# Patient Record
Sex: MALE | Race: WHITE | NOT HISPANIC OR LATINO | Employment: OTHER | URBAN - METROPOLITAN AREA
[De-identification: names, ages, dates, MRNs, and addresses within clinical notes are randomized per-mention and may not be internally consistent; named-entity substitution may affect disease eponyms.]

---

## 2017-01-12 ENCOUNTER — CLINICAL SUPPORT (OUTPATIENT)
Dept: FAMILY MEDICINE CLINIC | Facility: CLINIC | Age: 77
End: 2017-01-12

## 2017-01-12 DIAGNOSIS — D51.3 OTHER DIETARY VITAMIN B12 DEFICIENCY ANEMIA: Primary | ICD-10-CM

## 2017-01-12 PROCEDURE — 96372 THER/PROPH/DIAG INJ SC/IM: CPT | Performed by: FAMILY MEDICINE

## 2017-01-12 RX ADMIN — CYANOCOBALAMIN 1000 MCG: 1000 INJECTION, SOLUTION INTRAMUSCULAR; SUBCUTANEOUS at 08:55

## 2017-02-02 ENCOUNTER — OFFICE VISIT (OUTPATIENT)
Dept: FAMILY MEDICINE CLINIC | Facility: CLINIC | Age: 77
End: 2017-02-02

## 2017-02-02 VITALS
DIASTOLIC BLOOD PRESSURE: 64 MMHG | TEMPERATURE: 98.1 F | HEART RATE: 77 BPM | HEIGHT: 72 IN | WEIGHT: 249 LBS | SYSTOLIC BLOOD PRESSURE: 134 MMHG | RESPIRATION RATE: 16 BRPM | BODY MASS INDEX: 33.72 KG/M2

## 2017-02-02 DIAGNOSIS — J30.89 NON-SEASONAL ALLERGIC RHINITIS DUE TO OTHER ALLERGIC TRIGGER: ICD-10-CM

## 2017-02-02 DIAGNOSIS — M54.32 SCIATICA OF LEFT SIDE: Primary | ICD-10-CM

## 2017-02-02 DIAGNOSIS — G62.9 NEUROPATHY: ICD-10-CM

## 2017-02-02 PROCEDURE — 99214 OFFICE O/P EST MOD 30 MIN: CPT | Performed by: FAMILY MEDICINE

## 2017-02-02 RX ORDER — LEVOCETIRIZINE DIHYDROCHLORIDE 5 MG/1
5 TABLET, FILM COATED ORAL DAILY
Qty: 90 TABLET | Refills: 1 | Status: SHIPPED | OUTPATIENT
Start: 2017-02-02 | End: 2017-02-02 | Stop reason: SDUPTHER

## 2017-02-02 RX ORDER — GABAPENTIN 400 MG/1
400 CAPSULE ORAL 4 TIMES DAILY
Qty: 120 CAPSULE | Refills: 1 | Status: SHIPPED | OUTPATIENT
Start: 2017-02-02 | End: 2017-03-16 | Stop reason: SDUPTHER

## 2017-02-02 RX ORDER — LEVOCETIRIZINE DIHYDROCHLORIDE 5 MG/1
5 TABLET, FILM COATED ORAL DAILY
Qty: 90 TABLET | Refills: 1 | Status: SHIPPED | OUTPATIENT
Start: 2017-02-02 | End: 2017-06-23 | Stop reason: SDUPTHER

## 2017-02-02 RX ORDER — HYDROCODONE BITARTRATE AND ACETAMINOPHEN 5; 300 MG/1; MG/1
1 TABLET ORAL DAILY
Qty: 30 TABLET | Refills: 0 | Status: SHIPPED | OUTPATIENT
Start: 2017-02-02 | End: 2017-02-13 | Stop reason: SDUPTHER

## 2017-02-02 NOTE — PROGRESS NOTES
"Violet Gerber Sr. is a 76 y.o. male.     CC: Pain    History of Present Illness     Pt of Dr. Wild comes in today c/o widespread pain of the hips and legs/knees. Has metastatic renal cell carcinoma and sees oncology. He went on a cruise and \"walked too much\" and overdid it somewhat.  The right leg has improved somewhat, although he admits to \"really pushing myself\" with is hiking with his cruise and has had pain since.    The following portions of the patient's history were reviewed and updated as appropriate: allergies, current medications, past family history, past medical history, past social history, past surgical history and problem list.    Review of Systems   Constitutional: Negative for activity change, chills, fatigue and fever.   Respiratory: Negative for cough and chest tightness.    Cardiovascular: Negative for chest pain and palpitations.   Gastrointestinal: Negative for abdominal pain and nausea.   Endocrine: Negative for cold intolerance and polydipsia.   Musculoskeletal: Positive for back pain.        Knee pain   Psychiatric/Behavioral: Negative for behavioral problems and dysphoric mood.   All other systems reviewed and are negative.    Visit Vitals   • /64   • Pulse 77   • Temp 98.1 °F (36.7 °C) (Oral)   • Resp 16   • Ht 72\" (182.9 cm)   • Wt 249 lb (113 kg)   • BMI 33.77 kg/m2       Objective   Physical Exam   Constitutional: He appears well-developed and well-nourished.   Neck: Neck supple. No thyromegaly present.   Cardiovascular: Normal rate and regular rhythm.    No murmur heard.  Pulmonary/Chest: Effort normal and breath sounds normal.   Abdominal: Bowel sounds are normal.   Musculoskeletal: He exhibits tenderness (left SI joint).   Psychiatric: He has a normal mood and affect. His behavior is normal.   Nursing note and vitals reviewed.    The patient has read and signed the [facility] Controlled Substance Contract.  I will continue to see patient for regular " follow up appointments.  They are well controlled on their medication.  ISRAEL has been reviewed by me and is updated every 3 months. The patient is aware of the potential for addiction and dependence.    Assessment/Plan   Mg was seen today for bilateral hip pain, bilateral knee pain, bilateral pain from ankle to hips and pain left groin area.    Diagnoses and all orders for this visit:    Sciatica of left side  -     Hydrocodone-Acetaminophen (VICODIN) 5-300 MG per tablet; Take 1 tablet by mouth Daily. For 30 days    Non-seasonal allergic rhinitis due to other allergic trigger  -     levocetirizine (XYZAL) 5 MG tablet; Take 1 tablet by mouth Daily.    Neuropathy  -     gabapentin (NEURONTIN) 400 MG capsule; Take 1 capsule by mouth 4 (Four) Times a Day.    Other orders  -     Discontinue: levocetirizine (XYZAL) 5 MG tablet; Take 1 tablet by mouth Daily.

## 2017-02-08 ENCOUNTER — OFFICE VISIT (OUTPATIENT)
Dept: NEUROLOGY | Facility: CLINIC | Age: 77
End: 2017-02-08

## 2017-02-08 VITALS
DIASTOLIC BLOOD PRESSURE: 64 MMHG | OXYGEN SATURATION: 98 % | WEIGHT: 249 LBS | HEIGHT: 73 IN | HEART RATE: 75 BPM | BODY MASS INDEX: 33 KG/M2 | SYSTOLIC BLOOD PRESSURE: 130 MMHG

## 2017-02-08 DIAGNOSIS — M54.40 CHRONIC LEFT-SIDED LOW BACK PAIN WITH SCIATICA, SCIATICA LATERALITY UNSPECIFIED: ICD-10-CM

## 2017-02-08 DIAGNOSIS — R51.9 HEADACHE AROUND THE EYES: ICD-10-CM

## 2017-02-08 DIAGNOSIS — G89.29 CHRONIC LEFT-SIDED LOW BACK PAIN WITH SCIATICA, SCIATICA LATERALITY UNSPECIFIED: ICD-10-CM

## 2017-02-08 DIAGNOSIS — G31.84 MILD COGNITIVE IMPAIRMENT: ICD-10-CM

## 2017-02-08 PROCEDURE — 99214 OFFICE O/P EST MOD 30 MIN: CPT | Performed by: PSYCHIATRY & NEUROLOGY

## 2017-02-08 NOTE — PROGRESS NOTES
Subjective   Mg Gerber Sr. is a 76 y.o. male with history of memory impairment with headaches came for follow-up appointment.    History of Present Illness   He was accompanied by his wife and according to them, he has fluctuations in his memory but denies forgetting familiar names and complaint with the medication donepezil and denies any side effects and sepsis it's helping his symptoms.  Denies any hallucinations, resting tremor, cogwheel rigidity or shuffling type of gait.  Denies any worsening of the headaches and happening 2-3 times a month, pain is behind his eyes, pressure-like pain and associated with nausea but no photophobia or phonophobia and complaint with the medication gabapentin and denies any side effects.  Complaining of back pain issues and sometimes radiation of pain from the back to the left side but denies any nerve vision, double vision, weakness, tingling numbness, dizziness or balance problems when walking.  Denies any falls, passing out spells or recent hospitalizations since last visit.    The following portions of the patient's history were reviewed and updated as appropriate: allergies, current medications, past family history, past medical history, past social history, past surgical history and problem list.    Review of Systems   Constitutional: Negative for chills, fatigue and fever.   HENT: Positive for hearing loss. Negative for tinnitus and trouble swallowing.    Eyes: Negative for pain, itching and visual disturbance.   Respiratory: Positive for shortness of breath. Negative for cough and wheezing.    Gastrointestinal: Negative for diarrhea, nausea and vomiting.   Endocrine: Negative for cold intolerance and heat intolerance.   Genitourinary: Negative for decreased urine volume, difficulty urinating and urgency.   Musculoskeletal: Positive for back pain and gait problem (hurt leg, a week ago). Negative for neck pain.   Skin: Negative for rash and wound.    Allergic/Immunologic: Negative for environmental allergies and food allergies.   Neurological: Negative for dizziness, weakness, light-headedness, numbness and headaches.   Hematological: Negative for adenopathy.   Psychiatric/Behavioral: Positive for sleep disturbance. Negative for hallucinations.       Objective   Physical Exam  GENERAL EXAMINATION : Patient is well-developed, well-nourished, well-groomed, alert and approriate in no apparent distress.  HEENT: Normocephalic, normal funduscopic exam, no visible oral lesions.  NECK : Normal range of motion, no tenderness, no malalignment.  CARDIAC : Regular rate and rhythm, no murmurs.  CHEST : Clear to auscultation, bilateral.   No wheezing .  ABDOMEN : Soft, non tender and non distended. EXTREMITIES: No edema. SKIN : No rashes or lesions visible. MUSCULOSKELETAL : No muscle weakness or muscle pain. No joint pains. PSYCHIATRIC : Awake and follwoing verbal commands well.     NEUROLOGICAL EXAMINATION  :  Higher integrative functions : No aphasia or dysarthria.  MMSE 29/30.  CRANIAL NERVES : Cranial nerve II: Normal visual acuity and visual fields.  On funduscopic exam, discs are flat with sharp margins cranial nerves III, IV, VI: Extraocular movements are full without nystagmus; pupils are equal, round and reactive to light.  Cranial nerve V: Normal facial sensation and strength of muscles of mastication.  Cranial nerve: VII: Facial movements are symmetric.  No weakness.  Cranial nerve VIII: Auditory acuity is normal.  Cranial nerve IX, X: Symmetric palate movement.  Cranial nerve XI sternocleidomastoid and trapezius are normal.  Cranial nerve XII: Tongue in the midline with no atrophy or fasciculations.      MOTOR : Normal muscle strength and tone.  SENSATION : Normal to light touch, pinprick, vibration sensations intact and Romberg is negative.  MUSCLE STRETCH REFLEXES : Normal and symmetric in the upper extremities and lower extremities and the plantar responses  are flexor bilaterally. COORDINATION : Finger to nose test showed no dysmetria.  Rapid alternating movements are normal.   GAIT : Walks on heels, toes, except for difficulty with tandem walk.    Assessment/Plan   Mg was seen today for dizziness.    Diagnoses and all orders for this visit:    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, sciatica laterality unspecified  -     Ambulatory Referral to Physical Therapy Evaluate and treat    Mild cognitive impairment    Headache around the eyes     76-year-old right-handed white male with history of renal carcinoma status post nephrectomy with recent lung mass and getting immunotherapy once in 2 weeks with memory impairment and headaches came for follow-up appointment and complaining of back pain issues.  On exam, no new focal deficits were seen except for difficulty with tandem walk and MMSE 29/30.  Reviewed neuropsychological evaluation dated February 2016 and showed evidence of mild cognitive impairment and will change donepezil to Namzaric dose pack for 3 months period and continue Namzaric 28-10 milligrams 1 capsule by mouth daily and discussed about side effects.  Discussed about brain exercises to help with the memory issues.  Told him to continue gabapentin at the same dosage and discussed about side effects.  Discussed about fall precautions and ordered for physical therapy to help with the balance issues and back pain problems.  Will follow-up in 4 months or earlier if problems arise.    Thank you for allowing me to participate in the care of the patient.  Please feel free to call for any questions at your convenience.    Yours sincerely,    Diann Daley M.D

## 2017-02-13 ENCOUNTER — OFFICE VISIT (OUTPATIENT)
Dept: FAMILY MEDICINE CLINIC | Facility: CLINIC | Age: 77
End: 2017-02-13

## 2017-02-13 VITALS
DIASTOLIC BLOOD PRESSURE: 61 MMHG | WEIGHT: 249 LBS | HEIGHT: 72 IN | HEART RATE: 66 BPM | SYSTOLIC BLOOD PRESSURE: 124 MMHG | TEMPERATURE: 97 F | BODY MASS INDEX: 33.72 KG/M2 | OXYGEN SATURATION: 93 %

## 2017-02-13 DIAGNOSIS — M54.40 CHRONIC LEFT-SIDED LOW BACK PAIN WITH SCIATICA, SCIATICA LATERALITY UNSPECIFIED: Primary | ICD-10-CM

## 2017-02-13 DIAGNOSIS — M54.32 SCIATICA OF LEFT SIDE: ICD-10-CM

## 2017-02-13 DIAGNOSIS — M19.90 ARTHRITIS: ICD-10-CM

## 2017-02-13 DIAGNOSIS — D51.3 OTHER DIETARY VITAMIN B12 DEFICIENCY ANEMIA: ICD-10-CM

## 2017-02-13 DIAGNOSIS — G89.29 CHRONIC LEFT-SIDED LOW BACK PAIN WITH SCIATICA, SCIATICA LATERALITY UNSPECIFIED: Primary | ICD-10-CM

## 2017-02-13 DIAGNOSIS — M25.552 PAIN OF LEFT HIP JOINT: ICD-10-CM

## 2017-02-13 PROCEDURE — 99214 OFFICE O/P EST MOD 30 MIN: CPT | Performed by: FAMILY MEDICINE

## 2017-02-13 PROCEDURE — 96372 THER/PROPH/DIAG INJ SC/IM: CPT | Performed by: FAMILY MEDICINE

## 2017-02-13 RX ORDER — HYDROCODONE BITARTRATE AND ACETAMINOPHEN 5; 300 MG/1; MG/1
TABLET ORAL
Qty: 60 TABLET | Refills: 0
Start: 2017-02-13 | End: 2017-03-16 | Stop reason: SDUPTHER

## 2017-02-13 RX ADMIN — CYANOCOBALAMIN 1000 MCG: 1000 INJECTION, SOLUTION INTRAMUSCULAR; SUBCUTANEOUS at 08:24

## 2017-02-13 NOTE — PROGRESS NOTES
Subjective   Mg Gerber Sr. is a 76 y.o. male.     History of Present Illness   Chief Complaint:   Chief Complaint   Patient presents with   • Back Pain   • B12 Injection       Mg Gerber Sr. 76 y.o. male who presents today for Medical Management of the below listed issues and medication refills. He is needing a refill on his pain medication. He is having a lot of back, hip and leg pain. He is currently in PT for this. He saw tania Walton for this and used the vicodin already. He was warned to only take vicodin  Only 1/2 tablet q 6hrs prn pain. He can try flexeril 5mg prn. He starts pt this week.  he has a history of   Patient Active Problem List   Diagnosis   • Anemia, vitamin B12 deficiency   • Arthritis   • Hemochromatosis   • Herpes zoster   • Hip pain   • Hypertension   • Cancer   • COPD (chronic obstructive pulmonary disease)   • Left low back pain   • Dizziness   • Headache around the eyes   • Mild cognitive impairment   • Renal cell carcinoma   • Anxiety   • Malignant neoplasm of lung   • Neuropathy   • Perennial allergic rhinitis   .  Since the last visit, he has overall felt well.  he has been compliant with   Current Outpatient Prescriptions:   •  ALPRAZolam (XANAX) 0.25 MG tablet, Take 1 tablet by mouth daily as needed for anxiety., Disp: 30 tablet, Rfl: 0  •  amLODIPine (NORVASC) 5 MG tablet, Take 5 mg by mouth Daily., Disp: , Rfl:   •  cyclobenzaprine (FLEXERIL) 5 MG tablet, Take 1 tablet by mouth 3 (Three) Times a Day As Needed for muscle spasms., Disp: 90 tablet, Rfl: 1  •  donepezil (ARICEPT) 10 MG tablet, Take 1 tablet by mouth Daily., Disp: 30 tablet, Rfl: 1  •  folic acid (FOLVITE) 1 MG tablet, Take 1 mg by mouth daily., Disp: , Rfl:   •  gabapentin (NEURONTIN) 400 MG capsule, Take 1 capsule by mouth 4 (Four) Times a Day., Disp: 120 capsule, Rfl: 1  •  Hydrocodone-Acetaminophen (VICODIN) 5-300 MG per tablet, Take 1 tablet by mouth Daily. For 30 days (Patient taking differently: Take 1  "tablet by mouth Every 6 (Six) Hours As Needed for moderate pain (4-6). For 30 days), Disp: 30 tablet, Rfl: 0  •  levocetirizine (XYZAL) 5 MG tablet, Take 1 tablet by mouth Daily., Disp: 90 tablet, Rfl: 1  •  levothyroxine (SYNTHROID, LEVOTHROID) 50 MCG tablet, Take 50 mcg by mouth Daily., Disp: , Rfl:   •  promethazine (PHENERGAN) 25 MG tablet, Take 1 tablet (25 mg total) by mouth daily., Disp: 30 tablet, Rfl: 1  •  raNITIdine (ZANTAC) 150 MG tablet, Take 150 mg by mouth Daily., Disp: , Rfl:   •  SPIRIVA HANDIHALER 18 MCG per inhalation capsule, Place 1 capsule into inhaler and inhale Daily., Disp: , Rfl:   •  VENTOLIN  (90 BASE) MCG/ACT inhaler, Inhale 2 puffs Every 4 (Four) Hours As Needed., Disp: , Rfl:   •  VOTRIENT 200 MG chemo tablet, Take 800 mg by mouth daily., Disp: , Rfl:   •  XARELTO 20 MG tablet, Take 20 mg by mouth daily with dinner., Disp: , Rfl:     Current Facility-Administered Medications:   •  cyanocobalamin injection 1,000 mcg, 1,000 mcg, Intramuscular, Q30 Days, Dillon Marsh MD, 1,000 mcg at 01/12/17 0855.  he denies medication side effects.    All of the chronic condition(s) listed above are stable w/o issues.    Visit Vitals   • /61   • Pulse 66   • Temp 97 °F (36.1 °C)   • Ht 72\" (182.9 cm)   • Wt 249 lb (113 kg)   • SpO2 93%   • BMI 33.77 kg/m2             The following portions of the patient's history were reviewed and updated as appropriate: allergies, current medications, past family history, past medical history, past social history, past surgical history and problem list.    Review of Systems   Constitutional: Negative for activity change, appetite change and unexpected weight change.   HENT: Negative for nosebleeds and trouble swallowing.    Eyes: Negative for pain and visual disturbance.   Respiratory: Negative for chest tightness, shortness of breath and wheezing.    Cardiovascular: Negative for chest pain and palpitations.   Gastrointestinal: Negative for abdominal " pain and blood in stool.   Endocrine: Negative.    Genitourinary: Negative for difficulty urinating and hematuria.   Musculoskeletal: Positive for arthralgias and back pain. Negative for joint swelling.   Skin: Negative for color change and rash.   Allergic/Immunologic: Negative.    Neurological: Negative for syncope and speech difficulty.   Hematological: Negative for adenopathy.   Psychiatric/Behavioral: Negative for agitation and confusion.   All other systems reviewed and are negative.      Objective   Physical Exam   Constitutional: He appears well-developed and well-nourished.   HENT:   Head: Normocephalic and atraumatic.   Eyes: EOM are normal. Pupils are equal, round, and reactive to light.   Neck: Normal range of motion.   Cardiovascular: Normal rate.    Pulmonary/Chest: Effort normal and breath sounds normal.   Abdominal: Soft.   Musculoskeletal:   Left pevis pain and pain left posterior  Buttock and left posterior muscles tight. Can walk but with pain. No neuro deficit   Psychiatric: He has a normal mood and affect. His behavior is normal. Judgment and thought content normal.   Nursing note reviewed.      Assessment/Plan   Mg was seen today for back pain and b12 injection.    Diagnoses and all orders for this visit:    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, sciatica laterality unspecified    Arthritis    Pain of left hip joint    Sciatica of left side  -     Hydrocodone-Acetaminophen (VICODIN) 5-300 MG per tablet; 1/2 tablet q 6 hours prn pain    Other dietary vitamin B12 deficiency anemia

## 2017-02-13 NOTE — PATIENT INSTRUCTIONS
Patients must be seen every 3 months for their presription medication review and refill required by [place] law.  Patient has been advised on the potential for addiction  and dependence to their prescribed scheduled medication.  ISRAEL was obtained today and reviewed. This was scanned into the patient's chart. Told patient only use norco  5mg  1/2 q 6 hours prn pain  See me 1 month   Start pt this week

## 2017-02-15 ENCOUNTER — TELEPHONE (OUTPATIENT)
Dept: NEUROLOGY | Facility: CLINIC | Age: 77
End: 2017-02-15

## 2017-02-15 NOTE — TELEPHONE ENCOUNTER
----- Message from Radha Calabrese sent at 2/15/2017 10:16 AM EST -----  PT SAID THAT DR CALLEJAS GAVE HIM 3 SAMPLES. PT SAID THAT HE HAS 2 TYPES OF CANCER AND HE HAND DELIVERED THE SAMPLES TO HIS DR DOWN AT THE [facility]. PT SAID THE DOCTOR TOLD HIM THAT IT WAS OKAY FOR HIM TO TAKE THE MEDICATION, BUT THE DOCTOR TOLD HIM THAT DR CALLEJAS HAS TO WATCH HIS LIVER LEVELS.  PT WANTS TO BE CALLED [phone]

## 2017-02-16 ENCOUNTER — TREATMENT (OUTPATIENT)
Dept: PHYSICAL THERAPY | Facility: CLINIC | Age: 77
End: 2017-02-16

## 2017-02-16 DIAGNOSIS — G89.29 CHRONIC LEFT-SIDED LOW BACK PAIN WITH LEFT-SIDED SCIATICA: Primary | ICD-10-CM

## 2017-02-16 DIAGNOSIS — M54.42 CHRONIC LEFT-SIDED LOW BACK PAIN WITH LEFT-SIDED SCIATICA: Primary | ICD-10-CM

## 2017-02-16 DIAGNOSIS — R26.9 GAIT DIFFICULTY: ICD-10-CM

## 2017-02-16 PROCEDURE — 97110 THERAPEUTIC EXERCISES: CPT | Performed by: PHYSICAL THERAPIST

## 2017-02-16 PROCEDURE — G8979 MOBILITY GOAL STATUS: HCPCS | Performed by: PHYSICAL THERAPIST

## 2017-02-16 PROCEDURE — G8978 MOBILITY CURRENT STATUS: HCPCS | Performed by: PHYSICAL THERAPIST

## 2017-02-16 PROCEDURE — 97140 MANUAL THERAPY 1/> REGIONS: CPT | Performed by: PHYSICAL THERAPIST

## 2017-02-16 PROCEDURE — 97161 PT EVAL LOW COMPLEX 20 MIN: CPT | Performed by: PHYSICAL THERAPIST

## 2017-02-16 NOTE — PROGRESS NOTES
"Physical Therapy Initial Evaluation and Plan of Care    Patient: Mg Gerber .   : 1940  Diagnosis/ICD-10 Code:  Chronic left-sided low back pain with left-sided sciatica [M54.42, G89.29]  Referring practitioner: Diann Daley MD  Past medical Hx reviewed: 2017  Subjective Evaluation    History of Present Illness  Date of onset: 2017  Mechanism of injury: I have L lower back pain with pain in both legs. The L side is worse.  I get some pain in the groin and in the thigh.  I do have symptoms of neuropathy but not disease.  I take medication for that.      The last week of January went on an excursion while on vacation and walked about 3 miles.  The terrain was uneven and was rough.  By the time I got back it was severe back pain.  When I got back home the pain continued to get worse and decided to go to the doctor.      I started doing some home exercises from previous PT episodes and seemed to help for a little, then it got a little worse.  I eventually just laid in bed for several days and that seemed to help some.  Rest seems to be the most helpful activity.  Movement seems to help my symptoms.    I still have to use the cane to help get around.      The back just feels like it needs knots to be worked out.  Able to drive and sit okay.      PLOF: pain was about 2/10 with normal activity and with cane.  I could also walk about 15-20 min.       Quality of life: fair (Due to injury)    Pain  At worst pain rating: 10 (Brief periods of time.  )  Location: L leg and thigh into the back.  Some mm knots in the back.    Quality: sharp (L leg initially felt like to leg was going to \"snap in half\" )  Relieving factors: rest, relaxation and medications  Aggravating factors: movement (Walking.  Transfers/transitions.  )  Progression: improved (Mostly due to rest.  and medication. )    Social Support  Lives in: multiple-level home (Steps are difficult but has to use them. )  Lives with: " spouse    Diagnostic Tests  X-ray: abnormal  Abnormal MRI: None since recent injury.      Treatments  Previous treatment: physical therapy, medication and home therapy  Current treatment: medication  Patient Goals  Patient goals for therapy: decreased pain, increased strength, independence with ADLs/IADLs, improved balance and increased motion  Patient goal: improved ability to ambulate and transfer.       Objective     Palpation   Left   Muscle spasm in the erector spinae and quadratus lumborum.   Tenderness of the erector spinae and quadratus lumborum.   Trigger point to erector spinae.     Additional Palpation Details  L gluteals, piriformis mm TTP and mm spasm.      Active Range of Motion     Lumbar   Flexion: 40 (in sitting) degrees   Extension: 15 (in sitting) degrees   Left lateral flexion: 30 (Pain with pain into leg.  ) degrees   Right lateral flexion: 33 (Pulling pain on L side.  ) degrees   Left rotation: 50 degrees   Right rotation: 55 degrees     Strength/Myotome Testing     Left Hip   Planes of Motion   Flexion: 4-  Abduction: 4- (hip pain)  Adduction: 5  External rotation: 4- (pain )  Internal rotation: 4+ (pain)    Right Hip   Planes of Motion   Flexion: 4  Abduction: 4-  Adduction: 5  External rotation: 4+  Internal rotation: 5    Left Knee   Flexion: 4+  Extension: 4    Right Knee   Flexion: 4+  Extension: 5    Left Ankle/Foot   Dorsiflexion: 5  Plantar flexion: 4+    Right Ankle/Foot   Dorsiflexion: 5  Plantar flexion: 4+    Tests     Lumbar     Left   Positive passive SLR.     Ambulation   Weight-Bearing Status   Weight-Bearing Status (Left): weight-bearing as tolerated   Pain with standing on L.    Weight-Bearing Status (Right): weight-bearing as tolerated    Assistive device used: single point cane    Ambulation: Level Surfaces   Ambulation with assistive device: independent  Ambulation without assistive device: minimum assist    Ambulation: Stairs   Ascend stairs: independent  Pattern:  non-reciprocal  Railings: one rail  Descend stairs: independent  Pattern: non-reciprocal  Railings: one rail  Curbs: hand held    Observational Gait   Gait: antalgic and asymmetric   Decreased walking speed, stride length, left stance time and left step length.   Left foot contact pattern: foot flat  Left arm swing: decreased  Base of support: increased    Quality of Movement During Gait     Pelvis    Pelvis (Left): Positive Trendelenburg.     Functional Assessment     Single Leg Stance   Left: 0 (Unable) seconds       Assessment & Plan     Assessment  Impairments: abnormal gait, abnormal or restricted ROM, activity intolerance, impaired physical strength, lacks appropriate home exercise program and pain with function  Assessment details: Pt presents to PT with signs and symptoms consistent with referral for back pain associated with sciatica.  Pt would benefit from skilled PT intervention to address noted deficits.    Barriers to therapy: Chronicity of symptoms.  Significant Co-morbidities.    Prognosis: fair  Prognosis details:           SHORT TERM GOALS: 4-5 visits        1. Patient to be compliant and progression of HEP                             2.  Pain level < 5/10 at worst with mentioned activities to improve function.  3. Increased thoracic, lumbar and SIJ mobility to allow for increased lumbar AROM with less pain<6/10.    4. Increased lumbar AROM to by 25% in all planes to allow for increased ease with sit-stand transfers and bed transfers without pain > 5/10.      LONG TERM GOALS:  8-10 visits   1.Pt. to score < 20 % on Back Index  2. Pain level < 2/10 with all listed activities to return to normal  3. Lumbar AROM to WFL to allow for return to household & recreational activities w/o increased symptoms.   4. (B) LE and lower abdominal strength to 5-/5 to allow for pushing, pulling and activities to occur without pain (driving, sitting, household).        Goals  improved ability to ambulate and transfer.       Plan  Therapy options: will be seen for skilled physical therapy services  Planned modality interventions: thermotherapy (hydrocollator packs), TENS and cryotherapy  Planned therapy interventions: manual therapy, neuromuscular re-education, postural training, soft tissue mobilization, joint mobilization, home exercise program, gait training, functional ROM exercises, flexibility, balance/weight-bearing training, strengthening, stretching, therapeutic activities and transfer training  Frequency: 2x week  Duration in weeks: 6  Treatment plan discussed with: patient    Manual Therapy:    15     mins  30213;  Therapeutic Exercise:    10     mins  29598;     Neuromuscular Devin:    -    mins  20113;    Therapeutic Activity:     -     mins  85429;     Gait Training:      -     mins  39491;     Ultrasound:     -     mins  25363;    Electrical Stimulation:    -     mins  69923 ( );  Dry Needling     -     mins self-pay    Timed Treatment:   25   mins   Total Treatment:     65   mins    PT SIGNATURE: PHILIPPE Willson License #: [id number]    DATE TREATMENT INITIATED: 2/16/2017    Medicare Initial Certification  Certification Period: 5/17/2017  I certify that the therapy services are furnished while this patient is under my care.  The services outlined above are required by this patient, and will be reviewed every 90 days.     PHYSICIAN: Diann Daley MD      DATE:     Please sign and return via fax to [phone].. Thank you, [facility].

## 2017-02-16 NOTE — PATIENT INSTRUCTIONS
HEP provided to patient with picture handouts.  Exercise to be performed 2-3x/day daily. Pt demonstrated good understanding and agreed with established plan.

## 2017-02-20 ENCOUNTER — TELEPHONE (OUTPATIENT)
Dept: NEUROLOGY | Facility: CLINIC | Age: 77
End: 2017-02-20

## 2017-02-20 ENCOUNTER — TREATMENT (OUTPATIENT)
Dept: PHYSICAL THERAPY | Facility: CLINIC | Age: 77
End: 2017-02-20

## 2017-02-20 DIAGNOSIS — M54.42 CHRONIC LEFT-SIDED LOW BACK PAIN WITH LEFT-SIDED SCIATICA: Primary | ICD-10-CM

## 2017-02-20 DIAGNOSIS — G89.29 CHRONIC LEFT-SIDED LOW BACK PAIN WITH LEFT-SIDED SCIATICA: Primary | ICD-10-CM

## 2017-02-20 DIAGNOSIS — R26.9 GAIT DIFFICULTY: ICD-10-CM

## 2017-02-20 PROCEDURE — 97110 THERAPEUTIC EXERCISES: CPT | Performed by: PHYSICAL THERAPIST

## 2017-02-20 PROCEDURE — 97140 MANUAL THERAPY 1/> REGIONS: CPT | Performed by: PHYSICAL THERAPIST

## 2017-02-20 NOTE — PROGRESS NOTES
Physical Therapy Daily Progress Note  Visits:2    Subjective : Mg Gerber reports: Feeling better overall. I'm on my cane only and getting weight on the L leg more, but still hurts.    Objective:  L lumbar paraspinals more restricted than R.    See Exercise, Manual, and Modality Logs for complete treatment.   Assessment/Plan:  Pt continues to report that hip mobilization with belt is very helpful in relieving groin discomfort, further indicating L hip dysfunction Vs. Lumbar spine.  Will continue to track progress.  If hip/groin does not resolve, may consult MD for hip examination.      Progress per Plan of Care       Manual Therapy:    15     mins  19420;  Therapeutic Exercise:    20     mins  68066;     Neuromuscular Devin:    -    mins  57422;    Therapeutic Activity:     -     mins  99583;     Gait Trainin     mins  81320;     Ultrasound:     -     mins  29629;    Electrical Stimulation:    -     mins  45310 ( );  Dry Needling     -     mins self-pay    Timed Treatment:   40   mins   Total Treatment:     65   mins    PHILIPPE Willson License #[id number]    Physical Therapist

## 2017-02-20 NOTE — TELEPHONE ENCOUNTER
----- Message from Madelyn Radford sent at 2/17/2017  4:12 PM EST -----  Pt called again and is upset because he hasn't heard back from us.  He needs to get his medication refilled Monday if he is to continue taking it but it's very expensive to refill.    He says he is going to call back Monday if he doesn't hear anything.

## 2017-02-21 NOTE — TELEPHONE ENCOUNTER
Called the patient and talked to him and told him to start Namzaric dose pack for 3 month period and lefta message at DR. Montes De Oca's office.    Call Dr. Nadia Montes De Oca's office at [facility] and inform them that they can monitor LFT's every month.

## 2017-02-23 ENCOUNTER — TREATMENT (OUTPATIENT)
Dept: PHYSICAL THERAPY | Facility: CLINIC | Age: 77
End: 2017-02-23

## 2017-02-23 DIAGNOSIS — M54.42 CHRONIC LEFT-SIDED LOW BACK PAIN WITH LEFT-SIDED SCIATICA: Primary | ICD-10-CM

## 2017-02-23 DIAGNOSIS — R26.9 GAIT DIFFICULTY: ICD-10-CM

## 2017-02-23 DIAGNOSIS — G89.29 CHRONIC LEFT-SIDED LOW BACK PAIN WITH LEFT-SIDED SCIATICA: Primary | ICD-10-CM

## 2017-02-23 PROCEDURE — 97110 THERAPEUTIC EXERCISES: CPT | Performed by: PHYSICAL THERAPIST

## 2017-02-23 PROCEDURE — 97140 MANUAL THERAPY 1/> REGIONS: CPT | Performed by: PHYSICAL THERAPIST

## 2017-02-23 NOTE — PROGRESS NOTES
Physical Therapy Daily Progress Note  Visits:3    Subjective : Mg Gerber reports: I'm a little stiff this morning and it seems that the more I walk and stand, the more pain I get in the leg and groin.  When I rest, it seems to be better.  I use a sit down cart at the grocery.    Objective: Ambulating with SPC, decreased WB'ing on L LE.   See Exercise, Manual, and Modality Logs for complete treatment.   Assessment/Plan:  Pt tolerated treatment fair.  We are still avoiding much WB'ing activity due to the irritation it causes.  Hip flexor stretching did prove helpful for him.  Emphasis on light stretching.  We are considering some hip dysunction in addition to back pain vs. Radicular pain from back into the hip.      Progress per Plan of Care     Manual Therapy:    12     mins  64011;  Therapeutic Exercise:    40     mins  00164;     Neuromuscular Devin:    -    mins  20402;    Therapeutic Activity:     -     mins  55950;     Gait Training:      -     mins  25720;     Ultrasound:     -     mins  70811;    Electrical Stimulation:    -     mins  84548 ( );  Dry Needling     -     mins self-pay    Timed Treatment:   52   mins   Total Treatment:     62   mins    PHILIPPE Willson License #[id number]    Physical Therapist

## 2017-02-27 ENCOUNTER — TREATMENT (OUTPATIENT)
Dept: PHYSICAL THERAPY | Facility: CLINIC | Age: 77
End: 2017-02-27

## 2017-02-27 DIAGNOSIS — G89.29 CHRONIC LEFT-SIDED LOW BACK PAIN WITH LEFT-SIDED SCIATICA: Primary | ICD-10-CM

## 2017-02-27 DIAGNOSIS — M54.42 CHRONIC LEFT-SIDED LOW BACK PAIN WITH LEFT-SIDED SCIATICA: Primary | ICD-10-CM

## 2017-02-27 DIAGNOSIS — R26.9 GAIT DIFFICULTY: ICD-10-CM

## 2017-02-27 PROCEDURE — 97140 MANUAL THERAPY 1/> REGIONS: CPT | Performed by: PHYSICAL THERAPIST

## 2017-02-27 PROCEDURE — 97110 THERAPEUTIC EXERCISES: CPT | Performed by: PHYSICAL THERAPIST

## 2017-02-27 NOTE — PROGRESS NOTES
Physical Therapy Daily Progress Note  Visits:4    Subjective : Mg Gerber reports: Having more groin pain today.  I laid around a lot this weekend because it seems to be the only thing that helps.  I'm having more pain in the thigh too.   Objective :  Noticably more antalgia noted today with gait.  Heavy use of cane.    See Exercise, Manual, and Modality Logs for complete treatment.   Assessment/Plan: Today's visit was significantly modified due to his symptoms being worse.  What was previously very helpful (hip mobilzations and lumbar passive stretching) seemed to bother him today. He was instructed to call referring physician's office to set up appointment/ consult for further examination and possible imaging due to worsening symptoms.     Other:  Continue to treat for symptom alleviation until seen by MD.         Manual Therapy:    15     mins  79126;  Therapeutic Exercise:    20     mins  53463;     Neuromuscular Devin:    -    mins  74942;    Therapeutic Activity:     -     mins  35505;     Gait Training:      -     mins  22156;     Ultrasound:     -     mins  83212;    Electrical Stimulation:    -     mins  15151 ( );  Dry Needling     -     mins self-pay    Timed Treatment:   35   mins   Total Treatment:     50   mins    PHILIPPE Willson License #[id number]    Physical Therapist

## 2017-03-01 ENCOUNTER — TELEPHONE (OUTPATIENT)
Dept: NEUROLOGY | Facility: CLINIC | Age: 77
End: 2017-03-01

## 2017-03-06 ENCOUNTER — TELEPHONE (OUTPATIENT)
Dept: NEUROLOGY | Facility: CLINIC | Age: 77
End: 2017-03-06

## 2017-03-06 DIAGNOSIS — G89.29 CHRONIC LEFT-SIDED LOW BACK PAIN WITH SCIATICA, SCIATICA LATERALITY UNSPECIFIED: Primary | ICD-10-CM

## 2017-03-06 DIAGNOSIS — M54.40 CHRONIC LEFT-SIDED LOW BACK PAIN WITH SCIATICA, SCIATICA LATERALITY UNSPECIFIED: Primary | ICD-10-CM

## 2017-03-06 NOTE — TELEPHONE ENCOUNTER
Reviewed physical therapist notes and inform the patient to call me for worsening of his symptoms and to take an appointment earlier.

## 2017-03-06 NOTE — TELEPHONE ENCOUNTER
Called the patient and discussed about MRI of the lumbar spine but his oncologist ordered for MRI of the hip and lower lumbar spine and scheduled to get it done tomorrow and will review the report and will consider further evaluation depending on MRI reports.

## 2017-03-06 NOTE — TELEPHONE ENCOUNTER
Reviewed his previous records and ordered for MRI of the lumbar spine without contrast to evaluate for low back pain issues with sciatica and informed the patient that I ordered for MRI of the lumbar spine and told him to call me if he has any questions.

## 2017-03-06 NOTE — TELEPHONE ENCOUNTER
----- Message from Radha Calabrese sent at 3/3/2017  4:21 PM EST -----  Pt says he's in a lot of pain. [phone] cell phone or [phone] home phone preferably call the home phone. Pt said if  doesn't want to treat him anymore let him know so that he could find another doctor. Pt said he is very upset with our office. Pt said that he's had to go to his oncology doctor and they ordered him a MRI for his hip. Pt said that he been taking a lot of medications trying to relieve some of the pain.

## 2017-03-10 ENCOUNTER — TELEPHONE (OUTPATIENT)
Dept: NEUROLOGY | Facility: CLINIC | Age: 77
End: 2017-03-10

## 2017-03-10 NOTE — TELEPHONE ENCOUNTER
----- Message from Radha Calabrese sent at 3/10/2017  2:48 PM EST -----  Contact: [phone]  PT HAD HIS MRI @ [facility] 2/28. PT SAID THAT DR. MENDOZA CALLED AND GAVE PT HIS RESULTS. PT SAID THAT DR. MENDOZA IS REFERRING HIM TO A ORTHOPEDIC SURGEON BECAUSE OF WHAT WAS SEEN ON THE TEST. PT WOULD LIKE TO TALK TO . CALL AND LEAVE MESSAGE ON HIS ANSWERING MACHINE.

## 2017-03-13 ENCOUNTER — TELEPHONE (OUTPATIENT)
Dept: NEUROLOGY | Facility: CLINIC | Age: 77
End: 2017-03-13

## 2017-03-13 NOTE — TELEPHONE ENCOUNTER
Called the patient and discussed about MRI of the lumbar spine and MRI hip report and patient is scheduled to see orthopedic surgeon soon to evaluate for lower back issues and hip issues and told him to call me for any questions.

## 2017-03-13 NOTE — TELEPHONE ENCOUNTER
Called the patient to discuss about his MRI and left a message to call back. I need the results of MRI Hip and Lumbar spine form [facility].

## 2017-03-13 NOTE — TELEPHONE ENCOUNTER
----- Message from Radha Calabrese sent at 3/13/2017 10:55 AM EDT -----  Contact: [phone]  PT WILL BE GONE ALL DAY TOMORROW FOR ABOUT 5HRS AT THE [facility]. PT WAS JUST RETURNING DR. CALLEJAS'S PHONE CALL, THAT HE MISSED.

## 2017-03-16 ENCOUNTER — OFFICE VISIT (OUTPATIENT)
Dept: FAMILY MEDICINE CLINIC | Facility: CLINIC | Age: 77
End: 2017-03-16

## 2017-03-16 VITALS
RESPIRATION RATE: 16 BRPM | BODY MASS INDEX: 33.86 KG/M2 | OXYGEN SATURATION: 95 % | SYSTOLIC BLOOD PRESSURE: 130 MMHG | WEIGHT: 250 LBS | HEART RATE: 71 BPM | DIASTOLIC BLOOD PRESSURE: 66 MMHG | TEMPERATURE: 97.5 F | HEIGHT: 72 IN

## 2017-03-16 DIAGNOSIS — M54.40 CHRONIC LEFT-SIDED LOW BACK PAIN WITH SCIATICA, SCIATICA LATERALITY UNSPECIFIED: ICD-10-CM

## 2017-03-16 DIAGNOSIS — E53.8 VITAMIN B12 DEFICIENCY: Primary | ICD-10-CM

## 2017-03-16 DIAGNOSIS — M54.32 SCIATICA OF LEFT SIDE: ICD-10-CM

## 2017-03-16 DIAGNOSIS — G89.29 CHRONIC LEFT-SIDED LOW BACK PAIN WITH SCIATICA, SCIATICA LATERALITY UNSPECIFIED: ICD-10-CM

## 2017-03-16 DIAGNOSIS — G62.9 NEUROPATHY: ICD-10-CM

## 2017-03-16 PROCEDURE — 96372 THER/PROPH/DIAG INJ SC/IM: CPT | Performed by: FAMILY MEDICINE

## 2017-03-16 PROCEDURE — 99214 OFFICE O/P EST MOD 30 MIN: CPT | Performed by: FAMILY MEDICINE

## 2017-03-16 RX ORDER — HYDROCODONE BITARTRATE AND ACETAMINOPHEN 5; 300 MG/1; MG/1
TABLET ORAL
Qty: 90 TABLET | Refills: 0 | Status: SHIPPED | OUTPATIENT
Start: 2017-03-16 | End: 2017-06-23 | Stop reason: SDUPTHER

## 2017-03-16 RX ORDER — GABAPENTIN 400 MG/1
400 CAPSULE ORAL 4 TIMES DAILY
Qty: 120 CAPSULE | Refills: 5 | Status: SHIPPED | OUTPATIENT
Start: 2017-03-16 | End: 2017-09-22 | Stop reason: SDUPTHER

## 2017-03-16 RX ADMIN — CYANOCOBALAMIN 1000 MCG: 1000 INJECTION, SOLUTION INTRAMUSCULAR; SUBCUTANEOUS at 08:24

## 2017-03-16 NOTE — PROGRESS NOTES
Subjective   Mg Gerber Sr. is a 76 y.o. male.     History of Present Illness   Chief Complaint:   Chief Complaint   Patient presents with   • Back Pain   • Sciatica       Mg Gerber Sr. 76 y.o. male who presents today for a 4 week follow up for back pain with sciatica. HIs pain is a 6 out of 10 today. He had a B 12 injection in the office today. I reviewed his lab results. The patient has read and signed the [facility] Controlled Substance Contract.  I will continue to see patient for regular follow up appointments.  They are well controlled on their medication.  ISRAEL has been reviewed by me and is updated every 3 months. He also brought in x-ray reports and i said to follow up with that  Specialist. i will increase hydrocodone to 1 q 8hours. The patient is aware of the potential for addiction and dependence.    he has a history of   Patient Active Problem List   Diagnosis   • Anemia, vitamin B12 deficiency   • Arthritis   • Hemochromatosis   • Herpes zoster   • Hip pain   • Hypertension   • Cancer   • COPD (chronic obstructive pulmonary disease)   • Left low back pain   • Dizziness   • Headache around the eyes   • Mild cognitive impairment   • Renal cell carcinoma   • Anxiety   • Malignant neoplasm of lung   • Neuropathy   • Perennial allergic rhinitis   .  Since the last visit, he has overall felt well.  he has been compliant with   Current Outpatient Prescriptions:   •  ALPRAZolam (XANAX) 0.25 MG tablet, Take 1 tablet by mouth daily as needed for anxiety., Disp: 30 tablet, Rfl: 0  •  amLODIPine (NORVASC) 5 MG tablet, Take 5 mg by mouth Daily., Disp: , Rfl:   •  cyclobenzaprine (FLEXERIL) 5 MG tablet, Take 1 tablet by mouth 3 (Three) Times a Day As Needed for muscle spasms., Disp: 90 tablet, Rfl: 1  •  donepezil (ARICEPT) 10 MG tablet, Take 1 tablet by mouth Daily., Disp: 30 tablet, Rfl: 1  •  folic acid (FOLVITE) 1 MG tablet, Take 1 mg by mouth daily., Disp: , Rfl:   •  gabapentin (NEURONTIN)  "400 MG capsule, Take 1 capsule by mouth 4 (Four) Times a Day., Disp: 120 capsule, Rfl: 1  •  Hydrocodone-Acetaminophen (VICODIN) 5-300 MG per tablet, 1/2 tablet q 6 hours prn pain, Disp: 60 tablet, Rfl: 0  •  levocetirizine (XYZAL) 5 MG tablet, Take 1 tablet by mouth Daily., Disp: 90 tablet, Rfl: 1  •  levothyroxine (SYNTHROID, LEVOTHROID) 50 MCG tablet, Take 50 mcg by mouth Daily., Disp: , Rfl:   •  Memantine HCl-Donepezil HCl 7 & 14 & 21 &28 -10 MG capsule extended-release 24 hour therapy, Take  by mouth., Disp: , Rfl:   •  promethazine (PHENERGAN) 25 MG tablet, Take 1 tablet (25 mg total) by mouth daily., Disp: 30 tablet, Rfl: 1  •  raNITIdine (ZANTAC) 150 MG tablet, Take 150 mg by mouth Daily., Disp: , Rfl:   •  SPIRIVA HANDIHALER 18 MCG per inhalation capsule, Place 1 capsule into inhaler and inhale Daily., Disp: , Rfl:   •  VENTOLIN  (90 BASE) MCG/ACT inhaler, Inhale 2 puffs Every 4 (Four) Hours As Needed., Disp: , Rfl:   •  VOTRIENT 200 MG chemo tablet, Take 800 mg by mouth daily., Disp: , Rfl:   •  XARELTO 20 MG tablet, Take 20 mg by mouth daily with dinner., Disp: , Rfl:     Current Facility-Administered Medications:   •  cyanocobalamin injection 1,000 mcg, 1,000 mcg, Intramuscular, Q30 Days, Dillon Marsh MD, 1,000 mcg at 03/16/17 0824.  he denies medication side effects.    All of the chronic condition(s) listed above are stable w/o issues.    Visit Vitals   • /66   • Pulse 71   • Temp 97.5 °F (36.4 °C) (Oral)   • Resp 16   • Ht 72\" (182.9 cm)   • Wt 250 lb (113 kg)   • SpO2 95%   • BMI 33.91 kg/m2       Results for orders placed or performed in visit on 12/18/15   PTH, intact   Result Value Ref Range    PTH, Intact 16 15 - 65 pg/mL         The following portions of the patient's history were reviewed and updated as appropriate: allergies, current medications, past family history, past medical history, past social history, past surgical history and problem list.    Review of Systems "   Constitutional: Negative for activity change, appetite change and unexpected weight change.   Eyes: Negative for visual disturbance.   Respiratory: Negative for chest tightness and shortness of breath.    Cardiovascular: Negative for chest pain and palpitations.   Musculoskeletal: Positive for back pain.   Skin: Negative for color change.   Neurological: Negative for syncope and speech difficulty.   Psychiatric/Behavioral: Negative for confusion and decreased concentration.       Objective   Physical Exam   Constitutional: He is oriented to person, place, and time. He appears well-developed and well-nourished.   HENT:   Head: Normocephalic.   Eyes: EOM are normal.   Neck: Normal range of motion. Neck supple.   Cardiovascular: Normal rate and regular rhythm.    Pulmonary/Chest: Effort normal and breath sounds normal.   Abdominal: Soft.   Musculoskeletal:   Pain left back and hip   Pain is severe see reports   Neurological: He is alert and oriented to person, place, and time.   Psychiatric: He has a normal mood and affect. His behavior is normal.   Nursing note and vitals reviewed.      Assessment/Plan   Mg was seen today for back pain, sciatica and b12 injection.    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

## 2017-03-16 NOTE — PATIENT INSTRUCTIONS
Patients must be seen every 3 months for their presription medication review and refill required by [place] law.  Patient has been advised on the potential for addiction  and dependence to their prescribed scheduled medication.  ISRAEL was obtained today and reviewed. This was scanned into the patient's chart.

## 2017-03-20 ENCOUNTER — TREATMENT (OUTPATIENT)
Dept: PHYSICAL THERAPY | Facility: CLINIC | Age: 77
End: 2017-03-20

## 2017-03-20 ENCOUNTER — TELEPHONE (OUTPATIENT)
Dept: NEUROLOGY | Facility: CLINIC | Age: 77
End: 2017-03-20

## 2017-03-20 DIAGNOSIS — M54.42 CHRONIC LEFT-SIDED LOW BACK PAIN WITH LEFT-SIDED SCIATICA: Primary | ICD-10-CM

## 2017-03-20 DIAGNOSIS — G89.29 CHRONIC LEFT-SIDED LOW BACK PAIN WITH LEFT-SIDED SCIATICA: Primary | ICD-10-CM

## 2017-03-20 DIAGNOSIS — R26.9 GAIT DIFFICULTY: ICD-10-CM

## 2017-03-20 PROCEDURE — G0283 ELEC STIM OTHER THAN WOUND: HCPCS | Performed by: PHYSICAL THERAPIST

## 2017-03-20 PROCEDURE — 97110 THERAPEUTIC EXERCISES: CPT | Performed by: PHYSICAL THERAPIST

## 2017-03-20 NOTE — TELEPHONE ENCOUNTER
----- Message from Madelyn Radford sent at 3/20/2017  2:38 PM EDT -----  Contact: [phone]  Pt says Dr. Daley wanted to speak with him after he met with the doctor at the [facility].    He would like to talk to her about what they've told him.

## 2017-03-20 NOTE — PROGRESS NOTES
Physical Therapy Progress Note  Visits:5    Subjective : Mg Gerber reports: The L hip/ groin pain isn't nearly as painful as it used to be over the last week.  The pain seems to have moved to the thigh, buttock area, and down the inside of the leg and into the ankle.  I have a tough time standing too long and the leg feels like it's going to snap.  1/10 pain in rest, transition to standing: 10/10 and then 6-7/10 after walking a little.  Pain increases after about 200 ft of walking.  Still using cane and taking pain medication to address the pain.      Objective :  No objective measurements taken today.  Significant pain demonstrated.     Gait: Noted increased antalgia and decreased stance time on the L.    -See Exercise, Manual, and Modality Logs for complete treatment.     Assessment/Plan: Extensive patient education performed today to attempt to explain the findings of his diagnostic imaging of the hips and lumbar spine.  Pt had several questions and concerns about his current status.  He was informed that due to the extent of dysfunction and degeneration of hip and spinal structures, PT would only be a means of pain control.  We did apply TENS unit and Ice to the hip and lumbar spine today, in which he reported significant pain relief.  No TE progression or manual TE performed today.      Other: Follow up PRN as he completes medical intervention.       Manual Therapy:    -     mins  38880;  Therapeutic Exercise:    12     mins  43792;     Neuromuscular Devin:    -    mins  33940;    Therapeutic Activity:     -     mins  33743;     Gait Training:      -     mins  56955;     Ultrasound:     -     mins  23563;    Electrical Stimulation:    15     mins  86281 ( );  Dry Needling     -     mins self-pay    Timed Treatment:   12   mins   Total Treatment:     45   mins    PHILIPPE Willson License #[id number]    Physical Therapist

## 2017-03-22 ENCOUNTER — OFFICE VISIT (OUTPATIENT)
Dept: FAMILY MEDICINE CLINIC | Facility: CLINIC | Age: 77
End: 2017-03-22

## 2017-03-22 ENCOUNTER — TELEPHONE (OUTPATIENT)
Dept: PHYSICAL THERAPY | Facility: CLINIC | Age: 77
End: 2017-03-22

## 2017-03-22 VITALS
WEIGHT: 246 LBS | BODY MASS INDEX: 33.32 KG/M2 | HEART RATE: 96 BPM | RESPIRATION RATE: 16 BRPM | DIASTOLIC BLOOD PRESSURE: 74 MMHG | SYSTOLIC BLOOD PRESSURE: 138 MMHG | TEMPERATURE: 98.7 F | HEIGHT: 72 IN | OXYGEN SATURATION: 97 %

## 2017-03-22 DIAGNOSIS — M25.551 RIGHT HIP PAIN: Primary | ICD-10-CM

## 2017-03-22 DIAGNOSIS — Z01.818 PREOPERATIVE CLEARANCE: ICD-10-CM

## 2017-03-22 PROCEDURE — 99214 OFFICE O/P EST MOD 30 MIN: CPT | Performed by: FAMILY MEDICINE

## 2017-03-22 NOTE — PROGRESS NOTES
Subjective   Mg Gerber Sr. is a 76 y.o. male.     History of Present Illness   Chief Complaint:   Chief Complaint   Patient presents with   • Hip Pain     referral to Cardiology for surgery clearance       Mg Gerber Sr. 76 y.o. male who presents today for a referral to Cardiology for surgery clearance. He was referred by Dr. Montes De Oca to be seen by Dr. Estevez for his left hip pain. He was informed that there are 3 possibilities: cancer, Fx, or a tumor. They stated that the top of the femur is dying. They are going to run a biopsy first. It is waiting to be scheduled once he gets the clearance from the pulmonologist and cardiologist.  His pain is a 5-6 out of 10 today. He stated if he was not on his pain medication it would be a 10+.    he has a history of   Patient Active Problem List   Diagnosis   • Anemia, vitamin B12 deficiency   • Arthritis   • Hemochromatosis   • Herpes zoster   • Hip pain   • Hypertension   • Cancer   • COPD (chronic obstructive pulmonary disease)   • Left low back pain   • Dizziness   • Headache around the eyes   • Mild cognitive impairment   • Renal cell carcinoma   • Anxiety   • Malignant neoplasm of lung   • Neuropathy   • Perennial allergic rhinitis   • Vitamin B12 deficiency   .  Since the last visit, he has overall felt well.  he has been compliant with   Current Outpatient Prescriptions:   •  ALPRAZolam (XANAX) 0.25 MG tablet, Take 1 tablet by mouth daily as needed for anxiety., Disp: 30 tablet, Rfl: 0  •  amLODIPine (NORVASC) 5 MG tablet, Take 5 mg by mouth Daily., Disp: , Rfl:   •  cyclobenzaprine (FLEXERIL) 5 MG tablet, Take 1 tablet by mouth 3 (Three) Times a Day As Needed for muscle spasms., Disp: 90 tablet, Rfl: 1  •  donepezil (ARICEPT) 10 MG tablet, Take 1 tablet by mouth Daily., Disp: 30 tablet, Rfl: 1  •  folic acid (FOLVITE) 1 MG tablet, Take 1 mg by mouth daily., Disp: , Rfl:   •  gabapentin (NEURONTIN) 400 MG capsule, Take 1 capsule by mouth 4 (Four) Times a  "Day., Disp: 120 capsule, Rfl: 5  •  Hydrocodone-Acetaminophen (VICODIN) 5-300 MG per tablet, 1/2 tablet q 4 hours prn pain, Disp: 90 tablet, Rfl: 0  •  levocetirizine (XYZAL) 5 MG tablet, Take 1 tablet by mouth Daily., Disp: 90 tablet, Rfl: 1  •  levothyroxine (SYNTHROID, LEVOTHROID) 50 MCG tablet, Take 50 mcg by mouth Daily., Disp: , Rfl:   •  Memantine HCl-Donepezil HCl 7 & 14 & 21 &28 -10 MG capsule extended-release 24 hour therapy, Take  by mouth., Disp: , Rfl:   •  promethazine (PHENERGAN) 25 MG tablet, Take 1 tablet (25 mg total) by mouth daily., Disp: 30 tablet, Rfl: 1  •  raNITIdine (ZANTAC) 150 MG tablet, Take 150 mg by mouth Daily., Disp: , Rfl:   •  SPIRIVA HANDIHALER 18 MCG per inhalation capsule, Place 1 capsule into inhaler and inhale Daily., Disp: , Rfl:   •  VENTOLIN  (90 BASE) MCG/ACT inhaler, Inhale 2 puffs Every 4 (Four) Hours As Needed., Disp: , Rfl:   •  VOTRIENT 200 MG chemo tablet, Take 800 mg by mouth daily., Disp: , Rfl:   •  XARELTO 20 MG tablet, Take 20 mg by mouth daily with dinner., Disp: , Rfl:     Current Facility-Administered Medications:   •  cyanocobalamin injection 1,000 mcg, 1,000 mcg, Intramuscular, Q30 Days, Dillon Marsh MD, 1,000 mcg at 03/16/17 0824.  he denies medication side effects.    All of the chronic condition(s) listed above are stable w/o issues.    /74  Pulse 96  Temp 98.7 °F (37.1 °C) (Oral)   Resp 16  Ht 72\" (182.9 cm)  Wt 246 lb (112 kg)  SpO2 97%  BMI 33.36 kg/m2    The following portions of the patient's history were reviewed and updated as appropriate: allergies, current medications, past family history, past social history, past surgical history and problem list.    Review of Systems   Constitutional: Negative for activity change, appetite change and unexpected weight change.   Eyes: Negative for visual disturbance.   Respiratory: Negative for chest tightness and shortness of breath.    Cardiovascular: Negative for chest pain and " palpitations.   Musculoskeletal:        Right hip pain   Skin: Negative for color change.   Neurological: Negative for syncope and speech difficulty.   Psychiatric/Behavioral: Negative for confusion and decreased concentration.       Objective   Physical Exam   Constitutional: He is oriented to person, place, and time. He appears well-developed and well-nourished.   HENT:   Head: Atraumatic.   Mouth/Throat: Oropharynx is clear and moist.   Eyes: EOM are normal. Pupils are equal, round, and reactive to light.   Neck: Normal range of motion. Neck supple. No thyromegaly present.   Cardiovascular: Normal rate and regular rhythm.    Pulmonary/Chest: Effort normal and breath sounds normal.   Abdominal: Soft.   Musculoskeletal: Normal range of motion.   Walking with walker  Pain left hip   Neurological: He is alert and oriented to person, place, and time.   Skin: Skin is warm and dry.   Psychiatric: He has a normal mood and affect. His behavior is normal.   Nursing note and vitals reviewed.      Assessment/Plan   Mg was seen today for hip pain.    Diagnoses and all orders for this visit:    Right hip pain  -     Ambulatory Referral to Cardiology    Preoperative clearance  -     Ambulatory Referral to Cardiology    Other orders  -     Cancel: Ambulatory Referral to Cardiology

## 2017-03-22 NOTE — PATIENT INSTRUCTIONS
Exercise 30 minutes most days of the week  Sleep 6-8 hours each night if possible  Low fat, low cholesterol diet   we discussed prescribed medications and how to take them   make sure you get results of any labs/studies ordered today  Low glycemic index diet

## 2017-03-24 ENCOUNTER — TRANSCRIBE ORDERS (OUTPATIENT)
Dept: CARDIOLOGY | Facility: CLINIC | Age: 77
End: 2017-03-24

## 2017-03-24 ENCOUNTER — OFFICE VISIT (OUTPATIENT)
Dept: CARDIOLOGY | Facility: CLINIC | Age: 77
End: 2017-03-24

## 2017-03-24 VITALS
SYSTOLIC BLOOD PRESSURE: 128 MMHG | HEART RATE: 78 BPM | DIASTOLIC BLOOD PRESSURE: 70 MMHG | WEIGHT: 251 LBS | HEIGHT: 73 IN | BODY MASS INDEX: 33.27 KG/M2

## 2017-03-24 DIAGNOSIS — R94.31 ABNORMAL ECG: Primary | ICD-10-CM

## 2017-03-24 DIAGNOSIS — R06.02 SHORTNESS OF BREATH: ICD-10-CM

## 2017-03-24 DIAGNOSIS — R94.31 ABNORMAL ELECTROCARDIOGRAM: Primary | ICD-10-CM

## 2017-03-24 PROCEDURE — 93000 ELECTROCARDIOGRAM COMPLETE: CPT | Performed by: INTERNAL MEDICINE

## 2017-03-24 PROCEDURE — 99203 OFFICE O/P NEW LOW 30 MIN: CPT | Performed by: INTERNAL MEDICINE

## 2017-03-24 NOTE — PROGRESS NOTES
Subjective:     Encounter Date:03/24/2017      Patient ID: Mg Gerber Sr. is a 76 y.o. male.    Chief Complaint:  Shortness of Breath   This is a recurrent problem. The current episode started more than 1 year ago. The problem has been unchanged. Associated symptoms include wheezing. Pertinent negatives include no chest pain, leg pain, leg swelling, orthopnea, PND or syncope.   [id number] who presents today for perioperative risk assessment for his hip.  Patient has multiple cardiac issues including hypertension COPD and pulmonary embolisms and sleep apnea.  His EKG showed some nonspecific ST-T segment abnormalities.  Due to his hip pain he is very immobile.  He walked in with a walker today.  He also carries a history of neuropathy hemochromatosis and vitamin B12 deficiency.  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  He was seen today for perioperative risk assessment.      Review of Systems   Cardiovascular: Negative for chest pain, leg swelling, orthopnea, paroxysmal nocturnal dyspnea and syncope.   Respiratory: Positive for shortness of breath and wheezing.          ECG 12 Lead  Date/Time: 3/24/2017 1:22 PM  Performed by: SPENCER FLORES  Authorized by: SPENCER FLORES   Comparison: compared with previous ECG from 4/24/2015  Similar to previous ECG  Rhythm: sinus rhythm  ST Elevation: V4, V5 and V6  Other findings: LVH with strain  Clinical impression: non-specific ECG               Objective:     Physical Exam   Constitutional: He is oriented to person, place, and time. He appears well-developed and well-nourished. No distress.   HENT:   Head: Normocephalic.   Eyes: Conjunctivae are normal. Pupils are equal, round, and reactive to light. No scleral icterus.   Neck: Normal carotid pulses, no hepatojugular reflux and no JVD present. Carotid bruit is not present. No tracheal deviation, no edema and no erythema present. No thyromegaly present.   Cardiovascular: Normal  rate, regular rhythm, S1 normal, S2 normal, normal heart sounds and intact distal pulses.   No extrasystoles are present. PMI is not displaced.  Exam reveals no gallop, no distant heart sounds and no friction rub.    No murmur heard.  Pulses:       Carotid pulses are 2+ on the right side, and 2+ on the left side.       Radial pulses are 2+ on the right side, and 2+ on the left side.        Femoral pulses are 2+ on the right side, and 2+ on the left side.       Dorsalis pedis pulses are 2+ on the right side, and 2+ on the left side.        Posterior tibial pulses are 2+ on the right side, and 2+ on the left side.   Pulmonary/Chest: Effort normal and breath sounds normal. No respiratory distress. He has no decreased breath sounds. He has no wheezes. He has no rhonchi. He has no rales. He exhibits no tenderness.   Abdominal: Soft. Bowel sounds are normal. He exhibits no distension and no mass. There is no hepatosplenomegaly. There is no tenderness. There is no rebound and no guarding.       Musculoskeletal: He exhibits no edema, tenderness or deformity.   Neurological: He is alert and oriented to person, place, and time.   Skin: Skin is warm and dry. No rash noted. He is not diaphoretic. No cyanosis or erythema. No pallor. Nails show no clubbing.   Psychiatric: He has a normal mood and affect. His speech is normal and behavior is normal. Judgment and thought content normal.       Lab Review:       Assessment:          Diagnosis Plan   1. Abnormal ECG  Stress Test With Pet Myocardial Perfusion (Multi-Study)    Adult Transthoracic Echo Complete   2. Shortness of breath  Stress Test With Pet Myocardial Perfusion (Multi-Study)    Adult Transthoracic Echo Complete          Plan:       1.  Patient presents with shortness of breath as well as perioperative risk assessment for a hip surgery.  Patient is unable to walk significantly because of his hip.  I therefore try to set him up for a PET study but he did not meet the  current insurance qualifications.  His body habitus is such he would optimize his study.  Due to orthopedic issues it'll be a Lexiscan perfusion study.  If the stress test is unremarkable then clear for surgery.  2.  Shortness of breath known pulmonary embolisms and COPD.  I'm when a set up for an echocardiogram to reassess his valve status as well as look for pulmonary hypertension.  3.  If echo and stress test is unremarkable I would proceed with surgery as clinically indicated.  We will do this on Monday to try to get answer as soon as possible

## 2017-03-27 ENCOUNTER — HOSPITAL ENCOUNTER (OUTPATIENT)
Dept: CARDIOLOGY | Facility: HOSPITAL | Age: 77
Discharge: HOME | End: 2017-03-27
Attending: INTERNAL MEDICINE

## 2017-03-27 ENCOUNTER — APPOINTMENT (OUTPATIENT)
Dept: CARDIOLOGY | Facility: HOSPITAL | Age: 77
End: 2017-03-27
Attending: INTERNAL MEDICINE

## 2017-03-27 ENCOUNTER — TELEPHONE (OUTPATIENT)
Dept: CARDIOLOGY | Facility: CLINIC | Age: 77
End: 2017-03-27

## 2017-03-27 ENCOUNTER — HOSPITAL ENCOUNTER (OUTPATIENT)
Dept: CARDIOLOGY | Facility: HOSPITAL | Age: 77
Discharge: HOME | End: 2017-03-27
Attending: INTERNAL MEDICINE | Admitting: INTERNAL MEDICINE

## 2017-03-27 VITALS
HEART RATE: 64 BPM | SYSTOLIC BLOOD PRESSURE: 140 MMHG | DIASTOLIC BLOOD PRESSURE: 64 MMHG | WEIGHT: 250 LBS | BODY MASS INDEX: 33.13 KG/M2 | HEIGHT: 73 IN

## 2017-03-27 DIAGNOSIS — R06.02 SHORTNESS OF BREATH: ICD-10-CM

## 2017-03-27 DIAGNOSIS — R94.31 ABNORMAL ELECTROCARDIOGRAM: ICD-10-CM

## 2017-03-27 DIAGNOSIS — R94.31 ABNORMAL ECG: ICD-10-CM

## 2017-03-27 LAB
BH CV ECHO MEAS - ACS: 2.1 CM
BH CV ECHO MEAS - AO MAX PG (FULL): 11.2 MMHG
BH CV ECHO MEAS - AO MAX PG: 16.8 MMHG
BH CV ECHO MEAS - AO MEAN PG (FULL): 5.6 MMHG
BH CV ECHO MEAS - AO MEAN PG: 8.6 MMHG
BH CV ECHO MEAS - AO ROOT AREA (BSA CORRECTED): 1.2
BH CV ECHO MEAS - AO ROOT AREA: 6.6 CM^2
BH CV ECHO MEAS - AO ROOT DIAM: 2.9 CM
BH CV ECHO MEAS - AO V2 MAX: 205 CM/SEC
BH CV ECHO MEAS - AO V2 MEAN: 133.6 CM/SEC
BH CV ECHO MEAS - AO V2 VTI: 42.8 CM
BH CV ECHO MEAS - AVA(I,A): 2.4 CM^2
BH CV ECHO MEAS - AVA(I,D): 2.4 CM^2
BH CV ECHO MEAS - AVA(V,A): 2.3 CM^2
BH CV ECHO MEAS - AVA(V,D): 2.3 CM^2
BH CV ECHO MEAS - BSA(HAYCOCK): 2.4 M^2
BH CV ECHO MEAS - BSA: 2.4 M^2
BH CV ECHO MEAS - BZI_BMI: 33 KILOGRAMS/M^2
BH CV ECHO MEAS - BZI_METRIC_HEIGHT: 185.4 CM
BH CV ECHO MEAS - BZI_METRIC_WEIGHT: 113.4 KG
BH CV ECHO MEAS - CONTRAST EF (2CH): 62.9 ML/M^2
BH CV ECHO MEAS - CONTRAST EF 4CH: 63.2 ML/M^2
BH CV ECHO MEAS - EDV(CUBED): 188.8 ML
BH CV ECHO MEAS - EDV(MOD-SP2): 70 ML
BH CV ECHO MEAS - EDV(MOD-SP4): 68 ML
BH CV ECHO MEAS - EDV(TEICH): 162.4 ML
BH CV ECHO MEAS - EF(CUBED): 81 %
BH CV ECHO MEAS - EF(MOD-SP2): 62.9 %
BH CV ECHO MEAS - EF(MOD-SP4): 63.2 %
BH CV ECHO MEAS - EF(TEICH): 72.9 %
BH CV ECHO MEAS - ESV(CUBED): 35.8 ML
BH CV ECHO MEAS - ESV(MOD-SP2): 26 ML
BH CV ECHO MEAS - ESV(MOD-SP4): 25 ML
BH CV ECHO MEAS - ESV(TEICH): 44 ML
BH CV ECHO MEAS - FS: 42.6 %
BH CV ECHO MEAS - IVS/LVPW: 1
BH CV ECHO MEAS - IVSD: 1.2 CM
BH CV ECHO MEAS - LAT PEAK E' VEL: 10 CM/SEC
BH CV ECHO MEAS - LV DIASTOLIC VOL/BSA (35-75): 28.7 ML/M^2
BH CV ECHO MEAS - LV MASS(C)D: 285.1 GRAMS
BH CV ECHO MEAS - LV MASS(C)DI: 120.5 GRAMS/M^2
BH CV ECHO MEAS - LV MAX PG: 5.6 MMHG
BH CV ECHO MEAS - LV MEAN PG: 2.9 MMHG
BH CV ECHO MEAS - LV SYSTOLIC VOL/BSA (12-30): 10.6 ML/M^2
BH CV ECHO MEAS - LV V1 MAX: 117.9 CM/SEC
BH CV ECHO MEAS - LV V1 MEAN: 78.3 CM/SEC
BH CV ECHO MEAS - LV V1 VTI: 25.9 CM
BH CV ECHO MEAS - LVIDD: 5.7 CM
BH CV ECHO MEAS - LVIDS: 3.3 CM
BH CV ECHO MEAS - LVLD AP2: 7.2 CM
BH CV ECHO MEAS - LVLD AP4: 6.9 CM
BH CV ECHO MEAS - LVLS AP2: 5.9 CM
BH CV ECHO MEAS - LVLS AP4: 5.4 CM
BH CV ECHO MEAS - LVOT AREA (M): 3.8 CM^2
BH CV ECHO MEAS - LVOT AREA: 4 CM^2
BH CV ECHO MEAS - LVOT DIAM: 2.2 CM
BH CV ECHO MEAS - LVPWD: 1.2 CM
BH CV ECHO MEAS - MED PEAK E' VEL: 8 CM/SEC
BH CV ECHO MEAS - MV A DUR: 0.12 SEC
BH CV ECHO MEAS - MV A MAX VEL: 83.9 CM/SEC
BH CV ECHO MEAS - MV DEC SLOPE: 516 CM/SEC^2
BH CV ECHO MEAS - MV DEC TIME: 0.19 SEC
BH CV ECHO MEAS - MV E MAX VEL: 98 CM/SEC
BH CV ECHO MEAS - MV E/A: 1.2
BH CV ECHO MEAS - MV MAX PG: 4 MMHG
BH CV ECHO MEAS - MV MEAN PG: 2.6 MMHG
BH CV ECHO MEAS - MV P1/2T MAX VEL: 99.9 CM/SEC
BH CV ECHO MEAS - MV P1/2T: 56.7 MSEC
BH CV ECHO MEAS - MV V2 MAX: 100.4 CM/SEC
BH CV ECHO MEAS - MV V2 MEAN: 77.6 CM/SEC
BH CV ECHO MEAS - MV V2 VTI: 33.6 CM
BH CV ECHO MEAS - MVA P1/2T LCG: 2.2 CM^2
BH CV ECHO MEAS - MVA(P1/2T): 3.9 CM^2
BH CV ECHO MEAS - MVA(VTI): 3.1 CM^2
BH CV ECHO MEAS - PA MAX PG (FULL): 2.8 MMHG
BH CV ECHO MEAS - PA MAX PG: 5.1 MMHG
BH CV ECHO MEAS - PA V2 MAX: 112.7 CM/SEC
BH CV ECHO MEAS - PULM A REVS DUR: 0.12 SEC
BH CV ECHO MEAS - PULM A REVS VEL: 37.8 CM/SEC
BH CV ECHO MEAS - PULM DIAS VEL: 63.5 CM/SEC
BH CV ECHO MEAS - PULM S/D: 0.87
BH CV ECHO MEAS - PULM SYS VEL: 55.3 CM/SEC
BH CV ECHO MEAS - PVA(V,A): 2.8 CM^2
BH CV ECHO MEAS - PVA(V,D): 2.8 CM^2
BH CV ECHO MEAS - QP/QS: 0.79
BH CV ECHO MEAS - RAP SYSTOLE: 3 MMHG
BH CV ECHO MEAS - RV MAX PG: 2.3 MMHG
BH CV ECHO MEAS - RV MEAN PG: 1.4 MMHG
BH CV ECHO MEAS - RV V1 MAX: 75.7 CM/SEC
BH CV ECHO MEAS - RV V1 MEAN: 56.4 CM/SEC
BH CV ECHO MEAS - RV V1 VTI: 19.3 CM
BH CV ECHO MEAS - RVOT AREA: 4.2 CM^2
BH CV ECHO MEAS - RVOT DIAM: 2.3 CM
BH CV ECHO MEAS - RVSP: 22 MMHG
BH CV ECHO MEAS - SI(AO): 120.3 ML/M^2
BH CV ECHO MEAS - SI(CUBED): 64.7 ML/M^2
BH CV ECHO MEAS - SI(LVOT): 43.5 ML/M^2
BH CV ECHO MEAS - SI(MOD-SP2): 18.6 ML/M^2
BH CV ECHO MEAS - SI(MOD-SP4): 18.2 ML/M^2
BH CV ECHO MEAS - SI(TEICH): 50.1 ML/M^2
BH CV ECHO MEAS - SUP REN AO DIAM: 2.1 CM
BH CV ECHO MEAS - SV(AO): 284.7 ML
BH CV ECHO MEAS - SV(CUBED): 153 ML
BH CV ECHO MEAS - SV(LVOT): 102.8 ML
BH CV ECHO MEAS - SV(MOD-SP2): 44 ML
BH CV ECHO MEAS - SV(MOD-SP4): 43 ML
BH CV ECHO MEAS - SV(RVOT): 80.8 ML
BH CV ECHO MEAS - SV(TEICH): 118.4 ML
BH CV ECHO MEAS - TAPSE (>1.6): 2.1 CM2
BH CV ECHO MEAS - TR MAX VEL: 218.5 CM/SEC
BH CV NUCLEAR PRIOR STUDY: 3
BH CV STRESS BP STAGE 1: NORMAL
BH CV STRESS COMMENTS STAGE 1: NORMAL
BH CV STRESS DOSE REGADENOSON STAGE 1: 0.4
BH CV STRESS DURATION MIN STAGE 1: 0
BH CV STRESS DURATION SEC STAGE 1: 15
BH CV STRESS HR STAGE 1: 84
BH CV STRESS PROTOCOL 1: NORMAL
BH CV STRESS RECOVERY BP: NORMAL MMHG
BH CV STRESS RECOVERY HR: 76 BPM
BH CV STRESS STAGE 1: 1
BH CV XLRA - RV BASE: 3.3 CM
BH CV XLRA - TDI S': 15 CM/SEC
E/E' RATIO: 12
LEFT ATRIUM VOLUME INDEX: 32 ML/M2
LEFT ATRIUM VOLUME: 77 CM3
LV EF 2D ECHO EST: 63 %
LV EF NUC BP: 66 %
MAXIMAL PREDICTED HEART RATE: 143 BPM
PERCENT MAX PREDICTED HR: 58.74 %
STRESS BASELINE BP: NORMAL MMHG
STRESS BASELINE HR: 64 BPM
STRESS PERCENT HR: 69 %
STRESS POST EXERCISE DUR SEC: 15 SEC
STRESS POST PEAK BP: NORMAL MMHG
STRESS POST PEAK HR: 84 BPM
STRESS TARGET HR: 122 BPM

## 2017-03-27 PROCEDURE — 25010000002 REGADENOSON 0.4 MG/5ML SOLUTION: Performed by: INTERNAL MEDICINE

## 2017-03-27 PROCEDURE — 93016 CV STRESS TEST SUPVJ ONLY: CPT | Performed by: INTERNAL MEDICINE

## 2017-03-27 PROCEDURE — 93018 CV STRESS TEST I&R ONLY: CPT | Performed by: INTERNAL MEDICINE

## 2017-03-27 PROCEDURE — A9502 TC99M TETROFOSMIN: HCPCS | Performed by: INTERNAL MEDICINE

## 2017-03-27 PROCEDURE — 93306 TTE W/DOPPLER COMPLETE: CPT

## 2017-03-27 PROCEDURE — 0 TECHNETIUM TETROFOSMIN KIT: Performed by: INTERNAL MEDICINE

## 2017-03-27 PROCEDURE — 78452 HT MUSCLE IMAGE SPECT MULT: CPT | Performed by: INTERNAL MEDICINE

## 2017-03-27 PROCEDURE — 93306 TTE W/DOPPLER COMPLETE: CPT | Performed by: INTERNAL MEDICINE

## 2017-03-27 PROCEDURE — 78452 HT MUSCLE IMAGE SPECT MULT: CPT

## 2017-03-27 PROCEDURE — 93017 CV STRESS TEST TRACING ONLY: CPT

## 2017-03-27 RX ADMIN — REGADENOSON 0.4 MG: 0.08 INJECTION, SOLUTION INTRAVENOUS at 09:02

## 2017-03-27 RX ADMIN — TETROFOSMIN 1 DOSE: 1.38 INJECTION, POWDER, LYOPHILIZED, FOR SOLUTION INTRAVENOUS at 07:40

## 2017-03-27 RX ADMIN — TETROFOSMIN 1 DOSE: 1.38 INJECTION, POWDER, LYOPHILIZED, FOR SOLUTION INTRAVENOUS at 09:02

## 2017-03-27 NOTE — TELEPHONE ENCOUNTER
Pt left a msg at the  this morning and said that you wanted to know at his visit this past Friday if the walker was his or his wife's.  He said the walker is his, but he was only using it since March 22nd.  The pain in his hip is due mainly from a cracked hip, tumor or cancer.  Before the pain happened he was a very active person and did not use a walker or cane.  He needs this surgery so he can be active again.         Thanks,    Tammy

## 2017-03-28 ENCOUNTER — TELEPHONE (OUTPATIENT)
Dept: CARDIOLOGY | Facility: CLINIC | Age: 77
End: 2017-03-28

## 2017-03-28 NOTE — TELEPHONE ENCOUNTER
Pt wants to know if you called him to give him the results of his stress tests and the other test?  He said someone called & his answering machine isn't working properly-please call back.  He's waiting to be cleared for surgery.      Thanks,  Tammy

## 2017-04-24 ENCOUNTER — CLINICAL SUPPORT (OUTPATIENT)
Dept: FAMILY MEDICINE CLINIC | Facility: CLINIC | Age: 77
End: 2017-04-24

## 2017-04-24 DIAGNOSIS — E53.8 VITAMIN B12 DEFICIENCY: Primary | ICD-10-CM

## 2017-04-24 PROCEDURE — 96372 THER/PROPH/DIAG INJ SC/IM: CPT | Performed by: FAMILY MEDICINE

## 2017-04-24 RX ADMIN — CYANOCOBALAMIN 1000 MCG: 1000 INJECTION, SOLUTION INTRAMUSCULAR; SUBCUTANEOUS at 09:59

## 2017-05-01 ENCOUNTER — TELEPHONE (OUTPATIENT)
Dept: NEUROLOGY | Facility: CLINIC | Age: 77
End: 2017-05-01

## 2017-05-24 ENCOUNTER — CLINICAL SUPPORT (OUTPATIENT)
Dept: FAMILY MEDICINE CLINIC | Facility: CLINIC | Age: 77
End: 2017-05-24

## 2017-05-24 DIAGNOSIS — E53.8 VITAMIN B12 DEFICIENCY: Primary | ICD-10-CM

## 2017-05-24 PROCEDURE — 96372 THER/PROPH/DIAG INJ SC/IM: CPT | Performed by: FAMILY MEDICINE

## 2017-05-24 RX ADMIN — CYANOCOBALAMIN 1000 MCG: 1000 INJECTION, SOLUTION INTRAMUSCULAR; SUBCUTANEOUS at 09:14

## 2017-06-08 ENCOUNTER — OFFICE VISIT (OUTPATIENT)
Dept: FAMILY MEDICINE CLINIC | Facility: CLINIC | Age: 77
End: 2017-06-08

## 2017-06-08 VITALS
WEIGHT: 248 LBS | TEMPERATURE: 97.8 F | HEIGHT: 73 IN | BODY MASS INDEX: 32.87 KG/M2 | SYSTOLIC BLOOD PRESSURE: 120 MMHG | HEART RATE: 65 BPM | OXYGEN SATURATION: 95 % | DIASTOLIC BLOOD PRESSURE: 60 MMHG | RESPIRATION RATE: 16 BRPM

## 2017-06-08 DIAGNOSIS — R07.81 RIB PAIN ON RIGHT SIDE: Primary | ICD-10-CM

## 2017-06-08 DIAGNOSIS — Z85.118 HISTORY OF LUNG CANCER: ICD-10-CM

## 2017-06-08 PROCEDURE — 99213 OFFICE O/P EST LOW 20 MIN: CPT | Performed by: PHYSICIAN ASSISTANT

## 2017-06-08 PROCEDURE — 71101 X-RAY EXAM UNILAT RIBS/CHEST: CPT | Performed by: PHYSICIAN ASSISTANT

## 2017-06-08 NOTE — PROGRESS NOTES
Subjective   Mg Gerber Sr. is a 76 y.o. male.     History of Present Illness   Mg Gerber Sr. 76 y.o. male who presents today for sore area in ribs for 3 weeks and trauma 3 weeks ago with putting up w/c.  He has lung cancer and seeing pulm and oncologist.  Going to PT and want him to have rib xray;  Still having pain in distal costal margin.  Right knee pain--he has ortho  he has a history of   Patient Active Problem List   Diagnosis   • Anemia, vitamin B12 deficiency   • Arthritis   • Hemochromatosis   • Herpes zoster   • Hip pain   • Hypertension   • Cancer   • COPD (chronic obstructive pulmonary disease)   • Left low back pain   • Dizziness   • Headache around the eyes   • Mild cognitive impairment   • Renal cell carcinoma   • Anxiety   • Malignant neoplasm of lung   • Neuropathy   • Perennial allergic rhinitis   • Vitamin B12 deficiency   .  Today is first day no pain with breathing;  Still ROM pain; cannot lay on it and sore to touch    X-Ray  Interpretation report in house X-rays that I personally viewed    Relevant Clinical Issues/Diagnoses/Indications:  Rib trauma right ant costal margin        Clinical Findings:  No definite fx or displacement of ribs;  Lung right is expanded          Comparative Data:  none          Date of Previous X-ray:  Change on current X-ray    The following portions of the patient's history were reviewed and updated as appropriate: allergies, current medications, past family history, past medical history, past social history, past surgical history and problem list.    Review of Systems   Constitutional: Negative for activity change, appetite change and unexpected weight change.   Eyes: Negative for visual disturbance.   Respiratory: Negative for chest tightness and shortness of breath.    Cardiovascular: Negative for chest pain and palpitations.   Musculoskeletal: Positive for back pain.   Skin: Negative for color change.   Neurological: Negative for syncope and speech  difficulty.   Psychiatric/Behavioral: Negative for confusion and decreased concentration.       Objective   Physical Exam   Constitutional: He is oriented to person, place, and time. He appears well-developed and well-nourished.   HENT:   Head: Normocephalic.   Eyes: EOM are normal.   Neck: Normal range of motion. Neck supple.   Cardiovascular: Normal rate and regular rhythm.    Pulmonary/Chest: Effort normal and breath sounds normal.   Abdominal: Soft.   Musculoskeletal: He exhibits tenderness.   Right costal margin anterior 6-7 sore to touch and posterior also;  No rash; ROM pain; not red   Neurological: He is alert and oriented to person, place, and time.   Psychiatric: His behavior is normal. Judgment and thought content normal.   Nursing note and vitals reviewed.      Assessment/Plan   Problems Addressed this Visit     None      Visit Diagnoses     Rib pain on right side    -  Primary    Relevant Orders    XR Ribs Bilateral 3 View (In Office)

## 2017-06-23 ENCOUNTER — OFFICE VISIT (OUTPATIENT)
Dept: FAMILY MEDICINE CLINIC | Facility: CLINIC | Age: 77
End: 2017-06-23

## 2017-06-23 VITALS
TEMPERATURE: 97.7 F | OXYGEN SATURATION: 96 % | SYSTOLIC BLOOD PRESSURE: 116 MMHG | BODY MASS INDEX: 32.74 KG/M2 | DIASTOLIC BLOOD PRESSURE: 57 MMHG | HEART RATE: 75 BPM | WEIGHT: 247 LBS | RESPIRATION RATE: 16 BRPM | HEIGHT: 73 IN

## 2017-06-23 DIAGNOSIS — M25.561 CHRONIC PAIN OF RIGHT KNEE: ICD-10-CM

## 2017-06-23 DIAGNOSIS — G89.29 CHRONIC PAIN OF RIGHT KNEE: ICD-10-CM

## 2017-06-23 DIAGNOSIS — J30.89 NON-SEASONAL ALLERGIC RHINITIS DUE TO OTHER ALLERGIC TRIGGER: ICD-10-CM

## 2017-06-23 DIAGNOSIS — F41.9 ANXIETY: ICD-10-CM

## 2017-06-23 DIAGNOSIS — M62.830 BACK MUSCLE SPASM: ICD-10-CM

## 2017-06-23 DIAGNOSIS — M25.552 LEFT HIP PAIN: ICD-10-CM

## 2017-06-23 DIAGNOSIS — D51.3 OTHER DIETARY VITAMIN B12 DEFICIENCY ANEMIA: Primary | ICD-10-CM

## 2017-06-23 DIAGNOSIS — M54.32 SCIATICA OF LEFT SIDE: ICD-10-CM

## 2017-06-23 PROCEDURE — 96372 THER/PROPH/DIAG INJ SC/IM: CPT | Performed by: FAMILY MEDICINE

## 2017-06-23 PROCEDURE — 99214 OFFICE O/P EST MOD 30 MIN: CPT | Performed by: FAMILY MEDICINE

## 2017-06-23 RX ORDER — CYCLOBENZAPRINE HCL 5 MG
5 TABLET ORAL 3 TIMES DAILY PRN
Qty: 90 TABLET | Refills: 1 | Status: SHIPPED | OUTPATIENT
Start: 2017-06-23 | End: 2018-04-04 | Stop reason: SDUPTHER

## 2017-06-23 RX ORDER — LEVOCETIRIZINE DIHYDROCHLORIDE 5 MG/1
5 TABLET, FILM COATED ORAL DAILY
Qty: 90 TABLET | Refills: 0 | Status: SHIPPED | OUTPATIENT
Start: 2017-06-23 | End: 2017-12-11 | Stop reason: SDUPTHER

## 2017-06-23 RX ORDER — MEMANTINE HYDROCHLORIDE AND DONEPEZIL HYDROCHLORIDE 28; 10 MG/1; MG/1
1 CAPSULE ORAL EVERY EVENING
COMMUNITY
Start: 2017-06-10

## 2017-06-23 RX ORDER — HYDROCODONE BITARTRATE AND ACETAMINOPHEN 5; 300 MG/1; MG/1
TABLET ORAL
Qty: 90 TABLET | Refills: 0 | Status: SHIPPED | OUTPATIENT
Start: 2017-06-23 | End: 2019-11-22 | Stop reason: ALTCHOICE

## 2017-06-23 RX ADMIN — CYANOCOBALAMIN 1000 MCG: 1000 INJECTION, SOLUTION INTRAMUSCULAR; SUBCUTANEOUS at 09:03

## 2017-06-23 NOTE — PATIENT INSTRUCTIONS
Exercise 30 minutes most days of the week  Sleep 6-8 hours each night if possible  Low fat, low cholesterol diet   we discussed prescribed medications and how to take them   make sure you get results of any labs/studies ordered today  Low glycemic index diet    Patients must be seen every 3 months for their presription medication review and refill required by [place] law.  Patient has been advised on the potential for addiction  and dependence to their prescribed scheduled medication.  ISRAEL was obtained today and reviewed. This was scanned into the patient's chart.    See ortho about knee and cxr

## 2017-06-23 NOTE — PROGRESS NOTES
Subjective   Mg Gerber Sr. is a 76 y.o. male.     History of Present Illness   Chief Complaint:   Chief Complaint   Patient presents with   • Allergic Rhinitis   • Arthritis   • Anxiety       Mg Gerber Sr. 76 y.o. male who presents today for Medical Management of the below listed issues and medication refills. The patient has read and signed the [facility] Controlled Substance Contract.  I will continue to see patient for regular follow up appointments.  They are well controlled on their medication.  ISRAEL has been reviewed by me and is updated every 3 months. Describes pain right knee   Exam shows some pain on walking but neg exam  Takes vicodin 5mg  1  Daily prn pain.. Referred back to his ortho md  Which he has already seen him. The patient is aware of the potential for addiction and dependence.    he has a history of   Patient Active Problem List   Diagnosis   • Anemia, vitamin B12 deficiency   • Arthritis   • Hemochromatosis   • Herpes zoster   • Hip pain   • Hypertension   • Cancer   • COPD (chronic obstructive pulmonary disease)   • Left low back pain   • Dizziness   • Headache around the eyes   • Mild cognitive impairment   • Renal cell carcinoma   • Anxiety   • Malignant neoplasm of lung   • Neuropathy   • Perennial allergic rhinitis   • Vitamin B12 deficiency   .  Since the last visit, he has overall felt well.  he has been compliant with   Current Outpatient Prescriptions:   •  ALPRAZolam (XANAX) 0.25 MG tablet, Take 1 tablet by mouth daily as needed for anxiety., Disp: 30 tablet, Rfl: 0  •  amLODIPine (NORVASC) 5 MG tablet, Take 5 mg by mouth Daily., Disp: , Rfl:   •  cyclobenzaprine (FLEXERIL) 5 MG tablet, Take 1 tablet by mouth 3 (Three) Times a Day As Needed for muscle spasms., Disp: 90 tablet, Rfl: 1  •  folic acid (FOLVITE) 1 MG tablet, Take 1 mg by mouth daily., Disp: , Rfl:   •  gabapentin (NEURONTIN) 400 MG capsule, Take 1 capsule by mouth 4 (Four) Times a Day., Disp: 120  "capsule, Rfl: 5  •  Hydrocodone-Acetaminophen (VICODIN) 5-300 MG per tablet, 1/2 tablet q 4 hours prn pain, Disp: 90 tablet, Rfl: 0  •  levocetirizine (XYZAL) 5 MG tablet, Take 1 tablet by mouth Daily., Disp: 90 tablet, Rfl: 1  •  levothyroxine (SYNTHROID, LEVOTHROID) 50 MCG tablet, Take 50 mcg by mouth Daily., Disp: , Rfl:   •  NAMZARIC 28-10 MG capsule sustained-release 24 hr, , Disp: , Rfl:   •  SPIRIVA HANDIHALER 18 MCG per inhalation capsule, Place 1 capsule into inhaler and inhale Daily., Disp: , Rfl:   •  VENTOLIN  (90 BASE) MCG/ACT inhaler, Inhale 2 puffs Every 4 (Four) Hours As Needed., Disp: , Rfl:   •  XARELTO 20 MG tablet, Take 20 mg by mouth daily with dinner., Disp: , Rfl:     Current Facility-Administered Medications:   •  cyanocobalamin injection 1,000 mcg, 1,000 mcg, Intramuscular, Q30 Days, Dillon Marsh MD, 1,000 mcg at 05/24/17 0914.  he denies medication side effects.    All of the chronic condition(s) listed above are stable w/o issues.    /57  Pulse 75  Temp 97.7 °F (36.5 °C) (Oral)   Resp 16  Ht 73\" (185.4 cm)  Wt 247 lb (112 kg)  SpO2 96%  BMI 32.59 kg/m2    Results for orders placed or performed during the hospital encounter of 03/27/17   Stress Test With Myocardial Perfusion One Day   Result Value Ref Range    BH CV STRESS PROTOCOL 1 Pharmacologic     Stage 1 1     HR Stage 1 84     BP Stage 1 140/60     Duration Min Stage 1 0     Duration Sec Stage 1 15     Stress Dose Regadenoson Stage 1 0.4     Stress Comments Stage 1 15 sec bolus injection     Baseline HR 64 bpm    Baseline /70 mmHg    Peak HR 84 bpm    Percent Max Pred HR 58.74 %    Percent Target HR 69 %    Peak /60 mmHg    Recovery HR 76 bpm    Recovery /70 mmHg    Target HR (85%) 122 bpm    Max. Pred. HR (100%) 143 bpm    Exercise duration (sec) 15 sec    Nuclear Prior Study 3     Nuc Stress EF 66 %         The following portions of the patient's history were reviewed and updated as " appropriate: allergies, current medications, past family history, past medical history, past social history, past surgical history and problem list.    Review of Systems   Constitutional: Negative for activity change, appetite change and unexpected weight change.   Eyes: Negative for visual disturbance.   Respiratory: Negative for chest tightness and shortness of breath.    Cardiovascular: Negative for chest pain and palpitations.   Musculoskeletal:        Left hip pain   Skin: Negative for color change.   Neurological: Negative for syncope and speech difficulty.   Psychiatric/Behavioral: Negative for confusion and decreased concentration.       Objective   Physical Exam   Constitutional: He is oriented to person, place, and time. He appears well-developed and well-nourished.   HENT:   Head: Normocephalic.   Eyes: EOM are normal. Pupils are equal, round, and reactive to light.   Cardiovascular: Normal rate and regular rhythm.    Pulmonary/Chest: Effort normal and breath sounds normal.   Abdominal: Soft.   Musculoskeletal:   Pain on walking  Right knee  Refer to ortho   Lymphadenopathy:     He has no cervical adenopathy.   Neurological: He is alert and oriented to person, place, and time.   Psychiatric: He has a normal mood and affect. His behavior is normal.   Nursing note and vitals reviewed.      Assessment/Plan   Mg was seen today for allergic rhinitis, arthritis, anxiety, hip pain, knee pain and b12 injection.    Diagnoses and all orders for this visit:    Other dietary vitamin B12 deficiency anemia    Anxiety    Back muscle spasm  -     cyclobenzaprine (FLEXERIL) 5 MG tablet; Take 1 tablet by mouth 3 (Three) Times a Day As Needed for Muscle Spasms.    Sciatica of left side  -     Hydrocodone-Acetaminophen (VICODIN) 5-300 MG per tablet; 1/2 tablet q 4 hours prn pain    Non-seasonal allergic rhinitis due to other allergic trigger  -     levocetirizine (XYZAL) 5 MG tablet; Take 1 tablet by mouth  Daily.    Chronic pain of right knee    Left hip pain

## 2017-07-26 ENCOUNTER — CLINICAL SUPPORT (OUTPATIENT)
Dept: FAMILY MEDICINE CLINIC | Facility: CLINIC | Age: 77
End: 2017-07-26

## 2017-07-26 DIAGNOSIS — G62.9 NEUROPATHY: Primary | ICD-10-CM

## 2017-07-26 DIAGNOSIS — D51.3 OTHER DIETARY VITAMIN B12 DEFICIENCY ANEMIA: Primary | ICD-10-CM

## 2017-07-26 DIAGNOSIS — G31.84 MILD COGNITIVE IMPAIRMENT: ICD-10-CM

## 2017-07-26 PROCEDURE — 96372 THER/PROPH/DIAG INJ SC/IM: CPT | Performed by: FAMILY MEDICINE

## 2017-07-26 RX ADMIN — CYANOCOBALAMIN 1000 MCG: 1000 INJECTION, SOLUTION INTRAMUSCULAR; SUBCUTANEOUS at 08:56

## 2017-08-03 ENCOUNTER — TELEPHONE (OUTPATIENT)
Dept: CARDIOLOGY | Facility: CLINIC | Age: 77
End: 2017-08-03

## 2017-08-03 NOTE — TELEPHONE ENCOUNTER
I have put a letter in your inbox to sign. Tammy can you please call pt when this is ready. He wants it mailed to him.........karlo

## 2017-08-03 NOTE — TELEPHONE ENCOUNTER
----- Message from Rocky Forrester MD sent at 8/3/2017  1:53 PM EDT -----  Ok to clear  ----- Message -----     From: Stella Houston MA     Sent: 8/3/2017  11:36 AM       To: Rocky Forrester MD    Please see below and advise on if he needs an appt or not.......Stella    ----- Message -----     From: Razia Rodriguez     Sent: 8/2/2017  11:41 AM       To: Tammy Lal MA    Pt is having surgery on hip replacement an needs to be seen. Where do you want me to schedule him for the next follow up available is October. Thanks

## 2017-08-22 ENCOUNTER — DOCUMENTATION (OUTPATIENT)
Dept: PHYSICAL THERAPY | Facility: CLINIC | Age: 77
End: 2017-08-22

## 2017-08-22 NOTE — PROGRESS NOTES
NAME: Mg Gerber .     PATIENT MRN: [id number]  DATE: 8/22/2017    Discharge Note   Visits: 5, Treatment dates 2-17-17 to 3-20-17    Subjective     Per cancellation note: Patient needs to cancel his appointment. He said that he is in so much pain that he can't function and he spoke with his MD and was told to call and cancel all of his appointments. Because he may have to go to the hospital       Objective     Unable to formally assess objective measures or outcome measure due to unscheduled discharge. Please see last visit or progress note on 3-20-17 to see function at last visit.    Goals: Not Met    Assessment/Plan /Discharge status.    Self discharge and referral to MD.       Arden Hinojosa PT  Physical Therapist  [place] #[id number]

## 2017-09-22 ENCOUNTER — OFFICE VISIT (OUTPATIENT)
Dept: FAMILY MEDICINE CLINIC | Facility: CLINIC | Age: 77
End: 2017-09-22

## 2017-09-22 VITALS
OXYGEN SATURATION: 95 % | WEIGHT: 237 LBS | TEMPERATURE: 97.7 F | SYSTOLIC BLOOD PRESSURE: 115 MMHG | RESPIRATION RATE: 16 BRPM | HEART RATE: 73 BPM | DIASTOLIC BLOOD PRESSURE: 58 MMHG | HEIGHT: 73 IN | BODY MASS INDEX: 31.41 KG/M2

## 2017-09-22 DIAGNOSIS — J30.89 NON-SEASONAL ALLERGIC RHINITIS DUE TO OTHER ALLERGIC TRIGGER: ICD-10-CM

## 2017-09-22 DIAGNOSIS — K59.00 CONSTIPATION, UNSPECIFIED CONSTIPATION TYPE: ICD-10-CM

## 2017-09-22 DIAGNOSIS — G62.9 NEUROPATHY: Primary | ICD-10-CM

## 2017-09-22 DIAGNOSIS — M62.830 BACK MUSCLE SPASM: ICD-10-CM

## 2017-09-22 DIAGNOSIS — M54.32 SCIATICA OF LEFT SIDE: ICD-10-CM

## 2017-09-22 PROCEDURE — 99214 OFFICE O/P EST MOD 30 MIN: CPT | Performed by: FAMILY MEDICINE

## 2017-09-22 RX ORDER — SENNA AND DOCUSATE SODIUM 50; 8.6 MG/1; MG/1
1 TABLET, FILM COATED ORAL DAILY
Qty: 180 TABLET | Refills: 1 | Status: CANCELLED | OUTPATIENT
Start: 2017-09-22

## 2017-09-22 RX ORDER — HYDROCODONE BITARTRATE AND ACETAMINOPHEN 5; 300 MG/1; MG/1
TABLET ORAL
Qty: 90 TABLET | Refills: 0 | Status: CANCELLED | OUTPATIENT
Start: 2017-09-22

## 2017-09-22 RX ORDER — GABAPENTIN 400 MG/1
400 CAPSULE ORAL 4 TIMES DAILY
Qty: 120 CAPSULE | Refills: 5 | Status: SHIPPED | OUTPATIENT
Start: 2017-09-22 | End: 2018-04-04 | Stop reason: SDUPTHER

## 2017-09-22 RX ORDER — LEVOTHYROXINE SODIUM 112 UG/1
CAPSULE ORAL DAILY
COMMUNITY
Start: 2017-09-12 | End: 2022-01-17

## 2017-09-22 RX ORDER — ONDANSETRON 4 MG/1
4 TABLET, FILM COATED ORAL ONCE
Qty: 30 TABLET | Refills: 0 | Status: SHIPPED | OUTPATIENT
Start: 2017-09-22 | End: 2017-09-22

## 2017-09-22 RX ORDER — ONDANSETRON 4 MG/1
TABLET, FILM COATED ORAL
COMMUNITY
Start: 2017-09-14 | End: 2017-09-22 | Stop reason: SDUPTHER

## 2017-09-22 RX ORDER — DOCUSATE SODIUM 100 MG/1
100 CAPSULE, LIQUID FILLED ORAL NIGHTLY
Qty: 90 CAPSULE | Refills: 1 | Status: SHIPPED | OUTPATIENT
Start: 2017-09-22 | End: 2020-11-27 | Stop reason: HOSPADM

## 2017-09-22 RX ORDER — METOLAZONE 5 MG/1
TABLET ORAL
COMMUNITY
Start: 2017-09-14 | End: 2020-11-27 | Stop reason: HOSPADM

## 2017-09-22 RX ORDER — CYCLOBENZAPRINE HCL 5 MG
5 TABLET ORAL 3 TIMES DAILY PRN
Qty: 90 TABLET | Refills: 1 | Status: CANCELLED | OUTPATIENT
Start: 2017-09-22

## 2017-09-22 RX ORDER — SERTRALINE HYDROCHLORIDE 25 MG/1
50 TABLET, FILM COATED ORAL DAILY
COMMUNITY
Start: 2017-09-19

## 2017-09-22 RX ORDER — LEVOCETIRIZINE DIHYDROCHLORIDE 5 MG/1
5 TABLET, FILM COATED ORAL DAILY
Qty: 90 TABLET | Refills: 0 | Status: CANCELLED | OUTPATIENT
Start: 2017-09-22

## 2017-09-22 NOTE — PROGRESS NOTES
Subjective   Mg Gerber Sr. is a 76 y.o. male.     History of Present Illness   Chief Complaint:   Chief Complaint   Patient presents with   • Allergies   • Spasms   • Peripheral Neuropathy   • Constipation       Mg Gerber Sr. 76 y.o. male who presents today for Medical Management of the below listed issues and medication refills. The patient has read and signed the [facility] Controlled Substance Contract.  I will continue to see patient for regular follow up appointments.  They are well controlled on their medication.  ISRAEL has been reviewed by me and is updated every 3 months. The patient is aware of the potential for addiction and dependence. Surgery 6 weeks ago for left total hip and doing well.  Reviewed meds.    he has a problem list of   Patient Active Problem List   Diagnosis   • Anemia, vitamin B12 deficiency   • Arthritis   • Hemochromatosis   • Herpes zoster   • Hip pain   • Hypertension   • Cancer   • COPD (chronic obstructive pulmonary disease)   • Left low back pain   • Dizziness   • Headache around the eyes   • Mild cognitive impairment   • Renal cell carcinoma   • Anxiety   • Malignant neoplasm of lung   • Neuropathy   • Perennial allergic rhinitis   • Vitamin B12 deficiency   • Osteoarthritis of lumbar spine   .  Since the last visit, he has overall felt well.  he has been compliant with   Current Outpatient Prescriptions:   •  ALPRAZolam (XANAX) 0.25 MG tablet, Take 1 tablet by mouth daily as needed for anxiety., Disp: 30 tablet, Rfl: 0  •  amLODIPine (NORVASC) 5 MG tablet, Take 5 mg by mouth Daily., Disp: , Rfl:   •  cyclobenzaprine (FLEXERIL) 5 MG tablet, Take 1 tablet by mouth 3 (Three) Times a Day As Needed for Muscle Spasms., Disp: 90 tablet, Rfl: 1  •  folic acid (FOLVITE) 1 MG tablet, Take 1 mg by mouth daily., Disp: , Rfl:   •  gabapentin (NEURONTIN) 400 MG capsule, Take 1 capsule by mouth 4 (Four) Times a Day., Disp: 120 capsule, Rfl: 5  •  Hydrocodone-Acetaminophen  "(VICODIN) 5-300 MG per tablet, 1/2 tablet q 4 hours prn pain, Disp: 90 tablet, Rfl: 0  •  levocetirizine (XYZAL) 5 MG tablet, Take 1 tablet by mouth Daily., Disp: 90 tablet, Rfl: 0  •  levothyroxine (SYNTHROID, LEVOTHROID) 100 MCG tablet, , Disp: , Rfl:   •  metOLazone (ZAROXOLYN) 5 MG tablet, , Disp: , Rfl:   •  NAMZARIC 28-10 MG capsule sustained-release 24 hr, , Disp: , Rfl:   •  ondansetron (ZOFRAN) 4 MG tablet, , Disp: , Rfl:   •  sertraline (ZOLOFT) 25 MG tablet, , Disp: , Rfl:   •  SPIRIVA HANDIHALER 18 MCG per inhalation capsule, Place 1 capsule into inhaler and inhale Daily., Disp: , Rfl:   •  VENTOLIN  (90 BASE) MCG/ACT inhaler, Inhale 2 puffs Every 4 (Four) Hours As Needed., Disp: , Rfl:   •  XARELTO 20 MG tablet, Take 20 mg by mouth daily with dinner., Disp: , Rfl:     Current Facility-Administered Medications:   •  cyanocobalamin injection 1,000 mcg, 1,000 mcg, Intramuscular, Q30 Days, Dillon Marsh MD, 1,000 mcg at 07/26/17 0856.  he denies medication side effects.    All of the chronic condition(s) listed above are stable w/o issues.    /58  Pulse 73  Temp 97.7 °F (36.5 °C) (Oral)   Resp 16  Ht 73\" (185.4 cm)  Wt 237 lb (108 kg)  SpO2 95%  BMI 31.27 kg/m2    Results for orders placed or performed in visit on 09/04/17   BNP   Result Value Ref Range    proBNP 437.9 0.0 - 1800.0 pg/mL         The following portions of the patient's history were reviewed and updated as appropriate: allergies, current medications, past family history, past medical history, past social history, past surgical history and problem list.    Review of Systems   Constitutional: Negative for activity change, appetite change and unexpected weight change.   Eyes: Negative for visual disturbance.   Respiratory: Negative for chest tightness and shortness of breath.    Cardiovascular: Negative for chest pain and palpitations.   Skin: Negative for color change.   Neurological: Negative for syncope and speech " difficulty.   Psychiatric/Behavioral: Negative for confusion and decreased concentration.       Objective   Physical Exam   Constitutional: He is oriented to person, place, and time. He appears well-developed and well-nourished.   HENT:   Head: Normocephalic.   Eyes: EOM are normal. Pupils are equal, round, and reactive to light.   Neck: Neck supple.   Cardiovascular: Normal rate and regular rhythm.    Pulmonary/Chest: Effort normal and breath sounds normal.   Abdominal: Soft. Bowel sounds are normal.   Musculoskeletal: Normal range of motion.   Neurological: He is alert and oriented to person, place, and time.   Skin: Skin is warm and dry.   Psychiatric: He has a normal mood and affect. His behavior is normal.   Nursing note and vitals reviewed.      Assessment/Plan   Mg was seen today for allergies, spasms, peripheral neuropathy and constipation.    Diagnoses and all orders for this visit:    Neuropathy  -     gabapentin (NEURONTIN) 400 MG capsule; Take 1 capsule by mouth 4 (Four) Times a Day.    Non-seasonal allergic rhinitis due to other allergic trigger    Sciatica of left side  Comments:  recent left total hip    Back muscle spasm    Constipation, unspecified constipation type  -     docusate sodium (COLACE) 100 MG capsule; Take 1 capsule by mouth Every Night.    Other orders  -     Cancel: levocetirizine (XYZAL) 5 MG tablet; Take 1 tablet by mouth Daily.  -     Cancel: Hydrocodone-Acetaminophen (VICODIN) 5-300 MG per tablet; 1/2 tablet q 4 hours prn pain  -     Cancel: cyclobenzaprine (FLEXERIL) 5 MG tablet; Take 1 tablet by mouth 3 (Three) Times a Day As Needed for Muscle Spasms.  -     ondansetron (ZOFRAN) 4 MG tablet; Take 1 tablet by mouth 1 (One) Time for 1 dose. Prn nausea  -     Cancel: sennosides-docusate sodium (SENOKOT-S) 8.6-50 MG tablet; Take 1 tablet by mouth Daily.

## 2017-09-22 NOTE — PATIENT INSTRUCTIONS
Exercise 30 minutes most days of the week  Sleep 6-8 hours each night if possible  Low fat, low cholesterol diet   we discussed prescribed medications and how to take them   make sure you get results of any labs/studies ordered today  Low glycemic index diet      Patients must be seen every 3 months for their presription medication review and refill required by [place] law.  Patient has been advised on the potential for addiction  and dependence to their prescribed scheduled medication.  ISRAEL was obtained today and reviewed. This was scanned into the patient's chart.

## 2017-09-28 ENCOUNTER — TELEPHONE (OUTPATIENT)
Dept: FAMILY MEDICINE CLINIC | Facility: CLINIC | Age: 77
End: 2017-09-28

## 2017-09-28 DIAGNOSIS — Z96.642 HISTORY OF LEFT HIP REPLACEMENT: Primary | ICD-10-CM

## 2017-10-02 ENCOUNTER — CLINICAL SUPPORT (OUTPATIENT)
Dept: FAMILY MEDICINE CLINIC | Facility: CLINIC | Age: 77
End: 2017-10-02

## 2017-10-02 PROCEDURE — G0008 ADMIN INFLUENZA VIRUS VAC: HCPCS | Performed by: FAMILY MEDICINE

## 2017-10-02 PROCEDURE — 96372 THER/PROPH/DIAG INJ SC/IM: CPT | Performed by: FAMILY MEDICINE

## 2017-10-02 PROCEDURE — 90686 IIV4 VACC NO PRSV 0.5 ML IM: CPT | Performed by: FAMILY MEDICINE

## 2017-10-02 RX ADMIN — CYANOCOBALAMIN 1000 MCG: 1000 INJECTION, SOLUTION INTRAMUSCULAR; SUBCUTANEOUS at 09:44

## 2017-11-01 ENCOUNTER — CLINICAL SUPPORT (OUTPATIENT)
Dept: FAMILY MEDICINE CLINIC | Facility: CLINIC | Age: 77
End: 2017-11-01

## 2017-11-01 DIAGNOSIS — D51.3 OTHER DIETARY VITAMIN B12 DEFICIENCY ANEMIA: Primary | ICD-10-CM

## 2017-11-01 PROCEDURE — 96372 THER/PROPH/DIAG INJ SC/IM: CPT | Performed by: FAMILY MEDICINE

## 2017-11-01 RX ADMIN — CYANOCOBALAMIN 1000 MCG: 1000 INJECTION, SOLUTION INTRAMUSCULAR; SUBCUTANEOUS at 09:16

## 2017-12-01 ENCOUNTER — CLINICAL SUPPORT (OUTPATIENT)
Dept: FAMILY MEDICINE CLINIC | Facility: CLINIC | Age: 77
End: 2017-12-01

## 2017-12-01 DIAGNOSIS — E53.8 VITAMIN B12 DEFICIENCY: ICD-10-CM

## 2017-12-01 PROCEDURE — 96372 THER/PROPH/DIAG INJ SC/IM: CPT | Performed by: FAMILY MEDICINE

## 2017-12-01 RX ADMIN — CYANOCOBALAMIN 1000 MCG: 1000 INJECTION, SOLUTION INTRAMUSCULAR; SUBCUTANEOUS at 10:49

## 2017-12-11 RX ORDER — LEVOCETIRIZINE DIHYDROCHLORIDE 5 MG/1
TABLET, FILM COATED ORAL
Qty: 30 TABLET | Refills: 0 | Status: SHIPPED | OUTPATIENT
Start: 2017-12-11 | End: 2018-01-08 | Stop reason: SDUPTHER

## 2017-12-16 ENCOUNTER — OFFICE VISIT (OUTPATIENT)
Dept: RETAIL CLINIC | Facility: CLINIC | Age: 77
End: 2017-12-16

## 2017-12-16 VITALS — HEART RATE: 72 BPM | TEMPERATURE: 98.2 F | OXYGEN SATURATION: 96 %

## 2017-12-16 DIAGNOSIS — J02.9 PHARYNGITIS, UNSPECIFIED ETIOLOGY: Primary | ICD-10-CM

## 2017-12-16 PROCEDURE — 99213 OFFICE O/P EST LOW 20 MIN: CPT | Performed by: NURSE PRACTITIONER

## 2017-12-16 RX ORDER — DEXTROMETHORPHAN HYDROBROMIDE AND PROMETHAZINE HYDROCHLORIDE 15; 6.25 MG/5ML; MG/5ML
5 SYRUP ORAL 4 TIMES DAILY PRN
Qty: 180 ML | Refills: 0 | Status: SHIPPED | OUTPATIENT
Start: 2017-12-16 | End: 2018-04-04

## 2017-12-16 RX ORDER — AMOXICILLIN 500 MG/1
1000 CAPSULE ORAL 3 TIMES DAILY
Qty: 30 CAPSULE | Refills: 0 | Status: SHIPPED | OUTPATIENT
Start: 2017-12-16 | End: 2017-12-26

## 2017-12-16 NOTE — PROGRESS NOTES
Subjective   Patient ID: Mg Gerber Sr. is a 76 y.o. male presents with   Chief Complaint   Patient presents with   • URI       HPI Comments: 75 yo wm  Cc: sore throat x 48 hours. He has been using chloraseptic and promethazine Dm with some relief.    PMH: prostate ca, renal cell ca s/p L nephrectomy with lung mets he is currently taking chemotherapy. He is also taking vicodin prn for pain.      URI    Associated symptoms include congestion, rhinorrhea and a sore throat. Pertinent negatives include no abdominal pain, chest pain, coughing, diarrhea, ear pain, rash, vomiting or wheezing.       No Known Allergies    The following portions of the patient's history were reviewed and updated as appropriate: allergies, current medications, past family history, past medical history, past social history, past surgical history and problem list.      Review of Systems   Constitutional: Positive for fatigue. Negative for activity change, fever and unexpected weight change.   HENT: Positive for congestion, postnasal drip, rhinorrhea, sinus pressure and sore throat. Negative for ear pain.         Hoarseness   Eyes: Negative for pain and discharge.   Respiratory: Negative for cough, chest tightness, shortness of breath and wheezing.    Cardiovascular: Negative for chest pain and palpitations.   Gastrointestinal: Negative for abdominal pain, diarrhea and vomiting.   Endocrine: Negative.    Genitourinary: Negative.    Musculoskeletal: Negative for joint swelling.   Skin: Negative for color change, rash and wound.   Allergic/Immunologic: Negative.    Neurological: Negative for seizures and syncope.   Psychiatric/Behavioral: Negative.        Objective     Vitals:    12/16/17 1059   Pulse: 72   Temp: 98.2 °F (36.8 °C)   SpO2: 96%         Physical Exam   Constitutional: He is oriented to person, place, and time. He appears well-developed and well-nourished.  Non-toxic appearance. No distress.   HENT:   Head: Normocephalic and  atraumatic. Hair is normal.   Right Ear: Hearing, tympanic membrane, external ear and ear canal normal. No drainage, swelling or tenderness.   Left Ear: Hearing, tympanic membrane, external ear and ear canal normal. No drainage, swelling or tenderness.   Nose: Mucosal edema and rhinorrhea present. No epistaxis.   Mouth/Throat: Uvula is midline and mucous membranes are normal. No oral lesions. No uvula swelling. Posterior oropharyngeal erythema present. No oropharyngeal exudate. Tonsils are 0 on the right. Tonsils are 0 on the left. No tonsillar exudate.   Eyes: Conjunctivae, EOM and lids are normal. Pupils are equal, round, and reactive to light. Right eye exhibits no discharge. Left eye exhibits no discharge. No scleral icterus.   Neck: Normal range of motion. Neck supple.   Cardiovascular: Normal rate, regular rhythm and normal heart sounds.  Exam reveals no gallop.    No murmur heard.  Pulmonary/Chest: Effort normal and breath sounds normal. No stridor. No respiratory distress. He has no wheezes. He has no rales. He exhibits no tenderness.   Abdominal: Soft. There is no tenderness.   Lymphadenopathy:        Head (right side): No tonsillar adenopathy present.        Head (left side): No tonsillar adenopathy present.     He has no cervical adenopathy.   Neurological: He is alert and oriented to person, place, and time. He exhibits normal muscle tone.   Skin: Skin is warm and dry. No rash noted. He is not diaphoretic.   Psychiatric: He has a normal mood and affect. His behavior is normal. Judgment and thought content normal.   Nursing note and vitals reviewed.        Mg was seen today for uri.    Diagnoses and all orders for this visit:    Pharyngitis, unspecified etiology  -     promethazine-dextromethorphan (PROMETHAZINE-DM) 6.25-15 MG/5ML syrup; Take 5 mL by mouth 4 (Four) Times a Day As Needed for Cough.  -     amoxicillin (AMOXIL) 500 MG capsule; Take 2 capsules by mouth 3 (Three) Times a Day for 10  days.      Follow-up with Primary Care Physician in 24-48 hours if these symptoms worsen or fail to improve as anticipated.

## 2018-01-08 ENCOUNTER — OFFICE VISIT (OUTPATIENT)
Dept: FAMILY MEDICINE CLINIC | Facility: CLINIC | Age: 78
End: 2018-01-08

## 2018-01-08 VITALS
TEMPERATURE: 97.9 F | RESPIRATION RATE: 16 BRPM | SYSTOLIC BLOOD PRESSURE: 118 MMHG | HEIGHT: 73 IN | WEIGHT: 240 LBS | OXYGEN SATURATION: 96 % | BODY MASS INDEX: 31.81 KG/M2 | DIASTOLIC BLOOD PRESSURE: 58 MMHG | HEART RATE: 69 BPM

## 2018-01-08 DIAGNOSIS — M25.552 PAIN OF LEFT HIP JOINT: ICD-10-CM

## 2018-01-08 DIAGNOSIS — J30.89 PERENNIAL ALLERGIC RHINITIS: ICD-10-CM

## 2018-01-08 DIAGNOSIS — E53.8 VITAMIN B12 DEFICIENCY: Primary | ICD-10-CM

## 2018-01-08 DIAGNOSIS — G62.9 NEUROPATHY: ICD-10-CM

## 2018-01-08 PROCEDURE — 96372 THER/PROPH/DIAG INJ SC/IM: CPT | Performed by: FAMILY MEDICINE

## 2018-01-08 PROCEDURE — 99214 OFFICE O/P EST MOD 30 MIN: CPT | Performed by: FAMILY MEDICINE

## 2018-01-08 RX ORDER — ONDANSETRON 4 MG/1
TABLET, FILM COATED ORAL
COMMUNITY
Start: 2017-10-05 | End: 2019-11-22 | Stop reason: ALTCHOICE

## 2018-01-08 RX ORDER — CYCLOBENZAPRINE HCL 5 MG
5 TABLET ORAL 3 TIMES DAILY PRN
Qty: 90 TABLET | Refills: 1 | Status: CANCELLED | OUTPATIENT
Start: 2018-01-08

## 2018-01-08 RX ORDER — HYDROCODONE BITARTRATE AND ACETAMINOPHEN 5; 300 MG/1; MG/1
TABLET ORAL
Qty: 90 TABLET | Refills: 0 | Status: CANCELLED | OUTPATIENT
Start: 2018-01-08

## 2018-01-08 RX ORDER — LEVOCETIRIZINE DIHYDROCHLORIDE 5 MG/1
5 TABLET, FILM COATED ORAL EVERY EVENING
Qty: 90 TABLET | Refills: 1 | Status: SHIPPED | OUTPATIENT
Start: 2018-01-08 | End: 2018-07-02 | Stop reason: SDUPTHER

## 2018-01-08 RX ADMIN — CYANOCOBALAMIN 1000 MCG: 1000 INJECTION, SOLUTION INTRAMUSCULAR; SUBCUTANEOUS at 08:33

## 2018-01-08 NOTE — PATIENT INSTRUCTIONS
Exercise 30 minutes most days of the week  Sleep 6-8 hours each night if possible  Low fat, low cholesterol diet   we discussed prescribed medications and how to take them   make sure you get results of any labs/studies ordered today  Low glycemic index diet   Get me tsh labs

## 2018-01-08 NOTE — PROGRESS NOTES
Subjective   Mg Gerber Sr. is a 77 y.o. male.     History of Present Illness   Chief Complaint:   Chief Complaint   Patient presents with   • Vitamin B12 deficiency   • Allergic Rhinitis       Mg Gerber Sr. 77 y.o. male who presents today for Medical Management of the below listed issues and medication refills. He had a B12 injection in the office today. Need refill of xyzal for allergies. I do not see recent thyroid tests   Tsh.  Gets labs at [facility]  nueropathy better with watching diet/  He is going to bring me  His last tsh.  he has a problem list of   Patient Active Problem List   Diagnosis   • Anemia, vitamin B12 deficiency   • Arthritis   • Hemochromatosis   • Herpes zoster   • Hip pain   • Hypertension   • Cancer   • COPD (chronic obstructive pulmonary disease)   • Left low back pain   • Dizziness   • Headache around the eyes   • Mild cognitive impairment   • Renal cell carcinoma   • Anxiety   • Malignant neoplasm of lung   • Neuropathy   • Perennial allergic rhinitis   • Vitamin B12 deficiency   • Osteoarthritis of lumbar spine   • Pharyngitis   .  Since the last visit, he has overall felt well.  he has been compliant with   Current Outpatient Prescriptions:   •  ALPRAZolam (XANAX) 0.25 MG tablet, Take 1 tablet by mouth daily as needed for anxiety., Disp: 30 tablet, Rfl: 0  •  amLODIPine (NORVASC) 5 MG tablet, Take 5 mg by mouth Daily., Disp: , Rfl:   •  cyclobenzaprine (FLEXERIL) 5 MG tablet, Take 1 tablet by mouth 3 (Three) Times a Day As Needed for Muscle Spasms., Disp: 90 tablet, Rfl: 1  •  docusate sodium (COLACE) 100 MG capsule, Take 1 capsule by mouth Every Night., Disp: 90 capsule, Rfl: 1  •  folic acid (FOLVITE) 1 MG tablet, Take 1 mg by mouth daily., Disp: , Rfl:   •  gabapentin (NEURONTIN) 400 MG capsule, Take 1 capsule by mouth 4 (Four) Times a Day., Disp: 120 capsule, Rfl: 5  •  Hydrocodone-Acetaminophen (VICODIN) 5-300 MG per tablet, 1/2 tablet q 4 hours prn pain, Disp: 90 tablet,  "Rfl: 0  •  levocetirizine (XYZAL) 5 MG tablet, TAKE ONE TABLET BY MOUTH DAILY, Disp: 30 tablet, Rfl: 0  •  levothyroxine (SYNTHROID, LEVOTHROID) 100 MCG tablet, , Disp: , Rfl:   •  metOLazone (ZAROXOLYN) 5 MG tablet, , Disp: , Rfl:   •  NAMZARIC 28-10 MG capsule sustained-release 24 hr, , Disp: , Rfl:   •  ondansetron (ZOFRAN) 4 MG tablet, , Disp: , Rfl:   •  promethazine-dextromethorphan (PROMETHAZINE-DM) 6.25-15 MG/5ML syrup, Take 5 mL by mouth 4 (Four) Times a Day As Needed for Cough., Disp: 180 mL, Rfl: 0  •  sertraline (ZOLOFT) 25 MG tablet, , Disp: , Rfl:   •  SPIRIVA HANDIHALER 18 MCG per inhalation capsule, Place 1 capsule into inhaler and inhale Daily., Disp: , Rfl:   •  VENTOLIN  (90 BASE) MCG/ACT inhaler, Inhale 2 puffs Every 4 (Four) Hours As Needed., Disp: , Rfl:   •  XARELTO 20 MG tablet, Take 20 mg by mouth daily with dinner., Disp: , Rfl:     Current Facility-Administered Medications:   •  cyanocobalamin injection 1,000 mcg, 1,000 mcg, Intramuscular, Q30 Days, Dillon Marsh MD, 1,000 mcg at 12/01/17 1049.  he denies medication side effects.    All of the chronic condition(s) listed above are stable w/o issues.    /58  Pulse 69  Temp 97.9 °F (36.6 °C) (Oral)   Resp 16  Ht 185.4 cm (73\")  Wt 109 kg (240 lb)  SpO2 96%  BMI 31.66 kg/m2    The following portions of the patient's history were reviewed and updated as appropriate: allergies, current medications, past family history, past medical history, past social history, past surgical history and problem list.    Review of Systems   Constitutional: Negative for activity change, appetite change and unexpected weight change.   Eyes: Negative for visual disturbance.   Respiratory: Negative for chest tightness and shortness of breath.    Cardiovascular: Negative for chest pain and palpitations.   Skin: Negative for color change.   Neurological: Negative for syncope and speech difficulty.   Psychiatric/Behavioral: Negative for confusion " and decreased concentration.       Objective   Physical Exam   Constitutional: He is oriented to person, place, and time. He appears well-developed and well-nourished.   HENT:   Nose: Nose normal.   Mouth/Throat: Oropharynx is clear and moist.   Eyes: Conjunctivae and EOM are normal.   Neck: Normal range of motion. Neck supple. No thyromegaly present.   Cardiovascular: Normal rate and regular rhythm.    Pulmonary/Chest: Effort normal and breath sounds normal.   Abdominal: Soft. Bowel sounds are normal.   Musculoskeletal:   Neuropathy legs and feet     Neurological: He is alert and oriented to person, place, and time.   Gait  Walks carefully   Skin: Skin is warm and dry.   Psychiatric: He has a normal mood and affect. His behavior is normal.   Nursing note and vitals reviewed.      Assessment/Plan   Mg was seen today for vitamin b12 deficiency and allergic rhinitis.    Diagnoses and all orders for this visit:    Vitamin B12 deficiency    Perennial allergic rhinitis  -     levocetirizine (XYZAL) 5 MG tablet; Take 1 tablet by mouth Every Evening.    Pain of left hip joint    Neuropathy    Other orders  -     Cancel: cyclobenzaprine (FLEXERIL) 5 MG tablet; Take 1 tablet by mouth 3 (Three) Times a Day As Needed for Muscle Spasms.  -     Cancel: Hydrocodone-Acetaminophen (VICODIN) 5-300 MG per tablet; 1/2 tablet q 4 hours prn pain

## 2018-02-05 ENCOUNTER — CLINICAL SUPPORT (OUTPATIENT)
Dept: FAMILY MEDICINE CLINIC | Facility: CLINIC | Age: 78
End: 2018-02-05

## 2018-02-05 DIAGNOSIS — D51.3 OTHER DIETARY VITAMIN B12 DEFICIENCY ANEMIA: Primary | ICD-10-CM

## 2018-02-05 PROCEDURE — 96372 THER/PROPH/DIAG INJ SC/IM: CPT | Performed by: FAMILY MEDICINE

## 2018-02-05 RX ADMIN — CYANOCOBALAMIN 1000 MCG: 1000 INJECTION, SOLUTION INTRAMUSCULAR; SUBCUTANEOUS at 09:05

## 2018-03-05 ENCOUNTER — CLINICAL SUPPORT (OUTPATIENT)
Dept: FAMILY MEDICINE CLINIC | Facility: CLINIC | Age: 78
End: 2018-03-05

## 2018-03-05 DIAGNOSIS — D51.3 OTHER DIETARY VITAMIN B12 DEFICIENCY ANEMIA: Primary | ICD-10-CM

## 2018-03-05 PROCEDURE — 96372 THER/PROPH/DIAG INJ SC/IM: CPT | Performed by: FAMILY MEDICINE

## 2018-03-29 DIAGNOSIS — G62.9 NEUROPATHY: ICD-10-CM

## 2018-03-29 RX ORDER — GABAPENTIN 400 MG/1
CAPSULE ORAL
Qty: 120 CAPSULE | Refills: 4 | OUTPATIENT
Start: 2018-03-29

## 2018-04-04 ENCOUNTER — OFFICE VISIT (OUTPATIENT)
Dept: FAMILY MEDICINE CLINIC | Facility: CLINIC | Age: 78
End: 2018-04-04

## 2018-04-04 VITALS
DIASTOLIC BLOOD PRESSURE: 58 MMHG | SYSTOLIC BLOOD PRESSURE: 126 MMHG | RESPIRATION RATE: 16 BRPM | OXYGEN SATURATION: 90 % | HEIGHT: 73 IN | HEART RATE: 68 BPM | TEMPERATURE: 97.9 F | WEIGHT: 247 LBS | BODY MASS INDEX: 32.74 KG/M2

## 2018-04-04 DIAGNOSIS — G89.29 CHRONIC LEFT-SIDED LOW BACK PAIN WITH SCIATICA, SCIATICA LATERALITY UNSPECIFIED: ICD-10-CM

## 2018-04-04 DIAGNOSIS — M19.90 ARTHRITIS: ICD-10-CM

## 2018-04-04 DIAGNOSIS — F41.9 ANXIETY: Primary | ICD-10-CM

## 2018-04-04 DIAGNOSIS — G62.9 NEUROPATHY: ICD-10-CM

## 2018-04-04 DIAGNOSIS — M62.830 BACK MUSCLE SPASM: ICD-10-CM

## 2018-04-04 DIAGNOSIS — M54.40 CHRONIC LEFT-SIDED LOW BACK PAIN WITH SCIATICA, SCIATICA LATERALITY UNSPECIFIED: ICD-10-CM

## 2018-04-04 PROCEDURE — 99214 OFFICE O/P EST MOD 30 MIN: CPT | Performed by: FAMILY MEDICINE

## 2018-04-04 PROCEDURE — 96372 THER/PROPH/DIAG INJ SC/IM: CPT | Performed by: FAMILY MEDICINE

## 2018-04-04 RX ORDER — CYCLOBENZAPRINE HCL 5 MG
5 TABLET ORAL 3 TIMES DAILY PRN
Qty: 90 TABLET | Refills: 1 | Status: SHIPPED | OUTPATIENT
Start: 2018-04-04 | End: 2019-04-08 | Stop reason: SDUPTHER

## 2018-04-04 RX ORDER — TAMSULOSIN HYDROCHLORIDE 0.4 MG/1
1 CAPSULE ORAL DAILY
COMMUNITY
Start: 2018-03-03 | End: 2021-10-19 | Stop reason: ALTCHOICE

## 2018-04-04 RX ORDER — DOCUSATE SODIUM 100 MG/1
100 CAPSULE, LIQUID FILLED ORAL NIGHTLY
Qty: 90 CAPSULE | Refills: 1 | Status: CANCELLED | OUTPATIENT
Start: 2018-04-04

## 2018-04-04 RX ORDER — GABAPENTIN 400 MG/1
400 CAPSULE ORAL 4 TIMES DAILY
Qty: 120 CAPSULE | Refills: 5 | Status: SHIPPED | OUTPATIENT
Start: 2018-04-04 | End: 2018-10-01 | Stop reason: SDUPTHER

## 2018-04-04 RX ADMIN — CYANOCOBALAMIN 1000 MCG: 1000 INJECTION, SOLUTION INTRAMUSCULAR; SUBCUTANEOUS at 08:42

## 2018-04-04 NOTE — PROGRESS NOTES
Subjective   Mg Gerber Sr. is a 77 y.o. male.     History of Present Illness   Chief Complaint:   Chief Complaint   Patient presents with   • Peripheral Neuropathy   • Spasms       Mg Gerber Sr. 77 y.o. male who presents today for Medical Management of the below listed issues and medication refills. He received a B 12 injection in the office today. I reviewed his labs from [facility].  The patient has read and signed the [facility] Controlled Substance Contract.  I will continue to see patient for regular follow up appointments.  They are well controlled on their medication.  ISRAEL has been reviewed by me and is updated every 3 months. The patient is aware of the potential for addiction and dependence.    he has a problem list of   Patient Active Problem List   Diagnosis   • Anemia, vitamin B12 deficiency   • Arthritis   • Hemochromatosis   • Herpes zoster   • Hip pain   • Hypertension   • Cancer   • COPD (chronic obstructive pulmonary disease)   • Left low back pain   • Dizziness   • Headache around the eyes   • Mild cognitive impairment   • Renal cell carcinoma   • Anxiety   • Malignant neoplasm of lung   • Neuropathy   • Perennial allergic rhinitis   • Vitamin B12 deficiency   • Osteoarthritis of lumbar spine   • Pharyngitis   .  Since the last visit, he has overall felt well.  he has been compliant with   Current Outpatient Prescriptions:   •  ALPRAZolam (XANAX) 0.25 MG tablet, Take 1 tablet by mouth daily as needed for anxiety., Disp: 30 tablet, Rfl: 0  •  amLODIPine (NORVASC) 5 MG tablet, Take 5 mg by mouth Daily., Disp: , Rfl:   •  cyclobenzaprine (FLEXERIL) 5 MG tablet, Take 1 tablet by mouth 3 (Three) Times a Day As Needed for Muscle Spasms., Disp: 90 tablet, Rfl: 1  •  docusate sodium (COLACE) 100 MG capsule, Take 1 capsule by mouth Every Night., Disp: 90 capsule, Rfl: 1  •  folic acid (FOLVITE) 1 MG tablet, Take 1 mg by mouth daily., Disp: , Rfl:   •  gabapentin (NEURONTIN) 400  "MG capsule, Take 1 capsule by mouth 4 (Four) Times a Day., Disp: 120 capsule, Rfl: 5  •  Hydrocodone-Acetaminophen (VICODIN) 5-300 MG per tablet, 1/2 tablet q 4 hours prn pain, Disp: 90 tablet, Rfl: 0  •  levocetirizine (XYZAL) 5 MG tablet, Take 1 tablet by mouth Every Evening., Disp: 90 tablet, Rfl: 1  •  levothyroxine (SYNTHROID, LEVOTHROID) 100 MCG tablet, , Disp: , Rfl:   •  metOLazone (ZAROXOLYN) 5 MG tablet, , Disp: , Rfl:   •  NAMZARIC 28-10 MG capsule sustained-release 24 hr, , Disp: , Rfl:   •  ondansetron (ZOFRAN) 4 MG tablet, , Disp: , Rfl:   •  sertraline (ZOLOFT) 25 MG tablet, , Disp: , Rfl:   •  SPIRIVA HANDIHALER 18 MCG per inhalation capsule, Place 1 capsule into inhaler and inhale Daily., Disp: , Rfl:   •  tamsulosin (FLOMAX) 0.4 MG capsule 24 hr capsule, , Disp: , Rfl:   •  VENTOLIN  (90 BASE) MCG/ACT inhaler, Inhale 2 puffs Every 4 (Four) Hours As Needed., Disp: , Rfl:   •  XARELTO 20 MG tablet, Take 20 mg by mouth daily with dinner., Disp: , Rfl:     Current Facility-Administered Medications:   •  cyanocobalamin injection 1,000 mcg, 1,000 mcg, Intramuscular, Q30 Days, Dillon Montesinos MD, 1,000 mcg at 02/05/18 0905.  he denies medication side effects.    All of the chronic condition(s) listed above are stable w/o issues.    /58   Pulse 68   Temp 97.9 °F (36.6 °C) (Oral)   Resp 16   Ht 185.4 cm (73\")   Wt 112 kg (247 lb)   SpO2 90%   BMI 32.59 kg/m²     The following portions of the patient's history were reviewed and updated as appropriate: allergies, current medications, past family history, past medical history, past social history, past surgical history and problem list.    Review of Systems   Constitutional: Negative for activity change, appetite change and unexpected weight change.   Eyes: Negative for visual disturbance.   Respiratory: Negative for chest tightness and shortness of breath.    Cardiovascular: Negative for chest pain and palpitations.   Endocrine: Negative for " cold intolerance and heat intolerance.   Skin: Negative for color change.   Neurological: Negative for tremors, syncope and speech difficulty.   Psychiatric/Behavioral: Negative for behavioral problems, confusion and decreased concentration.       Objective   Physical Exam   Constitutional: He is oriented to person, place, and time. He appears well-developed and well-nourished.   HENT:   Head: Normocephalic.   Eyes: Pupils are equal, round, and reactive to light.   Neck: Normal range of motion. Neck supple.   Cardiovascular: Normal rate and regular rhythm.    Pulmonary/Chest: Effort normal and breath sounds normal.   Abdominal: Soft. Bowel sounds are normal.   Musculoskeletal: Normal range of motion. He exhibits no edema.   Lymphadenopathy:     He has no cervical adenopathy.   Neurological: He is alert and oriented to person, place, and time.   Neuropathy hands and feet   Skin: Skin is warm and dry.   Psychiatric: He has a normal mood and affect.   Nursing note and vitals reviewed.      Assessment/Plan   Mg was seen today for peripheral neuropathy and spasms.    Diagnoses and all orders for this visit:    Anxiety    Neuropathy  -     gabapentin (NEURONTIN) 400 MG capsule; Take 1 capsule by mouth 4 (Four) Times a Day.    Back muscle spasm  -     cyclobenzaprine (FLEXERIL) 5 MG tablet; Take 1 tablet by mouth 3 (Three) Times a Day As Needed for Muscle Spasms.    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

## 2018-04-05 ENCOUNTER — PRIOR AUTHORIZATION (OUTPATIENT)
Dept: FAMILY MEDICINE CLINIC | Facility: CLINIC | Age: 78
End: 2018-04-05

## 2018-05-07 ENCOUNTER — CLINICAL SUPPORT (OUTPATIENT)
Dept: FAMILY MEDICINE CLINIC | Facility: CLINIC | Age: 78
End: 2018-05-07

## 2018-05-07 DIAGNOSIS — E53.8 VITAMIN B12 DEFICIENCY: Primary | ICD-10-CM

## 2018-05-07 PROCEDURE — 96372 THER/PROPH/DIAG INJ SC/IM: CPT | Performed by: FAMILY MEDICINE

## 2018-05-07 RX ADMIN — CYANOCOBALAMIN 1000 MCG: 1000 INJECTION, SOLUTION INTRAMUSCULAR; SUBCUTANEOUS at 08:52

## 2018-06-07 ENCOUNTER — CLINICAL SUPPORT (OUTPATIENT)
Dept: FAMILY MEDICINE CLINIC | Facility: CLINIC | Age: 78
End: 2018-06-07

## 2018-06-07 DIAGNOSIS — D51.3 OTHER DIETARY VITAMIN B12 DEFICIENCY ANEMIA: Primary | ICD-10-CM

## 2018-06-07 PROCEDURE — 96372 THER/PROPH/DIAG INJ SC/IM: CPT | Performed by: FAMILY MEDICINE

## 2018-06-07 RX ADMIN — CYANOCOBALAMIN 1000 MCG: 1000 INJECTION, SOLUTION INTRAMUSCULAR; SUBCUTANEOUS at 09:19

## 2018-07-02 ENCOUNTER — OFFICE VISIT (OUTPATIENT)
Dept: FAMILY MEDICINE CLINIC | Facility: CLINIC | Age: 78
End: 2018-07-02

## 2018-07-02 VITALS
RESPIRATION RATE: 16 BRPM | DIASTOLIC BLOOD PRESSURE: 62 MMHG | BODY MASS INDEX: 33.27 KG/M2 | TEMPERATURE: 97.4 F | WEIGHT: 251 LBS | SYSTOLIC BLOOD PRESSURE: 130 MMHG | HEIGHT: 73 IN | HEART RATE: 65 BPM

## 2018-07-02 DIAGNOSIS — J44.9 CHRONIC OBSTRUCTIVE PULMONARY DISEASE, UNSPECIFIED COPD TYPE (HCC): ICD-10-CM

## 2018-07-02 DIAGNOSIS — R42 DIZZINESS: Primary | ICD-10-CM

## 2018-07-02 DIAGNOSIS — J30.89 PERENNIAL ALLERGIC RHINITIS: ICD-10-CM

## 2018-07-02 DIAGNOSIS — C34.90 MALIGNANT NEOPLASM OF LUNG, UNSPECIFIED LATERALITY, UNSPECIFIED PART OF LUNG (HCC): ICD-10-CM

## 2018-07-02 PROCEDURE — 99214 OFFICE O/P EST MOD 30 MIN: CPT | Performed by: FAMILY MEDICINE

## 2018-07-02 PROCEDURE — 96372 THER/PROPH/DIAG INJ SC/IM: CPT | Performed by: FAMILY MEDICINE

## 2018-07-02 RX ORDER — LEVOCETIRIZINE DIHYDROCHLORIDE 5 MG/1
5 TABLET, FILM COATED ORAL EVERY EVENING
Qty: 90 TABLET | Refills: 1 | Status: SHIPPED | OUTPATIENT
Start: 2018-07-02 | End: 2018-10-13 | Stop reason: SDUPTHER

## 2018-07-02 RX ORDER — MECLIZINE HYDROCHLORIDE 25 MG/1
12.5 TABLET ORAL 2 TIMES DAILY PRN
Status: DISCONTINUED | OUTPATIENT
Start: 2018-07-02 | End: 2018-07-03

## 2018-07-02 RX ADMIN — CYANOCOBALAMIN 1000 MCG: 1000 INJECTION, SOLUTION INTRAMUSCULAR; SUBCUTANEOUS at 08:39

## 2018-07-02 NOTE — PROGRESS NOTES
Subjective   Mg Gerber Sr. is a 77 y.o. male.     History of Present Illness   Chief Complaint:   Chief Complaint   Patient presents with   • Allergies   • Dizziness     when laying on back       Mg Gerber Sr. 77 y.o. male who presents today for Medical Management of the below listed issues and medication refills.  he has a problem list of  disscussed [facility]   Discussed ndizziness and antivert 12.5 mg   Discussed dizziness and allergies   Wants to us xyzal 5 mg   Discussed meds      Discussed dizziness and meds.  Patient Active Problem List   Diagnosis   • Anemia, vitamin B12 deficiency   • Arthritis   • Hemochromatosis   • Herpes zoster   • Hip pain   • Hypertension   • Cancer   • COPD (chronic obstructive pulmonary disease)   • Left low back pain   • Dizziness   • Headache around the eyes   • Mild cognitive impairment   • Renal cell carcinoma   • Anxiety   • Malignant neoplasm of lung   • Neuropathy   • Perennial allergic rhinitis   • Vitamin B12 deficiency   • Osteoarthritis of lumbar spine   • Pharyngitis   .  Since the last visit, he has overall felt well.  he has been compliant with   Current Outpatient Prescriptions:   •  ALPRAZolam (XANAX) 0.25 MG tablet, Take 1 tablet by mouth daily as needed for anxiety., Disp: 30 tablet, Rfl: 0  •  amLODIPine (NORVASC) 5 MG tablet, Take 5 mg by mouth Daily., Disp: , Rfl:   •  cyclobenzaprine (FLEXERIL) 5 MG tablet, Take 1 tablet by mouth 3 (Three) Times a Day As Needed for Muscle Spasms., Disp: 90 tablet, Rfl: 1  •  docusate sodium (COLACE) 100 MG capsule, Take 1 capsule by mouth Every Night., Disp: 90 capsule, Rfl: 1  •  folic acid (FOLVITE) 1 MG tablet, Take 1 mg by mouth daily., Disp: , Rfl:   •  gabapentin (NEURONTIN) 400 MG capsule, Take 1 capsule by mouth 4 (Four) Times a Day., Disp: 120 capsule, Rfl: 5  •  Hydrocodone-Acetaminophen (VICODIN) 5-300 MG per tablet, 1/2 tablet q 4 hours prn pain, Disp: 90 tablet, Rfl: 0  •  levocetirizine  "(XYZAL) 5 MG tablet, Take 1 tablet by mouth Every Evening., Disp: 90 tablet, Rfl: 1  •  levothyroxine (SYNTHROID, LEVOTHROID) 100 MCG tablet, , Disp: , Rfl:   •  metOLazone (ZAROXOLYN) 5 MG tablet, , Disp: , Rfl:   •  NAMZARIC 28-10 MG capsule sustained-release 24 hr, , Disp: , Rfl:   •  ondansetron (ZOFRAN) 4 MG tablet, , Disp: , Rfl:   •  sertraline (ZOLOFT) 25 MG tablet, , Disp: , Rfl:   •  SPIRIVA HANDIHALER 18 MCG per inhalation capsule, Place 1 capsule into inhaler and inhale Daily., Disp: , Rfl:   •  tamsulosin (FLOMAX) 0.4 MG capsule 24 hr capsule, , Disp: , Rfl:   •  VENTOLIN  (90 BASE) MCG/ACT inhaler, Inhale 2 puffs Every 4 (Four) Hours As Needed., Disp: , Rfl:   •  XARELTO 20 MG tablet, Take 20 mg by mouth daily with dinner., Disp: , Rfl:     Current Facility-Administered Medications:   •  cyanocobalamin injection 1,000 mcg, 1,000 mcg, Intramuscular, Q30 Days, Salma Carranza MD, 1,000 mcg at 07/02/18 0839.  he denies medication side effects.    All of the chronic condition(s) listed above are stable w/o issues.    /62   Pulse 65   Temp 97.4 °F (36.3 °C) (Oral)   Resp 16   Ht 185.4 cm (73\")   Wt 114 kg (251 lb)   BMI 33.12 kg/m²       The following portions of the patient's history were reviewed and updated as appropriate: allergies, current medications, past family history, past medical history, past social history, past surgical history and problem list.    Review of Systems   Constitutional: Positive for fatigue.   HENT: Positive for postnasal drip. Negative for sore throat.    Eyes: Negative for discharge.   Respiratory: Negative for cough.    Cardiovascular: Negative for chest pain.   Gastrointestinal: Negative for abdominal pain.   Musculoskeletal: Positive for arthralgias.   Neurological: Negative for headaches.   Psychiatric/Behavioral: Negative for confusion.       Objective   Physical Exam   Constitutional: He is oriented to person, place, and time. No distress.   HENT: "   Right Ear: External ear normal.   Left Ear: External ear normal.   Mouth/Throat: No oropharyngeal exudate.   Eyes: Pupils are equal, round, and reactive to light.   Neck: Normal range of motion.   Cardiovascular: Normal rate and regular rhythm.    Pulmonary/Chest: Effort normal and breath sounds normal.   Abdominal: Soft.   Musculoskeletal: Normal range of motion.   Lymphadenopathy:     He has no cervical adenopathy.   Neurological: He is oriented to person, place, and time.   Skin: No rash noted.   Psychiatric: He has a normal mood and affect.   Nursing note and vitals reviewed.      Assessment/Plan   Mg was seen today for allergies and dizziness.    Diagnoses and all orders for this visit:    Dizziness  -     meclizine (ANTIVERT) tablet 12.5 mg; Take 0.5 tablets by mouth 2 (Two) Times a Day As Needed for dizziness.    Perennial allergic rhinitis  -     levocetirizine (XYZAL) 5 MG tablet; Take 1 tablet by mouth Every Evening.    Malignant neoplasm of lung, unspecified laterality, unspecified part of lung    Chronic obstructive pulmonary disease, unspecified COPD type

## 2018-07-02 NOTE — PATIENT INSTRUCTIONS
Exercise 30 minutes most days of the week  Sleep 6-8 hours each night if possible  Low fat, low cholesterol diet   we discussed prescribed medications and how to take them   make sure you get results of any labs/studies ordered today  Low glycemic index diet   See me 3 months

## 2018-07-03 RX ORDER — MECLIZINE HCL 12.5 MG/1
12.5 TABLET ORAL 2 TIMES DAILY PRN
Qty: 20 TABLET | Refills: 0 | Status: SHIPPED | OUTPATIENT
Start: 2018-07-03 | End: 2021-08-04 | Stop reason: SDUPTHER

## 2018-08-01 ENCOUNTER — CLINICAL SUPPORT (OUTPATIENT)
Dept: FAMILY MEDICINE CLINIC | Facility: CLINIC | Age: 78
End: 2018-08-01

## 2018-08-01 DIAGNOSIS — D51.3 OTHER DIETARY VITAMIN B12 DEFICIENCY ANEMIA: Primary | ICD-10-CM

## 2018-08-01 PROCEDURE — 96372 THER/PROPH/DIAG INJ SC/IM: CPT | Performed by: FAMILY MEDICINE

## 2018-08-01 RX ADMIN — CYANOCOBALAMIN 1000 MCG: 1000 INJECTION, SOLUTION INTRAMUSCULAR; SUBCUTANEOUS at 09:06

## 2018-09-05 ENCOUNTER — CLINICAL SUPPORT (OUTPATIENT)
Dept: FAMILY MEDICINE CLINIC | Facility: CLINIC | Age: 78
End: 2018-09-05

## 2018-09-05 DIAGNOSIS — E53.8 VITAMIN B12 DEFICIENCY: Primary | ICD-10-CM

## 2018-09-05 PROCEDURE — 96372 THER/PROPH/DIAG INJ SC/IM: CPT | Performed by: FAMILY MEDICINE

## 2018-09-05 RX ADMIN — CYANOCOBALAMIN 1000 MCG: 1000 INJECTION, SOLUTION INTRAMUSCULAR; SUBCUTANEOUS at 09:06

## 2018-10-01 ENCOUNTER — OFFICE VISIT (OUTPATIENT)
Dept: FAMILY MEDICINE CLINIC | Facility: CLINIC | Age: 78
End: 2018-10-01

## 2018-10-01 VITALS
BODY MASS INDEX: 33.27 KG/M2 | RESPIRATION RATE: 16 BRPM | TEMPERATURE: 97.5 F | HEIGHT: 73 IN | SYSTOLIC BLOOD PRESSURE: 132 MMHG | OXYGEN SATURATION: 95 % | HEART RATE: 63 BPM | WEIGHT: 251 LBS | DIASTOLIC BLOOD PRESSURE: 58 MMHG

## 2018-10-01 DIAGNOSIS — F41.9 ANXIETY: ICD-10-CM

## 2018-10-01 DIAGNOSIS — E53.8 VITAMIN B12 DEFICIENCY: Primary | ICD-10-CM

## 2018-10-01 DIAGNOSIS — G62.9 NEUROPATHY: ICD-10-CM

## 2018-10-01 PROCEDURE — 96372 THER/PROPH/DIAG INJ SC/IM: CPT | Performed by: FAMILY MEDICINE

## 2018-10-01 PROCEDURE — 99214 OFFICE O/P EST MOD 30 MIN: CPT | Performed by: FAMILY MEDICINE

## 2018-10-01 RX ORDER — ALPRAZOLAM 0.25 MG/1
0.25 TABLET ORAL DAILY PRN
Qty: 30 TABLET | Refills: 0 | Status: SHIPPED | OUTPATIENT
Start: 2018-10-01 | End: 2019-12-11 | Stop reason: SDUPTHER

## 2018-10-01 RX ORDER — GABAPENTIN 400 MG/1
400 CAPSULE ORAL 4 TIMES DAILY
Qty: 120 CAPSULE | Refills: 5 | Status: SHIPPED | OUTPATIENT
Start: 2018-10-01 | End: 2019-04-08 | Stop reason: SDUPTHER

## 2018-10-01 RX ADMIN — CYANOCOBALAMIN 1000 MCG: 1000 INJECTION, SOLUTION INTRAMUSCULAR; SUBCUTANEOUS at 17:07

## 2018-10-01 NOTE — PROGRESS NOTES
Subjective   Chief Complaint:   Chief Complaint   Patient presents with   • Anxiety   • Peripheral Neuropathy   • Hypertension         History of Present Illness  Pain left ankle, back and it's a refill of Vicodin.  I have not given him Vicodin over a year.  I want him to get Vicodin from his cancer doctors.  The prescription he has is over a year old in my records in my chart I have not given him Vicodin for over a year.  He does get gabapentin 400 mg 1 4 times a day for neuropathy in his legs.  He also gets Xanax 0.25 mg 1 daily when necessary anxiety and I will give him 30 of those with no refills so many giving Xanax 0.25 mg #30 with no refills.  May given gabapentin 400 mg 1 4 times a day for neuropathy legs and  give him 30 days with 5 refills.  He needs to go to his pain doctor or his cancer doctor and get the Vicodin.forms filled out. Again he needs to get vicodin from pain doctor.The patient has read and signed the [facility] Controlled Substance Contract.  I will continue to see patient for regular follow up appointments.  They are well controlled on their medication.  ISRAEL has been reviewed by me and is updated every 3 months. The patient is aware of the potential for addiction and dependence.            Mg Gerber Sr. 77 y.o. male who presents today for Medical Management of the below listed issues and medication refills.    ICD-10-CM ICD-9-CM   1. Neuropathy G62.9 355.9   2. Anxiety F41.9 300.00        he has a problem list of   Patient Active Problem List   Diagnosis   • Anemia, vitamin B12 deficiency   • Arthritis   • Hemochromatosis   • Herpes zoster   • Hip pain   • Hypertension   • Cancer (CMS/HCC)   • COPD (chronic obstructive pulmonary disease) (CMS/HCC)   • Left low back pain   • Dizziness   • Headache around the eyes   • Mild cognitive impairment   • Renal cell carcinoma (CMS/HCC)   • Anxiety   • Malignant neoplasm of lung (CMS/HCC)   • Neuropathy   • Perennial allergic rhinitis   •  Vitamin B12 deficiency   • Osteoarthritis of lumbar spine   • Pharyngitis   .  Since the last visit, he has overall felt well.  he has been compliant with   Current Outpatient Prescriptions:   •  ALPRAZolam (XANAX) 0.25 MG tablet, Take 1 tablet by mouth Daily As Needed for Anxiety., Disp: 30 tablet, Rfl: 0  •  amLODIPine (NORVASC) 5 MG tablet, Take 5 mg by mouth Daily., Disp: , Rfl:   •  cyclobenzaprine (FLEXERIL) 5 MG tablet, Take 1 tablet by mouth 3 (Three) Times a Day As Needed for Muscle Spasms., Disp: 90 tablet, Rfl: 1  •  docusate sodium (COLACE) 100 MG capsule, Take 1 capsule by mouth Every Night., Disp: 90 capsule, Rfl: 1  •  folic acid (FOLVITE) 1 MG tablet, Take 1 mg by mouth daily., Disp: , Rfl:   •  gabapentin (NEURONTIN) 400 MG capsule, Take 1 capsule by mouth 4 (Four) Times a Day., Disp: 120 capsule, Rfl: 5  •  Hydrocodone-Acetaminophen (VICODIN) 5-300 MG per tablet, 1/2 tablet q 4 hours prn pain, Disp: 90 tablet, Rfl: 0  •  levocetirizine (XYZAL) 5 MG tablet, Take 1 tablet by mouth Every Evening., Disp: 90 tablet, Rfl: 1  •  levothyroxine (SYNTHROID, LEVOTHROID) 100 MCG tablet, , Disp: , Rfl:   •  meclizine (ANTIVERT) 12.5 MG tablet, Take 1 tablet by mouth 2 (Two) Times a Day As Needed for dizziness., Disp: 20 tablet, Rfl: 0  •  metOLazone (ZAROXOLYN) 5 MG tablet, , Disp: , Rfl:   •  NAMZARIC 28-10 MG capsule sustained-release 24 hr, , Disp: , Rfl:   •  ondansetron (ZOFRAN) 4 MG tablet, , Disp: , Rfl:   •  sertraline (ZOLOFT) 25 MG tablet, , Disp: , Rfl:   •  SPIRIVA HANDIHALER 18 MCG per inhalation capsule, Place 1 capsule into inhaler and inhale Daily., Disp: , Rfl:   •  tamsulosin (FLOMAX) 0.4 MG capsule 24 hr capsule, , Disp: , Rfl:   •  VENTOLIN  (90 BASE) MCG/ACT inhaler, Inhale 2 puffs Every 4 (Four) Hours As Needed., Disp: , Rfl:   •  XARELTO 20 MG tablet, Take 20 mg by mouth daily with dinner., Disp: , Rfl:     Current Facility-Administered Medications:   •  cyanocobalamin injection  "1,000 mcg, 1,000 mcg, Intramuscular, Q30 Days, Dillon Marsh MD, 1,000 mcg at 09/05/18 0906.  he denies medication side effects.    All of the chronic condition(s) listed above are stable w/o issues.    /58   Pulse 63   Temp 97.5 °F (36.4 °C)   Resp 16   Ht 185.4 cm (73\")   Wt 114 kg (251 lb)   SpO2 95%   BMI 33.12 kg/m²     Results for orders placed or performed in visit on 09/04/17   BNP   Result Value Ref Range    proBNP 437.9 0.0 - 1,800.0 pg/mL             The following portions of the patient's history were reviewed and updated as appropriate: allergies, current medications, past family history, past medical history, past social history, past surgical history and problem list.    Review of Systems   Constitutional: Negative for appetite change and fatigue.   HENT: Negative for ear pain and sinus pain.    Eyes: Negative for visual disturbance.   Respiratory: Negative for apnea and chest tightness.    Cardiovascular: Negative for chest pain.   Gastrointestinal: Negative for abdominal pain.   Musculoskeletal: Positive for back pain and gait problem.   Skin: Negative for rash.   Neurological: Positive for weakness. Negative for dizziness.       Objective   Physical Exam   Constitutional: He is oriented to person, place, and time.   Eyes: Pupils are equal, round, and reactive to light.   Cardiovascular: Normal rate.    Pulmonary/Chest: Effort normal and breath sounds normal.   Abdominal: Soft.   Musculoskeletal: He exhibits no edema or deformity.   Neurological: He is alert and oriented to person, place, and time.   Skin: Skin is warm and dry.   Psychiatric: He has a normal mood and affect.   Vitals reviewed.      Assessment/Plan   Mg was seen today for anxiety, peripheral neuropathy and hypertension.    Diagnoses and all orders for this visit:    Neuropathy  -     gabapentin (NEURONTIN) 400 MG capsule; Take 1 capsule by mouth 4 (Four) Times a Day.    Anxiety  -     ALPRAZolam (XANAX) 0.25 MG " tablet; Take 1 tablet by mouth Daily As Needed for Anxiety.

## 2018-10-13 DIAGNOSIS — J30.89 PERENNIAL ALLERGIC RHINITIS: ICD-10-CM

## 2018-10-15 RX ORDER — LEVOCETIRIZINE DIHYDROCHLORIDE 5 MG/1
TABLET, FILM COATED ORAL
Qty: 90 TABLET | Refills: 0 | Status: SHIPPED | OUTPATIENT
Start: 2018-10-15 | End: 2019-01-04 | Stop reason: SDUPTHER

## 2018-11-02 ENCOUNTER — CLINICAL SUPPORT (OUTPATIENT)
Dept: FAMILY MEDICINE CLINIC | Facility: CLINIC | Age: 78
End: 2018-11-02

## 2018-11-02 DIAGNOSIS — E53.8 VITAMIN B12 DEFICIENCY: Primary | ICD-10-CM

## 2018-11-02 PROCEDURE — 96372 THER/PROPH/DIAG INJ SC/IM: CPT | Performed by: FAMILY MEDICINE

## 2018-11-02 RX ADMIN — CYANOCOBALAMIN 1000 MCG: 1000 INJECTION, SOLUTION INTRAMUSCULAR; SUBCUTANEOUS at 08:58

## 2018-12-03 ENCOUNTER — CLINICAL SUPPORT (OUTPATIENT)
Dept: FAMILY MEDICINE CLINIC | Facility: CLINIC | Age: 78
End: 2018-12-03

## 2018-12-03 DIAGNOSIS — D51.3 OTHER DIETARY VITAMIN B12 DEFICIENCY ANEMIA: Primary | ICD-10-CM

## 2018-12-03 PROCEDURE — 96372 THER/PROPH/DIAG INJ SC/IM: CPT | Performed by: FAMILY MEDICINE

## 2018-12-03 RX ADMIN — CYANOCOBALAMIN 1000 MCG: 1000 INJECTION, SOLUTION INTRAMUSCULAR; SUBCUTANEOUS at 09:23

## 2019-01-04 ENCOUNTER — OFFICE VISIT (OUTPATIENT)
Dept: FAMILY MEDICINE CLINIC | Facility: CLINIC | Age: 79
End: 2019-01-04

## 2019-01-04 ENCOUNTER — RESULTS ENCOUNTER (OUTPATIENT)
Dept: FAMILY MEDICINE CLINIC | Facility: CLINIC | Age: 79
End: 2019-01-04

## 2019-01-04 VITALS
HEIGHT: 73 IN | OXYGEN SATURATION: 98 % | RESPIRATION RATE: 16 BRPM | BODY MASS INDEX: 33.13 KG/M2 | HEART RATE: 66 BPM | DIASTOLIC BLOOD PRESSURE: 59 MMHG | SYSTOLIC BLOOD PRESSURE: 130 MMHG | WEIGHT: 250 LBS | TEMPERATURE: 97.8 F

## 2019-01-04 DIAGNOSIS — J30.89 PERENNIAL ALLERGIC RHINITIS: ICD-10-CM

## 2019-01-04 DIAGNOSIS — E53.8 VITAMIN B12 DEFICIENCY: ICD-10-CM

## 2019-01-04 DIAGNOSIS — E53.8 LOW SERUM VITAMIN B12: Primary | ICD-10-CM

## 2019-01-04 DIAGNOSIS — E53.8 LOW SERUM VITAMIN B12: ICD-10-CM

## 2019-01-04 DIAGNOSIS — J44.9 CHRONIC OBSTRUCTIVE PULMONARY DISEASE, UNSPECIFIED COPD TYPE (HCC): ICD-10-CM

## 2019-01-04 DIAGNOSIS — C34.90 MALIGNANT NEOPLASM OF LUNG, UNSPECIFIED LATERALITY, UNSPECIFIED PART OF LUNG (HCC): ICD-10-CM

## 2019-01-04 PROCEDURE — 99214 OFFICE O/P EST MOD 30 MIN: CPT | Performed by: FAMILY MEDICINE

## 2019-01-04 PROCEDURE — 96372 THER/PROPH/DIAG INJ SC/IM: CPT | Performed by: FAMILY MEDICINE

## 2019-01-04 RX ORDER — LEVOCETIRIZINE DIHYDROCHLORIDE 5 MG/1
5 TABLET, FILM COATED ORAL EVERY EVENING
Qty: 90 TABLET | Refills: 1 | Status: SHIPPED | OUTPATIENT
Start: 2019-01-04 | End: 2019-07-09 | Stop reason: SDUPTHER

## 2019-01-04 RX ADMIN — CYANOCOBALAMIN 1000 MCG: 1000 INJECTION, SOLUTION INTRAMUSCULAR; SUBCUTANEOUS at 14:47

## 2019-01-04 NOTE — PROGRESS NOTES
Subjective   Chief Complaint:   Chief Complaint   Patient presents with   • Allergies         History of Present Illness allergies and needs his Xyzal refilled.  We discussed Xyzal 5 mg and that is the maximum dose of Xyzal I'm going to refill Xyzal 5 mg 1 daily 90 with 3 refills.  He sometimes takes 10 mg but I told him to only use 5 mg as that's a max dose he could use a Claritin over-the-counter or another mild antihistamine if he needs a little bit more than one Xyzal.  Patient takes the Xyzal 5 mg every day.  For allergies.  Brought a sheet of I and I reviewed those labs.  I reviewed his medications and list.  Allergies are really bad in any need more than one Xyzal I would take a little Claritin extra as needed when necessary reviewed his medication list            Mg Gerber Sr. 78 y.o. male who presents today for Medical Management of the below listed issues and medication refills.    ICD-10-CM ICD-9-CM   1. Perennial allergic rhinitis J30.89 477.8        he has a problem list of   Patient Active Problem List   Diagnosis   • Anemia, vitamin B12 deficiency   • Arthritis   • Hemochromatosis   • Herpes zoster   • Hip pain   • Hypertension   • Cancer (CMS/HCC)   • COPD (chronic obstructive pulmonary disease) (CMS/HCC)   • Left low back pain   • Dizziness   • Headache around the eyes   • Mild cognitive impairment   • Renal cell carcinoma (CMS/HCC)   • Anxiety   • Malignant neoplasm of lung (CMS/HCC)   • Neuropathy   • Perennial allergic rhinitis   • Vitamin B12 deficiency   • Osteoarthritis of lumbar spine   • Pharyngitis   .  Since the last visit, he has overall felt well.  he has been compliant with   Current Outpatient Medications:   •  ALPRAZolam (XANAX) 0.25 MG tablet, Take 1 tablet by mouth Daily As Needed for Anxiety., Disp: 30 tablet, Rfl: 0  •  amLODIPine (NORVASC) 5 MG tablet, Take 5 mg by mouth Daily., Disp: , Rfl:   •  cyclobenzaprine (FLEXERIL) 5 MG tablet, Take 1 tablet by mouth 3 (Three)  "Times a Day As Needed for Muscle Spasms., Disp: 90 tablet, Rfl: 1  •  docusate sodium (COLACE) 100 MG capsule, Take 1 capsule by mouth Every Night., Disp: 90 capsule, Rfl: 1  •  folic acid (FOLVITE) 1 MG tablet, Take 1 mg by mouth daily., Disp: , Rfl:   •  gabapentin (NEURONTIN) 400 MG capsule, Take 1 capsule by mouth 4 (Four) Times a Day., Disp: 120 capsule, Rfl: 5  •  Hydrocodone-Acetaminophen (VICODIN) 5-300 MG per tablet, 1/2 tablet q 4 hours prn pain, Disp: 90 tablet, Rfl: 0  •  levocetirizine (XYZAL) 5 MG tablet, TAKE ONE TABLET BY MOUTH EVERY EVENING, Disp: 90 tablet, Rfl: 0  •  levothyroxine (SYNTHROID, LEVOTHROID) 100 MCG tablet, , Disp: , Rfl:   •  meclizine (ANTIVERT) 12.5 MG tablet, Take 1 tablet by mouth 2 (Two) Times a Day As Needed for dizziness., Disp: 20 tablet, Rfl: 0  •  metOLazone (ZAROXOLYN) 5 MG tablet, , Disp: , Rfl:   •  NAMZARIC 28-10 MG capsule sustained-release 24 hr, , Disp: , Rfl:   •  ondansetron (ZOFRAN) 4 MG tablet, , Disp: , Rfl:   •  sertraline (ZOLOFT) 25 MG tablet, , Disp: , Rfl:   •  SPIRIVA HANDIHALER 18 MCG per inhalation capsule, Place 1 capsule into inhaler and inhale Daily., Disp: , Rfl:   •  tamsulosin (FLOMAX) 0.4 MG capsule 24 hr capsule, , Disp: , Rfl:   •  VENTOLIN  (90 BASE) MCG/ACT inhaler, Inhale 2 puffs Every 4 (Four) Hours As Needed., Disp: , Rfl:   •  XARELTO 20 MG tablet, Take 20 mg by mouth daily with dinner., Disp: , Rfl:     Current Facility-Administered Medications:   •  cyanocobalamin injection 1,000 mcg, 1,000 mcg, Intramuscular, Q30 Days, Dillon Marsh MD, 1,000 mcg at 12/03/18 0923.  he denies medication side effects.    All of the chronic condition(s) listed above are stable w/o issues.    /59   Pulse 66   Temp 97.8 °F (36.6 °C)   Resp 16   Ht 185.4 cm (73\")   Wt 113 kg (250 lb)   SpO2 98%   BMI 32.98 kg/m²     Results for orders placed or performed in visit on 09/04/17   BNP   Result Value Ref Range    proBNP 437.9 0.0 - 1,800.0 " pg/mL             The following portions of the patient's history were reviewed and updated as appropriate: allergies, current medications, past family history, past medical history, past social history, past surgical history and problem list.    Review of Systems   Constitutional: Negative for activity change, appetite change and unexpected weight change.   HENT: Negative for sore throat.    Eyes: Negative for pain and visual disturbance.   Respiratory: Negative for chest tightness and shortness of breath.    Cardiovascular: Negative for chest pain and palpitations.   Gastrointestinal: Negative for abdominal pain.   Genitourinary: Negative for difficulty urinating.   Musculoskeletal: Positive for arthralgias and back pain.   Skin: Negative for color change.   Psychiatric/Behavioral: Negative for confusion, decreased concentration, hallucinations and sleep disturbance.       Objective   Physical Exam    Assessment/Plan   Mg was seen today for allergies.    Diagnoses and all orders for this visit:    Perennial allergic rhinitis  -     levocetirizine (XYZAL) 5 MG tablet; Take 1 tablet by mouth Every Evening.

## 2019-02-01 ENCOUNTER — CLINICAL SUPPORT (OUTPATIENT)
Dept: FAMILY MEDICINE CLINIC | Facility: CLINIC | Age: 79
End: 2019-02-01

## 2019-02-01 ENCOUNTER — OFFICE VISIT (OUTPATIENT)
Dept: RETAIL CLINIC | Facility: CLINIC | Age: 79
End: 2019-02-01

## 2019-02-01 VITALS
HEART RATE: 63 BPM | SYSTOLIC BLOOD PRESSURE: 137 MMHG | DIASTOLIC BLOOD PRESSURE: 60 MMHG | TEMPERATURE: 97.8 F | OXYGEN SATURATION: 95 %

## 2019-02-01 DIAGNOSIS — E53.8 VITAMIN B12 DEFICIENCY: Primary | ICD-10-CM

## 2019-02-01 DIAGNOSIS — J01.00 ACUTE MAXILLARY SINUSITIS, RECURRENCE NOT SPECIFIED: Primary | ICD-10-CM

## 2019-02-01 PROCEDURE — 96372 THER/PROPH/DIAG INJ SC/IM: CPT | Performed by: FAMILY MEDICINE

## 2019-02-01 PROCEDURE — 99213 OFFICE O/P EST LOW 20 MIN: CPT | Performed by: NURSE PRACTITIONER

## 2019-02-01 RX ORDER — AMOXICILLIN AND CLAVULANATE POTASSIUM 875; 125 MG/1; MG/1
1 TABLET, FILM COATED ORAL 2 TIMES DAILY
Qty: 20 TABLET | Refills: 0 | Status: SHIPPED | OUTPATIENT
Start: 2019-02-01 | End: 2019-02-11

## 2019-02-01 RX ADMIN — CYANOCOBALAMIN 1000 MCG: 1000 INJECTION, SOLUTION INTRAMUSCULAR; SUBCUTANEOUS at 13:29

## 2019-02-01 NOTE — PROGRESS NOTES
"Violet Gerber Sr. is a 78 y.o. male.     Facial Swelling   This is a new problem. The current episode started today. The problem occurs constantly. The problem has been unchanged. Pertinent negatives include no congestion, coughing, fever, headaches, nausea or sore throat. Associated symptoms comments: \"ears stopped up\"  No appetite  . Nothing aggravates the symptoms. He has tried nothing for the symptoms.        The following portions of the patient's history were reviewed and updated as appropriate: allergies, current medications, past family history, past medical history, past social history, past surgical history and problem list.    Review of Systems   Constitutional: Negative for fever.   HENT: Positive for facial swelling. Negative for congestion and sore throat.    Respiratory: Negative for cough.    Gastrointestinal: Negative.  Negative for nausea.   Neurological: Negative.        Objective   Physical Exam   Constitutional: He is cooperative. No distress.   HENT:   Head: Normocephalic.   Right Ear: Hearing, external ear and ear canal normal. A middle ear effusion is present.   Left Ear: Hearing, external ear and ear canal normal. A middle ear effusion is present.   Nose: Mucosal edema (mild) present. Left sinus exhibits maxillary sinus tenderness and frontal sinus tenderness.   Mouth/Throat: Oropharynx is clear and moist.   Eyes: Conjunctivae, EOM and lids are normal. Pupils are equal, round, and reactive to light.   Neck: Trachea normal and full passive range of motion without pain.   Cardiovascular: Normal rate, regular rhythm and normal pulses.   Pulmonary/Chest: Effort normal and breath sounds normal.   Lymphadenopathy:        Head (left side): No submandibular adenopathy present.     He has cervical adenopathy.        Right cervical: Superficial cervical adenopathy present.        Left cervical: Superficial cervical adenopathy present.   Neurological: He is alert.   Skin: Skin is warm. " Capillary refill takes less than 2 seconds.   Mild swelling submandibular on right side, patietn states he feels like left side is more sore    Psychiatric: He has a normal mood and affect. His speech is normal and behavior is normal.   Vitals reviewed.        Assessment/Plan   Mg was seen today for facial swelling.    Diagnoses and all orders for this visit:    Acute maxillary sinusitis, recurrence not specified    Other orders  -     amoxicillin-clavulanate (AUGMENTIN) 875-125 MG per tablet; Take 1 tablet by mouth 2 (Two) Times a Day for 10 days.

## 2019-02-01 NOTE — PATIENT INSTRUCTIONS

## 2019-03-04 ENCOUNTER — CLINICAL SUPPORT (OUTPATIENT)
Dept: FAMILY MEDICINE CLINIC | Facility: CLINIC | Age: 79
End: 2019-03-04

## 2019-03-04 DIAGNOSIS — D51.3 OTHER DIETARY VITAMIN B12 DEFICIENCY ANEMIA: Primary | ICD-10-CM

## 2019-03-04 LAB — VIT B12 SERPL-MCNC: >2000 PG/ML (ref 211–946)

## 2019-03-04 PROCEDURE — 96372 THER/PROPH/DIAG INJ SC/IM: CPT | Performed by: FAMILY MEDICINE

## 2019-03-04 RX ADMIN — CYANOCOBALAMIN 1000 MCG: 1000 INJECTION, SOLUTION INTRAMUSCULAR; SUBCUTANEOUS at 08:46

## 2019-04-08 ENCOUNTER — OFFICE VISIT (OUTPATIENT)
Dept: FAMILY MEDICINE CLINIC | Facility: CLINIC | Age: 79
End: 2019-04-08

## 2019-04-08 VITALS
WEIGHT: 244 LBS | SYSTOLIC BLOOD PRESSURE: 138 MMHG | RESPIRATION RATE: 16 BRPM | HEIGHT: 73 IN | BODY MASS INDEX: 32.34 KG/M2 | HEART RATE: 64 BPM | DIASTOLIC BLOOD PRESSURE: 60 MMHG | OXYGEN SATURATION: 97 % | TEMPERATURE: 97.7 F

## 2019-04-08 DIAGNOSIS — I10 ESSENTIAL HYPERTENSION: ICD-10-CM

## 2019-04-08 DIAGNOSIS — G62.9 NEUROPATHY: ICD-10-CM

## 2019-04-08 DIAGNOSIS — J30.89 PERENNIAL ALLERGIC RHINITIS: Primary | ICD-10-CM

## 2019-04-08 DIAGNOSIS — M62.830 BACK MUSCLE SPASM: ICD-10-CM

## 2019-04-08 DIAGNOSIS — G31.84 MILD COGNITIVE IMPAIRMENT: ICD-10-CM

## 2019-04-08 PROCEDURE — 99214 OFFICE O/P EST MOD 30 MIN: CPT | Performed by: FAMILY MEDICINE

## 2019-04-08 RX ORDER — AMLODIPINE BESYLATE 5 MG/1
5 TABLET ORAL DAILY
Qty: 90 TABLET | Refills: 1 | Status: SHIPPED | OUTPATIENT
Start: 2019-04-08 | End: 2019-07-09 | Stop reason: SDUPTHER

## 2019-04-08 RX ORDER — CYCLOBENZAPRINE HCL 5 MG
5 TABLET ORAL 3 TIMES DAILY PRN
Qty: 90 TABLET | Refills: 1 | Status: SHIPPED | OUTPATIENT
Start: 2019-04-08 | End: 2019-10-23

## 2019-04-08 RX ORDER — GABAPENTIN 400 MG/1
400 CAPSULE ORAL 4 TIMES DAILY
Qty: 120 CAPSULE | Refills: 5 | Status: SHIPPED | OUTPATIENT
Start: 2019-04-08 | End: 2019-10-23 | Stop reason: SDUPTHER

## 2019-04-08 NOTE — PROGRESS NOTES
Subjective   Chief Complaint:   Chief Complaint   Patient presents with   • Hypertension   • Peripheral Neuropathy   • Spasms         History of Present Illness    comes in the office today for hypertension and peripheral neuropathy and spasms and refill of gabapentin.  [facility]  He needs a refill of Norvasc 5 mg 1 a day for his hypertension.  He also needs a refill of Flexeril 5 mg and he takes 1 tablet 3  times a day as needed muscle spasm.  Is doing good on the above mentioned medications.  The Flexeril were going to keep at 5 mg 1 tablet 3 times a day as needed for muscle spasm dispense 90 with 1 refill.  The gabapentin will stay at 400 mg 1 capsule by mouth 4 times a day.  Pressure today is good at 120/60.  On exam I do not see any evidence of sinusitis.            Mg OSEGUERA Buzz Sr. 78 y.o. male who presents today for Medical Management of the below listed issues and medication refills.    ICD-10-CM ICD-9-CM   1. Neuropathy G62.9 355.9   2. Back muscle spasm M62.830 724.8        he has a problem list of   Patient Active Problem List   Diagnosis   • Anemia, vitamin B12 deficiency   • Arthritis   • Hemochromatosis   • Herpes zoster   • Hip pain   • Hypertension   • Cancer (CMS/HCC)   • COPD (chronic obstructive pulmonary disease) (CMS/HCC)   • Left low back pain   • Dizziness   • Headache around the eyes   • Mild cognitive impairment   • Renal cell carcinoma (CMS/HCC)   • Anxiety   • Malignant neoplasm of lung (CMS/HCC)   • Neuropathy   • Perennial allergic rhinitis   • Vitamin B12 deficiency   • Osteoarthritis of lumbar spine   • Pharyngitis   .  Since the last visit, he has overall felt well.  he has been compliant with   Current Outpatient Medications:   •  ALPRAZolam (XANAX) 0.25 MG tablet, Take 1 tablet by mouth Daily As Needed for Anxiety., Disp: 30 tablet, Rfl: 0  •  amLODIPine (NORVASC) 5 MG tablet, Take 5 mg by mouth Daily., Disp: , Rfl:   •  cyclobenzaprine (FLEXERIL) 5 MG  "tablet, Take 1 tablet by mouth 3 (Three) Times a Day As Needed for Muscle Spasms., Disp: 90 tablet, Rfl: 1  •  docusate sodium (COLACE) 100 MG capsule, Take 1 capsule by mouth Every Night., Disp: 90 capsule, Rfl: 1  •  folic acid (FOLVITE) 1 MG tablet, Take 1 mg by mouth daily., Disp: , Rfl:   •  gabapentin (NEURONTIN) 400 MG capsule, Take 1 capsule by mouth 4 (Four) Times a Day., Disp: 120 capsule, Rfl: 5  •  Hydrocodone-Acetaminophen (VICODIN) 5-300 MG per tablet, 1/2 tablet q 4 hours prn pain, Disp: 90 tablet, Rfl: 0  •  levocetirizine (XYZAL) 5 MG tablet, Take 1 tablet by mouth Every Evening., Disp: 90 tablet, Rfl: 1  •  levothyroxine (SYNTHROID, LEVOTHROID) 100 MCG tablet, , Disp: , Rfl:   •  meclizine (ANTIVERT) 12.5 MG tablet, Take 1 tablet by mouth 2 (Two) Times a Day As Needed for dizziness., Disp: 20 tablet, Rfl: 0  •  metOLazone (ZAROXOLYN) 5 MG tablet, , Disp: , Rfl:   •  NAMZARIC 28-10 MG capsule sustained-release 24 hr, , Disp: , Rfl:   •  ondansetron (ZOFRAN) 4 MG tablet, , Disp: , Rfl:   •  sertraline (ZOLOFT) 25 MG tablet, , Disp: , Rfl:   •  SPIRIVA HANDIHALER 18 MCG per inhalation capsule, Place 1 capsule into inhaler and inhale Daily., Disp: , Rfl:   •  tamsulosin (FLOMAX) 0.4 MG capsule 24 hr capsule, , Disp: , Rfl:   •  VENTOLIN  (90 BASE) MCG/ACT inhaler, Inhale 2 puffs Every 4 (Four) Hours As Needed., Disp: , Rfl:   •  XARELTO 20 MG tablet, Take 20 mg by mouth daily with dinner., Disp: , Rfl:     Current Facility-Administered Medications:   •  cyanocobalamin injection 1,000 mcg, 1,000 mcg, Intramuscular, Q30 Days, Dillon Marsh MD, 1,000 mcg at 03/04/19 0846.  he denies medication side effects.    All of the chronic condition(s) listed above are stable w/o issues.    /60   Pulse 64   Temp 97.7 °F (36.5 °C)   Resp 16   Ht 185.4 cm (73\")   Wt 111 kg (244 lb)   SpO2 97%   BMI 32.19 kg/m²     Results for orders placed or performed in visit on 01/04/19   Vitamin B12   Result " Value Ref Range    Vitamin B-12 >2000 (H) 211 - 946 pg/mL             The following portions of the patient's history were reviewed and updated as appropriate: allergies, current medications, past family history, past medical history, past social history, past surgical history and problem list.    Review of Systems   Constitutional: Negative for activity change, appetite change and unexpected weight change.   Eyes: Negative for visual disturbance.   Respiratory: Negative for chest tightness and shortness of breath.    Cardiovascular: Negative for chest pain and palpitations.   Gastrointestinal: Negative for abdominal pain.   Musculoskeletal: Positive for arthralgias.   Skin: Negative for color change.   Neurological: Negative for syncope and speech difficulty.        Neuropathy both legs   Psychiatric/Behavioral: Negative for confusion and decreased concentration.       Objective   Physical Exam   Constitutional: He is oriented to person, place, and time. He appears well-developed and well-nourished.   HENT:   Head: Atraumatic.   Right Ear: External ear normal.   Left Ear: External ear normal.   Mouth/Throat: Oropharynx is clear and moist. No oropharyngeal exudate.   Eyes: EOM are normal. Pupils are equal, round, and reactive to light.   Neck: Normal range of motion. Neck supple. No thyromegaly present.   Cardiovascular: Normal rate and regular rhythm.   Pulmonary/Chest: Effort normal and breath sounds normal.   Abdominal: Soft.   Musculoskeletal: Normal range of motion.   Neurological: He is alert and oriented to person, place, and time. A sensory deficit is present.   Neuropathy legs   Skin: Skin is warm and dry.   Psychiatric: He has a normal mood and affect. His behavior is normal.   Nursing note and vitals reviewed.      Assessment/Plan   Mg was seen today for hypertension, peripheral neuropathy and spasms.    Diagnoses and all orders for this visit:    Neuropathy  -     gabapentin (NEURONTIN) 400 MG  capsule; Take 1 capsule by mouth 4 (Four) Times a Day.    Back muscle spasm  -     cyclobenzaprine (FLEXERIL) 5 MG tablet; Take 1 tablet by mouth 3 (Three) Times a Day As Needed for Muscle Spasms.    Other orders  -     amLODIPine (NORVASC) 5 MG tablet; Take 1 tablet by mouth Daily.

## 2019-05-09 ENCOUNTER — OFFICE VISIT (OUTPATIENT)
Dept: RETAIL CLINIC | Facility: CLINIC | Age: 79
End: 2019-05-09

## 2019-05-09 VITALS
OXYGEN SATURATION: 92 % | DIASTOLIC BLOOD PRESSURE: 50 MMHG | SYSTOLIC BLOOD PRESSURE: 132 MMHG | HEART RATE: 83 BPM | TEMPERATURE: 98.5 F

## 2019-05-09 DIAGNOSIS — J32.9 SINUSITIS, UNSPECIFIED CHRONICITY, UNSPECIFIED LOCATION: Primary | ICD-10-CM

## 2019-05-09 PROCEDURE — 99213 OFFICE O/P EST LOW 20 MIN: CPT | Performed by: NURSE PRACTITIONER

## 2019-05-09 RX ORDER — DEXTROMETHORPHAN HYDROBROMIDE AND PROMETHAZINE HYDROCHLORIDE 15; 6.25 MG/5ML; MG/5ML
5 SYRUP ORAL 4 TIMES DAILY PRN
Qty: 180 ML | Refills: 0 | Status: SHIPPED | OUTPATIENT
Start: 2019-05-09 | End: 2020-03-18 | Stop reason: SDUPTHER

## 2019-05-09 RX ORDER — AMOXICILLIN 500 MG/1
500 CAPSULE ORAL 2 TIMES DAILY
Qty: 20 CAPSULE | Refills: 0 | Status: SHIPPED | OUTPATIENT
Start: 2019-05-09 | End: 2019-05-15 | Stop reason: SDUPTHER

## 2019-05-09 RX ORDER — GUAIFENESIN 600 MG/1
600 TABLET, EXTENDED RELEASE ORAL 2 TIMES DAILY
Qty: 28 TABLET | Refills: 0 | Status: SHIPPED | OUTPATIENT
Start: 2019-05-09 | End: 2019-05-23

## 2019-05-09 NOTE — PROGRESS NOTES
Subjective:     Mg Gerber Sr. is a 78 y.o.     Sinusitis   Episode onset: started week ago. There has been no fever. Associated symptoms include congestion, coughing, headaches, shortness of breath, sinus pressure and a sore throat. Ear pain: stopped up. (Has lung ca) Treatments tried: allergy pill daily.         The following portions of the patient's history were reviewed and updated as appropriate: allergies, current medications, past family history, past medical history, past social history, past surgical history and problem list.      Review of Systems   Constitutional: Negative for fever.   HENT: Positive for congestion, rhinorrhea, sinus pressure, sinus pain and sore throat. Ear pain: stopped up.    Respiratory: Positive for cough and shortness of breath. Wheezing: minimal.    Cardiovascular:        Hx: hypertension   Neurological: Positive for headaches.         Objective:      Physical Exam   HENT:   Head: Normocephalic and atraumatic.   Right Ear: Ear canal normal. Right ear middle ear effusion: mild serous.   Left Ear: Ear canal normal. Left ear middle ear effusion: mild serous.   Nose: Right sinus exhibits maxillary sinus tenderness. Left sinus exhibits maxillary sinus tenderness.   Cardiovascular: Normal rate, regular rhythm, S1 normal, S2 normal and normal heart sounds.   Pulmonary/Chest: He has decreased breath sounds.   Coughing frequently at visit   Lymphadenopathy:     He has no cervical adenopathy.   Vitals reviewed.          Diagnoses and all orders for this visit:    Sinusitis, unspecified chronicity, unspecified location    Other orders  -     amoxicillin (AMOXIL) 500 MG capsule; Take 1 capsule by mouth 2 (Two) Times a Day for 10 days.  -     promethazine-dextromethorphan (PROMETHAZINE-DM) 6.25-15 MG/5ML syrup; Take 5 mL by mouth 4 (Four) Times a Day As Needed for Cough.  -     guaiFENesin (MUCINEX) 600 MG 12 hr tablet; Take 1 tablet by mouth 2 (Two) Times a Day for 14 days.

## 2019-05-09 NOTE — PATIENT INSTRUCTIONS

## 2019-05-15 RX ORDER — AMOXICILLIN 500 MG/1
500 CAPSULE ORAL 2 TIMES DAILY
Qty: 20 CAPSULE | Refills: 0 | Status: SHIPPED | OUTPATIENT
Start: 2019-05-15 | End: 2019-05-20 | Stop reason: ALTCHOICE

## 2019-05-20 ENCOUNTER — OFFICE VISIT (OUTPATIENT)
Dept: FAMILY MEDICINE CLINIC | Facility: CLINIC | Age: 79
End: 2019-05-20

## 2019-05-20 VITALS
SYSTOLIC BLOOD PRESSURE: 121 MMHG | HEART RATE: 64 BPM | OXYGEN SATURATION: 97 % | BODY MASS INDEX: 32.87 KG/M2 | TEMPERATURE: 98 F | DIASTOLIC BLOOD PRESSURE: 50 MMHG | HEIGHT: 73 IN | WEIGHT: 248 LBS | RESPIRATION RATE: 16 BRPM

## 2019-05-20 DIAGNOSIS — J44.9 CHRONIC OBSTRUCTIVE PULMONARY DISEASE, UNSPECIFIED COPD TYPE (HCC): ICD-10-CM

## 2019-05-20 DIAGNOSIS — J40 BRONCHITIS: Primary | ICD-10-CM

## 2019-05-20 DIAGNOSIS — C34.92 MALIGNANT NEOPLASM OF LEFT LUNG, UNSPECIFIED PART OF LUNG (HCC): ICD-10-CM

## 2019-05-20 PROCEDURE — 71046 X-RAY EXAM CHEST 2 VIEWS: CPT | Performed by: FAMILY MEDICINE

## 2019-05-20 PROCEDURE — 99214 OFFICE O/P EST MOD 30 MIN: CPT | Performed by: FAMILY MEDICINE

## 2019-05-20 RX ORDER — PREDNISONE 1 MG/1
TABLET ORAL
COMMUNITY
Start: 2019-05-16 | End: 2019-11-22 | Stop reason: ALTCHOICE

## 2019-05-20 NOTE — PATIENT INSTRUCTIONS
Exercise 30 minutes most days of the week  Sleep 6-8 hours each night if possible  Low fat, low cholesterol diet   we discussed prescribed medications and how to take them   make sure you get results of any labs/studies ordered today  Low glycemic index diet     Continue doxycycline that you are now taking along with your cough medicine see the pulmonary doctors that you have L as planned in a.m. with x-rays

## 2019-05-20 NOTE — PROGRESS NOTES
Subjective   Chief Complaint:   Chief Complaint   Patient presents with   • Cough         History of Present Illness over a chest x-ray that he had at [facility] last week.  I have an old chest x-ray from 2017 and it looks the same as the chest x-ray did today on him here.  His chest is essentially clear although he has had a cough for some time.  He has a chest x-ray that I read the report from the [facility] at [facility] I see that report.  That report is noted.  Him a copy of the chest x-ray I did today here and he is got a copy of the report and the CD that he did at [facility] he is going to see his cancer doctors tomorrow and is going to bring both of these x-rays down there to have them look at these films.  See the x-ray report from [facility].  His chest basically is clear on doxycycline at present he was on I think amoxicillin and they changed him to doxycycline he is on the doxycycline still.  He is again is going to see his cancer doctors tomorrow with the copies of both chest x-rays.            Mg ANA ROSA Buzz Sr. 78 y.o. male who presents today for Medical Management of the below listed issues and medication refills.    ICD-10-CM ICD-9-CM   1. Malignant neoplasm of left lung, unspecified part of lung (CMS/HCC) C34.92 162.9   2. Chronic obstructive pulmonary disease, unspecified COPD type (CMS/HCC) J44.9 496   3. Bronchitis J40 490        he has a problem list of   Patient Active Problem List   Diagnosis   • Anemia, vitamin B12 deficiency   • Arthritis   • Hemochromatosis   • Herpes zoster   • Hip pain   • Hypertension   • Cancer (CMS/HCC)   • COPD (chronic obstructive pulmonary disease) (CMS/HCC)   • Left low back pain   • Dizziness   • Headache around the eyes   • Mild cognitive impairment   • Renal cell carcinoma (CMS/HCC)   • Anxiety   • Malignant neoplasm of lung (CMS/HCC)   • Neuropathy   • Perennial allergic rhinitis   • Vitamin B12 deficiency   • Osteoarthritis of  lumbar spine   • Pharyngitis   • Bronchitis   .  Since the last visit, he has overall felt well.  he has been compliant with   Current Outpatient Medications:   •  ALPRAZolam (XANAX) 0.25 MG tablet, Take 1 tablet by mouth Daily As Needed for Anxiety., Disp: 30 tablet, Rfl: 0  •  amLODIPine (NORVASC) 5 MG tablet, Take 1 tablet by mouth Daily., Disp: 90 tablet, Rfl: 1  •  cyclobenzaprine (FLEXERIL) 5 MG tablet, Take 1 tablet by mouth 3 (Three) Times a Day As Needed for Muscle Spasms., Disp: 90 tablet, Rfl: 1  •  docusate sodium (COLACE) 100 MG capsule, Take 1 capsule by mouth Every Night., Disp: 90 capsule, Rfl: 1  •  folic acid (FOLVITE) 1 MG tablet, Take 1 mg by mouth daily., Disp: , Rfl:   •  gabapentin (NEURONTIN) 400 MG capsule, Take 1 capsule by mouth 4 (Four) Times a Day., Disp: 120 capsule, Rfl: 5  •  guaiFENesin (MUCINEX) 600 MG 12 hr tablet, Take 1 tablet by mouth 2 (Two) Times a Day for 14 days., Disp: 28 tablet, Rfl: 0  •  Hydrocodone-Acetaminophen (VICODIN) 5-300 MG per tablet, 1/2 tablet q 4 hours prn pain, Disp: 90 tablet, Rfl: 0  •  levocetirizine (XYZAL) 5 MG tablet, Take 1 tablet by mouth Every Evening., Disp: 90 tablet, Rfl: 1  •  levothyroxine (SYNTHROID, LEVOTHROID) 100 MCG tablet, , Disp: , Rfl:   •  meclizine (ANTIVERT) 12.5 MG tablet, Take 1 tablet by mouth 2 (Two) Times a Day As Needed for dizziness., Disp: 20 tablet, Rfl: 0  •  metOLazone (ZAROXOLYN) 5 MG tablet, , Disp: , Rfl:   •  NAMZARIC 28-10 MG capsule sustained-release 24 hr, , Disp: , Rfl:   •  ondansetron (ZOFRAN) 4 MG tablet, , Disp: , Rfl:   •  predniSONE (DELTASONE) 5 MG tablet, , Disp: , Rfl:   •  promethazine-dextromethorphan (PROMETHAZINE-DM) 6.25-15 MG/5ML syrup, Take 5 mL by mouth 4 (Four) Times a Day As Needed for Cough., Disp: 180 mL, Rfl: 0  •  sertraline (ZOLOFT) 25 MG tablet, , Disp: , Rfl:   •  SPIRIVA HANDIHALER 18 MCG per inhalation capsule, Place 1 capsule into inhaler and inhale Daily., Disp: , Rfl:   •  tamsulosin  "(FLOMAX) 0.4 MG capsule 24 hr capsule, , Disp: , Rfl:   •  VENTOLIN  (90 BASE) MCG/ACT inhaler, Inhale 2 puffs Every 4 (Four) Hours As Needed., Disp: , Rfl:   •  XARELTO 20 MG tablet, Take 20 mg by mouth daily with dinner., Disp: , Rfl:     Current Facility-Administered Medications:   •  cyanocobalamin injection 1,000 mcg, 1,000 mcg, Intramuscular, Q30 Days, Dillon Marsh MD, 1,000 mcg at 03/04/19 0846.  he denies medication side effects.    All of the chronic condition(s) listed above are stable w/o issues.    /50   Pulse 64   Temp 98 °F (36.7 °C)   Resp 16   Ht 185.4 cm (73\")   Wt 112 kg (248 lb)   SpO2 97%   BMI 32.72 kg/m²     Results for orders placed or performed in visit on 01/04/19   Vitamin B12   Result Value Ref Range    Vitamin B-12 >2000 (H) 211 - 946 pg/mL             The following portions of the patient's history were reviewed and updated as appropriate: allergies, current medications, past family history, past medical history, past social history, past surgical history and problem list.    Review of Systems   Constitutional: Negative for activity change, appetite change and unexpected weight change.   Eyes: Negative for visual disturbance.   Respiratory: Negative for chest tightness and shortness of breath.         Do not hear him coughing very much.  And his chest is basically clear   Cardiovascular: Negative for chest pain and palpitations.   Gastrointestinal: Negative for abdominal pain.   Skin: Negative for color change.   Neurological: Negative for syncope and speech difficulty.   Psychiatric/Behavioral: Negative for confusion and decreased concentration.       Objective   Physical Exam   Constitutional: He is oriented to person, place, and time. He appears well-developed and well-nourished.   HENT:   Head: Atraumatic.   Mouth/Throat: Oropharynx is clear and moist.   Eyes: EOM are normal. Pupils are equal, round, and reactive to light.   Neck: Normal range of motion. Neck " supple. No thyromegaly present.   Cardiovascular: Normal rate and regular rhythm.   Pulmonary/Chest: Effort normal and breath sounds normal.   Is basically clear no rales   Abdominal: Soft.   Musculoskeletal: Normal range of motion.   Neurological: He is alert and oriented to person, place, and time.   Skin: Skin is warm and dry.   Psychiatric: He has a normal mood and affect. His behavior is normal.   Nursing note and vitals reviewed.      Assessment/Plan   Mg was seen today for cough.    Diagnoses and all orders for this visit:    Malignant neoplasm of left lung, unspecified part of lung (CMS/HCC)    Chronic obstructive pulmonary disease, unspecified COPD type (CMS/HCC)    Bronchitis

## 2019-07-09 ENCOUNTER — OFFICE VISIT (OUTPATIENT)
Dept: FAMILY MEDICINE CLINIC | Facility: CLINIC | Age: 79
End: 2019-07-09

## 2019-07-09 VITALS
RESPIRATION RATE: 16 BRPM | OXYGEN SATURATION: 97 % | BODY MASS INDEX: 32.47 KG/M2 | SYSTOLIC BLOOD PRESSURE: 134 MMHG | HEART RATE: 59 BPM | HEIGHT: 73 IN | WEIGHT: 245 LBS | DIASTOLIC BLOOD PRESSURE: 63 MMHG | TEMPERATURE: 97.7 F

## 2019-07-09 DIAGNOSIS — E53.8 VITAMIN B12 DEFICIENCY: Primary | ICD-10-CM

## 2019-07-09 DIAGNOSIS — J30.89 PERENNIAL ALLERGIC RHINITIS: ICD-10-CM

## 2019-07-09 PROCEDURE — 99212 OFFICE O/P EST SF 10 MIN: CPT | Performed by: FAMILY MEDICINE

## 2019-07-09 RX ORDER — AMLODIPINE BESYLATE 5 MG/1
5 TABLET ORAL DAILY
Qty: 90 TABLET | Refills: 1 | Status: CANCELLED | OUTPATIENT
Start: 2019-07-09

## 2019-07-09 RX ORDER — LEVOCETIRIZINE DIHYDROCHLORIDE 5 MG/1
5 TABLET, FILM COATED ORAL EVERY EVENING
Qty: 90 TABLET | Refills: 1 | Status: SHIPPED | OUTPATIENT
Start: 2019-07-09 | End: 2019-12-11 | Stop reason: SDUPTHER

## 2019-07-09 RX ORDER — LEVOTHYROXINE SODIUM 0.1 MG/1
100 TABLET ORAL DAILY
Qty: 90 TABLET | Refills: 1 | Status: CANCELLED | OUTPATIENT
Start: 2019-07-09

## 2019-07-09 RX ORDER — AMLODIPINE BESYLATE 5 MG/1
5 TABLET ORAL DAILY
Qty: 90 TABLET | Refills: 1 | Status: SHIPPED | OUTPATIENT
Start: 2019-07-09 | End: 2019-12-11 | Stop reason: SDUPTHER

## 2019-07-09 NOTE — PROGRESS NOTES
Subjective   Chief Complaint:   Chief Complaint   Patient presents with   • Hypertension   • Hypothyroidism         History of Present Illness   refilled Xyzal and amlodipine for patient today.  Then he wants to get a B12 level because he has been off his B12 for several months somata order a B12 level  It was ordered today    Energy same and decreased  But this is normal fot patient.        Mg Gerber Sr. 78 y.o. male who presents today for Medical Management of the below listed issues and medication refills.    ICD-10-CM ICD-9-CM   1. Vitamin B12 deficiency E53.8 266.2   2. Perennial allergic rhinitis J30.89 477.8        he has a problem list of   Patient Active Problem List   Diagnosis   • Anemia, vitamin B12 deficiency   • Arthritis   • Hemochromatosis   • Herpes zoster   • Hip pain   • Hypertension   • Cancer (CMS/HCC)   • COPD (chronic obstructive pulmonary disease) (CMS/HCC)   • Left low back pain   • Dizziness   • Headache around the eyes   • Mild cognitive impairment   • Renal cell carcinoma (CMS/HCC)   • Anxiety   • Malignant neoplasm of lung (CMS/HCC)   • Neuropathy   • Perennial allergic rhinitis   • Vitamin B12 deficiency   • Osteoarthritis of lumbar spine   • Pharyngitis   • Bronchitis   .  Since the last visit, he has overall felt well.  he has been compliant with   Current Outpatient Medications:   •  ALPRAZolam (XANAX) 0.25 MG tablet, Take 1 tablet by mouth Daily As Needed for Anxiety., Disp: 30 tablet, Rfl: 0  •  amLODIPine (NORVASC) 5 MG tablet, Take 1 tablet by mouth Daily., Disp: 90 tablet, Rfl: 1  •  cyclobenzaprine (FLEXERIL) 5 MG tablet, Take 1 tablet by mouth 3 (Three) Times a Day As Needed for Muscle Spasms., Disp: 90 tablet, Rfl: 1  •  docusate sodium (COLACE) 100 MG capsule, Take 1 capsule by mouth Every Night., Disp: 90 capsule, Rfl: 1  •  folic acid (FOLVITE) 1 MG tablet, Take 1 mg by mouth daily., Disp: , Rfl:   •  gabapentin (NEURONTIN) 400 MG capsule, Take 1 capsule by mouth 4  "(Four) Times a Day., Disp: 120 capsule, Rfl: 5  •  Hydrocodone-Acetaminophen (VICODIN) 5-300 MG per tablet, 1/2 tablet q 4 hours prn pain, Disp: 90 tablet, Rfl: 0  •  levocetirizine (XYZAL) 5 MG tablet, Take 1 tablet by mouth Every Evening., Disp: 90 tablet, Rfl: 1  •  levothyroxine (SYNTHROID, LEVOTHROID) 100 MCG tablet, , Disp: , Rfl:   •  meclizine (ANTIVERT) 12.5 MG tablet, Take 1 tablet by mouth 2 (Two) Times a Day As Needed for dizziness., Disp: 20 tablet, Rfl: 0  •  metOLazone (ZAROXOLYN) 5 MG tablet, , Disp: , Rfl:   •  NAMZARIC 28-10 MG capsule sustained-release 24 hr, , Disp: , Rfl:   •  ondansetron (ZOFRAN) 4 MG tablet, , Disp: , Rfl:   •  predniSONE (DELTASONE) 5 MG tablet, , Disp: , Rfl:   •  promethazine-dextromethorphan (PROMETHAZINE-DM) 6.25-15 MG/5ML syrup, Take 5 mL by mouth 4 (Four) Times a Day As Needed for Cough., Disp: 180 mL, Rfl: 0  •  sertraline (ZOLOFT) 25 MG tablet, , Disp: , Rfl:   •  SPIRIVA HANDIHALER 18 MCG per inhalation capsule, Place 1 capsule into inhaler and inhale Daily., Disp: , Rfl:   •  tamsulosin (FLOMAX) 0.4 MG capsule 24 hr capsule, , Disp: , Rfl:   •  VENTOLIN  (90 BASE) MCG/ACT inhaler, Inhale 2 puffs Every 4 (Four) Hours As Needed., Disp: , Rfl:   •  XARELTO 20 MG tablet, Take 20 mg by mouth daily with dinner., Disp: , Rfl:     Current Facility-Administered Medications:   •  cyanocobalamin injection 1,000 mcg, 1,000 mcg, Intramuscular, Q30 Days, Dillon Marsh MD, 1,000 mcg at 03/04/19 0846.  he denies medication side effects.    All of the chronic condition(s) listed above are stable w/o issues.    /63   Pulse 59   Temp 97.7 °F (36.5 °C)   Resp 16   Ht 185.4 cm (73\")   Wt 111 kg (245 lb)   SpO2 97%   BMI 32.32 kg/m²     Results for orders placed or performed in visit on 01/04/19   Vitamin B12   Result Value Ref Range    Vitamin B-12 >2000 (H) 211 - 946 pg/mL             The following portions of the patient's history were reviewed and updated as " appropriate: allergies, current medications, past family history, past medical history, past social history, past surgical history and problem list.    Review of Systems   Constitutional: Negative for activity change, appetite change and unexpected weight change.   Eyes: Negative for visual disturbance.   Respiratory: Negative for chest tightness and shortness of breath.    Cardiovascular: Negative for chest pain and palpitations.   Skin: Negative for color change.   Neurological: Negative for syncope and speech difficulty.   Psychiatric/Behavioral: Negative for confusion and decreased concentration.       Objective   Physical Exam   Constitutional: He is oriented to person, place, and time. He appears well-developed and well-nourished.   HENT:   Head: Atraumatic.   Eyes: EOM are normal. Pupils are equal, round, and reactive to light.   Neck: Normal range of motion. Neck supple. No thyromegaly present.   Cardiovascular: Normal rate and regular rhythm.   Pulmonary/Chest: Effort normal and breath sounds normal.   Abdominal: Soft.   Musculoskeletal: Normal range of motion.   Neurological: He is alert and oriented to person, place, and time.   Skin: Skin is warm and dry.   Psychiatric: He has a normal mood and affect. His behavior is normal.   Nursing note and vitals reviewed.      Assessment/Plan   Mg was seen today for hypertension and hypothyroidism.    Diagnoses and all orders for this visit:    Vitamin B12 deficiency  -     Vitamin B12    Perennial allergic rhinitis  -     levocetirizine (XYZAL) 5 MG tablet; Take 1 tablet by mouth Every Evening.    Other orders  -     Cancel: levothyroxine (SYNTHROID, LEVOTHROID) 100 MCG tablet; Take 1 tablet by mouth Daily.  -     Cancel: amLODIPine (NORVASC) 5 MG tablet; Take 1 tablet by mouth Daily.  -     amLODIPine (NORVASC) 5 MG tablet; Take 1 tablet by mouth Daily.

## 2019-07-10 LAB — VIT B12 SERPL-MCNC: 439 PG/ML (ref 211–946)

## 2019-09-20 NOTE — PATIENT INSTRUCTIONS
"----- Message from Rain Llanos sent at 9/20/2019 12:58 PM CDT -----  Contact: [facility]   Pharmacy Assistant     Rx:  -  REVLIMID 25 mg Cap   Name of caller: Omayra   Provider/Physician?: Jere Tineo MD   Pharmacy name and phone number?:  [facility] - [address] [phone] (Phone) [phone] (Fax)  What do they need to clarify?:  - received Revlimid prescription but hasn't received the authorization number/     Contact Preference?: [phone], speak to anyone that answers.    Additional Information:  "Thank you for all that you do for our patients'"    " Exercise 30 minutes most days of the week  Sleep 6-8 hours each night if possible  Low fat, low cholesterol diet   we discussed prescribed medications and how to take them   make sure you get results of any labs/studies ordered today  Low glycemic index diet

## 2019-10-18 ENCOUNTER — TELEPHONE (OUTPATIENT)
Dept: FAMILY MEDICINE CLINIC | Facility: CLINIC | Age: 79
End: 2019-10-18

## 2019-10-21 NOTE — TELEPHONE ENCOUNTER
He needs refill of  Gabapentin  400mg  Qid   # 120   5 refills      He would be due appointment   October 8 2019   For refills      He was in hospital 2 days   In October      He has appointment wed ??? To see me   See if radha has idea where he could get 8 or 9 pills to last until wed   Make sure I can give him some  Or can I just give him 8 pills until I see him wed    See if  Wed appointment is correct ????????  Because he was in hospital 2 days.  He is due 6 month appointment  For gabapentin.  Send to bandar perez

## 2019-10-23 ENCOUNTER — OFFICE VISIT (OUTPATIENT)
Dept: FAMILY MEDICINE CLINIC | Facility: CLINIC | Age: 79
End: 2019-10-23

## 2019-10-23 VITALS
SYSTOLIC BLOOD PRESSURE: 124 MMHG | RESPIRATION RATE: 16 BRPM | BODY MASS INDEX: 32.6 KG/M2 | WEIGHT: 246 LBS | DIASTOLIC BLOOD PRESSURE: 58 MMHG | TEMPERATURE: 98.1 F | HEIGHT: 73 IN | OXYGEN SATURATION: 98 % | HEART RATE: 65 BPM

## 2019-10-23 DIAGNOSIS — G62.9 NEUROPATHY: ICD-10-CM

## 2019-10-23 PROCEDURE — 99213 OFFICE O/P EST LOW 20 MIN: CPT | Performed by: FAMILY MEDICINE

## 2019-10-23 RX ORDER — CYCLOBENZAPRINE HCL 5 MG
5 TABLET ORAL 2 TIMES DAILY PRN
Qty: 30 TABLET | Refills: 3 | Status: SHIPPED | OUTPATIENT
Start: 2019-10-23 | End: 2019-11-22 | Stop reason: ALTCHOICE

## 2019-10-23 RX ORDER — GABAPENTIN 400 MG/1
400 CAPSULE ORAL 4 TIMES DAILY
Qty: 120 CAPSULE | Refills: 5 | Status: SHIPPED | OUTPATIENT
Start: 2019-10-23 | End: 2020-02-26 | Stop reason: SDUPTHER

## 2019-10-23 NOTE — PATIENT INSTRUCTIONS
Exercise 30 minutes most days of the week  Sleep 6-8 hours each night if possible  Low fat, low cholesterol diet   we discussed prescribed medications and how to take them   make sure you get results of any labs/studies ordered today  Low glycemic index diet      Patients must be seen every 3 months for their presription medication review and refill required by [place] law.  Patient has been advised on the potential for addiction  and dependence to their prescribed scheduled medication.  ISRAEL was obtained today and reviewed. This was scanned into the patient's chart.  Labs results discussed in detail with the patient, if no recent labs were done, order placed today.  Plan to update vaccines if needed today.  I  reviewed health maintenance with the patient as part of my preventative care plan. I discussed preventative counseling with the patient in detail.

## 2019-10-23 NOTE — PROGRESS NOTES
Subjective   Chief Complaint:   Chief Complaint   Patient presents with   • Peripheral Neuropathy         History of Present Illness     The patient has read and signed the [facility] Controlled Substance Contract.  I will continue to see patient for regular follow up appointments.  They are well controlled on their medication.  ISRAEL has been reviewed by me and is updated every 3 months. The patient is aware of the potential for addiction and dependence.  So he is got the gabapentin now filled for the next 6 months.  And the gabapentin is 400 mg #121 4 times daily.  He is got a month with 5 refills.  I also him some Flexeril 5 mg to use 1 p.o. daily or 1 p.o. twice daily as needed muscle spasm in his back and legs.  He will need to recheck for me for the gabapentin in 6 months.            Mg OSEGUERA Buzz Sr. 78 y.o. male who presents today for Medical Management of the below listed issues and medication refills.    ICD-10-CM ICD-9-CM   1. Neuropathy G62.9 355.9        he has a problem list of   Patient Active Problem List   Diagnosis   • Anemia, vitamin B12 deficiency   • Arthritis   • Hemochromatosis   • Herpes zoster   • Hip pain   • Hypertension   • Cancer (CMS/HCC)   • COPD (chronic obstructive pulmonary disease) (CMS/HCC)   • Left low back pain   • Dizziness   • Headache around the eyes   • Mild cognitive impairment   • Renal cell carcinoma (CMS/HCC)   • Anxiety   • Malignant neoplasm of lung (CMS/HCC)   • Neuropathy   • Perennial allergic rhinitis   • Vitamin B12 deficiency   • Osteoarthritis of lumbar spine   • Pharyngitis   • Bronchitis   .    he has been compliant with   Current Outpatient Medications:   •  ALPRAZolam (XANAX) 0.25 MG tablet, Take 1 tablet by mouth Daily As Needed for Anxiety., Disp: 30 tablet, Rfl: 0  •  amLODIPine (NORVASC) 5 MG tablet, Take 1 tablet by mouth Daily., Disp: 90 tablet, Rfl: 1  •  cyclobenzaprine (FLEXERIL) 5 MG tablet, Take 1 tablet by mouth 3 (Three) Times a Day As  "Needed for Muscle Spasms., Disp: 90 tablet, Rfl: 1  •  docusate sodium (COLACE) 100 MG capsule, Take 1 capsule by mouth Every Night., Disp: 90 capsule, Rfl: 1  •  folic acid (FOLVITE) 1 MG tablet, Take 1 mg by mouth daily., Disp: , Rfl:   •  gabapentin (NEURONTIN) 400 MG capsule, Take 1 capsule by mouth 4 (Four) Times a Day., Disp: 120 capsule, Rfl: 5  •  Hydrocodone-Acetaminophen (VICODIN) 5-300 MG per tablet, 1/2 tablet q 4 hours prn pain, Disp: 90 tablet, Rfl: 0  •  levocetirizine (XYZAL) 5 MG tablet, Take 1 tablet by mouth Every Evening., Disp: 90 tablet, Rfl: 1  •  levothyroxine (SYNTHROID, LEVOTHROID) 100 MCG tablet, , Disp: , Rfl:   •  meclizine (ANTIVERT) 12.5 MG tablet, Take 1 tablet by mouth 2 (Two) Times a Day As Needed for dizziness., Disp: 20 tablet, Rfl: 0  •  metOLazone (ZAROXOLYN) 5 MG tablet, , Disp: , Rfl:   •  NAMZARIC 28-10 MG capsule sustained-release 24 hr, , Disp: , Rfl:   •  ondansetron (ZOFRAN) 4 MG tablet, , Disp: , Rfl:   •  predniSONE (DELTASONE) 5 MG tablet, , Disp: , Rfl:   •  promethazine-dextromethorphan (PROMETHAZINE-DM) 6.25-15 MG/5ML syrup, Take 5 mL by mouth 4 (Four) Times a Day As Needed for Cough., Disp: 180 mL, Rfl: 0  •  sertraline (ZOLOFT) 25 MG tablet, , Disp: , Rfl:   •  SPIRIVA HANDIHALER 18 MCG per inhalation capsule, Place 1 capsule into inhaler and inhale Daily., Disp: , Rfl:   •  tamsulosin (FLOMAX) 0.4 MG capsule 24 hr capsule, , Disp: , Rfl:   •  VENTOLIN  (90 BASE) MCG/ACT inhaler, Inhale 2 puffs Every 4 (Four) Hours As Needed., Disp: , Rfl:   •  XARELTO 20 MG tablet, Take 20 mg by mouth daily with dinner., Disp: , Rfl:     Current Facility-Administered Medications:   •  cyanocobalamin injection 1,000 mcg, 1,000 mcg, Intramuscular, Q30 Days, Dillon Marsh MD, 1,000 mcg at 03/04/19 0846.  he denies medication side effects.        /58   Pulse 65   Temp 98.1 °F (36.7 °C)   Resp 16   Ht 185.4 cm (73\")   Wt 112 kg (246 lb)   SpO2 98%   BMI 32.46 " kg/m²     Results for orders placed or performed in visit on 07/09/19   Vitamin B12   Result Value Ref Range    Vitamin B-12 439 211 - 946 pg/mL             The following portions of the patient's history were reviewed and updated as appropriate: allergies, current medications, past family history, past medical history, past social history, past surgical history and problem list.    Review of Systems   Constitutional: Negative for activity change, appetite change and unexpected weight change.   Eyes: Negative for visual disturbance.   Respiratory: Negative for chest tightness and shortness of breath.    Cardiovascular: Negative for chest pain and palpitations.   Skin: Negative for color change.   Neurological: Negative for syncope and speech difficulty.   Psychiatric/Behavioral: Negative for confusion and decreased concentration.       Objective   Physical Exam   Constitutional: He is oriented to person, place, and time. He appears well-developed and well-nourished.   HENT:   Head: Atraumatic.   Mouth/Throat: Oropharynx is clear and moist.   Eyes: EOM are normal. Pupils are equal, round, and reactive to light.   Neck: Normal range of motion. Neck supple. No thyromegaly present.   Cardiovascular: Normal rate and regular rhythm.   Pulmonary/Chest: Effort normal and breath sounds normal.   Abdominal: Soft.   Musculoskeletal: Normal range of motion.   He has some neuropathy going down both legs from his back and his lumbar radicular radiculopathy   Neurological: He is alert and oriented to person, place, and time. A sensory deficit is present.   Again he is going to use the gabapentin 400 mg and the Flexeril for his lumbar radiculopathy and the leg pain.   Skin: Skin is warm and dry.   Psychiatric: He has a normal mood and affect. His behavior is normal.   Nursing note and vitals reviewed.      Assessment/Plan   Mg was seen today for peripheral neuropathy.    Diagnoses and all orders for this  visit:    Neuropathy  -     gabapentin (NEURONTIN) 400 MG capsule; Take 1 capsule by mouth 4 (Four) Times a Day.                 -Labs results discussed in detail with the patient, if no recent labs were done, order placed today.  Plan to update vaccines if needed today.  I  reviewed health maintenance with the patient as part of my preventative care plan. I discussed preventative counseling with the patient in detail.

## 2019-11-22 ENCOUNTER — OFFICE VISIT (OUTPATIENT)
Dept: CARDIOLOGY | Facility: CLINIC | Age: 79
End: 2019-11-22

## 2019-11-22 VITALS
HEART RATE: 68 BPM | BODY MASS INDEX: 32.71 KG/M2 | DIASTOLIC BLOOD PRESSURE: 60 MMHG | WEIGHT: 246.8 LBS | SYSTOLIC BLOOD PRESSURE: 140 MMHG | OXYGEN SATURATION: 93 % | HEIGHT: 73 IN

## 2019-11-22 DIAGNOSIS — I49.3 PREMATURE VENTRICULAR CONTRACTIONS (PVCS) (VPCS): Primary | ICD-10-CM

## 2019-11-22 DIAGNOSIS — C64.2 RENAL CELL CARCINOMA OF LEFT KIDNEY (HCC): ICD-10-CM

## 2019-11-22 DIAGNOSIS — I10 ESSENTIAL HYPERTENSION: ICD-10-CM

## 2019-11-22 DIAGNOSIS — E83.119 HEMOCHROMATOSIS, UNSPECIFIED HEMOCHROMATOSIS TYPE: ICD-10-CM

## 2019-11-22 PROCEDURE — 99214 OFFICE O/P EST MOD 30 MIN: CPT | Performed by: INTERNAL MEDICINE

## 2019-11-22 RX ORDER — HYDROCODONE BITARTRATE AND ACETAMINOPHEN 5; 325 MG/1; MG/1
1 TABLET ORAL EVERY 6 HOURS PRN
Status: ON HOLD | COMMUNITY
Start: 2019-10-23 | End: 2020-11-18 | Stop reason: SDUPTHER

## 2019-11-22 NOTE — PROGRESS NOTES
Subjective:     Encounter Date:11/22/2019      Patient ID: Mg Gerber Sr. is a 78 y.o. male.    Chief Complaint: PVCs.  History of Present Illness    78-year-old gentleman who presents today for evaluation.  Patient was undergoing immunotherapy at [facility].  He was told 3 years ago that if he did not choose immunotherapy that he would be dead in 6 months.  Patient has metastatic renal cancer to his lungs.  During the infusion of his chemo he was noted to have an irregular pulse.  Patient also got very weak said he felt a little bit different.  They ultimately admitted him to the hospital given some fluid.  He had an echocardiogram done that reportedly was normal but we have been unable to get that report and continue to try to him.  He is doing relatively well says he occasionally feels palpitations but they really have not worsened at all.    Review of Systems   Constitution: Positive for malaise/fatigue.   HENT: Positive for hearing loss.    Eyes: Positive for blurred vision.   Musculoskeletal: Positive for joint pain.   Neurological: Positive for dizziness.   Psychiatric/Behavioral: The patient is nervous/anxious.        Procedures       Objective:     Physical Exam   Constitutional: He is oriented to person, place, and time. He appears well-developed.   HENT:   Head: Normocephalic.   Eyes: Conjunctivae are normal.   Neck: Normal range of motion.   Cardiovascular: Normal rate, regular rhythm and normal heart sounds.   Pulmonary/Chest: Breath sounds normal.   Abdominal: Soft. Bowel sounds are normal.   Musculoskeletal: Normal range of motion. He exhibits no edema.   Neurological: He is alert and oriented to person, place, and time.   Skin: Skin is warm and dry.   Psychiatric: He has a normal mood and affect. His behavior is normal.   Vitals reviewed.      Lab Review:       Assessment:          Diagnosis Plan   1. Premature ventricular contractions (PVCs) (VPCs)  Holter Monitor -  24 Hour          Plan:       1.  PVCs.  I am going to set patient up for a monitor and assess how much PVCs he is having.  2.  Metastatic renal cell cancer on immunotherapy.  Reportedly his scans are stable although his tumors are not decreasing in size.  This led to extensive discussion about CODE STATUS.  He said that no one is never really brought that up to him before and myself the patient and his wife initiated the conversation.  They were going to think about it more get more informed about it.  I think unfortunately is something that needs to be discussed and he admitted that he is going to be 79 years old soon and he is not sure how much she wants done.  3.  If his monitor is unremarkable I really would not do any further work-up at this time.

## 2019-12-03 ENCOUNTER — TELEPHONE (OUTPATIENT)
Dept: CARDIOLOGY | Facility: CLINIC | Age: 79
End: 2019-12-03

## 2019-12-11 ENCOUNTER — OFFICE VISIT (OUTPATIENT)
Dept: FAMILY MEDICINE CLINIC | Facility: CLINIC | Age: 79
End: 2019-12-11

## 2019-12-11 VITALS
OXYGEN SATURATION: 98 % | SYSTOLIC BLOOD PRESSURE: 135 MMHG | WEIGHT: 244 LBS | BODY MASS INDEX: 32.34 KG/M2 | TEMPERATURE: 97.6 F | DIASTOLIC BLOOD PRESSURE: 58 MMHG | RESPIRATION RATE: 16 BRPM | HEART RATE: 65 BPM | HEIGHT: 73 IN

## 2019-12-11 DIAGNOSIS — M25.511 PAIN IN JOINT OF RIGHT SHOULDER: Primary | ICD-10-CM

## 2019-12-11 DIAGNOSIS — D51.3 OTHER DIETARY VITAMIN B12 DEFICIENCY ANEMIA: ICD-10-CM

## 2019-12-11 DIAGNOSIS — F41.9 ANXIETY: ICD-10-CM

## 2019-12-11 DIAGNOSIS — E53.8 VITAMIN B12 DEFICIENCY: ICD-10-CM

## 2019-12-11 DIAGNOSIS — J30.89 PERENNIAL ALLERGIC RHINITIS: ICD-10-CM

## 2019-12-11 PROCEDURE — 73020 X-RAY EXAM OF SHOULDER: CPT | Performed by: FAMILY MEDICINE

## 2019-12-11 PROCEDURE — 99213 OFFICE O/P EST LOW 20 MIN: CPT | Performed by: FAMILY MEDICINE

## 2019-12-11 PROCEDURE — 96372 THER/PROPH/DIAG INJ SC/IM: CPT | Performed by: FAMILY MEDICINE

## 2019-12-11 RX ORDER — AMLODIPINE BESYLATE 5 MG/1
5 TABLET ORAL DAILY
Qty: 90 TABLET | Refills: 1 | Status: SHIPPED | OUTPATIENT
Start: 2019-12-11 | End: 2020-04-06 | Stop reason: SDUPTHER

## 2019-12-11 RX ORDER — LEVOTHYROXINE SODIUM 112 UG/1
112 CAPSULE ORAL DAILY
Qty: 90 CAPSULE | Refills: 1 | Status: CANCELLED | OUTPATIENT
Start: 2019-12-11

## 2019-12-11 RX ORDER — LEVOCETIRIZINE DIHYDROCHLORIDE 5 MG/1
5 TABLET, FILM COATED ORAL EVERY EVENING
Qty: 90 TABLET | Refills: 1 | Status: SHIPPED | OUTPATIENT
Start: 2019-12-11 | End: 2020-06-26

## 2019-12-11 RX ORDER — ALPRAZOLAM 0.25 MG/1
0.25 TABLET ORAL DAILY PRN
Qty: 30 TABLET | Refills: 0 | Status: SHIPPED | OUTPATIENT
Start: 2019-12-11 | End: 2020-04-08 | Stop reason: SDUPTHER

## 2019-12-11 RX ADMIN — CYANOCOBALAMIN 1000 MCG: 1000 INJECTION, SOLUTION INTRAMUSCULAR; SUBCUTANEOUS at 09:46

## 2019-12-11 NOTE — PROGRESS NOTES
Subjective   Chief Complaint:   Chief Complaint   Patient presents with   • Hypertension   • Allergic Rhinitis   • Anxiety   • Vitamin B12 deficiency         History of Present Illness a with some labs from his cancer doctor at [facility] and I reviewed his CMP and his CBC.  His hemoglobin is 10.9 I am going to refill his Xanax 0.25 mg 1 a day as needed anxiety #30 with no refills and on Monday refill his amlodipine Norvasc 5 mg 1 a day and refill his Xyzal 5 mg 1 a day for allergies.  I am going to x-ray his right shoulder.  He is having pain with limited range of motion in his right shoulder he says it has been like that for 2 months and then I am also going to order an MRI of his right shoulder per his cancer doctor at [facility].  He is got limited range of motion and pain in his right shoulder.  He also gets a B12 shot here monthly which he is due one today.  He is got tenderness in his right shoulder with limited range of motion.  Reviewed his CMP and a CBC from the [facility] cancer doctor.  His hemoglobin was 10.9 then today also he is getting a B12 shot I will look at his x-ray of his right shoulder then I will order an MRI scan of his right shoulder as per the wishes of his cancer doctors  At [facility].          Mg Gerber Sr. 78 y.o. male who presents today for Medical Management of the below listed issues and medication refills.    ICD-10-CM ICD-9-CM   1. Pain in joint of right shoulder M25.511 719.41   2. Perennial allergic rhinitis J30.89 477.8   3. Anxiety F41.9 300.00        he has a problem list of   Patient Active Problem List   Diagnosis   • Anemia, vitamin B12 deficiency   • Arthritis   • Hemochromatosis   • Herpes zoster   • Hip pain   • Hypertension   • Cancer (CMS/HCC)   • COPD (chronic obstructive pulmonary disease) (CMS/HCC)   • Left low back pain   • Dizziness   • Headache around the eyes   • Mild cognitive impairment   • Renal cell carcinoma (CMS/HCC)   • Anxiety   • Malignant neoplasm of lung  (CMS/[facility])   • Neuropathy   • Perennial allergic rhinitis   • Vitamin B12 deficiency   • Osteoarthritis of lumbar spine   • Pharyngitis   • Bronchitis   .    he has been compliant with   Current Outpatient Medications:   •  ALPRAZolam (XANAX) 0.25 MG tablet, Take 1 tablet by mouth Daily As Needed for Anxiety., Disp: 30 tablet, Rfl: 0  •  amLODIPine (NORVASC) 5 MG tablet, Take 1 tablet by mouth Daily., Disp: 90 tablet, Rfl: 1  •  docusate sodium (COLACE) 100 MG capsule, Take 1 capsule by mouth Every Night., Disp: 90 capsule, Rfl: 1  •  folic acid (FOLVITE) 1 MG tablet, Take 1 mg by mouth daily., Disp: , Rfl:   •  gabapentin (NEURONTIN) 400 MG capsule, Take 1 capsule by mouth 4 (Four) Times a Day., Disp: 120 capsule, Rfl: 5  •  HYDROcodone-acetaminophen (NORCO) 5-325 MG per tablet, , Disp: , Rfl:   •  levocetirizine (XYZAL) 5 MG tablet, Take 1 tablet by mouth Every Evening., Disp: 90 tablet, Rfl: 1  •  levothyroxine sodium (TIROSINT) 112 MCG capsule, Daily., Disp: , Rfl:   •  meclizine (ANTIVERT) 12.5 MG tablet, Take 1 tablet by mouth 2 (Two) Times a Day As Needed for dizziness., Disp: 20 tablet, Rfl: 0  •  metOLazone (ZAROXOLYN) 5 MG tablet, , Disp: , Rfl:   •  NAMZARIC 28-10 MG capsule sustained-release 24 hr, , Disp: , Rfl:   •  promethazine-dextromethorphan (PROMETHAZINE-DM) 6.25-15 MG/5ML syrup, Take 5 mL by mouth 4 (Four) Times a Day As Needed for Cough., Disp: 180 mL, Rfl: 0  •  sertraline (ZOLOFT) 25 MG tablet, , Disp: , Rfl:   •  SPIRIVA HANDIHALER 18 MCG per inhalation capsule, Place 1 capsule into inhaler and inhale Daily., Disp: , Rfl:   •  tamsulosin (FLOMAX) 0.4 MG capsule 24 hr capsule, , Disp: , Rfl:   •  VENTOLIN  (90 BASE) MCG/ACT inhaler, Inhale 2 puffs Every 4 (Four) Hours As Needed., Disp: , Rfl:   •  XARELTO 20 MG tablet, Take 20 mg by mouth daily with dinner., Disp: , Rfl:     Current Facility-Administered Medications:   •  cyanocobalamin injection 1,000 mcg, 1,000 mcg, Intramuscular, Q30  "Salma Mc James, MD, 1,000 mcg at 03/04/19 0846.  he denies medication side effects.        /58   Pulse 65   Temp 97.6 °F (36.4 °C)   Resp 16   Ht 185.4 cm (73\")   Wt 111 kg (244 lb)   SpO2 98%   BMI 32.19 kg/m²     Results for orders placed or performed in visit on 07/09/19   Vitamin B12   Result Value Ref Range    Vitamin B-12 439 211 - 946 pg/mL             The following portions of the patient's history were reviewed and updated as appropriate: allergies, current medications, past family history, past medical history, past social history, past surgical history and problem list.    Review of Systems   Constitutional: Negative for activity change, appetite change and unexpected weight change.   HENT: Negative for congestion.    Eyes: Negative for visual disturbance.   Respiratory: Negative for chest tightness and shortness of breath.    Cardiovascular: Negative for chest pain and palpitations.   Gastrointestinal: Negative for abdominal pain.   Musculoskeletal: Positive for arthralgias.        And with limited range of motion right shoulder pain right shoulder   Skin: Negative for color change.   Neurological: Negative for syncope and speech difficulty.   Psychiatric/Behavioral: Negative for confusion and decreased concentration.       Objective   Physical Exam   Constitutional: He is oriented to person, place, and time. He appears well-developed.   HENT:   Head: Normocephalic.   Eyes: Pupils are equal, round, and reactive to light. EOM are normal.   Neck: Normal range of motion.   Cardiovascular: Normal rate.   Pulmonary/Chest: No respiratory distress.   Abdominal: Soft.   Musculoskeletal: He exhibits tenderness. He exhibits no edema.   Pain and tenderness with limited range of motion right shoulder   Neurological: He is alert and oriented to person, place, and time.   Skin: No rash noted.   Psychiatric: He has a normal mood and affect.       Assessment/Plan   Mg was seen today for " hypertension, allergic rhinitis, anxiety and vitamin b12 deficiency.    Diagnoses and all orders for this visit:    Pain in joint of right shoulder  -     XR Shoulder 1 View Right    Perennial allergic rhinitis  -     levocetirizine (XYZAL) 5 MG tablet; Take 1 tablet by mouth Every Evening.  -     XR Shoulder 1 View Right    Anxiety  -     ALPRAZolam (XANAX) 0.25 MG tablet; Take 1 tablet by mouth Daily As Needed for Anxiety.  -     XR Shoulder 1 View Right    Other orders  -     amLODIPine (NORVASC) 5 MG tablet; Take 1 tablet by mouth Daily.                 .  To get a x-ray of his right shoulder today and then I am going to order an MRI of his right shoulder and he is going to go back and see his cancer doctors that you have L after the MRI of his right shoulder.  And I gave him 30 Xanax today to take as needed for anxiety due to his blood count from the cancer doctor.  See me in 2 to 3 weeks for the pain of her right shoulder to see if that is better

## 2019-12-12 LAB — VIT B12 SERPL-MCNC: >2000 PG/ML (ref 211–946)

## 2019-12-20 ENCOUNTER — TELEPHONE (OUTPATIENT)
Dept: FAMILY MEDICINE CLINIC | Facility: CLINIC | Age: 79
End: 2019-12-20

## 2019-12-20 DIAGNOSIS — G89.29 CHRONIC RIGHT SHOULDER PAIN: Primary | ICD-10-CM

## 2019-12-20 DIAGNOSIS — M25.619 LIMITED RANGE OF MOTION (ROM) OF SHOULDER: ICD-10-CM

## 2019-12-20 DIAGNOSIS — M25.511 CHRONIC RIGHT SHOULDER PAIN: Primary | ICD-10-CM

## 2019-12-20 NOTE — TELEPHONE ENCOUNTER
Pt called asking about MRI.    Per last OV 12/11/19:    I am also going to order an MRI of his right shoulder per his cancer doctor at [facility].  He is got limited range of motion and pain in his right shoulder.     Pt has chemo on Tuesdays - so do not schedule on Tuesday.

## 2020-01-08 NOTE — PATIENT INSTRUCTIONS
Exercise 30 minutes most days of the week  Sleep 6-8 hours each night if possible  Low fat, low cholesterol diet   we discussed prescribed medications and how to take them   make sure you get results of any labs/studies ordered today  Low glycemic index diet        The patient feels that the cataract is significantly impacting daily activities and has elected cataract surgery. The risks, benefits, and alternatives to surgery were discussed. The patient elects to proceed with surgery.

## 2020-01-10 ENCOUNTER — HOSPITAL ENCOUNTER (OUTPATIENT)
Dept: MRI IMAGING | Facility: HOSPITAL | Age: 80
Discharge: HOME | End: 2020-01-10
Admitting: FAMILY MEDICINE

## 2020-01-10 ENCOUNTER — APPOINTMENT (OUTPATIENT)
Dept: MRI IMAGING | Facility: HOSPITAL | Age: 80
End: 2020-01-10

## 2020-01-10 ENCOUNTER — HOSPITAL ENCOUNTER (OUTPATIENT)
Dept: GENERAL RADIOLOGY | Facility: HOSPITAL | Age: 80
Discharge: HOME | End: 2020-01-10

## 2020-01-10 DIAGNOSIS — M25.619 LIMITED RANGE OF MOTION (ROM) OF SHOULDER: ICD-10-CM

## 2020-01-10 DIAGNOSIS — G89.29 CHRONIC RIGHT SHOULDER PAIN: ICD-10-CM

## 2020-01-10 DIAGNOSIS — M25.511 CHRONIC RIGHT SHOULDER PAIN: ICD-10-CM

## 2020-01-10 PROCEDURE — 73221 MRI JOINT UPR EXTREM W/O DYE: CPT

## 2020-01-15 ENCOUNTER — TELEPHONE (OUTPATIENT)
Dept: FAMILY MEDICINE CLINIC | Facility: CLINIC | Age: 80
End: 2020-01-15

## 2020-01-15 ENCOUNTER — CLINICAL SUPPORT (OUTPATIENT)
Dept: FAMILY MEDICINE CLINIC | Facility: CLINIC | Age: 80
End: 2020-01-15

## 2020-01-15 DIAGNOSIS — E53.8 VITAMIN B12 DEFICIENCY: ICD-10-CM

## 2020-01-15 PROCEDURE — 96372 THER/PROPH/DIAG INJ SC/IM: CPT | Performed by: FAMILY MEDICINE

## 2020-01-15 RX ADMIN — CYANOCOBALAMIN 1000 MCG: 1000 INJECTION, SOLUTION INTRAMUSCULAR; SUBCUTANEOUS at 08:48

## 2020-01-20 NOTE — TELEPHONE ENCOUNTER
I gave him reports and told him to pick them up and give and discuss with his cancer doctors.he has a tear in shoulder and he needs to discuss with his cancer doctor and refer him to see DR KEARNS. ABOUT ABNORMAL MRI SHOULDER.    REFER HIM TO DR KEARNS  ABOUT ABNORMAL MRI RIGHT SHOULDER AND GIVE HIM COPIES  OF MRI ETC.

## 2020-01-23 DIAGNOSIS — T14.8XXA TENDON TEAR: Primary | ICD-10-CM

## 2020-02-05 ENCOUNTER — APPOINTMENT (OUTPATIENT)
Dept: GENERAL RADIOLOGY | Facility: HOSPITAL | Age: 80
End: 2020-02-05

## 2020-02-05 ENCOUNTER — HOSPITAL ENCOUNTER (EMERGENCY)
Facility: HOSPITAL | Age: 80
Discharge: HOME | End: 2020-02-05
Attending: EMERGENCY MEDICINE | Admitting: EMERGENCY MEDICINE

## 2020-02-05 VITALS
SYSTOLIC BLOOD PRESSURE: 137 MMHG | TEMPERATURE: 100.3 F | WEIGHT: 247 LBS | DIASTOLIC BLOOD PRESSURE: 57 MMHG | RESPIRATION RATE: 20 BRPM | BODY MASS INDEX: 32.74 KG/M2 | OXYGEN SATURATION: 99 % | HEART RATE: 71 BPM | HEIGHT: 73 IN

## 2020-02-05 DIAGNOSIS — R53.1 GENERALIZED WEAKNESS: ICD-10-CM

## 2020-02-05 DIAGNOSIS — J10.1 INFLUENZA A: Primary | ICD-10-CM

## 2020-02-05 LAB
ALBUMIN SERPL-MCNC: 3.8 G/DL (ref 3.5–5.2)
ALBUMIN/GLOB SERPL: 1.3 G/DL
ALP SERPL-CCNC: 61 U/L (ref 39–117)
ALT SERPL W P-5'-P-CCNC: 28 U/L (ref 1–41)
ANION GAP SERPL CALCULATED.3IONS-SCNC: 13.3 MMOL/L (ref 5–15)
AST SERPL-CCNC: 84 U/L (ref 1–40)
B PARAPERT DNA SPEC QL NAA+PROBE: NOT DETECTED
B PERT DNA SPEC QL NAA+PROBE: NOT DETECTED
BILIRUB SERPL-MCNC: 1.8 MG/DL (ref 0.2–1.2)
BUN BLD-MCNC: 17 MG/DL (ref 8–23)
BUN/CREAT SERPL: 13.8 (ref 7–25)
C PNEUM DNA NPH QL NAA+NON-PROBE: NOT DETECTED
CALCIUM SPEC-SCNC: 8.9 MG/DL (ref 8.6–10.5)
CHLORIDE SERPL-SCNC: 99 MMOL/L (ref 98–107)
CO2 SERPL-SCNC: 23.7 MMOL/L (ref 22–29)
CREAT BLD-MCNC: 1.23 MG/DL (ref 0.76–1.27)
DEPRECATED RDW RBC AUTO: 39 FL (ref 37–54)
ERYTHROCYTE [DISTWIDTH] IN BLOOD BY AUTOMATED COUNT: 10 % (ref 12.3–15.4)
FLUAV H1 2009 PAND RNA NPH QL NAA+PROBE: DETECTED
FLUAV H1 HA GENE NPH QL NAA+PROBE: NOT DETECTED
FLUAV H3 RNA NPH QL NAA+PROBE: NOT DETECTED
FLUBV RNA ISLT QL NAA+PROBE: NOT DETECTED
GFR SERPL CREATININE-BSD FRML MDRD: 57 ML/MIN/1.73
GIANT PLATELETS: ABNORMAL
GLOBULIN UR ELPH-MCNC: 3 GM/DL
GLUCOSE BLD-MCNC: 109 MG/DL (ref 65–99)
HADV DNA SPEC NAA+PROBE: NOT DETECTED
HCOV 229E RNA SPEC QL NAA+PROBE: NOT DETECTED
HCOV HKU1 RNA SPEC QL NAA+PROBE: NOT DETECTED
HCOV NL63 RNA SPEC QL NAA+PROBE: NOT DETECTED
HCOV OC43 RNA SPEC QL NAA+PROBE: NOT DETECTED
HCT VFR BLD AUTO: 28.8 % (ref 37.5–51)
HGB BLD-MCNC: 9.6 G/DL (ref 13–17.7)
HMPV RNA NPH QL NAA+NON-PROBE: NOT DETECTED
HOLD SPECIMEN: NORMAL
HOLD SPECIMEN: NORMAL
HPIV1 RNA SPEC QL NAA+PROBE: NOT DETECTED
HPIV2 RNA SPEC QL NAA+PROBE: NOT DETECTED
HPIV3 RNA NPH QL NAA+PROBE: NOT DETECTED
HPIV4 P GENE NPH QL NAA+PROBE: NOT DETECTED
LYMPHOCYTES # BLD MANUAL: 1.89 10*3/MM3 (ref 0.7–3.1)
LYMPHOCYTES NFR BLD MANUAL: 13.3 % (ref 5–12)
LYMPHOCYTES NFR BLD MANUAL: 16.3 % (ref 19.6–45.3)
M PNEUMO IGG SER IA-ACNC: NOT DETECTED
MCH RBC QN AUTO: 36.2 PG (ref 26.6–33)
MCHC RBC AUTO-ENTMCNC: 33.3 G/DL (ref 31.5–35.7)
MCV RBC AUTO: 108.7 FL (ref 79–97)
MONOCYTES # BLD AUTO: 1.55 10*3/MM3 (ref 0.1–0.9)
NEUTROPHILS # BLD AUTO: 8.18 10*3/MM3 (ref 1.7–7)
NEUTROPHILS NFR BLD MANUAL: 70.4 % (ref 42.7–76)
NRBC SPEC MANUAL: 5.1 /100 WBC (ref 0–0.2)
NT-PROBNP SERPL-MCNC: 882.3 PG/ML (ref 5–1800)
PLATELET # BLD AUTO: 332 10*3/MM3 (ref 140–450)
PMV BLD AUTO: 10.2 FL (ref 6–12)
POTASSIUM BLD-SCNC: 3.8 MMOL/L (ref 3.5–5.2)
PROT SERPL-MCNC: 6.8 G/DL (ref 6–8.5)
RBC # BLD AUTO: 2.65 10*6/MM3 (ref 4.14–5.8)
RBC MORPH BLD: NORMAL
RHINOVIRUS RNA SPEC NAA+PROBE: NOT DETECTED
RSV RNA NPH QL NAA+NON-PROBE: NOT DETECTED
SMUDGE CELLS BLD QL SMEAR: ABNORMAL
SODIUM BLD-SCNC: 136 MMOL/L (ref 136–145)
TROPONIN T SERPL-MCNC: <0.01 NG/ML (ref 0–0.03)
WBC NRBC COR # BLD: 11.62 10*3/MM3 (ref 3.4–10.8)
WHOLE BLOOD HOLD SPECIMEN: NORMAL
WHOLE BLOOD HOLD SPECIMEN: NORMAL

## 2020-02-05 PROCEDURE — 80053 COMPREHEN METABOLIC PANEL: CPT | Performed by: EMERGENCY MEDICINE

## 2020-02-05 PROCEDURE — 85007 BL SMEAR W/DIFF WBC COUNT: CPT | Performed by: EMERGENCY MEDICINE

## 2020-02-05 PROCEDURE — 85025 COMPLETE CBC W/AUTO DIFF WBC: CPT | Performed by: EMERGENCY MEDICINE

## 2020-02-05 PROCEDURE — 71046 X-RAY EXAM CHEST 2 VIEWS: CPT

## 2020-02-05 PROCEDURE — 96360 HYDRATION IV INFUSION INIT: CPT

## 2020-02-05 PROCEDURE — 84484 ASSAY OF TROPONIN QUANT: CPT | Performed by: EMERGENCY MEDICINE

## 2020-02-05 PROCEDURE — 93010 ELECTROCARDIOGRAM REPORT: CPT | Performed by: INTERNAL MEDICINE

## 2020-02-05 PROCEDURE — 83880 ASSAY OF NATRIURETIC PEPTIDE: CPT | Performed by: EMERGENCY MEDICINE

## 2020-02-05 PROCEDURE — 99284 EMERGENCY DEPT VISIT MOD MDM: CPT

## 2020-02-05 PROCEDURE — 0100U HC BIOFIRE FILMARRAY RESP PANEL 2: CPT | Performed by: EMERGENCY MEDICINE

## 2020-02-05 PROCEDURE — 93005 ELECTROCARDIOGRAM TRACING: CPT | Performed by: EMERGENCY MEDICINE

## 2020-02-05 PROCEDURE — 94640 AIRWAY INHALATION TREATMENT: CPT

## 2020-02-05 PROCEDURE — 96361 HYDRATE IV INFUSION ADD-ON: CPT

## 2020-02-05 RX ORDER — IPRATROPIUM BROMIDE AND ALBUTEROL SULFATE 2.5; .5 MG/3ML; MG/3ML
3 SOLUTION RESPIRATORY (INHALATION) ONCE
Status: COMPLETED | OUTPATIENT
Start: 2020-02-05 | End: 2020-02-05

## 2020-02-05 RX ORDER — SODIUM CHLORIDE 9 MG/ML
125 INJECTION, SOLUTION INTRAVENOUS CONTINUOUS
Status: DISCONTINUED | OUTPATIENT
Start: 2020-02-05 | End: 2020-02-05 | Stop reason: HOSPADM

## 2020-02-05 RX ORDER — SODIUM CHLORIDE 0.9 % (FLUSH) 0.9 %
10 SYRINGE (ML) INJECTION AS NEEDED
Status: DISCONTINUED | OUTPATIENT
Start: 2020-02-05 | End: 2020-02-05 | Stop reason: HOSPADM

## 2020-02-05 RX ORDER — OSELTAMIVIR PHOSPHATE 75 MG/1
75 CAPSULE ORAL 2 TIMES DAILY
Qty: 10 CAPSULE | Refills: 0 | Status: SHIPPED | OUTPATIENT
Start: 2020-02-05 | End: 2020-04-08

## 2020-02-05 RX ORDER — ACETAMINOPHEN 500 MG
1000 TABLET ORAL ONCE
Status: COMPLETED | OUTPATIENT
Start: 2020-02-05 | End: 2020-02-05

## 2020-02-05 RX ADMIN — ACETAMINOPHEN 1000 MG: 500 TABLET, FILM COATED ORAL at 12:01

## 2020-02-05 RX ADMIN — SODIUM CHLORIDE 125 ML/HR: 9 INJECTION, SOLUTION INTRAVENOUS at 12:01

## 2020-02-05 RX ADMIN — IPRATROPIUM BROMIDE AND ALBUTEROL SULFATE 3 ML: 2.5; .5 SOLUTION RESPIRATORY (INHALATION) at 11:53

## 2020-02-05 NOTE — ED PROVIDER NOTES
EMERGENCY DEPARTMENT ENCOUNTER    CHIEF COMPLAINT  Chief Complaint: Cough   History given by: Patient and family   History limited by: Pt has baseline confusion  Room Number: 10/10  PMD: Dillon Marsh MD      HPI:  Pt is a 79 y.o. male who presents via EMS complaining of productive cough with clear sputum which began 4 days ago and worsened today.   Pt is also c/o generalized weakness, light-headedness, SOA, abdominal pain and nausea. Pt denies chest pain, difficulty urinating and hematochezia. Pt reports a hx of COPD for which he is followed by  (pulmonology). He reports using CPAP at night and affirms compliance. Per family, pt is currently undergoing chemo for renal, prostate and lung cancer. Pt states he was supposed to go to chemo yesterday but felt too sick to go. He affirms getting flu shot this season. Family affirms pt is mildly confused at baseline.     Duration:  4 days   Onset: Gradual  Timing: Constant   Location: Respiratory   Radiation: n/a  Quality: Cough   Intensity/Severity: Moderate  Progression: Worsening   Associated Symptoms: Generalized weakness, light-headedness, SOA, abdominal pain and nausea  Aggravating Factors: None  Alleviating Factors: None  Previous Episodes: None  Treatment before arrival: None    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/24/2014   • Herpes zoster 12/06/2011   • Hip pain 03/24/2014   • Hypertension    • Cancer (CMS/HCC) 04/29/2014   • COPD (chronic obstructive pulmonary disease) (CMS/[facility]) 03/24/2014   • Left low back pain 10/28/2015   • Dizziness 02/02/2016   • Headache around the eyes 02/02/2016   • Mild cognitive impairment 02/02/2016   • Renal cell carcinoma (CMS/HCC) 02/15/2016   • Anxiety 05/16/2016   • Malignant neoplasm of lung (CMS/HCC) 05/16/2016   • Neuropathy 02/02/2017   • Perennial allergic rhinitis 02/02/2017   • Vitamin B12  deficiency 03/16/2017   • 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   • Pharyngitis 12/16/2017   • Bronchitis 05/20/2019     Resolved Ambulatory Problems     Diagnosis Date Noted   • Pulmonary embolism (CMS/HCC) 03/24/2014     Past Medical History:   Diagnosis Date   • Allergic    • Atrial fibrillation (CMS/HCC)    • Cataract    • Chemotherapeutic agent or infusion extravasation    • Emphysema of lung (CMS/HCC)    • History of Doppler ultrasound 12/28/2011   • History of EKG 02/10/2012   • Injury of back    • Renal cell cancer (CMS/HCC)    • Shortness of breath    • Sleep apnea        PAST SURGICAL HISTORY  Past Surgical History:   Procedure Laterality Date   • APPENDECTOMY     • CATARACT EXTRACTION     • CHOLECYSTECTOMY     • SPLENECTOMY     • TONSILLECTOMY     • VEIN SURGERY         FAMILY HISTORY  Family History   Problem Relation Age of Onset   • Aneurysm Mother    • Stroke Mother    • Heart disease Father    • Diabetes Brother         type 2       SOCIAL HISTORY  Social History     Socioeconomic History   • Marital status:      Spouse name: Not on file   • Number of children: Not on file   • Years of education: Not on file   • Highest education level: Not on file   Tobacco Use   • Smoking status: Former Smoker   • Smokeless tobacco: Never Used   Substance and Sexual Activity   • Alcohol use: Yes     Comment: 2 drinks month   • Drug use: No   • Sexual activity: Defer       ALLERGIES  Latex    REVIEW OF SYSTEMS  Review of Systems   Constitutional: Negative for chills and fever.   HENT: Negative for sore throat and trouble swallowing.    Eyes: Negative for visual disturbance.   Respiratory: Positive for cough and shortness of breath.    Cardiovascular: Negative for chest pain and leg swelling.   Gastrointestinal: Positive for abdominal pain and nausea. Negative for blood in stool, diarrhea and vomiting.   Endocrine: Negative.    Genitourinary: Negative for decreased urine volume, difficulty urinating and  frequency.   Musculoskeletal: Negative for neck pain.   Skin: Negative for rash.   Allergic/Immunologic: Negative.    Neurological: Positive for weakness and light-headedness. Negative for numbness.   Hematological: Negative.    Psychiatric/Behavioral: Negative.    All other systems reviewed and are negative.      PHYSICAL EXAM  ED Triage Vitals   Temp Heart Rate Resp BP SpO2   02/05/20 1002 02/05/20 1002 02/05/20 1002 02/05/20 1022 02/05/20 1002   100.3 °F (37.9 °C) 79 16 140/68 92 %      Temp src Heart Rate Source Patient Position BP Location FiO2 (%)   02/05/20 1002 -- -- -- --   Tympanic           Physical Exam   Constitutional: He is oriented to person, place, and time.  Non-toxic appearance. He appears distressed.   HENT:   Head: Normocephalic and atraumatic.   Eyes: EOM are normal.   Neck: Normal range of motion.   Cardiovascular: Normal rate, regular rhythm and normal heart sounds.   No murmur heard.  Pulses:       Posterior tibial pulses are 2+ on the right side, and 2+ on the left side.   Pulmonary/Chest: Effort normal. No respiratory distress. He has decreased breath sounds (L base). He has wheezes (expiratory wheezes throughout).   Port in R upper chest wall which appears clean, dry and intact   Abdominal: Soft. Bowel sounds are normal. There is no tenderness. There is no rebound and no guarding.   Musculoskeletal: Normal range of motion. He exhibits no edema.   Neurological: He is alert and oriented to person, place, and time.   Skin: Skin is warm and dry.   Psychiatric: Affect normal.   Nursing note and vitals reviewed.      LAB RESULTS  Lab Results (last 24 hours)     Procedure Component Value Units Date/Time    CBC & Differential [[id number]] Collected:  02/05/20 1030    Specimen:  Blood Updated:  02/05/20 1143    Narrative:       The following orders were created for panel order CBC & Differential.  Procedure                               Abnormality         Status                     ---------                                -----------         ------                     CBC Auto Differential[[id number]]        Abnormal            Final result                 Please view results for these tests on the individual orders.    Comprehensive Metabolic Panel [[id number]]  (Abnormal) Collected:  02/05/20 1030    Specimen:  Blood Updated:  02/05/20 1119     Glucose 109 mg/dL      BUN 17 mg/dL      Creatinine 1.23 mg/dL      Sodium 136 mmol/L      Potassium 3.8 mmol/L      Chloride 99 mmol/L      CO2 23.7 mmol/L      Calcium 8.9 mg/dL      Total Protein 6.8 g/dL      Albumin 3.80 g/dL      ALT (SGPT) 28 U/L      AST (SGOT) 84 U/L      Alkaline Phosphatase 61 U/L      Total Bilirubin 1.8 mg/dL      eGFR Non African Amer 57 mL/min/1.73      Globulin 3.0 gm/dL      A/G Ratio 1.3 g/dL      BUN/Creatinine Ratio 13.8     Anion Gap 13.3 mmol/L     Narrative:       GFR Normal >60  Chronic Kidney Disease <60  Kidney Failure <15      Troponin [[id number]]  (Normal) Collected:  02/05/20 1030    Specimen:  Blood Updated:  02/05/20 1119     Troponin T <0.010 ng/mL     Narrative:       Troponin T Reference Range:  <= 0.03 ng/mL-   Negative for AMI  >0.03 ng/mL-     Abnormal for myocardial necrosis.  Clinicians would have to utilize clinical acumen, EKG, Troponin and serial changes to determine if it is an Acute Myocardial Infarction or myocardial injury due to an underlying chronic condition.       Results may be falsely decreased if patient taking Biotin.      BNP [[id number]]  (Normal) Collected:  02/05/20 1030    Specimen:  Blood Updated:  02/05/20 1118     proBNP 882.3 pg/mL     Narrative:       Among patients with dyspnea, NT-proBNP is highly sensitive for the detection of acute congestive heart failure. In addition NT-proBNP of <300 pg/ml effectively rules out acute congestive heart failure with 99% negative predictive value.    Results may be falsely decreased if patient taking Biotin.      CBC Auto Differential [[id number]]   (Abnormal) Collected:  02/05/20 1030    Specimen:  Blood Updated:  02/05/20 1143     WBC 11.62 10*3/mm3      RBC 2.65 10*6/mm3      Hemoglobin 9.6 g/dL      Hematocrit 28.8 %      .7 fL      MCH 36.2 pg      MCHC 33.3 g/dL      RDW 10.0 %      RDW-SD 39.0 fl      MPV 10.2 fL      Platelets 332 10*3/mm3     Manual Differential [[id number]]  (Abnormal) Collected:  02/05/20 1030    Specimen:  Blood Updated:  02/05/20 1143     Neutrophil % 70.4 %      Lymphocyte % 16.3 %      Monocyte % 13.3 %      Neutrophils Absolute 8.18 10*3/mm3      Lymphocytes Absolute 1.89 10*3/mm3      Monocytes Absolute 1.55 10*3/mm3      nRBC 5.1 /100 WBC      RBC Morphology Normal     Smudge Cells Mod/2+     Giant Platelets Slight/1+    Respiratory Panel, PCR - Swab, Nasopharynx [[id number]]  (Abnormal) Collected:  02/05/20 1102    Specimen:  Swab from Nasopharynx Updated:  02/05/20 1207     ADENOVIRUS, PCR Not Detected     Coronavirus 229E Not Detected     Coronavirus HKU1 Not Detected     Coronavirus NL63 Not Detected     Coronavirus OC43 Not Detected     Human Metapneumovirus Not Detected     Human Rhinovirus/Enterovirus Not Detected     Influenza B PCR Not Detected     Parainfluenza Virus 1 Not Detected     Parainfluenza Virus 2 Not Detected     Parainfluenza Virus 3 Not Detected     Parainfluenza Virus 4 Not Detected     Bordetella pertussis pcr Not Detected     Influenza A H1 2009 PCR Detected     Chlamydophila pneumoniae PCR Not Detected     Mycoplasma pneumo by PCR Not Detected     Influenza A H3 Not Detected     Influenza A H1 Not Detected     RSV, PCR Not Detected     Bordetella parapertussis PCR Not Detected          I ordered the above labs and reviewed the results    RADIOLOGY  XR Chest 2 View   Final Result   IMPRESSIONS: No evidence of acute disease within the chest.          I ordered the above noted radiological studies. Interpreted by radiologist. Reviewed by me in PACS.       PROCEDURES  Procedures     EKG          EKG  time: 1111  Rhythm/Rate: Sinus rhythm, rate 70's  P waves and NV: Nml  QRS, axis: Nml   ST and T waves: Nml     Interpreted Contemporaneously by me, independently viewed  Unchanged compared to prior 4/24/15      PROGRESS AND CONSULTS       12:35 PM: Pt able to ambulate with walker without assistance. Informed pt of respiratory panel which was positive for flu A. Performed shared decision making regarding admission versus discharge. Pt is worried about  going home because he lives in a tri level home and is very weak. Discussed consulting [facility] to determine if resources can be obtained to support pt at home. Advised pt that tamiflu will not help since onset sx greater than 48 hours but pt would still like prescription for tamiflu. Son is at bedside and is going to discuss with pt before making a decision.    1:18 PM: Spoke with Wilfredo from [facility] who has spoke with family. He reports they are comfortable being discharged with a bedside commode. He gave them additional resources for home health just in case.     1:22 PM: Spoke with son. He reports pt is ready to go home and is comfortable with arrangements made with [facility]. Discussed plan to discharge. RTER pre-cautions given. Pt understands and agrees with the plan, all questions answered.    Pt able to ambulate independently with a walker with sats 90% or greater with ambulation        MEDICAL DECISION MAKING  Results were reviewed/discussed with the patient and they were also made aware of online access. Pt also made aware that some labs, such as cultures, will not be resulted during ER visit and follow up with PMD is necessary.     MDM  Number of Diagnoses or Management Options  Generalized weakness:   Influenza A:      Amount and/or Complexity of Data Reviewed  Clinical lab tests: ordered and reviewed (Respiratory viral panel positive for influenza A)  Tests in the radiology section of CPT®: ordered and reviewed (XR chest negative acute)  Tests in the medicine section of  CPT®: reviewed and ordered (EKG- see procedure note)  Decide to obtain previous medical records or to obtain history from someone other than the patient: yes ([facility] everywhere)  Review and summarize past medical records: yes (Pt was admitted to [facility] from 10/15/19 to 10/16/19 for workup after near syncope)  Independent visualization of images, tracings, or specimens: yes    Patient Progress  Patient progress: stable         DIAGNOSIS  Final diagnoses:   Influenza A   Generalized weakness       DISPOSITION  DISCHARGE    Patient discharged in stable condition.    Reviewed implications of results, diagnosis, meds, responsibility to follow up, warning signs and symptoms of possible worsening, potential complications and reasons to return to ER.    Patient/Family voiced understanding of above instructions.    Discussed plan for discharge, as there is no emergent indication for admission. Patient referred to primary care provider for BP management due to today's BP. Pt/family is agreeable and understands need for follow up and repeat testing.  Pt is aware that discharge does not mean that nothing is wrong but it indicates no emergency is present that requires admission and they must continue care with follow-up as given below or physician of their choice.     FOLLOW-UP  Dillon Marsh MD  [address] [address]  [phone]    Schedule an appointment as soon as possible for a visit in 2 days  EVEN IF WELL         Medication List      New Prescriptions    oseltamivir 75 MG capsule  Commonly known as:  TAMIFLU  Take 1 capsule by mouth 2 (Two) Times a Day.              Latest Documented Vital Signs:  As of 1:35 PM  BP- 137/57 HR- 71 Temp- 100.3 °F (37.9 °C) (Tympanic) O2 sat- 99%    --  Documentation assistance provided by chito Franco for . Information recorded by the chito was done at my direction and has been verified and validated by me.                     Salvador  Luna  02/05/20 [id number]       Fabiana Brooks MD  02/08/20 [id number]

## 2020-02-05 NOTE — PROGRESS NOTES
Received request from Cecilia MARIE for assistance with home DME, support for discharging patient.  Entered room, identified self, explained role, and verified facesheet information.  Patient alert, pleasant, slightly [place]gr[place], cooperative with assessment; patient's son, Mg, at bedside.  Patient lives in tri-level house with wife, Lucina, and daughter, Romina.  Family indicates patient and spouse are very active, and patient has been independent with ADLs.  Patient ambulates without assistance within the home, but does use a walker for longer distances while outside of the home.  There are approximately eight stairs between levels of the house, and patient able to negotiate these without issues.  Daughter, Romina, lives in the house and is able to provide assistance as needed; son, Mg, resides in his own residence but is also available for support if needed.  Patient indicates that he owns a wheelchair in addition to a walker, but it is not needed for use at this time.  Patient and family verbalized readiness to go home with current family support as needed; patient did request [facility] as BR alternative in the setting of his potential weakness.  [facility] rep contacted for availability; Detailed Written Order completed and faxed to [facility] with receipt of confirmation page.  [facility] also contacted for verbal confirmation of receipt of information/request.  Patient/Family also provided with In-Home Personal Care Agency list for assistance with ADLs as needed; family expressed appreciation for information, but indicated that external assistance likely not needed at this time.  No other questions or concerns verbalized.  Dillon Olsen RN

## 2020-02-05 NOTE — DISCHARGE INSTRUCTIONS
You are advised to follow closely with Dr. Marsh in 2-3 days for recheck, final results of lab work and imaging testing, and further testing/treatment as needed.    Drink plenty of fluids-small amounts frequently.  Please drink fluids without caffeine.  Take Tylenol every 4-6 hours as needed for body aches and fever.  Please make sure you have a thermometer at home and take Tylenol for a temperature 100.4 or above    Please return to the emergency department immediately with chest pain different than usual for you, persistent or worsening shortness of air, abdominal pain, persistent vomiting/fever, blood in emesis or stool, lightheadedness/fainting, problems with speech, one sided weakness/numbness, new incontinence, problems with vision, altered mental status or for worsening of symptoms or other concerns.

## 2020-02-05 NOTE — ED NOTES
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  Currently has temp of 100.3.     Lasha Pierre RN  02/05/20 2162

## 2020-02-06 ENCOUNTER — PATIENT OUTREACH (OUTPATIENT)
Dept: CASE MANAGEMENT | Facility: OTHER | Age: 80
End: 2020-02-06

## 2020-02-06 NOTE — OUTREACH NOTE
Care Plan Note      Responses   Lifestyle Goals  Routine follow-up with doctor(s), Fewer ER/urgent care visits, Less pain, Medication management   Barriers  Pain   Self Management  Get flu/pneumonia shot, Medication Adherence, Other (See Comment) [Check temperature regularly until feeling better]   Annual Wellness Visit:   Patient Will Schedule   AWV Materials  Send Materials   Specific Disease Process Teaching  -- [Flu: drink plenty of fluids, rest, treat fever and pain with Tylenol. ]   Does patient have depression diagnosis?  No   Advanced Directives:  Not Interested At This Time   Ed Visits past 12 months:  1   Hospitalizations past 12 months  None   Discharge destination:  Home   Medication Adherence  Medications understood [Patient knows to take Tylenol for fever and pain every 4 to 6 hours. ]   Goal Progress  Making Progress Toward Goal(s)   Health Literacy  Good        The main concerns and/or symptoms the patient would like to address are: Spoke with patient regarding recent ED visit due to flu. Reviewed that flu symptoms usually last 7-10 days. Patient states he still feels very bad. Patient stopped Tamiflu due to diarrhea. Patient has been drinking fluids. Patient denies temperature, but has not been checking. Patient states he will have his daughter check his temperature when she gets home. Patient's wife being discharged from hospital to rehab today.    Education/instruction provided by Care Coordinator: Introduced self, explained CA role and provided contact information. Reviewed flu care: rest, fluids, Tylenol for pain/fever. Reviewed typical symptom duration. Reviewed medications. Tamiflu stopped and Tylenol understood. Mychart active. AD not filed. AWV to be scheduled when patient feeling better. AWV information sent to patient as a reminder.     Follow Up Outreach Due: as needed    Christine Montes De Oca RN  Ambulatory     2/6/2020, 3:08 PM

## 2020-02-26 ENCOUNTER — OFFICE VISIT (OUTPATIENT)
Dept: FAMILY MEDICINE CLINIC | Facility: CLINIC | Age: 80
End: 2020-02-26

## 2020-02-26 VITALS
SYSTOLIC BLOOD PRESSURE: 135 MMHG | BODY MASS INDEX: 32.2 KG/M2 | RESPIRATION RATE: 16 BRPM | HEIGHT: 73 IN | HEART RATE: 72 BPM | TEMPERATURE: 98 F | OXYGEN SATURATION: 94 % | DIASTOLIC BLOOD PRESSURE: 59 MMHG | WEIGHT: 243 LBS

## 2020-02-26 DIAGNOSIS — M19.90 ARTHRITIS: ICD-10-CM

## 2020-02-26 DIAGNOSIS — F41.9 ANXIETY: Primary | ICD-10-CM

## 2020-02-26 DIAGNOSIS — C80.1 CANCER (HCC): ICD-10-CM

## 2020-02-26 DIAGNOSIS — C34.92 MALIGNANT NEOPLASM OF LEFT LUNG, UNSPECIFIED PART OF LUNG (HCC): ICD-10-CM

## 2020-02-26 DIAGNOSIS — J44.9 CHRONIC OBSTRUCTIVE PULMONARY DISEASE, UNSPECIFIED COPD TYPE (HCC): ICD-10-CM

## 2020-02-26 DIAGNOSIS — I10 ESSENTIAL HYPERTENSION: ICD-10-CM

## 2020-02-26 DIAGNOSIS — G62.9 NEUROPATHY: ICD-10-CM

## 2020-02-26 DIAGNOSIS — M54.40 CHRONIC LEFT-SIDED LOW BACK PAIN WITH SCIATICA, SCIATICA LATERALITY UNSPECIFIED: ICD-10-CM

## 2020-02-26 DIAGNOSIS — G89.29 CHRONIC LEFT-SIDED LOW BACK PAIN WITH SCIATICA, SCIATICA LATERALITY UNSPECIFIED: ICD-10-CM

## 2020-02-26 PROCEDURE — 99213 OFFICE O/P EST LOW 20 MIN: CPT | Performed by: FAMILY MEDICINE

## 2020-02-26 RX ORDER — GABAPENTIN 400 MG/1
400 CAPSULE ORAL 4 TIMES DAILY
Qty: 120 CAPSULE | Refills: 5 | Status: SHIPPED | OUTPATIENT
Start: 2020-02-26 | End: 2020-11-27 | Stop reason: HOSPADM

## 2020-02-26 RX ORDER — CEFDINIR 300 MG/1
300 CAPSULE ORAL 2 TIMES DAILY
Qty: 20 CAPSULE | Refills: 0 | Status: SHIPPED | OUTPATIENT
Start: 2020-02-26 | End: 2020-03-18 | Stop reason: SDUPTHER

## 2020-02-26 RX ORDER — CYCLOBENZAPRINE HCL 5 MG
5 TABLET ORAL 2 TIMES DAILY PRN
COMMUNITY
End: 2020-02-26 | Stop reason: SDUPTHER

## 2020-02-26 RX ORDER — CYCLOBENZAPRINE HCL 5 MG
5 TABLET ORAL 2 TIMES DAILY PRN
Qty: 30 TABLET | Refills: 3 | Status: SHIPPED | OUTPATIENT
Start: 2020-02-26 | End: 2020-04-08 | Stop reason: SDUPTHER

## 2020-02-26 RX ORDER — CEFDINIR 300 MG/1
300 CAPSULE ORAL 2 TIMES DAILY
COMMUNITY
End: 2020-02-26 | Stop reason: SDUPTHER

## 2020-02-26 NOTE — PROGRESS NOTES
Subjective   Chief Complaint:   Chief Complaint   Patient presents with   • Cough         History of Present Illness patient was checked at the hospital and had a chest x-ray and he thought he had pneumonia but they told him he did not have pneumonia but he does have a congested cough.  And here in the office I heard him a couple times with a congested cough.  Had a cough for about 3 weeks now it is getting better he said.  The time his phlegm is clear but sometimes he does have some yellow-green phlegm.  Also asked for a gabapentin refill he only has 30 days left and the MRI of his right shoulder was scheduled back are pushed back to March 6.  Wants a refill of his Flexeril 5 mg.  Also he asked for some Phenergan with codeine cough syrup.  Do hear some crackles in his bases coughing some going start him on Omnicef 300  mg 1 twice daily for 10 days and will give him some Phenergan with codeine cough syrup and he wants his gabapentin refilled and I am going to give him Flexeril 5 mg take 1 p.o. 3 times a day as needed cough gets worse or starts running high fevers to return.  Current outpatient and discharge medications have been reconciled for the patient.  Refill his gabapentin 400 mg 4 times daily for 120 with 5 refills.  And I am giving him Omnicef 3 mg 1 twice daily for 10 days and I am giving Flexeril 5 mg 1 daily #90 with a refill.  Phenergan with codeine cough syrup 1 teaspoon every 6 hours as needed cough 4 ounces.  Reviewed by: Dillon Marsh MD          Past Medical History:   Diagnosis Date   • Allergic    • Anemia, vitamin B12 deficiency 04/12/2012   • Anxiety    • Arthritis 03/24/2014   • Atrial fibrillation (CMS/HCC)    • Cancer (CMS/HCC) 04/29/2014    kidney; prostate   • Cataract    • Chemotherapeutic agent or infusion extravasation     q 1-2 wks    • COPD (chronic obstructive pulmonary disease) (CMS/HCC) 03/24/2014   • Emphysema of lung (CMS/HCC)    • Hemochromatosis 03/24/2014   • Herpes zoster  12/06/2011    left medial thigh   • Hip pain 03/24/2014   • History of Doppler ultrasound 12/28/2011    superficial and deep venous insuffiency   • History of EKG 02/10/2012    Dr. Kathi Lloyd; unchanged from prior EKG   • Hypertension     benign essential   • Injury of back    • Pulmonary embolism (CMS/HCC) 03/24/2014   • Renal cell cancer (CMS/HCC)    • Shortness of breath    • Sleep apnea         Mg Gerber Sr. 79 y.o. male who presents today for Medical Management of Cough.  Pt was seen in ER on 2/5/2020 with the Flu.    ICD-10-CM ICD-9-CM   1. Anxiety F41.9 300.00   2. Malignant neoplasm of left lung, unspecified part of lung (CMS/HCC) C34.92 162.9   3. Chronic obstructive pulmonary disease, unspecified COPD type (CMS/HCC) J44.9 496   4. Neuropathy G62.9 355.9   5. Arthritis M19.90 716.90   6. Cancer (CMS/HCC) C80.1 199.1   7. Essential hypertension I10 401.9   8. 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, sciatica laterality unspecified M54.40 724.2    G89.29 724.3     338.29        he has a problem list of   Patient Active Problem List   Diagnosis   • Anemia, vitamin B12 deficiency   • Arthritis   • Hemochromatosis   • Herpes zoster   • Hip pain   • Hypertension   • Cancer (CMS/HCC)   • COPD (chronic obstructive pulmonary disease) (CMS/HCC)   • Left low back pain   • Dizziness   • Headache around the eyes   • Mild cognitive impairment   • Renal cell carcinoma (CMS/HCC)   • Anxiety   • Malignant neoplasm of lung (CMS/HCC)   • Neuropathy   • Perennial allergic rhinitis   • Vitamin B12 deficiency   • Osteoarthritis of lumbar spine   • Pharyngitis   • Bronchitis   .    he has been compliant with   Current Outpatient Medications:   •  cefdinir (OMNICEF) 300 MG capsule, Take 300 mg by mouth 2 (Two) Times a Day., Disp: , Rfl:   •  cyclobenzaprine (FLEXERIL) 5 MG tablet, Take 5 mg by mouth 2 (Two) Times a Day As Needed for Muscle Spasms., Disp: , Rfl:   •  ALPRAZolam (XANAX) 0.25 MG tablet, Take 1 tablet  by mouth Daily As Needed for Anxiety., Disp: 30 tablet, Rfl: 0  •  amLODIPine (NORVASC) 5 MG tablet, Take 1 tablet by mouth Daily., Disp: 90 tablet, Rfl: 1  •  docusate sodium (COLACE) 100 MG capsule, Take 1 capsule by mouth Every Night., Disp: 90 capsule, Rfl: 1  •  folic acid (FOLVITE) 1 MG tablet, Take 1 mg by mouth daily., Disp: , Rfl:   •  gabapentin (NEURONTIN) 400 MG capsule, Take 1 capsule by mouth 4 (Four) Times a Day., Disp: 120 capsule, Rfl: 5  •  HYDROcodone-acetaminophen (NORCO) 5-325 MG per tablet, , Disp: , Rfl:   •  levocetirizine (XYZAL) 5 MG tablet, Take 1 tablet by mouth Every Evening., Disp: 90 tablet, Rfl: 1  •  levothyroxine sodium (TIROSINT) 112 MCG capsule, Daily., Disp: , Rfl:   •  meclizine (ANTIVERT) 12.5 MG tablet, Take 1 tablet by mouth 2 (Two) Times a Day As Needed for dizziness., Disp: 20 tablet, Rfl: 0  •  metOLazone (ZAROXOLYN) 5 MG tablet, , Disp: , Rfl:   •  NAMZARIC 28-10 MG capsule sustained-release 24 hr, , Disp: , Rfl:   •  oseltamivir (TAMIFLU) 75 MG capsule, Take 1 capsule by mouth 2 (Two) Times a Day., Disp: 10 capsule, Rfl: 0  •  promethazine-dextromethorphan (PROMETHAZINE-DM) 6.25-15 MG/5ML syrup, Take 5 mL by mouth 4 (Four) Times a Day As Needed for Cough., Disp: 180 mL, Rfl: 0  •  sertraline (ZOLOFT) 25 MG tablet, , Disp: , Rfl:   •  SPIRIVA HANDIHALER 18 MCG per inhalation capsule, Place 1 capsule into inhaler and inhale Daily., Disp: , Rfl:   •  tamsulosin (FLOMAX) 0.4 MG capsule 24 hr capsule, , Disp: , Rfl:   •  VENTOLIN  (90 BASE) MCG/ACT inhaler, Inhale 2 puffs Every 4 (Four) Hours As Needed., Disp: , Rfl:   •  XARELTO 20 MG tablet, Take 20 mg by mouth daily with dinner., Disp: , Rfl:     Current Facility-Administered Medications:   •  cyanocobalamin injection 1,000 mcg, 1,000 mcg, Intramuscular, Q30 Days, Dillon Marsh MD, 1,000 mcg at 01/15/20 0848.  he denies medication side effects.        /59   Pulse 72   Temp 98 °F (36.7 °C)   Resp 16   Ht  "185.4 cm (73\")   Wt 110 kg (243 lb)   SpO2 94%   BMI 32.06 kg/m²     Results for orders placed or performed during the hospital encounter of 02/05/20   Respiratory Panel, PCR - Swab, Nasopharynx   Result Value Ref Range    ADENOVIRUS, PCR Not Detected Not Detected    Coronavirus 229E Not Detected Not Detected    Coronavirus HKU1 Not Detected Not Detected    Coronavirus NL63 Not Detected Not Detected    Coronavirus OC43 Not Detected Not Detected    Human Metapneumovirus Not Detected Not Detected    Human Rhinovirus/Enterovirus Not Detected Not Detected    Influenza B PCR Not Detected Not Detected    Parainfluenza Virus 1 Not Detected Not Detected    Parainfluenza Virus 2 Not Detected Not Detected    Parainfluenza Virus 3 Not Detected Not Detected    Parainfluenza Virus 4 Not Detected Not Detected    Bordetella pertussis pcr Not Detected Not Detected    Influenza A H1 2009 PCR Detected (A) Not Detected    Chlamydophila pneumoniae PCR Not Detected Not Detected    Mycoplasma pneumo by PCR Not Detected Not Detected    Influenza A H3 Not Detected Not Detected    Influenza A H1 Not Detected Not Detected    RSV, PCR Not Detected Not Detected    Bordetella parapertussis PCR Not Detected Not Detected   Comprehensive Metabolic Panel   Result Value Ref Range    Glucose 109 (H) 65 - 99 mg/dL    BUN 17 8 - 23 mg/dL    Creatinine 1.23 0.76 - 1.27 mg/dL    Sodium 136 136 - 145 mmol/L    Potassium 3.8 3.5 - 5.2 mmol/L    Chloride 99 98 - 107 mmol/L    CO2 23.7 22.0 - 29.0 mmol/L    Calcium 8.9 8.6 - 10.5 mg/dL    Total Protein 6.8 6.0 - 8.5 g/dL    Albumin 3.80 3.50 - 5.20 g/dL    ALT (SGPT) 28 1 - 41 U/L    AST (SGOT) 84 (H) 1 - 40 U/L    Alkaline Phosphatase 61 39 - 117 U/L    Total Bilirubin 1.8 (H) 0.2 - 1.2 mg/dL    eGFR Non African Amer 57 (L) >60 mL/min/1.73    Globulin 3.0 gm/dL    A/G Ratio 1.3 g/dL    BUN/Creatinine Ratio 13.8 7.0 - 25.0    Anion Gap 13.3 5.0 - 15.0 mmol/L   Troponin   Result Value Ref Range    Troponin " T <0.010 0.000 - 0.030 ng/mL   BNP   Result Value Ref Range    proBNP 882.3 5.0-1,800.0 pg/mL   CBC Auto Differential   Result Value Ref Range    WBC 11.62 (H) 3.40 - 10.80 10*3/mm3    RBC 2.65 (L) 4.14 - 5.80 10*6/mm3    Hemoglobin 9.6 (L) 13.0 - 17.7 g/dL    Hematocrit 28.8 (L) 37.5 - 51.0 %    .7 (H) 79.0 - 97.0 fL    MCH 36.2 (H) 26.6 - 33.0 pg    MCHC 33.3 31.5 - 35.7 g/dL    RDW 10.0 (L) 12.3 - 15.4 %    RDW-SD 39.0 37.0 - 54.0 fl    MPV 10.2 6.0 - 12.0 fL    Platelets 332 140 - 450 10*3/mm3   Light Blue Top   Result Value Ref Range    Extra Tube hold for add-on    Green Top (Gel)   Result Value Ref Range    Extra Tube Hold for add-ons.    Lavender Top   Result Value Ref Range    Extra Tube hold for add-on    Gold Top - SST   Result Value Ref Range    Extra Tube Hold for add-ons.    Manual Differential   Result Value Ref Range    Neutrophil % 70.4 42.7 - 76.0 %    Lymphocyte % 16.3 (L) 19.6 - 45.3 %    Monocyte % 13.3 (H) 5.0 - 12.0 %    Neutrophils Absolute 8.18 (H) 1.70 - 7.00 10*3/mm3    Lymphocytes Absolute 1.89 0.70 - 3.10 10*3/mm3    Monocytes Absolute 1.55 (H) 0.10 - 0.90 10*3/mm3    nRBC 5.1 (H) 0.0 - 0.2 /100 WBC    RBC Morphology Normal Normal    Smudge Cells Mod/2+ None Seen    Giant Platelets Slight/1+ None Seen       The following portions of the patient's history were reviewed and updated as appropriate: allergies, current medications, past family history, past medical history, past social history, past surgical history and problem list.      he has a history of   Patient Active Problem List   Diagnosis   • Anemia, vitamin B12 deficiency   • Arthritis   • Hemochromatosis   • Herpes zoster   • Hip pain   • Hypertension   • Cancer (CMS/HCC)   • COPD (chronic obstructive pulmonary disease) (CMS/HCC)   • Left low back pain   • Dizziness   • Headache around the eyes   • Mild cognitive impairment   • Renal cell carcinoma (CMS/HCC)   • Anxiety   • Malignant neoplasm of lung (CMS/HCC)   •  Neuropathy   • Perennial allergic rhinitis   • Vitamin B12 deficiency   • Osteoarthritis of lumbar spine   • Pharyngitis   • Bronchitis       Review of Systems   Constitutional: Negative for activity change, appetite change and unexpected weight change.   Eyes: Negative for visual disturbance.   Respiratory: Negative for chest tightness and shortness of breath.    Cardiovascular: Negative for chest pain and palpitations.   Skin: Negative for color change.   Neurological: Negative for syncope and speech difficulty.   Psychiatric/Behavioral: Negative for confusion and decreased concentration.       Objective   Physical Exam   Constitutional: He is oriented to person, place, and time. He appears well-developed and well-nourished.   HENT:   Head: Atraumatic.   Mouth/Throat: Oropharynx is clear and moist.   Eyes: Pupils are equal, round, and reactive to light. EOM are normal.   Neck: Normal range of motion. Neck supple. No thyromegaly present.   Cardiovascular: Normal rate and regular rhythm.   Pulmonary/Chest: Effort normal and breath sounds normal.   He had bilateral crackles in both bases   Abdominal: Soft.   Musculoskeletal: Normal range of motion.   Neurological: He is alert and oriented to person, place, and time.   Skin: Skin is warm and dry.   Psychiatric: He has a normal mood and affect. His behavior is normal.   Nursing note and vitals reviewed.      Assessment/Plan   Mg was seen today for cough.    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

## 2020-02-26 NOTE — PATIENT INSTRUCTIONS
Cough, Adult    A cough helps to clear your throat and lungs. A cough may last only 2-3 weeks (acute), or it may last longer than 8 weeks (chronic). Many different things can cause a cough. A cough may be a sign of an illness or another medical condition.  Follow these instructions at home:  · Pay attention to any changes in your cough.  · Take medicines only as told by your doctor.  ? If you were prescribed an antibiotic medicine, take it as told by your doctor. Do not stop taking it even if you start to feel better.  ? Talk with your doctor before you try using a cough medicine.  · Drink enough fluid to keep your pee (urine) clear or pale yellow.  · If the air is dry, use a cold steam vaporizer or humidifier in your home.  · Stay away from things that make you cough at work or at home.  · If your cough is worse at night, try using extra pillows to raise your head up higher while you sleep.  · Do not smoke, and try not to be around smoke. If you need help quitting, ask your doctor.  · Do not have caffeine.  · Do not drink alcohol.  · Rest as needed.  Contact a doctor if:  · You have new problems (symptoms).  · You cough up yellow fluid (pus).  · Your cough does not get better after 2-3 weeks, or your cough gets worse.  · Medicine does not help your cough and you are not sleeping well.  · You have pain that gets worse or pain that is not helped with medicine.  · You have a fever.  · You are losing weight and you do not know why.  · You have night sweats.  Get help right away if:  · You cough up blood.  · You have trouble breathing.  · Your heartbeat is very fast.  This information is not intended to replace advice given to you by your health care provider. Make sure you discuss any questions you have with your health care provider.  Document Released: 08/30/2012 Document Revised: 05/25/2017 Document Reviewed: 02/24/2016  [employer] Interactive Patient Education © 2019 [employer]

## 2020-03-06 ENCOUNTER — OFFICE VISIT (OUTPATIENT)
Dept: ORTHOPEDIC SURGERY | Facility: CLINIC | Age: 80
End: 2020-03-06

## 2020-03-06 VITALS — TEMPERATURE: 97.4 F | BODY MASS INDEX: 33.05 KG/M2 | WEIGHT: 244 LBS | HEIGHT: 72 IN

## 2020-03-06 DIAGNOSIS — M25.511 RIGHT SHOULDER PAIN, UNSPECIFIED CHRONICITY: Primary | ICD-10-CM

## 2020-03-06 PROCEDURE — 20610 DRAIN/INJ JOINT/BURSA W/O US: CPT | Performed by: ORTHOPAEDIC SURGERY

## 2020-03-06 PROCEDURE — 73030 X-RAY EXAM OF SHOULDER: CPT | Performed by: ORTHOPAEDIC SURGERY

## 2020-03-06 PROCEDURE — 99203 OFFICE O/P NEW LOW 30 MIN: CPT | Performed by: ORTHOPAEDIC SURGERY

## 2020-03-06 RX ORDER — OXCARBAZEPINE 300 MG/1
300 TABLET, FILM COATED ORAL
Status: ON HOLD | COMMUNITY
End: 2020-11-18 | Stop reason: SDUPTHER

## 2020-03-06 RX ORDER — PROMETHAZINE HYDROCHLORIDE AND CODEINE PHOSPHATE 6.25; 1 MG/5ML; MG/5ML
SOLUTION ORAL
COMMUNITY
Start: 2020-02-26 | End: 2020-04-08

## 2020-03-06 RX ORDER — METHYLPREDNISOLONE ACETATE 80 MG/ML
80 INJECTION, SUSPENSION INTRA-ARTICULAR; INTRALESIONAL; INTRAMUSCULAR; SOFT TISSUE
Status: COMPLETED | OUTPATIENT
Start: 2020-03-06 | End: 2020-03-06

## 2020-03-06 RX ADMIN — METHYLPREDNISOLONE ACETATE 80 MG: 80 INJECTION, SUSPENSION INTRA-ARTICULAR; INTRALESIONAL; INTRAMUSCULAR; SOFT TISSUE at 09:48

## 2020-03-06 NOTE — PROGRESS NOTES
Patient: Mg Gerber Sr.    YOB: 1940    Medical Record Number: [id number]    Chief Complaints:   Right shoulder pain    History of Present Illness:     79 y.o. male patient who presents with right shoulder pain and weakness.  He reports a 6-month history of gradual onset symptoms.  He does not recall a specific injury.  He especially notices the pain when sleeping on his right side.  His pain is severe, intermittent and aching.  The symptoms are worse when trying to reach or lift above his head.  He has not noticed any alleviating factors.    Allergies:   Allergies   Allergen Reactions   • Latex Rash       Home Medications:    Current Outpatient Medications:   •  amLODIPine (NORVASC) 5 MG tablet, Take 1 tablet by mouth Daily., Disp: 90 tablet, Rfl: 1  •  cefdinir (OMNICEF) 300 MG capsule, Take 1 capsule by mouth 2 (Two) Times a Day., Disp: 20 capsule, Rfl: 0  •  folic acid (FOLVITE) 1 MG tablet, Take 1 mg by mouth daily., Disp: , Rfl:   •  gabapentin (NEURONTIN) 400 MG capsule, Take 1 capsule by mouth 4 (Four) Times a Day., Disp: 120 capsule, Rfl: 5  •  HYDROcodone-acetaminophen (NORCO) 5-325 MG per tablet, , Disp: , Rfl:   •  levocetirizine (XYZAL) 5 MG tablet, Take 1 tablet by mouth Every Evening., Disp: 90 tablet, Rfl: 1  •  levothyroxine sodium (TIROSINT) 112 MCG capsule, Daily., Disp: , Rfl:   •  metOLazone (ZAROXOLYN) 5 MG tablet, , Disp: , Rfl:   •  NAMZARIC 28-10 MG capsule sustained-release 24 hr, , Disp: , Rfl:   •  NIVOLUMAB IV, Infuse  into a venous catheter., Disp: , Rfl:   •  OXcarbazepine (TRILEPTAL) 300 MG tablet, Take 300 mg by mouth 2 (Two) Times a Day., Disp: , Rfl:   •  promethazine-codeine (PHENERGAN with CODEINE) 6.25-10 MG/5ML solution, , Disp: , Rfl:   •  sertraline (ZOLOFT) 25 MG tablet, , Disp: , Rfl:   •  SPIRIVA HANDIHALER 18 MCG per inhalation capsule, Place 1 capsule into inhaler and inhale Daily., Disp: , Rfl:   •  tamsulosin (FLOMAX) 0.4 MG capsule 24 hr  capsule, , Disp: , Rfl:   •  tiotropium bromide-olodaterol (STIOLTO RESPIMAT) 2.5-2.5 MCG/ACT aerosol solution inhaler, Inhale 2 (Two) Times a Day., Disp: , Rfl:   •  VENTOLIN  (90 BASE) MCG/ACT inhaler, Inhale 2 puffs Every 4 (Four) Hours As Needed., Disp: , Rfl:   •  XARELTO 20 MG tablet, Take 20 mg by mouth daily with dinner., Disp: , Rfl:   •  ALPRAZolam (XANAX) 0.25 MG tablet, Take 1 tablet by mouth Daily As Needed for Anxiety., Disp: 30 tablet, Rfl: 0  •  cyclobenzaprine (FLEXERIL) 5 MG tablet, Take 1 tablet by mouth 2 (Two) Times a Day As Needed for Muscle Spasms., Disp: 30 tablet, Rfl: 3  •  docusate sodium (COLACE) 100 MG capsule, Take 1 capsule by mouth Every Night., Disp: 90 capsule, Rfl: 1  •  meclizine (ANTIVERT) 12.5 MG tablet, Take 1 tablet by mouth 2 (Two) Times a Day As Needed for dizziness., Disp: 20 tablet, Rfl: 0  •  oseltamivir (TAMIFLU) 75 MG capsule, Take 1 capsule by mouth 2 (Two) Times a Day., Disp: 10 capsule, Rfl: 0  •  promethazine-dextromethorphan (PROMETHAZINE-DM) 6.25-15 MG/5ML syrup, Take 5 mL by mouth 4 (Four) Times a Day As Needed for Cough., Disp: 180 mL, Rfl: 0    Current Facility-Administered Medications:   •  cyanocobalamin injection 1,000 mcg, 1,000 mcg, Intramuscular, Q30 Days, Dillon Marsh MD, 1,000 mcg at 01/15/20 0848    Past Medical History:   Diagnosis Date   • Allergic    • Anemia, vitamin B12 deficiency 04/12/2012   • Anxiety    • Arthritis 03/24/2014   • Atrial fibrillation (CMS/HCC)    • Cancer (CMS/HCC) 04/29/2014    kidney; prostate   • Cataract    • Chemotherapeutic agent or infusion extravasation     q 1-2 wks    • COPD (chronic obstructive pulmonary disease) (CMS/HCC) 03/24/2014   • Emphysema of lung (CMS/HCC)    • Hemochromatosis 03/24/2014   • Herpes zoster 12/06/2011    left medial thigh   • Hip pain 03/24/2014   • History of Doppler ultrasound 12/28/2011    superficial and deep venous insuffiency   • History of EKG 02/10/2012    Dr. Kathi Lloyd;  "unchanged from prior EKG   • Hypertension     benign essential   • Injury of back    • Pulmonary embolism (CMS/HCC) 03/24/2014   • Renal cell cancer (CMS/HCC)    • Shortness of breath    • Sleep apnea        Past Surgical History:   Procedure Laterality Date   • APPENDECTOMY     • CATARACT EXTRACTION     • CHOLECYSTECTOMY     • SPLENECTOMY     • TONSILLECTOMY     • VEIN SURGERY         Social History     Occupational History   • Not on file   Tobacco Use   • Smoking status: Former Smoker   • Smokeless tobacco: Never Used   Substance and Sexual Activity   • Alcohol use: Yes     Comment: 2 drinks month   • Drug use: No   • Sexual activity: Defer      Social History     Social History Narrative   • Not on file       Family History   Problem Relation Age of Onset   • Aneurysm Mother    • Stroke Mother    • Heart disease Father    • Diabetes Brother         type 2       Review of Systems:      Constitutional: Denies fever, shaking or chills   Eyes: Denies change in visual acuity   HEENT: Denies nasal congestion or sore throat   Respiratory: Denies cough or shortness of breath   Cardiovascular: Denies chest pain or edema  Endocrine: Denies tremors, palpitations, intolerance of heat or cold, polyuria, polydipsia.  GI: Denies abdominal pain, nausea, vomiting, bloody stools or diarrhea  : Denies frequency, urgency, incontinence, retention, or nocturia.  Musculoskeletal: Denies numbness, tingling or loss of motor function except as above  Integument: Denies rash, lesion or ulceration   Neurologic: Denies headache or focal weakness, deficits  Heme: Denies spontaneous or excessive bleeding, epistaxis, hematuria, melena, fatigue, enlarged or tender lymph nodes.      All other pertinent positives and negatives as noted above in HPI.    Physical Exam: 79 y.o. male    Vitals:    03/06/20 0927   Temp: 97.4 °F (36.3 °C)   TempSrc: Temporal   Weight: 111 kg (244 lb)   Height: 182.9 cm (72\")     General:  Patient is awake and alert.  " Appears in no acute distress or discomfort.    Psych:  Affect and demeanor are appropriate.    Eyes:  Conjunctiva and sclera appear grossly normal.  Eyes track well and EOM seem to be intact.    Ears:  No gross abnormalities.  Hearing adequate for the exam.    Cardiovascular:  Regular rate and rhythm.    Lungs:  Good chest expansion.  Breathing unlabored.    Lymph:  No palpable adenopathy about neck or axilla.    Neck:  Supple.  Normal ROM.  Negative Spurling's for shoulder or arm pain.    Right upper extremity:  Skin is benign.  No gross abnormalities on inspection including any atrophy, swellings, or masses.  No palpable masses or adenopathy.  Mild anterior tenderness.  Full shoulder motion. No evident instability or apprehension.  Mildly positive Neer, Oliva.  Negative active compression maneuver.  Negative Speeds and Yergasons maneuver.  Negative belly press maneuver.  Weakness with forward elevation in the scapular plane.  Good strength with internal and external rotation.  Good strength in the deltoid, biceps, triceps, and .  Intact sensation throughout the arm.  Brisk cap refill.  Palpable radial pulse.  Good skin turgor.         Radiology:   AP, scapular Y, and axillary views of the right shoulder are ordered by myself and reviewed to evaluate the patient's complaint.  No comparison films are immediately available.  Incidental note of a port in his right chest.  He has moderate acromioclavicular arthritis.  The x-rays show no obvious acute abnormalities, lesions, masses, significant glenohumeral degenerative changes, or other concerning findings.  The acromiohumeral interval is normal.  Glenoid version appears normal as well.    MRI of the right shoulder is reviewed along with the associated report.  Findings are listed below.    IMPRESSION:  1. Thin linear full-thickness insertional tear of the supraspinatus  tendon with full-thickness component of the tear measuring approximately  1 cm in AP  dimension. Tear exhibits interstitial extension into the  infraspinous tendon though the infraspinous tendon is otherwise intact.  2. Acromial spur with type II acromial arch, moderate AC joint arthritis  with AC joint effusion. Fluid distention subacromial/subdeltoid bursa.  3. Mild glenohumeral joint arthritis with degenerative volume loss  posterior glenoid labrum.    Assessment/Plan:   Right rotator cuff tear    We discussed the natural history of full-thickness rotator cuff tears.  I explained the risk of tear progression.  I did explain that nonsurgical treatment is reasonable, particularly given his multiple medical comorbidities.  This would not cure the tear but hopefully we can manage his symptoms.  We discussed surgical and nonsurgical treatment.  He tells me that he has had problems with his hips and knees in the past.  He had trouble convincing a surgeon to operate on his hip.  This was very frustrating thing for him and he insists that he is healthy enough to have surgery.  We talked about it.  I told him I am not necessarily ruling out surgery at this point but I just think he is better off with a trial of attempted conservative treatment before we jump into a big surgery that could potentially put him at risk.  He is in agreement with a trial of conservative treatment.  The risk, benefits and alternatives to an injection were discussed.  He consented and the injection was performed as described below.  I offered to refer him to physical therapy but he declined that.  He will follow-up with me as needed.    Antony Ji MD    03/06/2020    CC to Dillon Marsh MD     Large Joint Arthrocentesis: R subacromial bursa  Date/Time: 3/6/2020 9:48 AM  Consent given by: patient  Site marked: site marked  Timeout: Immediately prior to procedure a time out was called to verify the correct patient, procedure, equipment, support staff and site/side marked as required   Supporting Documentation  Indications:  pain   Procedure Details  Location: shoulder - R subacromial bursa  Preparation: Patient was prepped and draped in the usual sterile fashion  Needle gauge: 21 G.  Approach: posterior  Medications administered: 2 mL lidocaine (cardiac); 80 mg methylPREDNISolone acetate 80 MG/ML  Patient tolerance: patient tolerated the procedure well with no immediate complications

## 2020-03-17 ENCOUNTER — TELEPHONE (OUTPATIENT)
Dept: FAMILY MEDICINE CLINIC | Facility: CLINIC | Age: 80
End: 2020-03-17

## 2020-03-17 NOTE — TELEPHONE ENCOUNTER
Pt called asking for refill cefdinir and promethazine cough syrup.  Pt does not want to come in office due to age and compromised immune system.  Pt still coughing, but denies fever and SOA.  Pt states that he usually has to have 2 rounds of antibiotics.

## 2020-03-18 RX ORDER — DEXTROMETHORPHAN HYDROBROMIDE AND PROMETHAZINE HYDROCHLORIDE 15; 6.25 MG/5ML; MG/5ML
5 SYRUP ORAL 4 TIMES DAILY PRN
Qty: 180 ML | Refills: 0 | Status: SHIPPED | OUTPATIENT
Start: 2020-03-18 | End: 2020-04-08

## 2020-03-18 RX ORDER — CEFDINIR 300 MG/1
300 CAPSULE ORAL 2 TIMES DAILY
Qty: 20 CAPSULE | Refills: 0 | Status: SHIPPED | OUTPATIENT
Start: 2020-03-18 | End: 2020-04-08

## 2020-03-24 ENCOUNTER — TELEPHONE (OUTPATIENT)
Dept: FAMILY MEDICINE CLINIC | Facility: CLINIC | Age: 80
End: 2020-03-24

## 2020-03-24 NOTE — TELEPHONE ENCOUNTER
Pt called stating the cough meds was not available , wants to know if there is something else you could send him.

## 2020-04-03 DIAGNOSIS — F41.9 ANXIETY: ICD-10-CM

## 2020-04-03 RX ORDER — AMLODIPINE BESYLATE 5 MG/1
5 TABLET ORAL DAILY
Qty: 90 TABLET | Refills: 1 | OUTPATIENT
Start: 2020-04-03

## 2020-04-03 RX ORDER — ALPRAZOLAM 0.25 MG/1
TABLET ORAL
Qty: 30 TABLET | Refills: 0 | OUTPATIENT
Start: 2020-04-03

## 2020-04-06 RX ORDER — AMLODIPINE BESYLATE 5 MG/1
5 TABLET ORAL DAILY
Qty: 90 TABLET | Refills: 0 | Status: SHIPPED | OUTPATIENT
Start: 2020-04-06 | End: 2020-04-08 | Stop reason: SDUPTHER

## 2020-04-06 NOTE — TELEPHONE ENCOUNTER
Spoke to pt - pt states that he does not have a psychiatrist and that you filled it for him in December 2019.  Please advise.

## 2020-04-08 ENCOUNTER — OFFICE VISIT (OUTPATIENT)
Dept: FAMILY MEDICINE CLINIC | Facility: CLINIC | Age: 80
End: 2020-04-08

## 2020-04-08 DIAGNOSIS — M19.90 ARTHRITIS: ICD-10-CM

## 2020-04-08 DIAGNOSIS — F41.9 ANXIETY: ICD-10-CM

## 2020-04-08 DIAGNOSIS — G62.9 NEUROPATHY: Primary | ICD-10-CM

## 2020-04-08 PROCEDURE — 99442 PR PHYS/QHP TELEPHONE EVALUATION 11-20 MIN: CPT | Performed by: FAMILY MEDICINE

## 2020-04-08 RX ORDER — CYCLOBENZAPRINE HCL 5 MG
5 TABLET ORAL 2 TIMES DAILY PRN
Qty: 60 TABLET | Refills: 3 | Status: ON HOLD | OUTPATIENT
Start: 2020-04-08 | End: 2020-11-24

## 2020-04-08 RX ORDER — AMLODIPINE BESYLATE 5 MG/1
5 TABLET ORAL DAILY
Qty: 90 TABLET | Refills: 1 | Status: SHIPPED | OUTPATIENT
Start: 2020-04-08 | End: 2020-12-28 | Stop reason: SDUPTHER

## 2020-04-08 RX ORDER — ALPRAZOLAM 0.25 MG/1
0.25 TABLET ORAL DAILY PRN
Qty: 30 TABLET | Refills: 2 | Status: ON HOLD | OUTPATIENT
Start: 2020-04-08 | End: 2020-11-18 | Stop reason: SDUPTHER

## 2020-04-08 NOTE — TELEPHONE ENCOUNTER
Make appointment for him today to refill meds   He is going to call today to make appointment  Can you  Make the  Note that discussed him getting xanax from psychatrist error because he always gets xanax from me.

## 2020-04-08 NOTE — PROGRESS NOTES
You have chosen to receive care through a telephone visit today. Do you consent to use a telephone visit for your medical care today? Yes    I refilled Xanax 0.25mg q-day 30 with 2 refill, Flexeril 5mg q-day PRN, Amlodipine 5mg q-day. Patient has anxiety which has worsened with what's going on in the world right now. He also has multiple illnesses which we dicussed today.    Diagnosis:  Final diagnoses:   Anxiety   Neuropathy   Arthritis       12 minutes, [id number]

## 2020-06-01 ENCOUNTER — OFFICE VISIT (OUTPATIENT)
Dept: FAMILY MEDICINE CLINIC | Facility: CLINIC | Age: 80
End: 2020-06-01

## 2020-06-01 VITALS
TEMPERATURE: 97.3 F | SYSTOLIC BLOOD PRESSURE: 122 MMHG | BODY MASS INDEX: 33.05 KG/M2 | DIASTOLIC BLOOD PRESSURE: 60 MMHG | HEART RATE: 96 BPM | WEIGHT: 244 LBS | RESPIRATION RATE: 16 BRPM | HEIGHT: 72 IN

## 2020-06-01 DIAGNOSIS — F41.9 ANXIETY: ICD-10-CM

## 2020-06-01 DIAGNOSIS — K59.09 OTHER CONSTIPATION: Primary | ICD-10-CM

## 2020-06-01 DIAGNOSIS — I10 ESSENTIAL HYPERTENSION: ICD-10-CM

## 2020-06-01 DIAGNOSIS — M54.40 CHRONIC LEFT-SIDED LOW BACK PAIN WITH SCIATICA, SCIATICA LATERALITY UNSPECIFIED: ICD-10-CM

## 2020-06-01 DIAGNOSIS — G89.29 CHRONIC LEFT-SIDED LOW BACK PAIN WITH SCIATICA, SCIATICA LATERALITY UNSPECIFIED: ICD-10-CM

## 2020-06-01 DIAGNOSIS — C64.9 RENAL CELL CARCINOMA, UNSPECIFIED LATERALITY (HCC): ICD-10-CM

## 2020-06-01 PROCEDURE — 99213 OFFICE O/P EST LOW 20 MIN: CPT | Performed by: FAMILY MEDICINE

## 2020-06-01 NOTE — PROGRESS NOTES
Subjective   Chief Complaint:   Chief Complaint   Patient presents with   • Hypertension   • COPD   • Anemia         History of Present Illness patient here today to  Discuss constipation.  Not new.  Has had in past. Taking ducolox otc.  Taking as needed. Drinking fluids. Up to date on colonoscopy.  He thinks 6 years ago.  Going to find out name of doctor and call me back today. Tender epigastric area   No vomiting  Make sure drinking  Fluids.        Past Medical History:   Diagnosis Date   • Allergic    • Anemia, vitamin B12 deficiency 04/12/2012   • Anxiety    • Arthritis 03/24/2014   • Atrial fibrillation (CMS/HCC)    • Cancer (CMS/HCC) 04/29/2014    kidney; prostate   • Cataract    • Chemotherapeutic agent or infusion extravasation     q 1-2 wks    • COPD (chronic obstructive pulmonary disease) (CMS/HCC) 03/24/2014   • Emphysema of lung (CMS/[facility])    • Hemochromatosis 03/24/2014   • Herpes zoster 12/06/2011    left medial thigh   • Hip pain 03/24/2014   • History of Doppler ultrasound 12/28/2011    superficial and deep venous insuffiency   • History of EKG 02/10/2012    Dr. Kathi Lloyd; unchanged from prior EKG   • Hypertension     benign essential   • Injury of back    • Pulmonary embolism (CMS/HCC) 03/24/2014   • Renal cell cancer (CMS/HCC)    • Shortness of breath    • Sleep apnea         Mg OSEGUERA Roosevelt Sr. 79 y.o. male who presents today for Medical Management of the below listed issues and medication refills.    ICD-10-CM ICD-9-CM   1. Other constipation K59.09 564.09   2. Renal cell carcinoma, unspecified laterality (CMS/HCC) C64.9 189.0   3. 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 Essential hypertension I10 401.9   5. Anxiety F41.9 300.00        he has a problem list of   Patient Active Problem List   Diagnosis   • Anemia, vitamin B12 deficiency   • Arthritis   • Hemochromatosis   • Herpes zoster   • Hip pain   •  Hypertension   • Cancer (CMS/HCC)   • COPD (chronic obstructive pulmonary disease) (CMS/HCC)   • Left low back pain   • Dizziness   • Headache around the eyes   • Mild cognitive impairment   • Renal cell carcinoma (CMS/HCC)   • Anxiety   • Malignant neoplasm of lung (CMS/HCC)   • Neuropathy   • Perennial allergic rhinitis   • Vitamin B12 deficiency   • Osteoarthritis of lumbar spine   • Pharyngitis   • Bronchitis   • Other constipation   .    he has been compliant with   Current Outpatient Medications:   •  ALPRAZolam (XANAX) 0.25 MG tablet, Take 1 tablet by mouth Daily As Needed for Anxiety., Disp: 30 tablet, Rfl: 2  •  amLODIPine (NORVASC) 5 MG tablet, Take 1 tablet by mouth Daily., Disp: 90 tablet, Rfl: 1  •  cyclobenzaprine (FLEXERIL) 5 MG tablet, Take 1 tablet by mouth 2 (Two) Times a Day As Needed for Muscle Spasms., Disp: 60 tablet, Rfl: 3  •  docusate sodium (COLACE) 100 MG capsule, Take 1 capsule by mouth Every Night., Disp: 90 capsule, Rfl: 1  •  folic acid (FOLVITE) 1 MG tablet, Take 1 mg by mouth daily., Disp: , Rfl:   •  gabapentin (NEURONTIN) 400 MG capsule, Take 1 capsule by mouth 4 (Four) Times a Day., Disp: 120 capsule, Rfl: 5  •  HYDROcodone-acetaminophen (NORCO) 5-325 MG per tablet, , Disp: , Rfl:   •  levocetirizine (XYZAL) 5 MG tablet, Take 1 tablet by mouth Every Evening., Disp: 90 tablet, Rfl: 1  •  levothyroxine sodium (TIROSINT) 112 MCG capsule, Daily., Disp: , Rfl:   •  meclizine (ANTIVERT) 12.5 MG tablet, Take 1 tablet by mouth 2 (Two) Times a Day As Needed for dizziness., Disp: 20 tablet, Rfl: 0  •  metOLazone (ZAROXOLYN) 5 MG tablet, , Disp: , Rfl:   •  NAMZARIC 28-10 MG capsule sustained-release 24 hr, , Disp: , Rfl:   •  NIVOLUMAB IV, Infuse  into a venous catheter., Disp: , Rfl:   •  OXcarbazepine (TRILEPTAL) 300 MG tablet, Take 300 mg by mouth 2 (Two) Times a Day., Disp: , Rfl:   •  sertraline (ZOLOFT) 25 MG tablet, , Disp: , Rfl:   •  SPIRIVA HANDIHALER 18 MCG per inhalation  "capsule, Place 1 capsule into inhaler and inhale Daily., Disp: , Rfl:   •  tamsulosin (FLOMAX) 0.4 MG capsule 24 hr capsule, , Disp: , Rfl:   •  tiotropium bromide-olodaterol (STIOLTO RESPIMAT) 2.5-2.5 MCG/ACT aerosol solution inhaler, Inhale 2 (Two) Times a Day., Disp: , Rfl:   •  VENTOLIN  (90 BASE) MCG/ACT inhaler, Inhale 2 puffs Every 4 (Four) Hours As Needed., Disp: , Rfl:   •  XARELTO 20 MG tablet, Take 20 mg by mouth daily with dinner., Disp: , Rfl:     Current Facility-Administered Medications:   •  cyanocobalamin injection 1,000 mcg, 1,000 mcg, Intramuscular, Q30 Days, Dillon Marsh MD, 1,000 mcg at 01/15/20 0848.  he denies medication side effects.        /60   Pulse 96   Temp 97.3 °F (36.3 °C)   Resp 16   Ht 182.9 cm (72\")   Wt 111 kg (244 lb)   BMI 33.09 kg/m²     Results for orders placed or performed during the hospital encounter of 02/05/20   Respiratory Panel, PCR - Swab, Nasopharynx   Result Value Ref Range    ADENOVIRUS, PCR Not Detected Not Detected    Coronavirus 229E Not Detected Not Detected    Coronavirus HKU1 Not Detected Not Detected    Coronavirus NL63 Not Detected Not Detected    Coronavirus OC43 Not Detected Not Detected    Human Metapneumovirus Not Detected Not Detected    Human Rhinovirus/Enterovirus Not Detected Not Detected    Influenza B PCR Not Detected Not Detected    Parainfluenza Virus 1 Not Detected Not Detected    Parainfluenza Virus 2 Not Detected Not Detected    Parainfluenza Virus 3 Not Detected Not Detected    Parainfluenza Virus 4 Not Detected Not Detected    Bordetella pertussis pcr Not Detected Not Detected    Influenza A H1 2009 PCR Detected (A) Not Detected    Chlamydophila pneumoniae PCR Not Detected Not Detected    Mycoplasma pneumo by PCR Not Detected Not Detected    Influenza A H3 Not Detected Not Detected    Influenza A H1 Not Detected Not Detected    RSV, PCR Not Detected Not Detected    Bordetella parapertussis PCR Not Detected Not Detected "   Comprehensive Metabolic Panel   Result Value Ref Range    Glucose 109 (H) 65 - 99 mg/dL    BUN 17 8 - 23 mg/dL    Creatinine 1.23 0.76 - 1.27 mg/dL    Sodium 136 136 - 145 mmol/L    Potassium 3.8 3.5 - 5.2 mmol/L    Chloride 99 98 - 107 mmol/L    CO2 23.7 22.0 - 29.0 mmol/L    Calcium 8.9 8.6 - 10.5 mg/dL    Total Protein 6.8 6.0 - 8.5 g/dL    Albumin 3.80 3.50 - 5.20 g/dL    ALT (SGPT) 28 1 - 41 U/L    AST (SGOT) 84 (H) 1 - 40 U/L    Alkaline Phosphatase 61 39 - 117 U/L    Total Bilirubin 1.8 (H) 0.2 - 1.2 mg/dL    eGFR Non African Amer 57 (L) >60 mL/min/1.73    Globulin 3.0 gm/dL    A/G Ratio 1.3 g/dL    BUN/Creatinine Ratio 13.8 7.0 - 25.0    Anion Gap 13.3 5.0 - 15.0 mmol/L   Troponin   Result Value Ref Range    Troponin T <0.010 0.000 - 0.030 ng/mL   BNP   Result Value Ref Range    proBNP 882.3 5.0-1,800.0 pg/mL   CBC Auto Differential   Result Value Ref Range    WBC 11.62 (H) 3.40 - 10.80 10*3/mm3    RBC 2.65 (L) 4.14 - 5.80 10*6/mm3    Hemoglobin 9.6 (L) 13.0 - 17.7 g/dL    Hematocrit 28.8 (L) 37.5 - 51.0 %    .7 (H) 79.0 - 97.0 fL    MCH 36.2 (H) 26.6 - 33.0 pg    MCHC 33.3 31.5 - 35.7 g/dL    RDW 10.0 (L) 12.3 - 15.4 %    RDW-SD 39.0 37.0 - 54.0 fl    MPV 10.2 6.0 - 12.0 fL    Platelets 332 140 - 450 10*3/mm3   Light Blue Top   Result Value Ref Range    Extra Tube hold for add-on    Green Top (Gel)   Result Value Ref Range    Extra Tube Hold for add-ons.    Lavender Top   Result Value Ref Range    Extra Tube hold for add-on    Gold Top - SST   Result Value Ref Range    Extra Tube Hold for add-ons.    Manual Differential   Result Value Ref Range    Neutrophil % 70.4 42.7 - 76.0 %    Lymphocyte % 16.3 (L) 19.6 - 45.3 %    Monocyte % 13.3 (H) 5.0 - 12.0 %    Neutrophils Absolute 8.18 (H) 1.70 - 7.00 10*3/mm3    Lymphocytes Absolute 1.89 0.70 - 3.10 10*3/mm3    Monocytes Absolute 1.55 (H) 0.10 - 0.90 10*3/mm3    nRBC 5.1 (H) 0.0 - 0.2 /100 WBC    RBC Morphology Normal Normal    Smudge Cells Mod/2+ None  Seen    Giant Platelets Slight/1+ None Seen       The following portions of the patient's history were reviewed and updated as appropriate: allergies, current medications, past family history, past medical history, past social history, past surgical history and problem list.      he has a history of   Patient Active Problem List   Diagnosis   • Anemia, vitamin B12 deficiency   • Arthritis   • Hemochromatosis   • Herpes zoster   • Hip pain   • Hypertension   • Cancer (CMS/HCC)   • COPD (chronic obstructive pulmonary disease) (CMS/HCC)   • Left low back pain   • Dizziness   • Headache around the eyes   • Mild cognitive impairment   • Renal cell carcinoma (CMS/HCC)   • Anxiety   • Malignant neoplasm of lung (CMS/HCC)   • Neuropathy   • Perennial allergic rhinitis   • Vitamin B12 deficiency   • Osteoarthritis of lumbar spine   • Pharyngitis   • Bronchitis   • Other constipation       Review of Systems   Constitutional: Negative for fever.   Eyes: Negative for visual disturbance.   Respiratory: Negative for shortness of breath.    Cardiovascular: Negative for chest pain.   Gastrointestinal: Positive for constipation. Negative for abdominal distention, abdominal pain, blood in stool, nausea and vomiting.   Genitourinary: Negative for difficulty urinating.   Musculoskeletal: Negative for back pain.   Skin: Negative for rash.   Neurological: Negative for headaches.   Psychiatric/Behavioral: Negative for confusion.       Objective   Physical Exam   Constitutional: He is oriented to person, place, and time. He appears well-developed. No distress.   Eyes: Pupils are equal, round, and reactive to light.   Cardiovascular: Normal rate and regular rhythm.   Pulmonary/Chest: He has no rales.   Abdominal: Soft. He exhibits no distension. There is no tenderness.   Genitourinary: Rectum normal.   Genitourinary Comments: Rectal   No stool   Neurological: He is alert and oriented to person, place, and time.   Psychiatric: He has a  normal mood and affect. His behavior is normal. Judgment and thought content normal.       Assessment/Plan   Mg was seen today for hypertension, copd and anemia.    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

## 2020-06-26 DIAGNOSIS — J30.89 PERENNIAL ALLERGIC RHINITIS: ICD-10-CM

## 2020-06-26 RX ORDER — LEVOCETIRIZINE DIHYDROCHLORIDE 5 MG/1
TABLET, FILM COATED ORAL
Qty: 90 TABLET | Refills: 0 | Status: SHIPPED | OUTPATIENT
Start: 2020-06-26 | End: 2020-09-28

## 2020-07-01 ENCOUNTER — OFFICE VISIT (OUTPATIENT)
Dept: FAMILY MEDICINE CLINIC | Facility: CLINIC | Age: 80
End: 2020-07-01

## 2020-07-01 VITALS
TEMPERATURE: 97.5 F | BODY MASS INDEX: 32.91 KG/M2 | WEIGHT: 243 LBS | OXYGEN SATURATION: 97 % | HEIGHT: 72 IN | DIASTOLIC BLOOD PRESSURE: 67 MMHG | RESPIRATION RATE: 16 BRPM | HEART RATE: 67 BPM | SYSTOLIC BLOOD PRESSURE: 147 MMHG

## 2020-07-01 DIAGNOSIS — K59.00 CONSTIPATION, UNSPECIFIED CONSTIPATION TYPE: ICD-10-CM

## 2020-07-01 DIAGNOSIS — F41.9 ANXIETY: ICD-10-CM

## 2020-07-01 DIAGNOSIS — G62.9 NEUROPATHY: ICD-10-CM

## 2020-07-01 DIAGNOSIS — J30.89 PERENNIAL ALLERGIC RHINITIS: ICD-10-CM

## 2020-07-01 PROCEDURE — 99213 OFFICE O/P EST LOW 20 MIN: CPT | Performed by: FAMILY MEDICINE

## 2020-07-01 RX ORDER — FINASTERIDE 5 MG/1
TABLET, FILM COATED ORAL
COMMUNITY
Start: 2020-06-22 | End: 2022-03-11

## 2020-07-01 NOTE — PATIENT INSTRUCTIONS
Diabetic Neuropathy  Diabetic neuropathy refers to nerve damage that is caused by diabetes (diabetes mellitus). Over time, people with diabetes can develop nerve damage throughout the body. There are several types of diabetic neuropathy:  · Peripheral neuropathy. This is the most common type of diabetic neuropathy. It causes damage to nerves that carry signals between the spinal cord and other parts of the body (peripheral nerves). This usually affects nerves in the feet and legs first, and may eventually affect the hands and arms. The damage affects the ability to sense touch or temperature.  · Autonomic neuropathy. This type causes damage to nerves that control involuntary functions (autonomic nerves). These nerves carry signals that control:  ? Heartbeat.  ? Body temperature.  ? Blood pressure.  ? Urination.  ? Digestion.  ? Sweating.  ? Sexual function.  ? Response to changing blood sugar (glucose) levels.  · Focal neuropathy. This type of nerve damage affects one area of the body, such as an arm, a leg, or the face. The injury may involve one nerve or a small group of nerves. Focal neuropathy can be painful and unpredictable, and occurs most often in older adults with diabetes. This often develops suddenly, but usually improves over time and does not cause long-term problems.  · Proximal neuropathy. This type of nerve damage affects the nerves of the thighs, hips, buttocks, or legs. It causes severe pain, weakness, and muscle death (atrophy), usually in the thigh muscles. It is more common among older men and people who have type 2 diabetes. The length of recovery time may vary.  What are the causes?  Peripheral, autonomic, and focal neuropathies are caused by diabetes that is not well controlled with treatment. The cause of proximal neuropathy is not known, but it may be caused by inflammation related to uncontrolled blood glucose levels.  What are the signs or symptoms?  Peripheral neuropathy  Peripheral  neuropathy develops slowly over time. When the nerves of the feet and legs no longer work, you may experience:  · Burning, stabbing, or aching pain in the legs or feet.  · Pain or cramping in the legs or feet.  · Loss of feeling (numbness) and inability to feel pressure or pain in the feet. This can lead to:  ? Thick calluses or sores on areas of constant pressure.  ? Ulcers.  ? Reduced ability to feel temperature changes.  · Foot deformities.  · Muscle weakness.  · Loss of balance or coordination.  Autonomic neuropathy  The symptoms of autonomic neuropathy vary depending on which nerves are affected. Symptoms may include:  · Problems with digestion, such as:  ? Nausea or vomiting.  ? Poor appetite.  ? Bloating.  ? Diarrhea or constipation.  ? Trouble swallowing.  ? Losing weight without trying to.  · Problems with the heart, blood and lungs, such as:  ? Dizziness, especially when standing up.  ? Fainting.  ? Shortness of breath.  ? Irregular heartbeat.  · Bladder problems, such as:  ? Trouble starting or stopping urination.  ? Leaking urine.  ? Trouble emptying the bladder.  ? Urinary tract infections (UTIs).  · Problems with other body functions, such as:  ? Sweat. You may sweat too much or too little.  ? Temperature. You might get hot easily. Or, you might feel cold more than usual.  ? Sexual function. Men may not be able to get or maintain an erection. Women may have vaginal dryness and difficulty with arousal.  Focal neuropathy  Symptoms affect only one area of the body. Common symptoms include:  · Numbness.  · Tingling.  · Burning pain.  · Prickling feeling.  · Very sensitive skin.  · Weakness.  · Inability to move (paralysis).  · Muscle twitching.  · Muscles getting smaller (wasting).  · Poor coordination.  · Double or blurred vision.  Proximal neuropathy  · Sudden, severe pain in the hip, thigh, or buttocks. Pain may spread from the back into the legs (sciatica).  · Pain and numbness in the arms and  legs.  · Tingling.  · Loss of bladder control or bowel control.  · Weakness and wasting of thigh muscles.  · Difficulty getting up from a seated position.  · Abdominal swelling.  · Unexplained weight loss.  How is this diagnosed?  Diagnosis usually involves reviewing your medical history and any symptoms you have. Diagnosis varies depending on the type of neuropathy your health care provider suspects.  Peripheral neuropathy  Your health care provider will check areas that are affected by your nervous system (neurologic exam), such as your reflexes, how you move, and what you can feel. You may have other tests, such as:  · Blood tests.  · Removal and examination of fluid that surrounds the spinal cord (lumbar puncture).  · CT scan.  · MRI.  · A test to check the nerves that control muscles (electromyogram, EMG).  · Tests of how quickly messages pass through your nerves (nerve conduction velocity tests).  · Removal of a small piece of nerve to be examined under a microscope (biopsy).  Autonomic neuropathy  You may have tests, such as:  · Tests to measure your blood pressure and heart rate. This may include monitoring you while you are safely secured to an exam table that moves you from a lying position to an upright position (table tilt test).  · Breathing tests to check your lungs.  · Tests to check how food moves through the digestive system (gastric emptying tests).  · Blood, sweat, or urine tests.  · Ultrasound of your bladder.  · Spinal fluid tests.  Focal neuropathy  This condition may be diagnosed with:  · A neurologic exam.  · CT scan.  · MRI.  · EMG.  · Nerve conduction velocity tests.  Proximal neuropathy  There is no test to diagnose this type of neuropathy. You may have tests to rule out other possible causes of this type of neuropathy. Tests may include:  · X-rays of your spine and lumbar region.  · Lumbar puncture.  · MRI.  How is this treated?  The goal of treatment is to keep nerve damage from getting  worse. The most important part of treatment is keeping your blood glucose level and your A1C level within your target range by following your diabetes management plan. Over time, maintaining lower blood glucose levels helps lessen symptoms. In some cases, you may need prescription pain medicine.  Follow these instructions at home:    Lifestyle    · Do not use any products that contain nicotine or tobacco, such as cigarettes and e-cigarettes. If you need help quitting, ask your health care provider.  · Be physically active every day. Include strength training and balance exercises.  · Follow a healthy meal plan.  · Work with your health care provider to manage your blood pressure.  General instructions  · Follow your diabetes management plan as directed.  ? Check your blood glucose levels as directed by your health care provider.  ? Keep your blood glucose in your target range as directed by your health care provider.  ? Have your A1C level checked at least two times a year, or as often as told by your health care provider.  · Take over the counter and prescription medicines only as told by your health care provider. This includes insulin and diabetes medicine.  · Do not drive or use heavy machinery while taking prescription pain medicines.  · Check your skin and feet every day for cuts, bruises, redness, blisters, or sores.  · Keep all follow up visits as told by your health care provider. This is important.  Contact a health care provider if:  · You have burning, stabbing, or aching pain in your legs or feet.  · You are unable to feel pressure or pain in your feet.  · You develop problems with digestion, such as:  ? Nausea.  ? Vomiting.  ? Bloating.  ? Constipation.  ? Diarrhea.  ? Abdominal pain.  · You have difficulty with urination, such as inability:  ? To control when you urinate (incontinence).  ? To completely empty the bladder (retention).  · You have palpitations.  · You feel dizzy, weak, or faint when you  stand up.  Get help right away if:  · You cannot urinate.  · You have sudden weakness or loss of coordination.  · You have trouble speaking.  · You have pain or pressure in your chest.  · You have an irregular heart beat.  · You have sudden inability to move a part of your body.  Summary  · Diabetic neuropathy refers to nerve damage that is caused by diabetes. It can affect nerves throughout the entire body, causing numbness and pain in the arms, legs, digestive tract, heart, and other body systems.  · Keep your blood glucose level and your blood pressure in your target range, as directed by your health care provider. This can help prevent neuropathy from getting worse.  · Check your skin and feet every day for cuts, bruises, redness, blisters, or sores.  · Do not use any products that contain nicotine or tobacco, such as cigarettes and e-cigarettes. If you need help quitting, ask your health care provider.  This information is not intended to replace advice given to you by your health care provider. Make sure you discuss any questions you have with your health care provider.  Document Released: 02/26/2003 Document Revised: 01/30/2019 Document Reviewed: 01/22/2018  Elsevier Patient Education © 2020 [employer]

## 2020-07-01 NOTE — PROGRESS NOTES
Subjective   Chief Complaint:   Chief Complaint   Patient presents with   • Hypertension   • Peripheral Neuropathy   • COPD       History of Present Illness Patient came into the office today to discuss medications and upcoming colonoscopy. Patient sees Dr Maciel at [facility] for his kidney cancer. He is asking about his colonoscopy and if Dr Kevin Land is able to get it done prior to [facility]. I told him to see Dr Land and discuss this, possibly will refer him to one of his partners. Patient complains of allergies and I advised him to try Flonase. We reviewed the recent labs in his chart in detail. I will see him in 6 months with repeat labs prior.         Past Medical History:   Diagnosis Date   • Allergic    • Anemia, vitamin B12 deficiency 04/12/2012   • Anxiety    • Arthritis 03/24/2014   • Atrial fibrillation (CMS/[facility])    • Cancer (CMS/[facility]) 04/29/2014    kidney; prostate   • Cataract    • Chemotherapeutic agent or infusion extravasation     q 1-2 wks    • COPD (chronic obstructive pulmonary disease) (CMS/[facility]) 03/24/2014   • Emphysema of lung (CMS/[facility])    • Hemochromatosis 03/24/2014   • Herpes zoster 12/06/2011    left medial thigh   • Hip pain 03/24/2014   • History of Doppler ultrasound 12/28/2011    superficial and deep venous insuffiency   • History of EKG 02/10/2012    Dr. Kathi Lloyd; unchanged from prior EKG   • Hypertension     benign essential   • Injury of back    • Pulmonary embolism (CMS/[facility]) 03/24/2014   • Renal cell cancer (CMS/[facility])    • Shortness of breath    • Sleep apnea         Mg Gerber Sr. 79 y.o. male who presents today for Medical Management of the below listed issues and medication refills.    ICD-10-CM ICD-9-CM   1. Perennial allergic rhinitis J30.89 477.8   2. Neuropathy G62.9 355.9   3. Constipation, unspecified constipation type K59.00 564.00   4. Anxiety F41.9 300.00        he has a problem list of   Patient Active Problem List   Diagnosis   • Anemia, vitamin B12  deficiency   • Arthritis   • Hemochromatosis   • Herpes zoster   • Hip pain   • Hypertension   • Cancer (CMS/[facility])   • COPD (chronic obstructive pulmonary disease) (CMS/[facility]CC)   • Left low back pain   • Dizziness   • Headache around the eyes   • Mild cognitive impairment   • Renal cell carcinoma (CMS/HCC)   • Anxiety   • Malignant neoplasm of lung (CMS/HCC)   • Neuropathy   • Perennial allergic rhinitis   • Vitamin B12 deficiency   • Osteoarthritis of lumbar spine   • Pharyngitis   • Bronchitis   • Other constipation   .    he has been compliant with   Current Outpatient Medications:   •  ALPRAZolam (XANAX) 0.25 MG tablet, Take 1 tablet by mouth Daily As Needed for Anxiety., Disp: 30 tablet, Rfl: 2  •  amLODIPine (NORVASC) 5 MG tablet, Take 1 tablet by mouth Daily., Disp: 90 tablet, Rfl: 1  •  cyclobenzaprine (FLEXERIL) 5 MG tablet, Take 1 tablet by mouth 2 (Two) Times a Day As Needed for Muscle Spasms., Disp: 60 tablet, Rfl: 3  •  docusate sodium (COLACE) 100 MG capsule, Take 1 capsule by mouth Every Night., Disp: 90 capsule, Rfl: 1  •  Docusate Sodium (STOOL SOFTENER LAXATIVE PO), Take  by mouth., Disp: , Rfl:   •  FIBER SELECT GUMMIES PO, Take  by mouth., Disp: , Rfl:   •  folic acid (FOLVITE) 1 MG tablet, Take 1 mg by mouth daily., Disp: , Rfl:   •  gabapentin (NEURONTIN) 400 MG capsule, Take 1 capsule by mouth 4 (Four) Times a Day., Disp: 120 capsule, Rfl: 5  •  HYDROcodone-acetaminophen (NORCO) 5-325 MG per tablet, , Disp: , Rfl:   •  levocetirizine (XYZAL) 5 MG tablet, TAKE ONE TABLET BY MOUTH EVERY EVENING, Disp: 90 tablet, Rfl: 0  •  levothyroxine sodium (TIROSINT) 112 MCG capsule, Daily., Disp: , Rfl:   •  meclizine (ANTIVERT) 12.5 MG tablet, Take 1 tablet by mouth 2 (Two) Times a Day As Needed for dizziness., Disp: 20 tablet, Rfl: 0  •  metOLazone (ZAROXOLYN) 5 MG tablet, , Disp: , Rfl:   •  NAMZARIC 28-10 MG capsule sustained-release 24 hr, , Disp: , Rfl:   •  NIVOLUMAB IV, Infuse  into a venous catheter.,  "Disp: , Rfl:   •  OXcarbazepine (TRILEPTAL) 300 MG tablet, Take 300 mg by mouth 2 (Two) Times a Day., Disp: , Rfl:   •  sertraline (ZOLOFT) 25 MG tablet, , Disp: , Rfl:   •  SPIRIVA HANDIHALER 18 MCG per inhalation capsule, Place 1 capsule into inhaler and inhale Daily., Disp: , Rfl:   •  tamsulosin (FLOMAX) 0.4 MG capsule 24 hr capsule, , Disp: , Rfl:   •  tiotropium bromide-olodaterol (STIOLTO RESPIMAT) 2.5-2.5 MCG/ACT aerosol solution inhaler, Inhale 2 (Two) Times a Day., Disp: , Rfl:   •  VENTOLIN  (90 BASE) MCG/ACT inhaler, Inhale 2 puffs Every 4 (Four) Hours As Needed., Disp: , Rfl:   •  XARELTO 20 MG tablet, Take 20 mg by mouth daily with dinner., Disp: , Rfl:   •  finasteride (PROSCAR) 5 MG tablet, , Disp: , Rfl:     Current Facility-Administered Medications:   •  cyanocobalamin injection 1,000 mcg, 1,000 mcg, Intramuscular, Q30 Days, Dillon Marsh MD, 1,000 mcg at 01/15/20 0848.  he denies medication side effects.        /67   Pulse 67   Temp 97.5 °F (36.4 °C)   Resp 16   Ht 182.9 cm (72\")   Wt 110 kg (243 lb)   SpO2 97%   BMI 32.96 kg/m²     Results for orders placed or performed during the hospital encounter of 02/05/20   Respiratory Panel, PCR - Swab, Nasopharynx   Result Value Ref Range    ADENOVIRUS, PCR Not Detected Not Detected    Coronavirus 229E Not Detected Not Detected    Coronavirus HKU1 Not Detected Not Detected    Coronavirus NL63 Not Detected Not Detected    Coronavirus OC43 Not Detected Not Detected    Human Metapneumovirus Not Detected Not Detected    Human Rhinovirus/Enterovirus Not Detected Not Detected    Influenza B PCR Not Detected Not Detected    Parainfluenza Virus 1 Not Detected Not Detected    Parainfluenza Virus 2 Not Detected Not Detected    Parainfluenza Virus 3 Not Detected Not Detected    Parainfluenza Virus 4 Not Detected Not Detected    Bordetella pertussis pcr Not Detected Not Detected    Influenza A H1 2009 PCR Detected (A) Not Detected    " Chlamydophila pneumoniae PCR Not Detected Not Detected    Mycoplasma pneumo by PCR Not Detected Not Detected    Influenza A H3 Not Detected Not Detected    Influenza A H1 Not Detected Not Detected    RSV, PCR Not Detected Not Detected    Bordetella parapertussis PCR Not Detected Not Detected   Comprehensive Metabolic Panel   Result Value Ref Range    Glucose 109 (H) 65 - 99 mg/dL    BUN 17 8 - 23 mg/dL    Creatinine 1.23 0.76 - 1.27 mg/dL    Sodium 136 136 - 145 mmol/L    Potassium 3.8 3.5 - 5.2 mmol/L    Chloride 99 98 - 107 mmol/L    CO2 23.7 22.0 - 29.0 mmol/L    Calcium 8.9 8.6 - 10.5 mg/dL    Total Protein 6.8 6.0 - 8.5 g/dL    Albumin 3.80 3.50 - 5.20 g/dL    ALT (SGPT) 28 1 - 41 U/L    AST (SGOT) 84 (H) 1 - 40 U/L    Alkaline Phosphatase 61 39 - 117 U/L    Total Bilirubin 1.8 (H) 0.2 - 1.2 mg/dL    eGFR Non African Amer 57 (L) >60 mL/min/1.73    Globulin 3.0 gm/dL    A/G Ratio 1.3 g/dL    BUN/Creatinine Ratio 13.8 7.0 - 25.0    Anion Gap 13.3 5.0 - 15.0 mmol/L   Troponin   Result Value Ref Range    Troponin T <0.010 0.000 - 0.030 ng/mL   BNP   Result Value Ref Range    proBNP 882.3 5.0-1,800.0 pg/mL   CBC Auto Differential   Result Value Ref Range    WBC 11.62 (H) 3.40 - 10.80 10*3/mm3    RBC 2.65 (L) 4.14 - 5.80 10*6/mm3    Hemoglobin 9.6 (L) 13.0 - 17.7 g/dL    Hematocrit 28.8 (L) 37.5 - 51.0 %    .7 (H) 79.0 - 97.0 fL    MCH 36.2 (H) 26.6 - 33.0 pg    MCHC 33.3 31.5 - 35.7 g/dL    RDW 10.0 (L) 12.3 - 15.4 %    RDW-SD 39.0 37.0 - 54.0 fl    MPV 10.2 6.0 - 12.0 fL    Platelets 332 140 - 450 10*3/mm3   Light Blue Top   Result Value Ref Range    Extra Tube hold for add-on    Green Top (Gel)   Result Value Ref Range    Extra Tube Hold for add-ons.    Lavender Top   Result Value Ref Range    Extra Tube hold for add-on    Gold Top - SST   Result Value Ref Range    Extra Tube Hold for add-ons.    Manual Differential   Result Value Ref Range    Neutrophil % 70.4 42.7 - 76.0 %    Lymphocyte % 16.3 (L) 19.6 -  45.3 %    Monocyte % 13.3 (H) 5.0 - 12.0 %    Neutrophils Absolute 8.18 (H) 1.70 - 7.00 10*3/mm3    Lymphocytes Absolute 1.89 0.70 - 3.10 10*3/mm3    Monocytes Absolute 1.55 (H) 0.10 - 0.90 10*3/mm3    nRBC 5.1 (H) 0.0 - 0.2 /100 WBC    RBC Morphology Normal Normal    Smudge Cells Mod/2+ None Seen    Giant Platelets Slight/1+ None Seen       The following portions of the patient's history were reviewed and updated as appropriate: allergies, current medications, past family history, past medical history, past social history, past surgical history and problem list.      he has a history of   Patient Active Problem List   Diagnosis   • Anemia, vitamin B12 deficiency   • Arthritis   • Hemochromatosis   • Herpes zoster   • Hip pain   • Hypertension   • Cancer (CMS/HCC)   • COPD (chronic obstructive pulmonary disease) (CMS/HCC)   • Left low back pain   • Dizziness   • Headache around the eyes   • Mild cognitive impairment   • Renal cell carcinoma (CMS/HCC)   • Anxiety   • Malignant neoplasm of lung (CMS/HCC)   • Neuropathy   • Perennial allergic rhinitis   • Vitamin B12 deficiency   • Osteoarthritis of lumbar spine   • Pharyngitis   • Bronchitis   • Other constipation       Review of Systems   Constitutional: Negative for activity change, appetite change and unexpected weight change.   HENT: Positive for rhinorrhea, sinus pressure and sneezing.    Eyes: Negative for visual disturbance.   Respiratory: Negative for chest tightness and shortness of breath.    Cardiovascular: Negative for chest pain and palpitations.   Skin: Negative for color change.   Neurological: Negative for syncope and speech difficulty.   Psychiatric/Behavioral: Negative for confusion and decreased concentration.   All other systems reviewed and are negative.      Objective   Physical Exam   Constitutional: He is oriented to person, place, and time. He appears well-developed and well-nourished. No distress.   HENT:   Head: Atraumatic.   Mouth/Throat:  Oropharynx is clear and moist.   Eyes: Pupils are equal, round, and reactive to light. EOM are normal.   Neck: Normal range of motion. Neck supple. No thyromegaly present.   Cardiovascular: Normal rate and regular rhythm.   Pulmonary/Chest: Effort normal and breath sounds normal. No respiratory distress.   Abdominal: Soft.   Musculoskeletal: Normal range of motion.   Neurological: He is alert and oriented to person, place, and time.   Skin: Skin is warm and dry.   Psychiatric: He has a normal mood and affect. His behavior is normal.   Nursing note and vitals reviewed.      Assessment/Plan   Mg was seen today for hypertension, peripheral neuropathy and copd.    Diagnoses and all orders for this visit:    Perennial allergic rhinitis    Neuropathy    Constipation, unspecified constipation type    Anxiety      Labs results discussed in detail with the patient, if no recent labs were done, order placed today.  Plan to update vaccines if needed today.  I  reviewed health maintenance with the patient as part of my preventative care plan. I discussed preventative counseling with the patient in detail.

## 2020-07-27 ENCOUNTER — OFFICE (OUTPATIENT)
Dept: URBAN - METROPOLITAN AREA CLINIC 75 | Facility: CLINIC | Age: 80
End: 2020-07-27
Payer: MEDICARE

## 2020-07-27 VITALS — WEIGHT: 247 LBS | TEMPERATURE: 96.4 F | HEIGHT: 73 IN

## 2020-07-27 DIAGNOSIS — K59.00 CONSTIPATION, UNSPECIFIED: ICD-10-CM

## 2020-07-27 PROCEDURE — 99203 OFFICE O/P NEW LOW 30 MIN: CPT | Performed by: INTERNAL MEDICINE

## 2020-09-27 DIAGNOSIS — J30.89 PERENNIAL ALLERGIC RHINITIS: ICD-10-CM

## 2020-09-28 RX ORDER — LEVOCETIRIZINE DIHYDROCHLORIDE 5 MG/1
TABLET, FILM COATED ORAL
Qty: 90 TABLET | Refills: 0 | Status: SHIPPED | OUTPATIENT
Start: 2020-09-28 | End: 2020-12-28 | Stop reason: SDUPTHER

## 2020-11-10 ENCOUNTER — APPOINTMENT (OUTPATIENT)
Dept: GENERAL RADIOLOGY | Facility: HOSPITAL | Age: 80
End: 2020-11-10

## 2020-11-10 ENCOUNTER — HOSPITAL ENCOUNTER (INPATIENT)
Facility: HOSPITAL | Age: 80
LOS: 8 days | Discharge: NURSING, REHAB OR LONG-TERM CARE | End: 2020-11-18
Attending: EMERGENCY MEDICINE | Admitting: HOSPITALIST

## 2020-11-10 ENCOUNTER — OFFICE VISIT (OUTPATIENT)
Dept: FAMILY MEDICINE CLINIC | Facility: CLINIC | Age: 80
End: 2020-11-10

## 2020-11-10 ENCOUNTER — APPOINTMENT (OUTPATIENT)
Dept: CT IMAGING | Facility: HOSPITAL | Age: 80
End: 2020-11-10

## 2020-11-10 DIAGNOSIS — R06.02 SHORTNESS OF BREATH: ICD-10-CM

## 2020-11-10 DIAGNOSIS — Z79.899 ON ANTINEOPLASTIC CHEMOTHERAPY: ICD-10-CM

## 2020-11-10 DIAGNOSIS — U07.1 COVID-19: ICD-10-CM

## 2020-11-10 DIAGNOSIS — R09.02 HYPOXEMIA: ICD-10-CM

## 2020-11-10 DIAGNOSIS — Z85.118 HISTORY OF LUNG CANCER: Primary | ICD-10-CM

## 2020-11-10 DIAGNOSIS — F41.9 ANXIETY: ICD-10-CM

## 2020-11-10 DIAGNOSIS — R50.9 FEVER AND CHILLS: ICD-10-CM

## 2020-11-10 DIAGNOSIS — Z20.822 ENCOUNTER FOR SCREENING LABORATORY TESTING FOR COVID-19 VIRUS: Primary | ICD-10-CM

## 2020-11-10 PROBLEM — Z87.19 HX OF HIATAL HERNIA: Status: ACTIVE | Noted: 2020-11-10

## 2020-11-10 LAB
ALBUMIN SERPL-MCNC: 4.1 G/DL (ref 3.5–5.2)
ALBUMIN/GLOB SERPL: 1.7 G/DL
ALP SERPL-CCNC: 61 U/L (ref 39–117)
ALT SERPL W P-5'-P-CCNC: 14 U/L (ref 1–41)
ANION GAP SERPL CALCULATED.3IONS-SCNC: 9.5 MMOL/L (ref 5–15)
ANISOCYTOSIS BLD QL: ABNORMAL
AST SERPL-CCNC: 20 U/L (ref 1–40)
B PARAPERT DNA SPEC QL NAA+PROBE: NOT DETECTED
B PERT DNA SPEC QL NAA+PROBE: NOT DETECTED
BACTERIA UR QL AUTO: ABNORMAL /HPF
BILIRUB SERPL-MCNC: 1.7 MG/DL (ref 0–1.2)
BILIRUB UR QL STRIP: NEGATIVE
BUN SERPL-MCNC: 12 MG/DL (ref 8–23)
BUN/CREAT SERPL: 11.2 (ref 7–25)
C PNEUM DNA NPH QL NAA+NON-PROBE: NOT DETECTED
CALCIUM SPEC-SCNC: 9.3 MG/DL (ref 8.6–10.5)
CHLORIDE SERPL-SCNC: 102 MMOL/L (ref 98–107)
CLARITY UR: CLEAR
CO2 SERPL-SCNC: 24.5 MMOL/L (ref 22–29)
COLOR UR: YELLOW
CREAT SERPL-MCNC: 1.07 MG/DL (ref 0.76–1.27)
CRP SERPL-MCNC: 1.51 MG/DL (ref 0–0.5)
D DIMER PPP FEU-MCNC: 0.36 MCGFEU/ML (ref 0–0.49)
D-LACTATE SERPL-SCNC: 1 MMOL/L (ref 0.5–2)
DEPRECATED RDW RBC AUTO: 40 FL (ref 37–54)
EOSINOPHIL # BLD MANUAL: 0.09 10*3/MM3 (ref 0–0.4)
EOSINOPHIL NFR BLD MANUAL: 1 % (ref 0.3–6.2)
ERYTHROCYTE [DISTWIDTH] IN BLOOD BY AUTOMATED COUNT: 10.1 % (ref 12.3–15.4)
FLUAV SUBTYP SPEC NAA+PROBE: NOT DETECTED
FLUBV RNA ISLT QL NAA+PROBE: NOT DETECTED
GFR SERPL CREATININE-BSD FRML MDRD: 66 ML/MIN/1.73
GLOBULIN UR ELPH-MCNC: 2.4 GM/DL
GLUCOSE SERPL-MCNC: 97 MG/DL (ref 65–99)
GLUCOSE UR STRIP-MCNC: NEGATIVE MG/DL
HADV DNA SPEC NAA+PROBE: NOT DETECTED
HCOV 229E RNA SPEC QL NAA+PROBE: NOT DETECTED
HCOV HKU1 RNA SPEC QL NAA+PROBE: NOT DETECTED
HCOV NL63 RNA SPEC QL NAA+PROBE: NOT DETECTED
HCOV OC43 RNA SPEC QL NAA+PROBE: NOT DETECTED
HCT VFR BLD AUTO: 31 % (ref 37.5–51)
HGB BLD-MCNC: 10.2 G/DL (ref 13–17.7)
HGB UR QL STRIP.AUTO: ABNORMAL
HMPV RNA NPH QL NAA+NON-PROBE: NOT DETECTED
HPIV1 RNA SPEC QL NAA+PROBE: NOT DETECTED
HPIV2 RNA SPEC QL NAA+PROBE: NOT DETECTED
HPIV3 RNA NPH QL NAA+PROBE: NOT DETECTED
HPIV4 P GENE NPH QL NAA+PROBE: NOT DETECTED
HYALINE CASTS UR QL AUTO: ABNORMAL /LPF
KETONES UR QL STRIP: NEGATIVE
LEUKOCYTE ESTERASE UR QL STRIP.AUTO: NEGATIVE
LYMPHOCYTES # BLD MANUAL: 0.97 10*3/MM3 (ref 0.7–3.1)
LYMPHOCYTES NFR BLD MANUAL: 10.4 % (ref 19.6–45.3)
LYMPHOCYTES NFR BLD MANUAL: 5.2 % (ref 5–12)
M PNEUMO IGG SER IA-ACNC: NOT DETECTED
MACROCYTES BLD QL SMEAR: ABNORMAL
MCH RBC QN AUTO: 36.2 PG (ref 26.6–33)
MCHC RBC AUTO-ENTMCNC: 32.9 G/DL (ref 31.5–35.7)
MCV RBC AUTO: 109.9 FL (ref 79–97)
MONOCYTES # BLD AUTO: 0.49 10*3/MM3 (ref 0.1–0.9)
NEUTROPHILS # BLD AUTO: 7.8 10*3/MM3 (ref 1.7–7)
NEUTROPHILS NFR BLD MANUAL: 83.3 % (ref 42.7–76)
NITRITE UR QL STRIP: NEGATIVE
NRBC SPEC MANUAL: 2.1 /100 WBC (ref 0–0.2)
NT-PROBNP SERPL-MCNC: 877.7 PG/ML (ref 0–1800)
PH UR STRIP.AUTO: 6 [PH] (ref 5–8)
PLAT MORPH BLD: NORMAL
PLATELET # BLD AUTO: 347 10*3/MM3 (ref 140–450)
PMV BLD AUTO: 9.8 FL (ref 6–12)
POTASSIUM SERPL-SCNC: 3.7 MMOL/L (ref 3.5–5.2)
PROCALCITONIN SERPL-MCNC: 0.19 NG/ML (ref 0–0.25)
PROT SERPL-MCNC: 6.5 G/DL (ref 6–8.5)
PROT UR QL STRIP: NEGATIVE
RBC # BLD AUTO: 2.82 10*6/MM3 (ref 4.14–5.8)
RBC # UR: ABNORMAL /HPF
REF LAB TEST METHOD: ABNORMAL
RHINOVIRUS RNA SPEC NAA+PROBE: NOT DETECTED
RSV RNA NPH QL NAA+NON-PROBE: NOT DETECTED
SARS-COV-2 RNA NPH QL NAA+NON-PROBE: DETECTED
SODIUM SERPL-SCNC: 136 MMOL/L (ref 136–145)
SP GR UR STRIP: >=1.03 (ref 1–1.03)
SQUAMOUS #/AREA URNS HPF: ABNORMAL /HPF
TROPONIN T SERPL-MCNC: <0.01 NG/ML (ref 0–0.03)
UROBILINOGEN UR QL STRIP: ABNORMAL
WBC # BLD AUTO: 9.36 10*3/MM3 (ref 3.4–10.8)
WBC MORPH BLD: NORMAL
WBC UR QL AUTO: ABNORMAL /HPF

## 2020-11-10 PROCEDURE — 83880 ASSAY OF NATRIURETIC PEPTIDE: CPT | Performed by: EMERGENCY MEDICINE

## 2020-11-10 PROCEDURE — 0 IOPAMIDOL PER 1 ML: Performed by: EMERGENCY MEDICINE

## 2020-11-10 PROCEDURE — 99284 EMERGENCY DEPT VISIT MOD MDM: CPT

## 2020-11-10 PROCEDURE — 93010 ELECTROCARDIOGRAM REPORT: CPT | Performed by: INTERNAL MEDICINE

## 2020-11-10 PROCEDURE — 86140 C-REACTIVE PROTEIN: CPT | Performed by: EMERGENCY MEDICINE

## 2020-11-10 PROCEDURE — G0378 HOSPITAL OBSERVATION PER HR: HCPCS

## 2020-11-10 PROCEDURE — 71275 CT ANGIOGRAPHY CHEST: CPT

## 2020-11-10 PROCEDURE — 84145 PROCALCITONIN (PCT): CPT | Performed by: PHYSICIAN ASSISTANT

## 2020-11-10 PROCEDURE — 93005 ELECTROCARDIOGRAM TRACING: CPT | Performed by: EMERGENCY MEDICINE

## 2020-11-10 PROCEDURE — 85379 FIBRIN DEGRADATION QUANT: CPT | Performed by: EMERGENCY MEDICINE

## 2020-11-10 PROCEDURE — 85007 BL SMEAR W/DIFF WBC COUNT: CPT | Performed by: EMERGENCY MEDICINE

## 2020-11-10 PROCEDURE — 71045 X-RAY EXAM CHEST 1 VIEW: CPT

## 2020-11-10 PROCEDURE — 87040 BLOOD CULTURE FOR BACTERIA: CPT | Performed by: PHYSICIAN ASSISTANT

## 2020-11-10 PROCEDURE — 0202U NFCT DS 22 TRGT SARS-COV-2: CPT | Performed by: EMERGENCY MEDICINE

## 2020-11-10 PROCEDURE — 80053 COMPREHEN METABOLIC PANEL: CPT | Performed by: EMERGENCY MEDICINE

## 2020-11-10 PROCEDURE — 81001 URINALYSIS AUTO W/SCOPE: CPT | Performed by: PHYSICIAN ASSISTANT

## 2020-11-10 PROCEDURE — 84484 ASSAY OF TROPONIN QUANT: CPT | Performed by: EMERGENCY MEDICINE

## 2020-11-10 PROCEDURE — 99442 PR PHYS/QHP TELEPHONE EVALUATION 11-20 MIN: CPT | Performed by: FAMILY MEDICINE

## 2020-11-10 PROCEDURE — 85025 COMPLETE CBC W/AUTO DIFF WBC: CPT | Performed by: EMERGENCY MEDICINE

## 2020-11-10 PROCEDURE — 36415 COLL VENOUS BLD VENIPUNCTURE: CPT

## 2020-11-10 PROCEDURE — 83605 ASSAY OF LACTIC ACID: CPT | Performed by: EMERGENCY MEDICINE

## 2020-11-10 RX ORDER — SODIUM CHLORIDE 0.9 % (FLUSH) 0.9 %
10 SYRINGE (ML) INJECTION AS NEEDED
Status: DISCONTINUED | OUTPATIENT
Start: 2020-11-10 | End: 2020-11-18 | Stop reason: HOSPADM

## 2020-11-10 RX ORDER — DEXAMETHASONE SODIUM PHOSPHATE 4 MG/ML
6 INJECTION, SOLUTION INTRA-ARTICULAR; INTRALESIONAL; INTRAMUSCULAR; INTRAVENOUS; SOFT TISSUE ONCE
Status: COMPLETED | OUTPATIENT
Start: 2020-11-10 | End: 2020-11-11

## 2020-11-10 RX ORDER — SODIUM CHLORIDE 9 MG/ML
100 INJECTION, SOLUTION INTRAVENOUS CONTINUOUS
Status: DISCONTINUED | OUTPATIENT
Start: 2020-11-10 | End: 2020-11-18 | Stop reason: HOSPADM

## 2020-11-10 RX ADMIN — IOPAMIDOL 95 ML: 755 INJECTION, SOLUTION INTRAVENOUS at 20:58

## 2020-11-10 NOTE — ED NOTES
Pt presents to ED with family. Pt spoke with Dr. Montesinos office and was sent to the ER today. Pt complains of fever, increased SOA, and generalized weakness starting last night. Pt has lung cancer. Pt wife states he is getting chemo every two weeks, with last treatment on 11-3-2020. Pt is A&OX3, states he can stand and sit but not walk because of the weakness, and in a mask at this time. Pt states he took 5-325 mg hydrocodone around 1600/1630 today.             Sherin Silav, RN  11/10/20 [id number]

## 2020-11-10 NOTE — ED NOTES
Patient was wearing facemask when RN entered the room and throughout our encounter. RN wearing PPE throughout all patient encounter including a face mask, goggles, gown and gloves.      Joann Bartlett RN  11/10/20 3603

## 2020-11-10 NOTE — PROGRESS NOTES
You have chosen to receive care through a telephone visit. Do you consent to use a telephone visit for your medical care today? Yes    Spoke to patient. He is complaining of COVID symptoms: fatigue, fever, exertional shortness of air and cough. I am going to send him to  for a COVID screening test. He has multiple serious medical problems including metastatic cancer currently being treated at [facility]. He is on a chemotherapy regimen.      Final diagnoses:   Encounter for screening laboratory testing for COVID-19 virus       [id number]   15 minutes

## 2020-11-11 ENCOUNTER — TELEPHONE (OUTPATIENT)
Dept: ONCOLOGY | Facility: CLINIC | Age: 80
End: 2020-11-11

## 2020-11-11 PROBLEM — R06.00 DYSPNEA: Status: ACTIVE | Noted: 2020-11-11

## 2020-11-11 PROBLEM — U07.1 COVID-19 VIRUS DETECTED: Status: ACTIVE | Noted: 2020-11-11

## 2020-11-11 PROBLEM — R53.1 GENERALIZED WEAKNESS: Status: ACTIVE | Noted: 2020-11-11

## 2020-11-11 LAB
ALBUMIN SERPL-MCNC: 3.6 G/DL (ref 3.5–5.2)
ALP SERPL-CCNC: 56 U/L (ref 39–117)
ALT SERPL W P-5'-P-CCNC: 14 U/L (ref 1–41)
AST SERPL-CCNC: 22 U/L (ref 1–40)
BILIRUB CONJ SERPL-MCNC: 0.3 MG/DL (ref 0–0.3)
BILIRUB INDIRECT SERPL-MCNC: 0.9 MG/DL
BILIRUB SERPL-MCNC: 1.2 MG/DL (ref 0–1.2)
CREAT SERPL-MCNC: 1.04 MG/DL (ref 0.76–1.27)
DEPRECATED RDW RBC AUTO: 39.6 FL (ref 37–54)
ERYTHROCYTE [DISTWIDTH] IN BLOOD BY AUTOMATED COUNT: 9.9 % (ref 12.3–15.4)
GFR SERPL CREATININE-BSD FRML MDRD: 69 ML/MIN/1.73
HCT VFR BLD AUTO: 29.6 % (ref 37.5–51)
HGB BLD-MCNC: 9.7 G/DL (ref 13–17.7)
MCH RBC QN AUTO: 36.2 PG (ref 26.6–33)
MCHC RBC AUTO-ENTMCNC: 32.8 G/DL (ref 31.5–35.7)
MCV RBC AUTO: 110.4 FL (ref 79–97)
PLATELET # BLD AUTO: 316 10*3/MM3 (ref 140–450)
PMV BLD AUTO: 10.1 FL (ref 6–12)
PROT SERPL-MCNC: 6.1 G/DL (ref 6–8.5)
QT INTERVAL: 355 MS
RBC # BLD AUTO: 2.68 10*6/MM3 (ref 4.14–5.8)
WBC # BLD AUTO: 7.2 10*3/MM3 (ref 3.4–10.8)

## 2020-11-11 PROCEDURE — 82565 ASSAY OF CREATININE: CPT | Performed by: INTERNAL MEDICINE

## 2020-11-11 PROCEDURE — 99221 1ST HOSP IP/OBS SF/LOW 40: CPT | Performed by: INTERNAL MEDICINE

## 2020-11-11 PROCEDURE — G0378 HOSPITAL OBSERVATION PER HR: HCPCS

## 2020-11-11 PROCEDURE — 63710000001 DEXAMETHASONE PER 0.25 MG: Performed by: HOSPITALIST

## 2020-11-11 PROCEDURE — 85027 COMPLETE CBC AUTOMATED: CPT | Performed by: NURSE PRACTITIONER

## 2020-11-11 PROCEDURE — 94640 AIRWAY INHALATION TREATMENT: CPT

## 2020-11-11 PROCEDURE — 25010000002 ONDANSETRON PER 1 MG: Performed by: HOSPITALIST

## 2020-11-11 PROCEDURE — 97530 THERAPEUTIC ACTIVITIES: CPT

## 2020-11-11 PROCEDURE — 80076 HEPATIC FUNCTION PANEL: CPT | Performed by: INTERNAL MEDICINE

## 2020-11-11 PROCEDURE — 97162 PT EVAL MOD COMPLEX 30 MIN: CPT

## 2020-11-11 PROCEDURE — XW033E5 INTRODUCTION OF REMDESIVIR ANTI-INFECTIVE INTO PERIPHERAL VEIN, PERCUTANEOUS APPROACH, NEW TECHNOLOGY GROUP 5: ICD-10-PCS | Performed by: INTERNAL MEDICINE

## 2020-11-11 PROCEDURE — 25010000002 DEXAMETHASONE SODIUM PHOSPHATE 20 MG/5ML SOLUTION: Performed by: PHYSICIAN ASSISTANT

## 2020-11-11 RX ORDER — ACETAMINOPHEN 325 MG/1
650 TABLET ORAL EVERY 4 HOURS PRN
Status: DISCONTINUED | OUTPATIENT
Start: 2020-11-11 | End: 2020-11-18 | Stop reason: HOSPADM

## 2020-11-11 RX ORDER — AMLODIPINE BESYLATE 5 MG/1
5 TABLET ORAL DAILY
Status: DISCONTINUED | OUTPATIENT
Start: 2020-11-11 | End: 2020-11-18 | Stop reason: HOSPADM

## 2020-11-11 RX ORDER — HYDROCODONE BITARTRATE AND ACETAMINOPHEN 5; 325 MG/1; MG/1
1 TABLET ORAL EVERY 6 HOURS PRN
Status: DISCONTINUED | OUTPATIENT
Start: 2020-11-11 | End: 2020-11-18 | Stop reason: HOSPADM

## 2020-11-11 RX ORDER — ACETAMINOPHEN 160 MG/5ML
650 SOLUTION ORAL EVERY 4 HOURS PRN
Status: DISCONTINUED | OUTPATIENT
Start: 2020-11-11 | End: 2020-11-18 | Stop reason: HOSPADM

## 2020-11-11 RX ORDER — FAMOTIDINE 20 MG/1
20 TABLET, FILM COATED ORAL
Status: DISCONTINUED | OUTPATIENT
Start: 2020-11-11 | End: 2020-11-18 | Stop reason: HOSPADM

## 2020-11-11 RX ORDER — GABAPENTIN 400 MG/1
800 CAPSULE ORAL 2 TIMES DAILY
Status: DISCONTINUED | OUTPATIENT
Start: 2020-11-11 | End: 2020-11-11

## 2020-11-11 RX ORDER — LEVOTHYROXINE SODIUM 112 UG/1
112 TABLET ORAL
Status: DISCONTINUED | OUTPATIENT
Start: 2020-11-11 | End: 2020-11-18 | Stop reason: HOSPADM

## 2020-11-11 RX ORDER — GABAPENTIN 400 MG/1
800 CAPSULE ORAL NIGHTLY
Status: DISCONTINUED | OUTPATIENT
Start: 2020-11-11 | End: 2020-11-18 | Stop reason: HOSPADM

## 2020-11-11 RX ORDER — FOLIC ACID 1 MG/1
1 TABLET ORAL
Status: DISCONTINUED | OUTPATIENT
Start: 2020-11-11 | End: 2020-11-18 | Stop reason: HOSPADM

## 2020-11-11 RX ORDER — ALBUTEROL SULFATE 90 UG/1
2 AEROSOL, METERED RESPIRATORY (INHALATION) EVERY 6 HOURS PRN
Status: DISCONTINUED | OUTPATIENT
Start: 2020-11-11 | End: 2020-11-18 | Stop reason: HOSPADM

## 2020-11-11 RX ORDER — ACETAMINOPHEN 650 MG/1
650 SUPPOSITORY RECTAL EVERY 4 HOURS PRN
Status: DISCONTINUED | OUTPATIENT
Start: 2020-11-11 | End: 2020-11-18 | Stop reason: HOSPADM

## 2020-11-11 RX ORDER — CYCLOBENZAPRINE HCL 10 MG
5 TABLET ORAL 2 TIMES DAILY PRN
Status: DISCONTINUED | OUTPATIENT
Start: 2020-11-11 | End: 2020-11-18 | Stop reason: HOSPADM

## 2020-11-11 RX ORDER — GABAPENTIN 400 MG/1
400 CAPSULE ORAL
Status: DISCONTINUED | OUTPATIENT
Start: 2020-11-12 | End: 2020-11-18 | Stop reason: HOSPADM

## 2020-11-11 RX ORDER — MEMANTINE HYDROCHLORIDE 10 MG/1
10 TABLET ORAL EVERY 12 HOURS SCHEDULED
Status: DISCONTINUED | OUTPATIENT
Start: 2020-11-11 | End: 2020-11-18 | Stop reason: HOSPADM

## 2020-11-11 RX ORDER — DONEPEZIL HYDROCHLORIDE 10 MG/1
10 TABLET, FILM COATED ORAL NIGHTLY
Status: DISCONTINUED | OUTPATIENT
Start: 2020-11-11 | End: 2020-11-18 | Stop reason: HOSPADM

## 2020-11-11 RX ORDER — ALPRAZOLAM 0.25 MG/1
0.25 TABLET ORAL DAILY PRN
Status: DISCONTINUED | OUTPATIENT
Start: 2020-11-11 | End: 2020-11-18 | Stop reason: HOSPADM

## 2020-11-11 RX ORDER — ONDANSETRON 2 MG/ML
4 INJECTION INTRAMUSCULAR; INTRAVENOUS EVERY 6 HOURS PRN
Status: DISCONTINUED | OUTPATIENT
Start: 2020-11-11 | End: 2020-11-18 | Stop reason: HOSPADM

## 2020-11-11 RX ORDER — CETIRIZINE HYDROCHLORIDE 10 MG/1
5 TABLET ORAL DAILY
Status: DISCONTINUED | OUTPATIENT
Start: 2020-11-11 | End: 2020-11-18 | Stop reason: HOSPADM

## 2020-11-11 RX ORDER — NITROGLYCERIN 0.4 MG/1
0.4 TABLET SUBLINGUAL
Status: DISCONTINUED | OUTPATIENT
Start: 2020-11-11 | End: 2020-11-18 | Stop reason: HOSPADM

## 2020-11-11 RX ORDER — SODIUM CHLORIDE 0.9 % (FLUSH) 0.9 %
10 SYRINGE (ML) INJECTION EVERY 12 HOURS SCHEDULED
Status: DISCONTINUED | OUTPATIENT
Start: 2020-11-11 | End: 2020-11-18 | Stop reason: HOSPADM

## 2020-11-11 RX ORDER — OXCARBAZEPINE 300 MG/1
300 TABLET, FILM COATED ORAL NIGHTLY
Status: DISCONTINUED | OUTPATIENT
Start: 2020-11-11 | End: 2020-11-18 | Stop reason: HOSPADM

## 2020-11-11 RX ORDER — DOCUSATE SODIUM 100 MG/1
100 CAPSULE, LIQUID FILLED ORAL DAILY PRN
Status: DISCONTINUED | OUTPATIENT
Start: 2020-11-11 | End: 2020-11-18 | Stop reason: HOSPADM

## 2020-11-11 RX ORDER — SERTRALINE HYDROCHLORIDE 25 MG/1
25 TABLET, FILM COATED ORAL DAILY
Status: DISCONTINUED | OUTPATIENT
Start: 2020-11-11 | End: 2020-11-18 | Stop reason: HOSPADM

## 2020-11-11 RX ORDER — TAMSULOSIN HYDROCHLORIDE 0.4 MG/1
0.4 CAPSULE ORAL DAILY
Status: DISCONTINUED | OUTPATIENT
Start: 2020-11-11 | End: 2020-11-18 | Stop reason: HOSPADM

## 2020-11-11 RX ORDER — FOLIC ACID 1 MG/1
1 TABLET ORAL 2 TIMES DAILY
Status: DISCONTINUED | OUTPATIENT
Start: 2020-11-11 | End: 2020-11-11

## 2020-11-11 RX ORDER — SODIUM CHLORIDE 0.9 % (FLUSH) 0.9 %
10 SYRINGE (ML) INJECTION AS NEEDED
Status: DISCONTINUED | OUTPATIENT
Start: 2020-11-11 | End: 2020-11-18 | Stop reason: HOSPADM

## 2020-11-11 RX ADMIN — TAMSULOSIN HYDROCHLORIDE 0.4 MG: 0.4 CAPSULE ORAL at 08:46

## 2020-11-11 RX ADMIN — LEVOTHYROXINE SODIUM 112 MCG: 112 TABLET ORAL at 08:46

## 2020-11-11 RX ADMIN — DONEPEZIL HYDROCHLORIDE 10 MG: 10 TABLET, FILM COATED ORAL at 20:49

## 2020-11-11 RX ADMIN — FOLIC ACID 1 MG: 1 TABLET ORAL at 20:49

## 2020-11-11 RX ADMIN — FOLIC ACID 1 MG: 1 TABLET ORAL at 08:46

## 2020-11-11 RX ADMIN — ALPRAZOLAM 0.25 MG: 0.25 TABLET ORAL at 20:50

## 2020-11-11 RX ADMIN — MEMANTINE HYDROCHLORIDE 10 MG: 10 TABLET, FILM COATED ORAL at 08:46

## 2020-11-11 RX ADMIN — GABAPENTIN 800 MG: 400 CAPSULE ORAL at 08:50

## 2020-11-11 RX ADMIN — SODIUM CHLORIDE, PRESERVATIVE FREE 10 ML: 5 INJECTION INTRAVENOUS at 20:50

## 2020-11-11 RX ADMIN — MEMANTINE HYDROCHLORIDE 10 MG: 10 TABLET, FILM COATED ORAL at 20:49

## 2020-11-11 RX ADMIN — TIOTROPIUM BROMIDE INHALATION SPRAY 2 PUFF: 3.12 SPRAY, METERED RESPIRATORY (INHALATION) at 12:30

## 2020-11-11 RX ADMIN — REMDESIVIR 200 MG: 100 INJECTION, POWDER, LYOPHILIZED, FOR SOLUTION INTRAVENOUS at 20:51

## 2020-11-11 RX ADMIN — ONDANSETRON 4 MG: 2 INJECTION INTRAMUSCULAR; INTRAVENOUS at 20:55

## 2020-11-11 RX ADMIN — DEXAMETHASONE 6 MG: 4 TABLET ORAL at 08:46

## 2020-11-11 RX ADMIN — GABAPENTIN 800 MG: 400 CAPSULE ORAL at 20:49

## 2020-11-11 RX ADMIN — OXCARBAZEPINE 300 MG: 300 TABLET, FILM COATED ORAL at 20:50

## 2020-11-11 RX ADMIN — CETIRIZINE HYDROCHLORIDE 5 MG: 10 TABLET ORAL at 18:32

## 2020-11-11 RX ADMIN — SERTRALINE 25 MG: 25 TABLET, FILM COATED ORAL at 08:46

## 2020-11-11 RX ADMIN — HYDROCODONE BITARTRATE AND ACETAMINOPHEN 1 TABLET: 5; 325 TABLET ORAL at 18:33

## 2020-11-11 RX ADMIN — HYDROCODONE BITARTRATE AND ACETAMINOPHEN 1 TABLET: 5; 325 TABLET ORAL at 09:07

## 2020-11-11 RX ADMIN — RIVAROXABAN 20 MG: 20 TABLET, FILM COATED ORAL at 18:33

## 2020-11-11 RX ADMIN — SODIUM CHLORIDE 100 ML/HR: 9 INJECTION, SOLUTION INTRAVENOUS at 00:21

## 2020-11-11 RX ADMIN — SODIUM CHLORIDE 100 ML/HR: 9 INJECTION, SOLUTION INTRAVENOUS at 11:24

## 2020-11-11 RX ADMIN — AMLODIPINE BESYLATE 5 MG: 5 TABLET ORAL at 08:49

## 2020-11-11 RX ADMIN — DEXAMETHASONE SODIUM PHOSPHATE 6 MG: 4 INJECTION, SOLUTION INTRAMUSCULAR; INTRAVENOUS at 00:23

## 2020-11-11 NOTE — CONSULTS
.     REASON FOR CONSULTATION:   lung ca  Provide an opinion on any further workup or treatment                             REQUESTING PHYSICIAN: Rigo Carney MD  RECORDS OBTAINED:  Records of the patients history including those from the electronic medical record were reviewed and summarized in detail.    HISTORY OF PRESENT ILLNESS:  The patient is a 79 y.o. year old male  who is here for follow-up with the above-mentioned history.    Reviewed note from Dr. Montes De Oca, [facility] oncology, 5/14/2020.    Metastatic renal cell carcinoma found on biopsy of enlarging mediastinal node 2016.  Nivolumab every 2 weeks since then.  Followed with PET scans based on contrast allergy.  Last seen by Dr. Elmer Thomas, pulmonary, 11/5/2020 in the office.    His PCP, Dr. Marsh, sent him to the ER due to fever, increased SOA, generalized weakness since the night prior.  Family reports last Opdivo was 11/3/2020.    CT angiogram chest 11/10/2020, from 2015: Left hilar mass 4.4 x 4.1 cm, compared to 3 cm in 2015.  Previously seen paraspinal masses have decreased in size.    COVID-19 positive, upon work-up in the ER.    Past Medical History:   Diagnosis Date   • Allergic    • Anemia, vitamin B12 deficiency 04/12/2012   • Anxiety    • Arthritis 03/24/2014   • Atrial fibrillation (CMS/HCC)    • Cancer (CMS/HCC) 04/29/2014    kidney; prostate   • Cataract    • Chemotherapeutic agent or infusion extravasation     q 1-2 wks    • COPD (chronic obstructive pulmonary disease) (CMS/HCC) 03/24/2014   • Emphysema of lung (CMS/HCC)    • Hemochromatosis 03/24/2014   • Herpes zoster 12/06/2011    left medial thigh   • Hip pain 03/24/2014   • History of Doppler ultrasound 12/28/2011    superficial and deep venous insuffiency   • History of EKG 02/10/2012    Dr. Kathi Lloyd; unchanged from prior EKG   • Hypertension     benign essential   • Injury of back    • Pulmonary embolism (CMS/HCC) 03/24/2014   • Renal cell cancer  (CMS/[facility])    • Shortness of breath    • Sleep apnea      Past Surgical History:   Procedure Laterality Date   • APPENDECTOMY     • CATARACT EXTRACTION     • CHOLECYSTECTOMY     • SPLENECTOMY     • TONSILLECTOMY     • VEIN SURGERY         MEDICATIONS    Current Facility-Administered Medications:   •  acetaminophen (TYLENOL) tablet 650 mg, 650 mg, Oral, Q4H PRN **OR** acetaminophen (TYLENOL) 160 MG/5ML solution 650 mg, 650 mg, Oral, Q4H PRN **OR** acetaminophen (TYLENOL) suppository 650 mg, 650 mg, Rectal, Q4H PRN, Rigo Carney MD  •  albuterol sulfate HFA (PROVENTIL HFA;VENTOLIN HFA;PROAIR HFA) inhaler 2 puff, 2 puff, Inhalation, Q6H PRN, Rigo Carney MD  •  ALPRAZolam (XANAX) tablet 0.25 mg, 0.25 mg, Oral, Daily PRN, Rigo Carney MD  •  amLODIPine (NORVASC) tablet 5 mg, 5 mg, Oral, Daily, Rigo Carney MD, 5 mg at 11/11/20 0849  •  cyclobenzaprine (FLEXERIL) tablet 5 mg, 5 mg, Oral, BID PRN, Rigo Carney MD  •  dexamethasone (DECADRON) tablet 6 mg, 6 mg, Oral, Daily With Breakfast, Rigo Carney MD, 6 mg at 11/11/20 0846  •  docusate sodium (COLACE) capsule 100 mg, 100 mg, Oral, Daily PRN, Rigo Carney MD  •  donepezil (ARICEPT) tablet 10 mg, 10 mg, Oral, Nightly, Rigo Carney MD  •  folic acid (FOLVITE) tablet 1 mg, 1 mg, Oral, BID, Rigo Carney MD, 1 mg at 11/11/20 0846  •  gabapentin (NEURONTIN) capsule 800 mg, 800 mg, Oral, BID, Rigo Carney MD, 800 mg at 11/11/20 0850  •  HYDROcodone-acetaminophen (NORCO) 5-325 MG per tablet 1 tablet, 1 tablet, Oral, Q6H PRN, Rigo Carney MD  •  levothyroxine (SYNTHROID, LEVOTHROID) tablet 112 mcg, 112 mcg, Oral, Q AM, Rigo Carney MD, 112 mcg at 11/11/20 0846  •  memantine (NAMENDA) tablet 10 mg, 10 mg, Oral, Q12H, Rigo Carney MD, 10 mg at 11/11/20 0846  •  nitroglycerin (NITROSTAT) SL tablet 0.4 mg, 0.4 mg, Sublingual, Q5 Min PRN, Rigo Carney MD  •  ondansetron  (ZOFRAN) injection 4 mg, 4 mg, Intravenous, Q6H PRN, Rigo Carney MD  •  OXcarbazepine (TRILEPTAL) tablet 300 mg, 300 mg, Oral, Nightly, Rigo Carney MD  •  rivaroxaban (XARELTO) tablet 20 mg, 20 mg, Oral, Daily With Dinner, Rigo Carney MD  •  sertraline (ZOLOFT) tablet 25 mg, 25 mg, Oral, Daily, Rigo Carney MD, 25 mg at 11/11/20 0846  •  [COMPLETED] Insert peripheral IV, , , Once **AND** sodium chloride 0.9 % flush 10 mL, 10 mL, Intravenous, PRN, Rigo Carney MD  •  sodium chloride 0.9 % flush 10 mL, 10 mL, Intravenous, Q12H, Rigo Carney MD  •  sodium chloride 0.9 % flush 10 mL, 10 mL, Intravenous, PRN, Rigo Carney MD  •  sodium chloride 0.9 % infusion, 100 mL/hr, Intravenous, Continuous, Rigo Carney MD, Last Rate: 100 mL/hr at 11/11/20 0850, 100 mL/hr at 11/11/20 0850  •  tamsulosin (FLOMAX) 24 hr capsule 0.4 mg, 0.4 mg, Oral, Daily, Rigo Carney MD, 0.4 mg at 11/11/20 0846  •  tiotropium (SPIRIVA RESPIMAT) 2.5 mcg/act aerosol solution inhaler, 2 puff, Inhalation, Daily - RT, Rigo Carney MD    ALLERGIES:     Allergies   Allergen Reactions   • Latex Rash       SOCIAL HISTORY:       Social History     Socioeconomic History   • Marital status:      Spouse name: Not on file   • Number of children: Not on file   • Years of education: Not on file   • Highest education level: Not on file   Tobacco Use   • Smoking status: Former Smoker   • Smokeless tobacco: Never Used   Substance and Sexual Activity   • Alcohol use: Yes     Comment: 2 drinks month   • Drug use: No   • Sexual activity: Defer         FAMILY HISTORY:  Family History   Problem Relation Age of Onset   • Aneurysm Mother    • Stroke Mother    • Heart disease Father    • Diabetes Brother         type 2       REVIEW OF SYSTEMS:  Review of Systems   Constitutional: Positive for fatigue. Negative for activity change.   HENT: Negative for nosebleeds and trouble swallowing.   "  Respiratory: Positive for shortness of breath. Negative for wheezing.    Cardiovascular: Negative for chest pain and palpitations.   Gastrointestinal: Negative for constipation, diarrhea and nausea.   Genitourinary: Negative for dysuria and hematuria.   Musculoskeletal: Negative for arthralgias and myalgias.   Neurological: Negative for seizures and syncope.   Hematological: Negative for adenopathy. Does not bruise/bleed easily.   Psychiatric/Behavioral: Negative for confusion.              Vitals:    11/10/20 2319 11/11/20 0011 11/11/20 0119 11/11/20 0849   BP:  142/77 109/48 104/67   BP Location:   Left arm Left arm   Patient Position:   Lying Sitting   Pulse: 80 76 70 62   Resp:   18 16   Temp:   99.2 °F (37.3 °C)    TempSrc:   Oral Oral   SpO2: 92% 92% 92% 91%   Weight:   105 kg (231 lb 7.7 oz)    Height:   185.4 cm (73\")      No flowsheet data found.   PHYSICAL EXAM:    Looks comfortable.  On room air.  Hard of hearing.  Does not appear to be in respiratory distress.  Appropriate mood and affect.  Appropriately verbal.     RECENT LABS:        WBC   Date Value Ref Range Status   11/11/2020 7.20 3.40 - 10.80 10*3/mm3 Final   11/10/2020 9.36 3.40 - 10.80 10*3/mm3 Final     Hemoglobin   Date Value Ref Range Status   11/11/2020 9.7 (L) 13.0 - 17.7 g/dL Final   11/10/2020 10.2 (L) 13.0 - 17.7 g/dL Final     Platelets   Date Value Ref Range Status   11/11/2020 316 140 - 450 10*3/mm3 Final   11/10/2020 347 140 - 450 10*3/mm3 Final       Assessment/Plan   Mg OSEGUERA [facility]. S513/1     *Metastatic renal cell carcinoma found on biopsy of enlarging mediastinal node 2016.  Nivolumab every 2 weeks since then.  Followed with PET scans based on contrast allergy.  Last seen by Dr. Elmer Thomas, pulmonary, 11/5/2020 in the office.  CT angiogram chest 11/10/2020, from 2015: Left hilar mass 4.4 x 4.1 cm, compared to 3 cm in 2015.  Previously seen paraspinal masses have decreased in size.  I told him it is difficult to know " the status of his cancer since we did not know the [facility] scans to compare to.  I told him to make sure Dr. Montes De Oca knows he had a CT at [facility] during this admission when he recovers from COVID-19 and is ready to resume Opdivo.    *COVID-19 positive.    *Anemia    *Macrocytosis    Plan  Hold Opdivo while recovering from COVID-19  Needs an appointment with Dr. Montes De Oca or NP with possible opdivo in about 3 -4weeks.  I have asked our office to make sure he has this appointment.    I will follow peripherally.

## 2020-11-11 NOTE — CONSULTS
CONSULT NOTE    Patient Identification:  Mg Gerber Sr.  79 y.o.  male  1940  [id number]            Requesting physician: Dr Rigo Carney    Reason for Consultation:  covid pna ahrf, abnormal ct chest    CC: couldn't breath    History of Present Illness:  Patient is an 79-year-old who follows me in the office for some COPD as well as a history of a mediastinal mass compressing his pulmonary artery secondary to metastatic renal cell cancer.  He has known about this for quite some time.  He has been on immunotherapy through the [facility] and has this monitored with serial CT scans and PET scans.  Last week some the office felt as though he was getting some nasal congestion.  At that point he had no shortness of breath however I did advise him if his symptoms progressed or if he developed any fever to be checked for Covid.  Fairly he did develop worsening symptoms shortness of breath and his primary care physician told to go to the ER to get checked.  He is Covid positive.  He had been weaned off oxygen however he had some desaturation was placed back on.  He is 98% on 2 L at present time he says he feels much better.  He has no chest pain no pleuritic chest pain no worsening chest tightness.  He is rather disgruntled at present.  Review of Systems:  CONSTITUTIONAL:  Denies fevers or chills  EYE:  No new vision changes  EAR:  No change in hearing  CARDIAC:  No chest pain  PULMONARY: Positive cough positive shortness of breath  GI:  No diarrhea, hematemesis or hematochezia,  RENAL:  No dysuria or urinary frequency  MUSCULOSKELETAL:  No musculoskeletal complaints other than some generalized weakness  ENDOCRINE:  No heat or cold intolerance  INTEGUMENTARY: No skin rashes  NEUROLOGICAL:  No dizziness or confusion.  No seizure activity  PSYCHIATRIC:  No new anxiety or depression  12 system review of systems performed and all else negative    Past Medical History:   Diagnosis Date   • Allergic     • Anemia, vitamin B12 deficiency 04/12/2012   • Anxiety    • Arthritis 03/24/2014   • Atrial fibrillation (CMS/HCC)    • Cancer (CMS/HCC) 04/29/2014    kidney; prostate   • Cataract    • Chemotherapeutic agent or infusion extravasation     q 1-2 wks    • COPD (chronic obstructive pulmonary disease) (CMS/HCC) 03/24/2014   • Emphysema of lung (CMS/HCC)    • Hemochromatosis 03/24/2014   • Herpes zoster 12/06/2011    left medial thigh   • Hip pain 03/24/2014   • History of Doppler ultrasound 12/28/2011    superficial and deep venous insuffiency   • History of EKG 02/10/2012    Dr. Kathi Lloyd; unchanged from prior EKG   • Hypertension     benign essential   • Injury of back    • Pulmonary embolism (CMS/HCC) 03/24/2014   • Renal cell cancer (CMS/HCC)    • Shortness of breath    • Sleep apnea        Past Surgical History:   Procedure Laterality Date   • APPENDECTOMY     • CATARACT EXTRACTION     • CHOLECYSTECTOMY     • SPLENECTOMY     • TONSILLECTOMY     • VEIN SURGERY          Facility-Administered Medications Prior to Admission   Medication Dose Route Frequency Provider Last Rate Last Dose   • cyanocobalamin injection 1,000 mcg  1,000 mcg Intramuscular Q30 Days Dillon Marsh MD   1,000 mcg at 01/15/20 0848     Medications Prior to Admission   Medication Sig Dispense Refill Last Dose   • amLODIPine (NORVASC) 5 MG tablet Take 1 tablet by mouth Daily. 90 tablet 1 11/10/2020 at Unknown time   • Calcium Carb-Cholecalciferol (CALTRATE 600+D3 PO) Take 1 tablet by mouth 2 (two) times a day.   11/10/2020 at Unknown time   • cyclobenzaprine (FLEXERIL) 5 MG tablet Take 1 tablet by mouth 2 (Two) Times a Day As Needed for Muscle Spasms. 60 tablet 3 11/10/2020 at Unknown time   • folic acid (FOLVITE) 1 MG tablet Take 1 mg by mouth 3 (Three) Times a Day.   11/10/2020 at Unknown time   • gabapentin (NEURONTIN) 400 MG capsule Take 1 capsule by mouth 4 (Four) Times a Day. (Patient taking differently: Take 800 mg by mouth 2 (two)  times a day. 1-2 capsules by mouth twice daily) 120 capsule 5 11/10/2020 at Unknown time   • HYDROcodone-acetaminophen (NORCO) 5-325 MG per tablet Take 1 tablet by mouth Every 6 (Six) Hours As Needed for Mild Pain , Moderate Pain  or Severe Pain .   11/10/2020 at Unknown time   • levocetirizine (XYZAL) 5 MG tablet TAKE ONE TABLET BY MOUTH EVERY EVENING 90 tablet 0 11/10/2020 at Unknown time   • levothyroxine sodium (TIROSINT) 112 MCG capsule Daily.   11/10/2020 at Unknown time   • NAMZARIC 28-10 MG capsule sustained-release 24 hr Take 1 capsule by mouth Every Evening.   11/10/2020 at Unknown time   • OXcarbazepine (TRILEPTAL) 300 MG tablet Take 300 mg by mouth every night at bedtime.   11/10/2020 at Unknown time   • sertraline (ZOLOFT) 25 MG tablet Take 25 mg by mouth Daily. Take 1-2 tabs by mouth daily   11/10/2020 at Unknown time   • SPIRIVA HANDIHALER 18 MCG per inhalation capsule Place 1 capsule into inhaler and inhale Daily.   11/10/2020 at Unknown time   • tamsulosin (FLOMAX) 0.4 MG capsule 24 hr capsule Take 1 capsule by mouth Daily.   Patient Taking Differently at Unknown time   • tiotropium bromide-olodaterol (STIOLTO RESPIMAT) 2.5-2.5 MCG/ACT aerosol solution inhaler Inhale 2 (Two) Times a Day.   11/10/2020 at Unknown time   • XARELTO 20 MG tablet Take 20 mg by mouth daily with dinner.   11/10/2020 at Unknown time   • ALPRAZolam (XANAX) 0.25 MG tablet Take 1 tablet by mouth Daily As Needed for Anxiety. 30 tablet 2 Unknown at Unknown time   • docusate sodium (COLACE) 100 MG capsule Take 1 capsule by mouth Every Night. (Patient taking differently: Take 100 mg by mouth Daily As Needed for Constipation.) 90 capsule 1 Unknown at Unknown time   • FIBER SELECT GUMMIES PO Take  by mouth.   Unknown at Unknown time   • finasteride (PROSCAR) 5 MG tablet       • meclizine (ANTIVERT) 12.5 MG tablet Take 1 tablet by mouth 2 (Two) Times a Day As Needed for dizziness. 20 tablet 0 Unknown at Unknown time   • metOLazone  "(ZAROXOLYN) 5 MG tablet       • NIVOLUMAB IV Infuse  into a venous catheter.      • VENTOLIN  (90 BASE) MCG/ACT inhaler Inhale 2 puffs Every 4 (Four) Hours As Needed.   Unknown at Unknown time       Allergies   Allergen Reactions   • Latex Rash       Social History     Socioeconomic History   • Marital status:      Spouse name: Not on file   • Number of children: Not on file   • Years of education: Not on file   • Highest education level: Not on file   Tobacco Use   • Smoking status: Former Smoker   • Smokeless tobacco: Never Used   Substance and Sexual Activity   • Alcohol use: Yes     Comment: 2 drinks month   • Drug use: No   • Sexual activity: Defer       Family History   Problem Relation Age of Onset   • Aneurysm Mother    • Stroke Mother    • Heart disease Father    • Diabetes Brother         type 2       Physical Exam:  /41 (BP Location: Right arm, Patient Position: Lying)   Pulse 55   Temp 97.3 °F (36.3 °C) (Oral)   Resp 20   Ht 185.4 cm (73\")   Wt 105 kg (231 lb 7.7 oz)   SpO2 91%   BMI 30.54 kg/m²   Body mass index is 30.54 kg/m².   General appearance: NAD, conversant   Eyes: anicteric sclerae, moist conjunctivae; no lid-lag; PERRLA  HENT: Atraumatic; oropharynx clear with moist mucous membranes and no mucosal ulcerations; normal hard and soft palate  Neck: Trachea midline; FROM, supple, no thyromegaly or lymphadenopathy  Lungs: No wheeze good air entry bilateral no rhonchi, with normal respiratory effort and no intercostal retractions  CV: RRR, no MRGs   Abdomen: Soft, non-tender; no masses or HSM  Extremities: No peripheral edema or extremity lymphadenopathy  Skin: Normal temperature, turgor and texture; no rash, ulcers or subcutaneous nodules  Psych: Agitated affect, alert and oriented to person, place and time    LABS:  Results from last 7 days   Lab Units 11/11/20  0618 11/10/20  1836   WBC 10*3/mm3 7.20 9.36   HEMOGLOBIN g/dL 9.7* 10.2*   PLATELETS 10*3/mm3 316 347 "     Results from last 7 days   Lab Units 11/10/20  1836   SODIUM mmol/L 136   POTASSIUM mmol/L 3.7   CHLORIDE mmol/L 102   CO2 mmol/L 24.5   BUN mg/dL 12   CREATININE mg/dL 1.07   GLUCOSE mg/dL 97   CALCIUM mg/dL 9.3   Estimated Creatinine Clearance: 70 mL/min (by C-G formula based on SCr of 1.07 mg/dL).    Imaging: I personally visualized the images of scans/x-rays performed within last 3 days.  Imaging Results (Most Recent)     Procedure Component Value Units Date/Time    CT Angiogram Chest [[id number]] Collected: 11/10/20 2122     Updated: 11/10/20 2138    Narrative:      CT PULMONARY ANGIOGRAM:     HISTORY:  Chest pain. Shortness of breath.     TECHNIQUE:  Non contrast localizing images were performed through the  mediastinum for localization and bolus timing. Axial images were  obtained from the apex to the lung bases and during IV contrast  infusion. No adverse reaction. Multiplanar reformatted images were  reconstructed from the helical source data. Radiation dose reduction  techniques were utilized, including automated exposure control and  exposure modulation based on body size.         COMPARISON:  CTA chest 04/25/2015. CT abdomen and pelvis 09/29/2015     FINDINGS:        Suboptimal opacification of the pulmonary arterial system. There is  respiratory motion artifact. Normal caliber main pulmonary artery. No  evidence of pulmonary embolus within the main, right and left pulmonary  arteries. The segmental branches cannot be clearly evaluated..     The thoracic aorta is normal in caliber. Scattered atherosclerotic  calcifications.      The heart is normal in size. No pericardial effusion.Coronary artery  opacifications.     Right-sided Mediport terminates in the SVC.     There is a 4.4 x 4.1 cm left hilar mass that has enlarged when compared  to the prior examination from 2015 where this measured approximately 3  cm in size. No other mediastinal or right hilar lymphadenopathy. No  axillary lymphadenopathy.      Previously seen paraspinal masses have decreased in size.     The central airways are patent. No focal consolidation or suspicious  pulmonary nodules. Trace left pleural effusion with mild compressive  atelectasis. No pneumothorax.     Mild nodular contour of the liver indicating possible cirrhosis. Patient  is status post left nephrectomy and splenectomy.     No acute osseous abnormality. No aggressive osseous lesions.  Degenerative changes of the spine.     The chest wall soft tissues are normal.     The visualized thyroid gland is unremarkable.          Impression:      1.  Suboptimal examination evaluate for pulmonary embolism. There is no  pulmonary embolism within the main, right and left pulmonary arteries.  The segmental and distal branches cannot be clearly evaluated.  2.  Trace left pleural effusion with mild compressive atelectasis. No  other evidence for acute cardiopulmonary disease.  3.  Left hilar mass mass that has enlarged when compared to the prior  exam from 04/25/2015.  4.  Previously seen paraspinal masses have decreased in size.  5.  Possible cirrhotic liver.     This report was finalized on 11/10/2020 9:32 PM by Dr. Marshal Blackwood M.D.       XR Chest AP [[id number]] Collected: 11/10/20 1946     Updated: 11/10/20 2039    Narrative:      ONE VIEW PORTABLE CHEST     HISTORY: Cough and fever. Possible Covid 19 pneumonia. Previous lung  cancer.     FINDINGS: There appears to be a large left perihilar mass and concerning  for neoplasm in this patient with previous history of left-sided lung  cancer and paraspinal mass. Further evaluation with CT scan is therefore  recommended. The area of concern measures up to 5.6 x 7 cm. The heart is  top normal in size. Right-sided MediPort catheter ends in the SVC.     This report was finalized on 11/10/2020 8:36 PM by Dr. Leo Franklin M.D.             Assessment / Recommendations:  COPD without acute exacerbation  COVID  Acute hypoxic respiratory  failure  Medistinal Metastasis from [facility] follows with [facility] on Immunotherapy monitored closely with frequent pet and ct scans.  Relatively stable over more recent years.  Obesity  Anemia  Hemochromatosis  Hypertension  Low back pain  Mild cognitive impairment  Hiatal hernia  Osteoarthritis    At this point it looks as though we have acute Covid infection requiring oxygen.  He is currently at a low flow rate.  Symptoms really within 10 days low oxygen requirements.  I will go ahead and pursue pretty much standard of care at this point in our pandemic with the best data evidence available.  REM does move air for 5 days as well as dexamethasone 6 mg.  Wean oxygen as able.  No signs of acute exacerbation.  Thank you very much for allowing me to see Mr. Gerber.  Cont outpt care for his met rcc      Elmer Cevallos MD  [facility]  11/11/20  16:17 EST

## 2020-11-11 NOTE — ED NOTES
Nursing report ED to floor  [facility]  79 y.o.  male    HPI (triage note):   Chief Complaint   Patient presents with   • Shortness of Breath   • Fever   • Weakness - Generalized       Admitting doctor:   Rigo Carney MD    Admitting diagnosis:   The primary encounter diagnosis was History of lung cancer. Diagnoses of On antineoplastic chemotherapy, Fever and chills, Shortness of breath, and COVID-19 were also pertinent to this visit.    Code status:   Current Code Status     Date Active Code Status Order ID Comments User Context       Not on file    Advance Care Planning Activity          Allergies:   Latex    Weight:       11/10/20  1804   Weight: 112 kg (247 lb)       Most recent vitals:   Vitals:    11/10/20 2100 11/10/20 2103 11/10/20 2200 11/10/20 2319   BP: (!) 173/29  176/76    BP Location:       Patient Position:       Pulse:  80 77 80   Resp:       Temp:       TempSrc:       SpO2:  92% 93% 92%   Weight:       Height:           Active LDAs/IV Access:   Lines, Drains & Airways    Active LDAs     Name:   Placement date:   Placement time:   Site:   Days:    Peripheral IV 11/10/20 1810 Right Forearm   11/10/20    1810    Forearm   less than 1                Labs (abnormal labs have a star):   Labs Reviewed   RESPIRATORY PANEL PCR W/ COVID-19 (SARS-COV-2) BEBETO/SUSIE/NOHELIA/PAD/COR/MAD/ZEFERINO IN-HOUSE, NP SWAB IN UTM/VTP, 3-4 HR TAT - Abnormal; Notable for the following components:       Result Value    COVID19 Detected (*)     All other components within normal limits    Narrative:     Fact sheet for providers: https://docs.[employer]/wp-content/uploads/QTV5442-0812-QT7.1-EUA-Provider-Fact-Sheet-3.pdf    Fact sheet for patients: https://docs.[employer]/wp-content/uploads/XKZ2766-3972-FV2.1-EUA-Patient-Fact-Sheet-1.pdf   COMPREHENSIVE METABOLIC PANEL - Abnormal; Notable for the following components:    Total Bilirubin 1.7 (*)     All other components within normal limits    Narrative:     GFR Normal  >60  Chronic Kidney Disease <60  Kidney Failure <15     C-REACTIVE PROTEIN - Abnormal; Notable for the following components:    C-Reactive Protein 1.51 (*)     All other components within normal limits   CBC WITH AUTO DIFFERENTIAL - Abnormal; Notable for the following components:    RBC 2.82 (*)     Hemoglobin 10.2 (*)     Hematocrit 31.0 (*)     .9 (*)     MCH 36.2 (*)     RDW 10.1 (*)     All other components within normal limits   MANUAL DIFFERENTIAL - Abnormal; Notable for the following components:    Neutrophil % 83.3 (*)     Lymphocyte % 10.4 (*)     Neutrophils Absolute 7.80 (*)     nRBC 2.1 (*)     All other components within normal limits   URINALYSIS W/ MICROSCOPIC IF INDICATED (NO CULTURE) - Abnormal; Notable for the following components:    Blood, UA Small (1+) (*)     All other components within normal limits   URINALYSIS, MICROSCOPIC ONLY - Abnormal; Notable for the following components:    RBC, UA 3-5 (*)     All other components within normal limits   LACTIC ACID, PLASMA - Normal   D-DIMER, QUANTITATIVE - Normal    Narrative:     The [place]ago D-Dimer test used in conjunction with a clinical pretest probability (PTP) assessment model, has been approved by the FDA to rule out the presence of venous thromboembolism (VTE) in outpatients suspected of deep venous thrombosis (DVT) or pulmonary embolism (PE). The cut-off for negative predictive value is <0.50 MCGFEU/mL.   TROPONIN (IN-HOUSE) - Normal    Narrative:     Troponin T Reference Range:  <= 0.03 ng/mL-   Negative for AMI  >0.03 ng/mL-     Abnormal for myocardial necrosis.  Clinicians would have to utilize clinical acumen, EKG, Troponin and serial changes to determine if it is an Acute Myocardial Infarction or myocardial injury due to an underlying chronic condition.       Results may be falsely decreased if patient taking Biotin.     BNP (IN-HOUSE) - Normal    Narrative:     Among patients with dyspnea, NT-proBNP is highly sensitive for the  "detection of acute congestive heart failure. In addition NT-proBNP of <300 pg/ml effectively rules out acute congestive heart failure with 99% negative predictive value.    Results may be falsely decreased if patient taking Biotin.     PROCALCITONIN - Normal    Narrative:     As a Marker for Sepsis (Non-Neonates):   1. <0.5 ng/mL represents a low risk of severe sepsis and/or septic shock.  1. >2 ng/mL represents a high risk of severe sepsis and/or septic shock.    As a Marker for Lower Respiratory Tract Infections that require antibiotic therapy:  PCT on Admission     Antibiotic Therapy             6-12 Hrs later  > 0.5                Strongly Recommended            >0.25 - <0.5         Recommended  0.1 - 0.25           Discouraged                   Remeasure/reassess PCT  <0.1                 Strongly Discouraged          Remeasure/reassess PCT      As 28 day mortality risk marker: \"Change in Procalcitonin Result\" (> 80 % or <=80 %) if Day 0 (or Day 1) and Day 4 values are available. Refer to http://www.[facility] Health[facility]ct-calculator.com/   Change in PCT <=80 %   A decrease of PCT levels below or equal to 80 % defines a positive change in PCT test result representing a higher risk for 28-day all-cause mortality of patients diagnosed with severe sepsis or septic shock.  Change in PCT > 80 %   A decrease of PCT levels of more than 80 % defines a negative change in PCT result representing a lower risk for 28-day all-cause mortality of patients diagnosed with severe sepsis or septic shock.                Results may be falsely decreased if patient taking Biotin.    BLOOD CULTURE   BLOOD CULTURE   CBC AND DIFFERENTIAL    Narrative:     The following orders were created for panel order CBC & Differential.  Procedure                               Abnormality         Status                     ---------                               -----------         ------                     CBC Auto Differential[[id number]]        Abnormal      "       Final result                 Please view results for these tests on the individual orders.       EKG:   ECG 12 Lead   Preliminary Result   HEART RATE= 79  bpm   RR Interval= 756  ms   CT Interval= 177  ms   P Horizontal Axis= 20  deg   P Front Axis= 63  deg   QRSD Interval= 102  ms   QT Interval= 355  ms   QRS Axis= -7  deg   T Wave Axis= 58  deg   - NORMAL ECG -   Sinus rhythm   Electronically Signed By:    Date and Time of Study: 2020-11-10 18:41:27          Meds given in ED:   Medications   sodium chloride 0.9 % flush 10 mL (has no administration in time range)   sodium chloride 0.9 % infusion (has no administration in time range)   dexamethasone sodium phosphate injection 6 mg (has no administration in time range)   iopamidol (ISOVUE-370) 76 % injection 100 mL (95 mL Intravenous Given by Other 11/10/20 2058)       Imaging results:  Ct Angiogram Chest    Result Date: 11/10/2020  1.  Suboptimal examination evaluate for pulmonary embolism. There is no pulmonary embolism within the main, right and left pulmonary arteries. The segmental and distal branches cannot be clearly evaluated. 2.  Trace left pleural effusion with mild compressive atelectasis. No other evidence for acute cardiopulmonary disease. 3.  Left hilar mass mass that has enlarged when compared to the prior exam from 04/25/2015. 4.  Previously seen paraspinal masses have decreased in size. 5.  Possible cirrhotic liver.  This report was finalized on 11/10/2020 9:32 PM by Dr. Marshal Blackwood M.D.        Ambulatory status:   -     Social issues:   Social History     Socioeconomic History   • Marital status:      Spouse name: Not on file   • Number of children: Not on file   • Years of education: Not on file   • Highest education level: Not on file   Tobacco Use   • Smoking status: Former Smoker   • Smokeless tobacco: Never Used   Substance and Sexual Activity   • Alcohol use: Yes     Comment: 2 drinks month   • Drug use: No   • Sexual activity:  Defer    Nursing report ED to floor       Ray, KALEB Coley  11/11/20 0011

## 2020-11-11 NOTE — TELEPHONE ENCOUNTER
PER DR. PARRA STAFF MSG- CALLED DR. MENDOZA'S OFFICE ([phone]) LEFT MSG STATING PT IS ADMITTED TO [facility] WITH COVID-19 ALSO PT HAD CT SCAN 11/10 AT [facility].  IN ADDITION DR. PARRA WANTED TO MAKE SURE PT HAS APPT WITH EITHER DR. ESTRADA OR NP WITH [facility] IN ABOUT 3-4 WEEKS.  I LEFT [facility]'S CLINIC MANAGER- PHONE NUMBER ([phone]) TO CALL IF THEY HAD ANY QUESTIONS.

## 2020-11-11 NOTE — THERAPY EVALUATION
Patient Name: Mg Gerber Sr.  : 1940    MRN: [id number]                              Today's Date: 2020       Admit Date: 11/10/2020    Visit Dx:     ICD-10-CM ICD-9-CM   1. History of lung cancer  Z85.118 V10.11   2. On antineoplastic chemotherapy  Z79.899 V58.69   3. Fever and chills  R50.9 780.60   4. Shortness of breath  R06.02 786.05   5. COVID-19  U07.1 079.89     Patient Active Problem List   Diagnosis   • Anemia, vitamin B12 deficiency   • Arthritis   • Hemochromatosis   • Herpes zoster   • Hip pain   • Hypertension   • Cancer (CMS/HCC)   • COPD (chronic obstructive pulmonary disease) (CMS/HCC)   • Left low back pain   • Dizziness   • Headache around the eyes   • Mild cognitive impairment   • Renal cell carcinoma (CMS/HCC)   • Anxiety   • Malignant neoplasm of lung (CMS/HCC)   • Neuropathy   • Perennial allergic rhinitis   • Vitamin B12 deficiency   • Osteoarthritis of lumbar spine   • Pharyngitis   • Bronchitis   • Other constipation   • Hx of hiatal hernia   • History of lung cancer   • COVID-19 virus detected   • Dyspnea   • Generalized weakness     Past Medical History:   Diagnosis Date   • Allergic    • Anemia, vitamin B12 deficiency 2012   • Anxiety    • Arthritis 2014   • Atrial fibrillation (CMS/HCC)    • Cancer (CMS/HCC) 2014    kidney; prostate   • Cataract    • Chemotherapeutic agent or infusion extravasation     q 1-2 wks    • COPD (chronic obstructive pulmonary disease) (CMS/HCC) 2014   • Emphysema of lung (CMS/HCC)    • Hemochromatosis 2014   • Herpes zoster 2011    left medial thigh   • Hip pain 2014   • History of Doppler ultrasound 2011    superficial and deep venous insuffiency   • History of EKG 02/10/2012    Dr. Kathi Lloyd; unchanged from prior EKG   • Hypertension     benign essential   • Injury of back    • Pulmonary embolism (CMS/HCC) 2014   • Renal cell cancer (CMS/HCC)    • Shortness of breath    • Sleep  apnea      Past Surgical History:   Procedure Laterality Date   • APPENDECTOMY     • CATARACT EXTRACTION     • CHOLECYSTECTOMY     • SPLENECTOMY     • TONSILLECTOMY     • VEIN SURGERY       General Information     Row Name 11/11/20 1500          Physical Therapy Time and Intention    Document Type  evaluation  -SV     Mode of Treatment  individual therapy;physical therapy  -     Row Name 11/11/20 1500          General Information    Patient Profile Reviewed  yes  -SV     Prior Level of Function  -- indep amb inside home, uses stc in community , occaisional usage of  rwx if not feeling well  -SV     Existing Precautions/Restrictions  fall;oxygen therapy device and L/min [place],kidney Cancer, lung CA, currently on Chemo  -SV     Barriers to Rehab  medically complex;previous functional deficit;hearing deficit  -     Row Name 11/11/20 1500          Living Environment    Lives With  spouse  -     Row Name 11/11/20 1500          Stairs Within Home, Primary    Stairs, Within Home, Primary  trilevel, multiple steps, plans to stay on one level once home  -SV     Row Name 11/11/20 1500          Cognition    Orientation Status (Cognition)  oriented x 4  -     Row Name 11/11/20 1500          Safety Issues, Functional Mobility    Impairments Affecting Function (Mobility)  balance;endurance/activity tolerance;shortness of breath;strength  -SV       User Key  (r) = Recorded By, (t) = Taken By, (c) = Cosigned By    Initials Name Provider Type    SV Debra Dickinson, PT Physical Therapist        Mobility     Row Name 11/11/20 153          Bed Mobility    Bed Mobility  supine-sit;sit-supine  -SV     Supine-Sit [place] (Bed Mobility)  contact guard;minimum assist (75% patient effort)  -     Sit-Supine [place] (Bed Mobility)  contact guard  -SV     Assistive Device (Bed Mobility)  bed rails;head of bed elevated  -     Row Name 11/11/20 1536          Sit-Stand Transfer    Sit-Stand [place] (Transfers)  contact  guard  -SV     Assistive Device (Sit-Stand Transfers)  walker, front-wheeled  -SV     Row Name 11/11/20 1538          Gait/Stairs (Locomotion)    [place] Level (Gait)  contact guard  -SV     Distance in Feet (Gait)  pushed IV pole 40' inside room , mild unsteadiness noted,  -SV       User Key  (r) = Recorded By, (t) = Taken By, (c) = Cosigned By    Initials Name Provider Type    SV Debra Dickinson, PT Physical Therapist        Obj/Interventions     Row Name 11/11/20 1539          Range of Motion Comprehensive    General Range of Motion  no range of motion deficits identified  -[facility] Name 11/11/20 1539          Strength Comprehensive (MMT)    Comment, General Manual Muscle Testing (MMT) Assessment  gross general strength observed at >3/5 with rom and mobility  -[facility] Name 11/11/20 1539          Motor Skills    Therapeutic Exercise  -- educated on PLB, seated posterior leg stretch: laq with DF 5 reps 5 h  -[facility] Name 11/11/20 1539          Balance    Comment, Balance  sit balance sup, standing bal cga with IV pole  -[facility] Name 11/11/20 1539          Sensory Assessment (Somatosensory)    Sensory Assessment (Somatosensory)  not tested  -SV       User Key  (r) = Recorded By, (t) = Taken By, (c) = Cosigned By    Initials Name Provider Type    Debra Duffy, PT Physical Therapist        Goals/Plan     Row Name 11/11/20 1543          Bed Mobility Goal 1 (PT)    Activity/Assistive Device (Bed Mobility Goal 1, PT)  sit to supine/supine to sit  -SV     [place] Level/Cues Needed (Bed Mobility Goal 1, PT)  independent  -SV     Time Frame (Bed Mobility Goal 1, PT)  1 week  -SV     Row Name 11/11/20 1543          Transfer Goal 1 (PT)    Activity/Assistive Device (Transfer Goal 1, PT)  sit-to-stand/stand-to-sit;walker, rolling  -SV     [place] Level/Cues Needed (Transfer Goal 1, PT)  independent  -SV     Time Frame (Transfer Goal 1, PT)  1 week  -SV     Row Name 11/11/20 1543          Gait  Training Goal 1 (PT)    Activity/Assistive Device (Gait Training Goal 1, PT)  gait (walking locomotion);walker, rolling  -SV     [place] Level (Gait Training Goal 1, PT)  independent  -SV     Distance (Gait Training Goal 1, PT)  '  -SV     Time Frame (Gait Training Goal 1, PT)  1 week  -SV       User Key  (r) = Recorded By, (t) = Taken By, (c) = Cosigned By    Initials Name Provider Type    SV Debra Dickinson, PT Physical Therapist        Clinical Impression     Row Name 11/11/20 1541          Pain    Additional Documentation  Pain Scale: Numbers Pre/Post-Treatment (Group)  -SV     Row Name 11/11/20 1541          Pain Scale: Numbers Pre/Post-Treatment    Pretreatment Pain Rating  0/10 - no pain  -     Row Name 11/11/20 1541          Plan of Care Review    Plan of Care Reviewed With  patient  -SV     [facility] Name 11/11/20 1541          Therapy Assessment/Plan (PT)    Patient/Family Therapy Goals Statement (PT)  indep amb with least vs no AD  -SV     Rehab Potential (PT)  good, to achieve stated therapy goals  -SV     Criteria for Skilled Interventions Met (PT)  yes  -SV     Predicted Duration of Therapy Intervention (PT)  1-2 weeks  -SV     Row Name 11/11/20 1541          Vital Signs    Pre SpO2 (%)  93  -SV     O2 Delivery Pre Treatment  room air  -SV     Intra SpO2 (%)  91 after amb 40'  -SV     O2 Delivery Intra Treatment  room air  -SV     Post SpO2 (%)  96  -SV     O2 Delivery Post Treatment  supplemental O2  -SV     Pre Patient Position  Supine  -SV     Intra Patient Position  Standing  -SV     Post Patient Position  Supine  -SV     Row Name 11/11/20 1541          Positioning and Restraints    Pre-Treatment Position  in bed  -SV     Post Treatment Position  bed  -SV     In Bed  call light within reach;encouraged to call for assist;exit alarm on;fowlers;notified nsg  -SV       User Key  (r) = Recorded By, (t) = Taken By, (c) = Cosigned By    Initials Name Provider Type    Debra Duffy, PT  Physical Therapist        Outcome Measures     Row Name 11/11/20 1544          How much help from another person do you currently need...    Turning from your back to your side while in flat bed without using bedrails?  4  -SV     Moving from lying on back to sitting on the side of a flat bed without bedrails?  3  -SV     Moving to and from a bed to a chair (including a wheelchair)?  3  -SV     Standing up from a chair using your arms (e.g., wheelchair, bedside chair)?  3  -SV     Climbing 3-5 steps with a railing?  2  -SV     To walk in hospital room?  3  -SV     AM-PAC 6 Clicks Score (PT)  18  -SV     Row Name 11/11/20 1544          Functional Assessment    Outcome Measure Options  AM-PAC 6 Clicks Basic Mobility (PT)  -       User Key  (r) = Recorded By, (t) = Taken By, (c) = Cosigned By    Initials Name Provider Type     Debra Dickinson, PT Physical Therapist        Physical Therapy Education                 Title: PT OT SLP Therapies (Done)     Topic: Physical Therapy (Done)     Point: Mobility training (Done)     Learning Progress Summary           Patient Acceptance, E,TB,D, VU,NR by  at 11/11/2020 1544                   Point: Home exercise program (Done)     Learning Progress Summary           Patient Acceptance, E,TB,D, VU,NR by  at 11/11/2020 1544                               User Key     Initials Effective Dates Name Provider Type Discipline     04/03/18 -  Debra Dickinson, PT Physical Therapist PT              PT Recommendation and Plan  Planned Therapy Interventions (PT): balance training, bed mobility training, gait training, home exercise program, strengthening, stretching, stair training, transfer training, patient/family education  Plan of Care Reviewed With: patient     Time Calculation:   PT Charges     Row Name 11/11/20 1554             Time Calculation    Start Time  1500  -SV      Stop Time  1530  -SV      Time Calculation (min)  30 min  -      PT Received On  11/11/20  -       PT - Next Appointment  11/12/20  -      PT Goal Re-Cert Due Date  11/18/20  -SV        User Key  (r) = Recorded By, (t) = Taken By, (c) = Cosigned By    Initials Name Provider Type    Debra Duffy, PT Physical Therapist        Therapy Charges for Today     Code Description Service Date Service Provider Modifiers Qty    12474483527 HC PT EVAL MOD COMPLEXITY 2 11/11/2020 Debra Dickinson, PT GP 1    58630158008 HC PT THERAPEUTIC ACT EA 15 MIN 11/11/2020 Debra Dickinson, PT GP 1          PT G-Codes  Outcome Measure Options: AM-PAC 6 Clicks Basic Mobility (PT)  AM-PAC 6 Clicks Score (PT): 18    Debra Dickinson PT  11/11/2020

## 2020-11-11 NOTE — H&P
HISTORY AND PHYSICAL   [facility]        Patient Identification:  Name: Mg Gerber Sr.  Age: 79 y.o.  Sex: male  :  1940  MRN: [id number]                     Primary Care Physician: Dillon Marsh MD    Chief Complaint: Shortness of breath    History of Present Illness:   Mr Gerber is a wonderfully pleasant though very hard of hearing 79-year-old male.  He is here secondary to complaints of shortness of breath.  He has known metastatic lung disease with initial diagnosis of renal cell carcinoma.  He states that he is followed by one of the lung doctors here at [facility] but was sent over by his PCP secondary to complaints of fever shortness of breath and malaise.  CT angiogram performed demonstrates increasing size of left hilar mass but improvement in paraspinal masses.  I believe his oncologist may be with the [facility] and he is either on chemo versus immunotherapy at this time.  He states he has an upcoming treatment here in the next couple weeks.  He was found to be Covid positive though no aspects of pneumonia were identified.  He was given dexamethasone IV at 6 mg.  Upon walking into the room he was not wearing the oxygen and his O2 was quite reassuring at around 91 to 93% on room air.  I instructed him to leave it off at this time and will watch his further O2 trends.  He denies any productive cough hemoptysis or chest pain.  He denies any focal loss of function but does admit to generalized weakness.      Past Medical History:  Past Medical History:   Diagnosis Date   • Allergic    • Anemia, vitamin B12 deficiency 2012   • Anxiety    • Arthritis 2014   • Atrial fibrillation (CMS/HCC)    • Cancer (CMS/HCC) 2014    kidney; prostate   • Cataract    • Chemotherapeutic agent or infusion extravasation     q 1-2 wks    • COPD (chronic obstructive pulmonary disease) (CMS/HCC) 2014   • Emphysema of lung (CMS/HCC)    • Hemochromatosis 2014   •  Herpes zoster 12/06/2011    left medial thigh   • Hip pain 03/24/2014   • History of Doppler ultrasound 12/28/2011    superficial and deep venous insuffiency   • History of EKG 02/10/2012    Dr. Kathi Lloyd; unchanged from prior EKG   • Hypertension     benign essential   • Injury of back    • Pulmonary embolism (CMS/HCC) 03/24/2014   • Renal cell cancer (CMS/HCC)    • Shortness of breath    • Sleep apnea      Past Surgical History:  Past Surgical History:   Procedure Laterality Date   • APPENDECTOMY     • CATARACT EXTRACTION     • CHOLECYSTECTOMY     • SPLENECTOMY     • TONSILLECTOMY     • VEIN SURGERY        Home Meds:  Facility-Administered Medications Prior to Admission   Medication Dose Route Frequency Provider Last Rate Last Dose   • cyanocobalamin injection 1,000 mcg  1,000 mcg Intramuscular Q30 Days Dillon Marsh MD   1,000 mcg at 01/15/20 0848     Medications Prior to Admission   Medication Sig Dispense Refill Last Dose   • amLODIPine (NORVASC) 5 MG tablet Take 1 tablet by mouth Daily. 90 tablet 1 11/10/2020 at Unknown time   • Calcium Carb-Cholecalciferol (CALTRATE 600+D3 PO) Take 1 tablet by mouth 2 (two) times a day.   11/10/2020 at Unknown time   • cyclobenzaprine (FLEXERIL) 5 MG tablet Take 1 tablet by mouth 2 (Two) Times a Day As Needed for Muscle Spasms. 60 tablet 3 11/10/2020 at Unknown time   • folic acid (FOLVITE) 1 MG tablet Take 1 mg by mouth 3 (Three) Times a Day.   11/10/2020 at Unknown time   • gabapentin (NEURONTIN) 400 MG capsule Take 1 capsule by mouth 4 (Four) Times a Day. (Patient taking differently: Take 800 mg by mouth 2 (two) times a day. 1-2 capsules by mouth twice daily) 120 capsule 5 11/10/2020 at Unknown time   • HYDROcodone-acetaminophen (NORCO) 5-325 MG per tablet Take 1 tablet by mouth Every 6 (Six) Hours As Needed for Mild Pain , Moderate Pain  or Severe Pain .   11/10/2020 at Unknown time   • levocetirizine (XYZAL) 5 MG tablet TAKE ONE TABLET BY MOUTH EVERY EVENING 90  tablet 0 11/10/2020 at Unknown time   • levothyroxine sodium (TIROSINT) 112 MCG capsule Daily.   11/10/2020 at Unknown time   • NAMZARIC 28-10 MG capsule sustained-release 24 hr Take 1 capsule by mouth Every Evening.   11/10/2020 at Unknown time   • OXcarbazepine (TRILEPTAL) 300 MG tablet Take 300 mg by mouth every night at bedtime.   11/10/2020 at Unknown time   • sertraline (ZOLOFT) 25 MG tablet Take 25 mg by mouth Daily. Take 1-2 tabs by mouth daily   11/10/2020 at Unknown time   • SPIRIVA HANDIHALER 18 MCG per inhalation capsule Place 1 capsule into inhaler and inhale Daily.   11/10/2020 at Unknown time   • tamsulosin (FLOMAX) 0.4 MG capsule 24 hr capsule Take 1 capsule by mouth Daily.   Patient Taking Differently at Unknown time   • tiotropium bromide-olodaterol (STIOLTO RESPIMAT) 2.5-2.5 MCG/ACT aerosol solution inhaler Inhale 2 (Two) Times a Day.   11/10/2020 at Unknown time   • XARELTO 20 MG tablet Take 20 mg by mouth daily with dinner.   11/10/2020 at Unknown time   • ALPRAZolam (XANAX) 0.25 MG tablet Take 1 tablet by mouth Daily As Needed for Anxiety. 30 tablet 2 Unknown at Unknown time   • docusate sodium (COLACE) 100 MG capsule Take 1 capsule by mouth Every Night. (Patient taking differently: Take 100 mg by mouth Daily As Needed for Constipation.) 90 capsule 1 Unknown at Unknown time   • FIBER SELECT GUMMIES PO Take  by mouth.   Unknown at Unknown time   • finasteride (PROSCAR) 5 MG tablet       • meclizine (ANTIVERT) 12.5 MG tablet Take 1 tablet by mouth 2 (Two) Times a Day As Needed for dizziness. 20 tablet 0 Unknown at Unknown time   • metOLazone (ZAROXOLYN) 5 MG tablet       • NIVOLUMAB IV Infuse  into a venous catheter.      • VENTOLIN  (90 BASE) MCG/ACT inhaler Inhale 2 puffs Every 4 (Four) Hours As Needed.   Unknown at Unknown time       Allergies:  Allergies   Allergen Reactions   • Latex Rash     Immunizations:  Immunization History   Administered Date(s) Administered   • Flu Vaccine Quad  "PF >36MO 10/02/2017   • Fluzone High Dose =>65 Years ([employer] ONLY) 2018   • Hepatitis B 2009   • Influenza TIV (IM) 2014, 10/01/2015, 10/20/2015   • Meningococcal Conjugate 2009   • Pneumococcal Polysaccharide (PPSV23) 2007   • Td 2009     Social History:   Social History     Social History Narrative   • Not on file     Social History     Socioeconomic History   • Marital status:      Spouse name: Not on file   • Number of children: Not on file   • Years of education: Not on file   • Highest education level: Not on file   Tobacco Use   • Smoking status: Former Smoker   • Smokeless tobacco: Never Used   Substance and Sexual Activity   • Alcohol use: Yes     Comment: 2 drinks month   • Drug use: No   • Sexual activity: Defer       Family History:  Family History   Problem Relation Age of Onset   • Aneurysm Mother    • Stroke Mother    • Heart disease Father    • Diabetes Brother         type 2        Review of Systems  See history of present illness and past medical history.  Patient admits to subjective fevers but denies chills or night sweats.  Denies any nausea vomiting abdominal pain and/or diarrhea.  Denies any chest pain palpitations admits to shortness of breath both at rest and with exertion at times.  Denies any productive cough or bloody sputum.  Denies any abdominal pains.  Admits to hardness of hearing but denies any focal loss of function and/or focal confusion and/or syncope.  Remainder of ROS is negative.    Objective:  T Max 24 hrs: Temp (24hrs), Av.7 °F (37.6 °C), Min:99.2 °F (37.3 °C), Max:100.2 °F (37.9 °C)    Vitals Ranges:   Temp:  [99.2 °F (37.3 °C)-100.2 °F (37.9 °C)] 99.2 °F (37.3 °C)  Heart Rate:  [62-90] 62  Resp:  [16-18] 16  BP: ()/(29-77) 104/67      Exam:  /67 (BP Location: Left arm, Patient Position: Sitting)   Pulse 62   Temp 99.2 °F (37.3 °C) (Oral)   Resp 16   Ht 185.4 cm (73\")   Wt 105 kg (231 lb 7.7 oz)   SpO2 91%   " BMI 30.54 kg/m²     General Appearance:    Alert, cooperative, no distress, alert and oriented for me.  Very hard of hearing and makes getting a history difficult at times, no family at bedside   Head:    Normocephalic, without obvious abnormality, atraumatic   Eyes:    PERRL, conjunctivae/corneas clear, EOM's intact, both eyes   Ears:    Normal external ear canals, both ears   Nose:   Nares normal, septum midline, mucosa normal, no drainage    or sinus tenderness   Throat:   Lips, mucosa, and tongue normal   Neck:   Supple, no meningismus or JVD   Back:     Symmetric, no curvature   Lungs:     Clear to auscultation bilaterally, respirations unlabored   Chest Wall:    No tenderness or deformity    Heart:    Regular rate and rhythm, S1 and S2 normal   Abdomen:     Soft, nontender, bowel sounds active all four quadrants,     no masses   Extremities:  Globally weak though nothing focal and moving all, no cyanosis or edema   Pulses:   2+ and symmetric all extremities   Skin:   Skin color, texture, turgor normal       Neurologic:   CNII-XII intact except for hearing loss      .    Data Review:  Labs in chart were reviewed.             Imaging Results (All)     Procedure Component Value Units Date/Time    CT Angiogram Chest [[id number]] Collected: 11/10/20 2122     Updated: 11/10/20 2138    Narrative:      CT PULMONARY ANGIOGRAM:     HISTORY:  Chest pain. Shortness of breath.     TECHNIQUE:  Non contrast localizing images were performed through the  mediastinum for localization and bolus timing. Axial images were  obtained from the apex to the lung bases and during IV contrast  infusion. No adverse reaction. Multiplanar reformatted images were  reconstructed from the helical source data. Radiation dose reduction  techniques were utilized, including automated exposure control and  exposure modulation based on body size.         COMPARISON:  CTA chest 04/25/2015. CT abdomen and pelvis 09/29/2015     FINDINGS:         Suboptimal opacification of the pulmonary arterial system. There is  respiratory motion artifact. Normal caliber main pulmonary artery. No  evidence of pulmonary embolus within the main, right and left pulmonary  arteries. The segmental branches cannot be clearly evaluated..     The thoracic aorta is normal in caliber. Scattered atherosclerotic  calcifications.      The heart is normal in size. No pericardial effusion.Coronary artery  opacifications.     Right-sided Mediport terminates in the SVC.     There is a 4.4 x 4.1 cm left hilar mass that has enlarged when compared  to the prior examination from 2015 where this measured approximately 3  cm in size. No other mediastinal or right hilar lymphadenopathy. No  axillary lymphadenopathy.     Previously seen paraspinal masses have decreased in size.     The central airways are patent. No focal consolidation or suspicious  pulmonary nodules. Trace left pleural effusion with mild compressive  atelectasis. No pneumothorax.     Mild nodular contour of the liver indicating possible cirrhosis. Patient  is status post left nephrectomy and splenectomy.     No acute osseous abnormality. No aggressive osseous lesions.  Degenerative changes of the spine.     The chest wall soft tissues are normal.     The visualized thyroid gland is unremarkable.          Impression:      1.  Suboptimal examination evaluate for pulmonary embolism. There is no  pulmonary embolism within the main, right and left pulmonary arteries.  The segmental and distal branches cannot be clearly evaluated.  2.  Trace left pleural effusion with mild compressive atelectasis. No  other evidence for acute cardiopulmonary disease.  3.  Left hilar mass mass that has enlarged when compared to the prior  exam from 04/25/2015.  4.  Previously seen paraspinal masses have decreased in size.  5.  Possible cirrhotic liver.     This report was finalized on 11/10/2020 9:32 PM by Dr. Marshal Blackwood M.D.       XR Chest AP  [[id number]] Collected: 11/10/20 1946     Updated: 11/10/20 2039    Narrative:      ONE VIEW PORTABLE CHEST     HISTORY: Cough and fever. Possible Covid 19 pneumonia. Previous lung  cancer.     FINDINGS: There appears to be a large left perihilar mass and concerning  for neoplasm in this patient with previous history of left-sided lung  cancer and paraspinal mass. Further evaluation with CT scan is therefore  recommended. The area of concern measures up to 5.6 x 7 cm. The heart is  top normal in size. Right-sided MediPort catheter ends in the SVC.     This report was finalized on 11/10/2020 8:36 PM by Dr. Leo Franklin M.D.               Assessment:    Dyspnea    Hypertension    Renal cell carcinoma (CMS/HCC)    History of lung cancer    COVID-19 virus detected    Generalized weakness      Plan:    Source of dyspnea likely secondary to cancer burden is left hilar masses increased in size but CTA does not demonstrate a PE but was not a great quality of exam.  He does have some left-sided pleural effusion with atelectasis which is likely compounding his issue   -He has no known past history of cirrhosis so this was also noted on CTA.  Paraspinal masses have actually shown improvement.  Will hold further work-up with GI at this time and await oncological input in case any of his treatments could be possible etiology.  LFTs otherwise normal except for mild elevation of bilirubin and will trend CMP tomorrow with a.m. labs   -Currently undergoing chemotherapy and I believe immunotherapy per his oncology group and I have placed a consult to oncology while here we will also appreciate any additional recommendations.  Unfortunately patient has metastatic renal cell carcinoma   -Covid was detected though no pneumonia is noted and will empirically treat with some dexamethasone as his O2 saturations are currently low normal at 91 to 93% on room air and will have to watch this closely   -Consult pulmonology as followed by   [facility] for any additional recommendations    PT to see for generalized weakness    CCP for dispo needs    On Xarelto so no additional DVT prophylaxis warranted    Add Pepcid for GI prophylaxis with use of steroids    Dementia without behavior disorder compounded by severe hardness of hearing    Further recommendations to follow    Rigo Carney MD  11/11/2020  11:20 EST

## 2020-11-11 NOTE — PROGRESS NOTES
Discharge Planning Assessment  [facility]     Patient Name: Mg Gerber Sr.  MRN: [id number]  Today's Date: 11/11/2020    Admit Date: 11/10/2020    Discharge Needs Assessment     Row Name 11/11/20 1410       Living Environment    Lives With  spouse;child(haroldo), adult    Current Living Arrangements  home/apartment/condo    Primary Care Provided by  self    Family Caregiver if Needed  spouse    Quality of Family Relationships  supportive    Able to Return to Prior Arrangements  yes       Transition Planning    Patient/Family Anticipates Transition to  home with family    Patient/Family Anticipated Services at Transition  none    Transportation Anticipated  car, drives self       Discharge Needs Assessment    Readmission Within the Last 30 Days  no previous admission in last 30 days    Equipment Currently Used at Home  cane, straight;walker, standard;wheelchair;wheelchair, motorized    Concerns to be Addressed  denies needs/concerns at this time        Discharge Plan     Row Name 11/11/20 1418       Plan    Plan  Pt plans to return home with his wife and dtr upon DC.  He denies DC needs at this time.  CCP will continue to follow.    Plan Comments  S/W pt by phone. He is currently A&Ox4. Facesheet and pharmacy info confirmed.  He did enroll in Meds to Bed.  Pt lives in a house with his wife Lucina and dtr Romina.  Pt states Romina is on Disability, but can help out a little at home.  Home DME includes a cane, walker, w/c, and scooter, but mostly uses the cane.  Pt states he is IADLs and can drive.  He has used HH in the past, but does not remember which agency.  He has been to [facility] for rehab. He does not have an advance directive. He manages his own meds and denies issues affording them.  Pt plans to return home upon DC.  He denies DC needs at this time.  H&P states dementia without behavioral disorder.  Pt states his main contact is his wife.  HIPAA compliant VM message left for Lucina with CCP contact  info.  BELCHER notice explained to pt.  Verbal understanding.  RN will take copy into the room when she she goes in next.        Continued Care and Services - Admitted Since 11/10/2020    Coordination has not been started for this encounter.         Demographic Summary     Row Name 11/11/20 1402       General Information    Admission Type  observation    Arrived From  home    Required Notices Provided  Observation Status Notice    Referral Source  admission list    Reason for Consult  discharge planning    Preferred Language  English        Functional Status     Row Name 11/11/20 1409       Functional Status    Usual Activity Tolerance  moderate    Current Activity Tolerance  moderate       Functional Status, IADL    Medications  independent    Meal Preparation  assistive person    Housekeeping  assistive person    Laundry  assistive person    Shopping  assistive person       Employment/    Employment Status  retired               Patient Forms     Row Name 11/11/20 1415       Patient Forms    Patient Observation Letter  Delivered    Delivered to  Patient    Method of delivery  Telephone Copy given to RN to take into the room            Tiffany Swann RN

## 2020-11-11 NOTE — PLAN OF CARE
Goal Outcome Evaluation:  Plan of Care Reviewed With: patient  Progress: improving  Outcome Summary: Patient very [place], needing to scream each instruction to him has caused some communication delays. Batteries changed in his Hearing aides, didn't help too much. He is very concerned about his home regimen if medications, got most of the scheduling figured out with help of Dr. Carney, patient worked well with PT, good appetite, oxygen really only needed when he is sleeping as he drops. RA all shift with exception of early am when he dropped a little but I suspect he was sleeping at this time. otherwise pt has no c/o. wallet, keys and meds locked in safe/pharmacy.

## 2020-11-11 NOTE — PLAN OF CARE
Pt admit with soa, weakness, COVID +, left pleural effusion with atelecatsis. Pt PMH kidney CA, lung CA, currently in chemotherapy, left MATTY, COPD. Pt PLOF is indep amb inside home no AD, and in [facility] with [facility]. He lives in a trilevel with his wife. Pt presents with general weakness, impaired mobility, impaired balance in standing and impaired endurance. Pt 93 % on RA upon PT entering the room. He moved to eob with min/cga and maintained >92% on eob for PLB and BLE exercises. Pt amb 40' inside room holding IV pole with cga. Mild unsteadiness noted. Pt reported dizziness following amb though O2 at 90% on RA. Pt returned to supine with O2 replaced on 2 L with increase to 95%. Pt encourage to perform 5-10 reps BLE MATTY exercises: qs, hs, abd/add, a/p, ceiling punches. Pt is hopeful to return home at d/c in 2-3 days.

## 2020-11-12 LAB
ALBUMIN SERPL-MCNC: 3.5 G/DL (ref 3.5–5.2)
ALBUMIN/GLOB SERPL: 1.5 G/DL
ALP SERPL-CCNC: 49 U/L (ref 39–117)
ALT SERPL W P-5'-P-CCNC: 14 U/L (ref 1–41)
ANION GAP SERPL CALCULATED.3IONS-SCNC: 9.4 MMOL/L (ref 5–15)
AST SERPL-CCNC: 21 U/L (ref 1–40)
BILIRUB CONJ SERPL-MCNC: 0.3 MG/DL (ref 0–0.3)
BILIRUB SERPL-MCNC: 1.1 MG/DL (ref 0–1.2)
BUN SERPL-MCNC: 21 MG/DL (ref 8–23)
BUN/CREAT SERPL: 19.3 (ref 7–25)
CALCIUM SPEC-SCNC: 8.3 MG/DL (ref 8.6–10.5)
CHLORIDE SERPL-SCNC: 105 MMOL/L (ref 98–107)
CO2 SERPL-SCNC: 24.6 MMOL/L (ref 22–29)
CREAT SERPL-MCNC: 1.09 MG/DL (ref 0.76–1.27)
DEPRECATED RDW RBC AUTO: 41.1 FL (ref 37–54)
ERYTHROCYTE [DISTWIDTH] IN BLOOD BY AUTOMATED COUNT: 10.1 % (ref 12.3–15.4)
GFR SERPL CREATININE-BSD FRML MDRD: 65 ML/MIN/1.73
GLOBULIN UR ELPH-MCNC: 2.3 GM/DL
GLUCOSE SERPL-MCNC: 115 MG/DL (ref 65–99)
HCT VFR BLD AUTO: 27.5 % (ref 37.5–51)
HGB BLD-MCNC: 8.8 G/DL (ref 13–17.7)
MCH RBC QN AUTO: 35.5 PG (ref 26.6–33)
MCHC RBC AUTO-ENTMCNC: 32 G/DL (ref 31.5–35.7)
MCV RBC AUTO: 110.9 FL (ref 79–97)
PLATELET # BLD AUTO: 301 10*3/MM3 (ref 140–450)
PMV BLD AUTO: 10 FL (ref 6–12)
POTASSIUM SERPL-SCNC: 4.4 MMOL/L (ref 3.5–5.2)
PROT SERPL-MCNC: 5.8 G/DL (ref 6–8.5)
RBC # BLD AUTO: 2.48 10*6/MM3 (ref 4.14–5.8)
SODIUM SERPL-SCNC: 139 MMOL/L (ref 136–145)
WBC # BLD AUTO: 10.69 10*3/MM3 (ref 3.4–10.8)

## 2020-11-12 PROCEDURE — 85027 COMPLETE CBC AUTOMATED: CPT | Performed by: HOSPITALIST

## 2020-11-12 PROCEDURE — 94799 UNLISTED PULMONARY SVC/PX: CPT

## 2020-11-12 PROCEDURE — 63710000001 DEXAMETHASONE PER 0.25 MG: Performed by: HOSPITALIST

## 2020-11-12 PROCEDURE — 80053 COMPREHEN METABOLIC PANEL: CPT | Performed by: HOSPITALIST

## 2020-11-12 PROCEDURE — 82248 BILIRUBIN DIRECT: CPT | Performed by: INTERNAL MEDICINE

## 2020-11-12 RX ORDER — CALCIUM CARBONATE 500(1250)
500 TABLET ORAL DAILY
Status: DISCONTINUED | OUTPATIENT
Start: 2020-11-12 | End: 2020-11-18 | Stop reason: HOSPADM

## 2020-11-12 RX ADMIN — HYDROCODONE BITARTRATE AND ACETAMINOPHEN 1 TABLET: 5; 325 TABLET ORAL at 23:46

## 2020-11-12 RX ADMIN — FOLIC ACID 1 MG: 1 TABLET ORAL at 12:10

## 2020-11-12 RX ADMIN — OXCARBAZEPINE 300 MG: 300 TABLET, FILM COATED ORAL at 20:22

## 2020-11-12 RX ADMIN — TIOTROPIUM BROMIDE INHALATION SPRAY 2 PUFF: 3.12 SPRAY, METERED RESPIRATORY (INHALATION) at 08:30

## 2020-11-12 RX ADMIN — LEVOTHYROXINE SODIUM 112 MCG: 112 TABLET ORAL at 06:44

## 2020-11-12 RX ADMIN — RIVAROXABAN 20 MG: 20 TABLET, FILM COATED ORAL at 17:23

## 2020-11-12 RX ADMIN — GABAPENTIN 400 MG: 400 CAPSULE ORAL at 08:48

## 2020-11-12 RX ADMIN — HYDROCODONE BITARTRATE AND ACETAMINOPHEN 1 TABLET: 5; 325 TABLET ORAL at 04:27

## 2020-11-12 RX ADMIN — FAMOTIDINE 20 MG: 20 TABLET, FILM COATED ORAL at 04:29

## 2020-11-12 RX ADMIN — MEMANTINE HYDROCHLORIDE 10 MG: 10 TABLET, FILM COATED ORAL at 08:49

## 2020-11-12 RX ADMIN — GABAPENTIN 400 MG: 400 CAPSULE ORAL at 12:10

## 2020-11-12 RX ADMIN — HYDROCODONE BITARTRATE AND ACETAMINOPHEN 1 TABLET: 5; 325 TABLET ORAL at 17:23

## 2020-11-12 RX ADMIN — SODIUM CHLORIDE 100 ML/HR: 9 INJECTION, SOLUTION INTRAVENOUS at 20:26

## 2020-11-12 RX ADMIN — FAMOTIDINE 20 MG: 20 TABLET, FILM COATED ORAL at 17:23

## 2020-11-12 RX ADMIN — FOLIC ACID 1 MG: 1 TABLET ORAL at 20:22

## 2020-11-12 RX ADMIN — CETIRIZINE HYDROCHLORIDE 5 MG: 10 TABLET ORAL at 08:48

## 2020-11-12 RX ADMIN — SODIUM CHLORIDE, PRESERVATIVE FREE 10 ML: 5 INJECTION INTRAVENOUS at 20:22

## 2020-11-12 RX ADMIN — AMLODIPINE BESYLATE 5 MG: 5 TABLET ORAL at 08:49

## 2020-11-12 RX ADMIN — DONEPEZIL HYDROCHLORIDE 10 MG: 10 TABLET, FILM COATED ORAL at 20:22

## 2020-11-12 RX ADMIN — SODIUM CHLORIDE, PRESERVATIVE FREE 10 ML: 5 INJECTION INTRAVENOUS at 08:49

## 2020-11-12 RX ADMIN — GABAPENTIN 800 MG: 400 CAPSULE ORAL at 20:22

## 2020-11-12 RX ADMIN — MEMANTINE HYDROCHLORIDE 10 MG: 10 TABLET, FILM COATED ORAL at 20:22

## 2020-11-12 RX ADMIN — TAMSULOSIN HYDROCHLORIDE 0.4 MG: 0.4 CAPSULE ORAL at 08:48

## 2020-11-12 RX ADMIN — SERTRALINE 25 MG: 25 TABLET, FILM COATED ORAL at 08:49

## 2020-11-12 RX ADMIN — DEXAMETHASONE 6 MG: 4 TABLET ORAL at 08:49

## 2020-11-12 RX ADMIN — REMDESIVIR 100 MG: 100 INJECTION, POWDER, LYOPHILIZED, FOR SOLUTION INTRAVENOUS at 17:23

## 2020-11-12 RX ADMIN — FOLIC ACID 1 MG: 1 TABLET ORAL at 08:49

## 2020-11-12 RX ADMIN — CALCIUM 500 MG: 500 TABLET ORAL at 12:10

## 2020-11-12 NOTE — PROGRESS NOTES
"  Daily Progress Note.   [address]  11/12/2020    Patient:  Name:  Mg Gerber Sr.  MRN:  [id number]  1940  79 y.o.  male         CC: soa    Interval History:  Follow pu covid pna, ahrf copd  Doing well  No increased cough soa, hemoptysis  A tad displeased with things in general but no specific complaint  Feels like he is getting better  ROS: No fever, no diarrhea, no chest pain  PMFSSH: no change    Physical Exam:  /56 (BP Location: Left arm, Patient Position: Lying)   Pulse 53   Temp 96.7 °F (35.9 °C) (Oral)   Resp 18   Ht 185.4 cm (73\")   Wt 110 kg (242 lb 8.1 oz)   SpO2 95%   BMI 31.99 kg/m²   Body mass index is 31.99 kg/m².    Intake/Output Summary (Last 24 hours) at 11/12/2020 1713  Last data filed at 11/12/2020 0643  Gross per 24 hour   Intake 1201 ml   Output 600 ml   Net 601 ml     General appearance: NAD, conversant   Eyes: anicteric sclerae, moist conjunctivae; no lid-lag; PERRLA  HENT: Atraumatic; oropharynx clear with moist mucous membranes and no mucosal ulcerations; normal hard and soft palate  Neck: Trachea midline; FROM, supple, no thyromegaly or lymphadenopathy  Lungs: No wheeze good air entry bilateral no rhonchi, with normal respiratory effort and no intercostal retractions  CV: RRR, no MRGs   Abdomen: Soft, non-tender; no masses or HSM  Extremities: No peripheral edema or extremity lymphadenopathy  Skin: Normal temperature, turgor and texture; no rash, ulcers or subcutaneous nodules  Psych: Agitated affect, alert and oriented to person, place and time     Data Review:  Notable Labs:  Results from last 7 days   Lab Units 11/12/20  0708 11/11/20  0618 11/10/20  1836   WBC 10*3/mm3 10.69 7.20 9.36   HEMOGLOBIN g/dL 8.8* 9.7* 10.2*   PLATELETS 10*3/mm3 301 316 347     Results from last 7 days   Lab Units 11/12/20  0708 11/11/20  2016 11/10/20  1836   SODIUM mmol/L 139  --  136   POTASSIUM mmol/L 4.4  --  3.7   CHLORIDE mmol/L 105  --  102   CO2 mmol/L " 24.6  --  24.5   BUN mg/dL 21  --  12   CREATININE mg/dL 1.09 1.04 1.07   GLUCOSE mg/dL 115*  --  97   CALCIUM mg/dL 8.3*  --  9.3   Estimated Creatinine Clearance: 70.3 mL/min (by C-G formula based on SCr of 1.09 mg/dL).    Results from last 7 days   Lab Units 11/12/20  0708 11/11/20  2016 11/11/20  0618 11/10/20  1836   AST (SGOT) U/L 21 22  --  20   ALT (SGPT) U/L 14 14  --  14   PROCALCITONIN ng/mL  --   --   --  0.19   LACTATE mmol/L  --   --   --  1.0   D DIMER QUANT MCGFEU/mL  --   --   --  0.36   CRP mg/dL  --   --   --  1.51*   PLATELETS 10*3/mm3 301  --  316 347             Imaging:  Reviewed chest images personally from past 3 days    Scheduled meds:    amLODIPine, 5 mg, Oral, Daily  calcium carbonate (oyster shell), 500 mg, Oral, Daily  cetirizine, 5 mg, Oral, Daily  dexamethasone, 6 mg, Oral, Daily With Breakfast  donepezil, 10 mg, Oral, Nightly  famotidine, 20 mg, Oral, BID AC  folic acid, 1 mg, Oral, 3 times per day  gabapentin, 400 mg, Oral, 2 times per day  gabapentin, 800 mg, Oral, Nightly  levothyroxine, 112 mcg, Oral, Q AM  memantine, 10 mg, Oral, Q12H  OXcarbazepine, 300 mg, Oral, Nightly  remdesivir, 100 mg, Intravenous, Q24H  rivaroxaban, 20 mg, Oral, Daily With Dinner  sertraline, 25 mg, Oral, Daily  sodium chloride, 10 mL, Intravenous, Q12H  tamsulosin, 0.4 mg, Oral, Daily  tiotropium bromide monohydrate, 2 puff, Inhalation, Daily - RT        ASSESSMENT  /  PLAN:  COPD without acute exacerbation  COVID  Acute hypoxic respiratory failure  Medistinal Metastasis from [facility] follows with [facility] Immun[facility] monitored closely with frequent pet and ct scans.  Relatively stable over more recent years.  Obesity  Anemia  Hemochromatosis  Hypertension  Low back pain  Mild cognitive impairment  Hiatal hernia  Osteoarthritis    Decadron 6mg daily  remdesivir 100mg today monitor lft and renal function  Wean oxygen as able  No ae of copd      Elmer Cevallos MD  [facility]  11/12/20  17:13 EST

## 2020-11-12 NOTE — PROGRESS NOTES
[facility]  Clinical Pharmacy Department     Remdesivir Review Note    Mg Gerber Sr. is a 79 y.o. male with confirmed COVID-19 infection on day 2 of hospitalization.     Consulting Provider:  Dr Elmer Cevallos  Date of Confirmed SARS-CoV-2: Yes  Date of Symptom Onset: Less than 10 days ago, per notes  Planned Duration of Therapy: 5 days  Other Antimicrobials:    Hydroxychloroquine or chloroquine prior to arrival: No    Allergies  Allergies as of 11/10/2020 - Reviewed 11/10/2020   Allergen Reaction Noted   • Latex Rash 02/01/2019       Microbiology:  Microbiology Results (last 10 days)       Procedure Component Value - Date/Time    Respiratory Panel PCR w/COVID-19(SARS-CoV-2) BEBETO/SUSIE/NOHELIA/PAD/COR/MAD/ZEFERINO In-House, NP Swab in UTM/VTM, 3-4 HR TAT - Swab, Nasopharynx [[id number]]  (Abnormal) Collected: 11/10/20 1836    Lab Status: Final result Specimen: Swab from Nasopharynx Updated: 11/10/20 2344     ADENOVIRUS, PCR Not Detected     Coronavirus 229E Not Detected     Coronavirus HKU1 Not Detected     Coronavirus NL63 Not Detected     Coronavirus OC43 Not Detected     COVID19 Detected     Human Metapneumovirus Not Detected     Human Rhinovirus/Enterovirus Not Detected     Influenza A PCR Not Detected     Influenza B PCR Not Detected     Parainfluenza Virus 1 Not Detected     Parainfluenza Virus 2 Not Detected     Parainfluenza Virus 3 Not Detected     Parainfluenza Virus 4 Not Detected     RSV, PCR Not Detected     Bordetella pertussis pcr Not Detected     Bordetella parapertussis PCR Not Detected     Chlamydophila pneumoniae PCR Not Detected     Mycoplasma pneumo by PCR Not Detected    Narrative:      Fact sheet for providers: https://docs.[employer]/wp-content/uploads/LYI7527-3706-UZ3.1-EUA-Provider-Fact-Sheet-3.pdf    Fact sheet for patients: https://docs.[employer]/wp-content/uploads/CAA1117-5375-YB8.1-EUA-Patient-Fact-Sheet-1.pdf            Radiology/Imaging:  Radiology Results (last 21  days)    Procedure Component Value Units Date/Time   CT Angiogram Chest [[id number]] Bruce as Reviewed   Order Status: Completed Collected: 11/10/20 2122    Updated: 11/10/20 2138   Narrative:     CT PULMONARY ANGIOGRAM:       HISTORY:  Chest pain. Shortness of breath.       TECHNIQUE:  Non contrast localizing images were performed through the   mediastinum for localization and bolus timing. Axial images were   obtained from the apex to the lung bases and during IV contrast   infusion. No adverse reaction. Multiplanar reformatted images were   reconstructed from the helical source data. Radiation dose reduction   techniques were utilized, including automated exposure control and   exposure modulation based on body size.            COMPARISON:  CTA chest 04/25/2015. CT abdomen and pelvis 09/29/2015       FINDINGS:          Suboptimal opacification of the pulmonary arterial system. There is   respiratory motion artifact. Normal caliber main pulmonary artery. No   evidence of pulmonary embolus within the main, right and left pulmonary   arteries. The segmental branches cannot be clearly evaluated..       The thoracic aorta is normal in caliber. Scattered atherosclerotic   calcifications.       The heart is normal in size. No pericardial effusion.Coronary artery   opacifications.       Right-sided Mediport terminates in the SVC.       There is a 4.4 x 4.1 cm left hilar mass that has enlarged when compared   to the prior examination from 2015 where this measured approximately 3   cm in size. No other mediastinal or right hilar lymphadenopathy. No   axillary lymphadenopathy.       Previously seen paraspinal masses have decreased in size.       The central airways are patent. No focal consolidation or suspicious   pulmonary nodules. Trace left pleural effusion with mild compressive   atelectasis. No pneumothorax.       Mild nodular contour of the liver indicating possible cirrhosis. Patient   is status post left nephrectomy  and splenectomy.       No acute osseous abnormality. No aggressive osseous lesions.   Degenerative changes of the spine.       The chest wall soft tissues are normal.       The visualized thyroid gland is unremarkable.           Impression:     1.  Suboptimal examination evaluate for pulmonary embolism. There is no   pulmonary embolism within the main, right and left pulmonary arteries.   The segmental and distal branches cannot be clearly evaluated.   2.  Trace left pleural effusion with mild compressive atelectasis. No   other evidence for acute cardiopulmonary disease.   3.  Left hilar mass mass that has enlarged when compared to the prior   exam from 04/25/2015.   4.  Previously seen paraspinal masses have decreased in size.   5.  Possible cirrhotic liver.       This report was finalized on 11/10/2020 9:32 PM by Dr. Marshal Blackwood M.D.       XR Chest AP [[id number]] Bruce as Reviewed   Order Status: Completed Collected: 11/10/20 1946    Updated: 11/10/20 2039   Narrative:     ONE VIEW PORTABLE CHEST       HISTORY: Cough and fever. Possible Covid 19 pneumonia. Previous lung   cancer.       FINDINGS: There appears to be a large left perihilar mass and concerning   for neoplasm in this patient with previous history of left-sided lung   cancer and paraspinal mass. Further evaluation with CT scan is therefore   recommended. The area of concern measures up to 5.6 x 7 cm. The heart is   top normal in size. Right-sided MediPort catheter ends in the SVC.       This report was finalized on 11/10/2020 8:36 PM by Dr. Leo Franklin M.D.           Vitals/Labs/I&O  [unfilled]    Results from last 7 days   Lab Units 11/11/20  0618 11/10/20  1836   WBC 10*3/mm3 7.20 9.36     Results from last 7 days   Lab Units 11/10/20  1836   PROCALCITONIN ng/mL 0.19     Results from last 7 days   Lab Units 11/10/20  1836   AST (SGOT) U/L 20      Results from last 7 days   Lab Units 11/10/20  1836   ALT (SGPT) U/L 14       Estimated  Creatinine Clearance: 70 mL/min (by C-G formula based on SCr of 1.07 mg/dL).  Results from last 7 days   Lab Units 11/10/20  1836   BUN mg/dL 12   CREATININE mg/dL 1.07     Intake & Output (last 3 days)         11/09 0701 - 11/10 0700 11/10 0701 - 11/11 0700 11/11 0701 - 11/12 0700    I.V. (mL/kg)   1000 (9.5)    Total Intake(mL/kg)   1000 (9.5)    Net   +1000           Urine Unmeasured Occurrence  1 x             Assessment/Plan:    Patient has been on 2L oxygen, but per RN, not documented as of this time.     Patient is hospitalized with confirmed, severe COVID-19 infection and started on remdesivir 200 mg IV once followed by 100 mg IV daily for 4 days (5 day total duration). All inclusions, exclusions, and monitoring requirements listed below have been reviewed.    1. Patient is hospitalized with confirmed COVID-19 infection  2. Patient is requiring ?2 L of oxygen to maintain oxygen saturations of ?94%   3. Baseline and daily LFTs and Scr have been ordered prior to remdesivir initiation  4. ALT is not ? 5 times the upper limit of normal  5. Patient is not on concomitant hydroxychloroquine or chloroquine       Thank you for involving pharmacy in this patient's care. Please contact pharmacy with any questions or concerns.                           Pete Sommer [facility]  Clinical Pharmacist  11/11/20 19:39 EST

## 2020-11-12 NOTE — ED PROVIDER NOTES
EMERGENCY DEPARTMENT ENCOUNTER    Room Number:  S513/1  Date of encounter:  11/12/2020  PCP: Dillon Marsh MD  Historian: Patient      HPI:  Chief Complaint: Shortness of breath  A complete HPI/ROS/PMH/PSH/SH/FH are unobtainable due to: Nothing    Context: Mg Gerber Sr. is a very pleasant 79 y.o. male who presents to the ED c/o complaining of shortness of breath x3 days.  Patient states symptoms started similar to the common cold with lots of sinus congestion and slowly settling in his chest.  He denies associated chest pain, palpitations, abdominal pain, or recent sick contacts.  He does endorse fever and chills.  He states he contacted his PCP and given his condition it was thought he would be better evaluated at the hospital.    In reviewing the patient's record he has a history of CLL, metastatic lung cancer, renal cell carcinoma.  It is thought that the renal cell carcinoma was the source of the metastatic lung cancer.  He states his oncologist is at [facility] is being treated on new chemotherapy immunotherapy every 2 weeks.    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/24/2014   • Herpes zoster 12/06/2011   • Hip pain 03/24/2014   • Hypertension    • Cancer (CMS/[facility]) 04/29/2014   • COPD (chronic obstructive pulmonary disease) (CMS/[facility]) 03/24/2014   • Left low back pain 10/28/2015   • Dizziness 02/02/2016   • Headache around the eyes 02/02/2016   • Mild cognitive impairment 02/02/2016   • Renal cell carcinoma (CMS/[facility]) 02/15/2016   • Anxiety 05/16/2016   • Malignant neoplasm of lung (CMS/[facility]) 05/16/2016   • Neuropathy 02/02/2017   • Perennial allergic rhinitis 02/02/2017   • Vitamin B12 deficiency 03/16/2017   • 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   • Pharyngitis 12/16/2017   • Bronchitis 05/20/2019   • Other constipation 06/01/2020   • Hx of hiatal hernia 11/10/2020      Resolved Ambulatory Problems     Diagnosis Date Noted   • Pulmonary embolism (CMS/HCC) 03/24/2014     Past Medical History:   Diagnosis Date   • Allergic    • Atrial fibrillation (CMS/HCC)    • Cataract    • Chemotherapeutic agent or infusion extravasation    • Emphysema of lung (CMS/HCC)    • History of Doppler ultrasound 12/28/2011   • History of EKG 02/10/2012   • Injury of back    • Renal cell cancer (CMS/HCC)    • Shortness of breath    • Sleep apnea          PAST SURGICAL HISTORY  Past Surgical History:   Procedure Laterality Date   • APPENDECTOMY     • CATARACT EXTRACTION     • CHOLECYSTECTOMY     • SPLENECTOMY     • TONSILLECTOMY     • VEIN SURGERY           FAMILY HISTORY  Family History   Problem Relation Age of Onset   • Aneurysm Mother    • Stroke Mother    • Heart disease Father    • Diabetes Brother         type 2         SOCIAL HISTORY  Social History     Socioeconomic History   • Marital status:      Spouse name: Not on file   • Number of children: Not on file   • Years of education: Not on file   • Highest education level: Not on file   Tobacco Use   • Smoking status: Former Smoker   • Smokeless tobacco: Never Used   Substance and Sexual Activity   • Alcohol use: Yes     Comment: 2 drinks month   • Drug use: No   • Sexual activity: Defer         ALLERGIES  Latex        REVIEW OF SYSTEMS  Review of Systems   Constitutional: Positive for chills and fever.   HENT: Positive for congestion.    Eyes: Negative.    Respiratory: Positive for cough and shortness of breath.    Cardiovascular: Negative for chest pain, palpitations and leg swelling.   Gastrointestinal: Negative.    Genitourinary: Negative.    Musculoskeletal: Negative.    Skin: Negative for rash.   Neurological: Positive for weakness.   Hematological: Does not bruise/bleed easily.        All systems reviewed and negative except for those discussed in HPI.       PHYSICAL EXAM    I have reviewed the triage vital signs and nursing  notes.    ED Triage Vitals [11/10/20 1743]   Temp Heart Rate Resp BP SpO2   100.2 °F (37.9 °C) 90 18 98/69 91 %      Temp src Heart Rate Source Patient Position BP Location FiO2 (%)   Tympanic Monitor Sitting Left arm --       Physical Exam  GENERAL: Alert, cooperative, mild distress  HENT: nares patent, atraumatic, mucous membranes appear moist  EYES: no scleral icterus, PERRL, conjunctiva clear  CV: regular rhythm, regular rate, no obvious rubs murmurs gallops  RESPIRATORY: Lungs CTA bilaterally and without adventitious lung sounds  ABDOMEN: soft, nontender  MUSCULOSKELETAL: no deformity  NEURO: alert x3, moves all extremities, follows commands  SKIN: warm, dry, no mass        LAB RESULTS  Recent Results (from the past 24 hour(s))   Hepatic Function Panel    Collection Time: 11/11/20  8:16 PM    Specimen: Blood   Result Value Ref Range    Total Protein 6.1 6.0 - 8.5 g/dL    Albumin 3.60 3.50 - 5.20 g/dL    ALT (SGPT) 14 1 - 41 U/L    AST (SGOT) 22 1 - 40 U/L    Alkaline Phosphatase 56 39 - 117 U/L    Total Bilirubin 1.2 0.0 - 1.2 mg/dL    Bilirubin, Direct 0.3 0.0 - 0.3 mg/dL    Bilirubin, Indirect 0.9 mg/dL   Creatinine, Serum    Collection Time: 11/11/20  8:16 PM    Specimen: Blood   Result Value Ref Range    Creatinine 1.04 0.76 - 1.27 mg/dL    eGFR Non African Amer 69 >60 mL/min/1.73       Ordered the above labs and independently reviewed the results.        RADIOLOGY  No Radiology Exams Resulted Within Past 24 Hours    I ordered the above noted radiological studies. Reviewed by me and discussed with radiologist.  See dictation for official radiology interpretation.      PROCEDURES    Procedures      MEDICATIONS GIVEN IN ER    6 mg of dexamethasone given IM      PROGRESS, DATA ANALYSIS, CONSULTS, AND MEDICAL DECISION MAKING    All labs have been independently reviewed by me.  All radiology studies have been reviewed by me and discussed with radiologist dictating the report.   EKG's independently viewed and  interpreted by me.  Discussion below represents my analysis of pertinent findings related to patient's condition, differential diagnosis, treatment plan and final disposition.    Working diagnosis will be cancer patient on chemotherapy with COVID-19.  Patient has been 92 to 93% on room air.  Given the full clinical picture is felt that the patient will be better admitted at this time.  We will give the patient 6 mg of dexamethasone prior to admission.  Will hold on antibiotics at this time.    ED Course as of Nov 12 0641   Tue Nov 10, 2020   1829 BP: 98/69 [RC]   1829 Temp: 100.2 °F (37.9 °C) [RC]   1829 Heart Rate: 90 [RC]   1829 Resp: 18 [RC]   1829 SpO2: 91 % [RC]   2057 WBC: 9.36 [RC]   2057 RBC(!): 2.82 [RC]   2057 Hemoglobin(!): 10.2 [RC]   2057 Hematocrit(!): 31.0 [RC]   2057 Platelets: 347 [RC]   2058 Glucose: 97 [RC]   2058 BUN: 12 [RC]   2058 Creatinine: 1.07 [RC]   2058 Sodium: 136 [RC]   2058 Potassium: 3.7 [RC]   2058 CO2: 24.5 [RC]   2058 Anion Gap: 9.5 [RC]   2058 ALT (SGPT): 14 [RC]   2058 AST (SGOT): 20 [RC]   2058 Alkaline Phosphatase: 61 [RC]   2058 Total Bilirubin(!): 1.7 [RC]   2058 D-Dimer, Quant: 0.36 [RC]   2058 Lactate: 1.0 [RC]   2058 C-Reactive Protein(!): 1.51 [RC]   2058 proBNP: 877.7 [RC]   2058 Troponin T: <0.010 [RC]   2254 EKG          EKG time: 1841  Rhythm/Rate: 79/Sinus  P waves and MS: Normal p and pr interval  QRS, axis: normal axis   ST and T waves: no acute st or t wave cgs noted.     Interpreted Contemporaneously by me, independently viewed  -no prior to compare      [RC]   1670 Discussed patient's case with ADRIANA Olivo with [facility].  To place patient in a telemetry bed for further evaluation at this time under Dr. Carney's care.    [RC]      ED Course User Index  [RC] Cotton, Anshul Janna III, PA     Patient was placed in face mask in first look. Patient was wearing facemask when I entered the room and throughout our encounter. I wore full protective equipment  throughout this patient encounter including a face mask, eye shield, gown and gloves. Hand hygiene was performed before donning protective equipment and after removal when leaving the room.    AS OF 06:41 EST VITALS:    BP - 134/56  HR - 50  TEMP - 97.6 °F (36.4 °C) (Oral)  O2 SATS - 95%        DIAGNOSIS  Final diagnoses:   History of lung cancer   On antineoplastic chemotherapy   Fever and chills   Shortness of breath   COVID-19         DISPOSITION  ADMISSION    Discussed treatment plan and reason for admission with pt/family and admitting physician.  Pt/family voiced understanding of the plan for admission for further testing/treatment as needed.                Anshul Villalobos III, PA  11/12/20 0639       Anshul Villalobos III, PA  11/12/20 0641

## 2020-11-12 NOTE — PROGRESS NOTES
[phone]   LOS: 0 days     Name: Mg Gerber Sr.  Age/Sex: 79 y.o. male  :  1940        PCP: Dillon Marsh MD  Chief Complaint   Patient presents with   • Shortness of Breath   • Fever   • Weakness - Generalized      Subjective   He denies any chest pain or palpitations but still having some shortness of breath and a dry cough.  Denies any other new issues or complaints today.  He is very hard of hearing which makes communication somewhat difficult.  General: No Fever or Chills, Cardiac: No Chest Pain or Palpitations, GI: No Nausea, Vomiting, or Diarrhea and Other: No bleeding    amLODIPine, 5 mg, Oral, Daily  calcium carbonate (oyster shell), 500 mg, Oral, Daily  cetirizine, 5 mg, Oral, Daily  dexamethasone, 6 mg, Oral, Daily With Breakfast  donepezil, 10 mg, Oral, Nightly  famotidine, 20 mg, Oral, BID AC  folic acid, 1 mg, Oral, 3 times per day  gabapentin, 400 mg, Oral, 2 times per day  gabapentin, 800 mg, Oral, Nightly  levothyroxine, 112 mcg, Oral, Q AM  memantine, 10 mg, Oral, Q12H  OXcarbazepine, 300 mg, Oral, Nightly  remdesivir, 100 mg, Intravenous, Q24H  rivaroxaban, 20 mg, Oral, Daily With Dinner  sertraline, 25 mg, Oral, Daily  sodium chloride, 10 mL, Intravenous, Q12H  tamsulosin, 0.4 mg, Oral, Daily  tiotropium bromide monohydrate, 2 puff, Inhalation, Daily - RT      Pharmacy Consult - Remdesivir,   sodium chloride, 100 mL/hr, Last Rate: 100 mL/hr (20 0850)        Objective   Vital Signs  Temp:  [96.4 °F (35.8 °C)-97.6 °F (36.4 °C)] 96.4 °F (35.8 °C)  Heart Rate:  [50-55] 53  Resp:  [18] 18  BP: (103-134)/(41-59) 130/59  Body mass index is 31.99 kg/m².    Intake/Output Summary (Last 24 hours) at 2020 1312  Last data filed at 2020 0643  Gross per 24 hour   Intake 1201 ml   Output 600 ml   Net 601 ml       Physical Exam  Vitals signs and nursing note reviewed.   Constitutional:       Appearance: Normal appearance.   HENT:      Head: Normocephalic and atraumatic.    Neck:      Musculoskeletal: Normal range of motion and neck supple.   Cardiovascular:      Rate and Rhythm: Normal rate and regular rhythm.   Pulmonary:      Effort: Pulmonary effort is normal.      Breath sounds: Normal breath sounds.   Neurological:      General: No focal deficit present.      Mental Status: He is alert and oriented to person, place, and time.           Results Review:       I reviewed the patient's new clinical results.  Results from last 7 days   Lab Units 11/12/20  0708 11/11/20  0618 11/10/20  1836   WBC 10*3/mm3 10.69 7.20 9.36   HEMOGLOBIN g/dL 8.8* 9.7* 10.2*   PLATELETS 10*3/mm3 301 316 347     Results from last 7 days   Lab Units 11/12/20  0708 11/11/20  2016 11/10/20  1836   SODIUM mmol/L 139  --  136   POTASSIUM mmol/L 4.4  --  3.7   CHLORIDE mmol/L 105  --  102   CO2 mmol/L 24.6  --  24.5   BUN mg/dL 21  --  12   CREATININE mg/dL 1.09 1.04 1.07   CALCIUM mg/dL 8.3*  --  9.3   Estimated Creatinine Clearance: 70.3 mL/min (by C-G formula based on SCr of 1.09 mg/dL).      Assessment/Plan   Active Hospital Problems    Diagnosis  POA   • **Dyspnea [R06.00]  Unknown   • COVID-19 virus detected [U07.1]  Unknown   • Generalized weakness [R53.1]  Unknown   • History of lung cancer [Z85.118]  Not Applicable   • Renal cell carcinoma (CMS/HCC) [C64.9]  Yes   • Hypertension [I10]  Yes      Resolved Hospital Problems   No resolved problems to display.       PLAN  This is an 79-year-old gentleman with a history of metastatic renal cell carcinoma that presents to the hospital with shortness of breath and weakness and is found to have an infection with the novel coronavirus as well as probable progression of his malignancy.  -Appreciate pulmonary and oncology's assistance  -Started on Remdesivir and Decadron yesterday  -Only needing 2 L nasal cannula oxygen for now continue to work to titrate  -Plan to observe here in the hospital at least another 48 hours before considering discharge.  -Based on  symptom onset he is about day 4 or 5 of his illness he has significant comorbidities and would be at risk for progression to more serious disease.  -He has every 3-month imaging done at [facility].  His liver function tests are stable at this point I think he can follow-up for the abnormal liver imaging with his physicians at [facility].  No further work-up indicated here in the hospital.  Will make sure he goes with imaging from here.    Well over 30 minutes was spent at the bedside addressing and evaluating his questions concerns and explanation of treatment plan.  Disposition  To be determined but early as Saturday      Oh Quintana MD  [facility]  11/12/20  13:12 EST

## 2020-11-12 NOTE — DISCHARGE PLACEMENT REQUEST
"Storm Koch Sr. (79 y.o. Male)     Date of Birth Social Security Number Address Home Phone MRN    1940  [address] [place] [id number] [phone] [id number]    Scientologist Marital Status          Rastafari        Admission Date Admission Type Admitting Provider Attending Provider Department, Room/Bed    11/10/20 Emergency Rigo Carney MD Richards, Stephen J, MD [address], S513/1    Discharge Date Discharge Disposition Discharge Destination                       Attending Provider: Oh Quintana MD    Allergies: Latex    Isolation: Enh Drop/Con   Infection: COVID (confirmed) (11/10/20)   Code Status: CPR    Ht: 185.4 cm (73\")   Wt: 110 kg (242 lb 8.1 oz)    Admission Cmt: None   Principal Problem: Dyspnea [R06.00]                 Active Insurance as of 11/10/2020     Primary Coverage     Payor Plan Insurance Group Employer/Plan Group    HUMANA MEDICARE REPLACEMENT HUMANA MEDICARE REPLACEMENT [id number]     Payor Plan Address Payor Plan Phone Number Payor Plan Fax Number Effective Dates    [address] [address] [phone]  1/1/2013 - None Entered    [facility] [id number]       Subscriber Name Subscriber Birth Date Member ID       STORM KOCH SR. 1940 [id number]                 Emergency Contacts      (Rel.) Home Phone Work Phone Mobile Phone    Lucina Koch (Spouse) [phone] -- [phone]    Storm Koch (Son) [phone] -- --    Romina Koch (Daughter) [phone] -- --              "

## 2020-11-12 NOTE — PROGRESS NOTES
Continued Stay Note  [facility]     Patient Name: Mg Gerber Sr.  MRN: [id number]  Today's Date: 11/12/2020    Admit Date: 11/10/2020    Discharge Plan     Row Name 11/12/20 1015       Plan    Plan  Plan is to return home with his wfamily upon DC.  Referral to [facility] in case pt decides he wants it. [facility] is following in case O2 needed.    Plan Comments  CCP spoke with pt by phone, recommended HH for PT and nursing.  He states he will think about it, but gave permission for CCP to made a referral.  He says his wife has had HH in the past and she would know the name of the agency. Per pt's request, call placed to pt's wife Lucina ([phone]).  She states she has used [facility].  She feels HH would be a good idea, but states pt would have to be agreeable.  Referral called to [facility].   Per pt consent, referral also made to [facility]/ [facility] in case home O2 is needed.        Discharge Codes    No documentation.             Tiffany Swann RN

## 2020-11-12 NOTE — PLAN OF CARE
Problem: Adult Inpatient Plan of Care  Goal: Plan of Care Review  Outcome: Ongoing, Progressing  Flowsheets  Taken 11/12/2020 1736 by Kendra Hinojosa RN  Plan of Care Reviewed With: patient  Outcome Summary: Patient has been A&O x4 on room air while awake and 1-2 L when asleep. IV Remdesivir given for 2nd dose. PRN pain medication given per MAR. No complaints noted. Will ctm  Taken 11/12/2020 0402 by Violet Silva RN  Progress: improving  Goal: Patient-Specific Goal (Individualized)  Outcome: Ongoing, Progressing  Goal: Absence of Hospital-Acquired Illness or Injury  Outcome: Ongoing, Progressing  Intervention: Identify and Manage Fall Risk  Description: Perform standard risk assessment with a validated tool or comprehensive approach appropriate to the patient on admission; reassess fall risk frequently, with change in status or transfer to another level of care.  Communicate fall injury risk to interprofessional healthcare team.  Determine need for increased observation, equipment and environmental modification, such as low bed and signage, as well as supportive, nonskid footwear.  Adjust safety measures to individual developmental age, stage and identified risk factors.  Reinforce the importance of safety and physical activity with patient and family.  Perform regular intentional rounding to assess need for position change, pain assessment, personal needs, including assistance with toileting.  Recent Flowsheet Documentation  Taken 11/12/2020 1506 by Kendra Hinojosa, RN  Safety Promotion/Fall Prevention:   safety round/check completed   room organization consistent  Taken 11/12/2020 0849 by Kendra Hinojosa, RN  Safety Promotion/Fall Prevention:   activity supervised   lighting adjusted   nonskid shoes/slippers when out of bed   room organization consistent   safety round/check completed  Intervention: Prevent Skin Injury  Description: Assess skin risk on admission and at regular intervals throughout  hospital stay.  Keep all areas of skin (especially folds) clean and dry.  Maintain adequate skin hydration.  Relieve and redistribute pressure and protect bony prominences; implement measures based on patient-specific risk factors.  Match turning and repositioning schedule to clinical condition.  Encourage weight shift frequently; assist with reposition if unable to complete independently.  Float heels off bed. Avoid pressure on the Achilles tendon.  Keep skin free from extended contact with medical devices.  Use aids (e.g., slide boards, mechanical lift) during transfer.  Recent Flowsheet Documentation  Taken 11/12/2020 1506 by Kendra Hinojosa RN  Body Position: sitting up in bed  Taken 11/12/2020 0849 by Kendra Hinojosa RN  Body Position:   position changed independently   sitting up in bed  Intervention: Prevent and Manage VTE (venous thromboembolism) Risk  Description: Assess for VTE risk.  Encourage/assist with early ambulation.  Initiate and maintain compression or other therapy, as indicated based on identified risk in accordance with organizational protocol/provider order.  Encourage both active and passive leg exercises while in bed, if unable to ambulate.  Recent Flowsheet Documentation  Taken 11/12/2020 1506 by Kendra Hinojosa RN  VTE Prevention/Management:   bilateral   dorsiflexion/plantar flexion performed  Taken 11/12/2020 0849 by Kendra Hinojosa RN  VTE Prevention/Management:   bilateral   dorsiflexion/plantar flexion performed  Intervention: Prevent Infection  Description: Maintain skin and mucous membrane integrity; promote hand, oral and pulmonary hygiene.  Optimize fluid balance, nutrition, sleep and glycemic control to maximize infection resistance.  Identify potential sources of infection early to prevent or mitigate progression of infection (e.g., wound, lines, devices).  Evaluate ongoing need for invasive devices; remove promptly when no longer indicated.  Recent Flowsheet  Documentation  Taken 11/12/2020 1600 by Kendra Hinojosa RN  Infection Prevention:   cohorting utilized   environmental surveillance performed   equipment surfaces disinfected   hand hygiene promoted   personal protective equipment utilized   rest/sleep promoted   single patient room provided   visitors restricted/screened  Taken 11/12/2020 1506 by Kendra Hinojosa RN  Infection Prevention:   cohorting utilized   environmental surveillance performed   equipment surfaces disinfected   hand hygiene promoted   personal protective equipment utilized   rest/sleep promoted   single patient room provided   visitors restricted/screened  Taken 11/12/2020 1400 by Kendra Hinojosa RN  Infection Prevention:   cohorting utilized   environmental surveillance performed   equipment surfaces disinfected   hand hygiene promoted   personal protective equipment utilized   rest/sleep promoted   single patient room provided   visitors restricted/screened  Taken 11/12/2020 1200 by Kendra Hinojosa RN  Infection Prevention:   cohorting utilized   environmental surveillance performed   equipment surfaces disinfected   hand hygiene promoted   personal protective equipment utilized   rest/sleep promoted   single patient room provided   visitors restricted/screened  Taken 11/12/2020 1000 by Kendra Hinojosa RN  Infection Prevention:   cohorting utilized   environmental surveillance performed   equipment surfaces disinfected   hand hygiene promoted   personal protective equipment utilized   rest/sleep promoted   single patient room provided   visitors restricted/screened  Taken 11/12/2020 0849 by Kendra Hinojosa RN  Infection Prevention:   cohorting utilized   environmental surveillance performed   equipment surfaces disinfected   hand hygiene promoted   personal protective equipment utilized   rest/sleep promoted   single patient room provided   visitors restricted/screened  Taken 11/12/2020 0800 by Kendra Hinojosa  RN  Infection Prevention:   cohorting utilized   environmental surveillance performed   equipment surfaces disinfected   hand hygiene promoted   personal protective equipment utilized   rest/sleep promoted   single patient room provided   visitors restricted/screened  Goal: Optimal Comfort and Wellbeing  Outcome: Ongoing, Progressing  Intervention: Provide Person-Centered Care  Description: Use a family-focused approach to care.  Develop trust and rapport by proactively providing information, encouraging questions, addressing concerns and offering reassurance.  Acknowledge emotional response to hospitalization.  Recognize and utilize personal coping strategies.  Honor spiritual and cultural preferences.  Recent Flowsheet Documentation  Taken 11/12/2020 1506 by Kendra Hinojosa RN  Trust Relationship/Rapport:   care explained   choices provided   emotional support provided   empathic listening provided   questions answered   questions encouraged   reassurance provided   thoughts/feelings acknowledged  Taken 11/12/2020 0849 by Kendra Hinojosa RN  Trust Relationship/Rapport:   care explained   choices provided   emotional support provided   empathic listening provided   questions answered   questions encouraged   reassurance provided   thoughts/feelings acknowledged  Goal: Readiness for Transition of Care  Outcome: Ongoing, Progressing     Problem: Anxiety  Goal: Anxiety Reduction or Resolution  Outcome: Ongoing, Progressing  Intervention: Promote Anxiety Reduction  Description: Maintain a calm and reassuring environment; minimize noise; provide familiar items; cluster care; offer choices.  Encourage support system presence and participation.  Support expression and identification of feelings and worries; normalize and validate concerns.  Utilize existing coping strategies and assist in developing new strategies (e.g., music, deep breathing, relaxation techniques, massage, meditation or pet therapy).  Identify  thoughts and feelings that led to anxiety onset to enhance understanding of triggers.  Reframe anxiety-provoking situations; provide a new perspective; engage in problem-solving.  Utilize anticipatory guidance to enhance sense of control.  Consider referral for a comprehensive assessment if there are concerns about the number, severity and duration of symptoms; degree of distress; functional impairment or excessive substance use.  Recent Flowsheet Documentation  Taken 11/12/2020 0849 by Kendra Hinojosa RN  Supportive Measures:   active listening utilized   self-care encouraged  Family/Support System Care:   self-care encouraged   support provided     Problem: Infection  Goal: Infection Symptom Resolution  Outcome: Ongoing, Progressing  Intervention: Prevent or Manage Infection  Description: Implement transmission-based precautions and isolation, as indicated, to prevent spread of infection.  Obtain cultures prior to initiating antimicrobial therapy. Do not delay treatment for laboratory results in the presence of high suspicion.  Administer ordered antimicrobial therapy promptly; reassess need regularly.  Identify and manage signs of early sepsis.  Provide fever-reduction and comfort measures.  Recent Flowsheet Documentation  Taken 11/12/2020 1600 by Kendra Hinojosa RN  Isolation Precautions:   contact precautions maintained   droplet precautions maintained  Taken 11/12/2020 1506 by Kendra Hinojosa RN  Isolation Precautions:   contact precautions maintained   droplet precautions maintained  Taken 11/12/2020 1400 by Kendra Hinojosa RN  Isolation Precautions:   contact precautions maintained   droplet precautions maintained  Taken 11/12/2020 1200 by Kendra Hinojosa RN  Isolation Precautions:   contact precautions maintained   droplet precautions maintained  Taken 11/12/2020 1000 by Kendra Hinojosa RN  Isolation Precautions:   contact precautions maintained   droplet precautions  maintained  Taken 11/12/2020 0849 by Kendra Hinojosa RN  Isolation Precautions:   contact precautions maintained   droplet precautions maintained  Taken 11/12/2020 0800 by Kendra Hinojosa RN  Isolation Precautions:   contact precautions maintained   droplet precautions maintained     Problem: Breathing Pattern Ineffective  Goal: Effective Breathing Pattern  Outcome: Ongoing, Progressing  Intervention: Promote Improved Breathing Pattern  Description: Maintain optimal position to relieve discomfort, breathlessness and ventilation/perfusion mismatch.  Evaluate psychosocial factors that may contribute to the anxiety-dyspnea cycle.  Acknowledge, normalize and validate intensity and complexity of patient and support system response to fear, anxiety and patient’s breathlessness.  Utilize nonpharmacologic measures such as controlled breathing, relaxation techniques in addition to medication to reduce pain and anxiety.  Promote mobility/ambulation; match patient’s ability and tolerance.  Encourage secretion clearance to promote lung expansion, prevent atelectasis and remove mucus (e.g., controlled cough, incentive spirometry, positive airway pressure, positive expiratory pressure therapy, suction).  Use oxygen therapy to maintain prescribed SpO2 level.  Consider noninvasive mechanical ventilation for additional respiratory support.  Recent Flowsheet Documentation  Taken 11/12/2020 1506 by Kendra Hinojosa RN  Head of Bed (HOB): HOB elevated  Taken 11/12/2020 0849 by Kendra Hinojosa RN  Supportive Measures:   active listening utilized   self-care encouraged  Head of Bed (HOB): HOB elevated     Problem: Skin Injury Risk Increased  Goal: Skin Health and Integrity  Outcome: Ongoing, Progressing  Intervention: Optimize Skin Protection  Description: Reassess skin injury risk and inspect skin frequently (e.g., scheduled interval, with turning, with change in condition) to provide optimal prevention.  Maintain  adequate tissue perfusion (e.g., encourage fluid balance, avoid crossing legs, constrictive clothing or devices) to promote tissue oxygenation.  Maintain head of bed at lowest degree of elevation tolerated, considering medical condition and other restrictions. Limit amount of time head of bed is elevated greater than 30 degrees to prevent friction/shear injury.  Avoid positioning onto an area that remains reddened.  Minimize incontinence and moisture (e.g., toileting schedule; moisture-wicking pad, diaper or incontinence collection device, skin moisture barrier).  Cleanse skin promptly and gently when soiled utilizing a pH-balanced cleanser.  Relieve and redistribute pressure (e.g., schedule position changes; utilize higher specification foam mattress, chair cushion, constant low-pressure or alternating-pressure support surface; medical device repositioning; protective dressing applicatio  Support increased progressive functional activity (e.g., therapeutic exercise) to decrease risk associated with immobility. Balance rest with activity.  Recent Flowsheet Documentation  Taken 11/12/2020 1506 by Kendra Hinojosa, RN  Pressure Reduction Techniques: frequent weight shift encouraged  Head of Bed (HOB): HOB elevated  Skin Protection:   adhesive use limited   tubing/devices free from skin contact  Taken 11/12/2020 0852 by Kendra Hinojosa, RN  Head of Bed (HOB): HOB elevated   Goal Outcome Evaluation:  Plan of Care Reviewed With: patient  Progress: improving  Outcome Summary: Patient has been A&O x4 on room air while awake and 1-2 L when asleep. IV Remdesivir given for 2nd dose. PRN pain medication given per MAR. No complaints noted. Will ctm

## 2020-11-12 NOTE — PLAN OF CARE
Goal Outcome Evaluation:  Plan of Care Reviewed With: patient  Progress: improving  Outcome Summary: AOx4, VSS on room air while awake. Placed on 1 LPM O2 via nasal cannula overnight d/t O2 saturation 88-89% while appearing to sleep. First dose of IV Remdesivir admin per mar. No s/s of distress observed. AM labs pending. Will cont to monitor.

## 2020-11-12 NOTE — PROGRESS NOTES
Chart check:    ASSESSMENT/PLAN:  Mg Gerber Sr. S513/1     *Metastatic renal cell carcinoma found on biopsy of enlarging mediastinal node 2016.  Nivolumab every 2 weeks since then.  Followed with PET scans based on contrast allergy.  Last seen by Dr. Elmer Thomas, pulmonary, 11/5/2020 in the office.  CT angiogram chest 11/10/2020, from 2015: Left hilar mass 4.4 x 4.1 cm, compared to 3 cm in 2015.  Previously seen paraspinal masses have decreased in size.  I told him it is difficult to know the status of his cancer since we did not know the [facility] scans to compare to.  I told him to make sure Dr. Montes De Oca knows he had a CT at [facility] during this admission when he recovers from COVID-19 and is ready to resume Opdivo.     *COVID-19 positive.     *Anemia  Hb 8.8 from 9.7 yesterday (can be anemia of acute inflammation)     *Macrocytosis  .9    Plan  · Hold Opdivo while recovering from COVID-19  · Needs an appointment with Dr. Montes De Oca or NP with possible opdivo in about 3 -4weeks.  · I have asked our office to make sure he has this appointment.  I have called Dr. Montes De Oca's office and left a message letting him know he needs a follow-up appointment and that he had a CT here  · Transfuse if needed     · I do not think that much more than add.  I would just hold his Opdivo until he completely recovers from COVID-19.  Upon discharge he should follow-up with his usual outpatient oncologist, Dr. Montes De Oca at the [facility]  · I will sign off.  · He does not need any follow-up in our office.

## 2020-11-13 ENCOUNTER — TELEPHONE (OUTPATIENT)
Dept: ONCOLOGY | Facility: CLINIC | Age: 80
End: 2020-11-13

## 2020-11-13 LAB
ALBUMIN SERPL-MCNC: 3.4 G/DL (ref 3.5–5.2)
ALP SERPL-CCNC: 49 U/L (ref 39–117)
ALT SERPL W P-5'-P-CCNC: 13 U/L (ref 1–41)
ANION GAP SERPL CALCULATED.3IONS-SCNC: 4.3 MMOL/L (ref 5–15)
AST SERPL-CCNC: 23 U/L (ref 1–40)
BILIRUB CONJ SERPL-MCNC: 0.3 MG/DL (ref 0–0.3)
BILIRUB INDIRECT SERPL-MCNC: 0.7 MG/DL
BILIRUB SERPL-MCNC: 1 MG/DL (ref 0–1.2)
BUN SERPL-MCNC: 25 MG/DL (ref 8–23)
BUN/CREAT SERPL: 24 (ref 7–25)
CALCIUM SPEC-SCNC: 8.7 MG/DL (ref 8.6–10.5)
CHLORIDE SERPL-SCNC: 105 MMOL/L (ref 98–107)
CO2 SERPL-SCNC: 26.7 MMOL/L (ref 22–29)
CREAT SERPL-MCNC: 1.04 MG/DL (ref 0.76–1.27)
DEPRECATED RDW RBC AUTO: 40.9 FL (ref 37–54)
ERYTHROCYTE [DISTWIDTH] IN BLOOD BY AUTOMATED COUNT: 10.2 % (ref 12.3–15.4)
GFR SERPL CREATININE-BSD FRML MDRD: 69 ML/MIN/1.73
GLUCOSE SERPL-MCNC: 110 MG/DL (ref 65–99)
HCT VFR BLD AUTO: 28.7 % (ref 37.5–51)
HGB BLD-MCNC: 9 G/DL (ref 13–17.7)
MAGNESIUM SERPL-MCNC: 2.1 MG/DL (ref 1.6–2.4)
MCH RBC QN AUTO: 34.7 PG (ref 26.6–33)
MCHC RBC AUTO-ENTMCNC: 31.4 G/DL (ref 31.5–35.7)
MCV RBC AUTO: 110.8 FL (ref 79–97)
PHOSPHATE SERPL-MCNC: 3.2 MG/DL (ref 2.5–4.5)
PLATELET # BLD AUTO: 337 10*3/MM3 (ref 140–450)
PMV BLD AUTO: 10.4 FL (ref 6–12)
POTASSIUM SERPL-SCNC: 4.4 MMOL/L (ref 3.5–5.2)
PROT SERPL-MCNC: 5.8 G/DL (ref 6–8.5)
RBC # BLD AUTO: 2.59 10*6/MM3 (ref 4.14–5.8)
SODIUM SERPL-SCNC: 136 MMOL/L (ref 136–145)
URATE SERPL-MCNC: 4.6 MG/DL (ref 3.4–7)
WBC # BLD AUTO: 11.02 10*3/MM3 (ref 3.4–10.8)

## 2020-11-13 PROCEDURE — 63710000001 DEXAMETHASONE PER 0.25 MG: Performed by: HOSPITALIST

## 2020-11-13 PROCEDURE — 97116 GAIT TRAINING THERAPY: CPT

## 2020-11-13 PROCEDURE — 80076 HEPATIC FUNCTION PANEL: CPT | Performed by: INTERNAL MEDICINE

## 2020-11-13 PROCEDURE — 97110 THERAPEUTIC EXERCISES: CPT

## 2020-11-13 PROCEDURE — 80048 BASIC METABOLIC PNL TOTAL CA: CPT | Performed by: HOSPITALIST

## 2020-11-13 PROCEDURE — 94799 UNLISTED PULMONARY SVC/PX: CPT

## 2020-11-13 PROCEDURE — 94760 N-INVAS EAR/PLS OXIMETRY 1: CPT

## 2020-11-13 PROCEDURE — 85027 COMPLETE CBC AUTOMATED: CPT | Performed by: HOSPITALIST

## 2020-11-13 PROCEDURE — 84550 ASSAY OF BLOOD/URIC ACID: CPT | Performed by: HOSPITALIST

## 2020-11-13 PROCEDURE — 84100 ASSAY OF PHOSPHORUS: CPT | Performed by: HOSPITALIST

## 2020-11-13 PROCEDURE — 83735 ASSAY OF MAGNESIUM: CPT | Performed by: HOSPITALIST

## 2020-11-13 PROCEDURE — 97530 THERAPEUTIC ACTIVITIES: CPT

## 2020-11-13 RX ADMIN — FAMOTIDINE 20 MG: 20 TABLET, FILM COATED ORAL at 16:38

## 2020-11-13 RX ADMIN — SODIUM CHLORIDE 100 ML/HR: 9 INJECTION, SOLUTION INTRAVENOUS at 09:23

## 2020-11-13 RX ADMIN — GABAPENTIN 400 MG: 400 CAPSULE ORAL at 08:56

## 2020-11-13 RX ADMIN — HYDROCODONE BITARTRATE AND ACETAMINOPHEN 1 TABLET: 5; 325 TABLET ORAL at 20:26

## 2020-11-13 RX ADMIN — FOLIC ACID 1 MG: 1 TABLET ORAL at 11:33

## 2020-11-13 RX ADMIN — CETIRIZINE HYDROCHLORIDE 5 MG: 10 TABLET ORAL at 08:57

## 2020-11-13 RX ADMIN — TIOTROPIUM BROMIDE INHALATION SPRAY 2 PUFF: 3.12 SPRAY, METERED RESPIRATORY (INHALATION) at 09:32

## 2020-11-13 RX ADMIN — LEVOTHYROXINE SODIUM 112 MCG: 112 TABLET ORAL at 06:34

## 2020-11-13 RX ADMIN — CALCIUM 500 MG: 500 TABLET ORAL at 08:57

## 2020-11-13 RX ADMIN — SERTRALINE 25 MG: 25 TABLET, FILM COATED ORAL at 08:57

## 2020-11-13 RX ADMIN — HYDROCODONE BITARTRATE AND ACETAMINOPHEN 1 TABLET: 5; 325 TABLET ORAL at 06:35

## 2020-11-13 RX ADMIN — FAMOTIDINE 20 MG: 20 TABLET, FILM COATED ORAL at 06:35

## 2020-11-13 RX ADMIN — MEMANTINE HYDROCHLORIDE 10 MG: 10 TABLET, FILM COATED ORAL at 20:26

## 2020-11-13 RX ADMIN — REMDESIVIR 100 MG: 100 INJECTION, POWDER, LYOPHILIZED, FOR SOLUTION INTRAVENOUS at 20:26

## 2020-11-13 RX ADMIN — DONEPEZIL HYDROCHLORIDE 10 MG: 10 TABLET, FILM COATED ORAL at 20:26

## 2020-11-13 RX ADMIN — DOCUSATE SODIUM 100 MG: 100 CAPSULE, LIQUID FILLED ORAL at 14:21

## 2020-11-13 RX ADMIN — RIVAROXABAN 20 MG: 20 TABLET, FILM COATED ORAL at 16:42

## 2020-11-13 RX ADMIN — GABAPENTIN 800 MG: 400 CAPSULE ORAL at 20:26

## 2020-11-13 RX ADMIN — FOLIC ACID 1 MG: 1 TABLET ORAL at 08:57

## 2020-11-13 RX ADMIN — DEXAMETHASONE 6 MG: 4 TABLET ORAL at 08:57

## 2020-11-13 RX ADMIN — MEMANTINE HYDROCHLORIDE 10 MG: 10 TABLET, FILM COATED ORAL at 08:57

## 2020-11-13 RX ADMIN — FOLIC ACID 1 MG: 1 TABLET ORAL at 20:26

## 2020-11-13 RX ADMIN — OXCARBAZEPINE 300 MG: 300 TABLET, FILM COATED ORAL at 20:37

## 2020-11-13 RX ADMIN — GABAPENTIN 400 MG: 400 CAPSULE ORAL at 11:33

## 2020-11-13 RX ADMIN — SODIUM CHLORIDE, PRESERVATIVE FREE 10 ML: 5 INJECTION INTRAVENOUS at 08:57

## 2020-11-13 RX ADMIN — TAMSULOSIN HYDROCHLORIDE 0.4 MG: 0.4 CAPSULE ORAL at 08:57

## 2020-11-13 RX ADMIN — SODIUM CHLORIDE, PRESERVATIVE FREE 10 ML: 5 INJECTION INTRAVENOUS at 20:31

## 2020-11-13 RX ADMIN — HYDROCODONE BITARTRATE AND ACETAMINOPHEN 1 TABLET: 5; 325 TABLET ORAL at 14:21

## 2020-11-13 RX ADMIN — ALPRAZOLAM 0.25 MG: 0.25 TABLET ORAL at 20:26

## 2020-11-13 RX ADMIN — AMLODIPINE BESYLATE 5 MG: 5 TABLET ORAL at 08:57

## 2020-11-13 NOTE — PLAN OF CARE
Problem: Adult Inpatient Plan of Care  Goal: Plan of Care Review  Recent Flowsheet Documentation  Taken 11/13/2020 8298 by Taylor Forman PTA  Progress: improving  Plan of Care Reviewed With: patient  Outcome Summary: Pt tolerated treatment with no complaints. Pt is progressing well with mobility. Pt is SBA with bed mobility and able to sit on EOB independently. Pt is CGA with sit<->stand transfers. Pt ambulated 65ft with HHA, CGA. Pt did require one seated rest break after ambulation. Pt is mildly unsteady but no LOB. Will continue to progress pt as tolerated.   ..Patient was wearing a face mask during this therapy encounter. Therapist used appropriate personal protective equipment including gown, eye protection, mask and gloves.  Mask used was standard procedure mask. Appropriate PPE was worn during the entire therapy session. Hand hygiene was completed before and after therapy session. Patient is in enhanced droplet precautions.

## 2020-11-13 NOTE — PROGRESS NOTES
"  Daily Progress Note.   [address]  11/13/2020    Patient:  Name:  Mg Gerber Sr.  MRN:  [id number]  1940  79 y.o.  male         CC: soa    Interval History:  Follow up covid pna, ahrf copd  Feels that he is doing better  No conversational dyspnea no chest tightness no chest pain.  No hemoptysis.  He actually seems a little more oriented.  In better spirits today    ROS: No fever, no diarrhea, no chest pain  PMFSSH: no change    Physical Exam:  /77 (BP Location: Right arm, Patient Position: Lying)   Pulse 62   Temp 97.7 °F (36.5 °C) (Oral)   Resp 16   Ht 185.4 cm (73\")   Wt 110 kg (242 lb 8.1 oz)   SpO2 93%   BMI 31.99 kg/m²   Body mass index is 31.99 kg/m².    Intake/Output Summary (Last 24 hours) at 11/13/2020 1710  Last data filed at 11/13/2020 1226  Gross per 24 hour   Intake 705 ml   Output 1800 ml   Net -1095 ml     General appearance: NAD, conversant   Eyes: anicteric sclerae, moist conjunctivae; no lid-lag; PERRLA  HENT: Atraumatic; oropharynx clear with moist mucous membranes and no mucosal ulcerations; normal hard and soft palate  Neck: Trachea midline; FROM, supple, no thyromegaly or lymphadenopathy  Lungs: No wheeze good air entry bilateral no rhonchi, with normal respiratory effort and no intercostal retractions  CV: RRR, no MRGs   Abdomen: Soft, non-tender; no masses or HSM obese   extremities: No peripheral edema or extremity lymphadenopathy  Skin: Normal temperature, turgor and texture; no rash, ulcers or subcutaneous nodules  Psych: Agitated affect, alert and oriented to person, place and time     Data Review:  Notable Labs:  Results from last 7 days   Lab Units 11/13/20 0449 11/12/20  0708 11/11/20  0618 11/10/20  1836   WBC 10*3/mm3 11.02* 10.69 7.20 9.36   HEMOGLOBIN g/dL 9.0* 8.8* 9.7* 10.2*   PLATELETS 10*3/mm3 337 301 316 347     Results from last 7 days   Lab Units 11/13/20 0449 11/12/20  0708 11/11/20  2016 11/10/20  1836   SODIUM mmol/L 136 " 139  --  136   POTASSIUM mmol/L 4.4 4.4  --  3.7   CHLORIDE mmol/L 105 105  --  102   CO2 mmol/L 26.7 24.6  --  24.5   BUN mg/dL 25* 21  --  12   CREATININE mg/dL 1.04 1.09 1.04 1.07   GLUCOSE mg/dL 110* 115*  --  97   CALCIUM mg/dL 8.7 8.3*  --  9.3   MAGNESIUM mg/dL 2.1  --   --   --    PHOSPHORUS mg/dL 3.2  --   --   --    Estimated Creatinine Clearance: 73.6 mL/min (by C-G formula based on SCr of 1.04 mg/dL).    Results from last 7 days   Lab Units 11/13/20  0449 11/12/20  0708 11/11/20  2016 11/11/20  0618 11/10/20  1836   AST (SGOT) U/L 23 21 22  --  20   ALT (SGPT) U/L 13 14 14  --  14   PROCALCITONIN ng/mL  --   --   --   --  0.19   LACTATE mmol/L  --   --   --   --  1.0   D DIMER QUANT MCGFEU/mL  --   --   --   --  0.36   CRP mg/dL  --   --   --   --  1.51*   PLATELETS 10*3/mm3 337 301  --  316 347             Imaging:  Reviewed chest images personally from past 3 days    Scheduled meds:    amLODIPine, 5 mg, Oral, Daily  calcium carbonate (oyster shell), 500 mg, Oral, Daily  cetirizine, 5 mg, Oral, Daily  dexamethasone, 6 mg, Oral, Daily With Breakfast  donepezil, 10 mg, Oral, Nightly  famotidine, 20 mg, Oral, BID AC  folic acid, 1 mg, Oral, 3 times per day  gabapentin, 400 mg, Oral, 2 times per day  gabapentin, 800 mg, Oral, Nightly  levothyroxine, 112 mcg, Oral, Q AM  memantine, 10 mg, Oral, Q12H  OXcarbazepine, 300 mg, Oral, Nightly  remdesivir, 100 mg, Intravenous, Q24H  rivaroxaban, 20 mg, Oral, Daily With Dinner  sertraline, 25 mg, Oral, Daily  sodium chloride, 10 mL, Intravenous, Q12H  tamsulosin, 0.4 mg, Oral, Daily  tiotropium bromide monohydrate, 2 puff, Inhalation, Daily - RT        ASSESSMENT  /  PLAN:  COPD without acute exacerbation  COVID  Acute hypoxic respiratory failure  Medistinal Metastasis from [facility] follows with [facility] monitored closely with frequent pet and ct scans.  Relatively stable over more recent years.  Obesity  Anemia  Hemochromatosis  Hypertension  Low back  pain  Mild cognitive impairment  Hiatal hernia  Osteoarthritis    Decadron 6mg daily day 3  remdesivir 100mg today monitor lft and renal function day 3  Wean oxygen as able  No ae of copd  He has many questions about home oxygen supplies hoses for his nebulizer we can get this I will arrange for him to go home with.    Elmer Cevallos MD  [facility]  11/13/20  5:39 PM

## 2020-11-13 NOTE — THERAPY TREATMENT NOTE
Patient Name: Mg Gerber Sr.  : 1940    MRN: [id number]                              Today's Date: 2020       Admit Date: 11/10/2020    Visit Dx:     ICD-10-CM ICD-9-CM   1. History of lung cancer  Z85.118 V10.11   2. On antineoplastic chemotherapy  Z79.899 V58.69   3. Fever and chills  R50.9 780.60   4. Shortness of breath  R06.02 786.05   5. COVID-19  U07.1 079.89     Patient Active Problem List   Diagnosis   • Anemia, vitamin B12 deficiency   • Arthritis   • Hemochromatosis   • Herpes zoster   • Hip pain   • Hypertension   • Cancer (CMS/HCC)   • COPD (chronic obstructive pulmonary disease) (CMS/HCC)   • Left low back pain   • Dizziness   • Headache around the eyes   • Mild cognitive impairment   • Renal cell carcinoma (CMS/HCC)   • Anxiety   • Malignant neoplasm of lung (CMS/HCC)   • Neuropathy   • Perennial allergic rhinitis   • Vitamin B12 deficiency   • Osteoarthritis of lumbar spine   • Pharyngitis   • Bronchitis   • Other constipation   • Hx of hiatal hernia   • History of lung cancer   • COVID-19 virus detected   • Dyspnea   • Generalized weakness     Past Medical History:   Diagnosis Date   • Allergic    • Anemia, vitamin B12 deficiency 2012   • Anxiety    • Arthritis 2014   • Atrial fibrillation (CMS/HCC)    • Cancer (CMS/HCC) 2014    kidney; prostate   • Cataract    • Chemotherapeutic agent or infusion extravasation     q 1-2 wks    • COPD (chronic obstructive pulmonary disease) (CMS/HCC) 2014   • Emphysema of lung (CMS/HCC)    • Hemochromatosis 2014   • Herpes zoster 2011    left medial thigh   • Hip pain 2014   • History of Doppler ultrasound 2011    superficial and deep venous insuffiency   • History of EKG 02/10/2012    Dr. Kathi Lloyd; unchanged from prior EKG   • Hypertension     benign essential   • Injury of back    • Pulmonary embolism (CMS/HCC) 2014   • Renal cell cancer (CMS/HCC)    • Shortness of breath    • Sleep  apnea      Past Surgical History:   Procedure Laterality Date   • APPENDECTOMY     • CATARACT EXTRACTION     • CHOLECYSTECTOMY     • SPLENECTOMY     • TONSILLECTOMY     • VEIN SURGERY       General Information     [facility] Name 11/13/20 1635          Physical Therapy Time and Intention    Document Type  therapy note (daily note)  -     Mode of Treatment  individual therapy;physical therapy  -[facility] Name 11/13/20 1635          General Information    Existing Precautions/Restrictions  fall  -[facility] Name 11/13/20 1635          Cognition    Orientation Status (Cognition)  oriented x 4  -[facility] Name 11/13/20 1635          Safety Issues, Functional Mobility    Impairments Affecting Function (Mobility)  endurance/activity tolerance;strength;balance  -       User Key  (r) = Recorded By, (t) = Taken By, (c) = Cosigned By    Initials Name Provider Type     Taylor Forman PTA Physical Therapy Assistant        Mobility     [facility] Name 11/13/20 1636          Bed Mobility    Supine-Sit [place] (Bed Mobility)  verbal cues;standby assist  -     Sit-Supine [place] (Bed Mobility)  verbal cues;standby assist  -     Assistive Device (Bed Mobility)  bed rails;head of bed elevated  -[facility] Name 11/13/20 1636          Sit-Stand Transfer    Sit-Stand [place] (Transfers)  contact guard  -     Assistive Device (Sit-Stand Transfers)  -- No AD, HHA  -[facility] Name 11/13/20 1636          Gait/Stairs (Locomotion)    [place] Level (Gait)  contact guard  -     Assistive Device (Gait)  -- HHA  -     Distance in Feet (Gait)  65 with 1 seated rest break after 40ft.  -     Deviations/Abnormal Patterns (Gait)  stride length decreased;festinating/shuffling;samara decreased;gait speed decreased  -     Bilateral Gait Deviations  forward flexed posture  -     Comment (Gait/Stairs)  pt mildly unsteady but no LOB. Pt normally ambulates with cane at home.  -       User Key  (r) = Recorded By, (t) = Taken  By, (c) = Cosigned By    Initials Name Provider Type     Taylor Forman PTA Physical Therapy Assistant        Obj/Interventions     Row Name 11/13/20 1637          Balance    Balance Assessment  sitting static balance  -     Static Sitting Balance  WNL  -       User Key  (r) = Recorded By, (t) = Taken By, (c) = Cosigned By    Initials Name Provider Type     Taylor Forman PTA Physical Therapy Assistant        Goals/Plan    No documentation.       Clinical Impression     Row Name 11/13/20 1638          Plan of Care Review    Plan of Care Reviewed With  patient  -     Progress  improving  -     Outcome Summary  Pt tolerated treatment with no complaints. Pt is progressing well with mobility. Pt is SBA with bed mobility and able to sit on EOB independently. Pt is CGA with sit<->stand transfers. Pt ambulated 65ft with HHA, CGA. Pt did require one seated rest break after ambulation. Pt is mildly unsteady but no LOB. Will continue to progress pt as tolerated.  -[facility] Name 11/13/20 1638          Vital Signs    O2 Delivery Pre Treatment  room air  -     O2 Delivery Intra Treatment  room air  -     O2 Delivery Post Treatment  room air  -[facility] Name 11/13/20 1638          Positioning and Restraints    Pre-Treatment Position  in bed  -EH     Post Treatment Position  bed  -     In Bed  supine;call light within reach;encouraged to call for assist;exit alarm on  -       User Key  (r) = Recorded By, (t) = Taken By, (c) = Cosigned By    Initials Name Provider Type     Taylor Forman PTA Physical Therapy Assistant        Outcome Measures     Row Name 11/13/20 1639          How much help from another person do you currently need...    Turning from your back to your side while in flat bed without using bedrails?  4  -EH     Moving from lying on back to sitting on the side of a flat bed without bedrails?  4  -EH     Moving to and from a bed to a chair (including a wheelchair)?  3  -EH     Standing up from  a chair using your arms (e.g., wheelchair, bedside chair)?  3  -EH     Climbing 3-5 steps with a railing?  2  -EH     To walk in hospital room?  3  -EH     AM-PAC 6 Clicks Score (PT)  19  -       User Key  (r) = Recorded By, (t) = Taken By, (c) = Cosigned By    Initials Name Provider Type     Taylor Forman PTA Physical Therapy Assistant        Physical Therapy Education                 Title: PT OT SLP Therapies (Done)     Topic: Physical Therapy (Done)     Point: Mobility training (Done)     Learning Progress Summary           Patient Acceptance, E, VU by  at 11/13/2020 1639    Acceptance, E,TB,D, VU,NR by  at 11/11/2020 1544                   Point: Home exercise program (Done)     Learning Progress Summary           Patient Acceptance, E, VU by  at 11/13/2020 1639    Acceptance, E,TB,D, VU,NR by  at 11/11/2020 1544                               User Key     Initials Effective Dates Name Provider Type Discipline     04/03/18 -  Debra Dickinson, PT Physical Therapist PT     08/19/18 -  Taylor Forman PTA Physical Therapy Assistant PT              PT Recommendation and Plan     Plan of Care Reviewed With: patient  Progress: improving  Outcome Summary: Pt tolerated treatment with no complaints. Pt is progressing well with mobility. Pt is SBA with bed mobility and able to sit on EOB independently. Pt is CGA with sit<->stand transfers. Pt ambulated 65ft with HHA, CGA. Pt did require one seated rest break after ambulation. Pt is mildly unsteady but no LOB. Will continue to progress pt as tolerated.     Time Calculation:   PT Charges     Row Name 11/13/20 7307             Time Calculation    Start Time  1334  -      Stop Time  1412  -      Time Calculation (min)  38 min  -      PT Received On  11/13/20  -      PT - Next Appointment  11/14/20  -         Time Calculation- PT    Total Timed Code Minutes- PT  38 minute(s)  -        User Key  (r) = Recorded By, (t) = Taken By, (c) = Cosigned By     Initials Name Provider Type     Taylor Forman PTA Physical Therapy Assistant        Therapy Charges for Today     Code Description Service Date Service Provider Modifiers Qty    95402296106 HC GAIT TRAINING EA 15 MIN 11/13/2020 Taylor Forman PTA GP 1    04886430024 HC PT THER PROC EA 15 MIN 11/13/2020 Taylor Forman PTA GP 1    28468073019 HC PT THERAPEUTIC ACT EA 15 MIN 11/13/2020 Taylor Forman PTA GP 1          PT G-Codes  Outcome Measure Options: AM-PAC 6 Clicks Basic Mobility (PT)  AM-PAC 6 Clicks Score (PT): 19    Taylor Forman PTA  11/13/2020

## 2020-11-13 NOTE — PROGRESS NOTES
Continued Stay Note  [facility]     Patient Name: Mg Gerber Sr.  MRN: [id number]  Today's Date: 11/13/2020    Admit Date: 11/10/2020    Discharge Plan     Row Name 11/13/20 1321       Plan    Plan  Plan is home with his wife and dtr with [facility] (if pt agreeable to HH) upon DC.  Prasad is following in case home O2 is needed.    Plan Comments  Karen/ [facility] and Violet/ Prasad are following.  Discussed w/ MD, do not anticipate DC over the weekend.  CCP will followup.        Discharge Codes    No documentation.       Expected Discharge Date and Time     Expected Discharge Date Expected Discharge Time    Nov 14, 2020             Tiffany Swann RN

## 2020-11-13 NOTE — PLAN OF CARE
Problem: Adult Inpatient Plan of Care  Goal: Plan of Care Review  Outcome: Ongoing, Progressing  Flowsheets (Taken 11/13/2020 1517)  Progress: improving  Plan of Care Reviewed With: patient  Outcome Summary: vss, RA, no falls, c/o left back pain, prn norco, IV Remdesivir, [place], bilateral hearing aides, continue to monitor  Goal: Patient-Specific Goal (Individualized)  Outcome: Ongoing, Progressing  Goal: Absence of Hospital-Acquired Illness or Injury  Outcome: Ongoing, Progressing  Intervention: Identify and Manage Fall Risk  Recent Flowsheet Documentation  Taken 11/13/2020 1413 by Silke Mojica RN  Safety Promotion/Fall Prevention:   activity supervised   assistive device/personal items within reach   clutter free environment maintained   fall prevention program maintained   nonskid shoes/slippers when out of bed   safety round/check completed   room organization consistent  Taken 11/13/2020 1214 by Silke Mojica RN  Safety Promotion/Fall Prevention:   activity supervised   assistive device/personal items within reach   clutter free environment maintained   fall prevention program maintained   nonskid shoes/slippers when out of bed   room organization consistent   safety round/check completed  Taken 11/13/2020 1058 by Silke Mojica RN  Safety Promotion/Fall Prevention:   activity supervised   assistive device/personal items within reach   clutter free environment maintained   fall prevention program maintained   nonskid shoes/slippers when out of bed   room organization consistent   safety round/check completed  Taken 11/13/2020 0858 by Silke Mojica RN  Safety Promotion/Fall Prevention:   activity supervised   assistive device/personal items within reach   clutter free environment maintained   fall prevention program maintained   nonskid shoes/slippers when out of bed   safety round/check completed   room organization consistent  Intervention: Prevent Skin Injury  Recent Flowsheet  Documentation  Taken 11/13/2020 1214 by Silke Mojica RN  Body Position: position changed independently  Taken 11/13/2020 1058 by Silke Mojica RN  Body Position: side-lying, left  Taken 11/13/2020 0858 by Silke Mojica RN  Body Position: position changed independently  Goal: Optimal Comfort and Wellbeing  Outcome: Ongoing, Progressing  Goal: Readiness for Transition of Care  Outcome: Ongoing, Progressing     Problem: Anxiety  Goal: Anxiety Reduction or Resolution  Outcome: Ongoing, Progressing  Intervention: Promote Anxiety Reduction  Recent Flowsheet Documentation  Taken 11/13/2020 1413 by Silke Mojica RN  Supportive Measures: active listening utilized  Taken 11/13/2020 0858 by Silke Mojica RN  Supportive Measures: active listening utilized     Problem: Infection  Goal: Infection Symptom Resolution  Outcome: Ongoing, Progressing  Intervention: Prevent or Manage Infection  Recent Flowsheet Documentation  Taken 11/13/2020 1413 by Silke Mojica RN  Isolation Precautions:   contact precautions maintained   droplet precautions maintained  Taken 11/13/2020 1214 by Silke Mojica RN  Isolation Precautions:   contact precautions maintained   droplet precautions maintained  Taken 11/13/2020 1058 by Silke Mojica RN  Isolation Precautions:   contact precautions maintained   droplet precautions maintained  Taken 11/13/2020 0858 by Silke Mojica RN  Isolation Precautions:   contact precautions maintained   droplet precautions maintained     Problem: Breathing Pattern Ineffective  Goal: Effective Breathing Pattern  Outcome: Ongoing, Progressing  Intervention: Promote Improved Breathing Pattern  Recent Flowsheet Documentation  Taken 11/13/2020 1413 by Silke Mojica RN  Supportive Measures: active listening utilized  Taken 11/13/2020 0858 by Silke Mojica RN  Supportive Measures: active listening utilized     Problem: Skin Injury Risk Increased  Goal: Skin Health and  Integrity  Outcome: Ongoing, Progressing  Intervention: Optimize Skin Protection  Recent Flowsheet Documentation  Taken 11/13/2020 1413 by Slike Mojica RN  Pressure Reduction Techniques: frequent weight shift encouraged  Taken 11/13/2020 0858 by Silke Mojica RN  Pressure Reduction Techniques: frequent weight shift encouraged     Problem: Fall Injury Risk  Goal: Absence of Fall and Fall-Related Injury  Outcome: Ongoing, Progressing  Intervention: Promote Injury-Free Environment  Recent Flowsheet Documentation  Taken 11/13/2020 1413 by Silke Mojica RN  Safety Promotion/Fall Prevention:   activity supervised   assistive device/personal items within reach   clutter free environment maintained   fall prevention program maintained   nonskid shoes/slippers when out of bed   safety round/check completed   room organization consistent  Taken 11/13/2020 1214 by Silke Mojica RN  Safety Promotion/Fall Prevention:   activity supervised   assistive device/personal items within reach   clutter free environment maintained   fall prevention program maintained   nonskid shoes/slippers when out of bed   room organization consistent   safety round/check completed  Taken 11/13/2020 1058 by Silke Mojica RN  Safety Promotion/Fall Prevention:   activity supervised   assistive device/personal items within reach   clutter free environment maintained   fall prevention program maintained   nonskid shoes/slippers when out of bed   room organization consistent   safety round/check completed  Taken 11/13/2020 0858 by Silke Mojica RN  Safety Promotion/Fall Prevention:   activity supervised   assistive device/personal items within reach   clutter free environment maintained   fall prevention program maintained   nonskid shoes/slippers when out of bed   safety round/check completed   room organization consistent   Goal Outcome Evaluation:  Plan of Care Reviewed With: patient  Progress: improving  Outcome Summary:  vss, RA, no falls, c/o left back pain, prn norco, IV Remdesivir, [place], bilateral hearing aides, continue to monitor

## 2020-11-13 NOTE — PROGRESS NOTES
Continued Stay Note  [facility]     Patient Name: Mg Gerber Sr.  MRN: [id number]  Today's Date: 11/13/2020    Admit Date: 11/10/2020    Discharge Plan     Row Name 11/13/20 8463       Plan    Plan  Referral to [facility].    Plan Comments  S/W [facility]/ [facility] who now states they cannot accept. CCP spoke w/ pt who has no preference of HH agency.  He is in agreement with referral to [facility].  Referral called to [facility].    Row Name 11/13/20 1321       Plan    Plan  Plan is home with his wife and dtr with [facility]  (if pt agreeable to HH) upon DC.  Prasad is following in case home O2 is needed.    Plan Comments  Karen/ [facility] and Violet/ Prasad are following.  Discussed w/ MD, do not anticipate DC over the weekend.  CCP will followup.        Discharge Codes    No documentation.       Expected Discharge Date and Time     Expected Discharge Date Expected Discharge Time    Nov 16, 2020             Tiffany Swann RN

## 2020-11-14 LAB
ALBUMIN SERPL-MCNC: 3.9 G/DL (ref 3.5–5.2)
ALP SERPL-CCNC: 53 U/L (ref 39–117)
ALT SERPL W P-5'-P-CCNC: 24 U/L (ref 1–41)
ANION GAP SERPL CALCULATED.3IONS-SCNC: 3.7 MMOL/L (ref 5–15)
AST SERPL-CCNC: 32 U/L (ref 1–40)
BILIRUB CONJ SERPL-MCNC: 0.3 MG/DL (ref 0–0.3)
BILIRUB INDIRECT SERPL-MCNC: 0.8 MG/DL
BILIRUB SERPL-MCNC: 1.1 MG/DL (ref 0–1.2)
BUN SERPL-MCNC: 23 MG/DL (ref 8–23)
BUN/CREAT SERPL: 23.5 (ref 7–25)
CALCIUM SPEC-SCNC: 8.8 MG/DL (ref 8.6–10.5)
CHLORIDE SERPL-SCNC: 105 MMOL/L (ref 98–107)
CO2 SERPL-SCNC: 26.3 MMOL/L (ref 22–29)
CREAT SERPL-MCNC: 0.98 MG/DL (ref 0.76–1.27)
CRP SERPL-MCNC: 0.46 MG/DL (ref 0–0.5)
FERRITIN SERPL-MCNC: 1133 NG/ML (ref 30–400)
GFR SERPL CREATININE-BSD FRML MDRD: 74 ML/MIN/1.73
GLUCOSE SERPL-MCNC: 93 MG/DL (ref 65–99)
PHOSPHATE SERPL-MCNC: 2.5 MG/DL (ref 2.5–4.5)
POTASSIUM SERPL-SCNC: 4.2 MMOL/L (ref 3.5–5.2)
PROT SERPL-MCNC: 6.3 G/DL (ref 6–8.5)
SODIUM SERPL-SCNC: 135 MMOL/L (ref 136–145)

## 2020-11-14 PROCEDURE — 63710000001 DEXAMETHASONE PER 0.25 MG: Performed by: HOSPITALIST

## 2020-11-14 PROCEDURE — 82728 ASSAY OF FERRITIN: CPT | Performed by: HOSPITALIST

## 2020-11-14 PROCEDURE — 80048 BASIC METABOLIC PNL TOTAL CA: CPT | Performed by: HOSPITALIST

## 2020-11-14 PROCEDURE — 84100 ASSAY OF PHOSPHORUS: CPT | Performed by: HOSPITALIST

## 2020-11-14 PROCEDURE — 80076 HEPATIC FUNCTION PANEL: CPT | Performed by: INTERNAL MEDICINE

## 2020-11-14 PROCEDURE — 86140 C-REACTIVE PROTEIN: CPT | Performed by: HOSPITALIST

## 2020-11-14 RX ADMIN — AMLODIPINE BESYLATE 5 MG: 5 TABLET ORAL at 08:17

## 2020-11-14 RX ADMIN — HYDROCODONE BITARTRATE AND ACETAMINOPHEN 1 TABLET: 5; 325 TABLET ORAL at 03:12

## 2020-11-14 RX ADMIN — FOLIC ACID 1 MG: 1 TABLET ORAL at 19:58

## 2020-11-14 RX ADMIN — SODIUM CHLORIDE, PRESERVATIVE FREE 10 ML: 5 INJECTION INTRAVENOUS at 08:19

## 2020-11-14 RX ADMIN — OXCARBAZEPINE 300 MG: 300 TABLET, FILM COATED ORAL at 19:57

## 2020-11-14 RX ADMIN — MEMANTINE HYDROCHLORIDE 10 MG: 10 TABLET, FILM COATED ORAL at 19:58

## 2020-11-14 RX ADMIN — SERTRALINE 25 MG: 25 TABLET, FILM COATED ORAL at 08:17

## 2020-11-14 RX ADMIN — FAMOTIDINE 20 MG: 20 TABLET, FILM COATED ORAL at 08:17

## 2020-11-14 RX ADMIN — ALPRAZOLAM 0.25 MG: 0.25 TABLET ORAL at 21:21

## 2020-11-14 RX ADMIN — DONEPEZIL HYDROCHLORIDE 10 MG: 10 TABLET, FILM COATED ORAL at 19:58

## 2020-11-14 RX ADMIN — CETIRIZINE HYDROCHLORIDE 5 MG: 10 TABLET ORAL at 08:18

## 2020-11-14 RX ADMIN — SODIUM CHLORIDE, PRESERVATIVE FREE 10 ML: 5 INJECTION INTRAVENOUS at 19:59

## 2020-11-14 RX ADMIN — FAMOTIDINE 20 MG: 20 TABLET, FILM COATED ORAL at 06:01

## 2020-11-14 RX ADMIN — LEVOTHYROXINE SODIUM 112 MCG: 112 TABLET ORAL at 06:02

## 2020-11-14 RX ADMIN — HYDROCODONE BITARTRATE AND ACETAMINOPHEN 1 TABLET: 5; 325 TABLET ORAL at 21:21

## 2020-11-14 RX ADMIN — CALCIUM 500 MG: 500 TABLET ORAL at 08:17

## 2020-11-14 RX ADMIN — HYDROCODONE BITARTRATE AND ACETAMINOPHEN 1 TABLET: 5; 325 TABLET ORAL at 08:35

## 2020-11-14 RX ADMIN — HYDROCODONE BITARTRATE AND ACETAMINOPHEN 1 TABLET: 5; 325 TABLET ORAL at 15:13

## 2020-11-14 RX ADMIN — GABAPENTIN 800 MG: 400 CAPSULE ORAL at 19:58

## 2020-11-14 RX ADMIN — FOLIC ACID 1 MG: 1 TABLET ORAL at 06:02

## 2020-11-14 RX ADMIN — TIOTROPIUM BROMIDE INHALATION SPRAY 2 PUFF: 3.12 SPRAY, METERED RESPIRATORY (INHALATION) at 09:04

## 2020-11-14 RX ADMIN — FOLIC ACID 1 MG: 1 TABLET ORAL at 12:43

## 2020-11-14 RX ADMIN — RIVAROXABAN 20 MG: 20 TABLET, FILM COATED ORAL at 17:00

## 2020-11-14 RX ADMIN — DEXAMETHASONE 6 MG: 4 TABLET ORAL at 08:17

## 2020-11-14 RX ADMIN — REMDESIVIR 100 MG: 100 INJECTION, POWDER, LYOPHILIZED, FOR SOLUTION INTRAVENOUS at 19:58

## 2020-11-14 RX ADMIN — GABAPENTIN 400 MG: 400 CAPSULE ORAL at 06:02

## 2020-11-14 RX ADMIN — FAMOTIDINE 20 MG: 20 TABLET, FILM COATED ORAL at 17:00

## 2020-11-14 RX ADMIN — MEMANTINE HYDROCHLORIDE 10 MG: 10 TABLET, FILM COATED ORAL at 08:17

## 2020-11-14 RX ADMIN — GABAPENTIN 400 MG: 400 CAPSULE ORAL at 12:43

## 2020-11-14 RX ADMIN — TAMSULOSIN HYDROCHLORIDE 0.4 MG: 0.4 CAPSULE ORAL at 08:17

## 2020-11-14 NOTE — PROGRESS NOTES
[phone]   LOS: 2 days     Name: Mg Gerber Sr.  Age/Sex: 79 y.o. male  :  1940        PCP: Dillno Marsh MD  Chief Complaint   Patient presents with   • Shortness of Breath   • Fever   • Weakness - Generalized      Subjective   He is feeling okay today continues to complain of this left-sided back pain.  He denies any radicular symptoms.  He does have occasional numbness in his toes but says this is chronic and he takes gabapentin for it.  It seems to be better if he gets up and moves it hurts worse the longer he lays in the bed.  Otherwise no fevers or chills overnight denies any shortness of breath.  He is waiting to hear back of his wife tested positive or not.  General: No Fever or Chills, Cardiac: No Chest Pain or Palpitations, Resp: No Cough or SOA, GI: No Nausea, Vomiting, or Diarrhea and Other: No bleeding    amLODIPine, 5 mg, Oral, Daily  calcium carbonate (oyster shell), 500 mg, Oral, Daily  cetirizine, 5 mg, Oral, Daily  dexamethasone, 6 mg, Oral, Daily With Breakfast  donepezil, 10 mg, Oral, Nightly  famotidine, 20 mg, Oral, BID AC  folic acid, 1 mg, Oral, 3 times per day  gabapentin, 400 mg, Oral, 2 times per day  gabapentin, 800 mg, Oral, Nightly  levothyroxine, 112 mcg, Oral, Q AM  memantine, 10 mg, Oral, Q12H  OXcarbazepine, 300 mg, Oral, Nightly  remdesivir, 100 mg, Intravenous, Q24H  rivaroxaban, 20 mg, Oral, Daily With Dinner  sertraline, 25 mg, Oral, Daily  sodium chloride, 10 mL, Intravenous, Q12H  tamsulosin, 0.4 mg, Oral, Daily  tiotropium bromide monohydrate, 2 puff, Inhalation, Daily - RT      Pharmacy Consult - Remdesivir,   sodium chloride, 100 mL/hr, Last Rate: Stopped (20 1136)        Objective   Vital Signs  Temp:  [97 °F (36.1 °C)-97.8 °F (36.6 °C)] 97.3 °F (36.3 °C)  Heart Rate:  [46-62] 57  Resp:  [16-18] 18  BP: (137-185)/(65-81) 146/78  Body mass index is 31.99 kg/m².    Intake/Output Summary (Last 24 hours) at 2020 0858  Last data filed at  11/14/2020 0317  Gross per 24 hour   Intake 945 ml   Output 2000 ml   Net -1055 ml       Physical Exam  Vitals signs and nursing note reviewed.   Constitutional:       Appearance: Normal appearance.   HENT:      Head: Normocephalic and atraumatic.   Cardiovascular:      Rate and Rhythm: Normal rate and regular rhythm.   Pulmonary:      Effort: Pulmonary effort is normal. No respiratory distress.      Breath sounds: Normal breath sounds. No wheezing.   Abdominal:      General: There is no distension.      Palpations: Abdomen is soft.   Neurological:      General: No focal deficit present.      Mental Status: He is alert and oriented to person, place, and time.   Psychiatric:         Mood and Affect: Mood normal.         Behavior: Behavior normal.           Results Review:       I reviewed the patient's new clinical results.  Results from last 7 days   Lab Units 11/13/20  0449 11/12/20  0708 11/11/20  0618 11/10/20  1836   WBC 10*3/mm3 11.02* 10.69 7.20 9.36   HEMOGLOBIN g/dL 9.0* 8.8* 9.7* 10.2*   PLATELETS 10*3/mm3 337 301 316 347     Results from last 7 days   Lab Units 11/13/20 0449 11/12/20 0708 11/11/20  2016 11/10/20  1836   SODIUM mmol/L 136 139  --  136   POTASSIUM mmol/L 4.4 4.4  --  3.7   CHLORIDE mmol/L 105 105  --  102   CO2 mmol/L 26.7 24.6  --  24.5   BUN mg/dL 25* 21  --  12   CREATININE mg/dL 1.04 1.09 1.04 1.07   CALCIUM mg/dL 8.7 8.3*  --  9.3   MAGNESIUM mg/dL 2.1  --   --   --    PHOSPHORUS mg/dL 3.2  --   --   --    Estimated Creatinine Clearance: 73.6 mL/min (by C-G formula based on SCr of 1.04 mg/dL).      Assessment/Plan   Active Hospital Problems    Diagnosis  POA   • **Dyspnea [R06.00]  Unknown   • COVID-19 virus detected [U07.1]  Unknown   • Generalized weakness [R53.1]  Unknown   • History of lung cancer [Z85.118]  Not Applicable   • Renal cell carcinoma (CMS/HCC) [C64.9]  Yes   • Hypertension [I10]  Yes      Resolved Hospital Problems   No resolved problems to display.       PLAN  This  is an 79-year-old gentleman with multiple medical comorbidities including hypertension obesity and metastatic cancer that presents to the hospital with novel coronavirus infection and viral pneumonia with subsequent hypoxia  -Continue to work to titrate oxygen and encourage out of bed activity  -His back pain to me sounds more musculoskeletal however he might have a pleural effusion causing it.  If it persists and does not get better with out of bed activity plan would be to consider a repeat chest x-ray to further evaluate  -Continue Decadron and Remdesivir  -Appreciate pulmonary's input  -He tell me multiple times during our evaluation today that he is not supposed to take Tylenol because of his kidneys.  I think he is likely confused and has been told in the past not to take NSAIDs with regards to his kidney function.  He has been on hydrocodone with Tylenol in it for more than 2 years based on his Yony report.  We will continue his pain medications as ordered.      Disposition  To be determined but probably home on Monday      Oh Quintana MD  [facility]  11/14/20  08:35 EST

## 2020-11-14 NOTE — PLAN OF CARE
Problem: Adult Inpatient Plan of Care  Goal: Plan of Care Review  Flowsheets (Taken 11/14/2020 0335)  Progress: no change  Plan of Care Reviewed With: patient  Outcome Summary: Patient had uneventful night. Pateint on o2 @ 2L NC during the night time. Patient able to ambulate to bathrooom with stand by assist. Plan for possible D/C on monday. Vitals stable. will continue to monitor.

## 2020-11-14 NOTE — PROGRESS NOTES
[phone]   LOS: 2 days     Name: Mg Gerber Sr.  Age/Sex: 79 y.o. male  :  1940        PCP: Dillon Marsh MD  Chief Complaint   Patient presents with   • Shortness of Breath   • Fever   • Weakness - Generalized      Subjective   He denies any chest pain or palpitations but still having some shortness of breath and a dry cough.  Denies any other new issues or complaints today.  He is very hard of hearing which makes communication somewhat difficult.  General: No Fever or Chills, Cardiac: No Chest Pain or Palpitations, GI: No Nausea, Vomiting, or Diarrhea and Other: No bleeding    amLODIPine, 5 mg, Oral, Daily  calcium carbonate (oyster shell), 500 mg, Oral, Daily  cetirizine, 5 mg, Oral, Daily  dexamethasone, 6 mg, Oral, Daily With Breakfast  donepezil, 10 mg, Oral, Nightly  famotidine, 20 mg, Oral, BID AC  folic acid, 1 mg, Oral, 3 times per day  gabapentin, 400 mg, Oral, 2 times per day  gabapentin, 800 mg, Oral, Nightly  levothyroxine, 112 mcg, Oral, Q AM  memantine, 10 mg, Oral, Q12H  OXcarbazepine, 300 mg, Oral, Nightly  remdesivir, 100 mg, Intravenous, Q24H  rivaroxaban, 20 mg, Oral, Daily With Dinner  sertraline, 25 mg, Oral, Daily  sodium chloride, 10 mL, Intravenous, Q12H  tamsulosin, 0.4 mg, Oral, Daily  tiotropium bromide monohydrate, 2 puff, Inhalation, Daily - RT      Pharmacy Consult - Remdesivir,   sodium chloride, 100 mL/hr, Last Rate: Stopped (20 1136)        Objective   Vital Signs  Temp:  [97 °F (36.1 °C)-97.8 °F (36.6 °C)] 97.3 °F (36.3 °C)  Heart Rate:  [46-62] 57  Resp:  [16-18] 18  BP: (137-185)/(65-81) 146/78  Body mass index is 31.99 kg/m².    Intake/Output Summary (Last 24 hours) at 2020 0880  Last data filed at 2020 0317  Gross per 24 hour   Intake 945 ml   Output 2000 ml   Net -1055 ml       Physical Exam  Vitals signs and nursing note reviewed.   Constitutional:       Appearance: Normal appearance.   HENT:      Head: Normocephalic and atraumatic.    Neck:      Musculoskeletal: Normal range of motion and neck supple.   Cardiovascular:      Rate and Rhythm: Normal rate and regular rhythm.   Pulmonary:      Effort: Pulmonary effort is normal.      Breath sounds: Normal breath sounds.   Neurological:      General: No focal deficit present.      Mental Status: He is alert and oriented to person, place, and time.           Results Review:       I reviewed the patient's new clinical results.  Results from last 7 days   Lab Units 11/13/20  0449 11/12/20  0708 11/11/20  0618 11/10/20  1836   WBC 10*3/mm3 11.02* 10.69 7.20 9.36   HEMOGLOBIN g/dL 9.0* 8.8* 9.7* 10.2*   PLATELETS 10*3/mm3 337 301 316 347     Results from last 7 days   Lab Units 11/13/20  0449 11/12/20  0708 11/11/20  2016 11/10/20  1836   SODIUM mmol/L 136 139  --  136   POTASSIUM mmol/L 4.4 4.4  --  3.7   CHLORIDE mmol/L 105 105  --  102   CO2 mmol/L 26.7 24.6  --  24.5   BUN mg/dL 25* 21  --  12   CREATININE mg/dL 1.04 1.09 1.04 1.07   CALCIUM mg/dL 8.7 8.3*  --  9.3   MAGNESIUM mg/dL 2.1  --   --   --    PHOSPHORUS mg/dL 3.2  --   --   --    Estimated Creatinine Clearance: 73.6 mL/min (by C-G formula based on SCr of 1.04 mg/dL).      Assessment/Plan   Active Hospital Problems    Diagnosis  POA   • **Dyspnea [R06.00]  Unknown   • COVID-19 virus detected [U07.1]  Unknown   • Generalized weakness [R53.1]  Unknown   • History of lung cancer [Z85.118]  Not Applicable   • Renal cell carcinoma (CMS/HCC) [C64.9]  Yes   • Hypertension [I10]  Yes      Resolved Hospital Problems   No resolved problems to display.       PLAN  This is an 79-year-old gentleman with a history of metastatic renal cell carcinoma that presents to the hospital with shortness of breath and weakness and is found to have an infection with the novel coronavirus as well as probable progression of his malignancy.  -Appreciate pulmonary and oncology's assistance  -Started on Remdesivir and Decadron D3  -Only needing 2 L nasal cannula oxygen  for now continue to work to titrate  -Blood and urine cultures are negative  -Plan to observe here in the hospital at least another 48 hours before considering discharge.  -Based on symptom onset he is about day 4 or 5 of his illness he has significant comorbidities and would be at risk for progression to more serious disease.  -He has every 3-month imaging done at [facility].  His liver function tests are stable at this point I think he can follow-up for the abnormal liver imaging with his physicians at [facility].  No further work-up indicated here in the hospital.  Will make sure he goes with imaging from here.    Well over 30 minutes was spent at the bedside addressing and evaluating his questions concerns and explanation of treatment plan.  Disposition  To be determined but early as Saturday will go with home monitor      Oh Quintana MD  [facility]  11/13/2020  08:33 EST

## 2020-11-15 LAB
ALBUMIN SERPL-MCNC: 3.8 G/DL (ref 3.5–5.2)
ALP SERPL-CCNC: 48 U/L (ref 39–117)
ALT SERPL W P-5'-P-CCNC: 32 U/L (ref 1–41)
AST SERPL-CCNC: 32 U/L (ref 1–40)
BACTERIA SPEC AEROBE CULT: NORMAL
BACTERIA SPEC AEROBE CULT: NORMAL
BILIRUB CONJ SERPL-MCNC: 0.3 MG/DL (ref 0–0.3)
BILIRUB INDIRECT SERPL-MCNC: 0.8 MG/DL
BILIRUB SERPL-MCNC: 1.1 MG/DL (ref 0–1.2)
CREAT SERPL-MCNC: 0.95 MG/DL (ref 0.76–1.27)
GFR SERPL CREATININE-BSD FRML MDRD: 76 ML/MIN/1.73
PROT SERPL-MCNC: 6 G/DL (ref 6–8.5)

## 2020-11-15 PROCEDURE — 80076 HEPATIC FUNCTION PANEL: CPT | Performed by: INTERNAL MEDICINE

## 2020-11-15 PROCEDURE — 97110 THERAPEUTIC EXERCISES: CPT | Performed by: PHYSICAL THERAPIST

## 2020-11-15 PROCEDURE — 94799 UNLISTED PULMONARY SVC/PX: CPT

## 2020-11-15 PROCEDURE — 63710000001 DEXAMETHASONE PER 0.25 MG: Performed by: HOSPITALIST

## 2020-11-15 PROCEDURE — 82565 ASSAY OF CREATININE: CPT | Performed by: INTERNAL MEDICINE

## 2020-11-15 RX ORDER — AMOXICILLIN 250 MG
2 CAPSULE ORAL NIGHTLY
Status: DISCONTINUED | OUTPATIENT
Start: 2020-11-15 | End: 2020-11-18 | Stop reason: HOSPADM

## 2020-11-15 RX ORDER — POLYETHYLENE GLYCOL 3350 17 G/17G
17 POWDER, FOR SOLUTION ORAL DAILY
Status: DISCONTINUED | OUTPATIENT
Start: 2020-11-15 | End: 2020-11-18 | Stop reason: HOSPADM

## 2020-11-15 RX ADMIN — OXCARBAZEPINE 300 MG: 300 TABLET, FILM COATED ORAL at 20:27

## 2020-11-15 RX ADMIN — MEMANTINE HYDROCHLORIDE 10 MG: 10 TABLET, FILM COATED ORAL at 08:39

## 2020-11-15 RX ADMIN — DONEPEZIL HYDROCHLORIDE 10 MG: 10 TABLET, FILM COATED ORAL at 20:27

## 2020-11-15 RX ADMIN — MEMANTINE HYDROCHLORIDE 10 MG: 10 TABLET, FILM COATED ORAL at 20:27

## 2020-11-15 RX ADMIN — REMDESIVIR 100 MG: 100 INJECTION, POWDER, LYOPHILIZED, FOR SOLUTION INTRAVENOUS at 17:52

## 2020-11-15 RX ADMIN — FOLIC ACID 1 MG: 1 TABLET ORAL at 20:27

## 2020-11-15 RX ADMIN — FOLIC ACID 1 MG: 1 TABLET ORAL at 11:57

## 2020-11-15 RX ADMIN — CALCIUM 500 MG: 500 TABLET ORAL at 08:39

## 2020-11-15 RX ADMIN — AMLODIPINE BESYLATE 5 MG: 5 TABLET ORAL at 08:39

## 2020-11-15 RX ADMIN — FAMOTIDINE 20 MG: 20 TABLET, FILM COATED ORAL at 17:51

## 2020-11-15 RX ADMIN — LEVOTHYROXINE SODIUM 112 MCG: 112 TABLET ORAL at 06:30

## 2020-11-15 RX ADMIN — RIVAROXABAN 20 MG: 20 TABLET, FILM COATED ORAL at 17:51

## 2020-11-15 RX ADMIN — POLYETHYLENE GLYCOL 3350 17 G: 17 POWDER, FOR SOLUTION ORAL at 09:05

## 2020-11-15 RX ADMIN — GABAPENTIN 400 MG: 400 CAPSULE ORAL at 11:57

## 2020-11-15 RX ADMIN — SODIUM CHLORIDE, PRESERVATIVE FREE 10 ML: 5 INJECTION INTRAVENOUS at 20:28

## 2020-11-15 RX ADMIN — HYDROCODONE BITARTRATE AND ACETAMINOPHEN 1 TABLET: 5; 325 TABLET ORAL at 03:34

## 2020-11-15 RX ADMIN — CETIRIZINE HYDROCHLORIDE 5 MG: 10 TABLET ORAL at 08:39

## 2020-11-15 RX ADMIN — DOCUSATE SODIUM 50MG AND SENNOSIDES 8.6MG 2 TABLET: 8.6; 5 TABLET, FILM COATED ORAL at 20:27

## 2020-11-15 RX ADMIN — DEXAMETHASONE 6 MG: 4 TABLET ORAL at 08:39

## 2020-11-15 RX ADMIN — SODIUM CHLORIDE, PRESERVATIVE FREE 10 ML: 5 INJECTION INTRAVENOUS at 08:40

## 2020-11-15 RX ADMIN — TAMSULOSIN HYDROCHLORIDE 0.4 MG: 0.4 CAPSULE ORAL at 08:39

## 2020-11-15 RX ADMIN — TIOTROPIUM BROMIDE INHALATION SPRAY 2 PUFF: 3.12 SPRAY, METERED RESPIRATORY (INHALATION) at 08:09

## 2020-11-15 RX ADMIN — FAMOTIDINE 20 MG: 20 TABLET, FILM COATED ORAL at 06:30

## 2020-11-15 RX ADMIN — FOLIC ACID 1 MG: 1 TABLET ORAL at 06:30

## 2020-11-15 RX ADMIN — SERTRALINE 25 MG: 25 TABLET, FILM COATED ORAL at 08:39

## 2020-11-15 RX ADMIN — GABAPENTIN 800 MG: 400 CAPSULE ORAL at 20:27

## 2020-11-15 RX ADMIN — HYDROCODONE BITARTRATE AND ACETAMINOPHEN 1 TABLET: 5; 325 TABLET ORAL at 20:27

## 2020-11-15 RX ADMIN — GABAPENTIN 400 MG: 400 CAPSULE ORAL at 06:31

## 2020-11-15 NOTE — PLAN OF CARE
Problem: Adult Inpatient Plan of Care  Goal: Plan of Care Review  Flowsheets (Taken 11/15/2020 0532)  Progress: no change  Plan of Care Reviewed With: patient  Outcome Summary: PATIENT HAD UNEVENTFUL NIGHT. PATITN HAD TO BE PLACED ON 2L NC DURING THE NIGHT WHILE HE WAS SLEEPING DUE TO O2 LEVELS DROPPING DOWN INTO THE 80'S. PATIENT DOES HAVE KNONW SLEEP APNEA BUT REPORTS HE HAS HAD TO EDI OXYGEN IN HIS CPAP BEFORE AT HOME BUT DOES NOT CURRENTY. PATIENT ALSO HAS NOT BEEN USING CPAP DURING HOSPITAL STAY. PATIENT HAS BEEN WANITNG HIS PAIN MEDS EVEYR 6 HOURS AS ORDERED. VITALS STABLE. WILL CONTINUE TO MONITOR.

## 2020-11-15 NOTE — PLAN OF CARE
Problem: Adult Inpatient Plan of Care  Goal: Plan of Care Review  Recent Flowsheet Documentation  Taken 11/15/2020 1439 by Lizbeth Baxter, PT  Outcome Summary: pt has been up to bathroom and chair multiple times today and was just getting back to bed with nursing when PT arrived. Pt was agreeable to BLE exercises but did not want to get up OOB again. Pt tolerated BLE exercises well.

## 2020-11-15 NOTE — PLAN OF CARE
Goal Outcome Evaluation:  Plan of Care Reviewed With: patient  Progress: no change   VSS, RA during day, 2L NC placed while sleeping. Up with stand by assist to the restroom today. Pt c/o constipation, Miramax and Pericolace ordered. A&O x4. Pt up to chair, c/o dizziness and wanted to be placed back in bed, once in bed he stated he felt better. No pain. Possible d/c tomorrow. Will CTM and provide care.

## 2020-11-15 NOTE — THERAPY TREATMENT NOTE
Patient Name: Mg Gerber Sr.  : 1940    MRN: [id number]                              Today's Date: 11/15/2020       Admit Date: 11/10/2020    Visit Dx:     ICD-10-CM ICD-9-CM   1. History of lung cancer  Z85.118 V10.11   2. On antineoplastic chemotherapy  Z79.899 V58.69   3. Fever and chills  R50.9 780.60   4. Shortness of breath  R06.02 786.05   5. COVID-19  U07.1 079.89     Patient Active Problem List   Diagnosis   • Anemia, vitamin B12 deficiency   • Arthritis   • Hemochromatosis   • Herpes zoster   • Hip pain   • Hypertension   • Cancer (CMS/HCC)   • COPD (chronic obstructive pulmonary disease) (CMS/HCC)   • Left low back pain   • Dizziness   • Headache around the eyes   • Mild cognitive impairment   • Renal cell carcinoma (CMS/HCC)   • Anxiety   • Malignant neoplasm of lung (CMS/HCC)   • Neuropathy   • Perennial allergic rhinitis   • Vitamin B12 deficiency   • Osteoarthritis of lumbar spine   • Pharyngitis   • Bronchitis   • Other constipation   • Hx of hiatal hernia   • History of lung cancer   • COVID-19 virus detected   • Dyspnea   • Generalized weakness     Past Medical History:   Diagnosis Date   • Allergic    • Anemia, vitamin B12 deficiency 2012   • Anxiety    • Arthritis 2014   • Atrial fibrillation (CMS/HCC)    • Cancer (CMS/HCC) 2014    kidney; prostate   • Cataract    • Chemotherapeutic agent or infusion extravasation     q 1-2 wks    • COPD (chronic obstructive pulmonary disease) (CMS/HCC) 2014   • Emphysema of lung (CMS/HCC)    • Hemochromatosis 2014   • Herpes zoster 2011    left medial thigh   • Hip pain 2014   • History of Doppler ultrasound 2011    superficial and deep venous insuffiency   • History of EKG 02/10/2012    Dr. Kathi Lloyd; unchanged from prior EKG   • Hypertension     benign essential   • Injury of back    • Pulmonary embolism (CMS/HCC) 2014   • Renal cell cancer (CMS/HCC)    • Shortness of breath    • Sleep  apnea      Past Surgical History:   Procedure Laterality Date   • APPENDECTOMY     • CATARACT EXTRACTION     • CHOLECYSTECTOMY     • SPLENECTOMY     • TONSILLECTOMY     • VEIN SURGERY       General Information     Row Name 11/15/20 1431          Physical Therapy Time and Intention    Document Type  therapy note (daily note)  -NOAH     Mode of Treatment  individual therapy;physical therapy  -       User Key  (r) = Recorded By, (t) = Taken By, (c) = Cosigned By    Initials Name Provider Type    Lizbeth Baker PT Physical Therapist        Mobility     Row Name 11/15/20 1431          Bed Mobility    Comment (Bed Mobility)  pt agreed to bed exercises only as he was just getting back to bed after being up to bathroom  -       User Key  (r) = Recorded By, (t) = Taken By, (c) = Cosigned By    Initials Name Provider Type    Lizbeth Baker PT Physical Therapist        Obj/Interventions     Row Name 11/15/20 1438          Motor Skills    Therapeutic Exercise  -- BLE AP, SLR, HS, hip abd, bridges x 15 reps  -       User Key  (r) = Recorded By, (t) = Taken By, (c) = Cosigned By    Initials Name Provider Type    Lizbeth Baker PT Physical Therapist        Goals/Plan    No documentation.       Clinical Impression     Row Name 11/15/20 1439          Pain    Additional Documentation  Pain Scale: Numbers Pre/Post-Treatment (Group)  -[facility] Name 11/15/20 1439          Pain Scale: Numbers Pre/Post-Treatment    Pretreatment Pain Rating  0/10 - no pain  -     Posttreatment Pain Rating  0/10 - no pain  -     Row Name 11/15/20 1439          Plan of Care Review    Outcome Summary  pt has been up to bathroom and chair multiple times today and was just getting back to bed with nursing when PT arrived. Pt was agreeable to BLE exercises but did not want to get up OOB again. Pt tolerated BLE exercises well.  -     Row Name 11/15/20 1439          Positioning and Restraints    Pre-Treatment  Position  in bed  -KH     Post Treatment Position  bed  -KH     In Bed  supine;call light within reach;encouraged to call for assist;exit alarm on  -       User Key  (r) = Recorded By, (t) = Taken By, (c) = Cosigned By    Initials Name Provider Type    Lizbeth Baker, PT Physical Therapist        Outcome Measures     Row Name 11/15/20 1441          How much help from another person do you currently need...    Turning from your back to your side while in flat bed without using bedrails?  4  -KH     Moving from lying on back to sitting on the side of a flat bed without bedrails?  4  -KH     Moving to and from a bed to a chair (including a wheelchair)?  3  -KH     Standing up from a chair using your arms (e.g., wheelchair, bedside chair)?  3  -KH     Climbing 3-5 steps with a railing?  3  -KH     To walk in hospital room?  3  -KH     AM-PAC 6 Clicks Score (PT)  20  -KH     Row Name 11/15/20 1441          Functional Assessment    Outcome Measure Options  AM-PAC 6 Clicks Basic Mobility (PT)  -       User Key  (r) = Recorded By, (t) = Taken By, (c) = Cosigned By    Initials Name Provider Type    Lizbeth Baker, PT Physical Therapist        Physical Therapy Education                 Title: PT OT SLP Therapies (Done)     Topic: Physical Therapy (Done)     Point: Mobility training (Done)     Learning Progress Summary           Patient Acceptance, E, VU,NR by  at 11/15/2020 1441    Acceptance, E, VU by  at 11/13/2020 1639    Acceptance, E,TB,D, VU,NR by  at 11/11/2020 1544                   Point: Home exercise program (Done)     Learning Progress Summary           Patient Acceptance, E, VU,NR by  at 11/15/2020 1441    Acceptance, E, VU by  at 11/13/2020 1639    Acceptance, E,TB,D, VU,NR by  at 11/11/2020 1544                               User Key     Initials Effective Dates Name Provider Type [facility] 02/05/19 -  Lizbeth Baxter, PT Physical Therapist PT      04/03/18 -  Debra Dickinson PT Physical Therapist PT     08/19/18 -  Taylor Forman PTA Physical Therapy Assistant PT              PT Recommendation and Plan     Outcome Summary: pt has been up to bathroom and chair multiple times today and was just getting back to bed with nursing when PT arrived. Pt was agreeable to BLE exercises but did not want to get up OOB again. Pt tolerated BLE exercises well.     Time Calculation:   PT Charges     Row Name 11/15/20 1441             Time Calculation    Start Time  1350  -      Stop Time  1403  -      Time Calculation (min)  13 min  -KH      PT Received On  11/15/20  -      PT - Next Appointment  11/16/20  -         Time Calculation- PT    Total Timed Code Minutes- PT  13 minute(s)  -        User Key  (r) = Recorded By, (t) = Taken By, (c) = Cosigned By    Initials Name Provider Type    Lizbeth Baker, PT Physical Therapist        Therapy Charges for Today     Code Description Service Date Service Provider Modifiers Qty    [id number] HC PT THER PROC EA 15 MIN 11/15/2020 Lizbeth Baxter, PT GP 1          PT G-Codes  Outcome Measure Options: AM-PAC 6 Clicks Basic Mobility (PT)  AM-PAC 6 Clicks Score (PT): 20    Lizbeth Baxter PT  11/15/2020

## 2020-11-15 NOTE — PROGRESS NOTES
[phone]   LOS: 3 days     Name: Mg Gerber Sr.  Age/Sex: 79 y.o. male  :  1940        PCP: Dillon Marsh MD  Chief Complaint   Patient presents with   • Shortness of Breath   • Fever   • Weakness - Generalized      Subjective   He felt a little rough this morning.  He had an episode of nausea and one episode of emesis and then felt very tired and fatigued.  He laid on a second nap but is feeling better now.  He is complaining of some constipation he had a strain for a bowel movement today.  General: No Fever or Chills, Cardiac: No Chest Pain or Palpitations, Resp: No Cough or SOA, GI: No Nausea, Vomiting, or Diarrhea and Other: No bleeding    amLODIPine, 5 mg, Oral, Daily  calcium carbonate (oyster shell), 500 mg, Oral, Daily  cetirizine, 5 mg, Oral, Daily  dexamethasone, 6 mg, Oral, Daily With Breakfast  donepezil, 10 mg, Oral, Nightly  famotidine, 20 mg, Oral, BID AC  folic acid, 1 mg, Oral, 3 times per day  gabapentin, 400 mg, Oral, 2 times per day  gabapentin, 800 mg, Oral, Nightly  levothyroxine, 112 mcg, Oral, Q AM  memantine, 10 mg, Oral, Q12H  OXcarbazepine, 300 mg, Oral, Nightly  polyethylene glycol, 17 g, Oral, Daily  remdesivir, 100 mg, Intravenous, Q24H  rivaroxaban, 20 mg, Oral, Daily With Dinner  senna-docusate sodium, 2 tablet, Oral, Nightly  sertraline, 25 mg, Oral, Daily  sodium chloride, 10 mL, Intravenous, Q12H  tamsulosin, 0.4 mg, Oral, Daily  tiotropium bromide monohydrate, 2 puff, Inhalation, Daily - RT      Pharmacy Consult - Remdesivir,   sodium chloride, 100 mL/hr, Last Rate: Stopped (20 1136)        Objective   Vital Signs  Temp:  [95.2 °F (35.1 °C)-97.3 °F (36.3 °C)] 95.2 °F (35.1 °C)  Heart Rate:  [52-71] 68  Resp:  [18] 18  BP: (141-165)/(69-84) 148/83  Body mass index is 31.99 kg/m².    Intake/Output Summary (Last 24 hours) at 11/15/2020 0844  Last data filed at 11/15/2020 0324  Gross per 24 hour   Intake --   Output 1500 ml   Net -1500 ml       Physical  Exam  Vitals signs and nursing note reviewed.   Constitutional:       General: He is not in acute distress.     Appearance: Normal appearance. He is obese. He is ill-appearing.   Cardiovascular:      Rate and Rhythm: Normal rate and regular rhythm.   Pulmonary:      Effort: Pulmonary effort is normal.      Breath sounds: Normal breath sounds. No wheezing.   Abdominal:      General: Abdomen is flat. There is no distension.      Palpations: Abdomen is soft.      Tenderness: There is no abdominal tenderness.   Musculoskeletal:      Comments: There is an area of ecchymosis on his left flank   Neurological:      Mental Status: He is alert.           Results Review:       I reviewed the patient's new clinical results.  Results from last 7 days   Lab Units 11/13/20  0449 11/12/20  0708 11/11/20  0618 11/10/20  1836   WBC 10*3/mm3 11.02* 10.69 7.20 9.36   HEMOGLOBIN g/dL 9.0* 8.8* 9.7* 10.2*   PLATELETS 10*3/mm3 337 301 316 347     Results from last 7 days   Lab Units 11/15/20  0347 11/14/20  0818 11/13/20 0449 11/12/20  0708 11/11/20  2016 11/10/20  1836   SODIUM mmol/L  --  135* 136 139  --  136   POTASSIUM mmol/L  --  4.2 4.4 4.4  --  3.7   CHLORIDE mmol/L  --  105 105 105  --  102   CO2 mmol/L  --  26.3 26.7 24.6  --  24.5   BUN mg/dL  --  23 25* 21  --  12   CREATININE mg/dL 0.95 0.98 1.04 1.09 1.04 1.07   CALCIUM mg/dL  --  8.8 8.7 8.3*  --  9.3   MAGNESIUM mg/dL  --   --  2.1  --   --   --    PHOSPHORUS mg/dL  --  2.5 3.2  --   --   --    Estimated Creatinine Clearance: 80.6 mL/min (by C-G formula based on SCr of 0.95 mg/dL).      Assessment/Plan   Active Hospital Problems    Diagnosis  POA   • **Dyspnea [R06.00]  Unknown   • COVID-19 virus detected [U07.1]  Unknown   • Generalized weakness [R53.1]  Unknown   • History of lung cancer [Z85.118]  Not Applicable   • Renal cell carcinoma (CMS/HCC) [C64.9]  Yes   • Hypertension [I10]  Yes      Resolved Hospital Problems   No resolved problems to display.       PLAN  This  is an 79-year-old gentleman with multiple medical comorbidities including hypertension obesity and metastatic cancer that presents to the hospital with novel coronavirus infection and viral pneumonia with subsequent hypoxia  -Continue to work to titrate oxygen and encourage out of bed activity  -His back pain to me sounds more musculoskeletal however he might have a pleural effusion causing it.  If it persists and does not get better with out of bed activity plan would be to consider a repeat chest x-ray to further evaluate.  Today he is up sitting in the chair and I was able to evaluate his back.  On exam he has large area of ecchymosis over the left flank.  It is unclear what came of this.  -Continue Decadron and Remdesivir  -Appreciate pulmonary's input, they have signed off      Disposition  He is nearing stability for discharge.  Plan is for him to go home with oxygen at discharge.  He could potentially be discharged tomorrow or Tuesday depending on progress      Oh Quintana MD  [facility]  11/15/20  08:44 EST

## 2020-11-16 LAB
ALBUMIN SERPL-MCNC: 3.7 G/DL (ref 3.5–5.2)
ALP SERPL-CCNC: 50 U/L (ref 39–117)
ALT SERPL W P-5'-P-CCNC: 31 U/L (ref 1–41)
ANION GAP SERPL CALCULATED.3IONS-SCNC: 9.5 MMOL/L (ref 5–15)
AST SERPL-CCNC: 26 U/L (ref 1–40)
BILIRUB CONJ SERPL-MCNC: 0.4 MG/DL (ref 0–0.3)
BILIRUB INDIRECT SERPL-MCNC: 1 MG/DL
BILIRUB SERPL-MCNC: 1.4 MG/DL (ref 0–1.2)
BUN SERPL-MCNC: 25 MG/DL (ref 8–23)
BUN/CREAT SERPL: 21 (ref 7–25)
CALCIUM SPEC-SCNC: 9.1 MG/DL (ref 8.6–10.5)
CHLORIDE SERPL-SCNC: 104 MMOL/L (ref 98–107)
CO2 SERPL-SCNC: 26.5 MMOL/L (ref 22–29)
CREAT SERPL-MCNC: 1.19 MG/DL (ref 0.76–1.27)
DEPRECATED RDW RBC AUTO: 40.9 FL (ref 37–54)
ERYTHROCYTE [DISTWIDTH] IN BLOOD BY AUTOMATED COUNT: 10.5 % (ref 12.3–15.4)
GFR SERPL CREATININE-BSD FRML MDRD: 59 ML/MIN/1.73
GLUCOSE SERPL-MCNC: 92 MG/DL (ref 65–99)
HCT VFR BLD AUTO: 30.5 % (ref 37.5–51)
HGB BLD-MCNC: 10.2 G/DL (ref 13–17.7)
MCH RBC QN AUTO: 36.2 PG (ref 26.6–33)
MCHC RBC AUTO-ENTMCNC: 33.4 G/DL (ref 31.5–35.7)
MCV RBC AUTO: 108.2 FL (ref 79–97)
PHOSPHATE SERPL-MCNC: 3 MG/DL (ref 2.5–4.5)
PLATELET # BLD AUTO: 376 10*3/MM3 (ref 140–450)
PMV BLD AUTO: 10.4 FL (ref 6–12)
POTASSIUM SERPL-SCNC: 4.1 MMOL/L (ref 3.5–5.2)
PROT SERPL-MCNC: 5.9 G/DL (ref 6–8.5)
RBC # BLD AUTO: 2.82 10*6/MM3 (ref 4.14–5.8)
SODIUM SERPL-SCNC: 140 MMOL/L (ref 136–145)
WBC # BLD AUTO: 18.55 10*3/MM3 (ref 3.4–10.8)

## 2020-11-16 PROCEDURE — 63710000001 DEXAMETHASONE PER 0.25 MG: Performed by: HOSPITALIST

## 2020-11-16 PROCEDURE — 85027 COMPLETE CBC AUTOMATED: CPT | Performed by: HOSPITALIST

## 2020-11-16 PROCEDURE — 94799 UNLISTED PULMONARY SVC/PX: CPT

## 2020-11-16 PROCEDURE — 80076 HEPATIC FUNCTION PANEL: CPT | Performed by: INTERNAL MEDICINE

## 2020-11-16 PROCEDURE — 80048 BASIC METABOLIC PNL TOTAL CA: CPT | Performed by: HOSPITALIST

## 2020-11-16 PROCEDURE — 84100 ASSAY OF PHOSPHORUS: CPT | Performed by: HOSPITALIST

## 2020-11-16 RX ADMIN — TIOTROPIUM BROMIDE INHALATION SPRAY 2 PUFF: 3.12 SPRAY, METERED RESPIRATORY (INHALATION) at 07:15

## 2020-11-16 RX ADMIN — FAMOTIDINE 20 MG: 20 TABLET, FILM COATED ORAL at 06:37

## 2020-11-16 RX ADMIN — SODIUM CHLORIDE, PRESERVATIVE FREE 10 ML: 5 INJECTION INTRAVENOUS at 20:13

## 2020-11-16 RX ADMIN — CALCIUM 500 MG: 500 TABLET ORAL at 08:20

## 2020-11-16 RX ADMIN — GABAPENTIN 400 MG: 400 CAPSULE ORAL at 06:38

## 2020-11-16 RX ADMIN — FOLIC ACID 1 MG: 1 TABLET ORAL at 12:12

## 2020-11-16 RX ADMIN — LEVOTHYROXINE SODIUM 112 MCG: 112 TABLET ORAL at 06:37

## 2020-11-16 RX ADMIN — RIVAROXABAN 20 MG: 20 TABLET, FILM COATED ORAL at 17:52

## 2020-11-16 RX ADMIN — OXCARBAZEPINE 300 MG: 300 TABLET, FILM COATED ORAL at 20:13

## 2020-11-16 RX ADMIN — MEMANTINE HYDROCHLORIDE 10 MG: 10 TABLET, FILM COATED ORAL at 08:20

## 2020-11-16 RX ADMIN — FOLIC ACID 1 MG: 1 TABLET ORAL at 06:37

## 2020-11-16 RX ADMIN — HYDROCODONE BITARTRATE AND ACETAMINOPHEN 1 TABLET: 5; 325 TABLET ORAL at 09:47

## 2020-11-16 RX ADMIN — FAMOTIDINE 20 MG: 20 TABLET, FILM COATED ORAL at 17:52

## 2020-11-16 RX ADMIN — TAMSULOSIN HYDROCHLORIDE 0.4 MG: 0.4 CAPSULE ORAL at 08:20

## 2020-11-16 RX ADMIN — GABAPENTIN 400 MG: 400 CAPSULE ORAL at 12:12

## 2020-11-16 RX ADMIN — HYDROCODONE BITARTRATE AND ACETAMINOPHEN 1 TABLET: 5; 325 TABLET ORAL at 22:56

## 2020-11-16 RX ADMIN — HYDROCODONE BITARTRATE AND ACETAMINOPHEN 1 TABLET: 5; 325 TABLET ORAL at 15:48

## 2020-11-16 RX ADMIN — SERTRALINE 25 MG: 25 TABLET, FILM COATED ORAL at 08:20

## 2020-11-16 RX ADMIN — CETIRIZINE HYDROCHLORIDE 5 MG: 10 TABLET ORAL at 08:20

## 2020-11-16 RX ADMIN — AMLODIPINE BESYLATE 5 MG: 5 TABLET ORAL at 08:20

## 2020-11-16 RX ADMIN — DOCUSATE SODIUM 50MG AND SENNOSIDES 8.6MG 2 TABLET: 8.6; 5 TABLET, FILM COATED ORAL at 20:13

## 2020-11-16 RX ADMIN — FOLIC ACID 1 MG: 1 TABLET ORAL at 20:13

## 2020-11-16 RX ADMIN — POLYETHYLENE GLYCOL 3350 17 G: 17 POWDER, FOR SOLUTION ORAL at 08:19

## 2020-11-16 RX ADMIN — SODIUM CHLORIDE, PRESERVATIVE FREE 10 ML: 5 INJECTION INTRAVENOUS at 08:19

## 2020-11-16 RX ADMIN — DEXAMETHASONE 6 MG: 4 TABLET ORAL at 08:19

## 2020-11-16 RX ADMIN — GABAPENTIN 800 MG: 400 CAPSULE ORAL at 20:13

## 2020-11-16 RX ADMIN — HYDROCODONE BITARTRATE AND ACETAMINOPHEN 1 TABLET: 5; 325 TABLET ORAL at 02:39

## 2020-11-16 RX ADMIN — MEMANTINE HYDROCHLORIDE 10 MG: 10 TABLET, FILM COATED ORAL at 20:13

## 2020-11-16 RX ADMIN — DOCUSATE SODIUM 100 MG: 100 CAPSULE, LIQUID FILLED ORAL at 17:52

## 2020-11-16 RX ADMIN — DONEPEZIL HYDROCHLORIDE 10 MG: 10 TABLET, FILM COATED ORAL at 20:13

## 2020-11-16 NOTE — PROGRESS NOTES
Continued Stay Note  [facility]     Patient Name: Mg Gerber Sr.  MRN: [id number]  Today's Date: 11/16/2020    Admit Date: 11/10/2020    Discharge Plan     Row Name 11/16/20 1441       Plan    Plan  SNF vs home with Liliya AGUILAR and Prasad for home O2.    Plan Comments  CCP spoke w/ pt and his wife Lucina.  Lucina and their dtr have been tested for Covid, but do not have their results back.  CCP discussed w/ pt whether he feels strong enough to return home.  Discussed rehab facilities w/ Covid units.  He requests a referral to [facility], but will work w/ PT to see if he is strong enough to go home.  Scientologist LAUREN and Prasad are following.        Discharge Codes    No documentation.       Expected Discharge Date and Time     Expected Discharge Date Expected Discharge Time    Nov 16, 2020             Tiffany Swann RN

## 2020-11-16 NOTE — PROGRESS NOTES
Case Management/Social Work    Patient Name:  Mg Gerber Sr.  YOB: 1940  MRN: [id number]  Admit Date:  11/10/2020      O2 sat at rest on room air this morning at 0600: 87%      Electronically signed by:  Tiffany Swann RN  11/16/20 13:34 EST

## 2020-11-16 NOTE — PROGRESS NOTES
[phone]   LOS: 6 days     Name: Mg Gerber Sr.  Age/Sex: 79 y.o. male  :  1940        PCP: Dillon Marsh MD  Chief Complaint   Patient presents with   • Shortness of Breath   • Fever   • Weakness - Generalized      Subjective   Feeling well no issues only needs O2 overnight.  No fevers   General: No Fever or Chills, Cardiac: No Chest Pain or Palpitations, Resp: No Cough or SOA, GI: No Nausea, Vomiting, or Diarrhea and Other: No bleeding    amLODIPine, 5 mg, Oral, Daily  calcium carbonate (oyster shell), 500 mg, Oral, Daily  cetirizine, 5 mg, Oral, Daily  dexamethasone, 6 mg, Oral, Daily With Breakfast  donepezil, 10 mg, Oral, Nightly  famotidine, 20 mg, Oral, BID AC  folic acid, 1 mg, Oral, 3 times per day  gabapentin, 400 mg, Oral, 2 times per day  gabapentin, 800 mg, Oral, Nightly  levothyroxine, 112 mcg, Oral, Q AM  memantine, 10 mg, Oral, Q12H  OXcarbazepine, 300 mg, Oral, Nightly  polyethylene glycol, 17 g, Oral, Daily  rivaroxaban, 20 mg, Oral, Daily With Dinner  senna-docusate sodium, 2 tablet, Oral, Nightly  sertraline, 25 mg, Oral, Daily  sodium chloride, 10 mL, Intravenous, Q12H  tamsulosin, 0.4 mg, Oral, Daily  tiotropium bromide monohydrate, 2 puff, Inhalation, Daily - RT      sodium chloride, 100 mL/hr, Last Rate: Stopped (20 1136)        Objective   Vital Signs  Temp:  [97.3 °F (36.3 °C)-98.5 °F (36.9 °C)] 97.9 °F (36.6 °C)  Heart Rate:  [56-86] 66  Resp:  [18-20] 18  BP: (130-151)/(67-93) 130/73  Body mass index is 31.99 kg/m².    Intake/Output Summary (Last 24 hours) at 2020 1646  Last data filed at 2020 0639  Gross per 24 hour   Intake --   Output 1150 ml   Net -1150 ml       Physical Exam  Vitals signs and nursing note reviewed.   Constitutional:       General: He is not in acute distress.     Appearance: Normal appearance. He is obese. He is ill-appearing.   Cardiovascular:      Rate and Rhythm: Normal rate and regular rhythm.   Pulmonary:      Effort:  Pulmonary effort is normal.      Breath sounds: Normal breath sounds. No wheezing.   Abdominal:      General: Abdomen is flat. There is no distension.      Palpations: Abdomen is soft.      Tenderness: There is no abdominal tenderness.   Musculoskeletal:      Comments: There is an area of ecchymosis on his left flank   Neurological:      Mental Status: He is alert.           Results Review:       I reviewed the patient's new clinical results.  Results from last 7 days   Lab Units 11/16/20  0551 11/13/20  0449 11/12/20  0708 11/11/20  0618 11/10/20  1836   WBC 10*3/mm3 18.55* 11.02* 10.69 7.20 9.36   HEMOGLOBIN g/dL 10.2* 9.0* 8.8* 9.7* 10.2*   PLATELETS 10*3/mm3 376 337 301 316 347     Results from last 7 days   Lab Units 11/16/20  0551 11/15/20  0347 11/14/20 0818 11/13/20  0449 11/12/20  0708 11/11/20  2016 11/10/20  1836   SODIUM mmol/L 140  --  135* 136 139  --  136   POTASSIUM mmol/L 4.1  --  4.2 4.4 4.4  --  3.7   CHLORIDE mmol/L 104  --  105 105 105  --  102   CO2 mmol/L 26.5  --  26.3 26.7 24.6  --  24.5   BUN mg/dL 25*  --  23 25* 21  --  12   CREATININE mg/dL 1.19 0.95 0.98 1.04 1.09 1.04 1.07   CALCIUM mg/dL 9.1  --  8.8 8.7 8.3*  --  9.3   MAGNESIUM mg/dL  --   --   --  2.1  --   --   --    PHOSPHORUS mg/dL 3.0  --  2.5 3.2  --   --   --    Estimated Creatinine Clearance: 64.4 mL/min (by C-G formula based on SCr of 1.19 mg/dL).      Assessment/Plan   Active Hospital Problems    Diagnosis  POA   • **Dyspnea [R06.00]  Unknown   • COVID-19 virus detected [U07.1]  Unknown   • Generalized weakness [R53.1]  Unknown   • History of lung cancer [Z85.118]  Not Applicable   • Renal cell carcinoma (CMS/HCC) [C64.9]  Yes   • Hypertension [I10]  Yes      Resolved Hospital Problems   No resolved problems to display.       PLAN  This is an 79-year-old gentleman with multiple medical comorbidities including hypertension obesity and metastatic cancer that presents to the hospital with novel coronavirus infection and  viral pneumonia with subsequent hypoxia  -Continue to work to titrate oxygen and encourage out of bed activity  -His back pain to me sounds more musculoskeletal however he might have a pleural effusion causing it.  If it persists and does not get better with out of bed activity plan would be to consider a repeat chest x-ray to further evaluate.  Today he is up sitting in the chair and I was able to evaluate his back.  On exam he has large area of ecchymosis over the left flank.  It is unclear what came of this.  -Continue Decadron and Remdesivir  -Appreciate pulmonary's input, they have signed off      Disposition  Stable for DC family and CCP working on plan        Oh Quintana MD  [facility]  11/16/20  16:46 EST

## 2020-11-16 NOTE — PLAN OF CARE
Goal Outcome Evaluation:  Plan of Care Reviewed With: patient  Progress: improving  Outcome Summary: Pt A&Ox4, VSS - on RA all day, pain well controlled with PRN Lortab. Pt was going to be discharged today, but the pt's wife and daughter who are going to be taking care of him aren't certain if they're Covid+ or not and the wife has been ill--tomorrow the pt will either be d/c'd home with home health or if pt qualifies will go to SNF. Will CTM.

## 2020-11-16 NOTE — DISCHARGE SUMMARY
Patient Name: Mg Gerber Sr.  : 1940  MRN: [id number]    Date of Admission: 11/10/2020  Date of Discharge:  2020  Primary Care Physician: Dillon Marsh MD      Chief Complaint:   Shortness of Breath, Fever, and Weakness - Generalized      Discharge Diagnoses     Active Hospital Problems    Diagnosis  POA   • **Dyspnea [R06.00]  Unknown   • COVID-19 virus detected [U07.1]  Unknown   • Generalized weakness [R53.1]  Unknown   • History of lung cancer [Z85.118]  Not Applicable   • Renal cell carcinoma (CMS/HCC) [C64.9]  Yes   • Hypertension [I10]  Yes      Resolved Hospital Problems   No resolved problems to display.        Hospital Course     Mr. Gerber is a 79 y.o. male with a history of lung cancer, obesity, hypertension, and renal cell carcinoma who presented to [facility] initially complaining of fevers shortness of breath and cough.  Please see the admitting history and physical for further details.  He was found to have viral pneumonia and was admitted to the hospital for further evaluation and treatment.  He was admitted to the hospital with viral pneumonia related to the novel coronavirus.  He was requiring supplemental oxygen and was evaluated by infectious disease and pulmonary.  They recommended placement on remdesivir and Decadron he tolerated those medications well.  He has been slowly titrated off oxygen during the day but still requiring it at night.  He has been rather weak and fatigued and really has not been on a red bed much while in the hospital.  Plan is to discharge to a skilled nursing facility where he can work on regaining his strength and independence to return home.  His daughter tested negative for coronavirus but his wife is tested positive and she is resting at home.    Subjective:  He is feeling better remains agreeable to going to a skilled nursing facility.  Has been a little weak on his feet and at this point his wife has Covid as well is  likely unable to really help him at home.    Review of Systems  He currently denies any fevers or chills nausea or vomiting.  Denies any diarrhea constipation.  Shortness of breath is at its baseline.  Physical Exam:  Temp:  [96.8 °F (36 °C)-97.9 °F (36.6 °C)] 96.8 °F (36 °C)  Heart Rate:  [58-66] 61  Resp:  [18] 18  BP: (130-154)/(73-88) 154/74  Body mass index is 31.99 kg/m².  Physical Exam    Consultants     Consult Orders (all) (From admission, onward)     Start     Ordered    11/11/20 1125  Inpatient Pulmonology Consult  Once     Specialty:  Pulmonary Disease  Provider:  Elmer Cevallos MD    11/11/20 1124    11/11/20 0745  Hematology & Oncology Inpatient Consult  Once     Specialty:  Hematology and Oncology  Provider:  Dillon Pedroza MD    11/11/20 0745    11/10/20 2305  [facility] (on-call MD unless specified) Details  Once     Specialty:  Hospitalist  Provider:  (Not yet assigned)    11/10/20 2304              Procedures     Imaging Results (All)     Procedure Component Value Units Date/Time    CT Angiogram Chest [[id number]] Collected: 11/10/20 2122     Updated: 11/10/20 2138    Narrative:      CT PULMONARY ANGIOGRAM:     HISTORY:  Chest pain. Shortness of breath.     TECHNIQUE:  Non contrast localizing images were performed through the  mediastinum for localization and bolus timing. Axial images were  obtained from the apex to the lung bases and during IV contrast  infusion. No adverse reaction. Multiplanar reformatted images were  reconstructed from the helical source data. Radiation dose reduction  techniques were utilized, including automated exposure control and  exposure modulation based on body size.         COMPARISON:  CTA chest 04/25/2015. CT abdomen and pelvis 09/29/2015     FINDINGS:        Suboptimal opacification of the pulmonary arterial system. There is  respiratory motion artifact. Normal caliber main pulmonary artery. No  evidence of pulmonary embolus within the main, right and left  pulmonary  arteries. The segmental branches cannot be clearly evaluated..     The thoracic aorta is normal in caliber. Scattered atherosclerotic  calcifications.      The heart is normal in size. No pericardial effusion.Coronary artery  opacifications.     Right-sided Mediport terminates in the SVC.     There is a 4.4 x 4.1 cm left hilar mass that has enlarged when compared  to the prior examination from 2015 where this measured approximately 3  cm in size. No other mediastinal or right hilar lymphadenopathy. No  axillary lymphadenopathy.     Previously seen paraspinal masses have decreased in size.     The central airways are patent. No focal consolidation or suspicious  pulmonary nodules. Trace left pleural effusion with mild compressive  atelectasis. No pneumothorax.     Mild nodular contour of the liver indicating possible cirrhosis. Patient  is status post left nephrectomy and splenectomy.     No acute osseous abnormality. No aggressive osseous lesions.  Degenerative changes of the spine.     The chest wall soft tissues are normal.     The visualized thyroid gland is unremarkable.          Impression:      1.  Suboptimal examination evaluate for pulmonary embolism. There is no  pulmonary embolism within the main, right and left pulmonary arteries.  The segmental and distal branches cannot be clearly evaluated.  2.  Trace left pleural effusion with mild compressive atelectasis. No  other evidence for acute cardiopulmonary disease.  3.  Left hilar mass mass that has enlarged when compared to the prior  exam from 04/25/2015.  4.  Previously seen paraspinal masses have decreased in size.  5.  Possible cirrhotic liver.     This report was finalized on 11/10/2020 9:32 PM by Dr. Marshal Blackwood M.D.       XR Chest AP [[id number]] Collected: 11/10/20 1946     Updated: 11/10/20 2039    Narrative:      ONE VIEW PORTABLE CHEST     HISTORY: Cough and fever. Possible Covid 19 pneumonia. Previous lung  cancer.     FINDINGS:  There appears to be a large left perihilar mass and concerning  for neoplasm in this patient with previous history of left-sided lung  cancer and paraspinal mass. Further evaluation with CT scan is therefore  recommended. The area of concern measures up to 5.6 x 7 cm. The heart is  top normal in size. Right-sided MediPort catheter ends in the SVC.     This report was finalized on 11/10/2020 8:36 PM by Dr. Leo Franklin M.D.             Pertinent Labs     Results from last 7 days   Lab Units 11/16/20 0551 11/13/20 0449 11/12/20 0708 11/11/20 0618   WBC 10*3/mm3 18.55* 11.02* 10.69 7.20   HEMOGLOBIN g/dL 10.2* 9.0* 8.8* 9.7*   PLATELETS 10*3/mm3 376 337 301 316     Results from last 7 days   Lab Units 11/17/20  0622 11/16/20  0551 11/15/20  0347 11/14/20  0818 11/13/20  0449 11/12/20  0708   SODIUM mmol/L  --  140  --  135* 136 139   POTASSIUM mmol/L  --  4.1  --  4.2 4.4 4.4   CHLORIDE mmol/L  --  104  --  105 105 105   CO2 mmol/L  --  26.5  --  26.3 26.7 24.6   BUN mg/dL  --  25*  --  23 25* 21   CREATININE mg/dL 0.90 1.19 0.95 0.98 1.04 1.09   GLUCOSE mg/dL  --  92  --  93 110* 115*   Estimated Creatinine Clearance: 85.1 mL/min (by C-G formula based on SCr of 0.9 mg/dL).  Results from last 7 days   Lab Units 11/17/20  0622 11/16/20  0551 11/15/20  0347 11/14/20  0818   ALBUMIN g/dL 3.50 3.70 3.80 3.90   BILIRUBIN mg/dL 1.8* 1.4* 1.1 1.1   ALK PHOS U/L 53 50 48 53   AST (SGOT) U/L 20 26 32 32   ALT (SGPT) U/L 28 31 32 24     Results from last 7 days   Lab Units 11/17/20  0622 11/16/20  0551 11/15/20  0347 11/14/20  0818 11/13/20  0449 11/12/20  0708   CALCIUM mg/dL  --  9.1  --  8.8 8.7 8.3*   ALBUMIN g/dL 3.50 3.70 3.80 3.90 3.40* 3.50   MAGNESIUM mg/dL  --   --   --   --  2.1  --    PHOSPHORUS mg/dL  --  3.0  --  2.5 3.2  --        Results from last 7 days   Lab Units 11/10/20  1836   TROPONIN T ng/mL <0.010   PROBNP pg/mL 877.7   D DIMER QUANT MCGFEU/mL 0.36     Results from last 7 days   Lab Units  11/13/20  0449   URIC ACID mg/dL 4.6         Invalid input(s): LDLCALC  Results from last 7 days   Lab Units 11/10/20  2252 11/10/20  2248   BLOODCX  No growth at 5 days No growth at 5 days       Test Results Pending at Discharge       Discharge Details        Discharge Medications      Changes to Medications      Instructions Start Date   docusate sodium 100 MG capsule  Commonly known as: Colace  What changed:   · when to take this  · reasons to take this   100 mg, Oral, Nightly      gabapentin 400 MG capsule  Commonly known as: NEURONTIN  What changed:   · how much to take  · when to take this  · additional instructions   400 mg, Oral, 4 Times Daily         Continue These Medications      Instructions Start Date   ALPRAZolam 0.25 MG tablet  Commonly known as: XANAX   0.25 mg, Oral, Daily PRN      amLODIPine 5 MG tablet  Commonly known as: NORVASC   5 mg, Oral, Daily      CALTRATE 600+D3 PO   1 tablet, Oral, 2 times daily      cyclobenzaprine 5 MG tablet  Commonly known as: FLEXERIL   5 mg, Oral, 2 Times Daily PRN      FIBER SELECT GUMMIES PO   Oral      finasteride 5 MG tablet  Commonly known as: PROSCAR   No dose, route, or frequency recorded.      folic acid 1 MG tablet  Commonly known as: FOLVITE   1 mg, Oral, 3 Times Daily      HYDROcodone-acetaminophen 5-325 MG per tablet  Commonly known as: NORCO   1 tablet, Oral, Every 6 Hours PRN      levocetirizine 5 MG tablet  Commonly known as: XYZAL   TAKE ONE TABLET BY MOUTH EVERY EVENING      levothyroxine sodium 112 MCG capsule  Commonly known as: TIROSINT   Daily      meclizine 12.5 MG tablet  Commonly known as: ANTIVERT   12.5 mg, Oral, 2 Times Daily PRN      metOLazone 5 MG tablet  Commonly known as: ZAROXOLYN   No dose, route, or frequency recorded.      Namzaric 28-10 MG capsule sustained-release 24 hr  Generic drug: Memantine HCl-Donepezil HCl   1 capsule, Oral, Every Evening      NIVOLUMAB IV   Intravenous      OXcarbazepine 300 MG tablet  Commonly known as:  TRILEPTAL   300 mg, Oral, Every Night at Bedtime      sertraline 25 MG tablet  Commonly known as: ZOLOFT   25 mg, Oral, Daily, Take 1-2 tabs by mouth daily      Spiriva HandiHaler 18 MCG per inhalation capsule  Generic drug: tiotropium   1 capsule, Inhalation, Daily - RT      Stiolto Respimat 2.5-2.5 MCG/ACT aerosol solution inhaler  Generic drug: tiotropium bromide-olodaterol   Inhalation, 2 Times Daily      tamsulosin 0.4 MG capsule 24 hr capsule  Commonly known as: FLOMAX   1 capsule, Oral, Daily      Ventolin  (90 Base) MCG/ACT inhaler  Generic drug: albuterol sulfate HFA   2 puffs, Inhalation, Every 4 Hours PRN      Xarelto 20 MG tablet  Generic drug: rivaroxaban   20 mg, Oral, Daily With Dinner             Allergies   Allergen Reactions   • Latex Rash         Discharge Disposition:  Skilled Nursing Facility (DC - External)    Discharge Diet:  Diet Order   Procedures   • Diet Regular; Cardiac; Safe Tray       Discharge Activity:   Activity Instructions     Activity as Tolerated            CODE STATUS:    Code Status and Medical Interventions:   Ordered at: 11/11/20 0120     Code Status:    CPR     Medical Interventions (Level of Support Prior to Arrest):    Full       Future Appointments   Date Time Provider Department Center   12/28/2020 10:00 AM Luther Cote MD [id number]     Additional Instructions for the Follow-ups that You Need to Schedule     Ambulatory Referral to Home Health   As directed      Face to Face Visit Date: 11/16/2020    Follow-up provider for Plan of Care?: I will be treating the patient on an ongoing basis.  Please send me the Plan of Care for signature.    Follow-up provider: LUTHER COTE [[id number]]    Reason/Clinical Findings: covid pneumonia    Describe mobility limitations that make leaving home difficult: quarintined    Nursing/Therapeutic Services Requested: Skilled Nursing Physical Therapy    Skilled nursing orders: O2 instruction    Frequency: 1 Week 1          Discharge Follow-up with PCP   As directed       Currently Documented PCP:    Luther Cote MD    PCP Phone Number:    [phone]     Follow Up Details: 4-6 weeks         Discharge Follow-up with Specified Provider: Dr Cevallos as directed   As directed      To: Dr Cevallos as directed            Contact information for follow-up providers     Luther Cote MD .    Specialty: Family Medicine  Why: 4-6 weeks  Contact information:  [address]  [address]  [facility] [id number] [phone]                   Contact information for after-discharge care     Home Medical Care     [facility] .    Service: Home Health Services  Contact information:  [address]  [facility] [address] [phone]                             Additional Instructions for the Follow-ups that You Need to Schedule     Ambulatory Referral to Home Health   As directed      Face to Face Visit Date: 11/16/2020    Follow-up provider for Plan of Care?: I will be treating the patient on an ongoing basis.  Please send me the Plan of Care for signature.    Follow-up provider: LUTHER COTE [[id number]]    Reason/Clinical Findings: covid pneumonia    Describe mobility limitations that make leaving home difficult: quarintined    Nursing/Therapeutic Services Requested: Skilled Nursing Physical Therapy    Skilled nursing orders: O2 instruction    Frequency: 1 Week 1         Discharge Follow-up with PCP   As directed       Currently Documented PCP:    Luther Cote MD    PCP Phone Number:    [phone]     Follow Up Details: 4-6 weeks         Discharge Follow-up with Specified Provider: Dr Cevallos as directed   As directed      To: Dr Cevallos as directed           Time Spent on Discharge:  Greater than 30 minutes      Oh Quintana MD  [facility]  11/17/20  12:53 EST

## 2020-11-16 NOTE — CONSULTS
Initial hospitality visit over the phone. Pt indicated that he was feeling better and eagerly anticipating DC. Seemed a little confused (or perhaps was just hard of hearing).

## 2020-11-17 LAB
ALBUMIN SERPL-MCNC: 3.5 G/DL (ref 3.5–5.2)
ALP SERPL-CCNC: 53 U/L (ref 39–117)
ALT SERPL W P-5'-P-CCNC: 28 U/L (ref 1–41)
AST SERPL-CCNC: 20 U/L (ref 1–40)
BILIRUB CONJ SERPL-MCNC: 0.4 MG/DL (ref 0–0.3)
BILIRUB INDIRECT SERPL-MCNC: 1.4 MG/DL
BILIRUB SERPL-MCNC: 1.8 MG/DL (ref 0–1.2)
CREAT SERPL-MCNC: 0.9 MG/DL (ref 0.76–1.27)
GFR SERPL CREATININE-BSD FRML MDRD: 81 ML/MIN/1.73
PROT SERPL-MCNC: 6.1 G/DL (ref 6–8.5)

## 2020-11-17 PROCEDURE — 94799 UNLISTED PULMONARY SVC/PX: CPT

## 2020-11-17 PROCEDURE — 82565 ASSAY OF CREATININE: CPT | Performed by: INTERNAL MEDICINE

## 2020-11-17 PROCEDURE — 97110 THERAPEUTIC EXERCISES: CPT

## 2020-11-17 PROCEDURE — 80076 HEPATIC FUNCTION PANEL: CPT | Performed by: INTERNAL MEDICINE

## 2020-11-17 PROCEDURE — 63710000001 DEXAMETHASONE PER 0.25 MG: Performed by: HOSPITALIST

## 2020-11-17 RX ADMIN — TIOTROPIUM BROMIDE INHALATION SPRAY 2 PUFF: 3.12 SPRAY, METERED RESPIRATORY (INHALATION) at 07:25

## 2020-11-17 RX ADMIN — DOCUSATE SODIUM 50MG AND SENNOSIDES 8.6MG 2 TABLET: 8.6; 5 TABLET, FILM COATED ORAL at 20:32

## 2020-11-17 RX ADMIN — POLYETHYLENE GLYCOL 3350 17 G: 17 POWDER, FOR SOLUTION ORAL at 08:29

## 2020-11-17 RX ADMIN — FAMOTIDINE 20 MG: 20 TABLET, FILM COATED ORAL at 05:35

## 2020-11-17 RX ADMIN — DEXAMETHASONE 6 MG: 4 TABLET ORAL at 08:29

## 2020-11-17 RX ADMIN — FOLIC ACID 1 MG: 1 TABLET ORAL at 20:32

## 2020-11-17 RX ADMIN — AMLODIPINE BESYLATE 5 MG: 5 TABLET ORAL at 08:29

## 2020-11-17 RX ADMIN — DONEPEZIL HYDROCHLORIDE 10 MG: 10 TABLET, FILM COATED ORAL at 20:32

## 2020-11-17 RX ADMIN — LEVOTHYROXINE SODIUM 112 MCG: 112 TABLET ORAL at 06:10

## 2020-11-17 RX ADMIN — SODIUM CHLORIDE, PRESERVATIVE FREE 10 ML: 5 INJECTION INTRAVENOUS at 08:32

## 2020-11-17 RX ADMIN — FOLIC ACID 1 MG: 1 TABLET ORAL at 06:10

## 2020-11-17 RX ADMIN — HYDROCODONE BITARTRATE AND ACETAMINOPHEN 1 TABLET: 5; 325 TABLET ORAL at 05:35

## 2020-11-17 RX ADMIN — RIVAROXABAN 20 MG: 20 TABLET, FILM COATED ORAL at 17:04

## 2020-11-17 RX ADMIN — MEMANTINE HYDROCHLORIDE 10 MG: 10 TABLET, FILM COATED ORAL at 08:29

## 2020-11-17 RX ADMIN — FOLIC ACID 1 MG: 1 TABLET ORAL at 12:02

## 2020-11-17 RX ADMIN — GABAPENTIN 400 MG: 400 CAPSULE ORAL at 05:35

## 2020-11-17 RX ADMIN — TAMSULOSIN HYDROCHLORIDE 0.4 MG: 0.4 CAPSULE ORAL at 08:32

## 2020-11-17 RX ADMIN — MEMANTINE HYDROCHLORIDE 10 MG: 10 TABLET, FILM COATED ORAL at 20:32

## 2020-11-17 RX ADMIN — FAMOTIDINE 20 MG: 20 TABLET, FILM COATED ORAL at 17:04

## 2020-11-17 RX ADMIN — ALPRAZOLAM 0.25 MG: 0.25 TABLET ORAL at 01:12

## 2020-11-17 RX ADMIN — SERTRALINE 25 MG: 25 TABLET, FILM COATED ORAL at 08:29

## 2020-11-17 RX ADMIN — CALCIUM 500 MG: 500 TABLET ORAL at 08:31

## 2020-11-17 RX ADMIN — CETIRIZINE HYDROCHLORIDE 5 MG: 10 TABLET ORAL at 08:31

## 2020-11-17 RX ADMIN — SODIUM CHLORIDE, PRESERVATIVE FREE 10 ML: 5 INJECTION INTRAVENOUS at 20:38

## 2020-11-17 RX ADMIN — HYDROCODONE BITARTRATE AND ACETAMINOPHEN 1 TABLET: 5; 325 TABLET ORAL at 20:33

## 2020-11-17 RX ADMIN — GABAPENTIN 400 MG: 400 CAPSULE ORAL at 12:02

## 2020-11-17 RX ADMIN — GABAPENTIN 800 MG: 400 CAPSULE ORAL at 20:33

## 2020-11-17 RX ADMIN — OXCARBAZEPINE 300 MG: 300 TABLET, FILM COATED ORAL at 20:33

## 2020-11-17 NOTE — THERAPY TREATMENT NOTE
Patient Name: Mg Gerber Sr.  : 1940    MRN: [id number]                              Today's Date: 2020       Admit Date: 11/10/2020    Visit Dx:     ICD-10-CM ICD-9-CM   1. History of lung cancer  Z85.118 V10.11   2. On antineoplastic chemotherapy  Z79.899 V58.69   3. Fever and chills  R50.9 780.60   4. Shortness of breath  R06.02 786.05   5. COVID-19  U07.1 079.89   6. Hypoxemia  R09.02 799.02     Patient Active Problem List   Diagnosis   • Anemia, vitamin B12 deficiency   • Arthritis   • Hemochromatosis   • Herpes zoster   • Hip pain   • Hypertension   • Cancer (CMS/HCC)   • COPD (chronic obstructive pulmonary disease) (CMS/HCC)   • Left low back pain   • Dizziness   • Headache around the eyes   • Mild cognitive impairment   • Renal cell carcinoma (CMS/HCC)   • Anxiety   • Malignant neoplasm of lung (CMS/HCC)   • Neuropathy   • Perennial allergic rhinitis   • Vitamin B12 deficiency   • Osteoarthritis of lumbar spine   • Pharyngitis   • Bronchitis   • Other constipation   • Hx of hiatal hernia   • History of lung cancer   • COVID-19 virus detected   • Dyspnea   • Generalized weakness     Past Medical History:   Diagnosis Date   • Allergic    • Anemia, vitamin B12 deficiency 2012   • Anxiety    • Arthritis 2014   • Atrial fibrillation (CMS/HCC)    • Cancer (CMS/HCC) 2014    kidney; prostate   • Cataract    • Chemotherapeutic agent or infusion extravasation     q 1-2 wks    • COPD (chronic obstructive pulmonary disease) (CMS/HCC) 2014   • Emphysema of lung (CMS/HCC)    • Hemochromatosis 2014   • Herpes zoster 2011    left medial thigh   • Hip pain 2014   • History of Doppler ultrasound 2011    superficial and deep venous insuffiency   • History of EKG 02/10/2012    Dr. Kathi Lloyd; unchanged from prior EKG   • Hypertension     benign essential   • Injury of back    • Pulmonary embolism (CMS/HCC) 2014   • Renal cell cancer (CMS/HCC)    •  Shortness of breath    • Sleep apnea      Past Surgical History:   Procedure Laterality Date   • APPENDECTOMY     • CATARACT EXTRACTION     • CHOLECYSTECTOMY     • SPLENECTOMY     • TONSILLECTOMY     • VEIN SURGERY       General Information     Row Name 11/17/20 1000          Physical Therapy Time and Intention    Document Type  therapy note (daily note)  -DJ     Mode of Treatment  individual therapy;physical therapy  -DJ     Row Name 11/17/20 1000          General Information    Patient Profile Reviewed  yes  -DJ     Existing Precautions/Restrictions  fall  -DJ     Row Name 11/17/20 1000          Cognition    Orientation Status (Cognition)  oriented x 4 Pleasant and cooperative; eager for d/c  -DJ     Row Name 11/17/20 1000          Safety Issues, Functional Mobility    Impairments Affecting Function (Mobility)  endurance/activity tolerance;strength;balance  -DJ     Comment, Safety Issues/Impairments (Mobility)  gt belt, grippy socks  -DJ       User Key  (r) = Recorded By, (t) = Taken By, (c) = Cosigned By    Initials Name Provider Type    Gayatri Headley, PT Physical Therapist        Mobility     Row Name 11/17/20 1001          Bed Mobility    Bed Mobility  supine-sit;sit-supine  -DJ     Supine-Sit [place] (Bed Mobility)  standby assist;verbal cues  -DJ     Sit-Supine [place] (Bed Mobility)  verbal cues;standby assist  -DJ     Assistive Device (Bed Mobility)  bed rails;head of bed elevated  -DJ     Comment (Bed Mobility)  Pt c/o dizziness immediately upon sitting EOB  -DJ     Row Name 11/17/20 1001          Transfers    Comment (Transfers)  sit/stand from EOB x 2; commode x 1  -DJ     Row Name 11/17/20 1001          Bed-Chair Transfer    Bed-Chair [place] (Transfers)  not tested  -DJ     Assistive Device (Bed-Chair Transfers)  -- HHA  -DJ     Row Name 11/17/20 1001          Sit-Stand Transfer    Sit-Stand [place] (Transfers)  contact guard  -DJ     Row Name 11/17/20 1001          Gait/Stairs  (Locomotion)    [place] Level (Gait)  minimum assist (75% patient effort)  -DJ     Assistive Device (Gait)  -- HHA  -DJ     Distance in Feet (Gait)  20' x 2  -DJ     Deviations/Abnormal Patterns (Gait)  stride length decreased;festinating/shuffling;samara decreased;gait speed decreased  -DJ     Bilateral Gait Deviations  forward flexed posture  -DJ     [place] Level (Stairs)  not tested  -DJ     Comment (Gait/Stairs)  Pt amb 20' x 2 from bed to/from bathroom without AD but req min A today due to sig c/o dizziness when up. Balance would be better with AD, posture is flexed, endurance is poor. Pt declined to sit UIC stating he might pass out.  -DJ       User Key  (r) = Recorded By, (t) = Taken By, (c) = Cosigned By    Initials Name Provider Type    Gayatri Headley, PHILIPPE Physical Therapist        Obj/Interventions     Row Name 11/17/20 1004          Motor Skills    Motor Skills  functional endurance  -DJ     Functional Endurance  poor - limited by c/o dizziness wtih standing  -DJ     Therapeutic Exercise  -- AP, QS, GS, sh flex, and bicep curls  -DJ     Row Name 11/17/20 1004          Balance    Balance Assessment  sitting static balance;standing static balance  -DJ     Static Sitting Balance  WNL  -DJ     Static Standing Balance  mild impairment  -DJ     Comment, Balance  dynamic standing balance min A due to c/o dizziness  -DJ       User Key  (r) = Recorded By, (t) = Taken By, (c) = Cosigned By    Initials Name Provider Type    Gayatri Headley, PHILIPPE Physical Therapist        Goals/Plan    No documentation.       Clinical Impression     Row Name 11/17/20 1006          Pain    Additional Documentation  Pain Scale: FACES Pre/Post-Treatment (Group)  -DJ     Row Name 11/17/20 1006          Pain Scale: Numbers Pre/Post-Treatment    Pre/Posttreatment Pain Comment  c/c is dizziness  -DJ     Pain Intervention(s)  Repositioned;Rest  -DJ     Row Name 11/17/20 1006          Plan of Care Review    Plan of Care Reviewed  "With  patient  -DJ     Progress  no change  -DJ     Outcome Summary  Pt essentially independent iw bed mobility adn req CGA for sit/stand transfer. However, he was only able to amb 20' x 2 from bed to/from bathroom. He req min A due to balance concerns and c/o dizziness. His balance would improve with use of AD. He declined to sit in recliner chair stating that he might \"pass out\". Reviewed seated and supine HEP with pt and endouraged him to move frequently throughout the day. Due to his weakness and c/o dizziness, he may be safer going to rehab for a few weeks to regain his strength before returning home with his wife and rekha. Cont PT for ther ex, gt/transfer training, bed mobility, balance, and activity tolerance as appropriate.  -DJ     Row Name 11/17/20 1006          Therapy Assessment/Plan (PT)    Criteria for Skilled Interventions Met (PT)  yes;meets criteria  -DJ     Row Name 11/17/20 1006          Vital Signs    Pre SpO2 (%)  95  -DJ     O2 Delivery Pre Treatment  room air  -DJ     O2 Delivery Intra Treatment  room air  -DJ     Post SpO2 (%)  97  -DJ     O2 Delivery Post Treatment  room air  -DJ     Pre Patient Position  Supine  -DJ     Intra Patient Position  Standing  -DJ     Post Patient Position  Supine  -DJ     Row Name 11/17/20 1006          Positioning and Restraints    Pre-Treatment Position  in bed  -DJ     Post Treatment Position  bed  -DJ     In Bed  supine;call light within reach;encouraged to call for assist;exit alarm on  -DJ       User Key  (r) = Recorded By, (t) = Taken By, (c) = Cosigned By    Initials Name Provider Type    Gayatri Headley, PT Physical Therapist        Outcome Measures     Row Name 11/17/20 1011          How much help from another person do you currently need...    Turning from your back to your side while in flat bed without using bedrails?  4  -DJ     Moving from lying on back to sitting on the side of a flat bed without bedrails?  4  -DJ     Moving to and from a bed to " a chair (including a wheelchair)?  3  -DJ     Standing up from a chair using your arms (e.g., wheelchair, bedside chair)?  3  -DJ     Climbing 3-5 steps with a railing?  3  -DJ     To walk in hospital room?  3  -DJ     AM-PAC 6 Clicks Score (PT)  20  -DJ     Row Name 11/17/20 1011          Functional Assessment    Outcome Measure Options  AM-PAC 6 Clicks Basic Mobility (PT)  -DJ       User Key  (r) = Recorded By, (t) = Taken By, (c) = Cosigned By    Initials Name Provider Type    Gayatri Headley, PT Physical Therapist        Physical Therapy Education                 Title: PT OT SLP Therapies (Done)     Topic: Physical Therapy (Done)     Point: Mobility training (Done)     Learning Progress Summary           Patient Acceptance, TB, DU by  at 11/17/2020 1012    Acceptance, E, VU,NR by  at 11/15/2020 1441    Acceptance, E, VU by  at 11/13/2020 1639    Acceptance, E,TB,D, VU,NR by  at 11/11/2020 1544                   Point: Home exercise program (Done)     Learning Progress Summary           Patient Acceptance, TB, DU by  at 11/17/2020 1012    Acceptance, E, VU,NR by  at 11/15/2020 1441    Acceptance, E, VU by  at 11/13/2020 1639    Acceptance, E,TB,D, VU,NR by  at 11/11/2020 1544                   Point: Precautions (Done)     Learning Progress Summary           Patient Acceptance, TB, DU by  at 11/17/2020 1012                               User Key     Initials Effective Dates Name Provider Type Discipline     02/05/19 -  Lizbeth Baxter, PT Physical Therapist PT     04/03/18 -  Debra Dickinson, PT Physical Therapist PT     08/19/18 -  Taylor Forman, GALINA Physical Therapy Assistant PT     10/25/19 -  Gayatri Whitehead, PT Physical Therapist PT              PT Recommendation and Plan     Plan of Care Reviewed With: patient  Progress: no change  Outcome Summary: Pt essentially independent iw bed mobility adn req CGA for sit/stand transfer. However, he was only able to amb 20' x 2 from  "bed to/from bathroom. He req min A due to balance concerns and c/o dizziness. His balance would improve with use of AD. He declined to sit in recliner chair stating that he might \"pass out\". Reviewed seated and supine HEP with pt and endouraged him to move frequently throughout the day. Due to his weakness and c/o dizziness, he may be safer going to rehab for a few weeks to regain his strength before returning home with his wife and rekha. Cont PT for ther ex, gt/transfer training, bed mobility, balance, and activity tolerance as appropriate.     Time Calculation:   PT Charges     Row Name 11/17/20 1014             Time Calculation    Start Time  0917  -DJ      Stop Time  0943  -DJ      Time Calculation (min)  26 min  -DJ      PT Non-Billable Time (min)  10 min  -DJ      PT Received On  11/17/20  -DJ      PT - Next Appointment  11/18/20  -MORGAN        User Key  (r) = Recorded By, (t) = Taken By, (c) = Cosigned By    Initials Name Provider Type    Gayatri Headley, PHILIPPE Physical Therapist        Therapy Charges for Today     Code Description Service Date Service Provider Modifiers Qty    [id number] HC PT THER PROC EA 15 MIN 11/17/2020 Gayatri Whitehead PT GP 2          PT G-Codes  Outcome Measure Options: AM-PAC 6 Clicks Basic Mobility (PT)  AM-PAC 6 Clicks Score (PT): 20    Gayatri Whitehead PT  11/17/2020    "

## 2020-11-17 NOTE — PLAN OF CARE
"  Problem: Adult Inpatient Plan of Care  Goal: Plan of Care Review  Recent Flowsheet Documentation  Taken 11/17/2020 1006 by Gayatri Whitehead, PT  Progress: no change  Plan of Care Reviewed With: patient  Outcome Summary: Pt essentially independent with bed mobility and req CGA for sit/stand transfer. However, he was only able to amb 20' x 2 from bed to/from bathroom. He req min A due to balance concerns and c/o dizziness. His balance would improve with use of AD. He declined to sit in recliner chair stating that he might \"pass out\". Reviewed seated and supine HEP with pt and endouraged him to move frequently throughout the day. Due to his weakness and c/o dizziness, he may be safer going to rehab for a few weeks to regain his strength before returning home with his wife and rekha. Cont PT for ther ex, gt/transfer training, bed mobility, balance, and activity tolerance as appropriate.  Patient was intermittently wearing a face mask during this therapy encounter. Therapist used appropriate personal protective equipment including gown, eye protection, mask and gloves.  Mask used was N95/duckbill. Appropriate PPE was worn during the entire therapy session. Hand hygiene was completed before and after therapy session. Patient is in enhanced droplet precautions.       "

## 2020-11-17 NOTE — PROGRESS NOTES
Continued Stay Note  [facility]     Patient Name: Mg Gerber Sr.  MRN: [id number]  Today's Date: 11/17/2020    Admit Date: 11/10/2020    Discharge Plan     Row Name 11/17/20 1710       Plan    Plan  [facility].  Precert received.    Plan Comments  DC orders noted.  Received call from [facility]/ [facility] who states that precert was just received.  CCP spoke w/ pt's wife Lucina by phone who states she has Covid and cannot provide transport, and there is no other family available.  [facility] and [facility] have no availability today.  [facility] w/c transport arranged for tomorrow at 11AM.  Pt, his wife and [facility] notifed.        Discharge Codes    No documentation.       Expected Discharge Date and Time     Expected Discharge Date Expected Discharge Time    Nov 17, 2020             Tiffany Swann RN

## 2020-11-17 NOTE — PLAN OF CARE
Goal Outcome Evaluation:  Plan of Care Reviewed With: patient  Progress: no change   Pt on intermittent 02. NAD, VSS, denies pain. Pt to dc to SNF tomorrow via chair service @11 am.

## 2020-11-17 NOTE — PROGRESS NOTES
Continued Stay Note  [facility]     Patient Name: Mg Gerber Sr.  MRN: [id number]  Today's Date: 11/17/2020    Admit Date: 11/10/2020    Discharge Plan     Row Name 11/17/20 1158       Plan    Plan  Plan is skilled bed at [facility] PENDING [employer] precert.    Plan Comments  S/W [facility] who states pt is approved at [facility] and they will initiate precert today.  Pt updated and he is in agreement.        Discharge Codes    No documentation.       Expected Discharge Date and Time     Expected Discharge Date Expected Discharge Time    Nov 17, 2020             Tiffany Swann RN

## 2020-11-18 VITALS
DIASTOLIC BLOOD PRESSURE: 87 MMHG | BODY MASS INDEX: 30.88 KG/M2 | TEMPERATURE: 96.9 F | HEART RATE: 73 BPM | SYSTOLIC BLOOD PRESSURE: 135 MMHG | RESPIRATION RATE: 16 BRPM | WEIGHT: 233.03 LBS | HEIGHT: 73 IN | OXYGEN SATURATION: 96 %

## 2020-11-18 LAB
ALBUMIN SERPL-MCNC: 3.5 G/DL (ref 3.5–5.2)
ALP SERPL-CCNC: 52 U/L (ref 39–117)
ALT SERPL W P-5'-P-CCNC: 22 U/L (ref 1–41)
AST SERPL-CCNC: 16 U/L (ref 1–40)
BILIRUB CONJ SERPL-MCNC: 0.4 MG/DL (ref 0–0.3)
BILIRUB INDIRECT SERPL-MCNC: 1.2 MG/DL
BILIRUB SERPL-MCNC: 1.6 MG/DL (ref 0–1.2)
CREAT SERPL-MCNC: 0.98 MG/DL (ref 0.76–1.27)
GFR SERPL CREATININE-BSD FRML MDRD: 74 ML/MIN/1.73
PROT SERPL-MCNC: 6 G/DL (ref 6–8.5)

## 2020-11-18 PROCEDURE — 94799 UNLISTED PULMONARY SVC/PX: CPT

## 2020-11-18 PROCEDURE — 82565 ASSAY OF CREATININE: CPT | Performed by: INTERNAL MEDICINE

## 2020-11-18 PROCEDURE — 63710000001 DEXAMETHASONE PER 0.25 MG: Performed by: HOSPITALIST

## 2020-11-18 PROCEDURE — 80076 HEPATIC FUNCTION PANEL: CPT | Performed by: INTERNAL MEDICINE

## 2020-11-18 RX ORDER — GABAPENTIN 400 MG/1
800 CAPSULE ORAL NIGHTLY
Qty: 10 CAPSULE | Refills: 0 | Status: SHIPPED | OUTPATIENT
Start: 2020-11-18 | End: 2020-12-28 | Stop reason: SDUPTHER

## 2020-11-18 RX ORDER — GABAPENTIN 400 MG/1
400 CAPSULE ORAL 2 TIMES DAILY
Qty: 10 CAPSULE | Refills: 0 | Status: ON HOLD | OUTPATIENT
Start: 2020-11-18 | End: 2022-03-20 | Stop reason: SDUPTHER

## 2020-11-18 RX ORDER — OXCARBAZEPINE 300 MG/1
300 TABLET, FILM COATED ORAL
Qty: 5 TABLET | Refills: 0 | Status: SHIPPED | OUTPATIENT
Start: 2020-11-18

## 2020-11-18 RX ORDER — ALPRAZOLAM 0.25 MG/1
0.25 TABLET ORAL DAILY PRN
Qty: 5 TABLET | Refills: 0 | Status: SHIPPED | OUTPATIENT
Start: 2020-11-18 | End: 2020-11-27 | Stop reason: HOSPADM

## 2020-11-18 RX ORDER — HYDROCODONE BITARTRATE AND ACETAMINOPHEN 5; 325 MG/1; MG/1
1 TABLET ORAL EVERY 6 HOURS PRN
Qty: 24 TABLET | Refills: 0 | Status: SHIPPED | OUTPATIENT
Start: 2020-11-18 | End: 2022-03-13 | Stop reason: HOSPADM

## 2020-11-18 RX ADMIN — CALCIUM 500 MG: 500 TABLET ORAL at 09:47

## 2020-11-18 RX ADMIN — LEVOTHYROXINE SODIUM 112 MCG: 112 TABLET ORAL at 06:39

## 2020-11-18 RX ADMIN — GABAPENTIN 400 MG: 400 CAPSULE ORAL at 06:39

## 2020-11-18 RX ADMIN — HYDROCODONE BITARTRATE AND ACETAMINOPHEN 1 TABLET: 5; 325 TABLET ORAL at 06:39

## 2020-11-18 RX ADMIN — DEXAMETHASONE 6 MG: 4 TABLET ORAL at 09:47

## 2020-11-18 RX ADMIN — SODIUM CHLORIDE, PRESERVATIVE FREE 10 ML: 5 INJECTION INTRAVENOUS at 09:47

## 2020-11-18 RX ADMIN — CETIRIZINE HYDROCHLORIDE 5 MG: 10 TABLET ORAL at 09:47

## 2020-11-18 RX ADMIN — POLYETHYLENE GLYCOL 3350 17 G: 17 POWDER, FOR SOLUTION ORAL at 09:47

## 2020-11-18 RX ADMIN — TAMSULOSIN HYDROCHLORIDE 0.4 MG: 0.4 CAPSULE ORAL at 09:47

## 2020-11-18 RX ADMIN — CYCLOBENZAPRINE 5 MG: 10 TABLET, FILM COATED ORAL at 00:01

## 2020-11-18 RX ADMIN — TIOTROPIUM BROMIDE INHALATION SPRAY 2 PUFF: 3.12 SPRAY, METERED RESPIRATORY (INHALATION) at 08:00

## 2020-11-18 RX ADMIN — AMLODIPINE BESYLATE 5 MG: 5 TABLET ORAL at 09:47

## 2020-11-18 RX ADMIN — MEMANTINE HYDROCHLORIDE 10 MG: 10 TABLET, FILM COATED ORAL at 09:47

## 2020-11-18 RX ADMIN — FAMOTIDINE 20 MG: 20 TABLET, FILM COATED ORAL at 06:39

## 2020-11-18 RX ADMIN — SERTRALINE 25 MG: 25 TABLET, FILM COATED ORAL at 09:47

## 2020-11-18 RX ADMIN — FOLIC ACID 1 MG: 1 TABLET ORAL at 06:39

## 2020-11-18 NOTE — PLAN OF CARE
Goal Outcome Evaluation:  Plan of Care Reviewed With: patient  Progress: no change   Pt to DC via [facility] to SNF. Patient does not report and pain, VSS, NAD. All belongings were returned to patient from both pharmacy, security and the unit to include: Wallet and keys from security, gabapentin, xanax, oxy/tylenol from pharmacy.

## 2020-11-18 NOTE — PROGRESS NOTES
Case Management Discharge Note      Final Note: DC to skilled bed at [facility]         Selected Continued Care - Admitted Since 11/10/2020     Destination Coordination complete    Service Provider Selected Services Address Phone Fax Patient Preferred    [facility]  Skilled Nursing [address] [address] [phone] --          Durable Medical Equipment    No services have been selected for the patient.              Dialysis/Infusion    No services have been selected for the patient.              Home Medical Care     Service Provider Selected Services Address Phone Fax Patient Preferred    [facility]  [facility] Health Services [address] [address] [phone] --          Therapy    No services have been selected for the patient.              Community Resources    No services have been selected for the patient.                       Final Discharge Disposition Code: 03 - skilled nursing facility (SNF)([facility])

## 2020-11-18 NOTE — DISCHARGE SUMMARY
Patient Name: Mg Gerber Sr.  : 1940  MRN: [id number]    Date of Admission: 11/10/2020  Date of Discharge:  2020  Primary Care Physician: Dillon Marsh MD      Chief Complaint:   Shortness of Breath, Fever, and Weakness - Generalized      Discharge Diagnoses     Active Hospital Problems    Diagnosis  POA   • **Dyspnea [R06.00]  Unknown   • COVID-19 virus detected [U07.1]  Unknown   • Generalized weakness [R53.1]  Unknown   • History of lung cancer [Z85.118]  Not Applicable   • Renal cell carcinoma (CMS/HCC) [C64.9]  Yes   • Hypertension [I10]  Yes      Resolved Hospital Problems   No resolved problems to display.        Hospital Course     Mr. Gerber is a 79 y.o. male with a history of lung cancer, obesity, hypertension, and renal cell carcinoma who presented to [facility] initially complaining of fevers shortness of breath and cough.  Please see the admitting history and physical for further details.  He was found to have viral pneumonia and was admitted to the hospital for further evaluation and treatment.  He was admitted to the hospital with viral pneumonia related to the novel coronavirus.  He was requiring supplemental oxygen and was evaluated by infectious disease and pulmonary.  They recommended placement on remdesivir and Decadron he tolerated those medications well.  He has been slowly titrated off oxygen during the day but still requiring it at night.  He has been rather weak and fatigued and really has not been on a red bed much while in the hospital.  Plan is to discharge to a skilled nursing facility where he can work on regaining his strength and independence to return home.  His daughter tested negative for coronavirus but his wife is tested positive and she is resting at home.  No changes overnight and kept for transportation issues    Subjective:  He is feeling better remains agreeable to going to a skilled nursing facility.  Has been a little weak on his  feet and at this point his wife has Covid as well is likely unable to really help him at home.    Review of Systems  He currently denies any fevers or chills nausea or vomiting.  Denies any diarrhea constipation.  Shortness of breath is at its baseline.  Physical Exam:  Temp:  [96 °F (35.6 °C)-97.1 °F (36.2 °C)] 96.9 °F (36.1 °C)  Heart Rate:  [55-73] 73  Resp:  [16-18] 16  BP: (135-142)/(71-87) 135/87  Body mass index is 30.74 kg/m².  Physical Exam  Constitutional:       Appearance: Normal appearance.   Cardiovascular:      Rate and Rhythm: Normal rate and regular rhythm.   Pulmonary:      Effort: Pulmonary effort is normal.      Breath sounds: Normal breath sounds.   Skin:     General: Skin is warm and dry.   Neurological:      General: No focal deficit present.      Mental Status: He is alert and oriented to person, place, and time.         Consultants     Consult Orders (all) (From admission, onward)     Start     Ordered    11/11/20 1125  Inpatient Pulmonology Consult  Once     Specialty:  Pulmonary Disease  Provider:  Elmer Cevallos MD    11/11/20 1124    11/11/20 0745  Hematology & Oncology Inpatient Consult  Once     Specialty:  Hematology and Oncology  Provider:  Dillon Pedroza MD    11/11/20 0745    11/10/20 2305  [facility] (on-call MD unless specified) Details  Once     Specialty:  Hospitalist  Provider:  (Not yet assigned)    11/10/20 2304              Procedures     Imaging Results (All)     Procedure Component Value Units Date/Time    CT Angiogram Chest [[id number]] Collected: 11/10/20 2122     Updated: 11/10/20 2138    Narrative:      CT PULMONARY ANGIOGRAM:     HISTORY:  Chest pain. Shortness of breath.     TECHNIQUE:  Non contrast localizing images were performed through the  mediastinum for localization and bolus timing. Axial images were  obtained from the apex to the lung bases and during IV contrast  infusion. No adverse reaction. Multiplanar reformatted images were  reconstructed from the  helical source data. Radiation dose reduction  techniques were utilized, including automated exposure control and  exposure modulation based on body size.         COMPARISON:  CTA chest 04/25/2015. CT abdomen and pelvis 09/29/2015     FINDINGS:        Suboptimal opacification of the pulmonary arterial system. There is  respiratory motion artifact. Normal caliber main pulmonary artery. No  evidence of pulmonary embolus within the main, right and left pulmonary  arteries. The segmental branches cannot be clearly evaluated..     The thoracic aorta is normal in caliber. Scattered atherosclerotic  calcifications.      The heart is normal in size. No pericardial effusion.Coronary artery  opacifications.     Right-sided Mediport terminates in the SVC.     There is a 4.4 x 4.1 cm left hilar mass that has enlarged when compared  to the prior examination from 2015 where this measured approximately 3  cm in size. No other mediastinal or right hilar lymphadenopathy. No  axillary lymphadenopathy.     Previously seen paraspinal masses have decreased in size.     The central airways are patent. No focal consolidation or suspicious  pulmonary nodules. Trace left pleural effusion with mild compressive  atelectasis. No pneumothorax.     Mild nodular contour of the liver indicating possible cirrhosis. Patient  is status post left nephrectomy and splenectomy.     No acute osseous abnormality. No aggressive osseous lesions.  Degenerative changes of the spine.     The chest wall soft tissues are normal.     The visualized thyroid gland is unremarkable.          Impression:      1.  Suboptimal examination evaluate for pulmonary embolism. There is no  pulmonary embolism within the main, right and left pulmonary arteries.  The segmental and distal branches cannot be clearly evaluated.  2.  Trace left pleural effusion with mild compressive atelectasis. No  other evidence for acute cardiopulmonary disease.  3.  Left hilar mass mass that has  enlarged when compared to the prior  exam from 04/25/2015.  4.  Previously seen paraspinal masses have decreased in size.  5.  Possible cirrhotic liver.     This report was finalized on 11/10/2020 9:32 PM by Dr. Marshal Blackwood M.D.       XR Chest AP [[id number]] Collected: 11/10/20 1946     Updated: 11/10/20 2039    Narrative:      ONE VIEW PORTABLE CHEST     HISTORY: Cough and fever. Possible Covid 19 pneumonia. Previous lung  cancer.     FINDINGS: There appears to be a large left perihilar mass and concerning  for neoplasm in this patient with previous history of left-sided lung  cancer and paraspinal mass. Further evaluation with CT scan is therefore  recommended. The area of concern measures up to 5.6 x 7 cm. The heart is  top normal in size. Right-sided MediPort catheter ends in the SVC.     This report was finalized on 11/10/2020 8:36 PM by Dr. Leo Franklin M.D.             Pertinent Labs     Results from last 7 days   Lab Units 11/16/20  0551 11/13/20 0449 11/12/20  0708   WBC 10*3/mm3 18.55* 11.02* 10.69   HEMOGLOBIN g/dL 10.2* 9.0* 8.8*   PLATELETS 10*3/mm3 376 337 301     Results from last 7 days   Lab Units 11/18/20  0557 11/17/20  0622 11/16/20  0551 11/15/20  0347 11/14/20  0818 11/13/20 0449 11/12/20  0708   SODIUM mmol/L  --   --  140  --  135* 136 139   POTASSIUM mmol/L  --   --  4.1  --  4.2 4.4 4.4   CHLORIDE mmol/L  --   --  104  --  105 105 105   CO2 mmol/L  --   --  26.5  --  26.3 26.7 24.6   BUN mg/dL  --   --  25*  --  23 25* 21   CREATININE mg/dL 0.98 0.90 1.19 0.95 0.98 1.04 1.09   GLUCOSE mg/dL  --   --  92  --  93 110* 115*   Estimated Creatinine Clearance: 76.8 mL/min (by C-G formula based on SCr of 0.98 mg/dL).  Results from last 7 days   Lab Units 11/18/20  0557 11/17/20  0622 11/16/20  0551 11/15/20  0347   ALBUMIN g/dL 3.50 3.50 3.70 3.80   BILIRUBIN mg/dL 1.6* 1.8* 1.4* 1.1   ALK PHOS U/L 52 53 50 48   AST (SGOT) U/L 16 20 26 32   ALT (SGPT) U/L 22 28 31 32     Results from last  7 days   Lab Units 11/18/20  0557 11/17/20  0622 11/16/20  0551 11/15/20  0347 11/14/20  0818 11/13/20  0449 11/12/20  0708   CALCIUM mg/dL  --   --  9.1  --  8.8 8.7 8.3*   ALBUMIN g/dL 3.50 3.50 3.70 3.80 3.90 3.40* 3.50   MAGNESIUM mg/dL  --   --   --   --   --  2.1  --    PHOSPHORUS mg/dL  --   --  3.0  --  2.5 3.2  --            Results from last 7 days   Lab Units 11/13/20 0449   URIC ACID mg/dL 4.6         Invalid input(s): LDLCALC        Test Results Pending at Discharge       Discharge Details        Discharge Medications      Changes to Medications      Instructions Start Date   docusate sodium 100 MG capsule  Commonly known as: Colace  What changed:   · when to take this  · reasons to take this   100 mg, Oral, Nightly      gabapentin 400 MG capsule  Commonly known as: NEURONTIN  What changed:   · how much to take  · when to take this  · additional instructions   400 mg, Oral, 4 Times Daily         Continue These Medications      Instructions Start Date   ALPRAZolam 0.25 MG tablet  Commonly known as: XANAX   0.25 mg, Oral, Daily PRN      amLODIPine 5 MG tablet  Commonly known as: NORVASC   5 mg, Oral, Daily      CALTRATE 600+D3 PO   1 tablet, Oral, 2 times daily      cyclobenzaprine 5 MG tablet  Commonly known as: FLEXERIL   5 mg, Oral, 2 Times Daily PRN      FIBER SELECT GUMMIES PO   Oral      finasteride 5 MG tablet  Commonly known as: PROSCAR   No dose, route, or frequency recorded.      folic acid 1 MG tablet  Commonly known as: FOLVITE   1 mg, Oral, 3 Times Daily      HYDROcodone-acetaminophen 5-325 MG per tablet  Commonly known as: NORCO   1 tablet, Oral, Every 6 Hours PRN      levocetirizine 5 MG tablet  Commonly known as: XYZAL   TAKE ONE TABLET BY MOUTH EVERY EVENING      levothyroxine sodium 112 MCG capsule  Commonly known as: TIROSINT   Daily      meclizine 12.5 MG tablet  Commonly known as: ANTIVERT   12.5 mg, Oral, 2 Times Daily PRN      metOLazone 5 MG tablet  Commonly known as: ZAROXOLYN    No dose, route, or frequency recorded.      Namzaric 28-10 MG capsule sustained-release 24 hr  Generic drug: Memantine HCl-Donepezil HCl   1 capsule, Oral, Every Evening      NIVOLUMAB IV   Intravenous      OXcarbazepine 300 MG tablet  Commonly known as: TRILEPTAL   300 mg, Oral, Every Night at Bedtime      sertraline 25 MG tablet  Commonly known as: ZOLOFT   25 mg, Oral, Daily, Take 1-2 tabs by mouth daily      Spiriva HandiHaler 18 MCG per inhalation capsule  Generic drug: tiotropium   1 capsule, Inhalation, Daily - RT      Stiolto Respimat 2.5-2.5 MCG/ACT aerosol solution inhaler  Generic drug: tiotropium bromide-olodaterol   Inhalation, 2 Times Daily      tamsulosin 0.4 MG capsule 24 hr capsule  Commonly known as: FLOMAX   1 capsule, Oral, Daily      Ventolin  (90 Base) MCG/ACT inhaler  Generic drug: albuterol sulfate HFA   2 puffs, Inhalation, Every 4 Hours PRN      Xarelto 20 MG tablet  Generic drug: rivaroxaban   20 mg, Oral, Daily With Dinner             Allergies   Allergen Reactions   • Latex Rash         Discharge Disposition:  Skilled Nursing Facility (DC - External)    Discharge Diet:  Diet Order   Procedures   • Diet Regular; Cardiac; Safe Tray       Discharge Activity:   Activity Instructions     Activity as Tolerated            CODE STATUS:    Code Status and Medical Interventions:   Ordered at: 11/11/20 0120     Code Status:    CPR     Medical Interventions (Level of Support Prior to Arrest):    Full       Future Appointments   Date Time Provider Department Center   12/28/2020 10:00 AM Luther Cote MD [id number]     Additional Instructions for the Follow-ups that You Need to Schedule     Ambulatory Referral to Home Health   As directed      Face to Face Visit Date: 11/16/2020    Follow-up provider for Plan of Care?: I will be treating the patient on an ongoing basis.  Please send me the Plan of Care for signature.    Follow-up provider: LUTHER COTE [[id number]]    Reason/Clinical  Findings: covid pneumonia    Describe mobility limitations that make leaving home difficult: quarintined    Nursing/Therapeutic Services Requested: Skilled Nursing Physical Therapy    Skilled nursing orders: O2 instruction    Frequency: 1 Week 1         Discharge Follow-up with PCP   As directed       Currently Documented PCP:    Luther Cote MD    PCP Phone Number:    [phone]     Follow Up Details: 4-6 weeks         Discharge Follow-up with Specified Provider: Dr Cevallos as directed   As directed      To: Dr Cevallos as directed            Contact information for follow-up providers     Luther Cote MD .    Specialty: Family Medicine  Why: 4-6 weeks  Contact information:  [address]  [address]  [place] [address]  [phone]                   Contact information for after-discharge care     Destination     [facility] .    Service: Skilled Nursing  Contact information:  [address] [facility] [address] [phone]                 Home Medical Care     [facility] .    Service: Home Health Services  Contact information:  [address]  [facility] [address] [phone]                             Additional Instructions for the Follow-ups that You Need to Schedule     Ambulatory Referral to Home Health   As directed      Face to Face Visit Date: 11/16/2020    Follow-up provider for Plan of Care?: I will be treating the patient on an ongoing basis.  Please send me the Plan of Care for signature.    Follow-up provider: LUTHER COTE [[id number]]    Reason/Clinical Findings: covid pneumonia    Describe mobility limitations that make leaving home difficult: quarintined    Nursing/Therapeutic Services Requested: Skilled Nursing Physical Therapy    Skilled nursing orders: O2 instruction    Frequency: 1 Week 1         Discharge Follow-up with PCP   As directed       Currently Documented PCP:    Luther Cote MD    PCP Phone Number:     [phone]     Follow Up Details: 4-6 weeks         Discharge Follow-up with Specified Provider: Dr Cevallos as directed   As directed      To: Dr Cevallos as directed           Time Spent on Discharge:  Greater than 30 minutes      Oh Quintana MD  [facility]  11/18/20  12:53 EST

## 2020-11-19 ENCOUNTER — READMISSION MANAGEMENT (OUTPATIENT)
Dept: CALL CENTER | Facility: HOSPITAL | Age: 80
End: 2020-11-19

## 2020-11-19 ENCOUNTER — TRANSITIONAL CARE MANAGEMENT TELEPHONE ENCOUNTER (OUTPATIENT)
Dept: CALL CENTER | Facility: HOSPITAL | Age: 80
End: 2020-11-19

## 2020-11-19 NOTE — OUTREACH NOTE
Call Center TCM Note      Responses   [facility] patient discharged from?  Non-   Does the patient have one of the following disease processes/diagnoses(primary or secondary)?  COVID-19   TCM attempt successful?  Yes   Discharge diagnosis  COVID-19 virus detected    Meds reviewed with patient/caregiver?  Yes   Does the patient have all medications ordered at discharge?  N/A   Prescription comments  Pt was not d/c from [facility] with any medications   Does the patient have a primary care provider?   Yes   Does the patient have an appointment with their PCP within 7 days of discharge?  No   Comments regarding PCP  Dillon Montesinos MD   Comments  Pt is requesting morning appt, also he was told to quarantine until 12/014/2020 which is past TCM time frame, but wants PCP input on when ok to come see him.    What is the Home health agency?   [facility]   Home health comments  I spoke with Violet at [facility] and they will see pt tomorrow. Pt is aware.    Has all DME been delivered?  No   DME comments  PT states O2 delivered by [facility] but some fittings missing so he cannot use. I have confirmed with Danay at [facility] she is obtaining order for additonal needed equipment and I have let pt know this will be resolved.    Psychosocial issues?  No   Did the patient receive a copy of their discharge instructions?  Yes   Nursing interventions  Reviewed instructions with patient   What is the patient's perception of their health status since discharge?  Same   Is the patient/caregiver able to teach back signs and symptoms related to disease process for when to call PCP?  Yes   Is the patient/caregiver able to teach back signs and symptoms related to disease process for when to call 911?  Yes   If the patient is a current smoker, are they able to teach back resources for cessation?  Not a smoker   TCM call completed?  Yes   Wrap up additional comments  Pt is fair. Slow recovery from COVID PNEUMONIA. Pt d/c from [facility] to [facility], [facility]  "complaints re: this SNF. Pt only stayed about 5 hours. Pt states he \"has been in [facility] [place] [facility]  facilities. Pt plans to register complaints with Gino Garsia when he is better. See above note re: [facility]          Violet Sears MA    11/19/2020, 12:53 EST      "

## 2020-11-19 NOTE — OUTREACH NOTE
Prep Survey      Responses   [facility] facility patient discharged from?  Non-BH   Is LACE score < 7 ?  Non-BH Discharge   Eligibility  [facility]   Date of Admission  11/18/20   Date of Discharge  11/18/20   Discharge diagnosis  COVID-19 virus detected    Does the patient have one of the following disease processes/diagnoses(primary or secondary)?  COVID-19   Prep survey completed?  Yes          Ashtyn Naidu RN

## 2020-11-20 ENCOUNTER — TELEPHONE (OUTPATIENT)
Dept: FAMILY MEDICINE CLINIC | Facility: CLINIC | Age: 80
End: 2020-11-20

## 2020-11-20 DIAGNOSIS — U07.1 COVID-19 VIRUS DETECTED: Primary | ICD-10-CM

## 2020-11-20 NOTE — TELEPHONE ENCOUNTER
Antonella from [facility] [phone] called and is requesting HH to help patient with Oxygen usage. Per Dr. Marsh, OK.  Put in orders and called Antonella back to let her know.

## 2020-11-23 ENCOUNTER — APPOINTMENT (OUTPATIENT)
Dept: GENERAL RADIOLOGY | Facility: HOSPITAL | Age: 80
End: 2020-11-23

## 2020-11-23 ENCOUNTER — HOSPITAL ENCOUNTER (INPATIENT)
Facility: HOSPITAL | Age: 80
LOS: 4 days | Discharge: HOME HEALTH | End: 2020-11-27
Attending: EMERGENCY MEDICINE | Admitting: INTERNAL MEDICINE

## 2020-11-23 DIAGNOSIS — J18.9 PNEUMONIA DUE TO INFECTIOUS ORGANISM, UNSPECIFIED LATERALITY, UNSPECIFIED PART OF LUNG: ICD-10-CM

## 2020-11-23 DIAGNOSIS — U07.1 PNEUMONIA DUE TO COVID-19 VIRUS: ICD-10-CM

## 2020-11-23 DIAGNOSIS — J12.82 PNEUMONIA DUE TO COVID-19 VIRUS: ICD-10-CM

## 2020-11-23 DIAGNOSIS — J96.01 ACUTE RESPIRATORY FAILURE WITH HYPOXIA (HCC): Primary | ICD-10-CM

## 2020-11-23 PROBLEM — J15.9 SECONDARY BACTERIAL PNEUMONIA: Status: ACTIVE | Noted: 2020-11-23

## 2020-11-23 PROBLEM — K21.9 GASTROESOPHAGEAL REFLUX DISEASE: Status: ACTIVE | Noted: 2020-11-23

## 2020-11-23 PROBLEM — C61 MALIGNANT NEOPLASM OF PROSTATE (HCC): Status: ACTIVE | Noted: 2020-11-23

## 2020-11-23 PROBLEM — C78.00 MALIGNANT NEOPLASM METASTATIC TO LUNG: Status: ACTIVE | Noted: 2020-11-23

## 2020-11-23 PROBLEM — G47.30 SLEEP APNEA: Status: ACTIVE | Noted: 2020-11-23

## 2020-11-23 PROBLEM — H91.90 HEARING DISORDER: Status: ACTIVE | Noted: 2020-11-23

## 2020-11-23 PROBLEM — R91.8 HILAR MASS: Status: ACTIVE | Noted: 2020-11-23

## 2020-11-23 PROBLEM — D64.9 ANEMIA: Status: ACTIVE | Noted: 2020-11-23

## 2020-11-23 LAB
ALBUMIN SERPL-MCNC: 3.8 G/DL (ref 3.5–5.2)
ALBUMIN/GLOB SERPL: 1.5 G/DL
ALP SERPL-CCNC: 63 U/L (ref 39–117)
ALT SERPL W P-5'-P-CCNC: 16 U/L (ref 1–41)
ANION GAP SERPL CALCULATED.3IONS-SCNC: 9.4 MMOL/L (ref 5–15)
AST SERPL-CCNC: 26 U/L (ref 1–40)
B PARAPERT DNA SPEC QL NAA+PROBE: NOT DETECTED
B PERT DNA SPEC QL NAA+PROBE: NOT DETECTED
BASOPHILS # BLD AUTO: 0.06 10*3/MM3 (ref 0–0.2)
BASOPHILS NFR BLD AUTO: 0.5 % (ref 0–1.5)
BILIRUB SERPL-MCNC: 2.7 MG/DL (ref 0–1.2)
BUN SERPL-MCNC: 16 MG/DL (ref 8–23)
BUN/CREAT SERPL: 15.4 (ref 7–25)
C PNEUM DNA NPH QL NAA+NON-PROBE: NOT DETECTED
CALCIUM SPEC-SCNC: 9.3 MG/DL (ref 8.6–10.5)
CHLORIDE SERPL-SCNC: 99 MMOL/L (ref 98–107)
CK SERPL-CCNC: 26 U/L (ref 20–200)
CO2 SERPL-SCNC: 25.6 MMOL/L (ref 22–29)
CREAT SERPL-MCNC: 1.04 MG/DL (ref 0.76–1.27)
CRP SERPL-MCNC: 6.52 MG/DL (ref 0–0.5)
D DIMER PPP FEU-MCNC: 0.41 MCGFEU/ML (ref 0–0.49)
D-LACTATE SERPL-SCNC: 1.6 MMOL/L (ref 0.5–2)
DEPRECATED RDW RBC AUTO: 41.2 FL (ref 37–54)
EOSINOPHIL # BLD AUTO: 0.04 10*3/MM3 (ref 0–0.4)
EOSINOPHIL NFR BLD AUTO: 0.3 % (ref 0.3–6.2)
ERYTHROCYTE [DISTWIDTH] IN BLOOD BY AUTOMATED COUNT: 10.5 % (ref 12.3–15.4)
FERRITIN SERPL-MCNC: ABNORMAL NG/ML (ref 30–400)
FLUAV SUBTYP SPEC NAA+PROBE: NOT DETECTED
FLUBV RNA ISLT QL NAA+PROBE: NOT DETECTED
GFR SERPL CREATININE-BSD FRML MDRD: 69 ML/MIN/1.73
GLOBULIN UR ELPH-MCNC: 2.6 GM/DL
GLUCOSE BLDC GLUCOMTR-MCNC: 126 MG/DL (ref 70–130)
GLUCOSE SERPL-MCNC: 122 MG/DL (ref 65–99)
HADV DNA SPEC NAA+PROBE: NOT DETECTED
HCOV 229E RNA SPEC QL NAA+PROBE: NOT DETECTED
HCOV HKU1 RNA SPEC QL NAA+PROBE: NOT DETECTED
HCOV NL63 RNA SPEC QL NAA+PROBE: NOT DETECTED
HCOV OC43 RNA SPEC QL NAA+PROBE: NOT DETECTED
HCT VFR BLD AUTO: 31.6 % (ref 37.5–51)
HGB BLD-MCNC: 10.3 G/DL (ref 13–17.7)
HMPV RNA NPH QL NAA+NON-PROBE: NOT DETECTED
HPIV1 RNA SPEC QL NAA+PROBE: NOT DETECTED
HPIV2 RNA SPEC QL NAA+PROBE: NOT DETECTED
HPIV3 RNA NPH QL NAA+PROBE: NOT DETECTED
HPIV4 P GENE NPH QL NAA+PROBE: NOT DETECTED
LDH SERPL-CCNC: 266 U/L (ref 135–225)
LYMPHOCYTES # BLD AUTO: 2.84 10*3/MM3 (ref 0.7–3.1)
LYMPHOCYTES NFR BLD AUTO: 21.4 % (ref 19.6–45.3)
M PNEUMO IGG SER IA-ACNC: NOT DETECTED
MCH RBC QN AUTO: 35.6 PG (ref 26.6–33)
MCHC RBC AUTO-ENTMCNC: 32.6 G/DL (ref 31.5–35.7)
MCV RBC AUTO: 109.3 FL (ref 79–97)
MONOCYTES # BLD AUTO: 2.46 10*3/MM3 (ref 0.1–0.9)
MONOCYTES NFR BLD AUTO: 18.5 % (ref 5–12)
MRSA DNA SPEC QL NAA+PROBE: NORMAL
NEUTROPHILS NFR BLD AUTO: 58.5 % (ref 42.7–76)
NEUTROPHILS NFR BLD AUTO: 7.77 10*3/MM3 (ref 1.7–7)
NT-PROBNP SERPL-MCNC: 439.3 PG/ML (ref 0–1800)
PLATELET # BLD AUTO: 277 10*3/MM3 (ref 140–450)
PMV BLD AUTO: 10.3 FL (ref 6–12)
POTASSIUM SERPL-SCNC: 3.9 MMOL/L (ref 3.5–5.2)
PROCALCITONIN SERPL-MCNC: 0.14 NG/ML (ref 0–0.25)
PROT SERPL-MCNC: 6.4 G/DL (ref 6–8.5)
QT INTERVAL: 332 MS
RBC # BLD AUTO: 2.89 10*6/MM3 (ref 4.14–5.8)
RHINOVIRUS RNA SPEC NAA+PROBE: NOT DETECTED
RSV RNA NPH QL NAA+NON-PROBE: NOT DETECTED
SARS-COV-2 RNA NPH QL NAA+NON-PROBE: DETECTED
SODIUM SERPL-SCNC: 134 MMOL/L (ref 136–145)
TROPONIN T SERPL-MCNC: 0.01 NG/ML (ref 0–0.03)
WBC # BLD AUTO: 13.28 10*3/MM3 (ref 3.4–10.8)

## 2020-11-23 PROCEDURE — 25010000002 PIPERACILLIN SOD-TAZOBACTAM PER 1 G: Performed by: INTERNAL MEDICINE

## 2020-11-23 PROCEDURE — 82728 ASSAY OF FERRITIN: CPT | Performed by: INTERNAL MEDICINE

## 2020-11-23 PROCEDURE — 87641 MR-STAPH DNA AMP PROBE: CPT | Performed by: INTERNAL MEDICINE

## 2020-11-23 PROCEDURE — 71045 X-RAY EXAM CHEST 1 VIEW: CPT

## 2020-11-23 PROCEDURE — 25010000002 MORPHINE PER 10 MG: Performed by: INTERNAL MEDICINE

## 2020-11-23 PROCEDURE — 82550 ASSAY OF CK (CPK): CPT | Performed by: INTERNAL MEDICINE

## 2020-11-23 PROCEDURE — 93005 ELECTROCARDIOGRAM TRACING: CPT

## 2020-11-23 PROCEDURE — 83880 ASSAY OF NATRIURETIC PEPTIDE: CPT | Performed by: EMERGENCY MEDICINE

## 2020-11-23 PROCEDURE — 87040 BLOOD CULTURE FOR BACTERIA: CPT | Performed by: EMERGENCY MEDICINE

## 2020-11-23 PROCEDURE — 85379 FIBRIN DEGRADATION QUANT: CPT | Performed by: EMERGENCY MEDICINE

## 2020-11-23 PROCEDURE — 83605 ASSAY OF LACTIC ACID: CPT | Performed by: EMERGENCY MEDICINE

## 2020-11-23 PROCEDURE — 84145 PROCALCITONIN (PCT): CPT | Performed by: EMERGENCY MEDICINE

## 2020-11-23 PROCEDURE — 83615 LACTATE (LD) (LDH) ENZYME: CPT | Performed by: INTERNAL MEDICINE

## 2020-11-23 PROCEDURE — 99285 EMERGENCY DEPT VISIT HI MDM: CPT

## 2020-11-23 PROCEDURE — 25810000003 SODIUM CHLORIDE 0.9 % WITH KCL 20 MEQ 20-0.9 MEQ/L-% SOLUTION: Performed by: INTERNAL MEDICINE

## 2020-11-23 PROCEDURE — 93010 ELECTROCARDIOGRAM REPORT: CPT | Performed by: INTERNAL MEDICINE

## 2020-11-23 PROCEDURE — 25010000002 VANCOMYCIN 10 G RECONSTITUTED SOLUTION: Performed by: INTERNAL MEDICINE

## 2020-11-23 PROCEDURE — 84484 ASSAY OF TROPONIN QUANT: CPT | Performed by: EMERGENCY MEDICINE

## 2020-11-23 PROCEDURE — 87205 SMEAR GRAM STAIN: CPT | Performed by: INTERNAL MEDICINE

## 2020-11-23 PROCEDURE — 25010000002 CEFTRIAXONE PER 250 MG: Performed by: EMERGENCY MEDICINE

## 2020-11-23 PROCEDURE — 25010000002 ONDANSETRON PER 1 MG: Performed by: INTERNAL MEDICINE

## 2020-11-23 PROCEDURE — 86140 C-REACTIVE PROTEIN: CPT | Performed by: INTERNAL MEDICINE

## 2020-11-23 PROCEDURE — 82962 GLUCOSE BLOOD TEST: CPT

## 2020-11-23 PROCEDURE — 80053 COMPREHEN METABOLIC PANEL: CPT | Performed by: EMERGENCY MEDICINE

## 2020-11-23 PROCEDURE — 87070 CULTURE OTHR SPECIMN AEROBIC: CPT | Performed by: INTERNAL MEDICINE

## 2020-11-23 PROCEDURE — 85025 COMPLETE CBC W/AUTO DIFF WBC: CPT | Performed by: EMERGENCY MEDICINE

## 2020-11-23 PROCEDURE — 0202U NFCT DS 22 TRGT SARS-COV-2: CPT | Performed by: INTERNAL MEDICINE

## 2020-11-23 RX ORDER — BUDESONIDE AND FORMOTEROL FUMARATE DIHYDRATE 160; 4.5 UG/1; UG/1
2 AEROSOL RESPIRATORY (INHALATION)
Status: DISCONTINUED | OUTPATIENT
Start: 2020-11-23 | End: 2020-11-27 | Stop reason: HOSPADM

## 2020-11-23 RX ORDER — GUAIFENESIN 600 MG/1
600 TABLET, EXTENDED RELEASE ORAL EVERY 12 HOURS SCHEDULED
Status: DISCONTINUED | OUTPATIENT
Start: 2020-11-23 | End: 2020-11-27 | Stop reason: HOSPADM

## 2020-11-23 RX ORDER — SODIUM CHLORIDE 0.9 % (FLUSH) 0.9 %
10 SYRINGE (ML) INJECTION EVERY 12 HOURS SCHEDULED
Status: DISCONTINUED | OUTPATIENT
Start: 2020-11-23 | End: 2020-11-27 | Stop reason: HOSPADM

## 2020-11-23 RX ORDER — ALBUTEROL SULFATE 90 UG/1
2 AEROSOL, METERED RESPIRATORY (INHALATION) EVERY 4 HOURS PRN
Status: DISCONTINUED | OUTPATIENT
Start: 2020-11-23 | End: 2020-11-23

## 2020-11-23 RX ORDER — AMLODIPINE BESYLATE 5 MG/1
5 TABLET ORAL DAILY
Status: DISCONTINUED | OUTPATIENT
Start: 2020-11-24 | End: 2020-11-27 | Stop reason: HOSPADM

## 2020-11-23 RX ORDER — UREA 10 %
1 LOTION (ML) TOPICAL NIGHTLY PRN
Status: DISCONTINUED | OUTPATIENT
Start: 2020-11-23 | End: 2020-11-27 | Stop reason: HOSPADM

## 2020-11-23 RX ORDER — CALCIUM CARBONATE 200(500)MG
2 TABLET,CHEWABLE ORAL 2 TIMES DAILY PRN
Status: DISCONTINUED | OUTPATIENT
Start: 2020-11-23 | End: 2020-11-27 | Stop reason: HOSPADM

## 2020-11-23 RX ORDER — FOLIC ACID 1 MG/1
1 TABLET ORAL 2 TIMES DAILY
Status: DISCONTINUED | OUTPATIENT
Start: 2020-11-23 | End: 2020-11-27 | Stop reason: HOSPADM

## 2020-11-23 RX ORDER — ACETAMINOPHEN 500 MG
1000 TABLET ORAL ONCE
Status: COMPLETED | OUTPATIENT
Start: 2020-11-23 | End: 2020-11-23

## 2020-11-23 RX ORDER — ACETAMINOPHEN 650 MG/1
650 SUPPOSITORY RECTAL EVERY 4 HOURS PRN
Status: DISCONTINUED | OUTPATIENT
Start: 2020-11-23 | End: 2020-11-27 | Stop reason: HOSPADM

## 2020-11-23 RX ORDER — SODIUM CHLORIDE 0.9 % (FLUSH) 0.9 %
10 SYRINGE (ML) INJECTION AS NEEDED
Status: DISCONTINUED | OUTPATIENT
Start: 2020-11-23 | End: 2020-11-27 | Stop reason: HOSPADM

## 2020-11-23 RX ORDER — ALBUTEROL SULFATE 90 UG/1
2 AEROSOL, METERED RESPIRATORY (INHALATION)
Status: DISCONTINUED | OUTPATIENT
Start: 2020-11-23 | End: 2020-11-23

## 2020-11-23 RX ORDER — LEVOTHYROXINE SODIUM 112 UG/1
112 TABLET ORAL
Status: DISCONTINUED | OUTPATIENT
Start: 2020-11-24 | End: 2020-11-27 | Stop reason: HOSPADM

## 2020-11-23 RX ORDER — NITROGLYCERIN 0.4 MG/1
0.4 TABLET SUBLINGUAL
Status: DISCONTINUED | OUTPATIENT
Start: 2020-11-23 | End: 2020-11-27 | Stop reason: HOSPADM

## 2020-11-23 RX ORDER — SODIUM CHLORIDE AND POTASSIUM CHLORIDE 150; 900 MG/100ML; MG/100ML
125 INJECTION, SOLUTION INTRAVENOUS CONTINUOUS
Status: DISPENSED | OUTPATIENT
Start: 2020-11-23 | End: 2020-11-24

## 2020-11-23 RX ORDER — ACETAMINOPHEN 325 MG/1
650 TABLET ORAL EVERY 4 HOURS PRN
Status: DISCONTINUED | OUTPATIENT
Start: 2020-11-23 | End: 2020-11-27 | Stop reason: HOSPADM

## 2020-11-23 RX ORDER — MORPHINE SULFATE 2 MG/ML
2 INJECTION, SOLUTION INTRAMUSCULAR; INTRAVENOUS EVERY 4 HOURS PRN
Status: DISCONTINUED | OUTPATIENT
Start: 2020-11-23 | End: 2020-11-27 | Stop reason: HOSPADM

## 2020-11-23 RX ORDER — ALBUTEROL SULFATE 90 UG/1
4 AEROSOL, METERED RESPIRATORY (INHALATION) EVERY 4 HOURS PRN
Status: DISCONTINUED | OUTPATIENT
Start: 2020-11-23 | End: 2020-11-27 | Stop reason: HOSPADM

## 2020-11-23 RX ORDER — ACETAMINOPHEN 160 MG/5ML
650 SOLUTION ORAL EVERY 4 HOURS PRN
Status: DISCONTINUED | OUTPATIENT
Start: 2020-11-23 | End: 2020-11-27 | Stop reason: HOSPADM

## 2020-11-23 RX ORDER — DOCUSATE SODIUM 100 MG/1
100 CAPSULE, LIQUID FILLED ORAL 2 TIMES DAILY PRN
Status: DISCONTINUED | OUTPATIENT
Start: 2020-11-23 | End: 2020-11-27 | Stop reason: HOSPADM

## 2020-11-23 RX ORDER — NALOXONE HCL 0.4 MG/ML
0.4 VIAL (ML) INJECTION
Status: DISCONTINUED | OUTPATIENT
Start: 2020-11-23 | End: 2020-11-27 | Stop reason: HOSPADM

## 2020-11-23 RX ORDER — ONDANSETRON 2 MG/ML
4 INJECTION INTRAMUSCULAR; INTRAVENOUS EVERY 6 HOURS PRN
Status: DISCONTINUED | OUTPATIENT
Start: 2020-11-23 | End: 2020-11-27 | Stop reason: HOSPADM

## 2020-11-23 RX ORDER — BISACODYL 10 MG
10 SUPPOSITORY, RECTAL RECTAL DAILY PRN
Status: DISCONTINUED | OUTPATIENT
Start: 2020-11-23 | End: 2020-11-27 | Stop reason: HOSPADM

## 2020-11-23 RX ORDER — TAMSULOSIN HYDROCHLORIDE 0.4 MG/1
0.4 CAPSULE ORAL NIGHTLY
Status: DISCONTINUED | OUTPATIENT
Start: 2020-11-23 | End: 2020-11-27 | Stop reason: HOSPADM

## 2020-11-23 RX ORDER — SODIUM CHLORIDE AND POTASSIUM CHLORIDE 150; 900 MG/100ML; MG/100ML
125 INJECTION, SOLUTION INTRAVENOUS CONTINUOUS
Status: DISCONTINUED | OUTPATIENT
Start: 2020-11-23 | End: 2020-11-23

## 2020-11-23 RX ORDER — CEFTRIAXONE SODIUM 1 G/50ML
1 INJECTION, SOLUTION INTRAVENOUS EVERY 24 HOURS
Status: DISCONTINUED | OUTPATIENT
Start: 2020-11-24 | End: 2020-11-23

## 2020-11-23 RX ORDER — ONDANSETRON 4 MG/1
4 TABLET, FILM COATED ORAL EVERY 6 HOURS PRN
Status: DISCONTINUED | OUTPATIENT
Start: 2020-11-23 | End: 2020-11-27 | Stop reason: HOSPADM

## 2020-11-23 RX ORDER — CEFTRIAXONE SODIUM 1 G/50ML
1 INJECTION, SOLUTION INTRAVENOUS ONCE
Status: COMPLETED | OUTPATIENT
Start: 2020-11-23 | End: 2020-11-23

## 2020-11-23 RX ORDER — GABAPENTIN 400 MG/1
400 CAPSULE ORAL 2 TIMES DAILY
Status: DISCONTINUED | OUTPATIENT
Start: 2020-11-23 | End: 2020-11-27 | Stop reason: HOSPADM

## 2020-11-23 RX ADMIN — ACETAMINOPHEN 1000 MG: 500 TABLET, FILM COATED ORAL at 11:20

## 2020-11-23 RX ADMIN — VANCOMYCIN HYDROCHLORIDE 2000 MG: 10 INJECTION, POWDER, LYOPHILIZED, FOR SOLUTION INTRAVENOUS at 22:38

## 2020-11-23 RX ADMIN — CEFTRIAXONE SODIUM 1 G: 1 INJECTION, SOLUTION INTRAVENOUS at 15:28

## 2020-11-23 RX ADMIN — GUAIFENESIN 600 MG: 600 TABLET, EXTENDED RELEASE ORAL at 21:37

## 2020-11-23 RX ADMIN — SODIUM CHLORIDE, PRESERVATIVE FREE 10 ML: 5 INJECTION INTRAVENOUS at 21:34

## 2020-11-23 RX ADMIN — ONDANSETRON 4 MG: 2 INJECTION INTRAMUSCULAR; INTRAVENOUS at 21:37

## 2020-11-23 RX ADMIN — GABAPENTIN 400 MG: 400 CAPSULE ORAL at 22:45

## 2020-11-23 RX ADMIN — TAZOBACTAM SODIUM AND PIPERACILLIN SODIUM 3.38 G: 375; 3 INJECTION, SOLUTION INTRAVENOUS at 21:36

## 2020-11-23 RX ADMIN — MORPHINE SULFATE 2 MG: 2 INJECTION, SOLUTION INTRAMUSCULAR; INTRAVENOUS at 22:38

## 2020-11-23 RX ADMIN — POTASSIUM CHLORIDE AND SODIUM CHLORIDE 125 ML/HR: 900; 150 INJECTION, SOLUTION INTRAVENOUS at 21:34

## 2020-11-24 ENCOUNTER — APPOINTMENT (OUTPATIENT)
Dept: CT IMAGING | Facility: HOSPITAL | Age: 80
End: 2020-11-24

## 2020-11-24 LAB
ALBUMIN SERPL-MCNC: 3 G/DL (ref 3.5–5.2)
ALBUMIN/GLOB SERPL: 1.3 G/DL
ALP SERPL-CCNC: 47 U/L (ref 39–117)
ALT SERPL W P-5'-P-CCNC: 14 U/L (ref 1–41)
ANION GAP SERPL CALCULATED.3IONS-SCNC: 7.8 MMOL/L (ref 5–15)
AST SERPL-CCNC: 15 U/L (ref 1–40)
BASOPHILS # BLD AUTO: 0.05 10*3/MM3 (ref 0–0.2)
BASOPHILS NFR BLD AUTO: 0.4 % (ref 0–1.5)
BILIRUB SERPL-MCNC: 1.6 MG/DL (ref 0–1.2)
BUN SERPL-MCNC: 17 MG/DL (ref 8–23)
BUN/CREAT SERPL: 16.2 (ref 7–25)
CALCIUM SPEC-SCNC: 8 MG/DL (ref 8.6–10.5)
CHLORIDE SERPL-SCNC: 106 MMOL/L (ref 98–107)
CK SERPL-CCNC: 25 U/L (ref 20–200)
CO2 SERPL-SCNC: 21.2 MMOL/L (ref 22–29)
CREAT SERPL-MCNC: 1.05 MG/DL (ref 0.76–1.27)
CRP SERPL-MCNC: 6.65 MG/DL (ref 0–0.5)
D DIMER PPP FEU-MCNC: 0.29 MCGFEU/ML (ref 0–0.49)
DEPRECATED RDW RBC AUTO: 39.9 FL (ref 37–54)
EOSINOPHIL # BLD AUTO: 0.03 10*3/MM3 (ref 0–0.4)
EOSINOPHIL NFR BLD AUTO: 0.2 % (ref 0.3–6.2)
ERYTHROCYTE [DISTWIDTH] IN BLOOD BY AUTOMATED COUNT: 10.5 % (ref 12.3–15.4)
FERRITIN SERPL-MCNC: 7991 NG/ML (ref 30–400)
FIBRINOGEN PPP-MCNC: 472 MG/DL (ref 219–464)
GFR SERPL CREATININE-BSD FRML MDRD: 68 ML/MIN/1.73
GLOBULIN UR ELPH-MCNC: 2.3 GM/DL
GLUCOSE SERPL-MCNC: 103 MG/DL (ref 65–99)
HCT VFR BLD AUTO: 25.3 % (ref 37.5–51)
HGB BLD-MCNC: 8.3 G/DL (ref 13–17.7)
LDH SERPL-CCNC: 212 U/L (ref 135–225)
LYMPHOCYTES # BLD AUTO: 3.28 10*3/MM3 (ref 0.7–3.1)
LYMPHOCYTES NFR BLD AUTO: 24.7 % (ref 19.6–45.3)
MCH RBC QN AUTO: 34.9 PG (ref 26.6–33)
MCHC RBC AUTO-ENTMCNC: 32.8 G/DL (ref 31.5–35.7)
MCV RBC AUTO: 106.3 FL (ref 79–97)
MONOCYTES # BLD AUTO: 2.21 10*3/MM3 (ref 0.1–0.9)
MONOCYTES NFR BLD AUTO: 16.6 % (ref 5–12)
NEUTROPHILS NFR BLD AUTO: 57.3 % (ref 42.7–76)
NEUTROPHILS NFR BLD AUTO: 7.63 10*3/MM3 (ref 1.7–7)
PLATELET # BLD AUTO: 232 10*3/MM3 (ref 140–450)
PMV BLD AUTO: 10.1 FL (ref 6–12)
POTASSIUM SERPL-SCNC: 4.1 MMOL/L (ref 3.5–5.2)
PROT SERPL-MCNC: 5.3 G/DL (ref 6–8.5)
RBC # BLD AUTO: 2.38 10*6/MM3 (ref 4.14–5.8)
S PNEUM AG SPEC QL LA: NEGATIVE
SODIUM SERPL-SCNC: 135 MMOL/L (ref 136–145)
WBC # BLD AUTO: 13.3 10*3/MM3 (ref 3.4–10.8)

## 2020-11-24 PROCEDURE — 82728 ASSAY OF FERRITIN: CPT | Performed by: INTERNAL MEDICINE

## 2020-11-24 PROCEDURE — 85379 FIBRIN DEGRADATION QUANT: CPT | Performed by: INTERNAL MEDICINE

## 2020-11-24 PROCEDURE — 25810000003 SODIUM CHLORIDE 0.9 % WITH KCL 20 MEQ 20-0.9 MEQ/L-% SOLUTION: Performed by: INTERNAL MEDICINE

## 2020-11-24 PROCEDURE — 86140 C-REACTIVE PROTEIN: CPT | Performed by: INTERNAL MEDICINE

## 2020-11-24 PROCEDURE — 25010000002 ONDANSETRON PER 1 MG: Performed by: INTERNAL MEDICINE

## 2020-11-24 PROCEDURE — 83615 LACTATE (LD) (LDH) ENZYME: CPT | Performed by: INTERNAL MEDICINE

## 2020-11-24 PROCEDURE — 63710000001 ONDANSETRON PER 8 MG: Performed by: INTERNAL MEDICINE

## 2020-11-24 PROCEDURE — 94640 AIRWAY INHALATION TREATMENT: CPT

## 2020-11-24 PROCEDURE — 94799 UNLISTED PULMONARY SVC/PX: CPT

## 2020-11-24 PROCEDURE — 25010000002 VANCOMYCIN 750 MG RECONSTITUTED SOLUTION: Performed by: INTERNAL MEDICINE

## 2020-11-24 PROCEDURE — 25010000002 PIPERACILLIN SOD-TAZOBACTAM PER 1 G: Performed by: INTERNAL MEDICINE

## 2020-11-24 PROCEDURE — 85025 COMPLETE CBC W/AUTO DIFF WBC: CPT | Performed by: INTERNAL MEDICINE

## 2020-11-24 PROCEDURE — 87899 AGENT NOS ASSAY W/OPTIC: CPT | Performed by: INTERNAL MEDICINE

## 2020-11-24 PROCEDURE — 36415 COLL VENOUS BLD VENIPUNCTURE: CPT | Performed by: INTERNAL MEDICINE

## 2020-11-24 PROCEDURE — 71250 CT THORAX DX C-: CPT

## 2020-11-24 PROCEDURE — 80053 COMPREHEN METABOLIC PANEL: CPT | Performed by: INTERNAL MEDICINE

## 2020-11-24 PROCEDURE — 82550 ASSAY OF CK (CPK): CPT | Performed by: INTERNAL MEDICINE

## 2020-11-24 PROCEDURE — 85384 FIBRINOGEN ACTIVITY: CPT | Performed by: INTERNAL MEDICINE

## 2020-11-24 RX ORDER — ALBUTEROL SULFATE 2.5 MG/3ML
2.5 SOLUTION RESPIRATORY (INHALATION) EVERY 6 HOURS PRN
Status: DISCONTINUED | OUTPATIENT
Start: 2020-11-24 | End: 2020-11-27 | Stop reason: HOSPADM

## 2020-11-24 RX ORDER — GABAPENTIN 400 MG/1
800 CAPSULE ORAL NIGHTLY
Status: DISCONTINUED | OUTPATIENT
Start: 2020-11-24 | End: 2020-11-27 | Stop reason: HOSPADM

## 2020-11-24 RX ORDER — HYDROCODONE BITARTRATE AND ACETAMINOPHEN 5; 325 MG/1; MG/1
1 TABLET ORAL EVERY 6 HOURS PRN
Status: DISCONTINUED | OUTPATIENT
Start: 2020-11-24 | End: 2020-11-27 | Stop reason: HOSPADM

## 2020-11-24 RX ADMIN — LEVOTHYROXINE SODIUM 112 MCG: 112 TABLET ORAL at 04:39

## 2020-11-24 RX ADMIN — HYDROCODONE BITARTRATE AND ACETAMINOPHEN 1 TABLET: 5; 325 TABLET ORAL at 20:28

## 2020-11-24 RX ADMIN — FOLIC ACID 1 MG: 1 TABLET ORAL at 20:27

## 2020-11-24 RX ADMIN — TAZOBACTAM SODIUM AND PIPERACILLIN SODIUM 3.38 G: 375; 3 INJECTION, SOLUTION INTRAVENOUS at 20:23

## 2020-11-24 RX ADMIN — ONDANSETRON HYDROCHLORIDE 4 MG: 4 TABLET, FILM COATED ORAL at 22:08

## 2020-11-24 RX ADMIN — TAZOBACTAM SODIUM AND PIPERACILLIN SODIUM 3.38 G: 375; 3 INJECTION, SOLUTION INTRAVENOUS at 04:39

## 2020-11-24 RX ADMIN — BUDESONIDE AND FORMOTEROL FUMARATE DIHYDRATE 2 PUFF: 160; 4.5 AEROSOL RESPIRATORY (INHALATION) at 07:19

## 2020-11-24 RX ADMIN — SODIUM CHLORIDE, PRESERVATIVE FREE 10 ML: 5 INJECTION INTRAVENOUS at 20:30

## 2020-11-24 RX ADMIN — ONDANSETRON 4 MG: 2 INJECTION INTRAMUSCULAR; INTRAVENOUS at 04:39

## 2020-11-24 RX ADMIN — GUAIFENESIN 600 MG: 600 TABLET, EXTENDED RELEASE ORAL at 08:50

## 2020-11-24 RX ADMIN — TAZOBACTAM SODIUM AND PIPERACILLIN SODIUM 3.38 G: 375; 3 INJECTION, SOLUTION INTRAVENOUS at 12:29

## 2020-11-24 RX ADMIN — CALCIUM CARBONATE-VITAMIN D TAB 500 MG-200 UNIT 1 TABLET: 500-200 TAB at 20:27

## 2020-11-24 RX ADMIN — AMLODIPINE BESYLATE 5 MG: 5 TABLET ORAL at 08:50

## 2020-11-24 RX ADMIN — GUAIFENESIN 600 MG: 600 TABLET, EXTENDED RELEASE ORAL at 20:27

## 2020-11-24 RX ADMIN — GABAPENTIN 800 MG: 400 CAPSULE ORAL at 20:27

## 2020-11-24 RX ADMIN — RIVAROXABAN 20 MG: 20 TABLET, FILM COATED ORAL at 10:20

## 2020-11-24 RX ADMIN — GABAPENTIN 400 MG: 400 CAPSULE ORAL at 08:50

## 2020-11-24 RX ADMIN — SODIUM CHLORIDE 750 MG: 900 INJECTION, SOLUTION INTRAVENOUS at 08:50

## 2020-11-24 RX ADMIN — ACETAMINOPHEN 650 MG: 325 TABLET, FILM COATED ORAL at 04:39

## 2020-11-24 RX ADMIN — CALCIUM CARBONATE-VITAMIN D TAB 500 MG-200 UNIT 1 TABLET: 500-200 TAB at 08:50

## 2020-11-24 RX ADMIN — POTASSIUM CHLORIDE AND SODIUM CHLORIDE 125 ML/HR: 900; 150 INJECTION, SOLUTION INTRAVENOUS at 06:09

## 2020-11-24 RX ADMIN — ONDANSETRON 4 MG: 2 INJECTION INTRAMUSCULAR; INTRAVENOUS at 16:16

## 2020-11-24 RX ADMIN — TIOTROPIUM BROMIDE INHALATION SPRAY 2 PUFF: 3.12 SPRAY, METERED RESPIRATORY (INHALATION) at 07:22

## 2020-11-24 RX ADMIN — HYDROCODONE BITARTRATE AND ACETAMINOPHEN 1 TABLET: 5; 325 TABLET ORAL at 13:46

## 2020-11-24 RX ADMIN — SODIUM CHLORIDE, PRESERVATIVE FREE 10 ML: 5 INJECTION INTRAVENOUS at 08:51

## 2020-11-24 RX ADMIN — FOLIC ACID 1 MG: 1 TABLET ORAL at 08:50

## 2020-11-25 LAB
ALBUMIN SERPL-MCNC: 2.9 G/DL (ref 3.5–5.2)
ALBUMIN/GLOB SERPL: 1.1 G/DL
ALP SERPL-CCNC: 50 U/L (ref 39–117)
ALT SERPL W P-5'-P-CCNC: 11 U/L (ref 1–41)
ANION GAP SERPL CALCULATED.3IONS-SCNC: 5.7 MMOL/L (ref 5–15)
ANISOCYTOSIS BLD QL: ABNORMAL
AST SERPL-CCNC: 16 U/L (ref 1–40)
BACTERIA SPEC RESP CULT: NORMAL
BILIRUB SERPL-MCNC: 1.5 MG/DL (ref 0–1.2)
BUN SERPL-MCNC: 13 MG/DL (ref 8–23)
BUN/CREAT SERPL: 12.3 (ref 7–25)
CALCIUM SPEC-SCNC: 8.4 MG/DL (ref 8.6–10.5)
CHLORIDE SERPL-SCNC: 106 MMOL/L (ref 98–107)
CK SERPL-CCNC: 22 U/L (ref 20–200)
CO2 SERPL-SCNC: 25.3 MMOL/L (ref 22–29)
CREAT SERPL-MCNC: 1.06 MG/DL (ref 0.76–1.27)
CRP SERPL-MCNC: 5.1 MG/DL (ref 0–0.5)
DEPRECATED RDW RBC AUTO: 41.6 FL (ref 37–54)
ERYTHROCYTE [DISTWIDTH] IN BLOOD BY AUTOMATED COUNT: 10.5 % (ref 12.3–15.4)
FERRITIN SERPL-MCNC: 3803 NG/ML (ref 30–400)
GFR SERPL CREATININE-BSD FRML MDRD: 67 ML/MIN/1.73
GLOBULIN UR ELPH-MCNC: 2.7 GM/DL
GLUCOSE SERPL-MCNC: 88 MG/DL (ref 65–99)
GRAM STN SPEC: NORMAL
GRAM STN SPEC: NORMAL
HCT VFR BLD AUTO: 24.1 % (ref 37.5–51)
HGB BLD-MCNC: 8 G/DL (ref 13–17.7)
HYPOCHROMIA BLD QL: ABNORMAL
LYMPHOCYTES # BLD MANUAL: 3.04 10*3/MM3 (ref 0.7–3.1)
LYMPHOCYTES NFR BLD MANUAL: 11 % (ref 5–12)
LYMPHOCYTES NFR BLD MANUAL: 26 % (ref 19.6–45.3)
MACROCYTES BLD QL SMEAR: ABNORMAL
MCH RBC QN AUTO: 36.4 PG (ref 26.6–33)
MCHC RBC AUTO-ENTMCNC: 33.2 G/DL (ref 31.5–35.7)
MCV RBC AUTO: 109.5 FL (ref 79–97)
MONOCYTES # BLD AUTO: 1.29 10*3/MM3 (ref 0.1–0.9)
NEUTROPHILS # BLD AUTO: 7.38 10*3/MM3 (ref 1.7–7)
NEUTROPHILS NFR BLD MANUAL: 63 % (ref 42.7–76)
PLAT MORPH BLD: NORMAL
PLATELET # BLD AUTO: 235 10*3/MM3 (ref 140–450)
PMV BLD AUTO: 10.3 FL (ref 6–12)
POLYCHROMASIA BLD QL SMEAR: ABNORMAL
POTASSIUM SERPL-SCNC: 4.1 MMOL/L (ref 3.5–5.2)
PROT SERPL-MCNC: 5.6 G/DL (ref 6–8.5)
RBC # BLD AUTO: 2.2 10*6/MM3 (ref 4.14–5.8)
SODIUM SERPL-SCNC: 137 MMOL/L (ref 136–145)
VANCOMYCIN TROUGH SERPL-MCNC: 8.8 MCG/ML (ref 5–20)
WBC # BLD AUTO: 11.71 10*3/MM3 (ref 3.4–10.8)
WBC MORPH BLD: NORMAL

## 2020-11-25 PROCEDURE — 94799 UNLISTED PULMONARY SVC/PX: CPT

## 2020-11-25 PROCEDURE — 25010000002 PIPERACILLIN SOD-TAZOBACTAM PER 1 G: Performed by: INTERNAL MEDICINE

## 2020-11-25 PROCEDURE — 80053 COMPREHEN METABOLIC PANEL: CPT | Performed by: INTERNAL MEDICINE

## 2020-11-25 PROCEDURE — 82550 ASSAY OF CK (CPK): CPT | Performed by: INTERNAL MEDICINE

## 2020-11-25 PROCEDURE — 63710000001 ONDANSETRON PER 8 MG: Performed by: INTERNAL MEDICINE

## 2020-11-25 PROCEDURE — 80202 ASSAY OF VANCOMYCIN: CPT | Performed by: INTERNAL MEDICINE

## 2020-11-25 PROCEDURE — 82728 ASSAY OF FERRITIN: CPT | Performed by: INTERNAL MEDICINE

## 2020-11-25 PROCEDURE — 85025 COMPLETE CBC W/AUTO DIFF WBC: CPT | Performed by: INTERNAL MEDICINE

## 2020-11-25 PROCEDURE — 85007 BL SMEAR W/DIFF WBC COUNT: CPT | Performed by: INTERNAL MEDICINE

## 2020-11-25 PROCEDURE — 86140 C-REACTIVE PROTEIN: CPT | Performed by: INTERNAL MEDICINE

## 2020-11-25 RX ORDER — AMOXICILLIN AND CLAVULANATE POTASSIUM 875; 125 MG/1; MG/1
1 TABLET, FILM COATED ORAL EVERY 12 HOURS SCHEDULED
Status: DISCONTINUED | OUTPATIENT
Start: 2020-11-25 | End: 2020-11-27 | Stop reason: HOSPADM

## 2020-11-25 RX ADMIN — GABAPENTIN 400 MG: 400 CAPSULE ORAL at 09:27

## 2020-11-25 RX ADMIN — BUDESONIDE AND FORMOTEROL FUMARATE DIHYDRATE 2 PUFF: 160; 4.5 AEROSOL RESPIRATORY (INHALATION) at 08:07

## 2020-11-25 RX ADMIN — GABAPENTIN 800 MG: 400 CAPSULE ORAL at 20:54

## 2020-11-25 RX ADMIN — LEVOTHYROXINE SODIUM 112 MCG: 112 TABLET ORAL at 05:46

## 2020-11-25 RX ADMIN — TAZOBACTAM SODIUM AND PIPERACILLIN SODIUM 3.38 G: 375; 3 INJECTION, SOLUTION INTRAVENOUS at 03:23

## 2020-11-25 RX ADMIN — SODIUM CHLORIDE, PRESERVATIVE FREE 10 ML: 5 INJECTION INTRAVENOUS at 09:28

## 2020-11-25 RX ADMIN — ONDANSETRON HYDROCHLORIDE 4 MG: 4 TABLET, FILM COATED ORAL at 18:41

## 2020-11-25 RX ADMIN — HYDROCODONE BITARTRATE AND ACETAMINOPHEN 1 TABLET: 5; 325 TABLET ORAL at 18:41

## 2020-11-25 RX ADMIN — TIOTROPIUM BROMIDE INHALATION SPRAY 2 PUFF: 3.12 SPRAY, METERED RESPIRATORY (INHALATION) at 08:09

## 2020-11-25 RX ADMIN — HYDROCODONE BITARTRATE AND ACETAMINOPHEN 1 TABLET: 5; 325 TABLET ORAL at 11:59

## 2020-11-25 RX ADMIN — AMOXICILLIN AND CLAVULANATE POTASSIUM 1 TABLET: 875; 125 TABLET, FILM COATED ORAL at 20:53

## 2020-11-25 RX ADMIN — BUDESONIDE AND FORMOTEROL FUMARATE DIHYDRATE 2 PUFF: 160; 4.5 AEROSOL RESPIRATORY (INHALATION) at 22:24

## 2020-11-25 RX ADMIN — FOLIC ACID 1 MG: 1 TABLET ORAL at 09:27

## 2020-11-25 RX ADMIN — RIVAROXABAN 20 MG: 20 TABLET, FILM COATED ORAL at 09:27

## 2020-11-25 RX ADMIN — SODIUM CHLORIDE, PRESERVATIVE FREE 10 ML: 5 INJECTION INTRAVENOUS at 20:54

## 2020-11-25 RX ADMIN — AMLODIPINE BESYLATE 5 MG: 5 TABLET ORAL at 09:27

## 2020-11-25 RX ADMIN — GABAPENTIN 400 MG: 400 CAPSULE ORAL at 18:41

## 2020-11-25 RX ADMIN — CALCIUM CARBONATE-VITAMIN D TAB 500 MG-200 UNIT 1 TABLET: 500-200 TAB at 09:27

## 2020-11-25 RX ADMIN — GUAIFENESIN 600 MG: 600 TABLET, EXTENDED RELEASE ORAL at 20:54

## 2020-11-25 RX ADMIN — CALCIUM CARBONATE-VITAMIN D TAB 500 MG-200 UNIT 1 TABLET: 500-200 TAB at 20:54

## 2020-11-25 RX ADMIN — HYDROCODONE BITARTRATE AND ACETAMINOPHEN 1 TABLET: 5; 325 TABLET ORAL at 03:07

## 2020-11-25 RX ADMIN — FOLIC ACID 1 MG: 1 TABLET ORAL at 20:54

## 2020-11-25 RX ADMIN — TAZOBACTAM SODIUM AND PIPERACILLIN SODIUM 3.38 G: 375; 3 INJECTION, SOLUTION INTRAVENOUS at 12:51

## 2020-11-25 RX ADMIN — ONDANSETRON HYDROCHLORIDE 4 MG: 4 TABLET, FILM COATED ORAL at 11:59

## 2020-11-25 RX ADMIN — GUAIFENESIN 600 MG: 600 TABLET, EXTENDED RELEASE ORAL at 09:27

## 2020-11-26 LAB
ALBUMIN SERPL-MCNC: 2.9 G/DL (ref 3.5–5.2)
ALBUMIN/GLOB SERPL: 1.1 G/DL
ALP SERPL-CCNC: 51 U/L (ref 39–117)
ALT SERPL W P-5'-P-CCNC: 10 U/L (ref 1–41)
ANION GAP SERPL CALCULATED.3IONS-SCNC: 4.9 MMOL/L (ref 5–15)
AST SERPL-CCNC: 16 U/L (ref 1–40)
BASOPHILS # BLD AUTO: 0.07 10*3/MM3 (ref 0–0.2)
BASOPHILS NFR BLD AUTO: 0.7 % (ref 0–1.5)
BILIRUB SERPL-MCNC: 0.9 MG/DL (ref 0–1.2)
BUN SERPL-MCNC: 14 MG/DL (ref 8–23)
BUN/CREAT SERPL: 12.5 (ref 7–25)
CALCIUM SPEC-SCNC: 8.7 MG/DL (ref 8.6–10.5)
CHLORIDE SERPL-SCNC: 105 MMOL/L (ref 98–107)
CK SERPL-CCNC: 22 U/L (ref 20–200)
CO2 SERPL-SCNC: 26.1 MMOL/L (ref 22–29)
CREAT SERPL-MCNC: 1.12 MG/DL (ref 0.76–1.27)
CRP SERPL-MCNC: 4.11 MG/DL (ref 0–0.5)
D DIMER PPP FEU-MCNC: <0.27 MCGFEU/ML (ref 0–0.49)
DEPRECATED RDW RBC AUTO: 41.6 FL (ref 37–54)
EOSINOPHIL # BLD AUTO: 0.27 10*3/MM3 (ref 0–0.4)
EOSINOPHIL NFR BLD AUTO: 2.5 % (ref 0.3–6.2)
ERYTHROCYTE [DISTWIDTH] IN BLOOD BY AUTOMATED COUNT: 10.6 % (ref 12.3–15.4)
FERRITIN SERPL-MCNC: 1527 NG/ML (ref 30–400)
FIBRINOGEN PPP-MCNC: 524 MG/DL (ref 219–464)
GFR SERPL CREATININE-BSD FRML MDRD: 63 ML/MIN/1.73
GLOBULIN UR ELPH-MCNC: 2.7 GM/DL
GLUCOSE SERPL-MCNC: 99 MG/DL (ref 65–99)
HCT VFR BLD AUTO: 25.3 % (ref 37.5–51)
HGB BLD-MCNC: 8.2 G/DL (ref 13–17.7)
IMM GRANULOCYTES # BLD AUTO: 0.04 10*3/MM3 (ref 0–0.05)
IMM GRANULOCYTES NFR BLD AUTO: 0.4 % (ref 0–0.5)
LDH SERPL-CCNC: 175 U/L (ref 135–225)
LYMPHOCYTES # BLD AUTO: 4.46 10*3/MM3 (ref 0.7–3.1)
LYMPHOCYTES NFR BLD AUTO: 41.6 % (ref 19.6–45.3)
MCH RBC QN AUTO: 35.2 PG (ref 26.6–33)
MCHC RBC AUTO-ENTMCNC: 32.4 G/DL (ref 31.5–35.7)
MCV RBC AUTO: 108.6 FL (ref 79–97)
MONOCYTES # BLD AUTO: 1.45 10*3/MM3 (ref 0.1–0.9)
MONOCYTES NFR BLD AUTO: 13.5 % (ref 5–12)
NEUTROPHILS NFR BLD AUTO: 4.42 10*3/MM3 (ref 1.7–7)
NEUTROPHILS NFR BLD AUTO: 41.3 % (ref 42.7–76)
NRBC BLD AUTO-RTO: 0.1 /100 WBC (ref 0–0.2)
PLATELET # BLD AUTO: 262 10*3/MM3 (ref 140–450)
PMV BLD AUTO: 10.1 FL (ref 6–12)
POTASSIUM SERPL-SCNC: 4.1 MMOL/L (ref 3.5–5.2)
PROT SERPL-MCNC: 5.6 G/DL (ref 6–8.5)
RBC # BLD AUTO: 2.33 10*6/MM3 (ref 4.14–5.8)
SODIUM SERPL-SCNC: 136 MMOL/L (ref 136–145)
WBC # BLD AUTO: 10.71 10*3/MM3 (ref 3.4–10.8)

## 2020-11-26 PROCEDURE — 85384 FIBRINOGEN ACTIVITY: CPT | Performed by: INTERNAL MEDICINE

## 2020-11-26 PROCEDURE — 85025 COMPLETE CBC W/AUTO DIFF WBC: CPT | Performed by: INTERNAL MEDICINE

## 2020-11-26 PROCEDURE — 82728 ASSAY OF FERRITIN: CPT | Performed by: INTERNAL MEDICINE

## 2020-11-26 PROCEDURE — 83615 LACTATE (LD) (LDH) ENZYME: CPT | Performed by: INTERNAL MEDICINE

## 2020-11-26 PROCEDURE — 85379 FIBRIN DEGRADATION QUANT: CPT | Performed by: INTERNAL MEDICINE

## 2020-11-26 PROCEDURE — 80053 COMPREHEN METABOLIC PANEL: CPT | Performed by: INTERNAL MEDICINE

## 2020-11-26 PROCEDURE — 86140 C-REACTIVE PROTEIN: CPT | Performed by: INTERNAL MEDICINE

## 2020-11-26 PROCEDURE — 63710000001 ONDANSETRON PER 8 MG: Performed by: INTERNAL MEDICINE

## 2020-11-26 PROCEDURE — 82550 ASSAY OF CK (CPK): CPT | Performed by: INTERNAL MEDICINE

## 2020-11-26 PROCEDURE — 94799 UNLISTED PULMONARY SVC/PX: CPT

## 2020-11-26 PROCEDURE — 36415 COLL VENOUS BLD VENIPUNCTURE: CPT | Performed by: INTERNAL MEDICINE

## 2020-11-26 RX ORDER — L.ACID,PARA/B.BIFIDUM/S.THERM 8B CELL
1 CAPSULE ORAL DAILY
Status: DISCONTINUED | OUTPATIENT
Start: 2020-11-26 | End: 2020-11-27 | Stop reason: HOSPADM

## 2020-11-26 RX ORDER — LOPERAMIDE HYDROCHLORIDE 2 MG/1
2 CAPSULE ORAL 4 TIMES DAILY PRN
Status: DISCONTINUED | OUTPATIENT
Start: 2020-11-26 | End: 2020-11-27 | Stop reason: HOSPADM

## 2020-11-26 RX ORDER — SACCHAROMYCES BOULARDII 250 MG
250 CAPSULE ORAL 2 TIMES DAILY
Status: DISCONTINUED | OUTPATIENT
Start: 2020-11-26 | End: 2020-11-27 | Stop reason: HOSPADM

## 2020-11-26 RX ORDER — SCOLOPAMINE TRANSDERMAL SYSTEM 1 MG/1
1 PATCH, EXTENDED RELEASE TRANSDERMAL
Status: DISCONTINUED | OUTPATIENT
Start: 2020-11-26 | End: 2020-11-27

## 2020-11-26 RX ADMIN — GUAIFENESIN 600 MG: 600 TABLET, EXTENDED RELEASE ORAL at 20:22

## 2020-11-26 RX ADMIN — Medication 250 MG: at 20:22

## 2020-11-26 RX ADMIN — TIOTROPIUM BROMIDE INHALATION SPRAY 2 PUFF: 3.12 SPRAY, METERED RESPIRATORY (INHALATION) at 07:52

## 2020-11-26 RX ADMIN — SCOPALAMINE 1 PATCH: 1 PATCH, EXTENDED RELEASE TRANSDERMAL at 20:22

## 2020-11-26 RX ADMIN — ONDANSETRON HYDROCHLORIDE 4 MG: 4 TABLET, FILM COATED ORAL at 08:14

## 2020-11-26 RX ADMIN — BUDESONIDE AND FORMOTEROL FUMARATE DIHYDRATE 2 PUFF: 160; 4.5 AEROSOL RESPIRATORY (INHALATION) at 22:57

## 2020-11-26 RX ADMIN — HYDROCODONE BITARTRATE AND ACETAMINOPHEN 1 TABLET: 5; 325 TABLET ORAL at 20:21

## 2020-11-26 RX ADMIN — CALCIUM CARBONATE-VITAMIN D TAB 500 MG-200 UNIT 1 TABLET: 500-200 TAB at 20:22

## 2020-11-26 RX ADMIN — RIVAROXABAN 20 MG: 20 TABLET, FILM COATED ORAL at 08:14

## 2020-11-26 RX ADMIN — AMOXICILLIN AND CLAVULANATE POTASSIUM 1 TABLET: 875; 125 TABLET, FILM COATED ORAL at 20:22

## 2020-11-26 RX ADMIN — GABAPENTIN 400 MG: 400 CAPSULE ORAL at 16:07

## 2020-11-26 RX ADMIN — GUAIFENESIN 600 MG: 600 TABLET, EXTENDED RELEASE ORAL at 08:14

## 2020-11-26 RX ADMIN — AMOXICILLIN AND CLAVULANATE POTASSIUM 1 TABLET: 875; 125 TABLET, FILM COATED ORAL at 08:14

## 2020-11-26 RX ADMIN — Medication 1 CAPSULE: at 20:22

## 2020-11-26 RX ADMIN — ONDANSETRON HYDROCHLORIDE 4 MG: 4 TABLET, FILM COATED ORAL at 00:28

## 2020-11-26 RX ADMIN — SODIUM CHLORIDE, PRESERVATIVE FREE 10 ML: 5 INJECTION INTRAVENOUS at 08:15

## 2020-11-26 RX ADMIN — GABAPENTIN 400 MG: 400 CAPSULE ORAL at 08:15

## 2020-11-26 RX ADMIN — LEVOTHYROXINE SODIUM 112 MCG: 112 TABLET ORAL at 08:14

## 2020-11-26 RX ADMIN — HYDROCODONE BITARTRATE AND ACETAMINOPHEN 1 TABLET: 5; 325 TABLET ORAL at 08:14

## 2020-11-26 RX ADMIN — CALCIUM CARBONATE-VITAMIN D TAB 500 MG-200 UNIT 1 TABLET: 500-200 TAB at 08:14

## 2020-11-26 RX ADMIN — HYDROCODONE BITARTRATE AND ACETAMINOPHEN 1 TABLET: 5; 325 TABLET ORAL at 00:28

## 2020-11-26 RX ADMIN — BUDESONIDE AND FORMOTEROL FUMARATE DIHYDRATE 2 PUFF: 160; 4.5 AEROSOL RESPIRATORY (INHALATION) at 07:52

## 2020-11-26 RX ADMIN — ONDANSETRON HYDROCHLORIDE 4 MG: 4 TABLET, FILM COATED ORAL at 20:22

## 2020-11-26 RX ADMIN — GABAPENTIN 800 MG: 400 CAPSULE ORAL at 20:21

## 2020-11-26 RX ADMIN — FOLIC ACID 1 MG: 1 TABLET ORAL at 20:22

## 2020-11-26 RX ADMIN — AMLODIPINE BESYLATE 5 MG: 5 TABLET ORAL at 08:14

## 2020-11-26 RX ADMIN — HYDROCODONE BITARTRATE AND ACETAMINOPHEN 1 TABLET: 5; 325 TABLET ORAL at 14:03

## 2020-11-26 RX ADMIN — FOLIC ACID 1 MG: 1 TABLET ORAL at 08:14

## 2020-11-26 RX ADMIN — SODIUM CHLORIDE, PRESERVATIVE FREE 10 ML: 5 INJECTION INTRAVENOUS at 20:21

## 2020-11-26 RX ADMIN — ONDANSETRON HYDROCHLORIDE 4 MG: 4 TABLET, FILM COATED ORAL at 14:03

## 2020-11-27 ENCOUNTER — READMISSION MANAGEMENT (OUTPATIENT)
Dept: CALL CENTER | Facility: HOSPITAL | Age: 80
End: 2020-11-27

## 2020-11-27 ENCOUNTER — APPOINTMENT (OUTPATIENT)
Dept: CT IMAGING | Facility: HOSPITAL | Age: 80
End: 2020-11-27

## 2020-11-27 VITALS
HEART RATE: 67 BPM | OXYGEN SATURATION: 94 % | BODY MASS INDEX: 30.53 KG/M2 | WEIGHT: 230.38 LBS | SYSTOLIC BLOOD PRESSURE: 136 MMHG | DIASTOLIC BLOOD PRESSURE: 70 MMHG | HEIGHT: 73 IN | RESPIRATION RATE: 16 BRPM | TEMPERATURE: 96.9 F

## 2020-11-27 LAB
ALBUMIN SERPL-MCNC: 3.1 G/DL (ref 3.5–5.2)
ALBUMIN/GLOB SERPL: 1.2 G/DL
ALP SERPL-CCNC: 48 U/L (ref 39–117)
ALT SERPL W P-5'-P-CCNC: 11 U/L (ref 1–41)
ANION GAP SERPL CALCULATED.3IONS-SCNC: 6.5 MMOL/L (ref 5–15)
AST SERPL-CCNC: 17 U/L (ref 1–40)
BASOPHILS # BLD AUTO: 0.07 10*3/MM3 (ref 0–0.2)
BASOPHILS NFR BLD AUTO: 0.6 % (ref 0–1.5)
BILIRUB SERPL-MCNC: 0.8 MG/DL (ref 0–1.2)
BUN SERPL-MCNC: 13 MG/DL (ref 8–23)
BUN/CREAT SERPL: 10.7 (ref 7–25)
CALCIUM SPEC-SCNC: 9.1 MG/DL (ref 8.6–10.5)
CHLORIDE SERPL-SCNC: 107 MMOL/L (ref 98–107)
CK SERPL-CCNC: 18 U/L (ref 20–200)
CO2 SERPL-SCNC: 27.5 MMOL/L (ref 22–29)
CREAT SERPL-MCNC: 1.21 MG/DL (ref 0.76–1.27)
CRP SERPL-MCNC: 2.31 MG/DL (ref 0–0.5)
DEPRECATED RDW RBC AUTO: 43.6 FL (ref 37–54)
EOSINOPHIL # BLD AUTO: 0.34 10*3/MM3 (ref 0–0.4)
EOSINOPHIL NFR BLD AUTO: 2.7 % (ref 0.3–6.2)
ERYTHROCYTE [DISTWIDTH] IN BLOOD BY AUTOMATED COUNT: 10.8 % (ref 12.3–15.4)
FERRITIN SERPL-MCNC: 1136 NG/ML (ref 30–400)
GFR SERPL CREATININE-BSD FRML MDRD: 58 ML/MIN/1.73
GLOBULIN UR ELPH-MCNC: 2.5 GM/DL
GLUCOSE SERPL-MCNC: 92 MG/DL (ref 65–99)
HCT VFR BLD AUTO: 26.3 % (ref 37.5–51)
HGB BLD-MCNC: 8.4 G/DL (ref 13–17.7)
LYMPHOCYTES # BLD AUTO: 4.82 10*3/MM3 (ref 0.7–3.1)
LYMPHOCYTES NFR BLD AUTO: 38.3 % (ref 19.6–45.3)
MCH RBC QN AUTO: 35.7 PG (ref 26.6–33)
MCHC RBC AUTO-ENTMCNC: 31.9 G/DL (ref 31.5–35.7)
MCV RBC AUTO: 111.9 FL (ref 79–97)
MONOCYTES # BLD AUTO: 1.39 10*3/MM3 (ref 0.1–0.9)
MONOCYTES NFR BLD AUTO: 11 % (ref 5–12)
NEUTROPHILS NFR BLD AUTO: 47.1 % (ref 42.7–76)
NEUTROPHILS NFR BLD AUTO: 5.92 10*3/MM3 (ref 1.7–7)
PLATELET # BLD AUTO: 293 10*3/MM3 (ref 140–450)
PMV BLD AUTO: 9.7 FL (ref 6–12)
POTASSIUM SERPL-SCNC: 4.5 MMOL/L (ref 3.5–5.2)
PROT SERPL-MCNC: 5.6 G/DL (ref 6–8.5)
RBC # BLD AUTO: 2.35 10*6/MM3 (ref 4.14–5.8)
SODIUM SERPL-SCNC: 141 MMOL/L (ref 136–145)
WBC # BLD AUTO: 12.58 10*3/MM3 (ref 3.4–10.8)

## 2020-11-27 PROCEDURE — 80053 COMPREHEN METABOLIC PANEL: CPT | Performed by: INTERNAL MEDICINE

## 2020-11-27 PROCEDURE — 94799 UNLISTED PULMONARY SVC/PX: CPT

## 2020-11-27 PROCEDURE — 82728 ASSAY OF FERRITIN: CPT | Performed by: INTERNAL MEDICINE

## 2020-11-27 PROCEDURE — 82550 ASSAY OF CK (CPK): CPT | Performed by: INTERNAL MEDICINE

## 2020-11-27 PROCEDURE — 36415 COLL VENOUS BLD VENIPUNCTURE: CPT | Performed by: INTERNAL MEDICINE

## 2020-11-27 PROCEDURE — 70450 CT HEAD/BRAIN W/O DYE: CPT

## 2020-11-27 PROCEDURE — 63710000001 ONDANSETRON PER 8 MG: Performed by: INTERNAL MEDICINE

## 2020-11-27 PROCEDURE — 86140 C-REACTIVE PROTEIN: CPT | Performed by: INTERNAL MEDICINE

## 2020-11-27 PROCEDURE — 85025 COMPLETE CBC W/AUTO DIFF WBC: CPT | Performed by: INTERNAL MEDICINE

## 2020-11-27 RX ORDER — AMOXICILLIN AND CLAVULANATE POTASSIUM 875; 125 MG/1; MG/1
1 TABLET, FILM COATED ORAL EVERY 12 HOURS SCHEDULED
Qty: 10 TABLET | Refills: 0 | Status: SHIPPED | OUTPATIENT
Start: 2020-11-27 | End: 2020-12-02

## 2020-11-27 RX ORDER — GUAIFENESIN 600 MG/1
600 TABLET, EXTENDED RELEASE ORAL EVERY 12 HOURS SCHEDULED
Start: 2020-11-27 | End: 2022-01-17

## 2020-11-27 RX ORDER — ONDANSETRON 4 MG/1
4 TABLET, FILM COATED ORAL EVERY 6 HOURS PRN
Qty: 25 TABLET | Refills: 0 | Status: SHIPPED | OUTPATIENT
Start: 2020-11-27 | End: 2020-12-28 | Stop reason: SDUPTHER

## 2020-11-27 RX ORDER — UREA 10 %
LOTION (ML) TOPICAL DAILY
Qty: 14 TABLET | Refills: 0 | Status: SHIPPED | OUTPATIENT
Start: 2020-11-28 | End: 2022-03-11

## 2020-11-27 RX ADMIN — SODIUM CHLORIDE, PRESERVATIVE FREE 10 ML: 5 INJECTION INTRAVENOUS at 08:51

## 2020-11-27 RX ADMIN — AMLODIPINE BESYLATE 5 MG: 5 TABLET ORAL at 08:51

## 2020-11-27 RX ADMIN — TIOTROPIUM BROMIDE INHALATION SPRAY 2 PUFF: 3.12 SPRAY, METERED RESPIRATORY (INHALATION) at 08:16

## 2020-11-27 RX ADMIN — CALCIUM CARBONATE-VITAMIN D TAB 500 MG-200 UNIT 1 TABLET: 500-200 TAB at 08:50

## 2020-11-27 RX ADMIN — GABAPENTIN 400 MG: 400 CAPSULE ORAL at 08:50

## 2020-11-27 RX ADMIN — HYDROCODONE BITARTRATE AND ACETAMINOPHEN 1 TABLET: 5; 325 TABLET ORAL at 03:59

## 2020-11-27 RX ADMIN — FOLIC ACID 1 MG: 1 TABLET ORAL at 08:51

## 2020-11-27 RX ADMIN — AMOXICILLIN AND CLAVULANATE POTASSIUM 1 TABLET: 875; 125 TABLET, FILM COATED ORAL at 08:51

## 2020-11-27 RX ADMIN — ONDANSETRON HYDROCHLORIDE 4 MG: 4 TABLET, FILM COATED ORAL at 03:59

## 2020-11-27 RX ADMIN — GUAIFENESIN 600 MG: 600 TABLET, EXTENDED RELEASE ORAL at 08:51

## 2020-11-27 RX ADMIN — Medication 1 CAPSULE: at 08:51

## 2020-11-27 RX ADMIN — LEVOTHYROXINE SODIUM 112 MCG: 112 TABLET ORAL at 06:02

## 2020-11-27 RX ADMIN — Medication 250 MG: at 08:50

## 2020-11-27 RX ADMIN — BUDESONIDE AND FORMOTEROL FUMARATE DIHYDRATE 2 PUFF: 160; 4.5 AEROSOL RESPIRATORY (INHALATION) at 08:16

## 2020-11-27 RX ADMIN — RIVAROXABAN 20 MG: 20 TABLET, FILM COATED ORAL at 08:50

## 2020-11-27 NOTE — OUTREACH NOTE
Prep Survey      Responses   [facility] facility patient discharged from?  [place]   Is LACE score < 7 ?  No   Eligibility  [facility]   Date of Admission  11/23/20   Date of Discharge  11/27/20   Discharge Disposition  Home or Self Care   Discharge diagnosis  acute hypoxic resp failure & PNA d/t COVID-19   Does the patient have one of the following disease processes/diagnoses(primary or secondary)?  COVID-19   Does the patient have Home health ordered?  Yes   What is the Home health agency?   [facility]   Is there a DME ordered?  Yes   What DME was ordered?  home O2 from [place]   General alerts for this patient  retastatic renal cell CA, on chemo   Prep survey completed?  Yes          Kira Lloyd RN

## 2020-11-28 ENCOUNTER — TRANSITIONAL CARE MANAGEMENT TELEPHONE ENCOUNTER (OUTPATIENT)
Dept: CALL CENTER | Facility: HOSPITAL | Age: 80
End: 2020-11-28

## 2020-11-28 LAB
BACTERIA SPEC AEROBE CULT: NORMAL
BACTERIA SPEC AEROBE CULT: NORMAL

## 2020-11-28 NOTE — OUTREACH NOTE
"Call Center TCM Note      Responses   [facility] patient discharged from?  [place]   Does the patient have one of the following disease processes/diagnoses(primary or secondary)?  COVID-19   COVID-19 underlying condition?  None   TCM attempt successful?  Yes   Call start time  1115   Call end time  1123   General alerts for this patient  retastatic renal cell CA, on chemo   Discharge diagnosis  acute hypoxic resp failure & PNA d/t COVID-19   Meds reviewed with patient/caregiver?  Yes   Is the patient having any side effects they believe may be caused by any medication additions or changes?  No   Does the patient have all medications ordered at discharge?  Yes   Prescription comments  Pt did verbalize that the home health nurse really did help him sort through his medications.   Is the patient taking all medications as directed (includes completed medication regime)?  Yes   Does the patient have a primary care provider?   Yes   Does the patient have an appointment with their PCP or specialist within 7 days of discharge?  No   What is preventing the patient from scheduling follow up appointments within 7 days of discharge?  -- [Pt already had an appt scheduled on 12/28/20. Pt reports, \"I don't think an appt right now is necessary, but he (Dr. Marsh) may call me if he wants too.\"  ]   Nursing Interventions  Advised patient to make appointment   Has the patient kept scheduled appointments due by today?  N/A   What is the Home health agency?   [facility]   Has home health visited the patient within 72 hours of discharge?  Yes   Home health comments   visited on 11/28/20   What DME was ordered?  home O2 from [facility]   Has all DME been delivered?  Yes   DME comments  Pt is wearing 2 L continuous    Psychosocial issues?  No   Psychosocial comments  Wife is home with COVID   Did the patient receive a copy of their discharge instructions?  Yes   Did the patient receive a copy of COVID-19 specific instructions?  Yes " "  Nursing interventions  Reviewed instructions with patient   What is the patient's perception of their health status since discharge?  Improving [Pt reports, \"I'm wobbly on my feet, dizzy and lightheaded\"]   Does the patient have any of the following symptoms?  Cough, Shortness of breath, Loss of taste/smell [\"a little cough every once in a while\",  SOB with exertion-which is not new,  Taste is \"different\"]   Nursing Interventions  Nurse provided patient education   Pulse Ox monitoring  Intermittent   Pulse Ox device source  Other [Home health]   O2 Sat comments  98% with 2 L of O2   Is the patient/caregiver able to teach back steps to recovery at home?  Rest and rebuild strength, gradually increase activity   Is the patient/caregiver able to teach back the hierarchy of who to call/visit for symptoms/problems? PCP, Specialist, Home health nurse, Urgent Care, ED, 911  Yes   TCM call completed?  Yes          Rhea Rodrigues RN    11/28/2020, 11:27 EST      "

## 2020-11-29 ENCOUNTER — READMISSION MANAGEMENT (OUTPATIENT)
Dept: CALL CENTER | Facility: HOSPITAL | Age: 80
End: 2020-11-29

## 2020-11-29 NOTE — OUTREACH NOTE
COVID-19 Week 1 Survey      Responses   [facility] patient discharged from?  [place]   Does the patient have one of the following disease processes/diagnoses(primary or secondary)?  COVID-19   COVID-19 underlying condition?  None   Call Number  Call 2   Week 1 Call successful?  Yes   Call start time  1216   Call end time  1224   Meds reviewed with patient/caregiver?  Yes   Is the patient having any side effects they believe may be caused by any medication additions or changes?  No   Does the patient have all medications ordered at discharge?  Yes   Is the patient taking all medications as directed (includes completed medication regime)?  Yes   Does the patient have a primary care provider?   Yes   Does the patient have an appointment with their PCP or specialist within 7 days of discharge?  No   What is preventing the patient from scheduling follow up appointments within 7 days of discharge?  Haven't had time   Nursing Interventions  Advised patient to make appointment, Educated patient on importance of making appointment   Has the patient kept scheduled appointments due by today?  N/A   Has home health visited the patient within 72 hours of discharge?  Yes   Has all DME been delivered?  Yes   Psychosocial issues?  No   Did the patient receive a copy of their discharge instructions?  Yes   Did the patient receive a copy of COVID-19 specific instructions?  Yes   Nursing interventions  Reviewed instructions with patient   What is the patient's perception of their health status since discharge?  Same   Pulse Ox monitoring  Intermittent   Pulse Ox device source  Other   O2 Sat comments  Reports O2 sats at 92% with home oxygen continuous 2L.   O2 Sat: education provided  Sat levels, Monitoring frequency, When to seek care   O2 Sat education comments  Advised to seek care if O2 sats remain below 92% on home oxygen.   If the patient is a current smoker, are they able to teach back resources for cessation?  Not a  smoker   Is the patient/caregiver able to teach back the hierarchy of who to call/visit for symptoms/problems? PCP, Specialist, Home health nurse, Urgent Care, ED, 911  Yes   COVID-19 call completed?  Yes   Wrap up additional comments  Patient states is feeling about the same. States continues with slight cough, SOA on exertion, loss of taste/smell. Denies any fever or chest pain. States fatigue continues. States will call HH nurse if any change in condition for evaluation. Denies any needs today.          Marilyn Almaraz, RN

## 2020-11-30 ENCOUNTER — READMISSION MANAGEMENT (OUTPATIENT)
Dept: CALL CENTER | Facility: HOSPITAL | Age: 80
End: 2020-11-30

## 2020-11-30 NOTE — OUTREACH NOTE
COVID-19 Week 1 Survey      Responses   [facility] patient discharged from?  [place]   Does the patient have one of the following disease processes/diagnoses(primary or secondary)?  COVID-19   COVID-19 underlying condition?  None   Call Number  Call 3   Week 1 Call successful?  No   Discharge diagnosis  acute hypoxic resp failure & PNA d/t COVID-19          Nehal Horta RN

## 2020-12-03 ENCOUNTER — READMISSION MANAGEMENT (OUTPATIENT)
Dept: CALL CENTER | Facility: HOSPITAL | Age: 80
End: 2020-12-03

## 2020-12-03 NOTE — OUTREACH NOTE
COVID-19 Week 2 Survey      Responses   [facility] patient discharged from?  [place]   Does the patient have one of the following disease processes/diagnoses(primary or secondary)?  COVID-19   COVID-19 underlying condition?  None   Call Number  Call 1   COVID-19 Week 2: Call 1 attempt successful?  Yes   Call start time  1138   Call end time  1139   General alerts for this patient  retastatic renal cell CA, on chemo   Discharge diagnosis  acute hypoxic resp failure & PNA d/t COVID-19   Meds reviewed with patient/caregiver?  Yes   Is the patient having any side effects they believe may be caused by any medication additions or changes?  No   Does the patient have all medications ordered at discharge?  Yes   Is the patient taking all medications as directed (includes completed medication regime)?  Yes   Does the patient have a primary care provider?   Yes   Has the patient kept scheduled appointments due by today?  N/A   Psychosocial issues?  No   Did the patient receive a copy of their discharge instructions?  Yes   Did the patient receive a copy of COVID-19 specific instructions?  Yes   Nursing interventions  Reviewed instructions with patient, Educated on MyChart   What is the patient's perception of their health status since discharge?  Improving   Does the patient have any of the following symptoms?  Shortness of breath, Cough   Nursing Interventions  Nurse provided patient education   Pulse Ox monitoring  Intermittent   Is the patient/caregiver able to teach back steps to recovery at home?  Set small, achievable goals for return to baseline health, Rest and rebuild strength, gradually increase activity   If the patient is a current smoker, are they able to teach back resources for cessation?  Not a smoker   Is the patient/caregiver able to teach back the hierarchy of who to call/visit for symptoms/problems? PCP, Specialist, Home health nurse, Urgent Care, ED, 911  Yes   COVID-19 call completed?  Yes           Carrie Gordillo RN

## 2020-12-04 ENCOUNTER — OFFICE VISIT (OUTPATIENT)
Dept: FAMILY MEDICINE CLINIC | Facility: CLINIC | Age: 80
End: 2020-12-04

## 2020-12-04 DIAGNOSIS — J43.9 PULMONARY EMPHYSEMA, UNSPECIFIED EMPHYSEMA TYPE (HCC): Primary | ICD-10-CM

## 2020-12-04 DIAGNOSIS — U07.1 COVID-19 VIRUS DETECTED: ICD-10-CM

## 2020-12-04 PROCEDURE — 99441 PR PHYS/QHP TELEPHONE EVALUATION 5-10 MIN: CPT | Performed by: FAMILY MEDICINE

## 2020-12-04 NOTE — PROGRESS NOTES
You have chosen to receive care through a telephone visit. Do you consent to use a telephone visit for your medical care today? Yes         patient was admitted I think according to him twice for Covid pneumonia he says he actually got the [place] [facility] today but yet he calls me and asked me if he should be on 10 more days of antibiotics.  He does sound congested but I reviewed his chart and he did have pneumonia he saw Dr. Thomas and several specialist and it looks to me like he was readmitted and he made a phone visit with me today because our office said that since he was a recent Covid pneumonia and still coughing and congested he could not come in here and be seen in the office on a make sure if he gets sick he needs to go see Dr. Thomas are the other doctors that saw him in the hospital.  So I talked him on the phone today it is difficult to tell how he feels but he said he felt good enough today to decorate his tree with lights and arms and decorate his mantle. He does not sound short of breath he said he does not have a fever me to review his charts and dictate the rest of this note. On 2L O2 at all times.      Final diagnoses:   Pulmonary emphysema, unspecified emphysema type (CMS/[facility])   COVID-19 virus detected       [id number]   9 minutes

## 2020-12-06 ENCOUNTER — READMISSION MANAGEMENT (OUTPATIENT)
Dept: CALL CENTER | Facility: HOSPITAL | Age: 80
End: 2020-12-06

## 2020-12-06 NOTE — OUTREACH NOTE
COVID-19 Week 2 Survey      Responses   [facility] patient discharged from?  [place]   Does the patient have one of the following disease processes/diagnoses(primary or secondary)?  COVID-19   COVID-19 underlying condition?  Sepsis   Call Number  Call 2   COVID-19 Week 2: Call 1 attempt successful?  Yes   Call start time  1415   Call end time  1441   General alerts for this patient  retastatic renal cell CA, on chemo   Discharge diagnosis  acute hypoxic resp failure & PNA d/t COVID-19   Is patient permission given to speak with other caregiver?  Yes   List who call center can speak with  wife   Meds reviewed with patient/caregiver?  Yes   Is the patient having any side effects they believe may be caused by any medication additions or changes?  No   Does the patient have all medications ordered at discharge?  Yes   Prescription comments  He has all of the medications with no questions   Is the patient taking all medications as directed (includes completed medication regime)?  Yes   Does the patient have a primary care provider?   Yes   Comments regarding PCP  Dillon Montesinos MD   Does the patient have an appointment with their PCP or specialist within 7 days of discharge?  Greater than 7 days   What is preventing the patient from scheduling follow up appointments within 7 days of discharge?  Earlier appointment not available   Nursing Interventions  Verified appointment date/time/provider, Educated patient on importance of making appointment   Has the patient kept scheduled appointments due by today?  N/A   Comments  He has an upcoming appointment on 12/28, he has an appointment with oncologist on 12/7 for a scan   Home health comments  They are no longer coming. He doesn't think that he needs them.    What DME was ordered?  home O2 from [facility]   Has all DME been delivered?  Yes   DME interventions  Called DME agency   DME comments  Pt stated that his Portable Tank wasn't working. when he tries the protable his  O2 Sats praveena down into the 80'%, when he uses a different tank his sats stay at 92-94%. He needs the portable O2 tomorrow to go to his appointment. I called [employer] and they are going to send a technician to replace the portable if needed today.   Psychosocial issues?  No   Psychosocial comments  Daughter is there taking care of both wife and himself   Did the patient receive a copy of their discharge instructions?  Yes   Did the patient receive a copy of COVID-19 specific instructions?  Yes   Nursing interventions  Reviewed instructions with patient   What is the patient's perception of their health status since discharge?  Improving   Does the patient have any of the following symptoms?  Shortness of breath   Nursing Interventions  Nurse provided patient education   Pulse Ox monitoring  Intermittent   Pulse Ox device source  Other   O2 Sat comments  O2 sats 92-94%. Using O2 at 2 L intermittently.   O2 Sat: education provided  Sat levels, Monitoring frequency, When to seek care, Other   O2 Sat education comments  Advised to seek care if O2 sats remain below 92% on home oxygen.   Is the patient/caregiver able to teach back steps to recovery at home?  Set small, achievable goals for return to baseline health, Rest and rebuild strength, gradually increase activity, Weigh daily, Practice good oral hygiene, Eat a well-balance diet, Make a list of questions for provider's appointment   If the patient is a current smoker, are they able to teach back resources for cessation?  Not a smoker   Is the patient/caregiver able to teach back the hierarchy of who to call/visit for symptoms/problems? PCP, Specialist, Home health nurse, Urgent Care, ED, 911  Yes   Is the patient/caregiver able to teach back Sepsis?  S - Shivering,fever or very cold, E - Extreme pain or generalized discomfort (worst ever,especially abdomen), P - Pale or discolored skin, S - Sleepy, difficult to arouse,confused, I -   I feel like I might die-a feeling  of hopelessness, S - Short of breath   Nursing interventions  Nurse provided reassurance to patient, Nurse provided patient education   Is patient/caregiver able to teach back steps to recovery at home?  Set small, achievable goals for return to baseline health, Rest and regain strength, Talk about feelings with family/friends, Eat a balanced diet, Make a list of questions for PCP appoinment   Is the patient/caregiver able to teach back signs and symptoms of worsening condition:  Fever, Rapid heart rate (>90), Hyperthermia, Shortness of breath/rapid respiratory rate, Altered mental status(confusion/coma), Edema   COVID-19 call completed?  Yes          Muriel Rebolledo, RN

## 2020-12-13 ENCOUNTER — READMISSION MANAGEMENT (OUTPATIENT)
Dept: CALL CENTER | Facility: HOSPITAL | Age: 80
End: 2020-12-13

## 2020-12-13 NOTE — OUTREACH NOTE
COVID-19 Week 3 Survey      Responses   [facility] patient discharged from?  [place]   Does the patient have one of the following disease processes/diagnoses(primary or secondary)?  Other [Metastic renal cell carcinoma on q wks chemo]   Call Number  Call 1   COVID-19 Week 3: Call 1 attempt successful?  Yes   Call start time  0939   Call end time  0944   Discharge diagnosis  Pneumonia due to COVID-19 virus    Medication alerts for this patient   has has medications   Meds reviewed with patient/caregiver?  Yes   Is the patient having any side effects they believe may be caused by any medication additions or changes?  No   Does the patient have all medications ordered at discharge?  Yes   Is the patient taking all medications as directed (includes completed medication regime)?  Yes   Comments regarding appointments  has kept his appointments as scheduled   Does the patient have a primary care provider?   Yes   Does the patient have an appointment with their PCP or specialist within 7 days of discharge?  Yes   Has the patient kept scheduled appointments due by today?  Yes   What is the Home health agency?   [facility]   Has home health visited the patient within 72 hours of discharge?  N/A   What DME was ordered?  oxygen   Has all DME been delivered?  Yes   Comments  using oxygen at night, home health has dc visitis   Did the patient receive a copy of their discharge instructions?  Yes   Did the patient receive a copy of COVID-19 specific instructions?  Yes   Nursing interventions  Reviewed instructions with patient   What is the patient's perception of their health status since discharge?  Improving   Does the patient have any of the following symptoms?  Loss of taste/smell   Pulse Ox monitoring  Intermittent   Pulse Ox device source  Hospital   O2 Sat comments  94%   O2 Sat: education provided  Sat levels   O2 Sat education comments   if sats are less than 92%, needing to call the doctore   Is the  patient/caregiver able to teach back steps to recovery at home?  Set small, achievable goals for return to baseline health, Rest and rebuild strength, gradually increase activity, Eat a well-balance diet   If the patient is a current smoker, are they able to teach back resources for cessation?  Not a smoker [former smoker]   Is the patient/caregiver able to teach back the hierarchy of who to call/visit for symptoms/problems? PCP, Specialist, Home health nurse, Urgent Care, ED, 911  Yes   COVID-19 call completed?  Yes   Wrap up additional comments  doing well using oxygen only at night, sats 94% room air          Domenica Cooper RN

## 2020-12-20 ENCOUNTER — READMISSION MANAGEMENT (OUTPATIENT)
Dept: CALL CENTER | Facility: HOSPITAL | Age: 80
End: 2020-12-20

## 2020-12-20 NOTE — OUTREACH NOTE
COVID-19 Week 4 Survey      Responses   [facility] patient discharged from?  [place]   Does the patient have one of the following disease processes/diagnoses(primary or secondary)?  COVID-19   COVID-19 underlying condition?  None [chemo]   Call Number  Call 1   COVID-19 Week 4: Call 1 attempt successful?  Yes   Call start time  0911   Call end time  0916   Discharge diagnosis  Pneumonia due to COVID-19 virus   Has the patient kept scheduled appointments due by today?  Yes   Comments  appointment  scheduled for 12/28 with pulmonary   Is the patient still receiving Home Health Services?  N/A   DME comments  wearing oxygen at night   What is the patient's perception of their health status since discharge?  Improving   Pulse Ox monitoring  Intermittent   Pulse Ox device source  Patient   O2 Sat comments  95 % only wearing oxygen at night   O2 Sat: education provided  Sat levels   Is the patient interested in additional calls from an ambulatory ?  NOTE:  applies to high risk patients requiring additional follow-up.  No   Did the patient feel the follow up calls were helpful during their recovery period?  Yes   Was the number of calls appropriate?  Yes [felt may have been a little too much]   Interested in COVID-19 Plasma Donation?  No   Is the patient/caregiver familiar with Advance Care Planning?  No   Would the patient like more information on Advance Care Planning?  No   Wrap up additional comments  goals are achieved, patient stated has been out and is doing finer          Domenica Cooper RN

## 2020-12-28 ENCOUNTER — OFFICE VISIT (OUTPATIENT)
Dept: FAMILY MEDICINE CLINIC | Facility: CLINIC | Age: 80
End: 2020-12-28

## 2020-12-28 VITALS
BODY MASS INDEX: 31.68 KG/M2 | DIASTOLIC BLOOD PRESSURE: 59 MMHG | TEMPERATURE: 97.6 F | SYSTOLIC BLOOD PRESSURE: 126 MMHG | HEART RATE: 79 BPM | OXYGEN SATURATION: 98 % | WEIGHT: 239 LBS | HEIGHT: 73 IN | RESPIRATION RATE: 16 BRPM

## 2020-12-28 DIAGNOSIS — J30.89 PERENNIAL ALLERGIC RHINITIS: ICD-10-CM

## 2020-12-28 PROCEDURE — 99213 OFFICE O/P EST LOW 20 MIN: CPT | Performed by: FAMILY MEDICINE

## 2020-12-28 PROCEDURE — 96160 PT-FOCUSED HLTH RISK ASSMT: CPT | Performed by: FAMILY MEDICINE

## 2020-12-28 PROCEDURE — G0439 PPPS, SUBSEQ VISIT: HCPCS | Performed by: FAMILY MEDICINE

## 2020-12-28 PROCEDURE — 1159F MED LIST DOCD IN RCRD: CPT | Performed by: FAMILY MEDICINE

## 2020-12-28 PROCEDURE — 1170F FXNL STATUS ASSESSED: CPT | Performed by: FAMILY MEDICINE

## 2020-12-28 RX ORDER — AMLODIPINE BESYLATE 5 MG/1
5 TABLET ORAL DAILY
Qty: 90 TABLET | Refills: 1 | Status: SHIPPED | OUTPATIENT
Start: 2020-12-28 | End: 2021-07-04

## 2020-12-28 RX ORDER — LEVOCETIRIZINE DIHYDROCHLORIDE 5 MG/1
5 TABLET, FILM COATED ORAL EVERY EVENING
Qty: 90 TABLET | Refills: 1 | Status: SHIPPED | OUTPATIENT
Start: 2020-12-28 | End: 2021-07-07

## 2020-12-28 RX ORDER — ONDANSETRON 4 MG/1
4 TABLET, FILM COATED ORAL EVERY 6 HOURS PRN
Qty: 25 TABLET | Refills: 1 | Status: SHIPPED | OUTPATIENT
Start: 2020-12-28 | End: 2021-06-16

## 2020-12-28 RX ORDER — LEVOTHYROXINE SODIUM 112 UG/1
112 CAPSULE ORAL DAILY
Qty: 90 CAPSULE | Refills: 1 | Status: CANCELLED | OUTPATIENT
Start: 2020-12-28

## 2020-12-28 NOTE — PATIENT INSTRUCTIONS
Medicare Wellness  Personal Prevention Plan of Service     Date of Office Visit:  2020  Encounter Provider:  Dillon Marsh MD  Place of Service:  [facility]  Patient Name: Mg Gerber Sr.  :  1940    As part of the Medicare Wellness portion of your visit today, we are providing you with this personalized preventive plan of services (PPPS). This plan is based upon recommendations of the [place] Preventive Services Task Force (USPSTF) and the Advisory Committee on Immunization Practices (ACIP).    This lists the preventive care services that should be considered, and provides dates of when you are due. Items listed as completed are up-to-date and do not require any further intervention.    Health Maintenance   Topic Date Due   • TDAP/TD VACCINES (1 - Tdap) 1959   • ANNUAL WELLNESS VISIT  2016   • PROSTATE CANCER SCREENING  2016   • INFLUENZA VACCINE  2020   • COLONOSCOPY  2020   • Pneumococcal Vaccine 65+  Completed   • ZOSTER VACCINE  Discontinued       No orders of the defined types were placed in this encounter.      No follow-ups on file.

## 2020-12-28 NOTE — PROGRESS NOTES
The ABCs of the Annual Wellness Visit  Subsequent Medicare Wellness Visit    Chief Complaint   Patient presents with   • Medicare Wellness-subsequent   • Hypothyroidism   • Hypertension       Subjective   History of Present Illness:  Mg Gerber Sr. is a 80 y.o. male who presents for a Subsequent Medicare Wellness Visit. We also reviewed his admission for COVID pneumonia in November 2020. He is feeling much better today and is being followed by Pulmonology. I refilled his medications today.     HEALTH RISK ASSESSMENT    Recent Hospitalizations:  Recently treated at the following:  [facility]    Current Medical Providers:  Patient Care Team:  Dillon Marsh MD as PCP - General (Family Medicine)  Dillon Marsh MD as PCP - Family Medicine (Family Medicine)    Smoking Status:  Social History     Tobacco Use   Smoking Status Former Smoker   Smokeless Tobacco Never Used       Alcohol Consumption:  Social History     Substance and Sexual Activity   Alcohol Use Yes    Comment: 2 drinks month       Depression Screen:   PHQ-2/PHQ-9 Depression Screening 12/28/2020   Little interest or pleasure in doing things 0   Feeling down, depressed, or hopeless 0   Trouble falling or staying asleep, or sleeping too much 0   Feeling tired or having little energy 0   Poor appetite or overeating 0   Feeling bad about yourself - or that you are a failure or have let yourself or your family down 0   Trouble concentrating on things, such as reading the newspaper or watching television 0   Moving or speaking so slowly that other people could have noticed. Or the opposite - being so fidgety or restless that you have been moving around a lot more than usual 0   Thoughts that you would be better off dead, or of hurting yourself in some way 0   Total Score 0       Fall Risk Screen:  STEADI Fall Risk Assessment was completed, and patient is at LOW risk for falls.Assessment completed on:12/28/2020    Health Habits and Functional  and Cognitive Screening:  Functional & Cognitive Status 12/28/2020   Do you have difficulty preparing food and eating? No   Do you have difficulty bathing yourself, getting dressed or grooming yourself? No   Do you have difficulty using the toilet? No   Do you have difficulty moving around from place to place? No   Do you have trouble with steps or getting out of a bed or a chair? No   Current Diet Unhealthy Diet   Dental Exam Up to date   Eye Exam Up to date   Exercise (times per week) 0 times per week   Current Exercises Include No Regular Exercise   Do you need help using the phone?  No   Are you deaf or do you have serious difficulty hearing?  Yes   Do you need help with transportation? No   Do you need help shopping? No   Do you need help preparing meals?  No   Do you need help with housework?  No   Do you need help with laundry? No   Do you need help taking your medications? No   Do you need help managing money? No   Do you ever drive or ride in a car without wearing a seat belt? No   Have you felt unusual stress, anger or loneliness in the last month? No   Who do you live with? Spouse   If you need help, do you have trouble finding someone available to you? No   Have you been bothered in the last four weeks by sexual problems? No   Do you have difficulty concentrating, remembering or making decisions? No         Does the patient have evidence of cognitive impairment? No    Asprin use counseling:Does not need ASA (and currently is not on it)    Age-appropriate Screening Schedule:  Refer to the list below for future screening recommendations based on patient's age, sex and/or medical conditions. Orders for these recommended tests are listed in the plan section. The patient has been provided with a written plan.    Health Maintenance   Topic Date Due   • TDAP/TD VACCINES (1 - Tdap) 12/28/1959   • PROSTATE CANCER SCREENING  08/11/2016   • INFLUENZA VACCINE  08/01/2020   • COLONOSCOPY  11/17/2020   • ZOSTER  VACCINE  Discontinued          The following portions of the patient's history were reviewed and updated as appropriate: allergies, current medications, past family history, past medical history, past social history, past surgical history and problem list.    Outpatient Medications Prior to Visit   Medication Sig Dispense Refill   • Calcium Carb-Cholecalciferol (CALTRATE 600+D3 PO) Take 1 tablet by mouth 2 (two) times a day.     • FIBER SELECT GUMMIES PO Take  by mouth.     • finasteride (PROSCAR) 5 MG tablet      • folic acid (FOLVITE) 1 MG tablet Take 1 mg by mouth 3 (Three) Times a Day.     • gabapentin (NEURONTIN) 400 MG capsule Take 1 capsule by mouth 2 (two) times a day. 10 capsule 0   • guaiFENesin (MUCINEX) 600 MG 12 hr tablet Take 1 tablet by mouth Every 12 (Twelve) Hours.     • HYDROcodone-acetaminophen (NORCO) 5-325 MG per tablet Take 1 tablet by mouth Every 6 (Six) Hours As Needed for Mild Pain , Moderate Pain  or Severe Pain . 24 tablet 0   • Lactobacillus tablet Take 1 tablet by mouth Daily. 14 tablet 0   • levothyroxine sodium (TIROSINT) 112 MCG capsule Daily.     • meclizine (ANTIVERT) 12.5 MG tablet Take 1 tablet by mouth 2 (Two) Times a Day As Needed for dizziness. 20 tablet 0   • NAMZARIC 28-10 MG capsule sustained-release 24 hr Take 1 capsule by mouth Every Evening.     • OXcarbazepine (TRILEPTAL) 300 MG tablet Take 1 tablet by mouth every night at bedtime. 5 tablet 0   • sertraline (ZOLOFT) 25 MG tablet Take 25 mg by mouth Daily. Take 1-2 tabs by mouth daily     • SPIRIVA HANDIHALER 18 MCG per inhalation capsule Place 1 capsule into inhaler and inhale Daily.     • tamsulosin (FLOMAX) 0.4 MG capsule 24 hr capsule Take 1 capsule by mouth Daily.     • tiotropium bromide-olodaterol (STIOLTO RESPIMAT) 2.5-2.5 MCG/ACT aerosol solution inhaler Inhale 2 (Two) Times a Day.     • VENTOLIN  (90 BASE) MCG/ACT inhaler Inhale 2 puffs Every 4 (Four) Hours As Needed.     • XARELTO 20 MG tablet Take 20 mg  by mouth daily with dinner.     • amLODIPine (NORVASC) 5 MG tablet Take 1 tablet by mouth Daily. 90 tablet 1   • levocetirizine (XYZAL) 5 MG tablet TAKE ONE TABLET BY MOUTH EVERY EVENING 90 tablet 0   • ondansetron (ZOFRAN) 4 MG tablet Take 1 tablet by mouth Every 6 (Six) Hours As Needed for Nausea or Vomiting. 25 tablet 0   • gabapentin (NEURONTIN) 400 MG capsule Take 2 capsules by mouth Every Night for 5 doses. 10 capsule 0     Facility-Administered Medications Prior to Visit   Medication Dose Route Frequency Provider Last Rate Last Admin   • cyanocobalamin injection 1,000 mcg  1,000 mcg Intramuscular Q30 Days Dillon Marsh MD   1,000 mcg at 01/15/20 0848       Patient Active Problem List   Diagnosis   • Anemia, vitamin B12 deficiency   • Arthritis   • Hemochromatosis   • Herpes zoster   • Hip pain   • Hypertension   • Cancer (CMS/HCC)   • Pulmonary emphysema (CMS/HCC)   • Left low back pain   • Dizziness   • Headache around the eyes   • Mild cognitive impairment   • Metastatic renal cell carcinoma  on q 2 wks chemo   • Anxiety   • Malignant neoplasm of lung (CMS/HCC)   • Neuropathy   • Perennial allergic rhinitis   • Vitamin B12 deficiency   • Osteoarthritis of lumbar spine   • Pharyngitis   • Bronchitis   • Other constipation   • Hx of hiatal hernia   • History of lung cancer   • COVID-19 virus detected   • Dyspnea   • Generalized weakness   • Acute respiratory failure with hypoxia (CMS/HCC)   • Gastroesophageal reflux disease   • Hearing disorder   • Malignant neoplasm of prostate (CMS/HCC)   • Sleep apnea   • Anemia   • Malignant neoplasm metastatic to lung (CMS/HCC)   • Secondary bacterial pneumonia   • Pneumonia due to COVID-19 virus   • Hilar mass   • On antineoplastic chemotherapy q 2wks   • Paroxysmal atrial fibrillation (CMS/HCC)   • History of pulmonary embolism       Advanced Care Planning:  ACP discussion was held with the patient during this visit. Patient does not have an advance directive,  "information provided.    Review of Systems   Constitutional: Negative for activity change, appetite change and unexpected weight change.   Eyes: Negative for visual disturbance.   Respiratory: Negative for chest tightness and shortness of breath.    Cardiovascular: Negative for chest pain and palpitations.   Skin: Negative for color change.   Neurological: Negative for syncope and speech difficulty.   Psychiatric/Behavioral: Negative for confusion and decreased concentration.   All other systems reviewed and are negative.      Compared to one year ago, the patient feels his physical health is better.  Compared to one year ago, the patient feels his mental health is better.    Reviewed chart for potential of high risk medication in the elderly: yes  Reviewed chart for potential of harmful drug interactions in the elderly:yes    Objective         Vitals:    12/28/20 0954   BP: 126/59   Pulse: 79   Resp: 16   Temp: 97.6 °F (36.4 °C)   SpO2: 98%   Weight: 108 kg (239 lb)   Height: 185.4 cm (73\")       Body mass index is 31.53 kg/m².  Discussed the patient's BMI with him. The BMI is above average; no BMI management plan is appropriate..    Physical Exam  Constitutional:       Appearance: He is obese. He is not ill-appearing or diaphoretic.   HENT:      Head: Normocephalic and atraumatic.      Right Ear: Tympanic membrane and external ear normal.      Left Ear: Tympanic membrane and external ear normal.   Pulmonary:      Effort: Pulmonary effort is normal. No respiratory distress.      Breath sounds: Normal breath sounds.   Abdominal:      Tenderness: There is no abdominal tenderness.   Musculoskeletal: Normal range of motion.         General: No deformity.   Skin:     Coloration: Skin is not pale.   Neurological:      Mental Status: He is alert and oriented to person, place, and time.               Assessment/Plan   Medicare Risks and Personalized Health Plan  CMS Preventative Services Quick Reference  Abdominal Aortic " Aneurysm Screening  Cardiovascular risk  Chronic Pain   Colon Cancer Screening  Fall Risk  Immunizations Discussed/Encouraged (specific immunizations; Td, Hepatitis B Vaccine/Series, Influenza and Shingrix )  Obesity/Overweight     The above risks/problems have been discussed with the patient.  Pertinent information has been shared with the patient in the After Visit Summary.  Follow up plans and orders are seen below in the Assessment/Plan Section.    Diagnoses and all orders for this visit:    1. Perennial allergic rhinitis  -     levocetirizine (XYZAL) 5 MG tablet; Take 1 tablet by mouth Every Evening.  Dispense: 90 tablet; Refill: 1    Other orders  -     amLODIPine (NORVASC) 5 MG tablet; Take 1 tablet by mouth Daily.  Dispense: 90 tablet; Refill: 1  -     ondansetron (ZOFRAN) 4 MG tablet; Take 1 tablet by mouth Every 6 (Six) Hours As Needed for Nausea or Vomiting.  Dispense: 25 tablet; Refill: 1  -     Cancel: levothyroxine sodium (TIROSINT) 112 MCG capsule; Take 1 capsule by mouth Daily.  Dispense: 90 capsule; Refill: 1      Follow Up:  No follow-ups on file.     An After Visit Summary and PPPS were given to the patient.

## 2021-03-03 DIAGNOSIS — Z23 IMMUNIZATION DUE: ICD-10-CM

## 2021-06-17 RX ORDER — ONDANSETRON 4 MG/1
TABLET, FILM COATED ORAL
Qty: 14 TABLET | Refills: 0 | Status: SHIPPED | OUTPATIENT
Start: 2021-06-17 | End: 2021-08-04 | Stop reason: SDUPTHER

## 2021-07-04 RX ORDER — AMLODIPINE BESYLATE 5 MG/1
TABLET ORAL
Qty: 30 TABLET | Refills: 0 | Status: SHIPPED | OUTPATIENT
Start: 2021-07-04 | End: 2021-07-31

## 2021-07-06 DIAGNOSIS — J30.89 PERENNIAL ALLERGIC RHINITIS: ICD-10-CM

## 2021-07-08 RX ORDER — LEVOCETIRIZINE DIHYDROCHLORIDE 5 MG/1
TABLET, FILM COATED ORAL
Qty: 30 TABLET | Refills: 0 | Status: SHIPPED | OUTPATIENT
Start: 2021-07-08 | End: 2021-08-04 | Stop reason: SDUPTHER

## 2021-07-10 DIAGNOSIS — J30.89 PERENNIAL ALLERGIC RHINITIS: ICD-10-CM

## 2021-07-13 RX ORDER — LEVOCETIRIZINE DIHYDROCHLORIDE 5 MG/1
TABLET, FILM COATED ORAL
Qty: 90 TABLET | Refills: 0 | OUTPATIENT
Start: 2021-07-13

## 2021-07-30 ENCOUNTER — TELEPHONE (OUTPATIENT)
Dept: FAMILY MEDICINE CLINIC | Facility: CLINIC | Age: 81
End: 2021-07-30

## 2021-07-30 RX ORDER — MECLIZINE HCL 12.5 MG/1
12.5 TABLET ORAL 2 TIMES DAILY PRN
Qty: 20 TABLET | Refills: 0 | Status: CANCELLED | OUTPATIENT
Start: 2021-07-30

## 2021-07-30 NOTE — TELEPHONE ENCOUNTER
Caller: Mg Gerber Sr.    Relationship: Self    Best call back number: [phone] (CAN LEAVE MESSAGE PER PATIENT)    Medication needed:   Requested Prescriptions     Pending Prescriptions Disp Refills   • meclizine (ANTIVERT) 12.5 MG tablet 20 tablet 0     Sig: Take 1 tablet by mouth 2 (Two) Times a Day As Needed for Dizziness.       When do you need the refill by: ASAP     What additional details did the patient provide when requesting the medication: OUT OF MEDICATION. PRESCRIPTION WAS WRITTEN BY AN URGENT CARE PROVIDER. PATIENT STATES IT HAS REALLY HELPED WITH HIS DIZZINESS AND ASKS IF PROVIDER WOULD WRITE A PRESCRIPTION FOR IT. PATIENT STATES HE WILL COME IN TO OFFICE IF NEEDED. WOULD PREFER TO SEE DR BARTLETT WHILE DR COTE IS OUT   Does the patient have less than a 3 day supply:  [x] Yes  [] No    What is the patient's preferred pharmacy: [address] [place] [address] [phone] [facility] [phone] [facility]           DELETE AFTER READING TO PATIENT: “Thank you for sharing this information with me. I will send a message to the clinical team. Please allow 48 hours for the clinical staff to follow up on this request.”

## 2021-07-30 NOTE — TELEPHONE ENCOUNTER
I called pt with 2 attempts pt did not answer.  I LM for pt to return call.  Pt will need to schedule an appointment with one of providers to get medication requesting.  Thanks

## 2021-07-31 RX ORDER — AMLODIPINE BESYLATE 5 MG/1
TABLET ORAL
Qty: 30 TABLET | Refills: 0 | Status: SHIPPED | OUTPATIENT
Start: 2021-07-31 | End: 2021-08-04 | Stop reason: SDUPTHER

## 2021-08-03 NOTE — PROGRESS NOTES
"Violet Gerber Sr. is a 80 y.o. male.     CC: Dizziness    History of Present Illness     Pt of Dr. Marsh's comes to see me today with dizziness issues       The following portions of the patient's history were reviewed and updated as appropriate: allergies, current medications, past family history, past medical history, past social history, past surgical history and problem list.    Review of Systems   Constitutional: Negative for activity change, chills and fever.   Respiratory: Negative for cough.    Cardiovascular: Negative for chest pain.   Psychiatric/Behavioral: Negative for dysphoric mood.       Temp 97.5 °F (36.4 °C)   Resp 16   Ht 185.4 cm (73\")   Wt 108 kg (239 lb)   BMI 31.53 kg/m²     Objective   Physical Exam  Constitutional:       General: He is not in acute distress.     Appearance: He is well-developed.   Cardiovascular:      Rate and Rhythm: Normal rate and regular rhythm.   Pulmonary:      Effort: Pulmonary effort is normal.      Breath sounds: Normal breath sounds.   Neurological:      Mental Status: He is alert and oriented to person, place, and time.   Psychiatric:         Behavior: Behavior normal.         Thought Content: Thought content normal.         Assessment/Plan   There are no diagnoses linked to this encounter.           "

## 2021-08-04 ENCOUNTER — OFFICE VISIT (OUTPATIENT)
Dept: FAMILY MEDICINE CLINIC | Facility: CLINIC | Age: 81
End: 2021-08-04

## 2021-08-04 VITALS
RESPIRATION RATE: 16 BRPM | TEMPERATURE: 97.5 F | WEIGHT: 239 LBS | HEIGHT: 73 IN | BODY MASS INDEX: 31.68 KG/M2 | HEART RATE: 60 BPM | DIASTOLIC BLOOD PRESSURE: 76 MMHG | SYSTOLIC BLOOD PRESSURE: 134 MMHG

## 2021-08-04 DIAGNOSIS — J30.89 PERENNIAL ALLERGIC RHINITIS: Chronic | ICD-10-CM

## 2021-08-04 DIAGNOSIS — I10 ESSENTIAL HYPERTENSION: Chronic | ICD-10-CM

## 2021-08-04 DIAGNOSIS — R42 VERTIGO: Primary | Chronic | ICD-10-CM

## 2021-08-04 DIAGNOSIS — R11.0 NAUSEA: Chronic | ICD-10-CM

## 2021-08-04 PROCEDURE — 99214 OFFICE O/P EST MOD 30 MIN: CPT | Performed by: FAMILY MEDICINE

## 2021-08-04 RX ORDER — MECLIZINE HCL 12.5 MG/1
12.5 TABLET ORAL 3 TIMES DAILY PRN
Qty: 270 TABLET | Refills: 1 | Status: SHIPPED | OUTPATIENT
Start: 2021-08-04

## 2021-08-04 RX ORDER — AMLODIPINE BESYLATE 5 MG/1
5 TABLET ORAL DAILY
Qty: 90 TABLET | Refills: 1 | Status: SHIPPED | OUTPATIENT
Start: 2021-08-04 | End: 2022-01-15 | Stop reason: SDUPTHER

## 2021-08-04 RX ORDER — LEVOCETIRIZINE DIHYDROCHLORIDE 5 MG/1
5 TABLET, FILM COATED ORAL EVERY EVENING
Qty: 90 TABLET | Refills: 1 | Status: SHIPPED | OUTPATIENT
Start: 2021-08-04 | End: 2022-01-15 | Stop reason: SDUPTHER

## 2021-08-04 RX ORDER — ONDANSETRON 4 MG/1
4 TABLET, FILM COATED ORAL EVERY 6 HOURS PRN
Qty: 30 TABLET | Refills: 11 | Status: SHIPPED | OUTPATIENT
Start: 2021-08-04 | End: 2022-01-17

## 2021-08-04 NOTE — PROGRESS NOTES
Subjective   Mg Gerber Sr. is a 80 y.o. male.     History of Present Illness     Chief Complaint:   Chief Complaint   Patient presents with   • Dizziness   • Hypertension   • Allergies       Mg Gerber Sr. 80 y.o. male who presents today for Medical Management of the below listed issues and medication refills.  he has a problem list of   Patient Active Problem List   Diagnosis   • Anemia, vitamin B12 deficiency   • Arthritis   • Hemochromatosis   • Herpes zoster   • Hip pain   • Hypertension   • Cancer (CMS/HCC)   • Pulmonary emphysema (CMS/HCC)   • Left low back pain   • Vertigo   • Headache around the eyes   • Mild cognitive impairment   • Metastatic renal cell carcinoma  on q 2 wks chemo   • Anxiety   • Malignant neoplasm of lung (CMS/HCC)   • Neuropathy   • Perennial allergic rhinitis   • Vitamin B12 deficiency   • Osteoarthritis of lumbar spine   • Pharyngitis   • Bronchitis   • Other constipation   • Hx of hiatal hernia   • History of lung cancer   • COVID-19 virus detected   • Dyspnea   • Generalized weakness   • Acute respiratory failure with hypoxia (CMS/HCC)   • Gastroesophageal reflux disease   • Hearing disorder   • Malignant neoplasm of prostate (CMS/HCC)   • Sleep apnea   • Anemia   • Malignant neoplasm metastatic to lung (CMS/HCC)   • Secondary bacterial pneumonia   • Pneumonia due to COVID-19 virus   • Hilar mass   • On antineoplastic chemotherapy q 2wks   • Paroxysmal atrial fibrillation (CMS/HCC)   • History of pulmonary embolism   • Nausea   .  Since the last visit, he has overall felt well.  he has been compliant with   Current Outpatient Medications:   •  amLODIPine (NORVASC) 5 MG tablet, Take 1 tablet by mouth Daily., Disp: 90 tablet, Rfl: 1  •  Lactobacillus tablet, Take 1 tablet by mouth Daily., Disp: 14 tablet, Rfl: 0  •  levocetirizine (XYZAL) 5 MG tablet, Take 1 tablet by mouth Every Evening., Disp: 90 tablet, Rfl: 1  •  levothyroxine sodium (TIROSINT) 112 MCG capsule, Daily.,  Disp: , Rfl:   •  NAMZARIC 28-10 MG capsule sustained-release 24 hr, Take 1 capsule by mouth Every Evening., Disp: , Rfl:   •  ondansetron (ZOFRAN) 4 MG tablet, Take 1 tablet by mouth Every 6 (Six) Hours As Needed for Nausea or Vomiting., Disp: 30 tablet, Rfl: 11  •  OXcarbazepine (TRILEPTAL) 300 MG tablet, Take 1 tablet by mouth every night at bedtime., Disp: 5 tablet, Rfl: 0  •  sertraline (ZOLOFT) 25 MG tablet, Take 25 mg by mouth Daily. Take 1-2 tabs by mouth daily, Disp: , Rfl:   •  SPIRIVA HANDIHALER 18 MCG per inhalation capsule, Place 1 capsule into inhaler and inhale Daily., Disp: , Rfl:   •  sulfamethoxazole-trimethoprim (BACTRIM DS,SEPTRA DS) 800-160 MG per tablet, Take 1 tablet by mouth 2 (Two) Times a Day., Disp: 14 tablet, Rfl: 0  •  tamsulosin (FLOMAX) 0.4 MG capsule 24 hr capsule, Take 1 capsule by mouth Daily., Disp: , Rfl:   •  tiotropium bromide-olodaterol (STIOLTO RESPIMAT) 2.5-2.5 MCG/ACT aerosol solution inhaler, Inhale 2 (Two) Times a Day., Disp: , Rfl:   •  VENTOLIN  (90 BASE) MCG/ACT inhaler, Inhale 2 puffs Every 4 (Four) Hours As Needed., Disp: , Rfl:   •  XARELTO 20 MG tablet, Take 20 mg by mouth daily with dinner., Disp: , Rfl:   •  Calcium Carb-Cholecalciferol (CALTRATE 600+D3 PO), Take 1 tablet by mouth 2 (two) times a day., Disp: , Rfl:   •  FIBER SELECT GUMMIES PO, Take  by mouth., Disp: , Rfl:   •  finasteride (PROSCAR) 5 MG tablet, , Disp: , Rfl:   •  folic acid (FOLVITE) 1 MG tablet, Take 1 mg by mouth 3 (Three) Times a Day., Disp: , Rfl:   •  gabapentin (NEURONTIN) 400 MG capsule, Take 1 capsule by mouth 2 (two) times a day., Disp: 10 capsule, Rfl: 0  •  guaiFENesin (MUCINEX) 600 MG 12 hr tablet, Take 1 tablet by mouth Every 12 (Twelve) Hours., Disp:  , Rfl:   •  HYDROcodone-acetaminophen (NORCO) 5-325 MG per tablet, Take 1 tablet by mouth Every 6 (Six) Hours As Needed for Mild Pain , Moderate Pain  or Severe Pain ., Disp: 24 tablet, Rfl: 0  •  meclizine (ANTIVERT) 12.5 MG  "tablet, Take 1 tablet by mouth 3 (Three) Times a Day As Needed for Dizziness., Disp: 270 tablet, Rfl: 1    Current Facility-Administered Medications:   •  cyanocobalamin injection 1,000 mcg, 1,000 mcg, Intramuscular, Q30 Days, Dillon Marsh MD, 1,000 mcg at 01/15/20 0848.  he denies medication side effects.    All of the other chronic condition(s) listed above are stable w/o issues.    /76   Pulse 60   Temp 97.5 °F (36.4 °C)   Resp 16   Ht 185.4 cm (73\")   Wt 108 kg (239 lb)   BMI 31.53 kg/m²     Results for orders placed or performed during the hospital encounter of 11/23/20   Blood Culture - Blood, Arm, Right    Specimen: Arm, Right; Blood   Result Value Ref Range    Blood Culture No growth at 5 days    Blood Culture - Blood, Arm, Left    Specimen: Arm, Left; Blood   Result Value Ref Range    Blood Culture No growth at 5 days    Respiratory Culture - Sputum, Cough    Specimen: Cough; Sputum   Result Value Ref Range    Respiratory Culture       Rare Normal Respiratory Zofia: NO S.aureus/MRSA or Pseudomonas aeruginosa    Gram Stain Few (2+) WBCs seen     Gram Stain       Rare (1+) Mixed bacterial morphotypes seen on Gram Stain   S. Pneumo Ag Urine or CSF - Urine, Urine, Clean Catch    Specimen: Urine, Clean Catch   Result Value Ref Range    Strep Pneumo Ag Negative Negative   MRSA Screen, PCR (Inpatient) - Swab, Nares    Specimen: Nares; Swab   Result Value Ref Range    MRSA PCR No MRSA Detected No MRSA Detected   Respiratory Panel PCR w/COVID-19(SARS-CoV-2) BEBETO/SUSIE/NOHELIA/PAD/COR/MAD/ZEFERINO In-House, NP Swab in [facility], 3-4 HR TAT - Swab, Nasopharynx    Specimen: Nasopharynx; Swab   Result Value Ref Range    ADENOVIRUS, PCR Not Detected Not Detected    Coronavirus 229E Not Detected Not Detected    Coronavirus HKU1 Not Detected Not Detected    Coronavirus NL63 Not Detected Not Detected    Coronavirus OC43 Not Detected Not Detected    COVID19 Detected (C) Not Detected - Ref. Range    Human Metapneumovirus Not " Detected Not Detected    Human Rhinovirus/Enterovirus Not Detected Not Detected    Influenza A PCR Not Detected Not Detected    Influenza B PCR Not Detected Not Detected    Parainfluenza Virus 1 Not Detected Not Detected    Parainfluenza Virus 2 Not Detected Not Detected    Parainfluenza Virus 3 Not Detected Not Detected    Parainfluenza Virus 4 Not Detected Not Detected    RSV, PCR Not Detected Not Detected    Bordetella pertussis pcr Not Detected Not Detected    Bordetella parapertussis PCR Not Detected Not Detected    Chlamydophila pneumoniae PCR Not Detected Not Detected    Mycoplasma pneumo by PCR Not Detected Not Detected   Comprehensive Metabolic Panel    Specimen: Blood   Result Value Ref Range    Glucose 122 (H) 65 - 99 mg/dL    BUN 16 8 - 23 mg/dL    Creatinine 1.04 0.76 - 1.27 mg/dL    Sodium 134 (L) 136 - 145 mmol/L    Potassium 3.9 3.5 - 5.2 mmol/L    Chloride 99 98 - 107 mmol/L    CO2 25.6 22.0 - 29.0 mmol/L    Calcium 9.3 8.6 - 10.5 mg/dL    Total Protein 6.4 6.0 - 8.5 g/dL    Albumin 3.80 3.50 - 5.20 g/dL    ALT (SGPT) 16 1 - 41 U/L    AST (SGOT) 26 1 - 40 U/L    Alkaline Phosphatase 63 39 - 117 U/L    Total Bilirubin 2.7 (H) 0.0 - 1.2 mg/dL    eGFR Non African Amer 69 >60 mL/min/1.73    Globulin 2.6 gm/dL    A/G Ratio 1.5 g/dL    BUN/Creatinine Ratio 15.4 7.0 - 25.0    Anion Gap 9.4 5.0 - 15.0 mmol/L   Lactic Acid, Plasma    Specimen: Blood   Result Value Ref Range    Lactate 1.6 0.5 - 2.0 mmol/L   Procalcitonin    Specimen: Blood   Result Value Ref Range    Procalcitonin 0.14 0.00 - 0.25 ng/mL   D-dimer, Quantitative    Specimen: Blood   Result Value Ref Range    D-Dimer, Quantitative 0.41 0.00 - 0.49 MCGFEU/mL   Troponin    Specimen: Blood   Result Value Ref Range    Troponin T 0.012 0.000 - 0.030 ng/mL   BNP    Specimen: Blood   Result Value Ref Range    proBNP 439.3 0.0-1,800.0 pg/mL   CBC Auto Differential    Specimen: Blood   Result Value Ref Range    WBC 13.28 (H) 3.40 - 10.80 10*3/mm3    RBC  2.89 (L) 4.14 - 5.80 10*6/mm3    Hemoglobin 10.3 (L) 13.0 - 17.7 g/dL    Hematocrit 31.6 (L) 37.5 - 51.0 %    .3 (H) 79.0 - 97.0 fL    MCH 35.6 (H) 26.6 - 33.0 pg    MCHC 32.6 31.5 - 35.7 g/dL    RDW 10.5 (L) 12.3 - 15.4 %    RDW-SD 41.2 37.0 - 54.0 fl    MPV 10.3 6.0 - 12.0 fL    Platelets 277 140 - 450 10*3/mm3    Neutrophil % 58.5 42.7 - 76.0 %    Lymphocyte % 21.4 19.6 - 45.3 %    Monocyte % 18.5 (H) 5.0 - 12.0 %    Eosinophil % 0.3 0.3 - 6.2 %    Basophil % 0.5 0.0 - 1.5 %    Neutrophils, Absolute 7.77 (H) 1.70 - 7.00 10*3/mm3    Lymphocytes, Absolute 2.84 0.70 - 3.10 10*3/mm3    Monocytes, Absolute 2.46 (H) 0.10 - 0.90 10*3/mm3    Eosinophils, Absolute 0.04 0.00 - 0.40 10*3/mm3    Basophils, Absolute 0.06 0.00 - 0.20 10*3/mm3   Ferritin    Specimen: Blood   Result Value Ref Range    Ferritin 10,274.00 (H) 30.00 - 400.00 ng/mL   Lactate Dehydrogenase    Specimen: Blood   Result Value Ref Range     (H) 135 - 225 U/L   CK    Specimen: Blood   Result Value Ref Range    Creatine Kinase 26 20 - 200 U/L   C-reactive Protein    Specimen: Blood   Result Value Ref Range    C-Reactive Protein 6.52 (H) 0.00 - 0.50 mg/dL   Comprehensive Metabolic Panel    Specimen: Blood   Result Value Ref Range    Glucose 103 (H) 65 - 99 mg/dL    BUN 17 8 - 23 mg/dL    Creatinine 1.05 0.76 - 1.27 mg/dL    Sodium 135 (L) 136 - 145 mmol/L    Potassium 4.1 3.5 - 5.2 mmol/L    Chloride 106 98 - 107 mmol/L    CO2 21.2 (L) 22.0 - 29.0 mmol/L    Calcium 8.0 (L) 8.6 - 10.5 mg/dL    Total Protein 5.3 (L) 6.0 - 8.5 g/dL    Albumin 3.00 (L) 3.50 - 5.20 g/dL    ALT (SGPT) 14 1 - 41 U/L    AST (SGOT) 15 1 - 40 U/L    Alkaline Phosphatase 47 39 - 117 U/L    Total Bilirubin 1.6 (H) 0.0 - 1.2 mg/dL    eGFR Non African Amer 68 >60 mL/min/1.73    Globulin 2.3 gm/dL    A/G Ratio 1.3 g/dL    BUN/Creatinine Ratio 16.2 7.0 - 25.0    Anion Gap 7.8 5.0 - 15.0 mmol/L   CK    Specimen: Blood   Result Value Ref Range    Creatine Kinase 25 20 - 200 U/L    C-reactive Protein    Specimen: Blood   Result Value Ref Range    C-Reactive Protein 6.65 (H) 0.00 - 0.50 mg/dL   Ferritin    Specimen: Blood   Result Value Ref Range    Ferritin 7,991.00 (H) 30.00 - 400.00 ng/mL   Lactate Dehydrogenase    Specimen: Blood   Result Value Ref Range     135 - 225 U/L   D-dimer, Quantitative    Specimen: Blood   Result Value Ref Range    D-Dimer, Quantitative 0.29 0.00 - 0.49 MCGFEU/mL   Fibrinogen    Specimen: Blood   Result Value Ref Range    Fibrinogen 472 (H) 219 - 464 mg/dL   CBC Auto Differential    Specimen: Blood   Result Value Ref Range    WBC 13.30 (H) 3.40 - 10.80 10*3/mm3    RBC 2.38 (L) 4.14 - 5.80 10*6/mm3    Hemoglobin 8.3 (L) 13.0 - 17.7 g/dL    Hematocrit 25.3 (L) 37.5 - 51.0 %    .3 (H) 79.0 - 97.0 fL    MCH 34.9 (H) 26.6 - 33.0 pg    MCHC 32.8 31.5 - 35.7 g/dL    RDW 10.5 (L) 12.3 - 15.4 %    RDW-SD 39.9 37.0 - 54.0 fl    MPV 10.1 6.0 - 12.0 fL    Platelets 232 140 - 450 10*3/mm3    Neutrophil % 57.3 42.7 - 76.0 %    Lymphocyte % 24.7 19.6 - 45.3 %    Monocyte % 16.6 (H) 5.0 - 12.0 %    Eosinophil % 0.2 (L) 0.3 - 6.2 %    Basophil % 0.4 0.0 - 1.5 %    Neutrophils, Absolute 7.63 (H) 1.70 - 7.00 10*3/mm3    Lymphocytes, Absolute 3.28 (H) 0.70 - 3.10 10*3/mm3    Monocytes, Absolute 2.21 (H) 0.10 - 0.90 10*3/mm3    Eosinophils, Absolute 0.03 0.00 - 0.40 10*3/mm3    Basophils, Absolute 0.05 0.00 - 0.20 10*3/mm3   Comprehensive Metabolic Panel    Specimen: Blood   Result Value Ref Range    Glucose 88 65 - 99 mg/dL    BUN 13 8 - 23 mg/dL    Creatinine 1.06 0.76 - 1.27 mg/dL    Sodium 137 136 - 145 mmol/L    Potassium 4.1 3.5 - 5.2 mmol/L    Chloride 106 98 - 107 mmol/L    CO2 25.3 22.0 - 29.0 mmol/L    Calcium 8.4 (L) 8.6 - 10.5 mg/dL    Total Protein 5.6 (L) 6.0 - 8.5 g/dL    Albumin 2.90 (L) 3.50 - 5.20 g/dL    ALT (SGPT) 11 1 - 41 U/L    AST (SGOT) 16 1 - 40 U/L    Alkaline Phosphatase 50 39 - 117 U/L    Total Bilirubin 1.5 (H) 0.0 - 1.2 mg/dL    eGFR Non   Amer 67 >60 mL/min/1.73    Globulin 2.7 gm/dL    A/G Ratio 1.1 g/dL    BUN/Creatinine Ratio 12.3 7.0 - 25.0    Anion Gap 5.7 5.0 - 15.0 mmol/L   CK    Specimen: Blood   Result Value Ref Range    Creatine Kinase 22 20 - 200 U/L   C-reactive Protein    Specimen: Blood   Result Value Ref Range    C-Reactive Protein 5.10 (H) 0.00 - 0.50 mg/dL   Ferritin    Specimen: Blood   Result Value Ref Range    Ferritin 3,803.00 (H) 30.00 - 400.00 ng/mL   CBC Auto Differential    Specimen: Blood   Result Value Ref Range    WBC 11.71 (H) 3.40 - 10.80 10*3/mm3    RBC 2.20 (L) 4.14 - 5.80 10*6/mm3    Hemoglobin 8.0 (L) 13.0 - 17.7 g/dL    Hematocrit 24.1 (L) 37.5 - 51.0 %    .5 (H) 79.0 - 97.0 fL    MCH 36.4 (H) 26.6 - 33.0 pg    MCHC 33.2 31.5 - 35.7 g/dL    RDW 10.5 (L) 12.3 - 15.4 %    RDW-SD 41.6 37.0 - 54.0 fl    MPV 10.3 6.0 - 12.0 fL    Platelets 235 140 - 450 10*3/mm3   Vancomycin, Trough Vancomycin is scheduled at 0900. Please draw trough 30 minutes prior to hanging dose (don't draw level while medication is infusing).    Specimen: Blood   Result Value Ref Range    Vancomycin Trough 8.80 5.00 - 20.00 mcg/mL   Manual Differential    Specimen: Blood   Result Value Ref Range    Neutrophil % 63.0 42.7 - 76.0 %    Lymphocyte % 26.0 19.6 - 45.3 %    Monocyte % 11.0 5.0 - 12.0 %    Neutrophils Absolute 7.38 (H) 1.70 - 7.00 10*3/mm3    Lymphocytes Absolute 3.04 0.70 - 3.10 10*3/mm3    Monocytes Absolute 1.29 (H) 0.10 - 0.90 10*3/mm3    Anisocytosis Slight/1+ None Seen    Hypochromia Slight/1+ None Seen    Macrocytes Slight/1+ None Seen    Polychromasia Slight/1+ None Seen    WBC Morphology Normal Normal    Platelet Morphology Normal Normal   Comprehensive Metabolic Panel    Specimen: Blood   Result Value Ref Range    Glucose 99 65 - 99 mg/dL    BUN 14 8 - 23 mg/dL    Creatinine 1.12 0.76 - 1.27 mg/dL    Sodium 136 136 - 145 mmol/L    Potassium 4.1 3.5 - 5.2 mmol/L    Chloride 105 98 - 107 mmol/L    CO2 26.1 22.0 - 29.0  mmol/L    Calcium 8.7 8.6 - 10.5 mg/dL    Total Protein 5.6 (L) 6.0 - 8.5 g/dL    Albumin 2.90 (L) 3.50 - 5.20 g/dL    ALT (SGPT) 10 1 - 41 U/L    AST (SGOT) 16 1 - 40 U/L    Alkaline Phosphatase 51 39 - 117 U/L    Total Bilirubin 0.9 0.0 - 1.2 mg/dL    eGFR Non African Amer 63 >60 mL/min/1.73    Globulin 2.7 gm/dL    A/G Ratio 1.1 g/dL    BUN/Creatinine Ratio 12.5 7.0 - 25.0    Anion Gap 4.9 (L) 5.0 - 15.0 mmol/L   CK    Specimen: Blood   Result Value Ref Range    Creatine Kinase 22 20 - 200 U/L   C-reactive Protein    Specimen: Blood   Result Value Ref Range    C-Reactive Protein 4.11 (H) 0.00 - 0.50 mg/dL   Ferritin    Specimen: Blood   Result Value Ref Range    Ferritin 1,527.00 (H) 30.00 - 400.00 ng/mL   Lactate Dehydrogenase    Specimen: Blood   Result Value Ref Range     135 - 225 U/L   D-dimer, Quantitative    Specimen: Blood   Result Value Ref Range    D-Dimer, Quantitative <0.27 0.00 - 0.49 MCGFEU/mL   Fibrinogen    Specimen: Blood   Result Value Ref Range    Fibrinogen 524 (H) 219 - 464 mg/dL   CBC Auto Differential    Specimen: Blood   Result Value Ref Range    WBC 10.71 3.40 - 10.80 10*3/mm3    RBC 2.33 (L) 4.14 - 5.80 10*6/mm3    Hemoglobin 8.2 (L) 13.0 - 17.7 g/dL    Hematocrit 25.3 (L) 37.5 - 51.0 %    .6 (H) 79.0 - 97.0 fL    MCH 35.2 (H) 26.6 - 33.0 pg    MCHC 32.4 31.5 - 35.7 g/dL    RDW 10.6 (L) 12.3 - 15.4 %    RDW-SD 41.6 37.0 - 54.0 fl    MPV 10.1 6.0 - 12.0 fL    Platelets 262 140 - 450 10*3/mm3    Neutrophil % 41.3 (L) 42.7 - 76.0 %    Lymphocyte % 41.6 19.6 - 45.3 %    Monocyte % 13.5 (H) 5.0 - 12.0 %    Eosinophil % 2.5 0.3 - 6.2 %    Basophil % 0.7 0.0 - 1.5 %    Immature Grans % 0.4 0.0 - 0.5 %    Neutrophils, Absolute 4.42 1.70 - 7.00 10*3/mm3    Lymphocytes, Absolute 4.46 (H) 0.70 - 3.10 10*3/mm3    Monocytes, Absolute 1.45 (H) 0.10 - 0.90 10*3/mm3    Eosinophils, Absolute 0.27 0.00 - 0.40 10*3/mm3    Basophils, Absolute 0.07 0.00 - 0.20 10*3/mm3    Immature Grans,  Absolute 0.04 0.00 - 0.05 10*3/mm3    nRBC 0.1 0.0 - 0.2 /100 WBC   Comprehensive Metabolic Panel    Specimen: Blood   Result Value Ref Range    Glucose 92 65 - 99 mg/dL    BUN 13 8 - 23 mg/dL    Creatinine 1.21 0.76 - 1.27 mg/dL    Sodium 141 136 - 145 mmol/L    Potassium 4.5 3.5 - 5.2 mmol/L    Chloride 107 98 - 107 mmol/L    CO2 27.5 22.0 - 29.0 mmol/L    Calcium 9.1 8.6 - 10.5 mg/dL    Total Protein 5.6 (L) 6.0 - 8.5 g/dL    Albumin 3.10 (L) 3.50 - 5.20 g/dL    ALT (SGPT) 11 1 - 41 U/L    AST (SGOT) 17 1 - 40 U/L    Alkaline Phosphatase 48 39 - 117 U/L    Total Bilirubin 0.8 0.0 - 1.2 mg/dL    eGFR Non African Amer 58 (L) >60 mL/min/1.73    Globulin 2.5 gm/dL    A/G Ratio 1.2 g/dL    BUN/Creatinine Ratio 10.7 7.0 - 25.0    Anion Gap 6.5 5.0 - 15.0 mmol/L   CK    Specimen: Blood   Result Value Ref Range    Creatine Kinase 18 (L) 20 - 200 U/L   C-reactive Protein    Specimen: Blood   Result Value Ref Range    C-Reactive Protein 2.31 (H) 0.00 - 0.50 mg/dL   Ferritin    Specimen: Blood   Result Value Ref Range    Ferritin 1,136.00 (H) 30.00 - 400.00 ng/mL   CBC Auto Differential    Specimen: Blood   Result Value Ref Range    WBC 12.58 (H) 3.40 - 10.80 10*3/mm3    RBC 2.35 (L) 4.14 - 5.80 10*6/mm3    Hemoglobin 8.4 (L) 13.0 - 17.7 g/dL    Hematocrit 26.3 (L) 37.5 - 51.0 %    .9 (H) 79.0 - 97.0 fL    MCH 35.7 (H) 26.6 - 33.0 pg    MCHC 31.9 31.5 - 35.7 g/dL    RDW 10.8 (L) 12.3 - 15.4 %    RDW-SD 43.6 37.0 - 54.0 fl    MPV 9.7 6.0 - 12.0 fL    Platelets 293 140 - 450 10*3/mm3    Neutrophil % 47.1 42.7 - 76.0 %    Lymphocyte % 38.3 19.6 - 45.3 %    Monocyte % 11.0 5.0 - 12.0 %    Eosinophil % 2.7 0.3 - 6.2 %    Basophil % 0.6 0.0 - 1.5 %    Neutrophils, Absolute 5.92 1.70 - 7.00 10*3/mm3    Lymphocytes, Absolute 4.82 (H) 0.70 - 3.10 10*3/mm3    Monocytes, Absolute 1.39 (H) 0.10 - 0.90 10*3/mm3    Eosinophils, Absolute 0.34 0.00 - 0.40 10*3/mm3    Basophils, Absolute 0.07 0.00 - 0.20 10*3/mm3   POC Glucose Once     Specimen: Blood   Result Value Ref Range    Glucose 126 70 - 130 mg/dL   ECG 12 Lead   Result Value Ref Range    QT Interval 332 ms           The following portions of the patient's history were reviewed and updated as appropriate: allergies, current medications, past family history, past medical history, past social history, past surgical history and problem list.    Review of Systems   Constitutional: Negative for activity change, chills and fever.   Respiratory: Negative for cough.    Cardiovascular: Negative for chest pain.   Psychiatric/Behavioral: Negative for dysphoric mood.       Objective   Physical Exam  Constitutional:       General: He is not in acute distress.     Appearance: He is well-developed.   Cardiovascular:      Rate and Rhythm: Normal rate and regular rhythm.   Pulmonary:      Effort: Pulmonary effort is normal.      Breath sounds: Normal breath sounds.   Neurological:      Mental Status: He is alert and oriented to person, place, and time.   Psychiatric:         Behavior: Behavior normal.         Thought Content: Thought content normal.         Assessment/Plan   Diagnoses and all orders for this visit:    1. Vertigo (Primary)  -     meclizine (ANTIVERT) 12.5 MG tablet; Take 1 tablet by mouth 3 (Three) Times a Day As Needed for Dizziness.  Dispense: 270 tablet; Refill: 1    2. Perennial allergic rhinitis  -     levocetirizine (XYZAL) 5 MG tablet; Take 1 tablet by mouth Every Evening.  Dispense: 90 tablet; Refill: 1    3. Essential hypertension  -     amLODIPine (NORVASC) 5 MG tablet; Take 1 tablet by mouth Daily.  Dispense: 90 tablet; Refill: 1    4. Nausea  -     ondansetron (ZOFRAN) 4 MG tablet; Take 1 tablet by mouth Every 6 (Six) Hours As Needed for Nausea or Vomiting.  Dispense: 30 tablet; Refill: 11

## 2021-09-12 NOTE — PROGRESS NOTES
"Violet Gerber Sr. is a 80 y.o. male.     CC: Nausea/Spot on Wrist    History of Present Illness     Pt comes in today reporting several month h/o nausea, mainly after eating. The sx come and go but are most of the time.   No black/blood in the stool. Appetite still good. Pt does NOT have his GB anymore. Pt admits to eating a lot of greasy foods.    Pt also got a scratch to the right hand from his dog that he wants me to look at.    The following portions of the patient's history were reviewed and updated as appropriate: allergies, current medications, past family history, past medical history, past social history, past surgical history and problem list.    Review of Systems   Constitutional: Negative for activity change, chills and fever.   Respiratory: Negative for cough.    Cardiovascular: Negative for chest pain.   Gastrointestinal: Positive for nausea. Negative for blood in stool.   Psychiatric/Behavioral: Negative for dysphoric mood.       /62   Pulse 80   Temp 96.9 °F (36.1 °C) (Oral)   Resp 20   Ht 185.4 cm (73\")   Wt 108 kg (238 lb)   BMI 31.40 kg/m²     Objective   Physical Exam  Constitutional:       General: He is not in acute distress.     Appearance: He is well-developed.   Pulmonary:      Effort: Pulmonary effort is normal.   Abdominal:      General: Abdomen is flat.      Palpations: Abdomen is soft.      Tenderness: There is abdominal tenderness (mild, epigastrium).   Neurological:      Mental Status: He is alert and oriented to person, place, and time.   Psychiatric:         Behavior: Behavior normal.         Thought Content: Thought content normal.             Assessment/Plan   Diagnoses and all orders for this visit:    1. Acute gastritis without hemorrhage, unspecified gastritis type (Primary)  -     famotidine (Pepcid) 20 MG tablet; Take 1 tablet by mouth 2 (Two) Times a Day.  Dispense: 180 tablet; Refill: 1  -     sucralfate (Carafate) 1 g tablet; Take 1 tablet by " mouth 4 (Four) Times a Day.  Dispense: 40 tablet; Refill: 1    Wound care discussed.

## 2021-09-13 ENCOUNTER — OFFICE VISIT (OUTPATIENT)
Dept: FAMILY MEDICINE CLINIC | Facility: CLINIC | Age: 81
End: 2021-09-13

## 2021-09-13 VITALS
WEIGHT: 238 LBS | HEIGHT: 73 IN | RESPIRATION RATE: 20 BRPM | BODY MASS INDEX: 31.54 KG/M2 | HEART RATE: 80 BPM | TEMPERATURE: 96.9 F | SYSTOLIC BLOOD PRESSURE: 124 MMHG | DIASTOLIC BLOOD PRESSURE: 62 MMHG

## 2021-09-13 DIAGNOSIS — K29.00 ACUTE GASTRITIS WITHOUT HEMORRHAGE, UNSPECIFIED GASTRITIS TYPE: Primary | ICD-10-CM

## 2021-09-13 PROCEDURE — 99213 OFFICE O/P EST LOW 20 MIN: CPT | Performed by: FAMILY MEDICINE

## 2021-09-13 RX ORDER — SUCRALFATE 1 G/1
1 TABLET ORAL 4 TIMES DAILY
Qty: 40 TABLET | Refills: 1 | Status: SHIPPED | OUTPATIENT
Start: 2021-09-13 | End: 2022-01-17

## 2021-09-13 RX ORDER — FAMOTIDINE 20 MG/1
20 TABLET, FILM COATED ORAL 2 TIMES DAILY
Qty: 180 TABLET | Refills: 1 | Status: SHIPPED | OUTPATIENT
Start: 2021-09-13 | End: 2022-01-15 | Stop reason: SDUPTHER

## 2021-10-18 ENCOUNTER — TELEPHONE (OUTPATIENT)
Dept: FAMILY MEDICINE CLINIC | Facility: CLINIC | Age: 81
End: 2021-10-18

## 2021-10-18 NOTE — TELEPHONE ENCOUNTER
Caller: Mg Gerber Sr.    Relationship to patient: Self    Best call back number: [phone]    Patient is needing: PT IS CALLING TO SCHEDULE FLU SHOT. UNABLE TO WT, NO ANSWER

## 2021-10-19 ENCOUNTER — OFFICE VISIT (OUTPATIENT)
Dept: CARDIOLOGY | Facility: CLINIC | Age: 81
End: 2021-10-19

## 2021-10-19 VITALS
HEIGHT: 73 IN | HEART RATE: 81 BPM | DIASTOLIC BLOOD PRESSURE: 60 MMHG | BODY MASS INDEX: 30.62 KG/M2 | SYSTOLIC BLOOD PRESSURE: 124 MMHG | WEIGHT: 231 LBS

## 2021-10-19 DIAGNOSIS — I48.0 PAROXYSMAL ATRIAL FIBRILLATION (HCC): Primary | ICD-10-CM

## 2021-10-19 DIAGNOSIS — I10 PRIMARY HYPERTENSION: ICD-10-CM

## 2021-10-19 PROCEDURE — 99214 OFFICE O/P EST MOD 30 MIN: CPT | Performed by: INTERNAL MEDICINE

## 2021-10-19 PROCEDURE — 93000 ELECTROCARDIOGRAM COMPLETE: CPT | Performed by: INTERNAL MEDICINE

## 2021-10-19 NOTE — PROGRESS NOTES
"      CARDIOLOGY    Rocky Forrester MD    ENCOUNTER DATE:  10/19/2021    Mg Gerber Sr. / 80 y.o. / male        CHIEF COMPLAINT / REASON FOR OFFICE VISIT     Premature ventricular contractions (11/22/2019 Follow up)  Hypertension    HISTORY OF PRESENT ILLNESS       HPI  Mg Gerber Sr. is a 80 y.o. male who presents today for reevaluation.  Clinically patient is doing well.  He denies  shortness of breath palpitations lightheadedness swelling or fatigue.  He is supposed to get a knee replacement in January 2022.  Patient did have some chest pain on the lateral aspect of his chest.  He was found to have ulcers was treated for that and the pain has been resolved.      The following portions of the patient's history were reviewed and updated as appropriate: allergies, current medications, past family history, past medical history, past social history, past surgical history and problem list.      VITAL SIGNS     Visit Vitals  /60 (BP Location: Left arm)   Pulse 81   Ht 185.4 cm (73\")   Wt 105 kg (231 lb)   BMI 30.48 kg/m²         Wt Readings from Last 3 Encounters:   10/19/21 105 kg (231 lb)   09/13/21 108 kg (238 lb)   08/09/21 108 kg (239 lb)     Body mass index is 30.48 kg/m².      REVIEW OF SYSTEMS   Review of Systems   All other systems reviewed and are negative.          PHYSICAL EXAMINATION     Vitals reviewed.   Constitutional:       Appearance: Healthy appearance.   Pulmonary:      Breath sounds: Normal breath sounds.   Cardiovascular:      Normal rate. Regular rhythm. Normal S1. Normal S2.      Murmurs: There is no murmur.      No gallop. No click. No rub.   Pulses:     Intact distal pulses.   Edema:     Peripheral edema absent.   Neurological:      Mental Status: Alert.           REVIEWED DATA       ECG 12 Lead    Date/Time: 10/19/2021 2:27 PM  Performed by: Rocky Forrester MD  Authorized by: Rokcy Frorester MD   Comparison: compared with previous ECG from 11/23/2020  Similar to " previous ECG  Rhythm: sinus rhythm    Clinical impression: non-specific ECG            Cardiac Procedures:  Interpretation Summary  3/27/17    · Myocardial perfusion imaging indicates a probably normal myocardial perfusion study with no evidence of ischemia.  · Left ventricular ejection fraction is normal (Calculated EF = 66%).  · Diaphragmatic attenuation and GI artifacts are present.  · There is a significant artifacts noted. Any further imaging should be by PET. If clinically indicated consider PET    1.   Interpretation Summary    · Calculated right ventricular systolic pressure from tricuspid regurgitation is 22 mmHg.  · Left ventricular wall thickness is consistent with mild concentric hypertrophy.  · Left ventricular function is normal. Estimated EF = 63%.  · Left atrial cavity size is mildly dilated.  · Left ventricular diastolic dysfunction (grade II) consistent with pseudonormalization.             ASSESSMENT & PLAN      Diagnosis Plan   1. Paroxysmal atrial fibrillation (HCC)     2. Primary hypertension           SUMMARY/DISCUSSION  1. Paroxysmal atrial fibrillation.  He remains stable.  2. Hypertension blood pressures good  3. Chest discomfort consistent with a GI etiology.  ECG has not changed and he has had a stress test about 3 years ago.  He is having surgery in several months.  I told him if he develops chest discomfort again he will need a stress test prior to his knee surgery.  4. 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  5. Follow-up 6 to 12 months sooner course if he has an issue.        MEDICATIONS         Discharge Medications          Accurate as of October 19, 2021  2:26 PM. If you have any questions, ask your nurse or doctor.            Continue These Medications      Instructions Start Date   amLODIPine 5 MG tablet  Commonly known as: NORVASC   5 mg, Oral, Daily      CALTRATE 600+D3 PO   1 tablet, Oral, 2  times daily      esomeprazole 20 MG capsule  Commonly known as: nexIUM   20 mg, Oral      famotidine 20 MG tablet  Commonly known as: Pepcid   20 mg, Oral, 2 Times Daily      FIBER SELECT GUMMIES PO   Oral      finasteride 5 MG tablet  Commonly known as: PROSCAR   No dose, route, or frequency recorded.      folic acid 1 MG tablet  Commonly known as: FOLVITE   1 mg, Oral, 3 Times Daily      gabapentin 400 MG capsule  Commonly known as: NEURONTIN   400 mg, Oral, 2 times daily      guaiFENesin 600 MG 12 hr tablet  Commonly known as: MUCINEX   600 mg, Oral, Every 12 Hours Scheduled      HYDROcodone-acetaminophen 5-325 MG per tablet  Commonly known as: NORCO   1 tablet, Oral, Every 6 Hours PRN      Lactobacillus tablet   Take 1 tablet by mouth Daily.      levocetirizine 5 MG tablet  Commonly known as: XYZAL   5 mg, Oral, Every Evening      levothyroxine sodium 112 MCG capsule  Commonly known as: TIROSINT   Daily      meclizine 12.5 MG tablet  Commonly known as: ANTIVERT   12.5 mg, Oral, 3 Times Daily PRN      Namzaric 28-10 MG capsule sustained-release 24 hr  Generic drug: Memantine HCl-Donepezil HCl   1 capsule, Oral, Every Evening      NIVOLUMAB IV   Intravenous      ondansetron 4 MG tablet  Commonly known as: ZOFRAN   4 mg, Oral, Every 6 Hours PRN      OXcarbazepine 300 MG tablet  Commonly known as: TRILEPTAL   300 mg, Oral, Every Night at Bedtime      polycarbophil 625 MG tablet tablet   625 mg, Oral, Daily      sertraline 25 MG tablet  Commonly known as: ZOLOFT   25 mg, Oral, Daily, Take 1-2 tabs by mouth daily      Spiriva HandiHaler 18 MCG per inhalation capsule  Generic drug: tiotropium   1 capsule, Inhalation, Daily - RT      Stiolto Respimat 2.5-2.5 MCG/ACT aerosol solution inhaler  Generic drug: tiotropium bromide-olodaterol   Inhalation, 2 Times Daily      sucralfate 1 g tablet  Commonly known as: Carafate   1 g, Oral, 4 Times Daily      sulfamethoxazole-trimethoprim 800-160 MG per tablet  Commonly known as:  Bactrim DS   160 mg, Oral, 2 Times Daily      triamcinolone 0.1 % cream  Commonly known as: KENALOG   Topical      Ventolin  (90 Base) MCG/ACT inhaler  Generic drug: albuterol sulfate HFA   2 puffs, Inhalation, Every 4 Hours PRN      Xarelto 20 MG tablet  Generic drug: rivaroxaban   20 mg, Oral, Daily With Dinner                 **Dragon Disclaimer:   Much of this encounter note is an electronic transcription/translation of spoken language to printed text. The electronic translation of spoken language may permit erroneous, or at times, nonsensical words or phrases to be inadvertently transcribed. Although I have reviewed the note for such errors, some may still exist.

## 2021-10-21 ENCOUNTER — FLU SHOT (OUTPATIENT)
Dept: FAMILY MEDICINE CLINIC | Facility: CLINIC | Age: 81
End: 2021-10-21

## 2021-10-21 DIAGNOSIS — Z23 NEED FOR INFLUENZA VACCINATION: Primary | ICD-10-CM

## 2021-10-21 PROCEDURE — 90662 IIV NO PRSV INCREASED AG IM: CPT | Performed by: PHYSICIAN ASSISTANT

## 2021-10-21 PROCEDURE — G0008 ADMIN INFLUENZA VIRUS VAC: HCPCS | Performed by: PHYSICIAN ASSISTANT

## 2021-12-10 ENCOUNTER — TELEPHONE (OUTPATIENT)
Dept: FAMILY MEDICINE CLINIC | Facility: CLINIC | Age: 81
End: 2021-12-10

## 2022-01-10 ENCOUNTER — TELEPHONE (OUTPATIENT)
Dept: FAMILY MEDICINE CLINIC | Facility: CLINIC | Age: 82
End: 2022-01-10

## 2022-01-10 NOTE — TELEPHONE ENCOUNTER
Caller: Mg Gebrer Sr.    Relationship: Self    Best call back number: [phone] (H)    Requested Prescriptions:   Requested Prescriptions      No prescriptions requested or ordered in this encounter    ALPRAZOLAM NOT ON MED LIST  CYCLOBENZAPRINE NOT ON MED LIST    Pharmacy where request should be sent: [facility] [address] [facility] [phone] [facility] [phone]      Additional details provided by patient: PATIENT STATES DR BRYAN FILLED BOTH OF THESE MEDICATIONS AND IS ASKING IF DR BARTLETT WILL REFILL THEM FOR HIM, PLEASE ADVISE PATIENT IF HE NEEDS TO BE SEEN BEFORE THE CAN BE REFILLED ASAP    Does the patient have less than a 3 day supply:  [x] Yes  [] No    Jame Palumbo Rep   01/10/22 14:16 EST

## 2022-01-11 ENCOUNTER — TELEPHONE (OUTPATIENT)
Dept: FAMILY MEDICINE CLINIC | Facility: CLINIC | Age: 82
End: 2022-01-11

## 2022-01-11 NOTE — TELEPHONE ENCOUNTER
Caller: Mg Gerber Sr.    Relationship: Self    Best call back number: [phone]     Requested Prescriptions:   Requested Prescriptions      No prescriptions requested or ordered in this encounter    ALPRAZOLAM  CYCLOBENZAPRINE     Pharmacy where request should be sent: [facility] [phone] [facility] [phone]      Additional details provided by patient: PATIENT SCHEDULED FOR FIRST AVAILABLE ON 01/17/21    Does the patient have less than a 3 day supply:  [x] Yes  [] No    Jame Anderson Rep   01/11/22 12:01 EST

## 2022-01-12 NOTE — TELEPHONE ENCOUNTER
"Tried to call the patient and no answer, unable to LM.    1.These medications aren't on his med list. Was Pain Management prescribing? If so, they should until he sees Dr Walton.  2.1/17/22 will be his first \"get est\" visit with Dr Walton, even though he has seen him before when Dr Bowen was out of the office.  3. Needs medication agreement on file and Yony since he sees PM    Chel    "

## 2022-01-15 PROBLEM — M62.838 MUSCLE SPASM: Status: ACTIVE | Noted: 2022-01-15

## 2022-01-15 PROBLEM — K29.70 GASTRITIS: Status: ACTIVE | Noted: 2022-01-15

## 2022-01-15 RX ORDER — MECLIZINE HCL 12.5 MG/1
12.5 TABLET ORAL 3 TIMES DAILY PRN
Qty: 270 TABLET | Refills: 1 | Status: CANCELLED | OUTPATIENT
Start: 2022-01-15

## 2022-01-15 NOTE — PROGRESS NOTES
Subjective   Mg Gerber Sr. is a 81 y.o. male.     History of Present Illness     Chief Complaint:   Chief Complaint   Patient presents with   • Hypertension     med refill    dr schuster pt    • Heartburn     to discuss medications per pt xanax    • Dizziness     meds reviewed with pt today    • Allergies       Mg Gerber Sr. 81 y.o. male who presents today for Medical Management of the below listed issues and medication refills. He  has a problem list of   Patient Active Problem List   Diagnosis   • Anemia, vitamin B12 deficiency   • Arthritis   • Hemochromatosis   • Herpes zoster   • Hip pain   • Essential hypertension   • Cancer (HCC)   • Pulmonary emphysema (HCC)   • Left low back pain   • Vertigo   • Headache around the eyes   • Mild cognitive impairment   • Metastatic renal cell carcinoma  on q 2 wks chemo   • Anxiety   • Malignant neoplasm of lung (HCC)   • Neuropathy   • Perennial allergic rhinitis   • Vitamin B12 deficiency   • Osteoarthritis of lumbar spine   • Pharyngitis   • Bronchitis   • Other constipation   • Hx of hiatal hernia   • History of lung cancer   • COVID-19 virus detected   • Dyspnea   • Generalized weakness   • Acute respiratory failure with hypoxia (HCC)   • Gastroesophageal reflux disease   • Hearing disorder   • Malignant neoplasm of prostate (HCC)   • Sleep apnea   • Anemia   • Malignant neoplasm metastatic to lung (HCC)   • Secondary bacterial pneumonia   • Pneumonia due to COVID-19 virus   • Hilar mass   • On antineoplastic chemotherapy q 2wks   • Paroxysmal atrial fibrillation (HCC)   • History of pulmonary embolism   • Nausea   • Gastritis   • Muscle spasm   .  Since the last visit, He has overall felt well.  he has been compliant with   Current Outpatient Medications:   •  meclizine (ANTIVERT) 12.5 MG tablet, Take 1 tablet by mouth 3 (Three) Times a Day As Needed for Dizziness., Disp: 270 tablet, Rfl: 1  •  ALPRAZolam (Xanax) 0.25 MG tablet, Take 1 tablet by mouth 2  (Two) Times a Day As Needed for Anxiety., Disp: 60 tablet, Rfl: 2  •  ALPRAZolam (XANAX) 0.25 MG tablet, Take 0.25 mg by mouth 2 (Two) Times a Day As Needed for Anxiety., Disp: , Rfl:   •  amLODIPine (NORVASC) 5 MG tablet, Take 1 tablet by mouth Daily., Disp: 90 tablet, Rfl: 1  •  Calcium Carb-Cholecalciferol (CALTRATE 600+D3 PO), Take 1 tablet by mouth 2 (two) times a day., Disp: , Rfl:   •  cyclobenzaprine (FLEXERIL) 10 MG tablet, Take 1 tablet by mouth 2 (Two) Times a Day As Needed for Muscle Spasms., Disp: 60 tablet, Rfl: 3  •  cyclobenzaprine (FLEXERIL) 5 MG tablet, Take 5 mg by mouth 3 (Three) Times a Day As Needed for Muscle Spasms., Disp: , Rfl:   •  esomeprazole (nexIUM) 20 MG capsule, Take 20 mg by mouth., Disp: , Rfl:   •  famotidine (Pepcid) 20 MG tablet, Take 1 tablet by mouth 2 (Two) Times a Day., Disp: 180 tablet, Rfl: 1  •  FIBER SELECT GUMMIES PO, Take  by mouth., Disp: , Rfl:   •  finasteride (PROSCAR) 5 MG tablet, , Disp: , Rfl:   •  folic acid (FOLVITE) 1 MG tablet, Take 1 mg by mouth 3 (Three) Times a Day., Disp: , Rfl:   •  gabapentin (NEURONTIN) 400 MG capsule, Take 1 capsule by mouth 2 (two) times a day., Disp: 10 capsule, Rfl: 0  •  HYDROcodone-acetaminophen (NORCO) 5-325 MG per tablet, Take 1 tablet by mouth Every 6 (Six) Hours As Needed for Mild Pain , Moderate Pain  or Severe Pain ., Disp: 24 tablet, Rfl: 0  •  Lactobacillus tablet, Take 1 tablet by mouth Daily., Disp: 14 tablet, Rfl: 0  •  levocetirizine (XYZAL) 5 MG tablet, Take 1 tablet by mouth Every Evening., Disp: 90 tablet, Rfl: 1  •  levothyroxine (SYNTHROID, LEVOTHROID) 112 MCG tablet, Take 112 mcg by mouth Daily., Disp: , Rfl:   •  NAMZARIC 28-10 MG capsule sustained-release 24 hr, Take 1 capsule by mouth Every Evening., Disp: , Rfl:   •  NIVOLUMAB IV, Infuse  into a venous catheter., Disp: , Rfl:   •  ondansetron (ZOFRAN) 4 MG tablet, Take 4 mg by mouth Every 8 (Eight) Hours As Needed for Nausea or Vomiting., Disp: , Rfl:   •   "OXcarbazepine (TRILEPTAL) 300 MG tablet, Take 1 tablet by mouth every night at bedtime., Disp: 5 tablet, Rfl: 0  •  sertraline (ZOLOFT) 25 MG tablet, Take 25 mg by mouth Daily. Take 1-2 tabs by mouth daily, Disp: , Rfl:   •  SPIRIVA HANDIHALER 18 MCG per inhalation capsule, Place 1 capsule into inhaler and inhale Daily., Disp: , Rfl:   •  tiotropium bromide-olodaterol (STIOLTO RESPIMAT) 2.5-2.5 MCG/ACT aerosol solution inhaler, Inhale 2 (Two) Times a Day., Disp: , Rfl:   •  VENTOLIN  (90 BASE) MCG/ACT inhaler, Inhale 2 puffs Every 4 (Four) Hours As Needed., Disp: , Rfl:   •  XARELTO 20 MG tablet, Take 20 mg by mouth daily with dinner., Disp: , Rfl: .  He denies medication side effects.    All of the other chronic condition(s) listed above are stable w/o issues.    /63   Pulse 79   Temp 97.3 °F (36.3 °C) (Oral)   Resp 16   Ht 185.4 cm (73\")   Wt 104 kg (230 lb)   BMI 30.34 kg/m²     Results for orders placed or performed during the hospital encounter of 11/23/20   Blood Culture - Blood, Arm, Right    Specimen: Arm, Right; Blood   Result Value Ref Range    Blood Culture No growth at 5 days    Blood Culture - Blood, Arm, Left    Specimen: Arm, Left; Blood   Result Value Ref Range    Blood Culture No growth at 5 days    Respiratory Culture - Sputum, Cough    Specimen: Cough; Sputum   Result Value Ref Range    Respiratory Culture       Rare Normal Respiratory Zofia: NO S.aureus/MRSA or Pseudomonas aeruginosa    Gram Stain Few (2+) WBCs seen     Gram Stain       Rare (1+) Mixed bacterial morphotypes seen on Gram Stain   S. Pneumo Ag Urine or CSF - Urine, Urine, Clean Catch    Specimen: Urine, Clean Catch   Result Value Ref Range    Strep Pneumo Ag Negative Negative   MRSA Screen, PCR (Inpatient) - Swab, Nares    Specimen: Nares; Swab   Result Value Ref Range    MRSA PCR No MRSA Detected No MRSA Detected   Respiratory Panel PCR w/COVID-19(SARS-CoV-2) [facility]O/SUSIE/NOHELIA/PAD/COR/MAD/ZEFERINO In-House, NP Swab in " UTM/VTM, 3-4 HR TAT - Swab, Nasopharynx    Specimen: Nasopharynx; Swab   Result Value Ref Range    ADENOVIRUS, PCR Not Detected Not Detected    Coronavirus 229E Not Detected Not Detected    Coronavirus HKU1 Not Detected Not Detected    Coronavirus NL63 Not Detected Not Detected    Coronavirus OC43 Not Detected Not Detected    COVID19 Detected (C) Not Detected - Ref. Range    Human Metapneumovirus Not Detected Not Detected    Human Rhinovirus/Enterovirus Not Detected Not Detected    Influenza A PCR Not Detected Not Detected    Influenza B PCR Not Detected Not Detected    Parainfluenza Virus 1 Not Detected Not Detected    Parainfluenza Virus 2 Not Detected Not Detected    Parainfluenza Virus 3 Not Detected Not Detected    Parainfluenza Virus 4 Not Detected Not Detected    RSV, PCR Not Detected Not Detected    Bordetella pertussis pcr Not Detected Not Detected    Bordetella parapertussis PCR Not Detected Not Detected    Chlamydophila pneumoniae PCR Not Detected Not Detected    Mycoplasma pneumo by PCR Not Detected Not Detected   Comprehensive Metabolic Panel    Specimen: Blood   Result Value Ref Range    Glucose 122 (H) 65 - 99 mg/dL    BUN 16 8 - 23 mg/dL    Creatinine 1.04 0.76 - 1.27 mg/dL    Sodium 134 (L) 136 - 145 mmol/L    Potassium 3.9 3.5 - 5.2 mmol/L    Chloride 99 98 - 107 mmol/L    CO2 25.6 22.0 - 29.0 mmol/L    Calcium 9.3 8.6 - 10.5 mg/dL    Total Protein 6.4 6.0 - 8.5 g/dL    Albumin 3.80 3.50 - 5.20 g/dL    ALT (SGPT) 16 1 - 41 U/L    AST (SGOT) 26 1 - 40 U/L    Alkaline Phosphatase 63 39 - 117 U/L    Total Bilirubin 2.7 (H) 0.0 - 1.2 mg/dL    eGFR Non African Amer 69 >60 mL/min/1.73    Globulin 2.6 gm/dL    A/G Ratio 1.5 g/dL    BUN/Creatinine Ratio 15.4 7.0 - 25.0    Anion Gap 9.4 5.0 - 15.0 mmol/L   Lactic Acid, Plasma    Specimen: Blood   Result Value Ref Range    Lactate 1.6 0.5 - 2.0 mmol/L   Procalcitonin    Specimen: Blood   Result Value Ref Range    Procalcitonin 0.14 0.00 - 0.25 ng/mL    D-dimer, Quantitative    Specimen: Blood   Result Value Ref Range    D-Dimer, Quantitative 0.41 0.00 - 0.49 MCGFEU/mL   Troponin    Specimen: Blood   Result Value Ref Range    Troponin T 0.012 0.000 - 0.030 ng/mL   BNP    Specimen: Blood   Result Value Ref Range    proBNP 439.3 0.0-1,800.0 pg/mL   CBC Auto Differential    Specimen: Blood   Result Value Ref Range    WBC 13.28 (H) 3.40 - 10.80 10*3/mm3    RBC 2.89 (L) 4.14 - 5.80 10*6/mm3    Hemoglobin 10.3 (L) 13.0 - 17.7 g/dL    Hematocrit 31.6 (L) 37.5 - 51.0 %    .3 (H) 79.0 - 97.0 fL    MCH 35.6 (H) 26.6 - 33.0 pg    MCHC 32.6 31.5 - 35.7 g/dL    RDW 10.5 (L) 12.3 - 15.4 %    RDW-SD 41.2 37.0 - 54.0 fl    MPV 10.3 6.0 - 12.0 fL    Platelets 277 140 - 450 10*3/mm3    Neutrophil % 58.5 42.7 - 76.0 %    Lymphocyte % 21.4 19.6 - 45.3 %    Monocyte % 18.5 (H) 5.0 - 12.0 %    Eosinophil % 0.3 0.3 - 6.2 %    Basophil % 0.5 0.0 - 1.5 %    Neutrophils, Absolute 7.77 (H) 1.70 - 7.00 10*3/mm3    Lymphocytes, Absolute 2.84 0.70 - 3.10 10*3/mm3    Monocytes, Absolute 2.46 (H) 0.10 - 0.90 10*3/mm3    Eosinophils, Absolute 0.04 0.00 - 0.40 10*3/mm3    Basophils, Absolute 0.06 0.00 - 0.20 10*3/mm3   Ferritin    Specimen: Blood   Result Value Ref Range    Ferritin 10,274.00 (H) 30.00 - 400.00 ng/mL   Lactate Dehydrogenase    Specimen: Blood   Result Value Ref Range     (H) 135 - 225 U/L   CK    Specimen: Blood   Result Value Ref Range    Creatine Kinase 26 20 - 200 U/L   C-reactive Protein    Specimen: Blood   Result Value Ref Range    C-Reactive Protein 6.52 (H) 0.00 - 0.50 mg/dL   Comprehensive Metabolic Panel    Specimen: Blood   Result Value Ref Range    Glucose 103 (H) 65 - 99 mg/dL    BUN 17 8 - 23 mg/dL    Creatinine 1.05 0.76 - 1.27 mg/dL    Sodium 135 (L) 136 - 145 mmol/L    Potassium 4.1 3.5 - 5.2 mmol/L    Chloride 106 98 - 107 mmol/L    CO2 21.2 (L) 22.0 - 29.0 mmol/L    Calcium 8.0 (L) 8.6 - 10.5 mg/dL    Total Protein 5.3 (L) 6.0 - 8.5 g/dL     Albumin 3.00 (L) 3.50 - 5.20 g/dL    ALT (SGPT) 14 1 - 41 U/L    AST (SGOT) 15 1 - 40 U/L    Alkaline Phosphatase 47 39 - 117 U/L    Total Bilirubin 1.6 (H) 0.0 - 1.2 mg/dL    eGFR Non African Amer 68 >60 mL/min/1.73    Globulin 2.3 gm/dL    A/G Ratio 1.3 g/dL    BUN/Creatinine Ratio 16.2 7.0 - 25.0    Anion Gap 7.8 5.0 - 15.0 mmol/L   CK    Specimen: Blood   Result Value Ref Range    Creatine Kinase 25 20 - 200 U/L   C-reactive Protein    Specimen: Blood   Result Value Ref Range    C-Reactive Protein 6.65 (H) 0.00 - 0.50 mg/dL   Ferritin    Specimen: Blood   Result Value Ref Range    Ferritin 7,991.00 (H) 30.00 - 400.00 ng/mL   Lactate Dehydrogenase    Specimen: Blood   Result Value Ref Range     135 - 225 U/L   D-dimer, Quantitative    Specimen: Blood   Result Value Ref Range    D-Dimer, Quantitative 0.29 0.00 - 0.49 MCGFEU/mL   Fibrinogen    Specimen: Blood   Result Value Ref Range    Fibrinogen 472 (H) 219 - 464 mg/dL   CBC Auto Differential    Specimen: Blood   Result Value Ref Range    WBC 13.30 (H) 3.40 - 10.80 10*3/mm3    RBC 2.38 (L) 4.14 - 5.80 10*6/mm3    Hemoglobin 8.3 (L) 13.0 - 17.7 g/dL    Hematocrit 25.3 (L) 37.5 - 51.0 %    .3 (H) 79.0 - 97.0 fL    MCH 34.9 (H) 26.6 - 33.0 pg    MCHC 32.8 31.5 - 35.7 g/dL    RDW 10.5 (L) 12.3 - 15.4 %    RDW-SD 39.9 37.0 - 54.0 fl    MPV 10.1 6.0 - 12.0 fL    Platelets 232 140 - 450 10*3/mm3    Neutrophil % 57.3 42.7 - 76.0 %    Lymphocyte % 24.7 19.6 - 45.3 %    Monocyte % 16.6 (H) 5.0 - 12.0 %    Eosinophil % 0.2 (L) 0.3 - 6.2 %    Basophil % 0.4 0.0 - 1.5 %    Neutrophils, Absolute 7.63 (H) 1.70 - 7.00 10*3/mm3    Lymphocytes, Absolute 3.28 (H) 0.70 - 3.10 10*3/mm3    Monocytes, Absolute 2.21 (H) 0.10 - 0.90 10*3/mm3    Eosinophils, Absolute 0.03 0.00 - 0.40 10*3/mm3    Basophils, Absolute 0.05 0.00 - 0.20 10*3/mm3   Comprehensive Metabolic Panel    Specimen: Blood   Result Value Ref Range    Glucose 88 65 - 99 mg/dL    BUN 13 8 - 23 mg/dL     Creatinine 1.06 0.76 - 1.27 mg/dL    Sodium 137 136 - 145 mmol/L    Potassium 4.1 3.5 - 5.2 mmol/L    Chloride 106 98 - 107 mmol/L    CO2 25.3 22.0 - 29.0 mmol/L    Calcium 8.4 (L) 8.6 - 10.5 mg/dL    Total Protein 5.6 (L) 6.0 - 8.5 g/dL    Albumin 2.90 (L) 3.50 - 5.20 g/dL    ALT (SGPT) 11 1 - 41 U/L    AST (SGOT) 16 1 - 40 U/L    Alkaline Phosphatase 50 39 - 117 U/L    Total Bilirubin 1.5 (H) 0.0 - 1.2 mg/dL    eGFR Non African Amer 67 >60 mL/min/1.73    Globulin 2.7 gm/dL    A/G Ratio 1.1 g/dL    BUN/Creatinine Ratio 12.3 7.0 - 25.0    Anion Gap 5.7 5.0 - 15.0 mmol/L   CK    Specimen: Blood   Result Value Ref Range    Creatine Kinase 22 20 - 200 U/L   C-reactive Protein    Specimen: Blood   Result Value Ref Range    C-Reactive Protein 5.10 (H) 0.00 - 0.50 mg/dL   Ferritin    Specimen: Blood   Result Value Ref Range    Ferritin 3,803.00 (H) 30.00 - 400.00 ng/mL   CBC Auto Differential    Specimen: Blood   Result Value Ref Range    WBC 11.71 (H) 3.40 - 10.80 10*3/mm3    RBC 2.20 (L) 4.14 - 5.80 10*6/mm3    Hemoglobin 8.0 (L) 13.0 - 17.7 g/dL    Hematocrit 24.1 (L) 37.5 - 51.0 %    .5 (H) 79.0 - 97.0 fL    MCH 36.4 (H) 26.6 - 33.0 pg    MCHC 33.2 31.5 - 35.7 g/dL    RDW 10.5 (L) 12.3 - 15.4 %    RDW-SD 41.6 37.0 - 54.0 fl    MPV 10.3 6.0 - 12.0 fL    Platelets 235 140 - 450 10*3/mm3   Vancomycin, Trough Vancomycin is scheduled at 0900. Please draw trough 30 minutes prior to hanging dose (don't draw level while medication is infusing).    Specimen: Blood   Result Value Ref Range    Vancomycin Trough 8.80 5.00 - 20.00 mcg/mL   Manual Differential    Specimen: Blood   Result Value Ref Range    Neutrophil % 63.0 42.7 - 76.0 %    Lymphocyte % 26.0 19.6 - 45.3 %    Monocyte % 11.0 5.0 - 12.0 %    Neutrophils Absolute 7.38 (H) 1.70 - 7.00 10*3/mm3    Lymphocytes Absolute 3.04 0.70 - 3.10 10*3/mm3    Monocytes Absolute 1.29 (H) 0.10 - 0.90 10*3/mm3    Anisocytosis Slight/1+ None Seen    Hypochromia Slight/1+ None  Seen    Macrocytes Slight/1+ None Seen    Polychromasia Slight/1+ None Seen    WBC Morphology Normal Normal    Platelet Morphology Normal Normal   Comprehensive Metabolic Panel    Specimen: Blood   Result Value Ref Range    Glucose 99 65 - 99 mg/dL    BUN 14 8 - 23 mg/dL    Creatinine 1.12 0.76 - 1.27 mg/dL    Sodium 136 136 - 145 mmol/L    Potassium 4.1 3.5 - 5.2 mmol/L    Chloride 105 98 - 107 mmol/L    CO2 26.1 22.0 - 29.0 mmol/L    Calcium 8.7 8.6 - 10.5 mg/dL    Total Protein 5.6 (L) 6.0 - 8.5 g/dL    Albumin 2.90 (L) 3.50 - 5.20 g/dL    ALT (SGPT) 10 1 - 41 U/L    AST (SGOT) 16 1 - 40 U/L    Alkaline Phosphatase 51 39 - 117 U/L    Total Bilirubin 0.9 0.0 - 1.2 mg/dL    eGFR Non African Amer 63 >60 mL/min/1.73    Globulin 2.7 gm/dL    A/G Ratio 1.1 g/dL    BUN/Creatinine Ratio 12.5 7.0 - 25.0    Anion Gap 4.9 (L) 5.0 - 15.0 mmol/L   CK    Specimen: Blood   Result Value Ref Range    Creatine Kinase 22 20 - 200 U/L   C-reactive Protein    Specimen: Blood   Result Value Ref Range    C-Reactive Protein 4.11 (H) 0.00 - 0.50 mg/dL   Ferritin    Specimen: Blood   Result Value Ref Range    Ferritin 1,527.00 (H) 30.00 - 400.00 ng/mL   Lactate Dehydrogenase    Specimen: Blood   Result Value Ref Range     135 - 225 U/L   D-dimer, Quantitative    Specimen: Blood   Result Value Ref Range    D-Dimer, Quantitative <0.27 0.00 - 0.49 MCGFEU/mL   Fibrinogen    Specimen: Blood   Result Value Ref Range    Fibrinogen 524 (H) 219 - 464 mg/dL   CBC Auto Differential    Specimen: Blood   Result Value Ref Range    WBC 10.71 3.40 - 10.80 10*3/mm3    RBC 2.33 (L) 4.14 - 5.80 10*6/mm3    Hemoglobin 8.2 (L) 13.0 - 17.7 g/dL    Hematocrit 25.3 (L) 37.5 - 51.0 %    .6 (H) 79.0 - 97.0 fL    MCH 35.2 (H) 26.6 - 33.0 pg    MCHC 32.4 31.5 - 35.7 g/dL    RDW 10.6 (L) 12.3 - 15.4 %    RDW-SD 41.6 37.0 - 54.0 fl    MPV 10.1 6.0 - 12.0 fL    Platelets 262 140 - 450 10*3/mm3    Neutrophil % 41.3 (L) 42.7 - 76.0 %    Lymphocyte % 41.6  19.6 - 45.3 %    Monocyte % 13.5 (H) 5.0 - 12.0 %    Eosinophil % 2.5 0.3 - 6.2 %    Basophil % 0.7 0.0 - 1.5 %    Immature Grans % 0.4 0.0 - 0.5 %    Neutrophils, Absolute 4.42 1.70 - 7.00 10*3/mm3    Lymphocytes, Absolute 4.46 (H) 0.70 - 3.10 10*3/mm3    Monocytes, Absolute 1.45 (H) 0.10 - 0.90 10*3/mm3    Eosinophils, Absolute 0.27 0.00 - 0.40 10*3/mm3    Basophils, Absolute 0.07 0.00 - 0.20 10*3/mm3    Immature Grans, Absolute 0.04 0.00 - 0.05 10*3/mm3    nRBC 0.1 0.0 - 0.2 /100 WBC   Comprehensive Metabolic Panel    Specimen: Blood   Result Value Ref Range    Glucose 92 65 - 99 mg/dL    BUN 13 8 - 23 mg/dL    Creatinine 1.21 0.76 - 1.27 mg/dL    Sodium 141 136 - 145 mmol/L    Potassium 4.5 3.5 - 5.2 mmol/L    Chloride 107 98 - 107 mmol/L    CO2 27.5 22.0 - 29.0 mmol/L    Calcium 9.1 8.6 - 10.5 mg/dL    Total Protein 5.6 (L) 6.0 - 8.5 g/dL    Albumin 3.10 (L) 3.50 - 5.20 g/dL    ALT (SGPT) 11 1 - 41 U/L    AST (SGOT) 17 1 - 40 U/L    Alkaline Phosphatase 48 39 - 117 U/L    Total Bilirubin 0.8 0.0 - 1.2 mg/dL    eGFR Non African Amer 58 (L) >60 mL/min/1.73    Globulin 2.5 gm/dL    A/G Ratio 1.2 g/dL    BUN/Creatinine Ratio 10.7 7.0 - 25.0    Anion Gap 6.5 5.0 - 15.0 mmol/L   CK    Specimen: Blood   Result Value Ref Range    Creatine Kinase 18 (L) 20 - 200 U/L   C-reactive Protein    Specimen: Blood   Result Value Ref Range    C-Reactive Protein 2.31 (H) 0.00 - 0.50 mg/dL   Ferritin    Specimen: Blood   Result Value Ref Range    Ferritin 1,136.00 (H) 30.00 - 400.00 ng/mL   CBC Auto Differential    Specimen: Blood   Result Value Ref Range    WBC 12.58 (H) 3.40 - 10.80 10*3/mm3    RBC 2.35 (L) 4.14 - 5.80 10*6/mm3    Hemoglobin 8.4 (L) 13.0 - 17.7 g/dL    Hematocrit 26.3 (L) 37.5 - 51.0 %    .9 (H) 79.0 - 97.0 fL    MCH 35.7 (H) 26.6 - 33.0 pg    MCHC 31.9 31.5 - 35.7 g/dL    RDW 10.8 (L) 12.3 - 15.4 %    RDW-SD 43.6 37.0 - 54.0 fl    MPV 9.7 6.0 - 12.0 fL    Platelets 293 140 - 450 10*3/mm3    Neutrophil %  47.1 42.7 - 76.0 %    Lymphocyte % 38.3 19.6 - 45.3 %    Monocyte % 11.0 5.0 - 12.0 %    Eosinophil % 2.7 0.3 - 6.2 %    Basophil % 0.6 0.0 - 1.5 %    Neutrophils, Absolute 5.92 1.70 - 7.00 10*3/mm3    Lymphocytes, Absolute 4.82 (H) 0.70 - 3.10 10*3/mm3    Monocytes, Absolute 1.39 (H) 0.10 - 0.90 10*3/mm3    Eosinophils, Absolute 0.34 0.00 - 0.40 10*3/mm3    Basophils, Absolute 0.07 0.00 - 0.20 10*3/mm3   POC Glucose Once    Specimen: Blood   Result Value Ref Range    Glucose 126 70 - 130 mg/dL   ECG 12 Lead   Result Value Ref Range    QT Interval 332 ms             The following portions of the patient's history were reviewed and updated as appropriate: allergies, current medications, past family history, past medical history, past social history, past surgical history, and problem list.    Review of Systems   Constitutional: Negative for activity change, chills and fever.   Respiratory: Negative for cough.    Cardiovascular: Negative for chest pain.   Psychiatric/Behavioral: Negative for dysphoric mood.       Objective   Physical Exam  Constitutional:       General: He is not in acute distress.     Appearance: He is well-developed.   Cardiovascular:      Rate and Rhythm: Normal rate and regular rhythm.   Pulmonary:      Effort: Pulmonary effort is normal.      Breath sounds: Normal breath sounds.   Neurological:      Mental Status: He is alert and oriented to person, place, and time.   Psychiatric:         Behavior: Behavior normal.         Thought Content: Thought content normal.       The patient has read and signed the [facility] Controlled Substance Contract.  I will continue to see patient for regular follow up appointments.  They are well controlled on their medication.  ISRAEL has been reviewed by me and is updated every 3 months. The patient is aware of the potential for addiction and dependence.      Assessment/Plan   Diagnoses and all orders for this visit:    1. Medicare annual wellness visit,  subsequent (Primary)    2. Essential hypertension  -     amLODIPine (NORVASC) 5 MG tablet; Take 1 tablet by mouth Daily.  Dispense: 90 tablet; Refill: 1  -     Comprehensive metabolic panel  -     Lipid panel  -     CBC and Differential  -     TSH    3. Other chronic gastritis without hemorrhage  -     famotidine (Pepcid) 20 MG tablet; Take 1 tablet by mouth 2 (Two) Times a Day.  Dispense: 180 tablet; Refill: 1    4. Perennial allergic rhinitis  -     levocetirizine (XYZAL) 5 MG tablet; Take 1 tablet by mouth Every Evening.  Dispense: 90 tablet; Refill: 1    5. Anxiety  -     ALPRAZolam (Xanax) 0.25 MG tablet; Take 1 tablet by mouth 2 (Two) Times a Day As Needed for Anxiety.  Dispense: 60 tablet; Refill: 2    6. Muscle spasm  -     cyclobenzaprine (FLEXERIL) 10 MG tablet; Take 1 tablet by mouth 2 (Two) Times a Day As Needed for Muscle Spasms.  Dispense: 60 tablet; Refill: 3

## 2022-01-17 ENCOUNTER — OFFICE VISIT (OUTPATIENT)
Dept: FAMILY MEDICINE CLINIC | Facility: CLINIC | Age: 82
End: 2022-01-17

## 2022-01-17 VITALS
RESPIRATION RATE: 16 BRPM | SYSTOLIC BLOOD PRESSURE: 139 MMHG | WEIGHT: 230 LBS | TEMPERATURE: 97.3 F | DIASTOLIC BLOOD PRESSURE: 63 MMHG | HEART RATE: 79 BPM | HEIGHT: 73 IN | BODY MASS INDEX: 30.48 KG/M2

## 2022-01-17 DIAGNOSIS — Z00.00 MEDICARE ANNUAL WELLNESS VISIT, SUBSEQUENT: Primary | ICD-10-CM

## 2022-01-17 DIAGNOSIS — F41.9 ANXIETY: Chronic | ICD-10-CM

## 2022-01-17 DIAGNOSIS — K29.50 OTHER CHRONIC GASTRITIS WITHOUT HEMORRHAGE: Chronic | ICD-10-CM

## 2022-01-17 DIAGNOSIS — J30.89 PERENNIAL ALLERGIC RHINITIS: Chronic | ICD-10-CM

## 2022-01-17 DIAGNOSIS — M62.838 MUSCLE SPASM: ICD-10-CM

## 2022-01-17 DIAGNOSIS — I10 ESSENTIAL HYPERTENSION: Chronic | ICD-10-CM

## 2022-01-17 PROCEDURE — 99214 OFFICE O/P EST MOD 30 MIN: CPT | Performed by: FAMILY MEDICINE

## 2022-01-17 RX ORDER — CYCLOBENZAPRINE HCL 5 MG
7.5 TABLET ORAL 3 TIMES DAILY PRN
COMMUNITY
Start: 2022-10-11

## 2022-01-17 RX ORDER — LEVOCETIRIZINE DIHYDROCHLORIDE 5 MG/1
5 TABLET, FILM COATED ORAL EVERY EVENING
Qty: 90 TABLET | Refills: 1 | Status: SHIPPED | OUTPATIENT
Start: 2022-01-17 | End: 2022-09-08

## 2022-01-17 RX ORDER — ALPRAZOLAM 0.25 MG/1
0.25 TABLET ORAL 2 TIMES DAILY PRN
Qty: 60 TABLET | Refills: 2 | Status: SHIPPED | OUTPATIENT
Start: 2022-01-17 | End: 2022-09-12 | Stop reason: SDUPTHER

## 2022-01-17 RX ORDER — ALPRAZOLAM 0.25 MG/1
0.25 TABLET ORAL 2 TIMES DAILY PRN
COMMUNITY
End: 2022-03-11

## 2022-01-17 RX ORDER — CYCLOBENZAPRINE HCL 10 MG
10 TABLET ORAL 2 TIMES DAILY PRN
Qty: 60 TABLET | Refills: 3 | Status: SHIPPED | OUTPATIENT
Start: 2022-01-17 | End: 2022-03-11

## 2022-01-17 RX ORDER — ONDANSETRON 4 MG/1
4 TABLET, FILM COATED ORAL EVERY 8 HOURS PRN
COMMUNITY
End: 2022-08-17

## 2022-01-17 RX ORDER — AMLODIPINE BESYLATE 5 MG/1
5 TABLET ORAL DAILY
Qty: 90 TABLET | Refills: 1 | Status: SHIPPED | OUTPATIENT
Start: 2022-01-17 | End: 2022-09-08

## 2022-01-17 RX ORDER — FAMOTIDINE 20 MG/1
20 TABLET, FILM COATED ORAL 2 TIMES DAILY
Qty: 180 TABLET | Refills: 1 | Status: SHIPPED | OUTPATIENT
Start: 2022-01-17 | End: 2022-09-12 | Stop reason: SDUPTHER

## 2022-01-17 RX ORDER — LEVOTHYROXINE SODIUM 112 UG/1
112 TABLET ORAL DAILY
COMMUNITY

## 2022-02-22 ENCOUNTER — TELEMEDICINE (OUTPATIENT)
Dept: FAMILY MEDICINE CLINIC | Facility: CLINIC | Age: 82
End: 2022-02-22

## 2022-02-22 ENCOUNTER — TELEPHONE (OUTPATIENT)
Dept: FAMILY MEDICINE CLINIC | Facility: CLINIC | Age: 82
End: 2022-02-22

## 2022-02-22 VITALS — BODY MASS INDEX: 30.48 KG/M2 | HEIGHT: 73 IN | WEIGHT: 230 LBS

## 2022-02-22 DIAGNOSIS — J06.9 ACUTE URI: Primary | ICD-10-CM

## 2022-02-22 PROCEDURE — 99442 PR PHYS/QHP TELEPHONE EVALUATION 11-20 MIN: CPT | Performed by: FAMILY MEDICINE

## 2022-02-22 RX ORDER — DEXTROMETHORPHAN HYDROBROMIDE AND PROMETHAZINE HYDROCHLORIDE 15; 6.25 MG/5ML; MG/5ML
5 SYRUP ORAL 4 TIMES DAILY PRN
Qty: 240 ML | Refills: 1 | Status: SHIPPED | OUTPATIENT
Start: 2022-02-22 | End: 2022-03-11

## 2022-02-22 RX ORDER — AMOXICILLIN AND CLAVULANATE POTASSIUM 875; 125 MG/1; MG/1
1 TABLET, FILM COATED ORAL EVERY 12 HOURS SCHEDULED
Qty: 20 TABLET | Refills: 0 | Status: SHIPPED | OUTPATIENT
Start: 2022-02-22 | End: 2022-03-11

## 2022-02-22 NOTE — TELEPHONE ENCOUNTER
Caller: Mg Gerber Sr.    Relationship: Self    Best call back number: [phone]    What medication are you requesting: UNKNOWN     What are your current symptoms: DRAINAGE, CONGESTION, COUGH     How long have you been experiencing symptoms: 1 DAY     Have you had these symptoms before:    [x] Yes  [] No    Have you been treated for these symptoms before:   [x] Yes  [] No    If a prescription is needed, what is your preferred pharmacy and phone number:      [facility] [address] [place] [address] [phone] [facility] [phone] [facility]     Additional notes: PT WOULD LIKE TO SEE IF MD BARTLETT WOULD CALL SOMETHING IN. HE STATED THAT HE USUALLY GETS THESE SYMPTOMS AND THEY CALL IN AN ANTIBIOTIC. HE STATED THAT HES TOO WEAK TO COME INTO OFFICE

## 2022-02-22 NOTE — PROGRESS NOTES
Violet Gerber Sr. is a 81 y.o. male.     CC: Telehealth Visit for URI-sx    History of Present Illness     Pt seen today on a Telehealth visit c/o 2 day h/o ST, PND, congestion, no f/c, somewhat productive (clear) cough. No dyspnea.       The following portions of the patient's history were reviewed and updated as appropriate: allergies, current medications, past family history, past medical history, past social history, past surgical history and problem list.    Review of Systems   Constitutional: Negative for activity change, chills and fever.   HENT: Positive for congestion and postnasal drip.    Respiratory: Positive for cough. Negative for shortness of breath and wheezing.    Cardiovascular: Negative for chest pain.   Psychiatric/Behavioral: Negative for dysphoric mood.       Objective   Physical Exam  Constitutional:       General: He is not in acute distress.  Pulmonary:      Effort: Pulmonary effort is normal.   Neurological:      Mental Status: He is oriented to person, place, and time.   Psychiatric:         Behavior: Behavior normal.         Thought Content: Thought content normal.         Assessment/Plan   Diagnoses and all orders for this visit:    1. Acute URI (Primary)  -     promethazine-dextromethorphan (PROMETHAZINE-DM) 6.25-15 MG/5ML syrup; Take 5 mL by mouth 4 (Four) Times a Day As Needed for Cough.  Dispense: 240 mL; Refill: 1  -     amoxicillin-clavulanate (Augmentin) 875-125 MG per tablet; Take 1 tablet by mouth Every 12 (Twelve) Hours.  Dispense: 20 tablet; Refill: 0        Spent  12   minutes with chart and interview and consent for this encounter given by the patient.  You have chosen to receive care through a telehealth visit.  Do you consent to use a TELEPHONE connection for your medical care today? Yes

## 2022-03-10 ENCOUNTER — HOSPITAL ENCOUNTER (OUTPATIENT)
Facility: HOSPITAL | Age: 82
Setting detail: OBSERVATION
LOS: 1 days | Discharge: HOME | End: 2022-03-13
Attending: EMERGENCY MEDICINE | Admitting: HOSPITALIST

## 2022-03-10 DIAGNOSIS — Z85.118 HISTORY OF LUNG CANCER: ICD-10-CM

## 2022-03-10 DIAGNOSIS — S09.90XA CLOSED HEAD INJURY, INITIAL ENCOUNTER: ICD-10-CM

## 2022-03-10 DIAGNOSIS — S22.42XA CLOSED FRACTURE OF MULTIPLE RIBS OF LEFT SIDE, INITIAL ENCOUNTER: ICD-10-CM

## 2022-03-10 DIAGNOSIS — R52 INTRACTABLE PAIN: ICD-10-CM

## 2022-03-10 DIAGNOSIS — Z79.01 ON CONTINUOUS ORAL ANTICOAGULATION: ICD-10-CM
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
## 2022-03-10 LAB
ANISOCYTOSIS BLD QL: NORMAL
BASOPHILS # BLD AUTO: 0.13 10*3/MM3 (ref 0–0.2)
BASOPHILS NFR BLD AUTO: 0.7 % (ref 0–1.5)
DEPRECATED RDW RBC AUTO: 41.5 FL (ref 37–54)
EOSINOPHIL # BLD AUTO: 0.3 10*3/MM3 (ref 0–0.4)
EOSINOPHIL NFR BLD AUTO: 1.7 % (ref 0.3–6.2)
ERYTHROCYTE [DISTWIDTH] IN BLOOD BY AUTOMATED COUNT: 10.4 % (ref 12.3–15.4)
HCT VFR BLD AUTO: 30.8 % (ref 37.5–51)
HGB BLD-MCNC: 10.1 G/DL (ref 13–17.7)
IMM GRANULOCYTES # BLD AUTO: 0.15 10*3/MM3 (ref 0–0.05)
IMM GRANULOCYTES NFR BLD AUTO: 0.8 % (ref 0–0.5)
LYMPHOCYTES # BLD AUTO: 4.48 10*3/MM3 (ref 0.7–3.1)
LYMPHOCYTES NFR BLD AUTO: 25.3 % (ref 19.6–45.3)
MACROCYTES BLD QL SMEAR: NORMAL
MCH RBC QN AUTO: 36.5 PG (ref 26.6–33)
MCHC RBC AUTO-ENTMCNC: 32.8 G/DL (ref 31.5–35.7)
MCV RBC AUTO: 111.2 FL (ref 79–97)
MONOCYTES # BLD AUTO: 1.71 10*3/MM3 (ref 0.1–0.9)
MONOCYTES NFR BLD AUTO: 9.7 % (ref 5–12)
NEUTROPHILS NFR BLD AUTO: 10.94 10*3/MM3 (ref 1.7–7)
NEUTROPHILS NFR BLD AUTO: 61.8 % (ref 42.7–76)
NRBC BLD AUTO-RTO: 0.3 /100 WBC (ref 0–0.2)
PLAT MORPH BLD: NORMAL
PLATELET # BLD AUTO: 388 10*3/MM3 (ref 140–450)
PMV BLD AUTO: 9.8 FL (ref 6–12)
POLYCHROMASIA BLD QL SMEAR: NORMAL
RBC # BLD AUTO: 2.77 10*6/MM3 (ref 4.14–5.8)
WBC MORPH BLD: NORMAL
WBC NRBC COR # BLD: 17.71 10*3/MM3 (ref 3.4–10.8)

## 2022-03-10 PROCEDURE — 83735 ASSAY OF MAGNESIUM: CPT | Performed by: EMERGENCY MEDICINE

## 2022-03-10 PROCEDURE — 99285 EMERGENCY DEPT VISIT HI MDM: CPT

## 2022-03-10 PROCEDURE — 85007 BL SMEAR W/DIFF WBC COUNT: CPT

## 2022-03-10 PROCEDURE — 93010 ELECTROCARDIOGRAM REPORT: CPT | Performed by: INTERNAL MEDICINE

## 2022-03-10 PROCEDURE — 85610 PROTHROMBIN TIME: CPT | Performed by: EMERGENCY MEDICINE

## 2022-03-10 PROCEDURE — 84484 ASSAY OF TROPONIN QUANT: CPT | Performed by: EMERGENCY MEDICINE

## 2022-03-10 PROCEDURE — 93005 ELECTROCARDIOGRAM TRACING: CPT | Performed by: EMERGENCY MEDICINE

## 2022-03-10 PROCEDURE — 36415 COLL VENOUS BLD VENIPUNCTURE: CPT

## 2022-03-10 PROCEDURE — 80053 COMPREHEN METABOLIC PANEL: CPT | Performed by: EMERGENCY MEDICINE

## 2022-03-10 PROCEDURE — 85025 COMPLETE CBC W/AUTO DIFF WBC: CPT

## 2022-03-10 RX ORDER — SODIUM CHLORIDE 0.9 % (FLUSH) 0.9 %
10 SYRINGE (ML) INJECTION AS NEEDED
Status: DISCONTINUED | OUTPATIENT
Start: 2022-03-10 | End: 2022-03-13 | Stop reason: HOSPADM

## 2022-03-11 ENCOUNTER — APPOINTMENT (OUTPATIENT)
Dept: GENERAL RADIOLOGY | Facility: HOSPITAL | Age: 82
End: 2022-03-11

## 2022-03-11 ENCOUNTER — APPOINTMENT (OUTPATIENT)
Dept: CT IMAGING | Facility: HOSPITAL | Age: 82
End: 2022-03-11
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
## 2022-03-11 PROBLEM — S22.42XA CLOSED FRACTURE OF MULTIPLE RIBS OF LEFT SIDE: Status: ACTIVE | Noted: 2022-03-11

## 2022-03-11 PROBLEM — R07.89 CHEST WALL PAIN: Status: ACTIVE | Noted: 2022-03-11

## 2022-03-11 LAB
ALBUMIN SERPL-MCNC: 4.2 G/DL (ref 3.5–5.2)
ALBUMIN/GLOB SERPL: 1.5 G/DL
ALP SERPL-CCNC: 62 U/L (ref 39–117)
ALT SERPL W P-5'-P-CCNC: 17 U/L (ref 1–41)
ANION GAP SERPL CALCULATED.3IONS-SCNC: 11.9 MMOL/L (ref 5–15)
ANION GAP SERPL CALCULATED.3IONS-SCNC: 8.1 MMOL/L (ref 5–15)
AST SERPL-CCNC: 19 U/L (ref 1–40)
BASOPHILS # BLD AUTO: 0.12 10*3/MM3 (ref 0–0.2)
BASOPHILS NFR BLD AUTO: 0.6 % (ref 0–1.5)
BILIRUB SERPL-MCNC: 1.2 MG/DL (ref 0–1.2)
BILIRUB UR QL STRIP: NEGATIVE
BUN SERPL-MCNC: 16 MG/DL (ref 8–23)
BUN SERPL-MCNC: 18 MG/DL (ref 8–23)
BUN/CREAT SERPL: 14.7 (ref 7–25)
BUN/CREAT SERPL: 15 (ref 7–25)
CALCIUM SPEC-SCNC: 8.8 MG/DL (ref 8.6–10.5)
CALCIUM SPEC-SCNC: 9.6 MG/DL (ref 8.6–10.5)
CHLORIDE SERPL-SCNC: 106 MMOL/L (ref 98–107)
CHLORIDE SERPL-SCNC: 107 MMOL/L (ref 98–107)
CLARITY UR: CLEAR
CO2 SERPL-SCNC: 21.1 MMOL/L (ref 22–29)
CO2 SERPL-SCNC: 23.9 MMOL/L (ref 22–29)
COLOR UR: YELLOW
CREAT SERPL-MCNC: 1.09 MG/DL (ref 0.76–1.27)
CREAT SERPL-MCNC: 1.2 MG/DL (ref 0.76–1.27)
DEPRECATED RDW RBC AUTO: 40.6 FL (ref 37–54)
EGFRCR SERPLBLD CKD-EPI 2021: 60.4 ML/MIN/1.73
EGFRCR SERPLBLD CKD-EPI 2021: 67.8 ML/MIN/1.73
EOSINOPHIL # BLD AUTO: 0.31 10*3/MM3 (ref 0–0.4)
EOSINOPHIL NFR BLD AUTO: 1.6 % (ref 0.3–6.2)
ERYTHROCYTE [DISTWIDTH] IN BLOOD BY AUTOMATED COUNT: 10.2 % (ref 12.3–15.4)
GLOBULIN UR ELPH-MCNC: 2.8 GM/DL
GLUCOSE SERPL-MCNC: 103 MG/DL (ref 65–99)
GLUCOSE SERPL-MCNC: 98 MG/DL (ref 65–99)
GLUCOSE UR STRIP-MCNC: NEGATIVE MG/DL
HCT VFR BLD AUTO: 29.9 % (ref 37.5–51)
HGB BLD-MCNC: 9.8 G/DL (ref 13–17.7)
HGB UR QL STRIP.AUTO: NEGATIVE
HOLD SPECIMEN: NORMAL
HOLD SPECIMEN: NORMAL
INR PPP: 1.25 (ref 0.9–1.1)
INR PPP: 1.42 (ref 0.9–1.1)
KETONES UR QL STRIP: NEGATIVE
LEUKOCYTE ESTERASE UR QL STRIP.AUTO: NEGATIVE
LYMPHOCYTES # BLD AUTO: 3.82 10*3/MM3 (ref 0.7–3.1)
LYMPHOCYTES NFR BLD AUTO: 19.9 % (ref 19.6–45.3)
MAGNESIUM SERPL-MCNC: 2.2 MG/DL (ref 1.6–2.4)
MCH RBC QN AUTO: 36.4 PG (ref 26.6–33)
MCHC RBC AUTO-ENTMCNC: 32.8 G/DL (ref 31.5–35.7)
MCV RBC AUTO: 111.2 FL (ref 79–97)
MONOCYTES # BLD AUTO: 2.23 10*3/MM3 (ref 0.1–0.9)
MONOCYTES NFR BLD AUTO: 11.6 % (ref 5–12)
NEUTROPHILS NFR BLD AUTO: 12.55 10*3/MM3 (ref 1.7–7)
NEUTROPHILS NFR BLD AUTO: 65.6 % (ref 42.7–76)
NITRITE UR QL STRIP: NEGATIVE
PH UR STRIP.AUTO: <=5 [PH] (ref 5–8)
PLATELET # BLD AUTO: 372 10*3/MM3 (ref 140–450)
PMV BLD AUTO: 10.1 FL (ref 6–12)
POTASSIUM SERPL-SCNC: 3.9 MMOL/L (ref 3.5–5.2)
POTASSIUM SERPL-SCNC: 4.3 MMOL/L (ref 3.5–5.2)
PROT SERPL-MCNC: 7 G/DL (ref 6–8.5)
PROT UR QL STRIP: NEGATIVE
PROTHROMBIN TIME: 15.7 SECONDS (ref 11.7–14.2)
PROTHROMBIN TIME: 17.3 SECONDS (ref 11.7–14.2)
QT INTERVAL: 409 MS
RBC # BLD AUTO: 2.69 10*6/MM3 (ref 4.14–5.8)
SARS-COV-2 RNA PNL SPEC NAA+PROBE: NOT DETECTED
SODIUM SERPL-SCNC: 138 MMOL/L (ref 136–145)
SODIUM SERPL-SCNC: 140 MMOL/L (ref 136–145)
SP GR UR STRIP: 1.02 (ref 1–1.03)
TROPONIN T SERPL-MCNC: <0.01 NG/ML (ref 0–0.03)
UROBILINOGEN UR QL STRIP: NORMAL
WBC NRBC COR # BLD: 19.16 10*3/MM3 (ref 3.4–10.8)
WHOLE BLOOD HOLD SPECIMEN: NORMAL
WHOLE BLOOD HOLD SPECIMEN: NORMAL

## 2022-03-11 PROCEDURE — 71250 CT THORAX DX C-: CPT

## 2022-03-11 PROCEDURE — 94664 DEMO&/EVAL PT USE INHALER: CPT

## 2022-03-11 PROCEDURE — 73130 X-RAY EXAM OF HAND: CPT

## 2022-03-11 PROCEDURE — 25010000002 MORPHINE PER 10 MG: Performed by: EMERGENCY MEDICINE

## 2022-03-11 PROCEDURE — 80048 BASIC METABOLIC PNL TOTAL CA: CPT | Performed by: NURSE PRACTITIONER

## 2022-03-11 PROCEDURE — 72125 CT NECK SPINE W/O DYE: CPT

## 2022-03-11 PROCEDURE — 96376 TX/PRO/DX INJ SAME DRUG ADON: CPT

## 2022-03-11 PROCEDURE — 99213 OFFICE O/P EST LOW 20 MIN: CPT | Performed by: NURSE PRACTITIONER

## 2022-03-11 PROCEDURE — 85025 COMPLETE CBC W/AUTO DIFF WBC: CPT | Performed by: NURSE PRACTITIONER

## 2022-03-11 PROCEDURE — 81003 URINALYSIS AUTO W/O SCOPE: CPT | Performed by: EMERGENCY MEDICINE

## 2022-03-11 PROCEDURE — 94640 AIRWAY INHALATION TREATMENT: CPT

## 2022-03-11 PROCEDURE — 94799 UNLISTED PULMONARY SVC/PX: CPT

## 2022-03-11 PROCEDURE — 25010000002 ONDANSETRON PER 1 MG: Performed by: EMERGENCY MEDICINE

## 2022-03-11 PROCEDURE — 25010000002 MORPHINE PER 10 MG: Performed by: HOSPITALIST

## 2022-03-11 PROCEDURE — 96374 THER/PROPH/DIAG INJ IV PUSH: CPT

## 2022-03-11 PROCEDURE — 70450 CT HEAD/BRAIN W/O DYE: CPT

## 2022-03-11 PROCEDURE — 74176 CT ABD & PELVIS W/O CONTRAST: CPT

## 2022-03-11 PROCEDURE — 96375 TX/PRO/DX INJ NEW DRUG ADDON: CPT

## 2022-03-11 PROCEDURE — 87635 SARS-COV-2 COVID-19 AMP PRB: CPT | Performed by: EMERGENCY MEDICINE

## 2022-03-11 PROCEDURE — 96361 HYDRATE IV INFUSION ADD-ON: CPT

## 2022-03-11 PROCEDURE — 94761 N-INVAS EAR/PLS OXIMETRY MLT: CPT

## 2022-03-11 PROCEDURE — 85610 PROTHROMBIN TIME: CPT | Performed by: NURSE PRACTITIONER

## 2022-03-11 RX ORDER — ALBUTEROL SULFATE 2.5 MG/3ML
2.5 SOLUTION RESPIRATORY (INHALATION) EVERY 4 HOURS PRN
Status: DISCONTINUED | OUTPATIENT
Start: 2022-03-11 | End: 2022-03-13 | Stop reason: HOSPADM

## 2022-03-11 RX ORDER — GABAPENTIN 400 MG/1
400 CAPSULE ORAL EVERY MORNING
Status: DISCONTINUED | OUTPATIENT
Start: 2022-03-12 | End: 2022-03-13 | Stop reason: HOSPADM

## 2022-03-11 RX ORDER — OXCARBAZEPINE 300 MG/1
300 TABLET, FILM COATED ORAL NIGHTLY
Status: DISCONTINUED | OUTPATIENT
Start: 2022-03-11 | End: 2022-03-13 | Stop reason: HOSPADM

## 2022-03-11 RX ORDER — GABAPENTIN 400 MG/1
800 CAPSULE ORAL EVERY EVENING
Status: DISCONTINUED | OUTPATIENT
Start: 2022-03-11 | End: 2022-03-13 | Stop reason: HOSPADM

## 2022-03-11 RX ORDER — SERTRALINE HYDROCHLORIDE 25 MG/1
25 TABLET, FILM COATED ORAL DAILY
Status: DISCONTINUED | OUTPATIENT
Start: 2022-03-11 | End: 2022-03-13 | Stop reason: HOSPADM

## 2022-03-11 RX ORDER — ACETAMINOPHEN 500 MG
1000 TABLET ORAL EVERY 8 HOURS
Status: DISCONTINUED | OUTPATIENT
Start: 2022-03-11 | End: 2022-03-13 | Stop reason: HOSPADM

## 2022-03-11 RX ORDER — CYCLOBENZAPRINE HCL 10 MG
5 TABLET ORAL 3 TIMES DAILY PRN
Status: DISCONTINUED | OUTPATIENT
Start: 2022-03-11 | End: 2022-03-13 | Stop reason: HOSPADM

## 2022-03-11 RX ORDER — GABAPENTIN 400 MG/1
400 CAPSULE ORAL
Status: DISCONTINUED | OUTPATIENT
Start: 2022-03-11 | End: 2022-03-13 | Stop reason: HOSPADM

## 2022-03-11 RX ORDER — ACETAMINOPHEN 325 MG/1
650 TABLET ORAL EVERY 4 HOURS PRN
Status: DISCONTINUED | OUTPATIENT
Start: 2022-03-11 | End: 2022-03-11

## 2022-03-11 RX ORDER — FOLIC ACID 1 MG/1
1 TABLET ORAL 3 TIMES DAILY
Status: DISCONTINUED | OUTPATIENT
Start: 2022-03-11 | End: 2022-03-13 | Stop reason: HOSPADM

## 2022-03-11 RX ORDER — MORPHINE SULFATE 2 MG/ML
4 INJECTION, SOLUTION INTRAMUSCULAR; INTRAVENOUS
Status: DISCONTINUED | OUTPATIENT
Start: 2022-03-11 | End: 2022-03-13 | Stop reason: HOSPADM

## 2022-03-11 RX ORDER — GABAPENTIN 400 MG/1
800 CAPSULE ORAL EVERY EVENING
COMMUNITY
End: 2022-03-20 | Stop reason: HOSPADM

## 2022-03-11 RX ORDER — AMLODIPINE BESYLATE 5 MG/1
5 TABLET ORAL DAILY
Status: DISCONTINUED | OUTPATIENT
Start: 2022-03-11 | End: 2022-03-13 | Stop reason: HOSPADM

## 2022-03-11 RX ORDER — SODIUM CHLORIDE 0.9 % (FLUSH) 0.9 %
10 SYRINGE (ML) INJECTION AS NEEDED
Status: DISCONTINUED | OUTPATIENT
Start: 2022-03-11 | End: 2022-03-13 | Stop reason: HOSPADM

## 2022-03-11 RX ORDER — ACETAMINOPHEN 160 MG/5ML
650 SOLUTION ORAL EVERY 4 HOURS PRN
Status: DISCONTINUED | OUTPATIENT
Start: 2022-03-11 | End: 2022-03-11

## 2022-03-11 RX ORDER — ONDANSETRON 2 MG/ML
4 INJECTION INTRAMUSCULAR; INTRAVENOUS EVERY 6 HOURS PRN
Status: DISCONTINUED | OUTPATIENT
Start: 2022-03-11 | End: 2022-03-13 | Stop reason: HOSPADM

## 2022-03-11 RX ORDER — MORPHINE SULFATE 2 MG/ML
4 INJECTION, SOLUTION INTRAMUSCULAR; INTRAVENOUS ONCE
Status: COMPLETED | OUTPATIENT
Start: 2022-03-11 | End: 2022-03-11

## 2022-03-11 RX ORDER — ACETAMINOPHEN 650 MG/1
650 SUPPOSITORY RECTAL EVERY 4 HOURS PRN
Status: DISCONTINUED | OUTPATIENT
Start: 2022-03-11 | End: 2022-03-11

## 2022-03-11 RX ORDER — LIDOCAINE 50 MG/G
1 PATCH TOPICAL EVERY 24 HOURS
Status: DISCONTINUED | OUTPATIENT
Start: 2022-03-11 | End: 2022-03-11

## 2022-03-11 RX ORDER — FAMOTIDINE 20 MG/1
20 TABLET, FILM COATED ORAL 2 TIMES DAILY
Status: DISCONTINUED | OUTPATIENT
Start: 2022-03-11 | End: 2022-03-13 | Stop reason: HOSPADM

## 2022-03-11 RX ORDER — LEVOTHYROXINE SODIUM 112 UG/1
112 TABLET ORAL DAILY
Status: DISCONTINUED | OUTPATIENT
Start: 2022-03-11 | End: 2022-03-13 | Stop reason: HOSPADM

## 2022-03-11 RX ORDER — SODIUM CHLORIDE 0.9 % (FLUSH) 0.9 %
10 SYRINGE (ML) INJECTION EVERY 12 HOURS SCHEDULED
Status: DISCONTINUED | OUTPATIENT
Start: 2022-03-11 | End: 2022-03-13 | Stop reason: HOSPADM

## 2022-03-11 RX ORDER — ONDANSETRON 2 MG/ML
4 INJECTION INTRAMUSCULAR; INTRAVENOUS ONCE
Status: COMPLETED | OUTPATIENT
Start: 2022-03-11 | End: 2022-03-11

## 2022-03-11 RX ORDER — ALPRAZOLAM 0.25 MG/1
0.25 TABLET ORAL 2 TIMES DAILY PRN
Status: DISCONTINUED | OUTPATIENT
Start: 2022-03-11 | End: 2022-03-13 | Stop reason: HOSPADM

## 2022-03-11 RX ORDER — OXYCODONE HYDROCHLORIDE 5 MG/1
5 TABLET ORAL EVERY 4 HOURS PRN
Status: DISCONTINUED | OUTPATIENT
Start: 2022-03-11 | End: 2022-03-13 | Stop reason: HOSPADM

## 2022-03-11 RX ORDER — LIDOCAINE 50 MG/G
2 PATCH TOPICAL EVERY 24 HOURS
Status: DISCONTINUED | OUTPATIENT
Start: 2022-03-12 | End: 2022-03-13 | Stop reason: HOSPADM

## 2022-03-11 RX ORDER — SODIUM CHLORIDE 9 MG/ML
100 INJECTION, SOLUTION INTRAVENOUS CONTINUOUS
Status: DISCONTINUED | OUTPATIENT
Start: 2022-03-11 | End: 2022-03-13 | Stop reason: HOSPADM

## 2022-03-11 RX ADMIN — OXCARBAZEPINE 300 MG: 300 TABLET, FILM COATED ORAL at 20:03

## 2022-03-11 RX ADMIN — SODIUM CHLORIDE 100 ML/HR: 9 INJECTION, SOLUTION INTRAVENOUS at 06:23

## 2022-03-11 RX ADMIN — LIDOCAINE 1 PATCH: 50 PATCH TOPICAL at 02:29

## 2022-03-11 RX ADMIN — FOLIC ACID 1 MG: 1 TABLET ORAL at 20:03

## 2022-03-11 RX ADMIN — AMLODIPINE BESYLATE 5 MG: 5 TABLET ORAL at 11:35

## 2022-03-11 RX ADMIN — MORPHINE SULFATE 4 MG: 2 INJECTION, SOLUTION INTRAMUSCULAR; INTRAVENOUS at 02:25

## 2022-03-11 RX ADMIN — MORPHINE SULFATE 4 MG: 2 INJECTION, SOLUTION INTRAMUSCULAR; INTRAVENOUS at 10:21

## 2022-03-11 RX ADMIN — SERTRALINE 25 MG: 25 TABLET, FILM COATED ORAL at 11:25

## 2022-03-11 RX ADMIN — FAMOTIDINE 20 MG: 20 TABLET ORAL at 11:35

## 2022-03-11 RX ADMIN — ALPRAZOLAM 0.25 MG: 0.25 TABLET ORAL at 20:03

## 2022-03-11 RX ADMIN — GABAPENTIN 800 MG: 400 CAPSULE ORAL at 18:01

## 2022-03-11 RX ADMIN — OXYCODONE HYDROCHLORIDE 5 MG: 5 TABLET ORAL at 12:33

## 2022-03-11 RX ADMIN — FAMOTIDINE 20 MG: 20 TABLET ORAL at 20:03

## 2022-03-11 RX ADMIN — CYCLOBENZAPRINE 5 MG: 10 TABLET, FILM COATED ORAL at 20:03

## 2022-03-11 RX ADMIN — RIVAROXABAN 20 MG: 20 TABLET, FILM COATED ORAL at 20:05

## 2022-03-11 RX ADMIN — MORPHINE SULFATE 4 MG: 2 INJECTION, SOLUTION INTRAMUSCULAR; INTRAVENOUS at 00:54

## 2022-03-11 RX ADMIN — SODIUM CHLORIDE 100 ML/HR: 9 INJECTION, SOLUTION INTRAVENOUS at 11:35

## 2022-03-11 RX ADMIN — OXYCODONE HYDROCHLORIDE 5 MG: 5 TABLET ORAL at 17:50

## 2022-03-11 RX ADMIN — ONDANSETRON 4 MG: 2 INJECTION INTRAMUSCULAR; INTRAVENOUS at 00:54

## 2022-03-11 RX ADMIN — FOLIC ACID 1 MG: 1 TABLET ORAL at 11:35

## 2022-03-11 RX ADMIN — TIOTROPIUM BROMIDE INHALATION SPRAY 2 PUFF: 3.12 SPRAY, METERED RESPIRATORY (INHALATION) at 11:17

## 2022-03-11 RX ADMIN — GABAPENTIN 400 MG: 400 CAPSULE ORAL at 11:34

## 2022-03-11 NOTE — PROGRESS NOTES
Clinical Pharmacy Services: Medication History    gM Gerber Sr. is a 81 y.o. male presenting to [facility] for   Chief Complaint   Patient presents with   • Fall       He  has a past medical history of Allergic, Anemia, vitamin B12 deficiency (04/12/2012), Anxiety, Arthritis (03/24/2014), Atrial fibrillation (HCC), Cancer (HCC) (04/29/2014), Cataract, Chemotherapeutic agent or infusion extravasation, COPD (chronic obstructive pulmonary disease) (HCC) (03/24/2014), Emphysema of lung (HCC), Hemochromatosis (03/24/2014), Herpes zoster (12/06/2011), Hip pain (03/24/2014), History of Doppler ultrasound (12/28/2011), History of EKG (02/10/2012), Hypertension, Injury of back, Pulmonary embolism (HCC) (03/24/2014), Renal cell cancer (HCC), Shortness of breath, and Sleep apnea.    Allergies as of 03/10/2022 - Reviewed 03/10/2022   Allergen Reaction Noted   • Nsaids Hives and Itching 09/13/2021   • Orange Unknown - Low Severity 11/26/2020   • Orange oil Unknown - Low Severity 11/26/2020   • Latex Rash 02/01/2019       Medication information was obtained from: Patient   Pharmacy and Phone Number:     Prior to Admission Medications     Prescriptions Last Dose Informant Patient Reported? Taking?    ALPRAZolam (Xanax) 0.25 MG tablet 3/10/2022 Self No Yes    Take 1 tablet by mouth 2 (Two) Times a Day As Needed for Anxiety.    amLODIPine (NORVASC) 5 MG tablet 3/10/2022 Self No Yes    Take 1 tablet by mouth Daily.    Calcium Carb-Cholecalciferol (CALTRATE 600+D3 PO) 3/10/2022 Self Yes Yes    Take 1 tablet by mouth 2 (two) times a day.    cyclobenzaprine (FLEXERIL) 5 MG tablet 3/10/2022 Self Yes Yes    Take 5 mg by mouth 3 (Three) Times a Day As Needed for Muscle Spasms.    famotidine (Pepcid) 20 MG tablet 3/10/2022 Self No Yes    Take 1 tablet by mouth 2 (Two) Times a Day.    folic acid (FOLVITE) 1 MG tablet 3/10/2022 Self Yes Yes    Take 1 mg by mouth 3 (Three) Times a Day.    gabapentin (NEURONTIN) 400 MG  capsule 3/10/2022 Self No Yes    Take 1 capsule by mouth 2 (two) times a day.    Patient taking differently:  Take 400 mg by mouth 2 (two) times a day. Morning and Mid-day    gabapentin (NEURONTIN) 400 MG capsule 3/10/2022 Self Yes Yes    Take 800 mg by mouth Every Evening.    HYDROcodone-acetaminophen (NORCO) 5-325 MG per tablet 3/10/2022 Self No Yes    Take 1 tablet by mouth Every 6 (Six) Hours As Needed for Mild Pain , Moderate Pain  or Severe Pain .    levocetirizine (XYZAL) 5 MG tablet 3/10/2022 Self No Yes    Take 1 tablet by mouth Every Evening.    levothyroxine (SYNTHROID, LEVOTHROID) 112 MCG tablet 3/10/2022 Self Yes Yes    Take 112 mcg by mouth Daily.    meclizine (ANTIVERT) 12.5 MG tablet Past Week Self No Yes    Take 1 tablet by mouth 3 (Three) Times a Day As Needed for Dizziness.    NAMZARIC 28-10 MG capsule sustained-release 24 hr 3/10/2022 Self Yes Yes    Take 1 capsule by mouth Every Evening.    NIVOLUMAB IV Past Month Self Yes Yes    Infuse  into a venous catheter Every 30 (Thirty) Days. Due: 3/15/2022    ondansetron (ZOFRAN) 4 MG tablet Past Week Self Yes Yes    Take 4 mg by mouth Every 8 (Eight) Hours As Needed for Nausea or Vomiting.    OXcarbazepine (TRILEPTAL) 300 MG tablet 3/10/2022 Self No Yes    Take 1 tablet by mouth every night at bedtime.    sertraline (ZOLOFT) 25 MG tablet 3/10/2022 Self Yes Yes    Take 25 mg by mouth Daily.    SPIRIVA HANDIHALER 18 MCG per inhalation capsule 3/10/2022 Self Yes Yes    Place 1 capsule into inhaler and inhale Daily.    tiotropium bromide-olodaterol (STIOLTO RESPIMAT) 2.5-2.5 MCG/ACT aerosol solution inhaler 3/10/2022 Self Yes Yes    Inhale 1 puff 2 (Two) Times a Day.    VENTOLIN  (90 BASE) MCG/ACT inhaler Past Week Self Yes Yes    Inhale 2 puffs Every 4 (Four) Hours As Needed.    XARELTO 20 MG tablet 3/10/2022 Self Yes Yes    Take 20 mg by mouth daily with dinner.            Medication notes:     This medication list is complete to the best of my  knowledge as of 3/11/2022    Please call if questions.    Jarrett Donaldson  Medication History Technician  [phone]    3/11/2022 09:57 EST

## 2022-03-11 NOTE — H&P
HISTORY AND PHYSICAL   [facility]        Date of Admission: 3/10/2022  Patient Identification:  Name: Mg Gerber Sr.  Age: 81 y.o.  Sex: male  :  1940  MRN: [id number]                     Primary Care Physician: Dillon Walton MD    Chief Complaint: Chest wall pain status post fall    History of Present Illness:   Mr. Gerber is a wonderfully pleasant 81-year-old male who is a little hard of hearing but otherwise is an excellent historian given the fact that is got quite a plethora of past medical history.  He is here today because he went to play bin[employer]izhi with his wife and unfortunately the patient did not want any money and when he left he went to step off a curb and slipped with his foot and suffered a mechanical fall when she actually fell backwards and states that he hit his head.  CT of the head did not demonstrate any acute intracranial abnormalities.  He is sitting up conversational and appears to be at baseline.  Speech is fluent and cranial nerves are overall intact.  His main complaint now is left-sided chest wall pain and CT obtained demonstrates 5 through 7 rib fractures.  He is splinting a bit in pain and has difficulties taking a deep breath currently.  He is not currently wearing any oxygen though he does drop below 90% at times.  He states he was in his usual health preceding that fall.  Denies any recent fever chills or infectious problems.  He is under the care of Dr. Montes De Oca at [place] for known metastatic renal cell carcinoma.  He was aware of the lung mass and states that is metastatic disease.  He was not aware of the paraspinal masses but is under the care of pain management has had recent procedures.  I also went over the findings of the left adrenal gland and he did not seem aware of that either.  He understands the importance of follow-up with his oncologist post discharge as he is being admitted here for chest wall pain status post fall.    Past Medical  History:  Past Medical History:   Diagnosis Date   • Allergic    • Anemia, vitamin B12 deficiency 04/12/2012   • Anxiety    • Arthritis 03/24/2014   • Atrial fibrillation (HCC)    • Cancer (HCC) 04/29/2014    kidney; prostate   • Cataract    • Chemotherapeutic agent or infusion extravasation     q 1-2 wks    • COPD (chronic obstructive pulmonary disease) (HCC) 03/24/2014   • Emphysema of lung (HCC)    • Hemochromatosis 03/24/2014   • Herpes zoster 12/06/2011    left medial thigh   • Hip pain 03/24/2014   • History of Doppler ultrasound 12/28/2011    superficial and deep venous insuffiency   • History of EKG 02/10/2012    Dr. Kathi Lloyd; unchanged from prior EKG   • Hypertension     benign essential   • Injury of back    • Pulmonary embolism (HCC) 03/24/2014   • Renal cell cancer (HCC)    • Shortness of breath    • Sleep apnea      Past Surgical History:  Past Surgical History:   Procedure Laterality Date   • APPENDECTOMY     • CATARACT EXTRACTION     • CHOLECYSTECTOMY     • SPLENECTOMY     • TONSILLECTOMY     • VEIN SURGERY        Home Meds:  (Not in a hospital admission)      Allergies:  Allergies   Allergen Reactions   • Nsaids Hives and Itching   • Orange Unknown - Low Severity   • Orange Oil Unknown - Low Severity     Other reaction(s): Unknown - Low Severity  Other reaction(s): Unknown - Low Severity   • Latex Rash     Immunizations:  Immunization History   Administered Date(s) Administered   • COVID-19 (PFIZER) PURPLE CAP 02/27/2021, 02/27/2021, 03/20/2021, 03/20/2021, 09/07/2021, 09/07/2021   • FLUAD TRI 65YR+ 10/01/2019   • Flu Vaccine Quad PF 6-35MO 10/02/2017, 10/02/2017   • Flu Vaccine Quad PF >36MO 10/02/2017   • Fluzone High Dose =>65 Years ([employer] ONLY) 09/18/2018, 09/18/2018, 10/08/2019, 08/28/2020   • Fluzone High-Dose 65+yrs 10/21/2021   • Hepatitis B 01/01/2009, 01/01/2009   • Influenza TIV (IM) 09/29/2014, 09/29/2014, 10/01/2015, 10/01/2015, 10/20/2015, 10/20/2015   • Meningococcal Conjugate  "2009, 2009   • Pneumococcal Polysaccharide (PPSV23) 2007, 2007   • TD Preservative Free 2021   • Td 2009, 2009     Social History:   Social History     Social History Narrative   • Not on file     Social History     Socioeconomic History   • Marital status:    Tobacco Use   • Smoking status: Former Smoker   • Smokeless tobacco: Never Used   Vaping Use   • Vaping Use: Never used   Substance and Sexual Activity   • Alcohol use: Yes     Comment: 2 drinks month   • Drug use: No   • Sexual activity: Defer       Family History:  Family History   Problem Relation Age of Onset   • Aneurysm Mother    • Stroke Mother    • Heart disease Father    • Diabetes Brother         type 2        Review of Systems  See history of present illness and past medical history.  Patient denies fever chills night sweats.  He denies any current changes to his vision smell taste or sound.  He admits to some mild head discomfort in the back but denies any pain.  Denies any change in range of motion of the neck.  Denies any dizziness loss of consciousness or focal loss of function.  No problems with cough or shortness of breath though he does have discomfort with deep breathing and discomfort with movement as well involving the left side of his chest.  Denies any abdominal pain dysuria.  Denies any new bruising or bleeding.  Remainder of ROS is negative.    Objective:  T Max 24 hrs: Temp (24hrs), Av.9 °F (36.6 °C), Min:97.7 °F (36.5 °C), Max:98 °F (36.7 °C)    Vitals Ranges:   Temp:  [97.7 °F (36.5 °C)-98 °F (36.7 °C)] 98 °F (36.7 °C)  Heart Rate:  [60-76] 67  Resp:  [20] 20  BP: (105-164)/(48-71) 105/48      Exam:  /48 (BP Location: Left arm, Patient Position: Lying)   Pulse 67   Temp 98 °F (36.7 °C) (Oral)   Resp 20   Ht 185.4 cm (73\")   Wt 104 kg (230 lb)   SpO2 96%   BMI 30.34 kg/m²     General Appearance:    Alert, cooperative, alert and oriented x3, excellent historian, hard of " hearing, no family at bedside   Head:    Normocephalic, no significant tenderness to palpation or hematoma or laceration   Eyes:    PERRL, conjunctivae/corneas clear, EOM's intact, both eyes   Ears:    Normal external ear canals, both ears   Nose:   Nares normal, septum midline, mucosa normal, no drainage    or sinus tenderness   Throat:   Lips, mucosa, and tongue normal   Neck:   Supple, some tenderness to palpation but range of motion adequate with no JVD       Lungs:     Clear to auscultation bilaterally, respirations unlabored   Chest Wall:   Left-sided chest wall pain at the appropriate level of fractures with tenderness to palpation.  1 transdermal patch noted    Heart:    Regular rate and rhythm, S1 and S2 normal   Abdomen:     Soft, nontender, bowel sounds active all four quadrants,     no masses, old healed surgical scars   Extremities:  Moving all, no cyanosis or edema   Pulses:   2+ and symmetric all extremities           Neurologic:   CNII-XII intact, no focal deficit      .    Data Review:  Labs in chart were reviewed.             Imaging Results (All)     Procedure Component Value Units Date/Time    CT Chest Without Contrast Diagnostic [[id number]] Collected: 03/11/22 0109     Updated: 03/11/22 0138    Narrative:      CT OF THE CHEST WITHOUT CONTRAST; CT OF THE ABDOMEN AND PELVIS WITHOUT  CONTRAST     HISTORY: Left-sided body pain after a fall     COMPARISON: 11/24/2020     TECHNIQUE: Axial CT imaging was obtained from the thoracic inlet through  the symphysis pubis. No IV contrast was administered.     FINDINGS:  CT CHEST: There is mild deformity of the left fifth through seventh ribs  anteriorly. Nondisplaced fractures are not excluded. There is mild  deformity of the superior endplate of T12. However, this appears to be a  chronic finding. Thyroid gland is atrophic. Trachea and esophagus appear  within normal limits. There are coronary artery calcifications. There is  dilatation of the proximal  descending thoracic aorta, measuring up to  3.3 cm. Distal descending thoracic aorta tapers to normal caliber. There  is some biapical scarring. No pneumothorax or pleural effusion is seen.  There is chronic scarring identified at the left lung base. The patient  does have a left hilar mass. This measures at least 3.7 x 0.5 cm. This  is actually smaller than on prior study, when it measured 4.7 x 4.2 cm.  Patient is also noted to have paraspinous masses. These have increased  in size. For example, one to the left of T9 measures up to 4.6 x 2.6 cm.  Previously, it measured 4.7 x 2.2 cm. Right paraspinous mass at T9  measures 2.6 x 1.9 cm. Again, this is larger than on prior study, when  it measured 2.0 x 1.1 cm. Right internal jugular vein Mediport  terminates within the superior vena cava.     CT ABDOMEN AND PELVIS: The patient is status post left hip arthroplasty.  There is a compression fracture which is noted at L4. This is age  indeterminate, but may actually be chronic. MRI or bone scan would be  more sensitive for assessment of acuity. There is mild lumbar scoliosis,  with convexity to the left. No suspicious hepatic lesions are seen.  There is nodularity to the surface of the liver, suggesting cirrhosis.  This is a stable finding. There is a left adrenal nodule, which was not  clearly identified on prior imaging. It measures up to 1.6 x 1.5 cm.  Left kidney is surgically absent, as is the spleen. The pancreas is  normal. Patient is also noted to have a right adrenal nodule, which has  increased in size. It measures up to 1.5 cm. Previously, it measured 6  mm. Gallbladder is surgically absent. No hydronephrosis is seen on the  right. Stomach and small bowel appear normal. Both sets of structures  within the pelvis is degraded by streak artifact from the arthroplasty.  Dystrophic calcifications are seen within the prostate gland. Urinary  bladder is contracted. It appears somewhat thick-walled,  with  perivesical stranding. Correlation with urinalysis and urine cultures is  recommended. There is diverticulosis. Appendix is surgically absent.       Impression:         1. Mild deformity of the left fifth through seventh ribs anteriorly.  Nondisplaced fractures are not excluded.  2. Age-indeterminate compression fracture of L4, although favored to be  chronic.  3. A previously identified left hilar mass has decreased in size.  However, bilateral paraspinous masses have increased in size, and there  are new or enlarging bilateral adrenal nodules.     Radiation dose reduction techniques were utilized, including automated  exposure control and exposure modulation based on body size.     This report was finalized on 3/11/2022 1:35 AM by Dr. Alysia Tavares M.D.       CT Abdomen Pelvis Without Contrast [[id number]] Collected: 03/11/22 0109     Updated: 03/11/22 0138    Narrative:      CT OF THE CHEST WITHOUT CONTRAST; CT OF THE ABDOMEN AND PELVIS WITHOUT  CONTRAST     HISTORY: Left-sided body pain after a fall     COMPARISON: 11/24/2020     TECHNIQUE: Axial CT imaging was obtained from the thoracic inlet through  the symphysis pubis. No IV contrast was administered.     FINDINGS:  CT CHEST: There is mild deformity of the left fifth through seventh ribs  anteriorly. Nondisplaced fractures are not excluded. There is mild  deformity of the superior endplate of T12. However, this appears to be a  chronic finding. Thyroid gland is atrophic. Trachea and esophagus appear  within normal limits. There are coronary artery calcifications. There is  dilatation of the proximal descending thoracic aorta, measuring up to  3.3 cm. Distal descending thoracic aorta tapers to normal caliber. There  is some biapical scarring. No pneumothorax or pleural effusion is seen.  There is chronic scarring identified at the left lung base. The patient  does have a left hilar mass. This measures at least 3.7 x 0.5 cm. This  is actually smaller  than on prior study, when it measured 4.7 x 4.2 cm.  Patient is also noted to have paraspinous masses. These have increased  in size. For example, one to the left of T9 measures up to 4.6 x 2.6 cm.  Previously, it measured 4.7 x 2.2 cm. Right paraspinous mass at T9  measures 2.6 x 1.9 cm. Again, this is larger than on prior study, when  it measured 2.0 x 1.1 cm. Right internal jugular vein Mediport  terminates within the superior vena cava.     CT ABDOMEN AND PELVIS: The patient is status post left hip arthroplasty.  There is a compression fracture which is noted at L4. This is age  indeterminate, but may actually be chronic. MRI or bone scan would be  more sensitive for assessment of acuity. There is mild lumbar scoliosis,  with convexity to the left. No suspicious hepatic lesions are seen.  There is nodularity to the surface of the liver, suggesting cirrhosis.  This is a stable finding. There is a left adrenal nodule, which was not  clearly identified on prior imaging. It measures up to 1.6 x 1.5 cm.  Left kidney is surgically absent, as is the spleen. The pancreas is  normal. Patient is also noted to have a right adrenal nodule, which has  increased in size. It measures up to 1.5 cm. Previously, it measured 6  mm. Gallbladder is surgically absent. No hydronephrosis is seen on the  right. Stomach and small bowel appear normal. Both sets of structures  within the pelvis is degraded by streak artifact from the arthroplasty.  Dystrophic calcifications are seen within the prostate gland. Urinary  bladder is contracted. It appears somewhat thick-walled, with  perivesical stranding. Correlation with urinalysis and urine cultures is  recommended. There is diverticulosis. Appendix is surgically absent.       Impression:         1. Mild deformity of the left fifth through seventh ribs anteriorly.  Nondisplaced fractures are not excluded.  2. Age-indeterminate compression fracture of L4, although favored to be  chronic.  3.  A previously identified left hilar mass has decreased in size.  However, bilateral paraspinous masses have increased in size, and there  are new or enlarging bilateral adrenal nodules.     Radiation dose reduction techniques were utilized, including automated  exposure control and exposure modulation based on body size.     This report was finalized on 3/11/2022 1:35 AM by Dr. Alysia Tavares M.D.       CT Cervical Spine Without Contrast [[id number]] Collected: 03/11/22 0102     Updated: 03/11/22 0110    Narrative:      CT OF THE CERVICAL SPINE     HISTORY: Fall with neck pain     COMPARISON: 10/26/2015     TECHNIQUE: Axial CT imaging was obtained through the cervical spine.  Coronal and sagittal reformatted images were obtained.     FINDINGS:  No acute fracture or subluxation is identified. Cervical vertebral body  alignment appears within normal limits. Intervertebral disc space  narrowing is noted most significantly at C6-C7 and C5-C6. There is no  prevertebral soft tissue swelling.     C2-C3: There is no canal stenosis. There is mild bilateral neural  foraminal narrowing.  C3-C4: There is no significant canal stenosis. There is no significant  neural foraminal narrowing.  C4-C5: There is mild canal narrowing. There is mild bilateral neural  foraminal narrowing.  C5-C6: There is moderate canal narrowing. There is moderate neural  foraminal narrowing on the right and mild narrowing on the left.  C6-C7: There is moderate canal stenosis. There is moderate bilateral  neural foraminal narrowing.  C7-T1: There is no canal stenosis or neural foraminal narrowing.       Impression:      No acute fracture or subluxation identified.     Radiation dose reduction techniques were utilized, including automated  exposure control and exposure modulation based on body size.     This report was finalized on 3/11/2022 1:07 AM by Dr. Alysia Tavares M.D.       CT Head Without Contrast [[id number]] Collected: 03/11/22 0056      Updated: 03/11/22 0104    Narrative:      CT HEAD WITHOUT CONTRAST     HISTORY: Fall with left-sided pain     COMPARISON: 11/27/2020     TECHNIQUE: Axial CT imaging was obtained through the brain. IV contrast  was administered.     FINDINGS:  No acute intracranial hemorrhage is seen. There is diffuse atrophy.  There is periventricular and deep white matter microangiopathic change.  There is no midline shift or mass effect. Old lacunar infarcts are noted  within the left external capsule. No calvarial fracture is seen.  Paranasal sinuses appear clear. There is partial opacification of the  left mastoid air cells.       Impression:      No acute intracranial findings.     Radiation dose reduction techniques were utilized, including automated  exposure control and exposure modulation based on body size.     This report was finalized on 3/11/2022 1:01 AM by Dr. Alysia Tavares M.D.       XR Hand 3+ View Right [[id number]] Collected: 03/11/22 0015     Updated: 03/11/22 0021    Narrative:      3 VIEWS RIGHT HAND     HISTORY: Fall     COMPARISON: None available.     FINDINGS:  No acute fracture or subluxation of the right hand is seen. There are  degenerative changes seen within the right hand, most in keeping with  osteoarthritis.       Impression:      No acute fracture or subluxation identified.     This report was finalized on 3/11/2022 12:18 AM by Dr. Alysia Tavares M.D.               Assessment:  Active Hospital Problems    Diagnosis  POA   • **Closed fracture of multiple ribs of left side [S22.42XA]  Unknown   • Fall [W19.XXXA]  Yes   • Chest wall pain [R07.89]  Unknown   • History of pulmonary embolism [Z86.711]  Yes   • Paroxysmal atrial fibrillation (HCC) [I48.0]  Yes   • Malignant neoplasm of lung (HCC) [C34.90]  Yes   • Metastatic renal cell carcinoma  on q 2 wks chemo [C64.9]  Yes   • Essential hypertension [I10]  Yes   • Hemochromatosis [E83.119]  Yes      Resolved Hospital Problems   No resolved  problems to display.       Plan:    Mechanical fall with left-sided chest wall pain consistent with acute fractures of left ribs 5 through 7   -Continue as needed pain control -switched over to oxycodone to avoid Tylenol given cirrhotic liver though formally was on Norco prior to admission   -Hold scheduled Tylenol   -Increase Lidoderm to 2 patches   -TTS consult for additional recommendations   -I-S strongly advised    Known renal cell carcinoma with extensive metastatic disease.  He states that the lung masses are secondary to metastatic renal cell.  He also has paraspinal masses and he states that he has been followed by Dr. Montes De Oca with [facility] oncology department.  He even follows with the pain management with [facility] as well and states that he has had previous interventions on the spine for pain control.  Given the fact that he is well-established at an outlying hospital, holding any additional oncological consultation and also holding any further evaluation of the paraspinal or left adrenal mass.  I counseled patient about both of these and he understands and will follow up with his oncologist    Leukocytosis likely reactive/chronic -no signs or concern for current infection    Fall precautions with PT to evaluate.  CCP for discharge needs    SCDs for additional prophylaxis    Unable to addendum med rec at this time secondary to someone () else being in ADT?  And patient needs his a.m. meds but this will delay    Rigo Carney MD  3/11/2022  09:39 EST

## 2022-03-11 NOTE — CONSULTS
Inpatient Thoracic Surgery Consult  Consult performed by: Joy Mcgill, RACHANA, APRN  Consult ordered by: Rigo Carney MD          Patient Care Team:  Dillon Walton MD as PCP - General (Family Medicine)    Chief Complaint   Patient presents with   • Fall       Subjective     History of Present Illness     Mg Gerber is a pleasant 81-year-old gentleman with known metastatic renal cell carcinoma.  He is under the care of Dr. Ashleigh Montes De Oca at [facility] for this.  He has known metastatic disease in the lung.  He is followed by pain management and has undergone recent procedures to assist with chronic pain for malignancy.  Apparently, patient was out playing bingo with his wife yesterday and when he left he went to step off a curb and slipped, suffering a mechanical fall.  Patient reports hitting his head.  A CT of the head did not not demonstrate any acute intracranial abnormalities.  He appears to be comfortable sitting up in bed but complains of left-sided chest wall pain.  His CT scan demonstrates questionable fractures of the left anterior fifth, sixth and seventh ribs.     Upon exam today, the patient is alert resting in bed.  He is extremely hard of hearing.  He reports increased pain with movement and deep inspiration.  He is on 2 L per nasal cannula, but does not require any supplemental oxygen at baseline.  Mr. Gerber has been admitted for management of his chest wall pain and we have been asked to see him to render an opinion and provide assistance with these rib fractures.    Review of Systems   Constitutional: Negative.    HENT: Positive for hearing loss.    Eyes: Negative.    Respiratory: Negative for shortness of breath.    Cardiovascular: Positive for chest pain.   Gastrointestinal: Negative.    Endocrine: Negative.    Genitourinary: Negative.    Musculoskeletal: Positive for gait problem.   Skin: Negative.    Allergic/Immunologic: Positive for immunocompromised state.    Hematological: Bruises/bleeds easily.   Psychiatric/Behavioral: Negative.         Patient Active Problem List   Diagnosis   • Anemia, vitamin B12 deficiency   • Arthritis   • Hemochromatosis   • Herpes zoster   • Hip pain   • Essential hypertension   • Cancer (HCC)   • Pulmonary emphysema (HCC)   • Left low back pain   • Vertigo   • Headache around the eyes   • Mild cognitive impairment   • Metastatic renal cell carcinoma  on q 2 wks chemo   • Anxiety   • Malignant neoplasm of lung (HCC)   • Neuropathy   • Perennial allergic rhinitis   • Vitamin B12 deficiency   • Osteoarthritis of lumbar spine   • Pharyngitis   • Bronchitis   • Other constipation   • Hx of hiatal hernia   • History of lung cancer   • COVID-19 virus detected   • Dyspnea   • Generalized weakness   • Acute respiratory failure with hypoxia (HCC)   • Gastroesophageal reflux disease   • Hearing disorder   • Malignant neoplasm of prostate (HCC)   • Sleep apnea   • Anemia   • Malignant neoplasm metastatic to lung (HCC)   • Secondary bacterial pneumonia   • Pneumonia due to COVID-19 virus   • Hilar mass   • On antineoplastic chemotherapy q 2wks   • Paroxysmal atrial fibrillation (HCC)   • History of pulmonary embolism   • Nausea   • Gastritis   • Muscle spasm   • Fall   • Closed fracture of multiple ribs of left side   • Chest wall pain     Past Medical History:   Diagnosis Date   • Allergic    • Anemia, vitamin B12 deficiency 04/12/2012   • Anxiety    • Arthritis 03/24/2014   • Atrial fibrillation (HCC)    • Cancer (HCC) 04/29/2014    kidney; prostate   • Cataract    • Chemotherapeutic agent or infusion extravasation     q 1-2 wks    • COPD (chronic obstructive pulmonary disease) (HCC) 03/24/2014   • Emphysema of lung (HCC)    • Hemochromatosis 03/24/2014   • Herpes zoster 12/06/2011    left medial thigh   • Hip pain 03/24/2014   • History of Doppler ultrasound 12/28/2011    superficial and deep venous insuffiency   • History of EKG 02/10/2012      Kathi Lloyd; unchanged from prior EKG   • Hypertension     benign essential   • Injury of back    • Pulmonary embolism (HCC) 03/24/2014   • Renal cell cancer (HCC)    • Shortness of breath    • Sleep apnea      Past Surgical History:   Procedure Laterality Date   • APPENDECTOMY     • CATARACT EXTRACTION     • CHOLECYSTECTOMY     • SPLENECTOMY     • TONSILLECTOMY     • VEIN SURGERY       Family History   Problem Relation Age of Onset   • Aneurysm Mother    • Stroke Mother    • Heart disease Father    • Diabetes Brother         type 2     Social History     Socioeconomic History   • Marital status:    Tobacco Use   • Smoking status: Former Smoker   • Smokeless tobacco: Never Used   Vaping Use   • Vaping Use: Never used   Substance and Sexual Activity   • Alcohol use: Yes     Comment: 2 drinks month   • Drug use: No   • Sexual activity: Defer     (Not in a hospital admission)    Allergies   Allergen Reactions   • Nsaids Hives and Itching   • Orange Unknown - Low Severity   • Orange Oil Unknown - Low Severity     Other reaction(s): Unknown - Low Severity  Other reaction(s): Unknown - Low Severity   • Latex Rash       Objective      Vital Signs  Temp:  [97.7 °F (36.5 °C)-98 °F (36.7 °C)] 98 °F (36.7 °C)  Heart Rate:  [60-76] 67  Resp:  [20] 20  BP: (105-164)/(48-71) 105/48    Intake & Output (last day)       03/10 0701  03/11 0700 03/11 0701  03/12 0700    P.O.  240    Total Intake(mL/kg)  240 (2.3)    Urine (mL/kg/hr)  400 (1.2)    Total Output  400    Net  -160                Physical Exam  Constitutional:       Appearance: Normal appearance. He is ill-appearing.   HENT:      Head: Normocephalic and atraumatic.      Right Ear: Decreased hearing noted.      Left Ear: Decreased hearing noted.      Nose: Nose normal.   Cardiovascular:      Rate and Rhythm: Normal rate.   Pulmonary:      Effort: Pulmonary effort is normal. No respiratory distress.      Breath sounds: Decreased breath sounds present.   Chest:       Chest wall: Tenderness (Left lateral chest and lumbar spine) present.   Musculoskeletal:      Cervical back: Normal range of motion and neck supple.      Comments: Decreased ROM secondary to weakness   Skin:     General: Skin is warm.      Findings: No bruising.   Neurological:      General: No focal deficit present.      Mental Status: He is alert.   Psychiatric:         Mood and Affect: Mood normal.         Results Review:    I reviewed the patient's new clinical results.  I reviewed the patient's new imaging results and agree with the interpretation.  I reviewed the patient's other test results and agree with the interpretation  Discussed with patient, RN and Dr. Hess    Imaging Results (Last 24 Hours)     Procedure Component Value Units Date/Time    CT Chest Without Contrast Diagnostic [[id number]] Collected: 03/11/22 0109     Updated: 03/11/22 0138    Narrative:      CT OF THE CHEST WITHOUT CONTRAST; CT OF THE ABDOMEN AND PELVIS WITHOUT  CONTRAST     HISTORY: Left-sided body pain after a fall     COMPARISON: 11/24/2020     TECHNIQUE: Axial CT imaging was obtained from the thoracic inlet through  the symphysis pubis. No IV contrast was administered.     FINDINGS:  CT CHEST: There is mild deformity of the left fifth through seventh ribs  anteriorly. Nondisplaced fractures are not excluded. There is mild  deformity of the superior endplate of T12. However, this appears to be a  chronic finding. Thyroid gland is atrophic. Trachea and esophagus appear  within normal limits. There are coronary artery calcifications. There is  dilatation of the proximal descending thoracic aorta, measuring up to  3.3 cm. Distal descending thoracic aorta tapers to normal caliber. There  is some biapical scarring. No pneumothorax or pleural effusion is seen.  There is chronic scarring identified at the left lung base. The patient  does have a left hilar mass. This measures at least 3.7 x 0.5 cm. This  is actually smaller than on prior  study, when it measured 4.7 x 4.2 cm.  Patient is also noted to have paraspinous masses. These have increased  in size. For example, one to the left of T9 measures up to 4.6 x 2.6 cm.  Previously, it measured 4.7 x 2.2 cm. Right paraspinous mass at T9  measures 2.6 x 1.9 cm. Again, this is larger than on prior study, when  it measured 2.0 x 1.1 cm. Right internal jugular vein Mediport  terminates within the superior vena cava.     CT ABDOMEN AND PELVIS: The patient is status post left hip arthroplasty.  There is a compression fracture which is noted at L4. This is age  indeterminate, but may actually be chronic. MRI or bone scan would be  more sensitive for assessment of acuity. There is mild lumbar scoliosis,  with convexity to the left. No suspicious hepatic lesions are seen.  There is nodularity to the surface of the liver, suggesting cirrhosis.  This is a stable finding. There is a left adrenal nodule, which was not  clearly identified on prior imaging. It measures up to 1.6 x 1.5 cm.  Left kidney is surgically absent, as is the spleen. The pancreas is  normal. Patient is also noted to have a right adrenal nodule, which has  increased in size. It measures up to 1.5 cm. Previously, it measured 6  mm. Gallbladder is surgically absent. No hydronephrosis is seen on the  right. Stomach and small bowel appear normal. Both sets of structures  within the pelvis is degraded by streak artifact from the arthroplasty.  Dystrophic calcifications are seen within the prostate gland. Urinary  bladder is contracted. It appears somewhat thick-walled, with  perivesical stranding. Correlation with urinalysis and urine cultures is  recommended. There is diverticulosis. Appendix is surgically absent.       Impression:         1. Mild deformity of the left fifth through seventh ribs anteriorly.  Nondisplaced fractures are not excluded.  2. Age-indeterminate compression fracture of L4, although favored to be  chronic.  3. A previously  identified left hilar mass has decreased in size.  However, bilateral paraspinous masses have increased in size, and there  are new or enlarging bilateral adrenal nodules.     Radiation dose reduction techniques were utilized, including automated  exposure control and exposure modulation based on body size.     This report was finalized on 3/11/2022 1:35 AM by Dr. Alysia Tavares M.D.       CT Abdomen Pelvis Without Contrast [[id number]] Collected: 03/11/22 0109     Updated: 03/11/22 0138    Narrative:      CT OF THE CHEST WITHOUT CONTRAST; CT OF THE ABDOMEN AND PELVIS WITHOUT  CONTRAST     HISTORY: Left-sided body pain after a fall     COMPARISON: 11/24/2020     TECHNIQUE: Axial CT imaging was obtained from the thoracic inlet through  the symphysis pubis. No IV contrast was administered.     FINDINGS:  CT CHEST: There is mild deformity of the left fifth through seventh ribs  anteriorly. Nondisplaced fractures are not excluded. There is mild  deformity of the superior endplate of T12. However, this appears to be a  chronic finding. Thyroid gland is atrophic. Trachea and esophagus appear  within normal limits. There are coronary artery calcifications. There is  dilatation of the proximal descending thoracic aorta, measuring up to  3.3 cm. Distal descending thoracic aorta tapers to normal caliber. There  is some biapical scarring. No pneumothorax or pleural effusion is seen.  There is chronic scarring identified at the left lung base. The patient  does have a left hilar mass. This measures at least 3.7 x 0.5 cm. This  is actually smaller than on prior study, when it measured 4.7 x 4.2 cm.  Patient is also noted to have paraspinous masses. These have increased  in size. For example, one to the left of T9 measures up to 4.6 x 2.6 cm.  Previously, it measured 4.7 x 2.2 cm. Right paraspinous mass at T9  measures 2.6 x 1.9 cm. Again, this is larger than on prior study, when  it measured 2.0 x 1.1 cm. Right internal  jugular vein Mediport  terminates within the superior vena cava.     CT ABDOMEN AND PELVIS: The patient is status post left hip arthroplasty.  There is a compression fracture which is noted at L4. This is age  indeterminate, but may actually be chronic. MRI or bone scan would be  more sensitive for assessment of acuity. There is mild lumbar scoliosis,  with convexity to the left. No suspicious hepatic lesions are seen.  There is nodularity to the surface of the liver, suggesting cirrhosis.  This is a stable finding. There is a left adrenal nodule, which was not  clearly identified on prior imaging. It measures up to 1.6 x 1.5 cm.  Left kidney is surgically absent, as is the spleen. The pancreas is  normal. Patient is also noted to have a right adrenal nodule, which has  increased in size. It measures up to 1.5 cm. Previously, it measured 6  mm. Gallbladder is surgically absent. No hydronephrosis is seen on the  right. Stomach and small bowel appear normal. Both sets of structures  within the pelvis is degraded by streak artifact from the arthroplasty.  Dystrophic calcifications are seen within the prostate gland. Urinary  bladder is contracted. It appears somewhat thick-walled, with  perivesical stranding. Correlation with urinalysis and urine cultures is  recommended. There is diverticulosis. Appendix is surgically absent.       Impression:         1. Mild deformity of the left fifth through seventh ribs anteriorly.  Nondisplaced fractures are not excluded.  2. Age-indeterminate compression fracture of L4, although favored to be  chronic.  3. A previously identified left hilar mass has decreased in size.  However, bilateral paraspinous masses have increased in size, and there  are new or enlarging bilateral adrenal nodules.     Radiation dose reduction techniques were utilized, including automated  exposure control and exposure modulation based on body size.     This report was finalized on 3/11/2022 1:35 AM by   Alysia Tavares M.D.       CT Cervical Spine Without Contrast [[id number]] Collected: 03/11/22 0102     Updated: 03/11/22 0110    Narrative:      CT OF THE CERVICAL SPINE     HISTORY: Fall with neck pain     COMPARISON: 10/26/2015     TECHNIQUE: Axial CT imaging was obtained through the cervical spine.  Coronal and sagittal reformatted images were obtained.     FINDINGS:  No acute fracture or subluxation is identified. Cervical vertebral body  alignment appears within normal limits. Intervertebral disc space  narrowing is noted most significantly at C6-C7 and C5-C6. There is no  prevertebral soft tissue swelling.     C2-C3: There is no canal stenosis. There is mild bilateral neural  foraminal narrowing.  C3-C4: There is no significant canal stenosis. There is no significant  neural foraminal narrowing.  C4-C5: There is mild canal narrowing. There is mild bilateral neural  foraminal narrowing.  C5-C6: There is moderate canal narrowing. There is moderate neural  foraminal narrowing on the right and mild narrowing on the left.  C6-C7: There is moderate canal stenosis. There is moderate bilateral  neural foraminal narrowing.  C7-T1: There is no canal stenosis or neural foraminal narrowing.       Impression:      No acute fracture or subluxation identified.     Radiation dose reduction techniques were utilized, including automated  exposure control and exposure modulation based on body size.     This report was finalized on 3/11/2022 1:07 AM by Dr. Alysia Tavares M.D.       CT Head Without Contrast [[id number]] Collected: 03/11/22 0056     Updated: 03/11/22 0104    Narrative:      CT HEAD WITHOUT CONTRAST     HISTORY: Fall with left-sided pain     COMPARISON: 11/27/2020     TECHNIQUE: Axial CT imaging was obtained through the brain. IV contrast  was administered.     FINDINGS:  No acute intracranial hemorrhage is seen. There is diffuse atrophy.  There is periventricular and deep white matter microangiopathic  change.  There is no midline shift or mass effect. Old lacunar infarcts are noted  within the left external capsule. No calvarial fracture is seen.  Paranasal sinuses appear clear. There is partial opacification of the  left mastoid air cells.       Impression:      No acute intracranial findings.     Radiation dose reduction techniques were utilized, including automated  exposure control and exposure modulation based on body size.     This report was finalized on 3/11/2022 1:01 AM by Dr. Alysia Tavares M.D.       XR Hand 3+ View Right [[id number]] Collected: 03/11/22 0015     Updated: 03/11/22 0021    Narrative:      3 VIEWS RIGHT HAND     HISTORY: Fall     COMPARISON: None available.     FINDINGS:  No acute fracture or subluxation of the right hand is seen. There are  degenerative changes seen within the right hand, most in keeping with  osteoarthritis.       Impression:      No acute fracture or subluxation identified.     This report was finalized on 3/11/2022 12:18 AM by Dr. Alysia Tavares M.D.             Lab Results:  Lab Results (last 24 hours)     Procedure Component Value Units Date/Time    Protime-INR [[id number]]  (Abnormal) Collected: 03/11/22 0619    Specimen: Blood Updated: 03/11/22 0718     Protime 15.7 Seconds      INR 1.25    Basic Metabolic Panel [[id number]]  (Normal) Collected: 03/11/22 0619    Specimen: Blood Updated: 03/11/22 0714     Glucose 98 mg/dL      BUN 16 mg/dL      Creatinine 1.09 mg/dL      Sodium 138 mmol/L      Potassium 4.3 mmol/L      Chloride 106 mmol/L      CO2 23.9 mmol/L      Calcium 8.8 mg/dL      BUN/Creatinine Ratio 14.7     Anion Gap 8.1 mmol/L      eGFR 67.8 mL/min/1.73      Comment: National Kidney Foundation and American Society of Nephrology (ASN) Task Force recommended calculation based on the Chronic Kidney Disease Epidemiology Collaboration (CKD-EPI) equation refit without adjustment for race.       Narrative:      GFR Normal >60  Chronic Kidney Disease  <60  Kidney Failure <15      CBC Auto Differential [[id number]]  (Abnormal) Collected: 03/11/22 0619    Specimen: Blood Updated: 03/11/22 0713     WBC 19.16 10*3/mm3      RBC 2.69 10*6/mm3      Hemoglobin 9.8 g/dL      Hematocrit 29.9 %      .2 fL      MCH 36.4 pg      MCHC 32.8 g/dL      RDW 10.2 %      RDW-SD 40.6 fl      MPV 10.1 fL      Platelets 372 10*3/mm3      Neutrophil % 65.6 %      Lymphocyte % 19.9 %      Monocyte % 11.6 %      Eosinophil % 1.6 %      Basophil % 0.6 %      Neutrophils, Absolute 12.55 10*3/mm3      Lymphocytes, Absolute 3.82 10*3/mm3      Monocytes, Absolute 2.23 10*3/mm3      Eosinophils, Absolute 0.31 10*3/mm3      Basophils, Absolute 0.12 10*3/mm3     COVID PRE-OP / PRE-PROCEDURE SCREENING ORDER (NO ISOLATION) - Swab, Nasopharynx [[id number]]  (Normal) Collected: 03/11/22 0228    Specimen: Swab from Nasopharynx Updated: 03/11/22 0304    Narrative:      The following orders were created for panel order COVID PRE-OP / PRE-PROCEDURE SCREENING ORDER (NO ISOLATION) - Swab, Nasopharynx.  Procedure                               Abnormality         Status                     ---------                               -----------         ------                     COVID-19,[facility]...[[id number]]  Normal              Final result                 Please view results for these tests on the individual orders.    COVID-19,[facility] CEPHEID/DARRELL NP SWAB IN TRANSPORT MEDIA 8-12 HR TAT - Swab, Nasopharynx [[id number]]  (Normal) Collected: 03/11/22 0228    Specimen: Swab from Nasopharynx Updated: 03/11/22 0304     COVID19 Not Detected    Narrative:      Fact sheet for providers: https://www.fda.gov/media/712374/download    Fact sheet for patients: https://www.fda.gov/media/334977/download    Test performed by PCR.    Urinalysis With Microscopic If Indicated (No Culture) - Urine, Clean Catch [[id number]]  (Normal) Collected: 03/11/22 0112    Specimen: Urine, Clean Catch Updated: 03/11/22  0126     Color, UA Yellow     Appearance, UA Clear     pH, UA <=5.0     Specific Gravity, UA 1.019     Glucose, UA Negative     Ketones, UA Negative     Bilirubin, UA Negative     Blood, UA Negative     Protein, UA Negative     Leuk Esterase, UA Negative     Nitrite, UA Negative     Urobilinogen, UA 0.2 E.U./dL    Narrative:      Urine microscopic not indicated.    Comprehensive Metabolic Panel [[id number]]  (Abnormal) Collected: 03/10/22 2312    Specimen: Blood Updated: 03/11/22 0020     Glucose 103 mg/dL      BUN 18 mg/dL      Creatinine 1.20 mg/dL      Sodium 140 mmol/L      Potassium 3.9 mmol/L      Comment: Slight hemolysis detected by analyzer. Results may be affected.        Chloride 107 mmol/L      CO2 21.1 mmol/L      Calcium 9.6 mg/dL      Total Protein 7.0 g/dL      Albumin 4.20 g/dL      ALT (SGPT) 17 U/L      AST (SGOT) 19 U/L      Alkaline Phosphatase 62 U/L      Total Bilirubin 1.2 mg/dL      Globulin 2.8 gm/dL      A/G Ratio 1.5 g/dL      BUN/Creatinine Ratio 15.0     Anion Gap 11.9 mmol/L      eGFR 60.4 mL/min/1.73      Comment: National Kidney Foundation and American Society of Nephrology (ASN) Task Force recommended calculation based on the Chronic Kidney Disease Epidemiology Collaboration (CKD-EPI) equation refit without adjustment for race.       Narrative:      GFR Normal >60  Chronic Kidney Disease <60  Kidney Failure <15      Magnesium [[id number]]  (Normal) Collected: 03/10/22 2312    Specimen: Blood Updated: 03/11/22 0020     Magnesium 2.2 mg/dL     Troponin [[id number]]  (Normal) Collected: 03/10/22 2312    Specimen: Blood Updated: 03/11/22 0020     Troponin T <0.010 ng/mL     Narrative:      Troponin T Reference Range:  <= 0.03 ng/mL-   Negative for AMI  >0.03 ng/mL-     Abnormal for myocardial necrosis.  Clinicians would have to utilize clinical acumen, EKG, Troponin and serial changes to determine if it is an Acute Myocardial Infarction or myocardial injury due to an underlying chronic  condition.       Results may be falsely decreased if patient taking Biotin.      [place] Draw [[id number]] Collected: 03/10/22 2312    Specimen: Blood Updated: 03/11/22 0017    Narrative:      The following orders were created for panel order [place] Draw.  Procedure                               Abnormality         Status                     ---------                               -----------         ------                     Green Top (Gel)[[id number]]                                  Final result               Lavender Top[[id number]]                                     Final result               Gold Top - SST[[id number]]                                   Final result               Light Blue Top[[id number]]                                   Final result                 Please view results for these tests on the individual orders.    Green Top (Gel) [[id number]] Collected: 03/10/22 2312    Specimen: Blood Updated: 03/11/22 0017     Extra Tube Hold for add-ons.     Comment: Auto resulted.       Lavender Top [[id number]] Collected: 03/10/22 2312    Specimen: Blood Updated: 03/11/22 0017     Extra Tube hold for add-on     Comment: Auto resulted       Gold Top - SST [[id number]] Collected: 03/10/22 2312    Specimen: Blood Updated: 03/11/22 0017     Extra Tube Hold for add-ons.     Comment: Auto resulted.       Light Blue Top [[id number]] Collected: 03/10/22 2312    Specimen: Blood Updated: 03/11/22 0017     Extra Tube hold for add-on     Comment: Auto resulted       Protime-INR [[id number]]  (Abnormal) Collected: 03/10/22 2312    Specimen: Blood Updated: 03/11/22 0012     Protime 17.3 Seconds      INR 1.42    CBC & Differential [[id number]]  (Abnormal) Collected: 03/10/22 2312    Specimen: Blood Updated: 03/10/22 2343    Narrative:      The following orders were created for panel order CBC & Differential.  Procedure                               Abnormality         Status                     ---------                                -----------         ------                     CBC Auto Differential[[id number]]        Abnormal            Final result               Scan Slide[[id number]]                                       Final result                 Please view results for these tests on the individual orders.    Scan Slide [[id number]] Collected: 03/10/22 2312    Specimen: Blood Updated: 03/10/22 2343     Anisocytosis Slight/1+     Macrocytes Mod/2+     Polychromasia Slight/1+     WBC Morphology Normal     Platelet Morphology Normal    CBC Auto Differential [[id number]]  (Abnormal) Collected: 03/10/22 2312    Specimen: Blood Updated: 03/10/22 2343     WBC 17.71 10*3/mm3      RBC 2.77 10*6/mm3      Hemoglobin 10.1 g/dL      Hematocrit 30.8 %      .2 fL      MCH 36.5 pg      MCHC 32.8 g/dL      RDW 10.4 %      RDW-SD 41.5 fl      MPV 9.8 fL      Platelets 388 10*3/mm3      Neutrophil % 61.8 %      Lymphocyte % 25.3 %      Monocyte % 9.7 %      Eosinophil % 1.7 %      Basophil % 0.7 %      Immature Grans % 0.8 %      Neutrophils, Absolute 10.94 10*3/mm3      Lymphocytes, Absolute 4.48 10*3/mm3      Monocytes, Absolute 1.71 10*3/mm3      Eosinophils, Absolute 0.30 10*3/mm3      Basophils, Absolute 0.13 10*3/mm3      Immature Grans, Absolute 0.15 10*3/mm3      nRBC 0.3 /100 WBC               Assessment/Plan       Closed fracture of multiple ribs of left side    Hemochromatosis    Essential hypertension    Metastatic renal cell carcinoma  on q 2 wks chemo    Malignant neoplasm of lung (HCC)    Paroxysmal atrial fibrillation (HCC)    History of pulmonary embolism    Fall    Chest wall pain      Assessment & Plan     I reviewed the patient's radiographic imaging including the CT of the chest with 3D reconstruction performed on 3/11/2022.  There is some mild deformity of the left fifth through seventh ribs anteriorly.  No evidence of displaced rib fractures.  Compression fracture of L4 is chronic.  There is a left hilar mass and bilateral  paraspinous masses as well as new/enlarging bilateral adrenal nodules.    Left anterior rib deformity: Nondisplaced rib fractures seen on CT chest.  Recommend conservative treatment with rib fracture protocol medications.  This includes scheduled Tylenol and Lidoderm patch.  We will hold off on anti-inflammatories due to patient's history of nephrectomy.  Continue home dose of gabapentin.  He may have morphine and oxycodone as needed for breakthrough pain.  Recommend incentive spirometry and flutter valve 10 times per hour and out of bed as much as possible to prevent respiratory complications from developing.  Patient is not a surgical candidate given nature of rib fractures and advanced disease.  Recheck chest x-ray in the morning.  Patient will likely be able to discharge tomorrow assuming his imaging is stable.    Generalized weakness: Agree with consult for physical therapy.  Appreciate their assistance with increasing mobilization.    I discussed the patients findings and our recommendations with patient, nursing staff and Dr. Hess    Thank you for this consult and allowing us to participate in the care of your patient.  We will follow along with you during this hospitalization.       ADRIANA Armenta  [facility]  03/11/22  10:17 EST    Patient was seen and assessed while wearing personal protective equipment including facemask, protective eyewear and gloves.  Hand hygiene performed prior to entering the room and upon exiting with doffing of gloves.

## 2022-03-11 NOTE — ED PROVIDER NOTES
EMERGENCY DEPARTMENT ENCOUNTER    Room Number:  21/21  Date of encounter:  3/11/2022  PCP: Dillon Walton MD  Historian: Patient and EMS      HPI:  Chief Complaint: Fall      Context: Mg Gerber Sr. is a 81 y.o. male who presents to the ED c/o fall just prior to arrival.  Patient states that he was stepping down off a curb when he lost his balance and fell backwards hitting his head.  The patient now complains of headache and left lower rib pain.  The patient is anticoagulated on Xarelto.  He denies loss of consciousness.  The patient denies nausea, vomiting or confusion.  He also states that he is injured his right hand.      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/24/2014   • Herpes zoster 12/06/2011   • Hip pain 03/24/2014   • Essential hypertension    • Cancer (HCC) 04/29/2014   • Pulmonary emphysema (HCC) 03/24/2014   • Left low back pain 10/28/2015   • Vertigo 02/02/2016   • Headache around the eyes 02/02/2016   • Mild cognitive impairment 02/02/2016   • Metastatic renal cell carcinoma  on q 2 wks chemo 02/15/2016   • Anxiety 05/16/2016   • Malignant neoplasm of lung (HCC) 05/16/2016   • Neuropathy 02/02/2017   • Perennial allergic rhinitis 02/02/2017   • Vitamin B12 deficiency 03/16/2017   • 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   • Pharyngitis 12/16/2017   • Bronchitis 05/20/2019   • Other constipation 06/01/2020   • Hx of hiatal hernia 11/10/2020   • History of lung cancer 11/10/2020   • COVID-19 virus detected 11/11/2020   • Dyspnea 11/11/2020   • Generalized weakness 11/11/2020   • Acute respiratory failure with hypoxia (HCC) 11/23/2020   • Gastroesophageal reflux disease 11/23/2020   • Hearing disorder 11/23/2020   • Malignant neoplasm of prostate (HCC) 11/23/2020   • Sleep apnea 11/23/2020   • Anemia 11/23/2020   • Malignant neoplasm metastatic to lung (HCC) 11/23/2020   •  Secondary bacterial pneumonia 11/23/2020   • Pneumonia due to COVID-19 virus 11/23/2020   • Hilar mass 11/23/2020   • On antineoplastic chemotherapy q 2wks    • Paroxysmal atrial fibrillation (HCC)    • History of pulmonary embolism    • Nausea 08/04/2021   • Gastritis 01/15/2022   • Muscle spasm 01/15/2022     Resolved Ambulatory Problems     Diagnosis Date Noted   • Pulmonary embolism (HCC) 03/24/2014   • Sepsis, unspecified organism (HCC)      Past Medical History:   Diagnosis Date   • Allergic    • Atrial fibrillation (HCC)    • Cataract    • Chemotherapeutic agent or infusion extravasation    • COPD (chronic obstructive pulmonary disease) (HCC) 03/24/2014   • Emphysema of lung (HCC)    • History of Doppler ultrasound 12/28/2011   • History of EKG 02/10/2012   • Hypertension    • Injury of back    • Renal cell cancer (HCC)    • Shortness of breath          PAST SURGICAL HISTORY  Past Surgical History:   Procedure Laterality Date   • APPENDECTOMY     • CATARACT EXTRACTION     • CHOLECYSTECTOMY     • SPLENECTOMY     • TONSILLECTOMY     • VEIN SURGERY           FAMILY HISTORY  Family History   Problem Relation Age of Onset   • Aneurysm Mother    • Stroke Mother    • Heart disease Father    • Diabetes Brother         type 2         SOCIAL HISTORY  Social History     Socioeconomic History   • Marital status:    Tobacco Use   • Smoking status: Former Smoker   • Smokeless tobacco: Never Used   Vaping Use   • Vaping Use: Never used   Substance and Sexual Activity   • Alcohol use: Yes     Comment: 2 drinks month   • Drug use: No   • Sexual activity: Defer         ALLERGIES  Nsaids, Orange, Orange oil, and Latex        REVIEW OF SYSTEMS  Review of Systems     Patient denies neck pain, shortness of breath, abdominal pain, back pain, lower extremity pain, lower extremity swelling or focal neuro deficit  All systems reviewed and negative except for those discussed in HPI.     PHYSICAL EXAM    I have reviewed the  triage vital signs and nursing notes.    ED Triage Vitals [03/10/22 2245]   Temp Heart Rate Resp BP SpO2   97.7 °F (36.5 °C) 64 20 164/64 97 %      Temp src Heart Rate Source Patient Position BP Location FiO2 (%)   Tympanic Monitor -- -- --       GENERAL: 81-year-old well developed, well nourished in mild distress  HENT: Occipital tenderness to palpation, neck supple, trachea midline: No C-spine tenderness  EYES: no scleral icterus, PERRL, normal conjunctivae  CV: regular rhythm, regular rate, no murmur  RESPIRATORY: Tenderness to left lower anterior and lateral ribs with splinting and decreased air movement on the left base  ABDOMEN: soft, mild left upper quadrant tenderness with no guarding or rebound, nondistended, bowel sounds present  MUSCULOSKELETAL: no gross deformity, no pedal edema, no calf tenderness: The patient has an abrasion to the palm of his right hand.  NEURO: alert,  sensory and motor function of extremities intact, speech clear, mental status normal  SKIN: warm, dry, no rash  PSYCH:  Appropriate mood and affect      PPE  Pt does not present with symptoms for COVID19; however, I was wearing a N95 mask and goggles throughout all patient interaction.    Vital signs and nursing notes reviewed.      LAB RESULTS  Recent Results (from the past 24 hour(s))   Comprehensive Metabolic Panel    Collection Time: 03/10/22 11:12 PM    Specimen: Blood   Result Value Ref Range    Glucose 103 (H) 65 - 99 mg/dL    BUN 18 8 - 23 mg/dL    Creatinine 1.20 0.76 - 1.27 mg/dL    Sodium 140 136 - 145 mmol/L    Potassium 3.9 3.5 - 5.2 mmol/L    Chloride 107 98 - 107 mmol/L    CO2 21.1 (L) 22.0 - 29.0 mmol/L    Calcium 9.6 8.6 - 10.5 mg/dL    Total Protein 7.0 6.0 - 8.5 g/dL    Albumin 4.20 3.50 - 5.20 g/dL    ALT (SGPT) 17 1 - 41 U/L    AST (SGOT) 19 1 - 40 U/L    Alkaline Phosphatase 62 39 - 117 U/L    Total Bilirubin 1.2 0.0 - 1.2 mg/dL    Globulin 2.8 gm/dL    A/G Ratio 1.5 g/dL    BUN/Creatinine Ratio 15.0 7.0 - 25.0     Anion Gap 11.9 5.0 - 15.0 mmol/L    eGFR 60.4 >60.0 mL/min/1.73   Magnesium    Collection Time: 03/10/22 11:12 PM    Specimen: Blood   Result Value Ref Range    Magnesium 2.2 1.6 - 2.4 mg/dL   Troponin    Collection Time: 03/10/22 11:12 PM    Specimen: Blood   Result Value Ref Range    Troponin T <0.010 0.000 - 0.030 ng/mL   Green Top (Gel)    Collection Time: 03/10/22 11:12 PM   Result Value Ref Range    Extra Tube Hold for add-ons.    Lavender Top    Collection Time: 03/10/22 11:12 PM   Result Value Ref Range    Extra Tube hold for add-on    Gold Top - SST    Collection Time: 03/10/22 11:12 PM   Result Value Ref Range    Extra Tube Hold for add-ons.    Light Blue Top    Collection Time: 03/10/22 11:12 PM   Result Value Ref Range    Extra Tube hold for add-on    CBC Auto Differential    Collection Time: 03/10/22 11:12 PM    Specimen: Blood   Result Value Ref Range    WBC 17.71 (H) 3.40 - 10.80 10*3/mm3    RBC 2.77 (L) 4.14 - 5.80 10*6/mm3    Hemoglobin 10.1 (L) 13.0 - 17.7 g/dL    Hematocrit 30.8 (L) 37.5 - 51.0 %    .2 (H) 79.0 - 97.0 fL    MCH 36.5 (H) 26.6 - 33.0 pg    MCHC 32.8 31.5 - 35.7 g/dL    RDW 10.4 (L) 12.3 - 15.4 %    RDW-SD 41.5 37.0 - 54.0 fl    MPV 9.8 6.0 - 12.0 fL    Platelets 388 140 - 450 10*3/mm3    Neutrophil % 61.8 42.7 - 76.0 %    Lymphocyte % 25.3 19.6 - 45.3 %    Monocyte % 9.7 5.0 - 12.0 %    Eosinophil % 1.7 0.3 - 6.2 %    Basophil % 0.7 0.0 - 1.5 %    Immature Grans % 0.8 (H) 0.0 - 0.5 %    Neutrophils, Absolute 10.94 (H) 1.70 - 7.00 10*3/mm3    Lymphocytes, Absolute 4.48 (H) 0.70 - 3.10 10*3/mm3    Monocytes, Absolute 1.71 (H) 0.10 - 0.90 10*3/mm3    Eosinophils, Absolute 0.30 0.00 - 0.40 10*3/mm3    Basophils, Absolute 0.13 0.00 - 0.20 10*3/mm3    Immature Grans, Absolute 0.15 (H) 0.00 - 0.05 10*3/mm3    nRBC 0.3 (H) 0.0 - 0.2 /100 WBC   Scan Slide    Collection Time: 03/10/22 11:12 PM    Specimen: Blood   Result Value Ref Range    Anisocytosis Slight/1+ None Seen    Macrocytes  Mod/2+ None Seen    Polychromasia Slight/1+ None Seen    WBC Morphology Normal Normal    Platelet Morphology Normal Normal   Protime-INR    Collection Time: 03/10/22 11:12 PM    Specimen: Blood   Result Value Ref Range    Protime 17.3 (H) 11.7 - 14.2 Seconds    INR 1.42 (H) 0.90 - 1.10   ECG 12 Lead    Collection Time: 03/10/22 11:59 PM   Result Value Ref Range    QT Interval 409 ms   Urinalysis With Microscopic If Indicated (No Culture) - Urine, Clean Catch    Collection Time: 03/11/22  1:12 AM    Specimen: Urine, Clean Catch   Result Value Ref Range    Color, UA Yellow Yellow, Straw    Appearance, UA Clear Clear    pH, UA <=5.0 5.0 - 8.0    Specific Gravity, UA 1.019 1.005 - 1.030    Glucose, UA Negative Negative    Ketones, UA Negative Negative    Bilirubin, UA Negative Negative    Blood, UA Negative Negative    Protein, UA Negative Negative    Leuk Esterase, UA Negative Negative    Nitrite, UA Negative Negative    Urobilinogen, UA 0.2 E.U./dL 0.2 - 1.0 E.U./dL       Ordered the above labs and independently reviewed the results.        RADIOLOGY  XR Hand 3+ View Right    Result Date: 3/11/2022  3 VIEWS RIGHT HAND  HISTORY: Fall  COMPARISON: None available.  FINDINGS: No acute fracture or subluxation of the right hand is seen. There are degenerative changes seen within the right hand, most in keeping with osteoarthritis.      No acute fracture or subluxation identified.  This report was finalized on 3/11/2022 12:18 AM by Dr. Alysia Tavares M.D.      CT Head Without Contrast    Result Date: 3/11/2022  CT HEAD WITHOUT CONTRAST  HISTORY: Fall with left-sided pain  COMPARISON: 11/27/2020  TECHNIQUE: Axial CT imaging was obtained through the brain. IV contrast was administered.  FINDINGS: No acute intracranial hemorrhage is seen. There is diffuse atrophy. There is periventricular and deep white matter microangiopathic change. There is no midline shift or mass effect. Old lacunar infarcts are noted within the left  external capsule. No calvarial fracture is seen. Paranasal sinuses appear clear. There is partial opacification of the left mastoid air cells.      No acute intracranial findings.  Radiation dose reduction techniques were utilized, including automated exposure control and exposure modulation based on body size.  This report was finalized on 3/11/2022 1:01 AM by Dr. Alysia Tavares M.D.      CT Chest Without Contrast Diagnostic, CT Abdomen Pelvis Without Contrast    Result Date: 3/11/2022  CT OF THE CHEST WITHOUT CONTRAST; CT OF THE ABDOMEN AND PELVIS WITHOUT CONTRAST  HISTORY: Left-sided body pain after a fall  COMPARISON: 11/24/2020  TECHNIQUE: Axial CT imaging was obtained from the thoracic inlet through the symphysis pubis. No IV contrast was administered.  FINDINGS: CT CHEST: There is mild deformity of the left fifth through seventh ribs anteriorly. Nondisplaced fractures are not excluded. There is mild deformity of the superior endplate of T12. However, this appears to be a chronic finding. Thyroid gland is atrophic. Trachea and esophagus appear within normal limits. There are coronary artery calcifications. There is dilatation of the proximal descending thoracic aorta, measuring up to 3.3 cm. Distal descending thoracic aorta tapers to normal caliber. There is some biapical scarring. No pneumothorax or pleural effusion is seen. There is chronic scarring identified at the left lung base. The patient does have a left hilar mass. This measures at least 3.7 x 0.5 cm. This is actually smaller than on prior study, when it measured 4.7 x 4.2 cm. Patient is also noted to have paraspinous masses. These have increased in size. For example, one to the left of T9 measures up to 4.6 x 2.6 cm. Previously, it measured 4.7 x 2.2 cm. Right paraspinous mass at T9 measures 2.6 x 1.9 cm. Again, this is larger than on prior study, when it measured 2.0 x 1.1 cm. Right internal jugular vein Mediport terminates within the superior  vena cava.  CT ABDOMEN AND PELVIS: The patient is status post left hip arthroplasty. There is a compression fracture which is noted at L4. This is age indeterminate, but may actually be chronic. MRI or bone scan would be more sensitive for assessment of acuity. There is mild lumbar scoliosis, with convexity to the left. No suspicious hepatic lesions are seen. There is nodularity to the surface of the liver, suggesting cirrhosis. This is a stable finding. There is a left adrenal nodule, which was not clearly identified on prior imaging. It measures up to 1.6 x 1.5 cm. Left kidney is surgically absent, as is the spleen. The pancreas is normal. Patient is also noted to have a right adrenal nodule, which has increased in size. It measures up to 1.5 cm. Previously, it measured 6 mm. Gallbladder is surgically absent. No hydronephrosis is seen on the right. Stomach and small bowel appear normal. Both sets of structures within the pelvis is degraded by streak artifact from the arthroplasty. Dystrophic calcifications are seen within the prostate gland. Urinary bladder is contracted. It appears somewhat thick-walled, with perivesical stranding. Correlation with urinalysis and urine cultures is recommended. There is diverticulosis. Appendix is surgically absent.       1. Mild deformity of the left fifth through seventh ribs anteriorly. Nondisplaced fractures are not excluded. 2. Age-indeterminate compression fracture of L4, although favored to be chronic. 3. A previously identified left hilar mass has decreased in size. However, bilateral paraspinous masses have increased in size, and there are new or enlarging bilateral adrenal nodules.  Radiation dose reduction techniques were utilized, including automated exposure control and exposure modulation based on body size.  This report was finalized on 3/11/2022 1:35 AM by Dr. Alysia Tavares M.D.      CT Cervical Spine Without Contrast    Result Date: 3/11/2022  CT OF THE  CERVICAL SPINE  HISTORY: Fall with neck pain  COMPARISON: 10/26/2015  TECHNIQUE: Axial CT imaging was obtained through the cervical spine. Coronal and sagittal reformatted images were obtained.  FINDINGS: No acute fracture or subluxation is identified. Cervical vertebral body alignment appears within normal limits. Intervertebral disc space narrowing is noted most significantly at C6-C7 and C5-C6. There is no prevertebral soft tissue swelling.  C2-C3: There is no canal stenosis. There is mild bilateral neural foraminal narrowing. C3-C4: There is no significant canal stenosis. There is no significant neural foraminal narrowing. C4-C5: There is mild canal narrowing. There is mild bilateral neural foraminal narrowing. C5-C6: There is moderate canal narrowing. There is moderate neural foraminal narrowing on the right and mild narrowing on the left. C6-C7: There is moderate canal stenosis. There is moderate bilateral neural foraminal narrowing. C7-T1: There is no canal stenosis or neural foraminal narrowing.      No acute fracture or subluxation identified.  Radiation dose reduction techniques were utilized, including automated exposure control and exposure modulation based on body size.  This report was finalized on 3/11/2022 1:07 AM by Dr. Alysia Tavares M.D.        I ordered the above noted radiological studies. Independently reviewed by me and discussed with radiologist.  See dictation above for official radiology interpretation.      PROCEDURES    Procedures        MEDICATIONS GIVEN IN ER    Medications   sodium chloride 0.9 % flush 10 mL (has no administration in time range)   sodium chloride 0.9 % flush 10 mL (has no administration in time range)   lidocaine (LIDODERM) 5 % 1 patch (1 patch Transdermal Medication Applied 3/11/22 0229)   ondansetron (ZOFRAN) injection 4 mg (4 mg Intravenous Given 3/11/22 0054)   morphine injection 4 mg (4 mg Intravenous Given 3/11/22 0054)   morphine injection 4 mg (4 mg  Intravenous Given 3/11/22 0225)         PROGRESS, DATA ANALYSIS, CONSULTS, AND MEDICAL DECISION MAKING    All labs have been independently reviewed by me.  All radiology studies have been reviewed by me and discussed with radiologist dictating report.   EKG's independently reviewed by me.  Discussion below represents my analysis of pertinent findings related to patient's condition, differential diagnosis, treatment plan and final disposition.      ED Course as of 03/11/22 0244   u Mar 10, 2022   2359 I will obtain a CT head, C-spine, chest and abdomen pelvis along with right hand x-rays for his trauma.  I will also place the patient on the monitor and obtain labs and EKG for further evaluation.  I will treat him with morphine and Zofran for his pain. [GP]   Fri Mar 11, 2022   0032 EKG    EKG time: 2359  Rhythm/Rate: Normal sinus rate 61  No Acute Ischemia  Non-Specific ST-T changes    Unchanged compared to prior on 2020    Interpreted Contemporaneously by me.  Independently viewed by me     [GP]   0033 Patient's hand x-ray is negative. [GP]   0147 CT head and C-spine are negative acute.  The patient CT chest shows left fifth through seventh rib fractures with a left hilar mass and paraspinous masses.  The abdominal CT scan shows no internal bleeding but bilateral adrenal nodules.  Patient's white count is elevated at 17 but his urinalysis and CT scans do not show any evidence of infection.  I believe the white count may be elevated secondary to his trauma. [GP]   0155 Upon repeat evaluation I advised the patient of his rib fractures.  He states he is still in pain and thus I will give him another dose of morphine and place a Lidoderm patch.  Patient states he does have cancer in his lung and is followed at the [facility].  He states it is metastatic from his kidney which has been removed years ago. [GP]   0206 I discussed the case with Destiny Kessler from [facility] and will admit the patient to a  telemetry bed under Dr. Alvarez service. [GP]      ED Course User Index  [GP] Andrea Basurto MD           The differential diagnosis includes but is not limited to intracranial hemorrhage, skull fracture, C-spine fracture, concussion, closed head injury, rib fracture, pneumothorax, spleen injury, internal bleeding, syncope or electrolyte abnormality        AS OF 02:44 EST VITALS:    BP - 126/71  HR - 60  TEMP - 97.7 °F (36.5 °C) (Tympanic)  02 SATS - 94%        DIAGNOSIS  Final diagnoses:   Fall, initial encounter   Closed fracture of multiple ribs of left side, initial encounter   History of lung cancer   Closed head injury, initial encounter   On continuous oral anticoagulation   Intractable pain         DISPOSITION  ADMISSION    Discussed treatment plan and reason for admission with pt/family and admitting physician.  Pt/family voiced understanding of the plan for admission for further testing/treatment as needed.        EMR Dragon/Transcription disclaimer:   Much of this encounter note is an electronic transcription/translation of spoken language to printed text.        Andrea Basurto MD  03/11/22 0245

## 2022-03-11 NOTE — ED NOTES
".  Nursing report ED to floor  [facility]  81 y.o.  male    HPI :   Chief Complaint   Patient presents with   • Fall       Admitting doctor:   Cristopher Alvarez MD    Admitting diagnosis:   The primary encounter diagnosis was Fall, initial encounter. Diagnoses of Closed fracture of multiple ribs of left side, initial encounter, History of lung cancer, Closed head injury, initial encounter, On continuous oral anticoagulation, and Intractable pain were also pertinent to this visit.    Code status:   Current Code Status     Date Active Code Status Order ID Comments User Context       Prior    Advance Care Planning Activity          Allergies:   Nsaids, Orange, Orange oil, and Latex    Intake and Output  No intake or output data in the 24 hours ending 03/11/22 0237    Weight:       03/11/22  0212   Weight: 104 kg (230 lb)       Most recent vitals:   Vitals:    03/11/22 0056 03/11/22 0106 03/11/22 0109 03/11/22 0212   BP:  126/71     Pulse:       Resp:       Temp:       TempSrc:       SpO2: 96%  94%    Weight:    104 kg (230 lb)   Height:    185.4 cm (73\")       Active LDAs/IV Access:   Lines, Drains & Airways     Active LDAs     Name Placement date Placement time Site Days    Peripheral IV 03/10/22 2246 Right Antecubital 03/10/22  2246  Antecubital  less than 1                Labs (abnormal labs have a star):   Labs Reviewed   COMPREHENSIVE METABOLIC PANEL - Abnormal; Notable for the following components:       Result Value    Glucose 103 (*)     CO2 21.1 (*)     All other components within normal limits    Narrative:     GFR Normal >60  Chronic Kidney Disease <60  Kidney Failure <15     CBC WITH AUTO DIFFERENTIAL - Abnormal; Notable for the following components:    WBC 17.71 (*)     RBC 2.77 (*)     Hemoglobin 10.1 (*)     Hematocrit 30.8 (*)     .2 (*)     MCH 36.5 (*)     RDW 10.4 (*)     Immature Grans % 0.8 (*)     Neutrophils, Absolute 10.94 (*)     Lymphocytes, Absolute 4.48 (*)     Monocytes, " Absolute 1.71 (*)     Immature Grans, Absolute 0.15 (*)     nRBC 0.3 (*)     All other components within normal limits   PROTIME-INR - Abnormal; Notable for the following components:    Protime 17.3 (*)     INR 1.42 (*)     All other components within normal limits   MAGNESIUM - Normal   TROPONIN (IN-HOUSE) - Normal    Narrative:     Troponin T Reference Range:  <= 0.03 ng/mL-   Negative for AMI  >0.03 ng/mL-     Abnormal for myocardial necrosis.  Clinicians would have to utilize clinical acumen, EKG, Troponin and serial changes to determine if it is an Acute Myocardial Infarction or myocardial injury due to an underlying chronic condition.       Results may be falsely decreased if patient taking Biotin.     URINALYSIS W/ MICROSCOPIC IF INDICATED (NO CULTURE) - Normal    Narrative:     Urine microscopic not indicated.   COVID PRE-OP / PRE-PROCEDURE SCREENING ORDER (NO ISOLATION)    Narrative:     The following orders were created for panel order COVID PRE-OP / PRE-PROCEDURE SCREENING ORDER (NO ISOLATION) - Swab, Nasopharynx.  Procedure                               Abnormality         Status                     ---------                               -----------         ------                     COVID-19,[facility] IN-HOUSE...[[id number]]                      In process                   Please view results for these tests on the individual orders.   COVID-19,[facility] IN-HOUSE CEPHEID/DARRELL, NP SWAB IN TRANSPORT MEDIA 8-12 HR TAT   RAINBOW DRAW    Narrative:     The following orders were created for panel order [place] Draw.  Procedure                               Abnormality         Status                     ---------                               -----------         ------                     Green Top (Gel)[[id number]]                                  Final result               Lavender Top[[id number]]                                     Final result               Gold Top - SST[[id number]]                                    Final result               Light Blue Top[[id number]]                                   Final result                 Please view results for these tests on the individual orders.   SCAN SLIDE   POCT GLUCOSE FINGERSTICK   CBC AND DIFFERENTIAL    Narrative:     The following orders were created for panel order CBC & Differential.  Procedure                               Abnormality         Status                     ---------                               -----------         ------                     CBC Auto Differential[[id number]]        Abnormal            Final result               Scan Slide[[id number]]                                       Final result                 Please view results for these tests on the individual orders.   GREEN TOP   LAVENDER TOP   GOLD TOP - SST   LIGHT BLUE TOP       EKG:   ECG 12 Lead   Preliminary Result   HEART RATE= 61  bpm   RR Interval= 988  ms   RI Interval= 186  ms   P Horizontal Axis= -1  deg   P Front Axis= 69  deg   QRSD Interval= 107  ms   QT Interval= 409  ms   QRS Axis= -2  deg   T Wave Axis= 55  deg   - NORMAL ECG -   Sinus rhythm   Electronically Signed By:    Date and Time of Study: 2022-03-10 23:59:15          Meds given in ED:   Medications   sodium chloride 0.9 % flush 10 mL (has no administration in time range)   sodium chloride 0.9 % flush 10 mL (has no administration in time range)   lidocaine (LIDODERM) 5 % 1 patch (1 patch Transdermal Medication Applied 3/11/22 0229)   ondansetron (ZOFRAN) injection 4 mg (4 mg Intravenous Given 3/11/22 0054)   morphine injection 4 mg (4 mg Intravenous Given 3/11/22 0054)   morphine injection 4 mg (4 mg Intravenous Given 3/11/22 0225)       Imaging results:  CT Abdomen Pelvis Without Contrast    Result Date: 3/11/2022   1. Mild deformity of the left fifth through seventh ribs anteriorly. Nondisplaced fractures are not excluded. 2. Age-indeterminate compression fracture of L4, although favored to be chronic. 3. A previously  identified left hilar mass has decreased in size. However, bilateral paraspinous masses have increased in size, and there are new or enlarging bilateral adrenal nodules.  Radiation dose reduction techniques were utilized, including automated exposure control and exposure modulation based on body size.  This report was finalized on 3/11/2022 1:35 AM by Dr. Alysia Tavares M.D.      XR Hand 3+ View Right    Result Date: 3/11/2022  No acute fracture or subluxation identified.  This report was finalized on 3/11/2022 12:18 AM by Dr. Alysia Tavares M.D.      CT Head Without Contrast    Result Date: 3/11/2022  No acute intracranial findings.  Radiation dose reduction techniques were utilized, including automated exposure control and exposure modulation based on body size.  This report was finalized on 3/11/2022 1:01 AM by Dr. Alysia Tavares M.D.      CT Chest Without Contrast Diagnostic    Result Date: 3/11/2022   1. Mild deformity of the left fifth through seventh ribs anteriorly. Nondisplaced fractures are not excluded. 2. Age-indeterminate compression fracture of L4, although favored to be chronic. 3. A previously identified left hilar mass has decreased in size. However, bilateral paraspinous masses have increased in size, and there are new or enlarging bilateral adrenal nodules.  Radiation dose reduction techniques were utilized, including automated exposure control and exposure modulation based on body size.  This report was finalized on 3/11/2022 1:35 AM by Dr. Alysia Tavares M.D.      CT Cervical Spine Without Contrast    Result Date: 3/11/2022  No acute fracture or subluxation identified.  Radiation dose reduction techniques were utilized, including automated exposure control and exposure modulation based on body size.  This report was finalized on 3/11/2022 1:07 AM by Dr. Alysia Tavares M.D.        Ambulatory status:   - with assistance    Social issues:   Social History     Socioeconomic History    • Marital status:    Tobacco Use   • Smoking status: Former Smoker   • Smokeless tobacco: Never Used   Vaping Use   • Vaping Use: Never used   Substance and Sexual Activity   • Alcohol use: Yes     Comment: 2 drinks month   • Drug use: No   • Sexual activity: Defer       NIH Stroke Scale:        Nursing report ED to floor:

## 2022-03-11 NOTE — ED NOTES
Pt arrived by EMS from [facility], pt denies LOC, c/o head pain, pt side pain, pt is on blood thinners. Pt EKG prior to arrival 22:26.     Patient was placed in face mask during first look triage.  Patient was wearing a face mask throughout encounter.  I wore personal protective equipment throughout the encounter.  Hand hygiene was performed before and after patient encounter.

## 2022-03-12 ENCOUNTER — APPOINTMENT (OUTPATIENT)
Dept: GENERAL RADIOLOGY | Facility: HOSPITAL | Age: 82
End: 2022-03-12
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
## 2022-03-12 PROCEDURE — G0378 HOSPITAL OBSERVATION PER HR: HCPCS

## 2022-03-12 PROCEDURE — 97530 THERAPEUTIC ACTIVITIES: CPT

## 2022-03-12 PROCEDURE — 94799 UNLISTED PULMONARY SVC/PX: CPT

## 2022-03-12 PROCEDURE — 96376 TX/PRO/DX INJ SAME DRUG ADON: CPT

## 2022-03-12 PROCEDURE — 94761 N-INVAS EAR/PLS OXIMETRY MLT: CPT

## 2022-03-12 PROCEDURE — 99213 OFFICE O/P EST LOW 20 MIN: CPT | Performed by: NURSE PRACTITIONER

## 2022-03-12 PROCEDURE — 25010000002 MORPHINE PER 10 MG: Performed by: HOSPITALIST

## 2022-03-12 PROCEDURE — 25010000002 ONDANSETRON PER 1 MG: Performed by: HOSPITALIST

## 2022-03-12 PROCEDURE — 71045 X-RAY EXAM CHEST 1 VIEW: CPT

## 2022-03-12 PROCEDURE — 97116 GAIT TRAINING THERAPY: CPT

## 2022-03-12 PROCEDURE — 96361 HYDRATE IV INFUSION ADD-ON: CPT

## 2022-03-12 PROCEDURE — 97162 PT EVAL MOD COMPLEX 30 MIN: CPT

## 2022-03-12 RX ADMIN — LEVOTHYROXINE SODIUM 112 MCG: 0.11 TABLET ORAL at 08:23

## 2022-03-12 RX ADMIN — GABAPENTIN 400 MG: 400 CAPSULE ORAL at 12:22

## 2022-03-12 RX ADMIN — AMLODIPINE BESYLATE 5 MG: 5 TABLET ORAL at 08:23

## 2022-03-12 RX ADMIN — ACETAMINOPHEN 1000 MG: 500 TABLET ORAL at 17:58

## 2022-03-12 RX ADMIN — OXCARBAZEPINE 300 MG: 300 TABLET, FILM COATED ORAL at 20:29

## 2022-03-12 RX ADMIN — OXYCODONE HYDROCHLORIDE 5 MG: 5 TABLET ORAL at 17:02

## 2022-03-12 RX ADMIN — ACETAMINOPHEN 1000 MG: 500 TABLET ORAL at 10:14

## 2022-03-12 RX ADMIN — FAMOTIDINE 20 MG: 20 TABLET ORAL at 08:22

## 2022-03-12 RX ADMIN — GABAPENTIN 800 MG: 400 CAPSULE ORAL at 17:02

## 2022-03-12 RX ADMIN — SERTRALINE 25 MG: 25 TABLET, FILM COATED ORAL at 08:23

## 2022-03-12 RX ADMIN — ONDANSETRON 4 MG: 2 INJECTION INTRAMUSCULAR; INTRAVENOUS at 17:57

## 2022-03-12 RX ADMIN — FAMOTIDINE 20 MG: 20 TABLET ORAL at 20:29

## 2022-03-12 RX ADMIN — RIVAROXABAN 20 MG: 20 TABLET, FILM COATED ORAL at 17:57

## 2022-03-12 RX ADMIN — FOLIC ACID 1 MG: 1 TABLET ORAL at 20:29

## 2022-03-12 RX ADMIN — Medication 10 ML: at 20:29

## 2022-03-12 RX ADMIN — FOLIC ACID 1 MG: 1 TABLET ORAL at 08:23

## 2022-03-12 RX ADMIN — SODIUM CHLORIDE 100 ML/HR: 9 INJECTION, SOLUTION INTRAVENOUS at 04:29

## 2022-03-12 RX ADMIN — GABAPENTIN 400 MG: 400 CAPSULE ORAL at 05:17

## 2022-03-12 RX ADMIN — FOLIC ACID 1 MG: 1 TABLET ORAL at 16:57

## 2022-03-12 RX ADMIN — MORPHINE SULFATE 4 MG: 2 INJECTION, SOLUTION INTRAMUSCULAR; INTRAVENOUS at 00:45

## 2022-03-12 RX ADMIN — OXYCODONE HYDROCHLORIDE 5 MG: 5 TABLET ORAL at 10:14

## 2022-03-12 RX ADMIN — TIOTROPIUM BROMIDE INHALATION SPRAY 2 PUFF: 3.12 SPRAY, METERED RESPIRATORY (INHALATION) at 10:20

## 2022-03-12 RX ADMIN — OXYCODONE HYDROCHLORIDE 5 MG: 5 TABLET ORAL at 21:13

## 2022-03-12 RX ADMIN — OXYCODONE HYDROCHLORIDE 5 MG: 5 TABLET ORAL at 05:19

## 2022-03-12 RX ADMIN — LIDOCAINE 2 PATCH: 50 PATCH TOPICAL at 04:29

## 2022-03-12 NOTE — PROGRESS NOTES
"    DAILY PROGRESS NOTE  [facility]    Patient Identification:  Name: Mg [facility] Sr.  Age: 81 y.o.  Sex: male  :  1940  MRN: [id number]         Primary Care Physician: Dillon Walton MD    Subjective:  Interval History: Pain control much improved with medication.  He states that he notices some increased want to sleep and sedation and I counseled that is a side effect.  He is able to deep breathe much easier today.  Denies any nausea vomiting or confusion.  Has some dizziness with exertion    Objective: Clinically looks more stable.  Able to breathe deep breathe without withdrawing in pain.  No family at bedside.  Patient is pleasant conversational and nontoxic    Scheduled Meds:acetaminophen, 1,000 mg, Oral, Q8H  amLODIPine, 5 mg, Oral, Daily  famotidine, 20 mg, Oral, BID  folic acid, 1 mg, Oral, TID  gabapentin, 400 mg, Oral, QAM  gabapentin, 400 mg, Oral, Daily With Lunch  gabapentin, 800 mg, Oral, Q PM  levothyroxine, 112 mcg, Oral, Daily  lidocaine, 2 patch, Transdermal, Q24H  OXcarbazepine, 300 mg, Oral, Nightly  rivaroxaban, 20 mg, Oral, Daily With Dinner  sertraline, 25 mg, Oral, Daily  sodium chloride, 10 mL, Intravenous, Q12H  tiotropium bromide monohydrate, 2 puff, Inhalation, Daily - RT      Continuous Infusions:sodium chloride, 100 mL/hr, Last Rate: 100 mL/hr (22 0429)        Vital signs in last 24 hours:  Temp:  [97.5 °F (36.4 °C)-98.6 °F (37 °C)] 98 °F (36.7 °C)  Heart Rate:  [62-70] 70  Resp:  [18-21] 18  BP: (112-134)/(46-68) 127/68    Intake/Output:    Intake/Output Summary (Last 24 hours) at 3/12/2022 1042  Last data filed at 3/12/2022 0945  Gross per 24 hour   Intake 270 ml   Output 640 ml   Net -370 ml       Exam:  /68 (BP Location: Right arm, Patient Position: Lying)   Pulse 70   Temp 98 °F (36.7 °C) (Oral)   Resp 18   Ht 185.4 cm (73\")   Wt 105 kg (230 lb 14.4 oz)   SpO2 92%   BMI 30.46 kg/m²     General Appearance:    Alert, cooperative, " AAOx3                          Head:    Normocephalic, without obvious abnormality, atraumatic                           Eyes:    Conjunctivae/corneas clear, EOM's intact, both eyes                         Throat:   Oral mucosa pink and moist                           Neck:   Supple, symmetrical, trachea midline, no JVD                         Lungs:    Clear to auscultation bilaterally, respirations unlabored more so today with no withdrawing secondary to pain                 Chest Wall:    Chest wall pain still present but improving                          Heart:    Regular rate and rhythm, S1 and S2 normal                  Abdomen:     Soft, nontender, bowel sounds active                 Extremities: Moving all, no cyanosis or edema                        Pulses:   Pulses palpable in all extremities                            Skin:   Skin is warm and dry                  Neurologic:   CNII-XII intact     Data Review:  Labs in chart were reviewed.    Assessment:  Active Hospital Problems    Diagnosis  POA   • **Closed fracture of multiple ribs of left side [S22.42XA]  Unknown   • Fall [W19.XXXA]  Yes   • Chest wall pain [R07.89]  Unknown   • History of pulmonary embolism [Z86.711]  Yes   • Paroxysmal atrial fibrillation (HCC) [I48.0]  Yes   • Malignant neoplasm of lung (HCC) [C34.90]  Yes   • Metastatic renal cell carcinoma  on q 2 wks chemo [C64.9]  Yes   • Essential hypertension [I10]  Yes   • Hemochromatosis [E83.119]  Yes      Resolved Hospital Problems   No resolved problems to display.       Plan:    Appreciate thoracic surgical input.  No plans for surgical correction    He is now on 2 L of oxygen    Agree with I-S/flutter and continued pain control but he does have some mild intermittent sedation from pain meds today    PT evaluation pending    Leukocytosis secondary to cancer -has infusion planned for Monday at [facility] for discharge needs but I anticipate discharge tomorrow    Anticoagulated on  Evangelina Carney MD  3/12/2022  10:42 EST

## 2022-03-12 NOTE — NURSING NOTE
No big changes today, but patient feels very dizzy when he stands. PT does not feel he's ready to DC today because of this. Patient believes he has BPPV, but no vest therapist this weekend. Likely home tomorrow. MARY. AOx4. [facility] [place]

## 2022-03-12 NOTE — PROGRESS NOTES
"    Chief Complaint: Left rib fractures      Subjective:  Symptoms:  Stable.  He reports weakness.  No shortness of breath, cough, chest pain or chest pressure.    Diet:  Adequate intake.  No nausea or vomiting.    Activity level: Impaired due to weakness.    Pain:  He complains of pain that is mild.  He reports pain is improving.  Pain is partially controlled.    Up to chair.  Continues to have some persistent weakness.  Asking about vestibular therapy to help improve his balance.    Vital Signs:  Temp:  [97.5 °F (36.4 °C)-98.6 °F (37 °C)] 98 °F (36.7 °C)  Heart Rate:  [62-70] 70  Resp:  [18-21] 18  BP: (112-134)/(46-68) 127/68    Intake & Output (last day)       03/11 0701  03/12 0700 03/12 0701  03/13 0700    P.O. 390 120    Total Intake(mL/kg) 390 (3.7) 120 (1.1)    Urine (mL/kg/hr) 1040 (0.4)     Total Output 1040     Net -650 +120          Urine Unmeasured Occurrence 1 x           Objective:  General Appearance:  Comfortable, ill-appearing and in no acute distress.    Vital signs: (most recent): Blood pressure 127/68, pulse 70, temperature 98 °F (36.7 °C), temperature source Oral, resp. rate 18, height 185.4 cm (73\"), weight 105 kg (230 lb 14.4 oz), SpO2 92 %.    HEENT: (Nasal cannula)    Lungs:  Normal effort.  He is not in respiratory distress.  There are decreased breath sounds.    Heart: Normal rate.    Chest: Chest wall tenderness (Left lateral chest) present.    Extremities: There is no dependent edema.    Neurological: Patient is alert.    Pupils:  Pupils are equal, round, and reactive to light.    Skin:  Warm and dry.                Results Review:     I reviewed the patient's new clinical results.  I reviewed the patient's new imaging results and agree with the interpretation.  I reviewed the patient's other test results and agree with the interpretation  Discussed with Patient, RN and Dr. Hess    Imaging Results (Last 24 Hours)     Procedure Component Value Units Date/Time    XR Chest 1 View " [[id number]] Collected: 03/12/22 0632     Updated: 03/12/22 0639    Narrative:      XR CHEST 1 VW-     HISTORY: Male who is 81 years-old, rib fractures, pneumonia     TECHNIQUE: Frontal view of the chest     COMPARISON: 11/23/2020     FINDINGS: Right chest port extends to the ureteral junction region. The  heart size is borderline. Left hilar mass demonstrated on the CT from  03/11/2022 is not well characterized radiographically. No focal  pulmonary consolidation, pleural effusion, or pneumothorax. Previous CT  demonstrated left anterior rib deformities are not well characterized  radiographically.       Impression:      No focal pulmonary consolidation. Borderline heart size.  Follow-up as clinical indications persist.     This report was finalized on 3/12/2022 6:36 AM by Dr. David Hinds M.D.             Lab Results:     Lab Results (last 24 hours)     ** No results found for the last 24 hours. **           Assessment/Plan       Closed fracture of multiple ribs of left side    Hemochromatosis    Essential hypertension    Metastatic renal cell carcinoma  on q 2 wks chemo    Malignant neoplasm of lung (HCC)    Paroxysmal atrial fibrillation (HCC)    History of pulmonary embolism    Fall    Chest wall pain       Assessment & Plan    I have independently reviewed this morning's chest x-ray which is stable in appearance compared to previous imaging.  No acute airspace disease has developed.  No pleural or pericardial effusion.  Left rib fractures not well visualized.    Left rib fractures: Today chest x-ray without acute findings.  Continue conservative management with pain control for adequate pulmonary toilet.  Explained importance of performing incentive spirometry/flutter valve 10 times per hour to prevent pneumonia.  Okay for mobilization per PT/nursing staff from our standpoint.  Appreciate their assistance.  May discharge at anytime once cleared by other providers.    Thank you for letting us participate  in the care of Mg [place].      Joy Mcgill DNP, APRN  [facility]  03/12/22  10:22 EST      I wore protective equipment throughout this patient encounter including a face mask, gloves and protective eyewear.  Hand hygiene was performed before donning protective equipment and after removal when leaving the room.

## 2022-03-12 NOTE — PLAN OF CARE
"Goal Outcome Evaluation:  Plan of Care Reviewed With: patient           Outcome Evaluation: Pt is an 82 yo M admitted following a mechanical fall resulting in multiple L rib fxs. PMHx significant for metastatic renal cell carcinoma. Pt lives with his wife and adult daughter and he reports one of them is home at all times. He lives in a tri-level home, but plans to enter on basement level with no steps and stay in bedroom on first floor. Pt reports use of a cane at BL, but has a rwx. Pt is also very [place]. Presents to PT with impaired strength and endurance as well as increased L rib pain, limiting mobility. Pt completed bed mobility with SBA, STS with CGA, and ambulated 15ft with CGA and rwx. Pt reporting increased dizziness with both sitting EOB and standing, reporting he has a hx of \"inner ear issues.\" Pt mildly unsteady throughout, but no overt LOB and pt agreeable to sitting UIC following session. Pt will continue to benefit from skilled PT to address functional deficits and improve overall mobility. PT recommending D/C home with HHPT if dizziness/mobility improves. Pt may also benefit from referral to OPPT for vestibular rehab.    Patient was intermittently wearing a face mask during this therapy encounter. Therapist used appropriate personal protective equipment including eye protection, mask, and gloves.  Mask used was standard procedure mask. Appropriate PPE was worn during the entire therapy session. Hand hygiene was completed before and after therapy session. Patient is not in enhanced droplet precautions.     "

## 2022-03-12 NOTE — THERAPY EVALUATION
Patient Name: Mg Gerber Sr.  : 1940    MRN: [id number]                              Today's Date: 3/12/2022       Admit Date: 3/10/2022    Visit Dx:     ICD-10-CM ICD-9-CM   1. Fall, initial encounter  W19.XXXA E888.9   2. Closed fracture of multiple ribs of left side, initial encounter  S22.42XA 807.09   3. History of lung cancer  Z85.118 V10.11   4. Closed head injury, initial encounter  S09.90XA 959.01   5. On continuous oral anticoagulation  Z79.01 V58.61   6. Intractable pain  R52 780.96     Patient Active Problem List   Diagnosis   • Anemia, vitamin B12 deficiency   • Arthritis   • Hemochromatosis   • Herpes zoster   • Hip pain   • Essential hypertension   • Cancer (HCC)   • Pulmonary emphysema (HCC)   • Left low back pain   • Vertigo   • Headache around the eyes   • Mild cognitive impairment   • Metastatic renal cell carcinoma  on q 2 wks chemo   • Anxiety   • Malignant neoplasm of lung (HCC)   • Neuropathy   • Perennial allergic rhinitis   • Vitamin B12 deficiency   • Osteoarthritis of lumbar spine   • Pharyngitis   • Bronchitis   • Other constipation   • Hx of hiatal hernia   • History of lung cancer   • COVID-19 virus detected   • Dyspnea   • Generalized weakness   • Acute respiratory failure with hypoxia (HCC)   • Gastroesophageal reflux disease   • Hearing disorder   • Malignant neoplasm of prostate (HCC)   • Sleep apnea   • Anemia   • Malignant neoplasm metastatic to lung (HCC)   • Secondary bacterial pneumonia   • Pneumonia due to COVID-19 virus   • Hilar mass   • On antineoplastic chemotherapy q 2wks   • Paroxysmal atrial fibrillation (HCC)   • History of pulmonary embolism   • Nausea   • Gastritis   • Muscle spasm   • Fall   • Closed fracture of multiple ribs of left side   • Chest wall pain     Past Medical History:   Diagnosis Date   • Allergic    • Anemia, vitamin B12 deficiency 2012   • Anxiety    • Arthritis 2014   • Atrial fibrillation (HCC)    • Cancer (HCC)  04/29/2014    kidney; prostate   • Cataract    • Chemotherapeutic agent or infusion extravasation     q 1-2 wks    • COPD (chronic obstructive pulmonary disease) (HCC) 03/24/2014   • Emphysema of lung (HCC)    • Hemochromatosis 03/24/2014   • Herpes zoster 12/06/2011    left medial thigh   • Hip pain 03/24/2014   • History of Doppler ultrasound 12/28/2011    superficial and deep venous insuffiency   • History of EKG 02/10/2012    Dr. Kathi Lloyd; unchanged from prior EKG   • Hypertension     benign essential   • Injury of back    • Pulmonary embolism (HCC) 03/24/2014   • Renal cell cancer (HCC)    • Shortness of breath    • Sleep apnea      Past Surgical History:   Procedure Laterality Date   • APPENDECTOMY     • CATARACT EXTRACTION     • CHOLECYSTECTOMY     • SPLENECTOMY     • TONSILLECTOMY     • VEIN SURGERY        General Information     [facility] Name 03/12/22 1046          Physical Therapy Time and Intention    Document Type evaluation  -     Mode of Treatment individual therapy;physical therapy  -     Row Name 03/12/22 1046          General Information    Patient Profile Reviewed yes  -     Prior Level of Function independent:;gait;transfer;bed mobility  -     Existing Precautions/Restrictions fall  -     Barriers to Rehab none identified  -     Row Name 03/12/22 1046          Living Environment    People in Home spouse;child(haroldo), adult  -     Row Name 03/12/22 1046          Home Main Entrance    Number of Stairs, Main Entrance none  -[facility] Name 03/12/22 1046          Stairs Within Home, Primary    Number of Stairs, Within Home, Primary none  -[facility] Name 03/12/22 1046          Cognition    Orientation Status (Cognition) oriented x 4  -     Row Name 03/12/22 1046          Safety Issues, Functional Mobility    Impairments Affecting Function (Mobility) balance;endurance/activity tolerance;strength;pain  -           User Key  (r) = Recorded By, (t) = Taken By, (c) = Cosigned By    Initials  Name Provider Type     Madelyn Amador Physical Therapist               Mobility     Row Name 03/12/22 1046          Bed Mobility    Bed Mobility supine-sit;sit-supine  -     Supine-Sit [place] (Bed Mobility) standby assist;verbal cues  -     Sit-Supine [place] (Bed Mobility) not tested  [facility]  -     Assistive Device (Bed Mobility) bed rails;head of bed elevated  -     Row Name 03/12/22 1046          Sit-Stand Transfer    Sit-Stand [place] (Transfers) contact guard;verbal cues;nonverbal cues (demo/gesture)  -     Assistive Device (Sit-Stand Transfers) walker, front-wheeled  -     Row Name 03/12/22 1046          Gait/Stairs (Locomotion)    [place] Level (Gait) contact guard;nonverbal cues (demo/gesture);verbal cues  -     Assistive Device (Gait) walker, front-wheeled  -     Distance in Feet (Gait) 15ft  -     Deviations/Abnormal Patterns (Gait) base of support, wide;gait speed decreased;samara decreased;festinating/shuffling;stride length decreased  -     Comment, (Gait/Stairs) Tolerated gait well, reports of increased dizziness with standing, but mildly improve with standing/ambulation  -           User Key  (r) = Recorded By, (t) = Taken By, (c) = Cosigned By    Initials Name Provider Type     Madelyn Amador Physical Therapist               Obj/Interventions     Row Name 03/12/22 1049          Range of Motion Comprehensive    General Range of Motion no range of motion deficits identified  -[facility] Name 03/12/22 1049          Strength Comprehensive (MMT)    General Manual Muscle Testing (MMT) Assessment lower extremity strength deficits identified  -     Comment, General Manual Muscle Testing (MMT) Assessment Generalized weakness, BLE grossly 4/5  -     Row Name 03/12/22 1049          Motor Skills    Therapeutic Exercise --  10 reps B AP/LAQ/seated marches  -[facility] Name 03/12/22 1049          Balance    Balance Assessment sitting static balance;sitting dynamic  balance;standing static balance;standing dynamic balance  -     Static Sitting Balance modified independence  -     Dynamic Sitting Balance modified independence  -     Position, Sitting Balance unsupported;sitting edge of bed  -     Static Standing Balance contact guard  -     Dynamic Standing Balance contact guard  -     Position/Device Used, Standing Balance walker, front-wheeled  -     Row Name 03/12/22 1049          Sensory Assessment (Somatosensory)    Sensory Assessment (Somatosensory) sensation intact  -           User Key  (r) = Recorded By, (t) = Taken By, (c) = Cosigned By    Initials Name Provider Type     Madelyn Amador Physical Therapist               Goals/Plan     Row Name 03/12/22 1057          Bed Mobility Goal 1 (PT)    Activity/Assistive Device (Bed Mobility Goal 1, PT) bed mobility activities, all  -     [place] Level/Cues Needed (Bed Mobility Goal 1, PT) independent  -     Time Frame (Bed Mobility Goal 1, PT) 1 week  -     Row Name 03/12/22 1057          Transfer Goal 1 (PT)    Activity/Assistive Device (Transfer Goal 1, PT) transfers, all  -     [place] Level/Cues Needed (Transfer Goal 1, PT) modified independence  -     Time Frame (Transfer Goal 1, PT) 1 week  -     Row Name 03/12/22 1057          Gait Training Goal 1 (PT)    Activity/Assistive Device (Gait Training Goal 1, PT) gait (walking locomotion)  -     [place] Level (Gait Training Goal 1, PT) supervision required  -     Distance (Gait Training Goal 1, PT) 150ft  -     Time Frame (Gait Training Goal 1, PT) 1 week  -           User Key  (r) = Recorded By, (t) = Taken By, (c) = Cosigned By    Initials Name Provider Type     Madelyn Amador Physical Therapist               Clinical Impression     Row Name 03/12/22 1050          Pain    Pretreatment Pain Rating 4/10  -     Posttreatment Pain Rating 5/10  -     Pain Location - Side/Orientation Left  -     Pain Location - flank  -  "    Pain Intervention(s) Repositioned;Ambulation/increased activity  -     Row Name 03/12/22 1050          Plan of Care Review    Plan of Care Reviewed With patient  -     Outcome Evaluation Pt is an 80 yo M admitted following a mechanical fall resulting in multiple L rib fxs. PMHx significant for metastatic renal cell carcinoma. Pt lives with his wife and adult daughter and he reports one of them is home at all times. He lives in a tri-level home, but plans to enter on basement level with no steps and stay in bedroom on first floor. Pt reports use of a cane at BL, but has a rwx. Pt is also very [place]. Presents to PT with impaired strength and endurance as well as increased L rib pain, limiting mobility. Pt completed bed mobility with SBA, STS with CGA, and ambulated 15ft with CGA and rwx. Pt reporting increased dizziness with both sitting EOB and standing, reporting he has a hx of \"inner ear issues.\" Pt mildly unsteady throughout, but no overt LOB and pt agreeable to sitting UIC following session. Pt will continue to benefit from skilled PT to address functional deficits and improve overall mobility. PT recommending D/C home with HHPT if dizziness/mobility improves. Pt may also benefit from referral to OPPT for vestibular rehab.  -     Row Name 03/12/22 1050          Therapy Assessment/Plan (PT)    Patient/Family Therapy Goals Statement (PT) Return to OF  -     Rehab Potential (PT) good, to achieve stated therapy goals  -     Criteria for Skilled Interventions Met (PT) yes  -     Row Name 03/12/22 1050          Positioning and Restraints    Pre-Treatment Position in bed  -     Post Treatment Position chair  -     In Chair reclined;call light within reach;encouraged to call for assist;exit alarm on;notified [facility]           User Key  (r) = Recorded By, (t) = Taken By, (c) = Cosigned By    Initials Name Provider Type     Madelyn Amador Physical Therapist               Outcome Measures     Row Name " 03/12/22 1057          How much help from another person do you currently need...    Turning from your back to your side while in flat bed without using bedrails? 4  -BH     Moving from lying on back to sitting on the side of a flat bed without bedrails? 3  -BH     Moving to and from a bed to a chair (including a wheelchair)? 3  -BH     Standing up from a chair using your arms (e.g., wheelchair, bedside chair)? 3  -BH     Climbing 3-5 steps with a railing? 2  -BH     To walk in hospital room? 3  -     AM-PAC 6 Clicks Score (PT) 18  -     Row Name 03/12/22 1057          Functional Assessment    Outcome Measure Options AM-PAC 6 Clicks Basic Mobility (PT)  -           User Key  (r) = Recorded By, (t) = Taken By, (c) = Cosigned By    Initials Name Provider Type     Madelyn Amador Physical Therapist                             Physical Therapy Education                 Title: PT OT SLP Therapies (Done)     Topic: Physical Therapy (Done)     Point: Mobility training (Done)     Learning Progress Summary           Patient Acceptance, E,TB,D, VU,NR by  at 3/12/2022 1058                   Point: Home exercise program (Done)     Learning Progress Summary           Patient Acceptance, E,TB,D, VU,NR by  at 3/12/2022 1058                   Point: Body mechanics (Done)     Learning Progress Summary           Patient Acceptance, E,TB,D, VU,NR by  at 3/12/2022 1058                   Point: Precautions (Done)     Learning Progress Summary           Patient Acceptance, E,TB,D, VU,NR by  at 3/12/2022 1058                               User Key     Initials Effective Dates Name Provider Type [facility] 05/10/21 -  Madelyn Amador Physical Therapist PT              PT Recommendation and Plan  Planned Therapy Interventions (PT): balance training, bed mobility training, gait training, home exercise program, patient/family education, strengthening, transfer training  Plan of Care Reviewed With: patient  Outcome  "Evaluation: Pt is an 80 yo M admitted following a mechanical fall resulting in multiple L rib fxs. PMHx significant for metastatic renal cell carcinoma. Pt lives with his wife and adult daughter and he reports one of them is home at all times. He lives in a tri-level home, but plans to enter on basement level with no steps and stay in bedroom on first floor. Pt reports use of a cane at BL, but has a rwx. Pt is also very [place]. Presents to PT with impaired strength and endurance as well as increased L rib pain, limiting mobility. Pt completed bed mobility with SBA, STS with CGA, and ambulated 15ft with CGA and rwx. Pt reporting increased dizziness with both sitting EOB and standing, reporting he has a hx of \"inner ear issues.\" Pt mildly unsteady throughout, but no overt LOB and pt agreeable to sitting UIC following session. Pt will continue to benefit from skilled PT to address functional deficits and improve overall mobility. PT recommending D/C home with HHPT if dizziness/mobility improves. Pt may also benefit from referral to OPPT for vestibular rehab.     Time Calculation:    PT Charges     Row Name 03/12/22 1058             Time Calculation    Start Time 0902  -      Stop Time 0930  -      Time Calculation (min) 28 min  -      PT Received On 03/12/22  -      PT - Next Appointment 03/14/22  -      PT Goal Re-Cert Due Date 03/19/22  -              Time Calculation- PT    Total Timed Code Minutes- PT 25 minute(s)  -              Timed Charges    28877 - PT Therapeutic Exercise Minutes 5  -      18568 - Gait Training Minutes  10  -      64179 - PT Therapeutic Activity Minutes 10  -              Untimed Charges    PT Eval/Re-eval Minutes 5  -              Total Minutes    Timed Charges Total Minutes 25  -      Untimed Charges Total Minutes 5  -       Total Minutes 30  -BH            User Key  (r) = Recorded By, (t) = Taken By, (c) = Cosigned By    Initials Name Provider Type    DANAE Amador" Madelyn Physical Therapist              Therapy Charges for Today     Code Description Service Date Service Provider Modifiers Qty    86270022932 HC GAIT TRAINING EA 15 MIN 3/12/2022 Madelyn Amador GP 1    01614382539 HC PT THERAPEUTIC ACT EA 15 MIN 3/12/2022 Madelyn Amador GP 1    60438532581 HC PT EVAL MOD COMPLEXITY 2 3/12/2022 Madelyn Amador GP 1          PT G-Codes  Outcome Measure Options: AM-PAC 6 Clicks Basic Mobility (PT)  AM-PAC 6 Clicks Score (PT): 18    MADELYN AMADOR  3/12/2022

## 2022-03-13 ENCOUNTER — HOME HEALTH ADMISSION (OUTPATIENT)
Dept: HOME HEALTH SERVICES | Facility: HOME HEALTHCARE | Age: 82
End: 2022-03-13

## 2022-03-13 ENCOUNTER — READMISSION MANAGEMENT (OUTPATIENT)
Dept: CALL CENTER | Facility: HOSPITAL | Age: 82
End: 2022-03-13

## 2022-03-13 VITALS
RESPIRATION RATE: 18 BRPM | DIASTOLIC BLOOD PRESSURE: 62 MMHG | BODY MASS INDEX: 30.69 KG/M2 | SYSTOLIC BLOOD PRESSURE: 140 MMHG | TEMPERATURE: 98.2 F | HEART RATE: 78 BPM | HEIGHT: 73 IN | WEIGHT: 231.6 LBS | OXYGEN SATURATION: 91 %

## 2022-03-13 PROCEDURE — 94799 UNLISTED PULMONARY SVC/PX: CPT

## 2022-03-13 PROCEDURE — 94761 N-INVAS EAR/PLS OXIMETRY MLT: CPT

## 2022-03-13 PROCEDURE — G0378 HOSPITAL OBSERVATION PER HR: HCPCS

## 2022-03-13 PROCEDURE — 94664 DEMO&/EVAL PT USE INHALER: CPT

## 2022-03-13 PROCEDURE — 25010000002 ONDANSETRON PER 1 MG: Performed by: HOSPITALIST

## 2022-03-13 PROCEDURE — 96376 TX/PRO/DX INJ SAME DRUG ADON: CPT

## 2022-03-13 RX ORDER — OXYCODONE HYDROCHLORIDE 5 MG/1
5 TABLET ORAL EVERY 6 HOURS PRN
Qty: 21 TABLET | Refills: 0 | Status: SHIPPED | OUTPATIENT
Start: 2022-03-13 | End: 2022-03-14 | Stop reason: SDUPTHER

## 2022-03-13 RX ORDER — LIDOCAINE 50 MG/G
2 PATCH TOPICAL EVERY 24 HOURS
Qty: 30 PATCH | Refills: 0 | Status: SHIPPED | OUTPATIENT
Start: 2022-03-14 | End: 2022-10-09 | Stop reason: HOSPADM

## 2022-03-13 RX ADMIN — GABAPENTIN 400 MG: 400 CAPSULE ORAL at 06:35

## 2022-03-13 RX ADMIN — OXYCODONE HYDROCHLORIDE 5 MG: 5 TABLET ORAL at 05:09

## 2022-03-13 RX ADMIN — FOLIC ACID 1 MG: 1 TABLET ORAL at 08:43

## 2022-03-13 RX ADMIN — ACETAMINOPHEN 1000 MG: 500 TABLET ORAL at 04:02

## 2022-03-13 RX ADMIN — AMLODIPINE BESYLATE 5 MG: 5 TABLET ORAL at 08:43

## 2022-03-13 RX ADMIN — SERTRALINE 25 MG: 25 TABLET, FILM COATED ORAL at 08:43

## 2022-03-13 RX ADMIN — LEVOTHYROXINE SODIUM 112 MCG: 0.11 TABLET ORAL at 08:43

## 2022-03-13 RX ADMIN — ACETAMINOPHEN 1000 MG: 500 TABLET ORAL at 12:15

## 2022-03-13 RX ADMIN — TIOTROPIUM BROMIDE INHALATION SPRAY 2 PUFF: 3.12 SPRAY, METERED RESPIRATORY (INHALATION) at 07:24

## 2022-03-13 RX ADMIN — GABAPENTIN 400 MG: 400 CAPSULE ORAL at 12:15

## 2022-03-13 RX ADMIN — ONDANSETRON 4 MG: 2 INJECTION INTRAMUSCULAR; INTRAVENOUS at 13:41

## 2022-03-13 RX ADMIN — OXYCODONE HYDROCHLORIDE 5 MG: 5 TABLET ORAL at 08:43

## 2022-03-13 RX ADMIN — FAMOTIDINE 20 MG: 20 TABLET ORAL at 08:43

## 2022-03-13 NOTE — PLAN OF CARE
Goal Outcome Evaluation:  Plan of Care Reviewed With: patient      Patient AO x 4 / not in any distress during night / pain covered with PRN's / denied any dizziness during night / O2 via NC at  4 LPM during sleep hours, to keep O2 level higher than 90%/ / sinus rhythm on monitor / VS stable /

## 2022-03-13 NOTE — DISCHARGE PLACEMENT REQUEST
"Storm Koch Sr. (81 y.o. Male)             Date of Birth   1940    Social Security Number       Address   [address] [place] [address]    Home Phone   [phone]    MRN   [id number]       [employer]    Marital Status                               Admission Date   3/10/22    Admission Type   Emergency    Admitting Provider   Cristopher Alvarez MD    Attending Provider   Rigo Carney MD    Department, Room/Bed   [address], [address]1       Discharge Date       Discharge Disposition       Discharge Destination                               Attending Provider: Rigo Carney MD    Allergies: Nsaids, Orange, Orange Oil, Latex    Isolation: None   Infection: None   Code Status: CPR   Advance Care Planning Activity    Ht: 185.4 cm (73\")   Wt: 105 kg (231 lb 9.6 oz)    Admission Cmt: None   Principal Problem: Closed fracture of multiple ribs of left side [S22.42XA]                 Active Insurance as of 3/10/2022     Primary Coverage     Payor Plan Insurance Group Employer/Plan Group    HUMANA MEDICARE REPLACEMENT HUMANA MEDICARE REPLACEMENT [id number]     Payor Plan Address Payor Plan Phone Number Payor Plan Fax Number Effective Dates    [address] [address] [phone]  1/1/2013 - None Entered    [facility] [id number]       Subscriber Name Subscriber Birth Date Member ID       STORM KOCH SR. 1940 [id number]                 Emergency Contacts      (Rel.) Home Phone Work Phone Mobile Phone    Lucina Koch (Spouse) [phone] -- [phone]    Storm Koch (Son) [phone] -- --    Romina Koch (Daughter) [phone] -- --              "

## 2022-03-13 NOTE — NURSING NOTE
Discharge instructions provided to and discussed with patient and wife. O2 delivered by [place] and sent home with patient. Patient instructed to call phone number on paper attached to O2 tank as soon as he gets home. Scripts filled by OP pharmacy and picked up on discharge. Patient verbalizes understanding and has no questions at this time. Hearing aid batteries are in patient pants pocket. Discharged by wheelchair to private vehicle.

## 2022-03-13 NOTE — CASE MANAGEMENT/SOCIAL WORK
Discharge Planning Assessment  [facility]     Patient Name: Mg Gonzalez Sr.  MRN: [id number]  Today's Date: 3/13/2022    Admit Date: 3/10/2022     Discharge Needs Assessment     Row Name 03/13/22 1038       Living Environment    People in Home spouse;child(haroldo), adult    Current Living Arrangements home    Primary Care Provided by self;spouse/significant other    Provides Primary Care For no one    Family Caregiver if Needed spouse    Family Caregiver Names klaudia gonzalez [phone]    Quality of Family Relationships helpful;involved;supportive    Able to Return to Prior Arrangements yes       Resource/Environmental Concerns    Resource/Environmental Concerns none    Transportation Concerns no car       Transition Planning    Patient/Family Anticipates Transition to home with family;home with help/services    Patient/Family Anticipated Services at Transition home health care;durable medical equipment    Transportation Anticipated family or friend will provide       Discharge Needs Assessment    Readmission Within the Last 30 Days no previous admission in last 30 days    Current Outpatient/Agency/Support Group --  none    Equipment Currently Used at Home walker, rolling;cane, straight;grab bar;shower chair  spouse states pt's roll walker is not tall enough for him. She bought it from the drug store. Feels pt would benefit from a new rolling walker--order to be requested from MD    Concerns to be Addressed discharge planning;adjustment to diagnosis/illness    Anticipated Changes Related to Illness inability to care for self    Equipment Needed After Discharge walker, rolling;oxygen    Outpatient/Agency/Support Group Needs homecare agency    Discharge Facility/Level of Care Needs home with home health    Current Discharge Risk dependent with mobility/activities of daily living               Discharge Plan     Row Name 03/13/22 1019       Plan    Plan Home via private auto; [facility] to follow; [place] for O2 and roll  walker    Provided Post Acute Provider List? Refused    Refused Provider List Comment does not need it--plans pt to return home with [facility] to follow    Patient/Family in Agreement with Plan yes    Plan Comments Spoke with spouse on the phone. Introduced self/explained role of CCP. Face sheet data reviewed. CMS BELCHER letter discussed and she verbalized understanding. Copy emailed to [email or web address] as requested. Pt IADLs prior to admit. Has a cane and roll walker. Spouse states he does not use the roll walker b/c it is not tall enough (she bought it for him at the drug store). Discussed possible need for oxygen at home. Spouse states pt used O2 at home in the past and had [facility] provide it. If needed again, pt would again use [facility]. Pt has not used HH in the past, but she has and used [facility]. Spouse states if HH is needed to use [facility]. In basket referral to [facility] in [facility]. Pt has been to [place] for skilled care in the past. Plan at this time is to go home via private auto at [place] with BH and [facility] for walker and O2. Updated staff RN. CCP to follow...........JW              Continued Care and Services - Admitted Since 3/10/2022     Durable Medical Equipment     Service Provider Request Status Selected Services Address Phone Fax Patient Preferred    [facility]  Pending - Request Sent N/A [id number], [facility] [address] [phone] --          Home Medical Care     Service Provider Request Status Selected Services Address Phone Fax Patient Preferred    [facility]  Pending - Request Sent N/A [address] [place] [address] [id number] [phone] --                 Demographic Summary     Row Name 03/13/22 1034       General Information    Admission Type observation    Arrived From home    Required Notices Provided Observation Status Notice    Referral Source admission list    Reason for Consult discharge planning    Preferred Language  English       Contact Information    Permission Granted to Share Info With ;family/designee               Functional Status     Row Name 03/13/22 1035       Functional Status    Usual Activity Tolerance good    Current Activity Tolerance fair       Functional Status, IADL    Medications assistive person    Meal Preparation assistive person    Housekeeping assistive person    Laundry assistive person    Shopping assistive person    IADL Comments lives at home with spouse and is generally IADLs. Spouse will assist as needed.               Psychosocial    No documentation.                Abuse/Neglect    No documentation.                Legal    No documentation.                Substance Abuse    No documentation.                Patient Forms    No documentation.                   Enedina Fallon RN

## 2022-03-13 NOTE — DISCHARGE SUMMARY
[facility]    Date of Discharge:  3/13/2022    PCP: Dillon Walton MD    Discharge Diagnosis:   Active Hospital Problems    Diagnosis  POA   • **Closed fracture of multiple ribs of left side [S22.42XA]  Unknown   • Fall, initial encounter [W19.XXXA]  Yes   • Fall [W19.XXXA]  Yes   • Chest wall pain [R07.89]  Unknown   • History of pulmonary embolism [Z86.711]  Yes   • Paroxysmal atrial fibrillation (HCC) [I48.0]  Yes   • Malignant neoplasm of lung (HCC) [C34.90]  Yes   • Metastatic renal cell carcinoma  on q 2 wks chemo [C64.9]  Yes   • Essential hypertension [I10]  Yes   • Hemochromatosis [E83.119]  Yes      Resolved Hospital Problems   No resolved problems to display.          Consults     Date and Time Order Name Status Description    3/11/2022  9:45 AM Inpatient Thoracic Surgery Consult Completed     3/11/2022  1:56 AM [facility] (on-call MD unless specified) Details          Hospital Course  81 y.o. male initially met by myself due to mechanical fall which resulted in left-sided chest pain secondary to anterior fracture of ribs 5 through 7.  We provided the patient with pain control with use of oxycodone as well as Lidoderm.  We tried to avoid Tylenol secondary to past history of cirrhosis.  I did consult thoracic surgery to see him secondary to the multiple rib fractures and they recommend medical management.  No surgical intervention was endorsed.  He is to use incentive spirometry and this is strongly advised.  His pain control is much more adequate now than it was at admission and has been counseled about the importance of I-S to prevent any secondary pneumonia.  He already has pulmonary compromise as he has a lung mass which is of renal cell carcinoma of which he is under the care of Dr. Montes De Oca with [facility].  He actually has an infusion with an immunotherapy tomorrow so he will be discharged home today.  His leukocytosis is likely reactive and  due to his underlying carcinoma but no concerns for active infection.  PT is evaluated while here.  I have ordered home O2 for 30-day.  At 3 L via nasal cannula to help with his hypoxia as a result of the rib fractures and his decreased ability for deep breathing.  We have also ordered him a rolling walker with wheels.  All questions have been answered to the patient he is very thankful of the cares he received while here as well as amenable to the plan.  I discussed the case with thoracic surgery in passing.  Overall long-term prognosis is poor but hopefully he responds to further immunotherapy treatment.  Case discussed with RN to try to ensure a smooth transition to home.    Condition on Discharge: Improved.     Temp:  [97.6 °F (36.4 °C)-98.2 °F (36.8 °C)] 98.2 °F (36.8 °C)  Heart Rate:  [71-78] 78  Resp:  [18-22] 18  BP: (131-148)/(60-76) 140/62  Body mass index is 30.56 kg/m².    Physical Exam  HENT:      Head: Normocephalic.      Nose: Nose normal.      Mouth/Throat:      Mouth: Mucous membranes are moist.      Pharynx: Oropharynx is clear.   Eyes:      General: No scleral icterus.     Conjunctiva/sclera: Conjunctivae normal.   Cardiovascular:      Rate and Rhythm: Normal rate and regular rhythm.   Pulmonary:      Comments: Decreased bases but improved air entry/deep breathing versus at admission.  Left-sided chest wall pain reproducible but improving  Abdominal:      General: Bowel sounds are normal. There is no distension.      Palpations: Abdomen is soft.      Tenderness: There is no abdominal tenderness.   Musculoskeletal:         General: No swelling.   Skin:     General: Skin is warm and dry.   Neurological:      Mental Status: He is alert and oriented to person, place, and time. Mental status is at baseline.      Cranial Nerves: No cranial nerve deficit.   Psychiatric:         Mood and Affect: Mood normal.         Behavior: Behavior normal.         Thought Content: Thought content normal.        Disposition: Home or Self Care       Discharge Medications      New Medications      Instructions Start Date   lidocaine 5 %  Commonly known as: LIDODERM   2 patches, Transdermal, Every 24 Hours, Remove & Discard patch within 12 hours or as directed by MD   Start Date: March 14, 2022     oxyCODONE 5 MG immediate release tablet  Commonly known as: ROXICODONE   5 mg, Oral, Every 6 Hours PRN         Changes to Medications      Instructions Start Date   gabapentin 400 MG capsule  Commonly known as: NEURONTIN  What changed: Another medication with the same name was changed. Make sure you understand how and when to take each.   800 mg, Oral, Every Evening      gabapentin 400 MG capsule  Commonly known as: NEURONTIN  What changed: additional instructions   400 mg, Oral, 2 times daily         Continue These Medications      Instructions Start Date   ALPRAZolam 0.25 MG tablet  Commonly known as: Xanax   0.25 mg, Oral, 2 Times Daily PRN      amLODIPine 5 MG tablet  Commonly known as: NORVASC   5 mg, Oral, Daily      CALTRATE 600+D3 PO   1 tablet, Oral, 2 times daily      cyclobenzaprine 5 MG tablet  Commonly known as: FLEXERIL   5 mg, Oral, 3 Times Daily PRN      famotidine 20 MG tablet  Commonly known as: Pepcid   20 mg, Oral, 2 Times Daily      folic acid 1 MG tablet  Commonly known as: FOLVITE   1 mg, Oral, 3 Times Daily      levocetirizine 5 MG tablet  Commonly known as: XYZAL   5 mg, Oral, Every Evening      levothyroxine 112 MCG tablet  Commonly known as: SYNTHROID, LEVOTHROID   112 mcg, Oral, Daily      meclizine 12.5 MG tablet  Commonly known as: ANTIVERT   12.5 mg, Oral, 3 Times Daily PRN      Namzaric 28-10 MG capsule sustained-release 24 hr  Generic drug: Memantine HCl-Donepezil HCl   1 capsule, Oral, Every Evening      NIVOLUMAB IV   Intravenous, Every 30 Days, Due: 3/15/2022      ondansetron 4 MG tablet  Commonly known as: ZOFRAN   4 mg, Oral, Every 8 Hours PRN      OXcarbazepine 300 MG tablet  Commonly  known as: TRILEPTAL   300 mg, Oral, Every Night at Bedtime      sertraline 25 MG tablet  Commonly known as: ZOLOFT   25 mg, Oral, Daily      Spiriva HandiHaler 18 MCG per inhalation capsule  Generic drug: tiotropium   1 capsule, Inhalation, Daily - RT      tiotropium bromide-olodaterol 2.5-2.5 MCG/ACT aerosol solution inhaler  Commonly known as: STIOLTO RESPIMAT   1 puff, Inhalation, 2 Times Daily      Ventolin  (90 Base) MCG/ACT inhaler  Generic drug: albuterol sulfate HFA   2 puffs, Inhalation, Every 4 Hours PRN      Xarelto 20 MG tablet  Generic drug: rivaroxaban   20 mg, Oral, Daily With Dinner         Stop These Medications    HYDROcodone-acetaminophen 5-325 MG per tablet  Commonly known as: NORCO              Follow-up Information     Dillon Walton MD .    Specialty: Family Medicine  Contact information:  [address] [id number] [phone]                           iRgo Carney MD  03/13/22  12:13 EDT    Discharge time spent greater than 30 minutes.

## 2022-03-13 NOTE — PROGRESS NOTES
[facility] to provide Home Care services. Patient agreeable. PCP and contact information confirmed.

## 2022-03-13 NOTE — OUTREACH NOTE
Prep Survey    Flowsheet Row Responses   Mu-ism facility patient discharged from? [place]   Is LACE score < 7 ? No   Emergency Room discharge w/ pulse ox? No   Eligibility [facility]   Date of Admission 03/10/22   Date of Discharge 03/13/22   Discharge Disposition Home-Health Care Svc   Discharge diagnosis fall, closed fracture multiple left side ribs, A-fib, metastatic renal CA on chemo   Does the patient have one of the following disease processes/diagnoses(primary or secondary)? Other   Does the patient have Home health ordered? Yes   What is the Home health agency?  Merged with [facility]   Is there a DME ordered? Yes   What DME was ordered? O2 & rolling walker from [facility]   Prep survey completed? Yes          ANDERS SCHREIBER - Registered Nurse

## 2022-03-14 ENCOUNTER — TRANSITIONAL CARE MANAGEMENT TELEPHONE ENCOUNTER (OUTPATIENT)
Dept: CALL CENTER | Facility: HOSPITAL | Age: 82
End: 2022-03-14

## 2022-03-14 ENCOUNTER — OFFICE VISIT (OUTPATIENT)
Dept: FAMILY MEDICINE CLINIC | Facility: CLINIC | Age: 82
End: 2022-03-14

## 2022-03-14 ENCOUNTER — APPOINTMENT (OUTPATIENT)
Dept: GENERAL RADIOLOGY | Facility: HOSPITAL | Age: 82
End: 2022-03-14

## 2022-03-14 ENCOUNTER — HOME CARE VISIT (OUTPATIENT)
Dept: HOME HEALTH SERVICES | Facility: HOME HEALTHCARE | Age: 82
End: 2022-03-14

## 2022-03-14 ENCOUNTER — HOSPITAL ENCOUNTER (INPATIENT)
Facility: HOSPITAL | Age: 82
LOS: 6 days | Discharge: NURSING, REHAB OR LONG-TERM CARE | End: 2022-03-20
Attending: EMERGENCY MEDICINE | Admitting: HOSPITALIST

## 2022-03-14 ENCOUNTER — APPOINTMENT (OUTPATIENT)
Dept: CT IMAGING | Facility: HOSPITAL | Age: 82
End: 2022-03-14

## 2022-03-14 VITALS
DIASTOLIC BLOOD PRESSURE: 67 MMHG | HEIGHT: 73 IN | RESPIRATION RATE: 18 BRPM | SYSTOLIC BLOOD PRESSURE: 133 MMHG | BODY MASS INDEX: 30.62 KG/M2 | TEMPERATURE: 97.8 F | WEIGHT: 231 LBS | HEART RATE: 94 BPM

## 2022-03-14 VITALS
SYSTOLIC BLOOD PRESSURE: 150 MMHG | HEART RATE: 74 BPM | TEMPERATURE: 97.6 F | DIASTOLIC BLOOD PRESSURE: 70 MMHG | OXYGEN SATURATION: 86 % | RESPIRATION RATE: 20 BRPM

## 2022-03-14 DIAGNOSIS — Z09 HOSPITAL DISCHARGE FOLLOW-UP: ICD-10-CM

## 2022-03-14 DIAGNOSIS — Z74.09 IMMOBILITY: ICD-10-CM

## 2022-03-14 DIAGNOSIS — R53.1 GENERALIZED WEAKNESS: Primary | ICD-10-CM

## 2022-03-14 DIAGNOSIS — D72.829 LEUKOCYTOSIS, UNSPECIFIED TYPE: ICD-10-CM

## 2022-03-14 DIAGNOSIS — S22.42XA CLOSED FRACTURE OF MULTIPLE RIBS OF LEFT SIDE, INITIAL ENCOUNTER: Primary | ICD-10-CM

## 2022-03-14 DIAGNOSIS — Z85.118 HISTORY OF LUNG CANCER: ICD-10-CM

## 2022-03-14 DIAGNOSIS — S22.42XA CLOSED FRACTURE OF MULTIPLE RIBS OF LEFT SIDE, INITIAL ENCOUNTER: ICD-10-CM
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
## 2022-03-14 PROBLEM — Z78.9 SELF-CARE DEFICIT: Status: ACTIVE | Noted: 2022-03-14

## 2022-03-14 PROBLEM — M62.3 IMMOBILITY SYNDROME: Status: ACTIVE | Noted: 2022-03-14

## 2022-03-14 LAB
ALBUMIN SERPL-MCNC: 3.7 G/DL (ref 3.5–5.2)
ALBUMIN/GLOB SERPL: 1.2 G/DL
ALP SERPL-CCNC: 70 U/L (ref 39–117)
ALT SERPL W P-5'-P-CCNC: 22 U/L (ref 1–41)
ANION GAP SERPL CALCULATED.3IONS-SCNC: 9.9 MMOL/L (ref 5–15)
APTT PPP: 44 SECONDS (ref 22.7–35.4)
AST SERPL-CCNC: 17 U/L (ref 1–40)
BACTERIA UR QL AUTO: ABNORMAL /HPF
BASOPHILS # BLD AUTO: 0.11 10*3/MM3 (ref 0–0.2)
BASOPHILS NFR BLD AUTO: 0.5 % (ref 0–1.5)
BILIRUB SERPL-MCNC: 1.1 MG/DL (ref 0–1.2)
BILIRUB UR QL STRIP: NEGATIVE
BUN SERPL-MCNC: 12 MG/DL (ref 8–23)
BUN/CREAT SERPL: 11.5 (ref 7–25)
CALCIUM SPEC-SCNC: 9.4 MG/DL (ref 8.6–10.5)
CHLORIDE SERPL-SCNC: 102 MMOL/L (ref 98–107)
CLARITY UR: CLEAR
CO2 SERPL-SCNC: 26.1 MMOL/L (ref 22–29)
COLOR UR: YELLOW
CREAT SERPL-MCNC: 1.04 MG/DL (ref 0.76–1.27)
D-LACTATE SERPL-SCNC: 0.9 MMOL/L (ref 0.5–2)
DEPRECATED RDW RBC AUTO: 39.3 FL (ref 37–54)
EGFRCR SERPLBLD CKD-EPI 2021: 71.7 ML/MIN/1.73
EOSINOPHIL # BLD AUTO: 0.19 10*3/MM3 (ref 0–0.4)
EOSINOPHIL NFR BLD AUTO: 0.9 % (ref 0.3–6.2)
ERYTHROCYTE [DISTWIDTH] IN BLOOD BY AUTOMATED COUNT: 9.9 % (ref 12.3–15.4)
GLOBULIN UR ELPH-MCNC: 3.1 GM/DL
GLUCOSE SERPL-MCNC: 122 MG/DL (ref 65–99)
GLUCOSE UR STRIP-MCNC: NEGATIVE MG/DL
HCT VFR BLD AUTO: 31.9 % (ref 37.5–51)
HGB BLD-MCNC: 10.7 G/DL (ref 13–17.7)
HGB UR QL STRIP.AUTO: ABNORMAL
HYALINE CASTS UR QL AUTO: ABNORMAL /LPF
INR PPP: 1.54 (ref 0.9–1.1)
KETONES UR QL STRIP: NEGATIVE
LEUKOCYTE ESTERASE UR QL STRIP.AUTO: ABNORMAL
LYMPHOCYTES # BLD AUTO: 3.81 10*3/MM3 (ref 0.7–3.1)
LYMPHOCYTES NFR BLD AUTO: 17.2 % (ref 19.6–45.3)
MCH RBC QN AUTO: 37 PG (ref 26.6–33)
MCHC RBC AUTO-ENTMCNC: 33.5 G/DL (ref 31.5–35.7)
MCV RBC AUTO: 110.4 FL (ref 79–97)
MONOCYTES # BLD AUTO: 3.05 10*3/MM3 (ref 0.1–0.9)
MONOCYTES NFR BLD AUTO: 13.8 % (ref 5–12)
NEUTROPHILS NFR BLD AUTO: 14.8 10*3/MM3 (ref 1.7–7)
NEUTROPHILS NFR BLD AUTO: 66.9 % (ref 42.7–76)
NITRITE UR QL STRIP: NEGATIVE
PH UR STRIP.AUTO: <=5 [PH] (ref 5–8)
PLATELET # BLD AUTO: 303 10*3/MM3 (ref 140–450)
PMV BLD AUTO: 9.9 FL (ref 6–12)
POTASSIUM SERPL-SCNC: 4.3 MMOL/L (ref 3.5–5.2)
PROT SERPL-MCNC: 6.8 G/DL (ref 6–8.5)
PROT UR QL STRIP: NEGATIVE
PROTHROMBIN TIME: 18.4 SECONDS (ref 11.7–14.2)
RBC # BLD AUTO: 2.89 10*6/MM3 (ref 4.14–5.8)
RBC # UR STRIP: ABNORMAL /HPF
REF LAB TEST METHOD: ABNORMAL
SARS-COV-2 RNA RESP QL NAA+PROBE: DETECTED
SODIUM SERPL-SCNC: 138 MMOL/L (ref 136–145)
SP GR UR STRIP: 1.02 (ref 1–1.03)
SQUAMOUS #/AREA URNS HPF: ABNORMAL /HPF
TROPONIN T SERPL-MCNC: 0.01 NG/ML (ref 0–0.03)
UROBILINOGEN UR QL STRIP: ABNORMAL
WBC # UR STRIP: ABNORMAL /HPF
WBC NRBC COR # BLD: 22.12 10*3/MM3 (ref 3.4–10.8)

## 2022-03-14 PROCEDURE — 99213 OFFICE O/P EST LOW 20 MIN: CPT | Performed by: FAMILY MEDICINE

## 2022-03-14 PROCEDURE — 84484 ASSAY OF TROPONIN QUANT: CPT | Performed by: EMERGENCY MEDICINE

## 2022-03-14 PROCEDURE — 80053 COMPREHEN METABOLIC PANEL: CPT | Performed by: EMERGENCY MEDICINE

## 2022-03-14 PROCEDURE — G0151 HHCP-SERV OF PT,EA 15 MIN: HCPCS

## 2022-03-14 PROCEDURE — U0003 INFECTIOUS AGENT DETECTION BY NUCLEIC ACID (DNA OR RNA); SEVERE ACUTE RESPIRATORY SYNDROME CORONAVIRUS 2 (SARS-COV-2) (CORONAVIRUS DISEASE [COVID-19]), AMPLIFIED PROBE TECHNIQUE, MAKING USE OF HIGH THROUGHPUT TECHNOLOGIES AS DESCRIBED BY CMS-2020-01-R: HCPCS | Performed by: EMERGENCY MEDICINE

## 2022-03-14 PROCEDURE — 85610 PROTHROMBIN TIME: CPT | Performed by: EMERGENCY MEDICINE

## 2022-03-14 PROCEDURE — 85730 THROMBOPLASTIN TIME PARTIAL: CPT | Performed by: EMERGENCY MEDICINE

## 2022-03-14 PROCEDURE — 25010000002 ONDANSETRON PER 1 MG: Performed by: EMERGENCY MEDICINE

## 2022-03-14 PROCEDURE — 81001 URINALYSIS AUTO W/SCOPE: CPT | Performed by: EMERGENCY MEDICINE

## 2022-03-14 PROCEDURE — 83605 ASSAY OF LACTIC ACID: CPT | Performed by: EMERGENCY MEDICINE

## 2022-03-14 PROCEDURE — 85025 COMPLETE CBC W/AUTO DIFF WBC: CPT | Performed by: EMERGENCY MEDICINE

## 2022-03-14 PROCEDURE — 70450 CT HEAD/BRAIN W/O DYE: CPT

## 2022-03-14 PROCEDURE — 71045 X-RAY EXAM CHEST 1 VIEW: CPT

## 2022-03-14 PROCEDURE — 99284 EMERGENCY DEPT VISIT MOD MDM: CPT

## 2022-03-14 RX ORDER — NITROGLYCERIN 0.4 MG/1
0.4 TABLET SUBLINGUAL
Status: DISCONTINUED | OUTPATIENT
Start: 2022-03-14 | End: 2022-03-20 | Stop reason: HOSPADM

## 2022-03-14 RX ORDER — UREA 10 %
3 LOTION (ML) TOPICAL NIGHTLY PRN
Status: DISCONTINUED | OUTPATIENT
Start: 2022-03-14 | End: 2022-03-20 | Stop reason: HOSPADM

## 2022-03-14 RX ORDER — ONDANSETRON 2 MG/ML
4 INJECTION INTRAMUSCULAR; INTRAVENOUS ONCE
Status: COMPLETED | OUTPATIENT
Start: 2022-03-14 | End: 2022-03-14

## 2022-03-14 RX ORDER — ONDANSETRON 2 MG/ML
4 INJECTION INTRAMUSCULAR; INTRAVENOUS EVERY 6 HOURS PRN
Status: DISCONTINUED | OUTPATIENT
Start: 2022-03-14 | End: 2022-03-20 | Stop reason: HOSPADM

## 2022-03-14 RX ORDER — OXYCODONE HYDROCHLORIDE 5 MG/1
5 TABLET ORAL EVERY 6 HOURS PRN
Qty: 40 TABLET | Refills: 0 | Status: ON HOLD | OUTPATIENT
Start: 2022-03-14 | End: 2022-03-20 | Stop reason: SDUPTHER

## 2022-03-14 RX ORDER — ACETAMINOPHEN 325 MG/1
650 TABLET ORAL EVERY 4 HOURS PRN
Status: DISCONTINUED | OUTPATIENT
Start: 2022-03-14 | End: 2022-03-20 | Stop reason: HOSPADM

## 2022-03-14 RX ORDER — ONDANSETRON 4 MG/1
4 TABLET, FILM COATED ORAL EVERY 6 HOURS PRN
Status: DISCONTINUED | OUTPATIENT
Start: 2022-03-14 | End: 2022-03-20 | Stop reason: HOSPADM

## 2022-03-14 RX ORDER — SODIUM CHLORIDE 0.9 % (FLUSH) 0.9 %
10 SYRINGE (ML) INJECTION AS NEEDED
Status: DISCONTINUED | OUTPATIENT
Start: 2022-03-14 | End: 2022-03-20 | Stop reason: HOSPADM

## 2022-03-14 RX ADMIN — SODIUM CHLORIDE 1000 ML: 9 INJECTION, SOLUTION INTRAVENOUS at 19:31

## 2022-03-14 RX ADMIN — Medication 10 ML: at 19:22

## 2022-03-14 RX ADMIN — ONDANSETRON 4 MG: 2 INJECTION INTRAMUSCULAR; INTRAVENOUS at 19:33

## 2022-03-14 NOTE — CASE MANAGEMENT/SOCIAL WORK
Case Management Discharge Note      Final Note: Home with [facility]. alina rn/ccp    Provided Post Acute Provider List?: Refused  Refused Provider List Comment: does not need it--plans pt to return home with [facility] to follow    Selected Continued Care - Discharged on 3/13/2022 Admission date: 3/10/2022 - Discharge disposition: Home or Self Care    Destination    No services have been selected for the patient.              Durable Medical Equipment    No services have been selected for the patient.              Dialysis/Infusion    No services have been selected for the patient.              Home Medical Care     Service Provider Selected Services Address Phone Fax Patient Preferred    [facility]  Home Health Services [address] [address] [phone] --          Therapy    No services have been selected for the patient.              Community Resources    No services have been selected for the patient.              Community & DME    No services have been selected for the patient.                  Transportation Services  Private: Car    Final Discharge Disposition Code: 06 - home with home health care

## 2022-03-14 NOTE — HOME HEALTH
"80 yo male suffered fall with several rib fractures    PT warratented for balance training, strengthening, bed mobility and transfers, pain mgt in order for patient to return to [facility]. He lives with spuose. PLOF was ambulating outdoors with a cane and indoors without AD.  Is new on 02.  PT was unable to assess sit to stand transfers at SOC and gait due to patient refusal due to rib pain and stating \"he has not walked since he got home.\"  PT did get patient to edge of bed sitting for ~30 min.    Next visit:  assess 02, bed mobility, transfer training, pain mgt"

## 2022-03-14 NOTE — ED TRIAGE NOTES
Patient was here recently for broken ribs, he then went home Sunday since then he has declined and is unable to get up on his own and is having generalized weakness.    Patient has on mask nurse has on proper ppe

## 2022-03-14 NOTE — OUTREACH NOTE
Call Center TCM Note    Flowsheet Row Responses   [facility] patient discharged from? [facility]   Does the patient have one of the following disease processes/diagnoses(primary or secondary)? Other   TCM attempt successful? Yes   Discharge diagnosis fall, closed fracture multiple left side ribs, A-fib, metastatic renal CA on chemo   TCM call completed? Yes   Wrap up additional comments Pt d/c home on 03/13/2022, today completed FACE TO FACE TCM [facility] with PCP Dr Walton.          Violet Rivas MA    3/14/2022, 15:48 EDT

## 2022-03-14 NOTE — ED NOTES
Pt to ED with wife from home, was just DC;d from home, pt had a recent fall and 3 broken ribs on L side. Pt went home, was eval'd by home health today and sent back here for continued pain and weakness. Pt unable to stand independently, wife states she cannot take care of him at home right now. Pt was sent home on oxy, last taken at 1130 today per wife, and has been wearing lidocaine patches, 2 removed on arrival, wife states they were due to come off at 1530 today. Pt has mild bruising noted to L rib area.     Pt wearing face mask during their stay in ER. This RN wore capr and gloves while providing patient care.

## 2022-03-14 NOTE — PROGRESS NOTES
Transitional Care Follow Up Visit  Subjective     Mg Gerber Sr. is a 81 y.o. male who presents for a transitional care management visit.    Within 48 business hours after discharge our office contacted him via telephone to coordinate his care and needs.      I reviewed and discussed the details of that call along with the discharge summary, hospital problems, inpatient lab results, inpatient diagnostic studies, and consultation reports with Mg.     Current outpatient and discharge medications have been reconciled for the patient.  Reviewed by: Dillon Walton MD      Date of TCM Phone Call 3/13/2022   [facility]   Date of Admission 3/10/2022   Date of Discharge 3/13/2022   [facility]charge Disposition [facility]     Risk for Readmission (LACE) Score: 9 (3/13/2022  6:00 AM)      History of Present Illness   Course During Hospital Stay:      Date of Discharge:  3/13/2022     PCP: Dillon Walton MD     Discharge Diagnosis:         Active Hospital Problems     Diagnosis   POA   • **Closed fracture of multiple ribs of left side [S22.42XA]   Unknown   • Fall, initial encounter [W19.XXXA]   Yes   • Fall [W19.XXXA]   Yes   • Chest wall pain [R07.89]   Unknown   • History of pulmonary embolism [Z86.711]   Yes   • Paroxysmal atrial fibrillation (HCC) [I48.0]   Yes   • Malignant neoplasm of lung (HCC) [C34.90]   Yes   • Metastatic renal cell carcinoma  on q 2 wks chemo [C64.9]   Yes   • Essential hypertension [I10]   Yes   • Hemochromatosis [E83.119]   Yes       Resolved Hospital Problems   No resolved problems to display.                 Consults      Date and Time Order Name Status Description     3/11/2022  9:45 AM Inpatient Thoracic Surgery Consult Completed       3/11/2022  1:56 AM [facility] (on-call MD unless specified) Details              Hospital Course  81 y.o. male initially met by myself due to mechanical fall which resulted in left-sided chest pain secondary to anterior fracture of ribs 5 through  7.  We provided the patient with pain control with use of oxycodone as well as Lidoderm.  We tried to avoid Tylenol secondary to past history of cirrhosis.  I did consult thoracic surgery to see him secondary to the multiple rib fractures and they recommend medical management.  No surgical intervention was endorsed.  He is to use incentive spirometry and this is strongly advised.  His pain control is much more adequate now than it was at admission and has been counseled about the importance of I-S to prevent any secondary pneumonia.  He already has pulmonary compromise as he has a lung mass which is of renal cell carcinoma of which he is under the care of Dr. Montes De Oca with [facility].  He actually has an infusion with an immunotherapy tomorrow so he will be discharged home today.  His leukocytosis is likely reactive and due to his underlying carcinoma but no concerns for active infection.  PT is evaluated while here.  I have ordered home O2 for 30-day.  At 3 L via nasal cannula to help with his hypoxia as a result of the rib fractures and his decreased ability for deep breathing.  We have also ordered him a rolling walker with wheels.  All questions have been answered to the patient he is very thankful of the cares he received while here as well as amenable to the plan.  I discussed the case with thoracic surgery in passing.  Overall long-term prognosis is poor but hopefully he responds to further immunotherapy treatment.  Case discussed with RN to try to ensure a smooth transition to home.    Current outpatient and discharge medications have been reconciled for the patient.  Reviewed by: Dillon Walton MD    Pt comes in today with his wife and daughter and reports pt can't transfer, can't get up from a chair, can't get up to go to the BR, etc since being home. Wife and daughter very concerned about his fall risk, which I agree is very high.         The following portions of the patient's history were  "reviewed and updated as appropriate: allergies, current medications, past family history, past medical history, past social history, past surgical history and problem list.    Review of Systems   Constitutional: Negative for activity change, chills and fever.   Respiratory: Negative for cough.    Cardiovascular: Positive for chest pain (wall).   Psychiatric/Behavioral: Negative for dysphoric mood.       /67   Pulse 94   Temp 97.8 °F (36.6 °C)   Resp 18   Ht 185.4 cm (73\")   Wt 105 kg (231 lb)   BMI 30.48 kg/m²     Objective   Physical Exam  Constitutional:       General: He is not in acute distress.     Appearance: He is well-developed.   Cardiovascular:      Rate and Rhythm: Normal rate and regular rhythm.   Pulmonary:      Effort: Pulmonary effort is normal.      Breath sounds: Normal breath sounds.   Neurological:      Mental Status: He is alert and oriented to person, place, and time.   Psychiatric:         Behavior: Behavior normal.         Thought Content: Thought content normal.     Hospital records reviewed with pt confirming HPI.      Assessment/Plan   Diagnoses and all orders for this visit:    1. Closed fracture of multiple ribs of left side, initial encounter (Primary)  -     oxyCODONE (ROXICODONE) 5 MG immediate release tablet; Take 1 tablet by mouth Every 6 (Six) Hours As Needed for Moderate Pain.  Dispense: 40 tablet; Refill: 0    2. Fall, subsequent encounter    3. Hospital discharge follow-up    Incentive spirometry stressed.   I wheeled pt to car and helped transport him to the passenger seat. Pt is essentially unable to move/transfer and discussed with family maybe returning him to the ED for further evaluation and possible admission/transfer to Rehab.           "

## 2022-03-14 NOTE — ED PROVIDER NOTES
EMERGENCY DEPARTMENT ENCOUNTER    CHIEF COMPLAINT  Chief Complaint: Generalized weakness/rib pain  History given by: Patient  History limited by: None  Room Number: 20/20  PMD: Dillon Walton MD      HPI:  Pt is a 81 y.o. male who presents complaining of generalized weakness with associated immobility that has been present for the past 4 days.  The patient states that he fell 4 days ago causing multiple left-sided rib fractures.  He reports that he was admitted to the hospital and discharged yesterday though he states he was unable to walk secondary to pain as well as weakness.  He does report pain to the lateral chest wall associated with these rib fractures that is made worse by deep inspiration.  He denies headache or head trauma, vomiting, diarrhea/constipation, shortness of breath, or abdominal pain.    Duration: Ongoing for the past 4 days  Onset: Gradual  Location: Generalized  Radiation: None  Quality: Weakness/chest wall pain  Intensity/Severity: Moderate  Progression: Worsening  Associated Symptoms: None  Aggravating Factors: Deep inspirations worsens chest wall pain  Alleviating Factors: None  Previous Episodes: Yes, recent admission for multiple rib fractures  Treatment before arrival: Oxycodone as well as lidocaine patches    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/24/2014   • Herpes zoster 12/06/2011   • Hip pain 03/24/2014   • Essential hypertension    • Cancer (HCC) 04/29/2014   • Pulmonary emphysema (HCC) 03/24/2014   • Left low back pain 10/28/2015   • Vertigo 02/02/2016   • Headache around the eyes 02/02/2016   • Mild cognitive impairment 02/02/2016   • Metastatic renal cell carcinoma  on q 2 wks chemo 02/15/2016   • Anxiety 05/16/2016   • Malignant neoplasm of lung (HCC) 05/16/2016   • Neuropathy 02/02/2017   • Perennial allergic rhinitis 02/02/2017   • Vitamin B12 deficiency  03/16/2017   • 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   • Pharyngitis 12/16/2017   • Bronchitis 05/20/2019   • Other constipation 06/01/2020   • Hx of hiatal hernia 11/10/2020   • History of lung cancer 11/10/2020   • COVID-19 virus detected 11/11/2020   • Dyspnea 11/11/2020   • Generalized weakness 11/11/2020   • Acute respiratory failure with hypoxia (HCC) 11/23/2020   • Gastroesophageal reflux disease 11/23/2020   • Hearing disorder 11/23/2020   • Malignant neoplasm of prostate (HCC) 11/23/2020   • Sleep apnea 11/23/2020   • Anemia 11/23/2020   • Malignant neoplasm metastatic to lung (HCC) 11/23/2020   • Secondary bacterial pneumonia 11/23/2020   • Pneumonia due to COVID-19 virus 11/23/2020   • Hilar mass 11/23/2020   • On antineoplastic chemotherapy q 2wks    • Paroxysmal atrial fibrillation (HCC)    • History of pulmonary embolism    • Nausea 08/04/2021   • Gastritis 01/15/2022   • Muscle spasm 01/15/2022   • Fall 03/11/2022   • Closed fracture of multiple ribs of left side 03/11/2022   • Chest wall pain 03/11/2022   • Fall, initial encounter 03/12/2022     Resolved Ambulatory Problems     Diagnosis Date Noted   • Pulmonary embolism (HCC) 03/24/2014   • Sepsis, unspecified organism (HCC)      Past Medical History:   Diagnosis Date   • Allergic    • Atrial fibrillation (HCC)    • Cataract    • Chemotherapeutic agent or infusion extravasation    • COPD (chronic obstructive pulmonary disease) (HCC) 03/24/2014   • Emphysema of lung (HCC)    • History of Doppler ultrasound 12/28/2011   • History of EKG 02/10/2012   • Hypertension    • Injury of back    • Renal cell cancer (HCC)    • Shortness of breath        PAST SURGICAL HISTORY  Past Surgical History:   Procedure Laterality Date   • APPENDECTOMY     • CATARACT EXTRACTION     • CHOLECYSTECTOMY     • SPLENECTOMY     • TONSILLECTOMY     • VEIN SURGERY         FAMILY HISTORY  Family History   Problem Relation Age of Onset   • Aneurysm Mother    • Stroke Mother     • Heart disease Father    • Diabetes Brother         type 2       SOCIAL HISTORY  Social History     Socioeconomic History   • Marital status:    Tobacco Use   • Smoking status: Former Smoker   • Smokeless tobacco: Never Used   Vaping Use   • Vaping Use: Never used   Substance and Sexual Activity   • Alcohol use: Yes     Comment: 2 drinks month   • Drug use: No   • Sexual activity: Defer       ALLERGIES  Nsaids, Orange, Orange oil, and Latex    REVIEW OF SYSTEMS  Review of Systems   Constitutional: Negative for activity change, appetite change and fever.   HENT: Negative for congestion and sore throat.    Eyes: Negative.    Respiratory: Negative for cough and shortness of breath.    Cardiovascular: Negative for chest pain and leg swelling.   Gastrointestinal: Positive for nausea. Negative for abdominal pain, diarrhea and vomiting.   Endocrine: Negative.    Genitourinary: Negative for decreased urine volume and dysuria.   Musculoskeletal: Negative for neck pain.        Left rib pain   Skin: Negative for rash and wound.   Allergic/Immunologic: Negative.    Neurological: Positive for weakness. Negative for numbness and headaches.   Hematological: Negative.    Psychiatric/Behavioral: Negative.    All other systems reviewed and are negative.      PHYSICAL EXAM  ED Triage Vitals   Temp Heart Rate Resp BP SpO2   03/14/22 1643 03/14/22 1644 03/14/22 1643 -- 03/14/22 1644   99.2 °F (37.3 °C) 89 16  92 %      Temp src Heart Rate Source Patient Position BP Location FiO2 (%)   -- -- -- -- --              Physical Exam  Vitals and nursing note reviewed.   Constitutional:       General: He is not in acute distress.  HENT:      Head: Normocephalic and atraumatic.   Eyes:      Pupils: Pupils are equal, round, and reactive to light.   Cardiovascular:      Rate and Rhythm: Normal rate and regular rhythm.      Heart sounds: Normal heart sounds.   Pulmonary:      Effort: Pulmonary effort is normal. No respiratory distress.       Breath sounds: Normal breath sounds.   Abdominal:      Palpations: Abdomen is soft.      Tenderness: There is no abdominal tenderness. There is no guarding or rebound.   Musculoskeletal:         General: Tenderness and signs of injury present. No swelling or deformity. Normal range of motion.      Cervical back: Normal range of motion and neck supple.      Comments: Tenderness to palpation to the left mid axillary chest wall without palpable fracture or crepitus   Skin:     General: Skin is warm and dry.   Neurological:      Mental Status: He is alert and oriented to person, place, and time.      Sensory: Sensation is intact.   Psychiatric:         Mood and Affect: Mood and affect normal.         LAB RESULTS  Lab Results (last 24 hours)     Procedure Component Value Units Date/Time    CBC & Differential [[id number]]  (Abnormal) Collected: 03/14/22 1922    Specimen: Blood Updated: 03/14/22 1942    Narrative:      The following orders were created for panel order CBC & Differential.  Procedure                               Abnormality         Status                     ---------                               -----------         ------                     CBC Auto Differential[[id number]]        Abnormal            Final result                 Please view results for these tests on the individual orders.    Comprehensive Metabolic Panel [[id number]]  (Abnormal) Collected: 03/14/22 1922    Specimen: Blood Updated: 03/14/22 2003     Glucose 122 mg/dL      BUN 12 mg/dL      Creatinine 1.04 mg/dL      Sodium 138 mmol/L      Potassium 4.3 mmol/L      Comment: Slight hemolysis detected by analyzer. Results may be affected.        Chloride 102 mmol/L      CO2 26.1 mmol/L      Calcium 9.4 mg/dL      Total Protein 6.8 g/dL      Albumin 3.70 g/dL      ALT (SGPT) 22 U/L      AST (SGOT) 17 U/L      Alkaline Phosphatase 70 U/L      Total Bilirubin 1.1 mg/dL      Globulin 3.1 gm/dL      A/G Ratio 1.2 g/dL      BUN/Creatinine Ratio  11.5     Anion Gap 9.9 mmol/L      eGFR 71.7 mL/min/1.73      Comment: National Kidney Foundation and American Society of Nephrology (ASN) Task Force recommended calculation based on the Chronic Kidney Disease Epidemiology Collaboration (CKD-EPI) equation refit without adjustment for race.       Narrative:      GFR Normal >60  Chronic Kidney Disease <60  Kidney Failure <15      Protime-INR [[id number]]  (Abnormal) Collected: 03/14/22 1922    Specimen: Blood Updated: 03/14/22 1951     Protime 18.4 Seconds      INR 1.54    aPTT [[id number]]  (Abnormal) Collected: 03/14/22 1922    Specimen: Blood Updated: 03/14/22 1951     PTT 44.0 seconds     Troponin [[id number]]  (Normal) Collected: 03/14/22 1922    Specimen: Blood Updated: 03/14/22 2003     Troponin T 0.014 ng/mL     Narrative:      Troponin T Reference Range:  <= 0.03 ng/mL-   Negative for AMI  >0.03 ng/mL-     Abnormal for myocardial necrosis.  Clinicians would have to utilize clinical acumen, EKG, Troponin and serial changes to determine if it is an Acute Myocardial Infarction or myocardial injury due to an underlying chronic condition.       Results may be falsely decreased if patient taking Biotin.      Lactic Acid, Plasma [[id number]]  (Normal) Collected: 03/14/22 1922    Specimen: Blood Updated: 03/14/22 2001     Lactate 0.9 mmol/L     CBC Auto Differential [[id number]]  (Abnormal) Collected: 03/14/22 1922    Specimen: Blood Updated: 03/14/22 1942     WBC 22.12 10*3/mm3      RBC 2.89 10*6/mm3      Hemoglobin 10.7 g/dL      Hematocrit 31.9 %      .4 fL      MCH 37.0 pg      MCHC 33.5 g/dL      RDW 9.9 %      RDW-SD 39.3 fl      MPV 9.9 fL      Platelets 303 10*3/mm3      Neutrophil % 66.9 %      Lymphocyte % 17.2 %      Monocyte % 13.8 %      Eosinophil % 0.9 %      Basophil % 0.5 %      Neutrophils, Absolute 14.80 10*3/mm3      Lymphocytes, Absolute 3.81 10*3/mm3      Monocytes, Absolute 3.05 10*3/mm3      Eosinophils, Absolute 0.19 10*3/mm3       Basophils, Absolute 0.11 10*3/mm3     Urinalysis With Microscopic If Indicated (No Culture) - Urine, Clean Catch [[id number]]  (Abnormal) Collected: 03/14/22 2015    Specimen: Urine, Clean Catch Updated: 03/14/22 2048     Color, UA Yellow     Appearance, UA Clear     pH, UA <=5.0     Specific Gravity, UA 1.016     Glucose, UA Negative     Ketones, UA Negative     Bilirubin, UA Negative     Blood, UA Small (1+)     Protein, UA Negative     Leuk Esterase, UA Trace     Nitrite, UA Negative     Urobilinogen, UA 1.0 E.U./dL    Urinalysis, Microscopic Only - Urine, Clean Catch [[id number]]  (Abnormal) Collected: 03/14/22 2015    Specimen: Urine, Clean Catch Updated: 03/14/22 2049     RBC, UA 21-30 /HPF      WBC, UA 0-2 /HPF      Bacteria, UA None Seen /HPF      Squamous Epithelial Cells, UA 0-2 /HPF      Hyaline Casts, UA 3-6 /LPF      Methodology Automated Microscopy          I ordered the above labs and reviewed the results    RADIOLOGY  XR Chest 1 View   Final Result      CT Head Without Contrast    (Results Pending)        I ordered the above noted radiological studies. Interpreted by radiologist.  Reviewed by me in PACS.       PROCEDURES  Procedures      PROGRESS AND CONSULTS     The patient was wearing a facemask upon entrance into the room and remained in such throughout their visit.  I was wearing PPE including a facemask, eye protection, as well as gloves at any point entering the room and throughout the visit    2115  Case discussed with ADRIANA Miller for [facility], who agrees to admit the patient to the hospital for Dr. Tipton.    2140  On reevaluation, the patient is resting comfortably without any gross neurological deficits and stable vital signs.  I informed him that his work-up does show a elevation in his WBC count however no acute abnormalities otherwise.  I had already informed the patient as well as his spouse that we would be admitting him to the hospital today.  I did reiterate this fact to which  they are in agreement and all questions have been answered.      MEDICAL DECISION MAKING  Results were reviewed/discussed with the patient and they were also made aware of online access. Pt also made aware that some labs, such as cultures, will not be resulted during ER visit and follow up with PMD is necessary.     MDM  Number of Diagnoses or Management Options     Amount and/or Complexity of Data Reviewed  Clinical lab tests: ordered and reviewed  Tests in the radiology section of CPT®: ordered and reviewed  Tests in the medicine section of CPT®: ordered and reviewed  Review and summarize past medical records: yes (Upon medical records review, the patient was last seen and evaluated on 3/10/2022 where he was admitted for a fall with multiple closed rib fractures)  Discuss the patient with other providers: yes (ADRIANA Miller for [facility], who limit the patient to the hospital for Dr. Tipton)  Independent visualization of images, tracings, or specimens: yes (No obvious acute abnormality seen on chest x-ray)           DIAGNOSIS  Final diagnoses:   Generalized weakness   Immobility   Closed fracture of multiple ribs of left side, initial encounter   Leukocytosis, unspecified type       DISPOSITION  ADMISSION    Discussed treatment plan and reason for admission with pt/family and admitting physician.  Pt/family voiced understanding of the plan for admission for further testing/treatment as needed.         Latest Documented Vital Signs:  As of 21:20 EDT  BP- 166/76 HR- 89 Temp- 99.2 °F (37.3 °C) O2 sat- 92%         Keven Radford MD  03/14/22 3319

## 2022-03-15 ENCOUNTER — HOME CARE VISIT (OUTPATIENT)
Dept: HOME HEALTH SERVICES | Facility: HOME HEALTHCARE | Age: 82
End: 2022-03-15

## 2022-03-15 ENCOUNTER — READMISSION MANAGEMENT (OUTPATIENT)
Dept: CALL CENTER | Facility: HOSPITAL | Age: 82
End: 2022-03-15

## 2022-03-15 PROBLEM — K59.03 DRUG-INDUCED CONSTIPATION: Status: ACTIVE | Noted: 2020-06-01

## 2022-03-15 LAB
ALBUMIN SERPL-MCNC: 3.7 G/DL (ref 3.5–5.2)
ALP SERPL-CCNC: 66 U/L (ref 39–117)
ALT SERPL W P-5'-P-CCNC: 18 U/L (ref 1–41)
ANION GAP SERPL CALCULATED.3IONS-SCNC: 8.1 MMOL/L (ref 5–15)
AST SERPL-CCNC: 14 U/L (ref 1–40)
BILIRUB CONJ SERPL-MCNC: 0.3 MG/DL (ref 0–0.3)
BILIRUB INDIRECT SERPL-MCNC: 0.8 MG/DL
BILIRUB SERPL-MCNC: 1.1 MG/DL (ref 0–1.2)
BUN SERPL-MCNC: 9 MG/DL (ref 8–23)
BUN/CREAT SERPL: 9.9 (ref 7–25)
CALCIUM SPEC-SCNC: 8.9 MG/DL (ref 8.6–10.5)
CHLORIDE SERPL-SCNC: 101 MMOL/L (ref 98–107)
CO2 SERPL-SCNC: 26.9 MMOL/L (ref 22–29)
CREAT SERPL-MCNC: 0.85 MG/DL (ref 0.76–1.27)
CREAT SERPL-MCNC: 0.91 MG/DL (ref 0.76–1.27)
CRP SERPL-MCNC: 5.38 MG/DL (ref 0–0.5)
D DIMER PPP FEU-MCNC: 1.79 MCGFEU/ML (ref 0–0.49)
DEPRECATED RDW RBC AUTO: 38.1 FL (ref 37–54)
EGFRCR SERPLBLD CKD-EPI 2021: 84.1 ML/MIN/1.73
EGFRCR SERPLBLD CKD-EPI 2021: 86.8 ML/MIN/1.73
ERYTHROCYTE [DISTWIDTH] IN BLOOD BY AUTOMATED COUNT: 9.9 % (ref 12.3–15.4)
FERRITIN SERPL-MCNC: 534 NG/ML (ref 30–400)
GLUCOSE SERPL-MCNC: 119 MG/DL (ref 65–99)
HCT VFR BLD AUTO: 30.9 % (ref 37.5–51)
HGB BLD-MCNC: 10.3 G/DL (ref 13–17.7)
MCH RBC QN AUTO: 36.3 PG (ref 26.6–33)
MCHC RBC AUTO-ENTMCNC: 33.3 G/DL (ref 31.5–35.7)
MCV RBC AUTO: 108.8 FL (ref 79–97)
PLATELET # BLD AUTO: 292 10*3/MM3 (ref 140–450)
PMV BLD AUTO: 9.9 FL (ref 6–12)
POTASSIUM SERPL-SCNC: 4.3 MMOL/L (ref 3.5–5.2)
PROCALCITONIN SERPL-MCNC: 0.21 NG/ML (ref 0–0.25)
PROT SERPL-MCNC: 6.4 G/DL (ref 6–8.5)
RBC # BLD AUTO: 2.84 10*6/MM3 (ref 4.14–5.8)
SODIUM SERPL-SCNC: 136 MMOL/L (ref 136–145)
WBC NRBC COR # BLD: 19.8 10*3/MM3 (ref 3.4–10.8)

## 2022-03-15 PROCEDURE — 82728 ASSAY OF FERRITIN: CPT | Performed by: INTERNAL MEDICINE

## 2022-03-15 PROCEDURE — 94799 UNLISTED PULMONARY SVC/PX: CPT

## 2022-03-15 PROCEDURE — 94761 N-INVAS EAR/PLS OXIMETRY MLT: CPT

## 2022-03-15 PROCEDURE — 85027 COMPLETE CBC AUTOMATED: CPT

## 2022-03-15 PROCEDURE — 63710000001 DEXAMETHASONE PER 0.25 MG: Performed by: INTERNAL MEDICINE

## 2022-03-15 PROCEDURE — 97535 SELF CARE MNGMENT TRAINING: CPT

## 2022-03-15 PROCEDURE — 97162 PT EVAL MOD COMPLEX 30 MIN: CPT

## 2022-03-15 PROCEDURE — 82565 ASSAY OF CREATININE: CPT | Performed by: INTERNAL MEDICINE

## 2022-03-15 PROCEDURE — 86140 C-REACTIVE PROTEIN: CPT | Performed by: INTERNAL MEDICINE

## 2022-03-15 PROCEDURE — XW033E5 INTRODUCTION OF REMDESIVIR ANTI-INFECTIVE INTO PERIPHERAL VEIN, PERCUTANEOUS APPROACH, NEW TECHNOLOGY GROUP 5: ICD-10-PCS | Performed by: INTERNAL MEDICINE

## 2022-03-15 PROCEDURE — 80076 HEPATIC FUNCTION PANEL: CPT | Performed by: INTERNAL MEDICINE

## 2022-03-15 PROCEDURE — 36415 COLL VENOUS BLD VENIPUNCTURE: CPT

## 2022-03-15 PROCEDURE — 97530 THERAPEUTIC ACTIVITIES: CPT

## 2022-03-15 PROCEDURE — 25010000002 ONDANSETRON PER 1 MG

## 2022-03-15 PROCEDURE — 25010000002 REMDESIVIR 100 MG/20ML SOLUTION 1 EACH VIAL: Performed by: INTERNAL MEDICINE

## 2022-03-15 PROCEDURE — 94640 AIRWAY INHALATION TREATMENT: CPT

## 2022-03-15 PROCEDURE — 85379 FIBRIN DEGRADATION QUANT: CPT | Performed by: INTERNAL MEDICINE

## 2022-03-15 PROCEDURE — 80048 BASIC METABOLIC PNL TOTAL CA: CPT

## 2022-03-15 PROCEDURE — 84145 PROCALCITONIN (PCT): CPT | Performed by: INTERNAL MEDICINE

## 2022-03-15 PROCEDURE — 94760 N-INVAS EAR/PLS OXIMETRY 1: CPT

## 2022-03-15 PROCEDURE — 97110 THERAPEUTIC EXERCISES: CPT

## 2022-03-15 PROCEDURE — 97166 OT EVAL MOD COMPLEX 45 MIN: CPT

## 2022-03-15 PROCEDURE — 94664 DEMO&/EVAL PT USE INHALER: CPT

## 2022-03-15 RX ORDER — CETIRIZINE HYDROCHLORIDE 10 MG/1
5 TABLET ORAL DAILY
Refills: 1 | Status: DISCONTINUED | OUTPATIENT
Start: 2022-03-15 | End: 2022-03-20 | Stop reason: HOSPADM

## 2022-03-15 RX ORDER — ONDANSETRON 4 MG/1
4 TABLET, FILM COATED ORAL EVERY 8 HOURS PRN
Status: DISCONTINUED | OUTPATIENT
Start: 2022-03-15 | End: 2022-03-15 | Stop reason: SDUPTHER

## 2022-03-15 RX ORDER — CYCLOBENZAPRINE HCL 10 MG
5 TABLET ORAL 3 TIMES DAILY PRN
Status: DISCONTINUED | OUTPATIENT
Start: 2022-03-15 | End: 2022-03-20 | Stop reason: HOSPADM

## 2022-03-15 RX ORDER — FOLIC ACID 1 MG/1
1 TABLET ORAL 3 TIMES DAILY
Status: DISCONTINUED | OUTPATIENT
Start: 2022-03-15 | End: 2022-03-20 | Stop reason: HOSPADM

## 2022-03-15 RX ORDER — GABAPENTIN 400 MG/1
400 CAPSULE ORAL EVERY 12 HOURS SCHEDULED
Status: DISCONTINUED | OUTPATIENT
Start: 2022-03-15 | End: 2022-03-20 | Stop reason: HOSPADM

## 2022-03-15 RX ORDER — MEMANTINE HYDROCHLORIDE 10 MG/1
10 TABLET ORAL EVERY 12 HOURS SCHEDULED
Status: DISCONTINUED | OUTPATIENT
Start: 2022-03-15 | End: 2022-03-20 | Stop reason: HOSPADM

## 2022-03-15 RX ORDER — GABAPENTIN 400 MG/1
800 CAPSULE ORAL EVERY EVENING
Status: DISCONTINUED | OUTPATIENT
Start: 2022-03-15 | End: 2022-03-20 | Stop reason: HOSPADM

## 2022-03-15 RX ORDER — OXCARBAZEPINE 300 MG/1
300 TABLET, FILM COATED ORAL NIGHTLY
Status: DISCONTINUED | OUTPATIENT
Start: 2022-03-15 | End: 2022-03-20 | Stop reason: HOSPADM

## 2022-03-15 RX ORDER — ALBUTEROL SULFATE 90 UG/1
2 AEROSOL, METERED RESPIRATORY (INHALATION)
Status: DISCONTINUED | OUTPATIENT
Start: 2022-03-15 | End: 2022-03-20 | Stop reason: HOSPADM

## 2022-03-15 RX ORDER — OXYCODONE HYDROCHLORIDE 5 MG/1
5 TABLET ORAL EVERY 4 HOURS PRN
Status: DISCONTINUED | OUTPATIENT
Start: 2022-03-15 | End: 2022-03-20 | Stop reason: HOSPADM

## 2022-03-15 RX ORDER — FAMOTIDINE 20 MG/1
20 TABLET, FILM COATED ORAL 2 TIMES DAILY
Status: DISCONTINUED | OUTPATIENT
Start: 2022-03-15 | End: 2022-03-20 | Stop reason: HOSPADM

## 2022-03-15 RX ORDER — MECLIZINE HCL 12.5 MG/1
12.5 TABLET ORAL 3 TIMES DAILY PRN
Status: DISCONTINUED | OUTPATIENT
Start: 2022-03-15 | End: 2022-03-20 | Stop reason: HOSPADM

## 2022-03-15 RX ORDER — ALPRAZOLAM 0.25 MG/1
0.25 TABLET ORAL 2 TIMES DAILY PRN
Status: DISCONTINUED | OUTPATIENT
Start: 2022-03-15 | End: 2022-03-20 | Stop reason: HOSPADM

## 2022-03-15 RX ORDER — AMLODIPINE BESYLATE 5 MG/1
5 TABLET ORAL DAILY
Status: DISCONTINUED | OUTPATIENT
Start: 2022-03-15 | End: 2022-03-20 | Stop reason: HOSPADM

## 2022-03-15 RX ORDER — DOCUSATE SODIUM 100 MG/1
100 CAPSULE, LIQUID FILLED ORAL 2 TIMES DAILY
Status: DISCONTINUED | OUTPATIENT
Start: 2022-03-15 | End: 2022-03-20 | Stop reason: HOSPADM

## 2022-03-15 RX ORDER — LIDOCAINE 50 MG/G
2 PATCH TOPICAL EVERY 24 HOURS
Status: DISCONTINUED | OUTPATIENT
Start: 2022-03-15 | End: 2022-03-20 | Stop reason: HOSPADM

## 2022-03-15 RX ORDER — SERTRALINE HYDROCHLORIDE 25 MG/1
25 TABLET, FILM COATED ORAL DAILY
Status: DISCONTINUED | OUTPATIENT
Start: 2022-03-15 | End: 2022-03-16

## 2022-03-15 RX ORDER — LEVOTHYROXINE SODIUM 112 UG/1
112 TABLET ORAL DAILY
Status: DISCONTINUED | OUTPATIENT
Start: 2022-03-15 | End: 2022-03-20 | Stop reason: HOSPADM

## 2022-03-15 RX ORDER — OXYCODONE HYDROCHLORIDE 5 MG/1
5 TABLET ORAL EVERY 6 HOURS PRN
Status: DISCONTINUED | OUTPATIENT
Start: 2022-03-15 | End: 2022-03-15 | Stop reason: SDUPTHER

## 2022-03-15 RX ORDER — POLYETHYLENE GLYCOL 3350 17 G/17G
17 POWDER, FOR SOLUTION ORAL DAILY PRN
Status: DISCONTINUED | OUTPATIENT
Start: 2022-03-15 | End: 2022-03-20 | Stop reason: HOSPADM

## 2022-03-15 RX ORDER — CYCLOBENZAPRINE HCL 10 MG
10 TABLET ORAL 3 TIMES DAILY
Status: DISCONTINUED | OUTPATIENT
Start: 2022-03-15 | End: 2022-03-20 | Stop reason: HOSPADM

## 2022-03-15 RX ORDER — DONEPEZIL HYDROCHLORIDE 10 MG/1
10 TABLET, FILM COATED ORAL NIGHTLY
Status: DISCONTINUED | OUTPATIENT
Start: 2022-03-15 | End: 2022-03-20 | Stop reason: HOSPADM

## 2022-03-15 RX ADMIN — ALPRAZOLAM 0.25 MG: 0.25 TABLET ORAL at 20:32

## 2022-03-15 RX ADMIN — ALBUTEROL SULFATE 2 PUFF: 90 AEROSOL, METERED RESPIRATORY (INHALATION) at 10:35

## 2022-03-15 RX ADMIN — TIOTROPIUM BROMIDE INHALATION SPRAY 2 PUFF: 3.12 SPRAY, METERED RESPIRATORY (INHALATION) at 10:35

## 2022-03-15 RX ADMIN — CETIRIZINE HYDROCHLORIDE 5 MG: 10 TABLET ORAL at 12:11

## 2022-03-15 RX ADMIN — DOCUSATE SODIUM 100 MG: 100 CAPSULE, LIQUID FILLED ORAL at 20:33

## 2022-03-15 RX ADMIN — OXYCODONE HYDROCHLORIDE 5 MG: 5 TABLET ORAL at 04:11

## 2022-03-15 RX ADMIN — CYCLOBENZAPRINE 10 MG: 10 TABLET, FILM COATED ORAL at 20:32

## 2022-03-15 RX ADMIN — OXYCODONE HYDROCHLORIDE 5 MG: 5 TABLET ORAL at 08:48

## 2022-03-15 RX ADMIN — DOCUSATE SODIUM 100 MG: 100 CAPSULE, LIQUID FILLED ORAL at 04:11

## 2022-03-15 RX ADMIN — FOLIC ACID 1 MG: 1 TABLET ORAL at 12:12

## 2022-03-15 RX ADMIN — GABAPENTIN 800 MG: 400 CAPSULE ORAL at 17:00

## 2022-03-15 RX ADMIN — GABAPENTIN 400 MG: 400 CAPSULE ORAL at 12:14

## 2022-03-15 RX ADMIN — DEXAMETHASONE 6 MG: 4 TABLET ORAL at 12:14

## 2022-03-15 RX ADMIN — MEMANTINE HYDROCHLORIDE 10 MG: 10 TABLET, FILM COATED ORAL at 20:32

## 2022-03-15 RX ADMIN — CALCIUM CARBONATE-VITAMIN D TAB 500 MG-200 UNIT 1 TABLET: 500-200 TAB at 12:11

## 2022-03-15 RX ADMIN — Medication 10 ML: at 08:45

## 2022-03-15 RX ADMIN — CALCIUM CARBONATE-VITAMIN D TAB 500 MG-200 UNIT 1 TABLET: 500-200 TAB at 20:33

## 2022-03-15 RX ADMIN — REMDESIVIR 200 MG: 100 INJECTION, POWDER, LYOPHILIZED, FOR SOLUTION INTRAVENOUS at 17:22

## 2022-03-15 RX ADMIN — CYCLOBENZAPRINE 10 MG: 10 TABLET, FILM COATED ORAL at 08:44

## 2022-03-15 RX ADMIN — LIDOCAINE 2 PATCH: 50 PATCH CUTANEOUS at 12:15

## 2022-03-15 RX ADMIN — SERTRALINE 25 MG: 25 TABLET, FILM COATED ORAL at 12:12

## 2022-03-15 RX ADMIN — OXYCODONE HYDROCHLORIDE 5 MG: 5 TABLET ORAL at 17:00

## 2022-03-15 RX ADMIN — DONEPEZIL HYDROCHLORIDE 10 MG: 10 TABLET, FILM COATED ORAL at 20:33

## 2022-03-15 RX ADMIN — OXYCODONE HYDROCHLORIDE 5 MG: 5 TABLET ORAL at 20:33

## 2022-03-15 RX ADMIN — FAMOTIDINE 20 MG: 20 TABLET ORAL at 12:13

## 2022-03-15 RX ADMIN — MEMANTINE HYDROCHLORIDE 10 MG: 10 TABLET, FILM COATED ORAL at 12:11

## 2022-03-15 RX ADMIN — GABAPENTIN 400 MG: 400 CAPSULE ORAL at 20:32

## 2022-03-15 RX ADMIN — RIVAROXABAN 20 MG: 20 TABLET, FILM COATED ORAL at 18:30

## 2022-03-15 RX ADMIN — FOLIC ACID 1 MG: 1 TABLET ORAL at 20:33

## 2022-03-15 RX ADMIN — OXCARBAZEPINE 300 MG: 300 TABLET, FILM COATED ORAL at 20:37

## 2022-03-15 RX ADMIN — FOLIC ACID 1 MG: 1 TABLET ORAL at 17:00

## 2022-03-15 RX ADMIN — AMLODIPINE BESYLATE 5 MG: 5 TABLET ORAL at 12:12

## 2022-03-15 RX ADMIN — ALBUTEROL SULFATE 2 PUFF: 90 AEROSOL, METERED RESPIRATORY (INHALATION) at 20:59

## 2022-03-15 RX ADMIN — ALBUTEROL SULFATE 2 PUFF: 90 AEROSOL, METERED RESPIRATORY (INHALATION) at 15:42

## 2022-03-15 RX ADMIN — FAMOTIDINE 20 MG: 20 TABLET ORAL at 20:35

## 2022-03-15 NOTE — CASE MANAGEMENT/SOCIAL WORK
Discharge Planning Assessment  [facility]     Patient Name: Mg Gerber .  MRN: [id number]  Today's Date: 3/15/2022    Admit Date: 3/14/2022     Discharge Needs Assessment     Row Name 03/15/22 1431       Living Environment    People in Home child(haroldo), adult;spouse    Name(s) of People in Home Wife Lucina and adult daughter.    Current Living Arrangements home    Primary Care Provided by self    Provides Primary Care For no one, unable/limited ability to care for self    Family Caregiver if Needed spouse;child(haroldo), adult    Family Caregiver Names Lucina and daughter    Quality of Family Relationships helpful;involved;supportive    Able to Return to Prior Arrangements yes       Resource/Environmental Concerns    Resource/Environmental Concerns none    Transportation Concerns no car       Transition Planning    Patient/Family Anticipates Transition to inpatient rehabilitation facility    Patient/Family Anticipated Services at Transition skilled nursing;rehabilitation services    Transportation Anticipated health plan transportation       Discharge Needs Assessment    Equipment Currently Used at Home walker, rolling;wheelchair;oxygen;cpap    Concerns to be Addressed discharge planning    Anticipated Changes Related to Illness inability to care for self    Equipment Needed After Discharge none    Outpatient/Agency/Support Group Needs skilled nursing facility    Discharge Facility/Level of Care Needs nursing facility, skilled               Discharge Plan     Row Name 03/15/22 1434       Plan    Plan SNF, referral pending for [facility]    Patient/Family in Agreement with Plan yes    Plan Comments Attempted to reach patient via phone. He was unable to hear CCP and hung up. Outbound call to patient's wife Lucnia. Explained CCP role and verified face sheet. Patient lives at home with Lucina and their adult daughter in a tri level home. He can enter through the patio where there are no steps to enter the  home and he will be on the lower level where he could stay. He has a walker, WC, O2 from [facility] @ [address] [place], and a Cpap. He was scheduled to be [facility] to [facility] but was readmitted to the hospital. Wife stated they are currently unable to care for him at his functional level and he will need SNF at [place]. She was agreeable to a referral for [facility]. Referral placed in Epic. Partial packet in CCP office. Patient may need transport at [place]. aTmmy FIORE RN CCP              Continued Care and Services - Admitted Since 3/14/2022     Destination     Service Provider Request Status Selected Services Address Phone Fax Patient Preferred    [facility]  Pending - Request Sent N/A [id number] [facility] [address] [phone] --            Selected Continued Care - Prior Encounters Includes selections from prior encounters from 12/14/2021 to 3/15/2022    Discharged on 3/13/2022 Admission date: 3/10/2022 - Discharge disposition: Home or Self Care    Home Medical Care     Service Provider Selected Services Address Phone Fax Patient Preferred     [facility]  Home Health Services [address] [address] [phone] --                       Demographic Summary     Row Name 03/15/22 1425       General Information    Admission Type inpatient    Arrived From emergency department    Referral Source admission list    Reason for Consult discharge planning    Preferred Language English       Contact Information    Permission Granted to Share Info With family/designee               Functional Status     Row Name 03/15/22 1430       Functional Status    Usual Activity Tolerance good    Current Activity Tolerance poor       Functional Status, IADL    Medications independent    Meal Preparation independent    Housekeeping independent    Laundry independent    Shopping independent               Psychosocial     Row Name 03/15/22 1438        Coping/Stress    Sources of Support adult child(haroldo);spouse       Developmental Stage (Eriksson's)    Developmental Stage Stage 8 (64 years-death/Late Adulthood) Integrity vs. Despair               Abuse/Neglect    No documentation.                Legal    No documentation.                Substance Abuse    No documentation.                Patient Forms    No documentation.                   Tammy Powers RN

## 2022-03-15 NOTE — OUTREACH NOTE
Medical Week 2 Survey    Flowsheet Row Responses   [facility] patient discharged from? [place]   Does the patient have one of the following disease processes/diagnoses(primary or secondary)? Other   Week 2 attempt successful? No   Unsuccessful attempts Attempt 1   Revoke Readmitted          KELLEY DONALD - Registered Nurse

## 2022-03-15 NOTE — PROGRESS NOTES
[facility]  Clinical Pharmacy Department     Remdesivir Review Note    Mg Gerber Sr. is a 81 y.o. male with confirmed COVID-19 infection on day 2 of hospitalization.     Consulting Provider:  Lemuel  Date of Confirmed SARS-CoV-2: 3/14/22  Date of Symptom Onset: past 4 days  Other Antimicrobials: none  Hydroxychloroquine or chloroquine prior to arrival: none    Allergies  Allergies as of 03/14/2022 - Reviewed 03/14/2022   Allergen Reaction Noted    Nsaids Hives and Itching 09/13/2021    Orange Unknown - Low Severity 11/26/2020    Orange oil Unknown - Low Severity 11/26/2020    Latex Rash 02/01/2019       Microbiology:  Microbiology Results (last 10 days)       Procedure Component Value - Date/Time    COVID PRE-OP / PRE-PROCEDURE SCREENING ORDER (NO ISOLATION) - Swab, Nasopharynx [[id number]]  (Abnormal) Collected: 03/14/22 2116    Lab Status: Final result Specimen: Swab from Nasopharynx Updated: 03/14/22 2225    Narrative:      The following orders were created for panel order COVID PRE-OP / PRE-PROCEDURE SCREENING ORDER (NO ISOLATION) - Swab, Nasopharynx.  Procedure                               Abnormality         Status                     ---------                               -----------         ------                     COVID-19,[facility] IN-HOUSE...[[id number]]  Abnormal            Final result                 Please view results for these tests on the individual orders.    COVID-19,[facility] IN-HOUSE CEPHEID/DARRELL NP SWAB IN TRANSPORT MEDIA 8-12 HR TAT - Swab, Nasopharynx [[id number]]  (Abnormal) Collected: 03/14/22 2116    Lab Status: Final result Specimen: Swab from Nasopharynx Updated: 03/14/22 2225     COVID19 Detected    Narrative:      Fact sheet for providers: https://www.fda.gov/media/625221/download     Fact sheet for patients: https://www.fda.gov/media/530685/download    COVID PRE-OP / PRE-PROCEDURE SCREENING ORDER (NO ISOLATION) - Swab, Nasopharynx [[id number]]  (Normal) Collected: 03/11/22  0228    Lab Status: Final result Specimen: Swab from Nasopharynx Updated: 03/11/22 0304    Narrative:      The following orders were created for panel order COVID PRE-OP / PRE-PROCEDURE SCREENING ORDER (NO ISOLATION) - Swab, Nasopharynx.  Procedure                               Abnormality         Status                     ---------                               -----------         ------                     COVID-19,[facility] IN-HOUSE...[[id number]]  Normal              Final result                 Please view results for these tests on the individual orders.    COVID-19,[facility] IN-HOUSE CEPHEID/DARRELL NP SWAB IN TRANSPORT MEDIA 8-12 HR TAT - Swab, Nasopharynx [[id number]]  (Normal) Collected: 03/11/22 0228    Lab Status: Final result Specimen: Swab from Nasopharynx Updated: 03/11/22 0304     COVID19 Not Detected    Narrative:      Fact sheet for providers: https://www.fda.gov/media/229293/download    Fact sheet for patients: https://www.fda.gov/media/519406/download    Test performed by PCR.            Vitals/Labs/I&O  [unfilled]    Results from last 7 days   Lab Units 03/15/22  0828 03/14/22 1922 03/11/22  0619   WBC 10*3/mm3 19.80* 22.12* 19.16*     Results from last 7 days   Lab Units 03/15/22  0828   PROCALCITONIN ng/mL 0.21     Results from last 7 days   Lab Units 03/14/22  1922 03/10/22  2312   AST (SGOT) U/L 17 19      Results from last 7 days   Lab Units 03/14/22 1922 03/10/22  2312   ALT (SGPT) U/L 22 17       Estimated Creatinine Clearance: 78.8 mL/min (by C-G formula based on SCr of 0.91 mg/dL).  Results from last 7 days   Lab Units 03/15/22  0828 03/14/22 1922 03/11/22  0619   BUN mg/dL 9 12 16   CREATININE mg/dL 0.91 1.04 1.09     Intake & Output (last 3 days)         03/12 0701  03/13 0700 03/13 0701  03/14 0700 03/14 0701  03/15 0700 03/15 0701  03/16 0700    P.O.   120 120    IV Piggyback   1000     Total Intake(mL/kg)   1120 (10.7) 120 (1.1)    Urine (mL/kg/hr)    480 (0.5)    Total Output    480     Net   +1120 -391                    Assessment/Plan:    Patient meets the following inclusion/exclusion criteria:  Patient is hospitalized with confirmed COVID-19 infection (and accompanying symptoms)  Patient meets one of the two below criteria:  Patient is requiring an increase in baseline supplemental oxygen requirements (pt is requiring 6L today) OR SpO2 </= 94% on room air secondary to COVID-19 infection OR   Patient is symptomatic but not requiring supplemental oxygen or an increase in baseline oxygen AND has at least one of the below high risk criteria for progression to severe illness. High risk criteria: i  Age >/= 65  Cancer  Cardiovascular diseases (HF, CAD, or cardiomyopathies)  CKD  Chronic lung disease (COPD, CF, interstitial lung disease, PE, pulmonary hypertension, bronchopulmonary dysplasia, bronchiectasis)  Diabetes mellitis (insulin dependent or poorly controlled)  Immunocompromising conditions or receipt of immunosuppressive medications  Obesity (BMI ./= 35 kg/m2)  Pregnancy and recent pregnancy (within 7 days of delivery)  Sickle cell disease  Baseline and daily LFTs and Scr have been ordered prior to remdesivir initiation  ALT is not ? 10 times the upper limit of normal  Patient is not on concomitant hydroxychloroquine or chloroquine  Patient does not require invasive mechanical ventilation (IMV) or ECMO  Patient is within 10 days from symptom onset (for criteria 2a) or within 7 days of symptom onset (for criteria 2b)    Remdesivir 200 mg IV x 1 dose, followed by 100 mg IV q24h for 4 days or until hospital discharge (whichever comes first) has been ordered.    Thank you for involving pharmacy in this patient's care. Please contact pharmacy with any questions or concerns.                           Dillon Farrell [facility]  Clinical Pharmacist  03/15/22 15:47 EDT

## 2022-03-15 NOTE — H&P
Patient Name:  Mg Gerber Sr.  YOB: 1940  MRN:  [id number]  Admit Date:  3/14/2022  Patient Care Team:  Dillon Walton MD as PCP - General (Family Medicine)      Subjective   History Present Illness     Chief Complaint   Patient presents with   • Weakness - Generalized       Mr. Gerber is a 81 y.o. male with a history of atrial fibrillation, COPD, emphysema, hypertension, renal cell cancer, lung cancer, sleep apnea and falls that presents to [facility] complaining of weakness.  Patient was recently admitted to [facility] following a fall that resulted in multiple rib fractures.  Was recommended that patient be discharged to a skilled rehabilitation facility but patient declined and decided to resume physical therapy at home.  However, once home patient was unable to care for himself and patient's wife is not strong enough to provide adequate care for patient so he was brought back to [facility] for further evaluation as he seems to have progressively getting weaker.  Patient is alert and oriented but a very poor historian.  During examination he can tell me that his wife is unable to help him, he is unable to walk and he is constipated.  He is unsure of answers to most questions during examination.  Patient seems to be in no acute distress but is ill-appearing.  He is on supplemental oxygen.  He states that he was supposed to receive supplemental oxygen at home but had not been receiving it.  Patient is able to follow commands and has equal strength in all extremities.  Patient does have scattered bruising throughout his body.  Ribs on the left side remain tender as they are broken from previous fall.  Plan of care reviewed with patient he is agreeable.  Review of systems, physical exam, diagnostic data and plan as outlined below.      History of Present Illness  Review of Systems   Constitutional: Positive for activity change and appetite  change. Negative for chills, diaphoresis and fever.   HENT: Negative for sore throat and trouble swallowing.    Respiratory: Positive for cough and shortness of breath. Negative for choking and wheezing.    Cardiovascular: Negative for chest pain and palpitations.   Gastrointestinal: Positive for constipation. Negative for abdominal pain, diarrhea and vomiting.   Endocrine: Negative for cold intolerance and heat intolerance.   Genitourinary: Negative for difficulty urinating.   Musculoskeletal: Positive for arthralgias, back pain and gait problem. Negative for neck stiffness.   Neurological: Positive for weakness. Negative for dizziness, light-headedness and headaches.   Psychiatric/Behavioral: Positive for confusion and decreased concentration. Negative for sleep disturbance and suicidal ideas. The patient is nervous/anxious.         Personal History     Past Medical History:   Diagnosis Date   • Allergic    • Anemia, vitamin B12 deficiency 04/12/2012   • Anxiety    • Arthritis 03/24/2014   • Atrial fibrillation (HCC)    • Cancer (HCC) 04/29/2014    kidney; prostate   • Cataract    • Chemotherapeutic agent or infusion extravasation     q 1-2 wks    • COPD (chronic obstructive pulmonary disease) (HCC) 03/24/2014   • Emphysema of lung (HCC)    • Hemochromatosis 03/24/2014   • Herpes zoster 12/06/2011    left medial thigh   • Hip pain 03/24/2014   • History of Doppler ultrasound 12/28/2011    superficial and deep venous insuffiency   • History of EKG 02/10/2012    Dr. Kathi Lloyd; unchanged from prior EKG   • Hypertension     benign essential   • Injury of back    • Pulmonary embolism (HCC) 03/24/2014   • Renal cell cancer (HCC)    • Shortness of breath    • Sleep apnea      Past Surgical History:   Procedure Laterality Date   • APPENDECTOMY     • CATARACT EXTRACTION     • CHOLECYSTECTOMY     • SPLENECTOMY     • TONSILLECTOMY     • VEIN SURGERY       Family History   Problem Relation Age of Onset   • Aneurysm Mother     • Stroke Mother    • Heart disease Father    • Diabetes Brother         type 2     Social History     Tobacco Use   • Smoking status: Former Smoker   • Smokeless tobacco: Never Used   Vaping Use   • Vaping Use: Never used   Substance Use Topics   • Alcohol use: Yes     Comment: 2 drinks month   • Drug use: No     No current facility-administered medications on file prior to encounter.     Current Outpatient Medications on File Prior to Encounter   Medication Sig Dispense Refill   • ALPRAZolam (Xanax) 0.25 MG tablet Take 1 tablet by mouth 2 (Two) Times a Day As Needed for Anxiety. 60 tablet 2   • amLODIPine (NORVASC) 5 MG tablet Take 1 tablet by mouth Daily. 90 tablet 1   • Calcium Carb-Cholecalciferol (CALTRATE 600+D3 PO) Take 1 tablet by mouth 2 (two) times a day.     • cyclobenzaprine (FLEXERIL) 5 MG tablet Take 5 mg by mouth 3 (Three) Times a Day As Needed for Muscle Spasms.     • famotidine (Pepcid) 20 MG tablet Take 1 tablet by mouth 2 (Two) Times a Day. 180 tablet 1   • folic acid (FOLVITE) 1 MG tablet Take 1 mg by mouth 3 (Three) Times a Day.     • gabapentin (NEURONTIN) 400 MG capsule Take 1 capsule by mouth 2 (two) times a day. (Patient taking differently: Take 400 mg by mouth 2 (two) times a day. Morning and Mid-day) 10 capsule 0   • gabapentin (NEURONTIN) 400 MG capsule Take 800 mg by mouth Every Evening.     • levocetirizine (XYZAL) 5 MG tablet Take 1 tablet by mouth Every Evening. 90 tablet 1   • levothyroxine (SYNTHROID, LEVOTHROID) 112 MCG tablet Take 112 mcg by mouth Daily.     • lidocaine (LIDODERM) 5 % Place 2 patches on the skin as directed by provider Daily. Remove & Discard patch within 12 hours or as directed by MD 30 patch 0   • meclizine (ANTIVERT) 12.5 MG tablet Take 1 tablet by mouth 3 (Three) Times a Day As Needed for Dizziness. 270 tablet 1   • NAMZARIC 28-10 MG capsule sustained-release 24 hr Take 1 capsule by mouth Every Evening.     • NIVOLUMAB IV Infuse  into a venous catheter Every  30 (Thirty) Days. Due: 3/15/2022     • ondansetron (ZOFRAN) 4 MG tablet Take 4 mg by mouth Every 8 (Eight) Hours As Needed for Nausea or Vomiting.     • OXcarbazepine (TRILEPTAL) 300 MG tablet Take 1 tablet by mouth every night at bedtime. 5 tablet 0   • oxyCODONE (ROXICODONE) 5 MG immediate release tablet Take 1 tablet by mouth Every 6 (Six) Hours As Needed for Moderate Pain. 40 tablet 0   • sertraline (ZOLOFT) 25 MG tablet Take 25 mg by mouth Daily.     • SPIRIVA HANDIHALER 18 MCG per inhalation capsule Place 1 capsule into inhaler and inhale Daily.     • tiotropium bromide-olodaterol (STIOLTO RESPIMAT) 2.5-2.5 MCG/ACT aerosol solution inhaler Inhale 1 puff 2 (Two) Times a Day.     • VENTOLIN  (90 BASE) MCG/ACT inhaler Inhale 2 puffs Every 4 (Four) Hours As Needed.     • XARELTO 20 MG tablet Take 20 mg by mouth daily with dinner.       Allergies   Allergen Reactions   • Nsaids Hives and Itching   • Orange Unknown - Low Severity   • Orange Oil Unknown - Low Severity     Other reaction(s): Unknown - Low Severity  Other reaction(s): Unknown - Low Severity   • Latex Rash       Objective    Objective     Vital Signs  Temp:  [97.6 °F (36.4 °C)-99.2 °F (37.3 °C)] 99.2 °F (37.3 °C)  Heart Rate:  [74-94] 82  Resp:  [16-20] 18  BP: (133-166)/(66-76) 141/66  SpO2:  [86 %-97 %] 97 %  on  Flow (L/min):  [3-4] 4;   Device (Oxygen Therapy): nasal cannula  There is no height or weight on file to calculate BMI.    Physical Exam  Constitutional:       General: He is not in acute distress.     Appearance: He is obese. He is ill-appearing.   HENT:      Head: Normocephalic and atraumatic.      Nose: Nose normal.      Mouth/Throat:      Mouth: Mucous membranes are moist.      Pharynx: Oropharynx is clear.   Eyes:      General: No scleral icterus.     Extraocular Movements: Extraocular movements intact.      Conjunctiva/sclera: Conjunctivae normal.   Cardiovascular:      Rate and Rhythm: Normal rate and regular rhythm.       Pulses: Normal pulses.      Heart sounds: Normal heart sounds. No murmur heard.  Pulmonary:      Breath sounds: Decreased air movement present. No wheezing or rhonchi.   Abdominal:      General: Abdomen is flat. Bowel sounds are normal.      Palpations: There is no mass.      Tenderness: There is no abdominal tenderness.      Hernia: No hernia is present.   Musculoskeletal:         General: Tenderness and signs of injury present. Normal range of motion.      Cervical back: Normal range of motion and neck supple.      Right lower leg: No edema.      Left lower leg: No edema.   Skin:     General: Skin is warm and dry.      Capillary Refill: Capillary refill takes less than 2 seconds.      Coloration: Skin is pale.      Findings: Bruising and erythema present.   Neurological:      General: No focal deficit present.      Mental Status: He is alert. Mental status is at baseline.      Motor: Weakness present.      Gait: Gait abnormal.   Psychiatric:         Mood and Affect: Mood normal.         Behavior: Behavior normal.         Thought Content: Thought content normal.         Judgment: Judgment normal.         Results Review:  I reviewed the patient's new clinical results.  I reviewed the patient's new imaging results and agree with the interpretation.  I reviewed the patient's other test results and agree with the interpretation  I personally viewed and interpreted the patient's EKG/Telemetry data  Discussed with ED provider.    Lab Results (last 24 hours)     Procedure Component Value Units Date/Time    CBC & Differential [[id number]]  (Abnormal) Collected: 03/14/22 1922    Specimen: Blood Updated: 03/14/22 1942    Narrative:      The following orders were created for panel order CBC & Differential.  Procedure                               Abnormality         Status                     ---------                               -----------         ------                     CBC Auto Differential[[id number]]        Abnormal             Final result                 Please view results for these tests on the individual orders.    Comprehensive Metabolic Panel [[id number]]  (Abnormal) Collected: 03/14/22 1922    Specimen: Blood Updated: 03/14/22 2003     Glucose 122 mg/dL      BUN 12 mg/dL      Creatinine 1.04 mg/dL      Sodium 138 mmol/L      Potassium 4.3 mmol/L      Comment: Slight hemolysis detected by analyzer. Results may be affected.        Chloride 102 mmol/L      CO2 26.1 mmol/L      Calcium 9.4 mg/dL      Total Protein 6.8 g/dL      Albumin 3.70 g/dL      ALT (SGPT) 22 U/L      AST (SGOT) 17 U/L      Alkaline Phosphatase 70 U/L      Total Bilirubin 1.1 mg/dL      Globulin 3.1 gm/dL      A/G Ratio 1.2 g/dL      BUN/Creatinine Ratio 11.5     Anion Gap 9.9 mmol/L      eGFR 71.7 mL/min/1.73      Comment: National Kidney Foundation and American Society of Nephrology (ASN) Task Force recommended calculation based on the Chronic Kidney Disease Epidemiology Collaboration (CKD-EPI) equation refit without adjustment for race.       Narrative:      GFR Normal >60  Chronic Kidney Disease <60  Kidney Failure <15      Protime-INR [[id number]]  (Abnormal) Collected: 03/14/22 1922    Specimen: Blood Updated: 03/14/22 1951     Protime 18.4 Seconds      INR 1.54    aPTT [[id number]]  (Abnormal) Collected: 03/14/22 1922    Specimen: Blood Updated: 03/14/22 1951     PTT 44.0 seconds     Troponin [[id number]]  (Normal) Collected: 03/14/22 1922    Specimen: Blood Updated: 03/14/22 2003     Troponin T 0.014 ng/mL     Narrative:      Troponin T Reference Range:  <= 0.03 ng/mL-   Negative for AMI  >0.03 ng/mL-     Abnormal for myocardial necrosis.  Clinicians would have to utilize clinical acumen, EKG, Troponin and serial changes to determine if it is an Acute Myocardial Infarction or myocardial injury due to an underlying chronic condition.       Results may be falsely decreased if patient taking Biotin.      Lactic Acid, Plasma [[id number]]  (Normal)  Collected: 03/14/22 1922    Specimen: Blood Updated: 03/14/22 2001     Lactate 0.9 mmol/L     CBC Auto Differential [[id number]]  (Abnormal) Collected: 03/14/22 1922    Specimen: Blood Updated: 03/14/22 1942     WBC 22.12 10*3/mm3      RBC 2.89 10*6/mm3      Hemoglobin 10.7 g/dL      Hematocrit 31.9 %      .4 fL      MCH 37.0 pg      MCHC 33.5 g/dL      RDW 9.9 %      RDW-SD 39.3 fl      MPV 9.9 fL      Platelets 303 10*3/mm3      Neutrophil % 66.9 %      Lymphocyte % 17.2 %      Monocyte % 13.8 %      Eosinophil % 0.9 %      Basophil % 0.5 %      Neutrophils, Absolute 14.80 10*3/mm3      Lymphocytes, Absolute 3.81 10*3/mm3      Monocytes, Absolute 3.05 10*3/mm3      Eosinophils, Absolute 0.19 10*3/mm3      Basophils, Absolute 0.11 10*3/mm3     Urinalysis With Microscopic If Indicated (No Culture) - Urine, Clean Catch [[id number]]  (Abnormal) Collected: 03/14/22 2015    Specimen: Urine, Clean Catch Updated: 03/14/22 2048     Color, UA Yellow     Appearance, UA Clear     pH, UA <=5.0     Specific Gravity, UA 1.016     Glucose, UA Negative     Ketones, UA Negative     Bilirubin, UA Negative     Blood, UA Small (1+)     Protein, UA Negative     Leuk Esterase, UA Trace     Nitrite, UA Negative     Urobilinogen, UA 1.0 E.U./dL    Urinalysis, Microscopic Only - Urine, Clean Catch [[id number]]  (Abnormal) Collected: 03/14/22 2015    Specimen: Urine, Clean Catch Updated: 03/14/22 2049     RBC, UA 21-30 /HPF      WBC, UA 0-2 /HPF      Bacteria, UA None Seen /HPF      Squamous Epithelial Cells, UA 0-2 /HPF      Hyaline Casts, UA 3-6 /LPF      Methodology Automated Microscopy    COVID PRE-OP / PRE-PROCEDURE SCREENING ORDER (NO ISOLATION) - Swab, Nasopharynx [[id number]]  (Abnormal) Collected: 03/14/22 2116    Specimen: Swab from Nasopharynx Updated: 03/14/22 2225    Narrative:      The following orders were created for panel order COVID PRE-OP / PRE-PROCEDURE SCREENING ORDER (NO ISOLATION) - Swab, Nasopharynx.  Procedure                                Abnormality         Status                     ---------                               -----------         ------                     COVID-19,[facility] IN-HOUSE...[[id number]]  Abnormal            Final result                 Please view results for these tests on the individual orders.    COVID-19,[facility] IN-HOUSE CEPHEID/DARRELL NP SWAB IN TRANSPORT MEDIA 8-12 HR TAT - Swab, Nasopharynx [[id number]]  (Abnormal) Collected: 03/14/22 2116    Specimen: Swab from Nasopharynx Updated: 03/14/22 2225     COVID19 Detected    Narrative:      Fact sheet for providers: https://www.fda.gov/media/562397/download     Fact sheet for patients: https://www.fda.gov/media/115046/download          Imaging Results (Last 24 Hours)     Procedure Component Value Units Date/Time    CT Head Without Contrast [[id number]] Collected: 03/14/22 2124     Updated: 03/14/22 2125    Narrative:      CT HEAD WITHOUT CONTRAST     CLINICAL HISTORY: Fall with weakness.     TECHNIQUE: CT scan of the head was obtained with 3 mm axial soft tissue  algorithm and 2 mm bone algorithm images. No intravenous contrast was  administered. Sagittal and coronal reconstructions were obtained.     COMPARISON: CT head dated 03/11/2022.     FINDINGS: There is no evidence for a calvarial fracture. There is no  evidence for an acute extra-axial hemorrhage.     Along the left aspect of the falx cerebri on axial image #35, there is a  4 mm calcific density which may simply represent a small focus of dural  ossification although a tiny meningioma at this site could not be  excluded. Further evaluation could be performed with MR imaging, as  clinically indicated. The ventricles, sulci, and cisterns are  age-appropriate. There are mild-to-moderate changes of chronic small  vessel ischemic phenomena. The basal ganglia and thalami are  unremarkable in appearance. The posterior fossa structures are within  normal limits. Atherosclerotic changes are appreciated  within the  intracranial vasculature.       Impression:      No CT evidence for acute intracranial pathology. The  etiology of the patient's weakness is not further elucidated on this  examination, and if further assessment is required, one could obtain an  MRI of the brain.     Note is made of a 4 mm calcific density along the left aspect of the  falx cerebri medial to left frontal lobe which could simply represent an  area of dural calcification although a small partially calcified  meningioma at this site could not be excluded. This could also be  further evaluated with MR imaging.     Radiation dose reduction techniques were utilized, including automated  exposure control and exposure modulation based on body size.          XR Chest 1 View [[id number]] Collected: 03/14/22 2038     Updated: 03/14/22 2054    Narrative:      PORTABLE CHEST     CLINICAL HISTORY: Possible rib fractures.     FINDINGS: A frontal projection of the chest demonstrates a right IJ  approach Mediport catheter terminating at the level of the venoatrial  junction. Otherwise, the lungs are clear of acute infiltrates. The  cardiomediastinal silhouette is unremarkable. The osseous structures are  incidentally notable for moderate dextroconvex scoliotic curvature of  the thoracic spine. The previously identified mild deformities of the  left 5th through 7th ribs as seen on the prior chest CT are not well  delineated on this exam.     This report was finalized on 3/14/2022 8:51 PM by Dr. Aidan Bermeo M.D.             Results for orders placed during the hospital encounter of 03/27/17    Adult Transthoracic Echo Complete    Interpretation Summary  · Calculated right ventricular systolic pressure from tricuspid regurgitation is 22 mmHg.  · Left ventricular wall thickness is consistent with mild concentric hypertrophy.  · Left ventricular function is normal. Estimated EF = 63%.  · Left atrial cavity size is mildly dilated.  · Left ventricular  diastolic dysfunction (grade II) consistent with pseudonormalization.    No orders to display     Assessment/Plan   Assessment/Plan   Active Hospital Problems    Diagnosis  POA   • **Generalized weakness [R53.1]  Yes   • Immobility syndrome [M62.3]  Yes   • Self-care deficit [Z78.9]  Yes   • Closed fracture of multiple ribs of left side [S22.42XA]  Yes   • Paroxysmal atrial fibrillation (HCC) [I48.0]  Yes   • Drug-induced constipation [K59.03]  Yes   • Malignant neoplasm of lung (HCC) [C34.90]  Yes   • Essential hypertension [I10]  Yes      Resolved Hospital Problems   No resolved problems to display.       81 y.o. male admitted with Generalized weakness.    · Telemetry bed for continuous cardiac monitoring  · Physical therapy and Occupational Therapy to evaluate, treatment recommendation.  Patient was just discharged from hospital yesterday and refused inpatient rehab prior to being discharged.  Patient went home and was unable to care for self or be cared for by family so he has been readmitted  · Care management consult.  Patient will need discharge placement for inpatient rehabilitation services  · Supplemental oxygen as needed.  Patient currently utilizing 3 to 4 L supplemental oxygen via nasal cannula  · Continuous pulse oximetry monitoring  · Lidocaine patch to affected ribs  · Symptom management  · CBC in a.m.  Patient's white blood cell count has increased to over 22,000.  Current laboratory values compared against previous laboratory values obtained during last admission  · Head CT completed in the emergency department reviewed  · Trend labs during hospitalization  · Daily stool softener and as needed laxative for constipation  · Home meds to be reviewed once reconciled by primary RN  · Xarelto (home med) for DVT prophylaxis.  · Full code.  · Discussed with patient and ED provider.      ADRIANA Fraga  [facility]  03/15/22  00:29 EDT

## 2022-03-15 NOTE — THERAPY EVALUATION
Patient Name: Mg Gerber Sr.  : 1940    MRN: [id number]                              Today's Date: 3/15/2022       Admit Date: 3/14/2022    Visit Dx:     ICD-10-CM ICD-9-CM   1. Generalized weakness  R53.1 780.79   2. Immobility  Z74.09 799.89   3. Closed fracture of multiple ribs of left side, initial encounter  S22.42XA 807.09   4. Leukocytosis, unspecified type  D72.829 288.60     Patient Active Problem List   Diagnosis   • Anemia, vitamin B12 deficiency   • Arthritis   • Hemochromatosis   • Herpes zoster   • Hip pain   • Essential hypertension   • Cancer (HCC)   • Pulmonary emphysema (HCC)   • Left low back pain   • Vertigo   • Headache around the eyes   • Mild cognitive impairment   • Metastatic renal cell carcinoma  on q 2 wks chemo   • Anxiety   • Malignant neoplasm of lung (HCC)   • Neuropathy   • Perennial allergic rhinitis   • Vitamin B12 deficiency   • Osteoarthritis of lumbar spine   • Pharyngitis   • Bronchitis   • Drug-induced constipation   • Hx of hiatal hernia   • History of lung cancer   • COVID-19 virus detected   • Dyspnea   • Generalized weakness   • Acute respiratory failure with hypoxia (HCC)   • Gastroesophageal reflux disease   • Hearing disorder   • Malignant neoplasm of prostate (HCC)   • Sleep apnea   • Anemia   • Malignant neoplasm metastatic to lung (HCC)   • Secondary bacterial pneumonia   • Pneumonia due to COVID-19 virus   • Hilar mass   • On antineoplastic chemotherapy q 2wks   • Paroxysmal atrial fibrillation (HCC)   • History of pulmonary embolism   • Nausea   • Gastritis   • Muscle spasm   • Fall   • Closed fracture of multiple ribs of left side   • Chest wall pain   • Fall, initial encounter   • Immobility syndrome   • Self-care deficit     Past Medical History:   Diagnosis Date   • Allergic    • Anemia, vitamin B12 deficiency 2012   • Anxiety    • Arthritis 2014   • Atrial fibrillation (HCC)    • Cancer (HCC) 2014    kidney; prostate   •  Cataract    • Chemotherapeutic agent or infusion extravasation     q 1-2 wks    • COPD (chronic obstructive pulmonary disease) (HCC) 03/24/2014   • Emphysema of lung (HCC)    • Hemochromatosis 03/24/2014   • Herpes zoster 12/06/2011    left medial thigh   • Hip pain 03/24/2014   • History of Doppler ultrasound 12/28/2011    superficial and deep venous insuffiency   • History of EKG 02/10/2012    Dr. Kathi Lloyd; unchanged from prior EKG   • Hypertension     benign essential   • Injury of back    • Pulmonary embolism (HCC) 03/24/2014   • Renal cell cancer (HCC)    • Shortness of breath    • Sleep apnea      Past Surgical History:   Procedure Laterality Date   • APPENDECTOMY     • CATARACT EXTRACTION     • CHOLECYSTECTOMY     • SPLENECTOMY     • TONSILLECTOMY     • VEIN SURGERY        General Information     Row Name 03/15/22 [facility] [place]          Physical Therapy Time and Intention    Document Type evaluation  -MS     Mode of Treatment physical therapy  -MS     Row Name 03/15/22 1205          General Information    Patient Profile Reviewed yes  -MS     Prior Level of Function independent:;all household mobility  cane in community  -MS     Existing Precautions/Restrictions fall;oxygen therapy device and L/min  L rib fractures  -MS     Row Name 03/15/22 1205          Living Environment    People in Home spouse;child(haroldo), adult  -MS     Row Name 03/15/22 1205          Cognition    Orientation Status (Cognition) oriented x 3  -MS     Row Name 03/15/22 1205          Safety Issues, Functional Mobility    Safety Issues Affecting Function (Mobility) insight into deficits/self-awareness;judgment;positioning of assistive device;sequencing abilities  -MS     Impairments Affecting Function (Mobility) balance;strength;endurance/activity tolerance;pain  -MS     Comment, Safety Issues/Impairments (Mobility) Gait belt and non skid socks donned  -MS           User Key  (r) = Recorded By, (t) = Taken By, (c) = Cosigned By    Initials Name  Provider Type    MS Floridalma Lopes, PT Physical Therapist               Mobility     Row Name 03/15/22 1207          Bed Mobility    Bed Mobility supine-sit  -MS     Supine-Sit [place] (Bed Mobility) moderate assist (50% patient effort);verbal cues;nonverbal cues (demo/gesture)  -MS     Sit-Supine [place] (Bed Mobility) maximum assist (25% patient effort);verbal cues;nonverbal cues (demo/gesture)  -MS     Assistive Device (Bed Mobility) bed rails;head of bed elevated  -MS     Comment, (Bed Mobility) SBA-CGA for sitting balance, no overt LOB or veering noted.  -MS     Row Name 03/15/22 1207          Transfers    Comment, (Transfers) Sequencing and hand placement cues.  -MS     Row Name 03/15/22 1207          Sit-Stand Transfer    Sit-Stand [place] (Transfers) minimum assist (75% patient effort);moderate assist (50% patient effort);verbal cues;nonverbal cues (demo/gesture)  -MS     Assistive Device (Sit-Stand Transfers) walker, front-wheeled  -MS     Row Name 03/15/22 1207          Gait/Stairs (Locomotion)    [place] Level (Gait) minimum assist (75% patient effort);verbal cues;nonverbal cues (demo/gesture)  -MS     Assistive Device (Gait) walker, front-wheeled  -MS     Distance in Feet (Gait) small steps towards HOB  -MS     Comment, (Gait/Stairs) No overt LOB or veering noted.  -MS           User Key  (r) = Recorded By, (t) = Taken By, (c) = Cosigned By    Initials Name Provider Type    MS Floridalma Lopes, PT Physical Therapist               Obj/Interventions     Row Name 03/15/22 1208          Range of Motion Comprehensive    General Range of Motion no range of motion deficits identified  -MS     Row Name 03/15/22 1208          Strength Comprehensive (MMT)    Comment, General Manual Muscle Testing (MMT) Assessment B LEs grossly at least 3/5, generalized weakness noted  -MS     Row Name 03/15/22 1208          Motor Skills    Therapeutic Exercise --  Seated LAQs x 10 reps  -MS     Row Name  03/15/22 1208          Balance    Static Sitting Balance supervision  -MS     Dynamic Sitting Balance supervision  -MS     Position, Sitting Balance unsupported;sitting edge of bed  -MS           User Key  (r) = Recorded By, (t) = Taken By, (c) = Cosigned By    Initials Name Provider Type    Floridalma Link, PT Physical Therapist               Goals/Plan     Row Name 03/15/22 1210          Bed Mobility Goal 1 (PT)    Activity/Assistive Device (Bed Mobility Goal 1, PT) bed mobility activities, all  -MS     [place]ase Level/Cues Needed (Bed Mobility Goal 1, PT) supervision required  -MS     Time Frame (Bed Mobility Goal 1, PT) 1 week  -MS     Row Name 03/15/22 1210          Transfer Goal 1 (PT)    Activity/Assistive Device (Transfer Goal 1, PT) transfers, all;walker, rolling  -MS     [place]ase Level/Cues Needed (Transfer Goal 1, PT) supervision required  -MS     Time Frame (Transfer Goal 1, PT) 1 week  -MS     Row Name 03/15/22 1210          Gait Training Goal 1 (PT)    Activity/Assistive Device (Gait Training Goal 1, PT) gait (walking locomotion);walker, rolling  -MS     [place]ase Level (Gait Training Goal 1, PT) supervision required  -MS     Distance (Gait Training Goal 1, PT) 100'  -MS     Time Frame (Gait Training Goal 1, PT) 1 week  -MS           User Key  (r) = Recorded By, (t) = Taken By, (c) = Cosigned By    Initials Name Provider Type    Floridalma Link, PT Physical Therapist               Clinical Impression     Row Name 03/15/22 1209          Pain    Pretreatment Pain Rating 9/10  -MS     Posttreatment Pain Rating 9/10  -MS     Pain Location - Side/Orientation Left  -MS     Pain Location - flank  -MS     Pain Intervention(s) Repositioned;Rest  -MS     Row Name 03/15/22 1209          Therapy Assessment/Plan (PT)    Rehab Potential (PT) good, to achieve stated therapy goals  -MS     Criteria for Skilled Interventions Met (PT) yes;meets criteria  -MS     Row Name 03/15/22 1208           Vital Signs    Pre SpO2 (%) 96  -MS     O2 Delivery Pre Treatment supplemental O2  -MS     Intra SpO2 (%) 92  -MS     O2 Delivery Intra Treatment supplemental O2  -MS     Post SpO2 (%) 94  -MS     O2 Delivery Post Treatment supplemental O2  -MS     Row Name 03/15/22 1209          Positioning and Restraints    Pre-Treatment Position in bed  -MS     Post Treatment Position bed  -MS     In Bed notified nsg;fowlers;call light within reach;encouraged to call for assist;exit alarm on  -MS           User Key  (r) = Recorded By, (t) = Taken By, (c) = Cosigned By    Initials Name Provider Type    MS LopesFloridalma, PT Physical Therapist               Outcome Measures     Row Name 03/15/22 1210          How much help from another person do you currently need...    Turning from your back to your side while in flat bed without using bedrails? 3  -MS     Moving from lying on back to sitting on the side of a flat bed without bedrails? 2  -MS     Moving to and from a bed to a chair (including a wheelchair)? 2  -MS     Standing up from a chair using your arms (e.g., wheelchair, bedside chair)? 2  -MS     Climbing 3-5 steps with a railing? 1  -MS     To walk in hospital room? 2  -MS     AM-PAC 6 Clicks Score (PT) 12  -MS     Row Name 03/15/22 1210 03/15/22 1149       Functional Assessment    Outcome Measure Options AM-PAC 6 Clicks Basic Mobility (PT)  -MS AM-PAC 6 Clicks Daily Activity (OT)  -BL          User Key  (r) = Recorded By, (t) = Taken By, (c) = Cosigned By    Initials Name Provider Type    MS Floridalma Lopes, PT Physical Therapist    Aylin Solares OT Occupational Therapist                             Physical Therapy Education                 Title: PT OT SLP Therapies (In Progress)     Topic: Physical Therapy (Done)     Point: Mobility training (Done)     Learning Progress Summary           Patient Acceptance, E,TB, VU,NR by MS at 3/15/2022 1211                   Point: Home exercise program (Done)      Learning Progress Summary           Patient Acceptance, E,TB, VU,NR by MS at 3/15/2022 1211                   Point: Body mechanics (Done)     Learning Progress Summary           Patient Acceptance, E,TB, VU,NR by MS at 3/15/2022 1211                   Point: Precautions (Done)     Learning Progress Summary           Patient Acceptance, E,TB, VU,NR by MS at 3/15/2022 1211                               User Key     Initials Effective Dates Name Provider Type Discipline    MS 06/16/21 -  Floridalma Lopes, PT Physical Therapist PT              PT Recommendation and Plan  Planned Therapy Interventions (PT): balance training, bed mobility training, gait training, home exercise program, patient/family education, postural re-education, ROM (range of motion), stair training, strengthening, transfer training        Time Calculation:    PT Charges     Row Name 03/15/22 1203             Time Calculation    Start Time 0957  -MS      Stop Time 1018  -MS      Time Calculation (min) 21 min  -MS      PT Received On 03/15/22  -MS      PT - Next Appointment 03/16/22  -MS      PT Goal Re-Cert Due Date 03/22/22  -MS              Time Calculation- PT    Total Timed Code Minutes- PT 15 minute(s)  -MS            User Key  (r) = Recorded By, (t) = Taken By, (c) = Cosigned By    Initials Name Provider Type    MS LopesFloridalma, PT Physical Therapist              Therapy Charges for Today     Code Description Service Date Service Provider Modifiers Qty    35344045588 HC PT EVAL MOD COMPLEXITY 3 3/15/2022 Floridalma Lopes, PT GP 1    28291926144 HC PT THER PROC EA 15 MIN 3/15/2022 Floridalma Lopes, PT GP 1          PT G-Codes  Outcome Measure Options: AM-PAC 6 Clicks Basic Mobility (PT)  AM-PAC 6 Clicks Score (PT): 12  AM-PAC 6 Clicks Score (OT): 15    Floridalma Lopes PT  3/15/2022

## 2022-03-15 NOTE — PROGRESS NOTES
[facility]    [facility]    Name:  Mg Gerber Sr.   Age:  81 y.o.  Sex:  male  :  1940  MRN:  [id number]   Visit Number:  [id number]  Admission Date:  3/14/2022  Date Of Service:  03/15/22  Primary Care Physician:  Dillon Walton MD     LOS: 1 day :  Patient Care Team:  Dillon Walton MD as PCP - General (Family Medicine):    History taken from:     patient chart    Chief Complaint:      Dyspnea    Subjective     Interval History:     Patient seen and examined today.  Reviewed history and physical, lab work, x-rays, chart.    81-year-old male with history of mechanical fall who was discharged from this hospital on 3/13/2022.  He has multiple medical problems including renal cell carcinoma for which he is on immunotherapy, pulmonary embolism on Xarelto, paroxysmal atrial fibrillation, hypertension, hemochromatosis, reported history of cirrhosis.  He was offered rehab, however felt he could manage at home which he could not.  He returned to the hospital and now is testing positive for Covid-19.  He was discharged home on 3 L of oxygen, he is now requiring 6 L of oxygen.  He states that he is fully vaccinated and boosted.  Chest x-ray was clear of infiltrates.    He continues to have left-sided while chest pain.  This is worse with deep inspiration.  He also is dyspneic.  He denies any nausea or vomiting.  He is very weak.  He is unable to take care of himself and his wife is not strong enough to provide adequate care.  He is very hard of hearing as well.    Review of Systems:     All systems were reviewed and negative except for:  Musculoskeletal: positive for  muscle weakness    Objective     Vital Signs:    Temp:  [97.3 °F (36.3 °C)-99.2 °F (37.3 °C)] 97.5 °F (36.4 °C)  Heart Rate:  [78-89] 83  Resp:  [16-22] 20  BP: (140-166)/(65-77) 140/70    Physical Exam:    General: Alert and oriented x4, moderate distress.  Heart: Regular rate and rhythm without murmur rub or  "thrill.  Lungs: Decreased breath sounds bilaterally without use of accessory muscles respiration.  Abdomen: Soft/nontender/nondistended.  No HSM noted.  MSK: 4/5 strength in upper/lower extremities bilaterally.     Results Review:      I reviewed the patient's new clinical results.  I reviewed the patient's new imaging results and agree with the interpretation.  I reviewed the patient's other test results and agree with the interpretation    Labs:    Lab Results (last 24 hours)     Procedure Component Value Units Date/Time    D-dimer, Quantitative [[id number]]  (Abnormal) Collected: 03/15/22 1054    Specimen: Blood Updated: 03/15/22 1221     D-Dimer, Quantitative 1.79 MCGFEU/mL     Narrative:      The Stago D-Dimer test used in conjunction with a clinical pretest probability (PTP) assessment model, has been approved by the FDA to rule out the presence of venous thromboembolism (VTE) in outpatients suspected of deep venous thrombosis (DVT) or pulmonary embolism (PE). The cut-off for negative predictive value is <0.50 MCGFEU/mL.    Procalcitonin [[id number]]  (Normal) Collected: 03/15/22 0828    Specimen: Blood Updated: 03/15/22 1111     Procalcitonin 0.21 ng/mL     Narrative:      As a Marker for Sepsis (Non-Neonates):    1. <0.5 ng/mL represents a low risk of severe sepsis and/or septic shock.  2. >2 ng/mL represents a high risk of severe sepsis and/or septic shock.    As a Marker for Lower Respiratory Tract Infections that require antibiotic therapy:    PCT on Admission    Antibiotic Therapy       6-12 Hrs later    >0.5                Strongly Recommended  >0.25 - <0.5        Recommended   0.1 - 0.25          Discouraged              Remeasure/reassess PCT  <0.1                Strongly Discouraged     Remeasure/reassess PCT    As 28 day mortality risk marker: \"Change in Procalcitonin Result\" (>80% or <=80%) if Day 0 (or Day 1) and Day 4 values are available. Refer to http://www.[facility]-pct-calculator.com    Change in " PCT <=80%  A decrease of PCT levels below or equal to 80% defines a positive change in PCT test result representing a higher risk for 28-day all-cause mortality of patients diagnosed with severe sepsis for septic shock.    Change in PCT >80%  A decrease of PCT levels of more than 80% defines a negative change in PCT result representing a lower risk for 28-day all-cause mortality of patients diagnosed with severe sepsis or septic shock.       Ferritin [[id number]]  (Abnormal) Collected: 03/15/22 0828    Specimen: Blood Updated: 03/15/22 1111     Ferritin 534.00 ng/mL     Narrative:      Results may be falsely decreased if patient taking Biotin.      C-reactive Protein [[id number]]  (Abnormal) Collected: 03/15/22 0828    Specimen: Blood Updated: 03/15/22 1105     C-Reactive Protein 5.38 mg/dL     Basic Metabolic Panel [[id number]]  (Abnormal) Collected: 03/15/22 0828    Specimen: Blood Updated: 03/15/22 0917     Glucose 119 mg/dL      BUN 9 mg/dL      Creatinine 0.91 mg/dL      Sodium 136 mmol/L      Potassium 4.3 mmol/L      Chloride 101 mmol/L      CO2 26.9 mmol/L      Calcium 8.9 mg/dL      BUN/Creatinine Ratio 9.9     Anion Gap 8.1 mmol/L      eGFR 84.1 mL/min/1.73      Comment: National Kidney Foundation and American Society of Nephrology (ASN) Task Force recommended calculation based on the Chronic Kidney Disease Epidemiology Collaboration (CKD-EPI) equation refit without adjustment for race.       Narrative:      GFR Normal >60  Chronic Kidney Disease <60  Kidney Failure <15      CBC (No Diff) [[id number]]  (Abnormal) Collected: 03/15/22 0828    Specimen: Blood Updated: 03/15/22 0854     WBC 19.80 10*3/mm3      RBC 2.84 10*6/mm3      Hemoglobin 10.3 g/dL      Hematocrit 30.9 %      .8 fL      MCH 36.3 pg      MCHC 33.3 g/dL      RDW 9.9 %      RDW-SD 38.1 fl      MPV 9.9 fL      Platelets 292 10*3/mm3     COVID PRE-OP / PRE-PROCEDURE SCREENING ORDER (NO ISOLATION) - Swab, Nasopharynx [[id number]]  (Abnormal)  Collected: 03/14/22 2116    Specimen: Swab from Nasopharynx Updated: 03/14/22 2225    Narrative:      The following orders were created for panel order COVID PRE-OP / PRE-PROCEDURE SCREENING ORDER (NO ISOLATION) - Swab, Nasopharynx.  Procedure                               Abnormality         Status                     ---------                               -----------         ------                     COVID-19,[facility]HOU[facility]...[[id number]]  Abnormal            Final result                 Please view results for these tests on the individual orders.    COVID-19,[facility]HOU[facility] CEPHEID/DARRELL NP SWAB IN TRANSPORT MEDIA 8-12 HR TAT - Swab, Nasopharynx [[id number]]  (Abnormal) Collected: 03/14/22 2116    Specimen: Swab from Nasopharynx Updated: 03/14/22 2225     COVID19 Detected    Narrative:      Fact sheet for providers: https://www.fda.gov/media/803695/download     Fact sheet for patients: https://www.fda.gov/media/771821/download    Urinalysis, Microscopic Only - Urine, Clean Catch [[id number]]  (Abnormal) Collected: 03/14/22 2015    Specimen: Urine, Clean Catch Updated: 03/14/22 2049     RBC, UA 21-30 /HPF      WBC, UA 0-2 /HPF      Bacteria, UA None Seen /HPF      Squamous Epithelial Cells, UA 0-2 /HPF      Hyaline Casts, UA 3-6 /LPF      Methodology Automated Microscopy    Urinalysis With Microscopic If Indicated (No Culture) - Urine, Clean Catch [[id number]]  (Abnormal) Collected: 03/14/22 2015    Specimen: Urine, Clean Catch Updated: 03/14/22 2048     Color, UA Yellow     Appearance, UA Clear     pH, UA <=5.0     Specific Gravity, UA 1.016     Glucose, UA Negative     Ketones, UA Negative     Bilirubin, UA Negative     Blood, UA Small (1+)     Protein, UA Negative     Leuk Esterase, UA Trace     Nitrite, UA Negative     Urobilinogen, UA 1.0 E.U./dL    Comprehensive Metabolic Panel [[id number]]  (Abnormal) Collected: 03/14/22 1922    Specimen: Blood Updated: 03/14/22 2003     Glucose 122 mg/dL      BUN 12 mg/dL       Creatinine 1.04 mg/dL      Sodium 138 mmol/L      Potassium 4.3 mmol/L      Comment: Slight hemolysis detected by analyzer. Results may be affected.        Chloride 102 mmol/L      CO2 26.1 mmol/L      Calcium 9.4 mg/dL      Total Protein 6.8 g/dL      Albumin 3.70 g/dL      ALT (SGPT) 22 U/L      AST (SGOT) 17 U/L      Alkaline Phosphatase 70 U/L      Total Bilirubin 1.1 mg/dL      Globulin 3.1 gm/dL      A/G Ratio 1.2 g/dL      BUN/Creatinine Ratio 11.5     Anion Gap 9.9 mmol/L      eGFR 71.7 mL/min/1.73      Comment: National Kidney Foundation and American Society of Nephrology (ASN) Task Force recommended calculation based on the Chronic Kidney Disease Epidemiology Collaboration (CKD-EPI) equation refit without adjustment for race.       Narrative:      GFR Normal >60  Chronic Kidney Disease <60  Kidney Failure <15      Troponin [[id number]]  (Normal) Collected: 03/14/22 1922    Specimen: Blood Updated: 03/14/22 2003     Troponin T 0.014 ng/mL     Narrative:      Troponin T Reference Range:  <= 0.03 ng/mL-   Negative for AMI  >0.03 ng/mL-     Abnormal for myocardial necrosis.  Clinicians would have to utilize clinical acumen, EKG, Troponin and serial changes to determine if it is an Acute Myocardial Infarction or myocardial injury due to an underlying chronic condition.       Results may be falsely decreased if patient taking Biotin.      Lactic Acid, Plasma [[id number]]  (Normal) Collected: 03/14/22 1922    Specimen: Blood Updated: 03/14/22 2001     Lactate 0.9 mmol/L     Protime-INR [[id number]]  (Abnormal) Collected: 03/14/22 1922    Specimen: Blood Updated: 03/14/22 1951     Protime 18.4 Seconds      INR 1.54    aPTT [[id number]]  (Abnormal) Collected: 03/14/22 1922    Specimen: Blood Updated: 03/14/22 1951     PTT 44.0 seconds     CBC & Differential [[id number]]  (Abnormal) Collected: 03/14/22 1922    Specimen: Blood Updated: 03/14/22 1942    Narrative:      The following orders were created for panel order  CBC & Differential.  Procedure                               Abnormality         Status                     ---------                               -----------         ------                     CBC Auto Differential[[id number]]        Abnormal            Final result                 Please view results for these tests on the individual orders.    CBC Auto Differential [[id number]]  (Abnormal) Collected: 03/14/22 1922    Specimen: Blood Updated: 03/14/22 1942     WBC 22.12 10*3/mm3      RBC 2.89 10*6/mm3      Hemoglobin 10.7 g/dL      Hematocrit 31.9 %      .4 fL      MCH 37.0 pg      MCHC 33.5 g/dL      RDW 9.9 %      RDW-SD 39.3 fl      MPV 9.9 fL      Platelets 303 10*3/mm3      Neutrophil % 66.9 %      Lymphocyte % 17.2 %      Monocyte % 13.8 %      Eosinophil % 0.9 %      Basophil % 0.5 %      Neutrophils, Absolute 14.80 10*3/mm3      Lymphocytes, Absolute 3.81 10*3/mm3      Monocytes, Absolute 3.05 10*3/mm3      Eosinophils, Absolute 0.19 10*3/mm3      Basophils, Absolute 0.11 10*3/mm3            Radiology:    Imaging Results (Last 24 Hours)     Procedure Component Value Units Date/Time    CT Head Without Contrast [[id number]] Collected: 03/14/22 2124     Updated: 03/15/22 0813    Narrative:      CT HEAD WITHOUT CONTRAST     CLINICAL HISTORY: Fall with weakness.     TECHNIQUE: CT scan of the head was obtained with 3 mm axial soft tissue  algorithm and 2 mm bone algorithm images. No intravenous contrast was  administered. Sagittal and coronal reconstructions were obtained.     COMPARISON: CT head dated 03/11/2022.     FINDINGS:      There is no evidence for a calvarial fracture. There is no evidence for  an acute extra-axial hemorrhage.     Along the left aspect of the falx cerebri on axial image #35, there is a  4 mm calcific density which may simply represent a small focus of dural  ossification although a tiny meningioma at this site could not be  excluded. Further evaluation could be performed with MR  imaging, as  clinically indicated. The ventricles, sulci, and cisterns are  age-appropriate. There are mild-to-moderate changes of chronic small  vessel ischemic phenomena. The basal ganglia and thalami are  unremarkable in appearance. The posterior fossa structures are within  normal limits. Atherosclerotic changes are appreciated within the  intracranial vasculature.       Impression:         No CT evidence for acute intracranial pathology. The etiology of the  patient's weakness is not further elucidated on this examination, and if  further assessment is required, one could obtain an MRI of the brain.     Note is made of a 4 mm calcific density along the left aspect of the  falx cerebri medial to left frontal lobe which could simply represent an  area of dural calcification although a small partially calcified  meningioma at this site could not be excluded. This could also be  further evaluated with MR imaging.        Radiation dose reduction techniques were utilized, including automated  exposure control and exposure modulation based on body size.     This report was finalized on 3/15/2022 8:10 AM by Dr. Aidan Bermeo M.D.       XR Chest 1 View [[id number]] Collected: 03/14/22 2038     Updated: 03/14/22 2054    Narrative:      PORTABLE CHEST     CLINICAL HISTORY: Possible rib fractures.     FINDINGS: A frontal projection of the chest demonstrates a right IJ  approach Mediport catheter terminating at the level of the venoatrial  junction. Otherwise, the lungs are clear of acute infiltrates. The  cardiomediastinal silhouette is unremarkable. The osseous structures are  incidentally notable for moderate dextroconvex scoliotic curvature of  the thoracic spine. The previously identified mild deformities of the  left 5th through 7th ribs as seen on the prior chest CT are not well  delineated on this exam.     This report was finalized on 3/14/2022 8:51 PM by Dr. Aidan Bermeo M.D.             Medication Review:      albuterol sulfate HFA, 2 puff, Inhalation, 4x Daily - RT  amLODIPine, 5 mg, Oral, Daily  calcium 500 mg vitamin D 5 mcg (200 UT), 1 tablet, Oral, BID  cetirizine, 5 mg, Oral, Daily  cyclobenzaprine, 10 mg, Oral, TID  dexamethasone, 6 mg, Oral, Daily With Breakfast  docusate sodium, 100 mg, Oral, BID  donepezil, 10 mg, Oral, Nightly  famotidine, 20 mg, Oral, BID  folic acid, 1 mg, Oral, TID  gabapentin, 400 mg, Oral, Q12H  gabapentin, 800 mg, Oral, Q PM  levothyroxine, 112 mcg, Oral, Daily  lidocaine, 2 patch, Transdermal, Q24H  memantine, 10 mg, Oral, Q12H  OXcarbazepine, 300 mg, Oral, Nightly  remdesivir, 200 mg, Intravenous, Q24H   Followed by  [START ON 3/16/2022] remdesivir, 100 mg, Intravenous, Q24H  rivaroxaban, 20 mg, Oral, Daily With Dinner  sertraline, 25 mg, Oral, Daily  tiotropium bromide monohydrate, 2 puff, Inhalation, Daily - RT        Pharmacy Consult - Remdesivir,         Assessment/Plan     Principal Problem:    Generalized weakness  Active Problems:    Essential hypertension      Overview: benign essential    Malignant neoplasm of lung (HCC)    Drug-induced constipation    Paroxysmal atrial fibrillation (HCC)    Closed fracture of multiple ribs of left side    Immobility syndrome    Self-care deficit      1.  Acute respiratory failure with hypoxia due to Covid as well as rib fractures.  2.  Covid-19 infection despite full vaccination with booster.  3.  Fracture of ribs on the left side ribs 5-7.  4.  Paroxysmal atrial fibrillation on Xarelto  5.  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 Montes De Oca at [facility].  On immunotherapy.  6.  History of cirrhosis  7.  Impaired ADLs with significant debility  8.  History of pulmonary embolism  9.  Essential hypertension  10.  Hemochromatosis      Plan:    Cerumen Decadron.  We will start remdesivir.  Patient is high risk due to his cancer and receiving immunotherapy.  Monitor inflammatory markers.  PT and OT to work  with the patient.  Continue home medications.  Xarelto for DVT and stroke prophylaxis.  Patient with guarded prognosis.  He will need rehab once stable.  Further recommendations will depend on clinical course.    Simba Hdez DO  03/15/22  15:42 EDT

## 2022-03-15 NOTE — PLAN OF CARE
Goal Outcome Evaluation:    Patient is a pleasant 81 y.o. male admitted to [facility] for complaining of generalized weakness with associated immobility that has been present for the past 4 days. Per chart, patient admitted for fall 4 days ago causing multiple left-sided rib fractures and was discharged home though unable to walk secondary to pain as well as weakness. Patient is typically ambulatory at baseline and lives at home with spouse. Today, patient performed bed mobility with modA, required Steve-modA for transfers, and ambulated a couple side steps towards [facility] with Steve. Strength and endurance deficits noted with pain limiting. Patient may benefit from skilled PT services acutely to address functional deficits as well as improve level of independence prior to discharge. Anticipate need for SNF upon DC.    Patient was intermittently wearing a face mask during this therapy encounter. Therapist used appropriate personal protective equipment including gown, eye protection, mask and gloves.  Mask used was N95/duckbill. Appropriate PPE was worn during the entire therapy session. Hand hygiene was completed before and after therapy session. Patient is in enhanced droplet precautions.

## 2022-03-15 NOTE — PLAN OF CARE
Goal Outcome Evaluation:  Plan of Care Reviewed With: patient        Progress: improving  Outcome Evaluation: Pt seen by OT this date for evaluation. Pt is an 80 y/o male admit to [facility] for Generalized weakness presenting L rib fx's after sustaining a fall after leaving hospital due to declining rehab and is now back. PMHx includes atrial fibrillation, COPD, emphysema, hypertension, renal cell cancer, lung cancer, sleep apnea. Pt reports that he lives at home with spouse and daughter and is typically independent with all ADLs at baseline and also reports that he does use a rolling walker for functional mobility. Pt reports that he does have a shower chair and grab bars in bathroom at home. Upon arrival pt lying supine in bed, 8/10 pain in L ribs but RN already gave pain medication, A&O x3. Pt completed UB bathing and grooming task sitting up in bed with S/U and verbal cues for thoroughness. Pt requiring Mod A for sup>sit transition this date with increase time to sit EOB due to pain in ribs requiring rest breaks. Once sitting EOB pt able to stand and scoot bottom up in bed with Mod A and verbal cues. Pt Mod A for sit>sup transition. Pt returned to supine, needs in reach, alarm on. Pt to continue with skilled OT services to address goals and deficits. OT rec Rehab at D/C. OT wore mask, gloves, glasses, hand hygiene performed, appropriate ppe worn during encounter.

## 2022-03-15 NOTE — THERAPY EVALUATION
Patient Name: Mg Gerber Sr.  : 1940    MRN: [id number]                              Today's Date: 3/15/2022       Admit Date: 3/14/2022    Visit Dx:     ICD-10-CM ICD-9-CM   1. Generalized weakness  R53.1 780.79   2. Immobility  Z74.09 799.89   3. Closed fracture of multiple ribs of left side, initial encounter  S22.42XA 807.09   4. Leukocytosis, unspecified type  D72.829 288.60     Patient Active Problem List   Diagnosis   • Anemia, vitamin B12 deficiency   • Arthritis   • Hemochromatosis   • Herpes zoster   • Hip pain   • Essential hypertension   • Cancer (HCC)   • Pulmonary emphysema (HCC)   • Left low back pain   • Vertigo   • Headache around the eyes   • Mild cognitive impairment   • Metastatic renal cell carcinoma  on q 2 wks chemo   • Anxiety   • Malignant neoplasm of lung (HCC)   • Neuropathy   • Perennial allergic rhinitis   • Vitamin B12 deficiency   • Osteoarthritis of lumbar spine   • Pharyngitis   • Bronchitis   • Drug-induced constipation   • Hx of hiatal hernia   • History of lung cancer   • COVID-19 virus detected   • Dyspnea   • Generalized weakness   • Acute respiratory failure with hypoxia (HCC)   • Gastroesophageal reflux disease   • Hearing disorder   • Malignant neoplasm of prostate (HCC)   • Sleep apnea   • Anemia   • Malignant neoplasm metastatic to lung (HCC)   • Secondary bacterial pneumonia   • Pneumonia due to COVID-19 virus   • Hilar mass   • On antineoplastic chemotherapy q 2wks   • Paroxysmal atrial fibrillation (HCC)   • History of pulmonary embolism   • Nausea   • Gastritis   • Muscle spasm   • Fall   • Closed fracture of multiple ribs of left side   • Chest wall pain   • Fall, initial encounter   • Immobility syndrome   • Self-care deficit     Past Medical History:   Diagnosis Date   • Allergic    • Anemia, vitamin B12 deficiency 2012   • Anxiety    • Arthritis 2014   • Atrial fibrillation (HCC)    • Cancer (HCC) 2014    kidney; prostate   •  Cataract    • Chemotherapeutic agent or infusion extravasation     q 1-2 wks    • COPD (chronic obstructive pulmonary disease) (HCC) 03/24/2014   • Emphysema of lung (HCC)    • Hemochromatosis 03/24/2014   • Herpes zoster 12/06/2011    left medial thigh   • Hip pain 03/24/2014   • History of Doppler ultrasound 12/28/2011    superficial and deep venous insuffiency   • History of EKG 02/10/2012    Dr. Kathi Lloyd; unchanged from prior EKG   • Hypertension     benign essential   • Injury of back    • Pulmonary embolism (HCC) 03/24/2014   • Renal cell cancer (HCC)    • Shortness of breath    • Sleep apnea      Past Surgical History:   Procedure Laterality Date   • APPENDECTOMY     • CATARACT EXTRACTION     • CHOLECYSTECTOMY     • SPLENECTOMY     • TONSILLECTOMY     • VEIN SURGERY        General Information     Row Name 03/15/22 1130          OT Time and Intention    Document Type evaluation  -BL     Mode of Treatment individual therapy;occupational therapy  -BL     Row Name 03/15/22 1130          General Information    Patient Profile Reviewed yes  -BL     Existing Precautions/Restrictions fall;oxygen therapy device and L/min  -BL     Barriers to Rehab none identified  -BL     Row Name 03/15/22 1130          Living Environment    People in Home spouse;child(haroldo), adult  -BL     Row Name 03/15/22 1130          Cognition    Orientation Status (Cognition) oriented x 3  -BL     Row Name 03/15/22 1130          Safety Issues, Functional Mobility    Safety Issues Affecting Function (Mobility) insight into deficits/self-awareness;safety precaution awareness;awareness of need for assistance  -BL     Impairments Affecting Function (Mobility) balance;strength;endurance/activity tolerance;pain  -BL     Comment, Safety Issues/Impairments (Mobility) Pt slightly [place] at times, L rib fx  -BL           User Key  (r) = Recorded By, (t) = Taken By, (c) = Cosigned By    Initials Name Provider Type    BL Aylin Dowell, OT Occupational  Therapist                 Mobility/ADL's     Row Name 03/15/22 1132          Bed Mobility    Bed Mobility supine-sit;sit-supine  -     Supine-Sit [place] (Bed Mobility) moderate assist (50% patient effort)  -     Sit-Supine [place] (Bed Mobility) moderate assist (50% patient effort)  -     Assistive Device (Bed Mobility) bed rails;head of bed elevated  -     Row Name 03/15/22 1132          Transfers    Transfers sit-stand transfer;stand-sit transfer  -     Sit-Stand [place] (Transfers) moderate assist (50% patient effort);verbal cues  -     Stand-Sit [place] (Transfers) minimum assist (75% patient effort);verbal cues  -     Row Name 03/15/22 1132          Sit-Stand Transfer    Assistive Device (Sit-Stand Transfers) --  HHA x1  -[facility] Name 03/15/22 1132          Activities of Daily Living    BADL Assessment/Intervention bathing;grooming  -[facility] Name 03/15/22 1132          Stand-Sit Transfer    Assistive Device (Stand-Sit Transfers) --  HHA x1  -[facility] Name 03/15/22 1132          Bathing Assessment/Intervention    [place] Level (Bathing) upper body;upper extremities;chest/trunk;verbal cues;set up  -     Position (Bathing) sitting up in bed  -[facility] Name 03/15/22 1132          Grooming Assessment/Training    [place] Level (Grooming) wash face, hands;oral care regimen;set up;verbal cues  -     Position (Grooming) sitting up in bed  -           User Key  (r) = Recorded By, (t) = Taken By, (c) = Cosigned By    Initials Name Provider Type     Aylin Dowell OT Occupational Therapist               Obj/Interventions     Row Name 03/15/22 1135          Sensory Assessment (Somatosensory)    Sensory Assessment (Somatosensory) UE sensation intact  -     Sensory Subjective Reports numbness  -     Sensory Assessment slight numbness pt does have neuropathy in hands he reports  -[facility] Name 03/15/22 1135          Vision Assessment/Intervention    Visual  Impairment/Limitations WFL;corrective lenses for reading  -BL     Row Name 03/15/22 1135          Range of Motion Comprehensive    General Range of Motion no range of motion deficits identified  -[facility] Name 03/15/22 1135          Strength Comprehensive (MMT)    Comment, General Manual Muscle Testing (MMT) Assessment BUE 3-/5  -BL     Row Name 03/15/22 1135          Motor Skills    Motor Skills coordination  -     Coordination minimal impairment;upper extremity;dysmetria;dysdiadochokinesia;bilateral  -BL     Row Name 03/15/22 1135          Balance    Balance Assessment sitting static balance;sitting dynamic balance;sit to stand dynamic balance;standing static balance  -BL     Static Sitting Balance supervision  -BL     Dynamic Sitting Balance supervision  -BL     Position, Sitting Balance sitting edge of bed;unsupported  -BL     Sit to Stand Dynamic Balance minimal assist  -BL     Static Standing Balance minimal assist  -BL     Position/Device Used, Standing Balance supported  HHA x1  -BL     Balance Interventions sitting;standing;sit to stand;supported;static;dynamic;occupation based/functional task  -BL           User Key  (r) = Recorded By, (t) = Taken By, (c) = Cosigned By    Initials Name Provider Type    BL Aylin Dowell OT Occupational Therapist               Goals/Plan     [facility] Name 03/15/22 1148          Bed Mobility Goal 1 (OT)    Activity/Assistive Device (Bed Mobility Goal 1, OT) bed mobility activities, all  -BL     [place] Level/Cues Needed (Bed Mobility Goal 1, OT) contact guard required  -BL     Time Frame (Bed Mobility Goal 1, OT) short term goal (STG);2 weeks  -BL     Progress/Outcomes (Bed Mobility Goal 1, OT) continuing progress toward goal  -BL     Row Name 03/15/22 1148          Transfer Goal 1 (OT)    Activity/Assistive Device (Transfer Goal 1, OT) sit-to-stand/stand-to-sit;bed-to-chair/chair-to-bed;commode, 3-in-1  -BL     [place] Level/Cues Needed (Transfer Goal 1, OT)  contact guard required  -BL     Time Frame (Transfer Goal 1, OT) short term goal (STG);2 weeks  -BL     Progress/Outcome (Transfer Goal 1, OT) continuing progress toward goal  -BL     Row Name 03/15/22 1148          Bathing Goal 1 (OT)    Activity/Device (Bathing Goal 1, OT) bathing skills, all  -BL     [place] Level/Cues Needed (Bathing Goal 1, OT) minimum assist (75% or more patient effort)  -BL     Time Frame (Bathing Goal 1, OT) short term goal (STG);2 weeks  -BL     Progress/Outcomes (Bathing Goal 1, OT) continuing progress toward goal  -BL     Row Name 03/15/22 1148          Toileting Goal 1 (OT)    Activity/Device (Toileting Goal 1, OT) toileting skills, all  -BL     [place] Level/Cues Needed (Toileting Goal 1, OT) moderate assist (50-74% patient effort)  -BL     Time Frame (Toileting Goal 1, OT) short term goal (STG);2 weeks  -BL     Progress/Outcome (Toileting Goal 1, OT) continuing progress toward goal  -BL     Row Name 03/15/22 1148          Therapy Assessment/Plan (OT)    Planned Therapy Interventions (OT) BADL retraining;IADL retraining;occupation/activity based interventions;patient/caregiver education/training;transfer/mobility retraining;activity tolerance training  -BL           User Key  (r) = Recorded By, (t) = Taken By, (c) = Cosigned By    Initials Name Provider Type    BL Aylin Dowell, OT Occupational Therapist               Clinical Impression     Row Name 03/15/22 1140          Pain Assessment    Pretreatment Pain Rating 8/10  -BL     Posttreatment Pain Rating 7/10  -BL     Pain Location - Side/Orientation Left  -BL     Pre/Posttreatment Pain Comment ribs  -BL     Pain Intervention(s) Repositioned  -BL     Row Name 03/15/22 1140          Plan of Care Review    Plan of Care Reviewed With patient  -BL     Progress improving  -BL     Outcome Evaluation Pt seen by OT this date for evaluation. Pt is an 82 y/o male admit to [facility] for Generalized weakness presenting L rib fx's after  sustaining a fall after leaving hospital due to declining rehab and is now back. PMHx includes atrial fibrillation, COPD, emphysema, hypertension, renal cell cancer, lung cancer, sleep apnea. Pt reports that he lives at home with spouse and daughter and is typically independent with all ADLs at baseline and also reports that he does use a rolling walker for functional mobility. Pt reports that he does have a shower chair and grab bars in bathroom at home. Upon arrival pt lying supine in bed, 8/10 pain in L ribs but RN already gave pain medication, A&O x3. Pt completed UB bathing and grooming task sitting up in bed with S/U and verbal cues for thoroughness. Pt requiring Mod A for sup>sit transition this date with increase time to sit EOB due to pain in ribs requiring rest breaks. Once sitting EOB pt able to stand and scoot bottom up in bed with Mod A and verbal cues. Pt Mod A for sit>sup transition. Pt returned to supine, needs in reach, alarm on. Pt to continue with skilled OT services to address goals and deficits. OT rec Rehab at D/C. OT wore mask, gloves, glasses, hand hygiene performed, appropriate ppe worn during encounter.  -     Row Name 03/15/22 1140          Therapy Assessment/Plan (OT)    Rehab Potential (OT) good, to achieve stated therapy goals  -     Criteria for Skilled Therapeutic Interventions Met (OT) skilled treatment is necessary  -     Therapy Frequency (OT) 5 times/wk  -     Row Name 03/15/22 1140          Therapy Plan Review/Discharge Plan (OT)    Anticipated Discharge Disposition (OT) skilled nursing facility  -     Row Name 03/15/22 1140          Vital Signs    Pre Patient Position Supine  -BL     Intra Patient Position Standing  -BL     Post Patient Position Supine  -BL     Row Name 03/15/22 1140          Positioning and Restraints    Pre-Treatment Position in bed  -BL     Post Treatment Position bed  -BL     In Bed supine;call light within reach;encouraged to call for assist;exit  alarm on  -BL           User Key  (r) = Recorded By, (t) = Taken By, (c) = Cosigned By    Initials Name Provider Type     Aylin Dowell OT Occupational Therapist               Outcome Measures     Row Name 03/15/22 1149          How much help from another is currently needed...    Putting on and taking off regular lower body clothing? 2  -BL     Bathing (including washing, rinsing, and drying) 2  -BL     Toileting (which includes using toilet bed pan or urinal) 2  -BL     Putting on and taking off regular upper body clothing 3  -BL     Taking care of personal grooming (such as brushing teeth) 3  -BL     Eating meals 3  -BL     AM-PAC 6 Clicks Score (OT) 15  -BL     Row Name 03/15/22 1149          Functional Assessment    Outcome Measure Options AM-PAC 6 Clicks Daily Activity (OT)  -BL           User Key  (r) = Recorded By, (t) = Taken By, (c) = Cosigned By    Initials Name Provider Type    Aylin Solares OT Occupational Therapist                Occupational Therapy Education                 Title: PT OT SLP Therapies (In Progress)     Topic: Occupational Therapy (In Progress)     Point: ADL training (Done)     Description:   Instruct learner(s) on proper safety adaptation and remediation techniques during self care or transfers.   Instruct in proper use of assistive devices.              Learning Progress Summary           Patient Acceptance, E, VU by  at 3/15/2022 1150    Comment: the role of OT                   Point: Home exercise program (Not Started)     Description:   Instruct learner(s) on appropriate technique for monitoring, assisting and/or progressing therapeutic exercises/activities.              Learner Progress:  Not documented in this visit.          Point: Precautions (Not Started)     Description:   Instruct learner(s) on prescribed precautions during self-care and functional transfers.              Learner Progress:  Not documented in this visit.          Point: Body mechanics (Not  Started)     Description:   Instruct learner(s) on proper positioning and spine alignment during self-care, functional mobility activities and/or exercises.              Learner Progress:  Not documented in this visit.                      User Key     Initials Effective Dates Name Provider Type Discipline    BL 01/05/21 -  Aylin Dowell OT Occupational Therapist OT              OT Recommendation and Plan  Planned Therapy Interventions (OT): BADL retraining, IADL retraining, occupation/activity based interventions, patient/caregiver education/training, transfer/mobility retraining, activity tolerance training  Therapy Frequency (OT): 5 times/wk  Plan of Care Review  Plan of Care Reviewed With: patient  Progress: improving  Outcome Evaluation: Pt seen by OT this date for evaluation. Pt is an 80 y/o male admit to [facility] for Generalized weakness presenting L rib fx's after sustaining a fall after leaving hospital due to declining rehab and is now back. PMHx includes atrial fibrillation, COPD, emphysema, hypertension, renal cell cancer, lung cancer, sleep apnea. Pt reports that he lives at home with spouse and daughter and is typically independent with all ADLs at baseline and also reports that he does use a rolling walker for functional mobility. Pt reports that he does have a shower chair and grab bars in bathroom at home. Upon arrival pt lying supine in bed, 8/10 pain in L ribs but RN already gave pain medication, A&O x3. Pt completed UB bathing and grooming task sitting up in bed with S/U and verbal cues for thoroughness. Pt requiring Mod A for sup>sit transition this date with increase time to sit EOB due to pain in ribs requiring rest breaks. Once sitting EOB pt able to stand and scoot bottom up in bed with Mod A and verbal cues. Pt Mod A for sit>sup transition. Pt returned to supine, needs in reach, alarm on. Pt to continue with skilled OT services to address goals and deficits. OT rec Rehab at D/C. OT wore  mask, gloves, glasses, hand hygiene performed, appropriate ppe worn during encounter.     Time Calculation:    Time Calculation- OT     Row Name 03/15/22 1150             Time Calculation- OT    OT Start Time 0911  -BL      OT Stop Time 0943  -BL      OT Time Calculation (min) 32 min  -BL      Total Timed Code Minutes- OT 23 minute(s)  -BL      OT Received On 03/15/22  -BL      OT - Next Appointment 03/16/22  -BL      OT Goal Re-Cert Due Date 03/29/22  -BL              Timed Charges    88551 - OT Therapeutic Activity Minutes 10  -BL      30822 - OT Self Care/Mgmt Minutes 13  -BL              Untimed Charges    OT Eval/Re-eval Minutes 9  -BL              Total Minutes    Timed Charges Total Minutes 23  -BL      Untimed Charges Total Minutes 9  -BL       Total Minutes 32  -BL            User Key  (r) = Recorded By, (t) = Taken By, (c) = Cosigned By    Initials Name Provider Type     Aylin Dowell, BECKY Occupational Therapist              Therapy Charges for Today     Code Description Service Date Service Provider Modifiers Qty    39987514022 HC OT THERAPEUTIC ACT EA 15 MIN 3/15/2022 Aylin Dowell OT GO 1    19558290442 HC OT SELF CARE/MGMT/TRAIN EA 15 MIN 3/15/2022 Aylin Dowell OT GO 1    67460224661 HC OT EVAL MOD COMPLEXITY 2 3/15/2022 Aylin Dowell OT GO 1               Aylin Dowell OT  3/15/2022

## 2022-03-15 NOTE — DISCHARGE PLACEMENT REQUEST
"Storm Koch Sr. (81 y.o. Male)             Date of Birth   1940    Social Security Number       Address   [address] [place] [address]    Home Phone   [phone]    MRN   [id number]       [place]   Jewish    Marital Status                               Admission Date   3/14/22    Admission Type   Emergency    Admitting Provider   Booker Tipton MD    Attending Provider   Simba Hdez DO    Department, Room/Bed   [address], [address]       Discharge Date       Discharge Disposition       Discharge Destination                               Attending Provider: Simba Hdez DO    Allergies: Nsaids, Orange, Orange Oil, Latex    Isolation: Enh Drop/Con   Infection: COVID (confirmed) (03/14/22)   Code Status: CPR   Advance Care Planning Activity    Ht: 182.9 cm (72\")   Wt: 105 kg (232 lb 9.4 oz)    Admission Cmt: None   Principal Problem: Generalized weakness [R53.1]                 Active Insurance as of 3/14/2022     Primary Coverage     Payor Plan Insurance Group Employer/Plan Group    HUMANA MEDICARE REPLACEMENT HUMANA MEDICARE REPLACEMENT [id number]     Payor Plan Address Payor Plan Phone Number Payor Plan Fax Number Effective Dates    [address] [address] [phone]  1/1/2013 - None Entered    [facility] [id number]       Subscriber Name Subscriber Birth Date Member ID       STORM KOCH SR. 1940 [id number]                 Emergency Contacts      (Rel.) Home Phone Work Phone Mobile Phone    Lucina Koch (Spouse) [phone] -- [phone]    Storm Koch (Son) [phone] -- --    Romina Koch (Daughter) [phone] -- --            {Outbreak/Travel/Exposure Documentation......;  Question Available Choices Patient Response   COVID-19 Outbreak Screen:  Do you currently have a new onset of the following symptoms?        Fever/Chills, Cough, Shortness of air, Loss of taste or smell, No, Unknown  No (03/14/22 1644) "   COVID-19 Outbreak Screen: In the last 14 days, have you had contact with anyone who is ill, has show any of the symptoms listed above and/or has been diagnosis with the 2019 Novel Coronavirus? This includes any immediate household members but excludes any patients with whom you have been in contact within your normal work duties wearing proper PPE, if you are a healthcare worker.  Yes, No, Unknown              No (03/14/22 1644)   COVID-19 Outbreak Screen: Who was notified? Free text (not recorded)   Ebola Screening Outbreak Screen: Have you traveled to the [place] the [place] or [place] within the past 21 days?  Yes, No, Unknown (not recorded)   Ebola Screening Outbreak Screen: Do you have ANY of the following symptoms: Fever/Chills, Vomiting, Diarrhea, Fatigue, Headache, Muscle pain, Unexplained bleeding, Abdominal (stomach) pain, No, Unknown (not recorded)   Ebola Screening Outbreak Screen: Name of Person notified Free text (not recorded)   Travel Screen: Have you traveled in the last month? If so, to what country have you traveled? If [place] what state? Yes, No, Unknown  List of all countries  List of all States No (03/14/22 1643)  (not recorded)  (not recorded)   Infection Risk: Do you currently have the following symptoms?  (If cough is selected, the Tuberculosis Screen is performed.) Cough, Fever, Rash, No No (03/14/22 1643)   Tuberculosis Screen: Do you have any of the following Tuberculosis Risks?  · Have you lived or spent time with anyone who had or may have TB?  · Have you lived in or visited any of the following areas for more than one month: [place], [place], [place], [place] or [place], the [place] or [place]?  · Do you have HIV/AIDS?  · Have you lived in or worked in a nursing home, homeless shelter, correctional facility, or substance abuse treatment facility?   · No    If Yes do you have any of the following symptoms? Yes responses display to the right    If Yes, symptoms listed  are:  Cough greater than or equal to 3 weeks, Loss of appetite, Unexplained weight loss, Night sweats, Bloody sputum or hemoptysis, Hoarseness, Fever, Fatigue, Chest pain, No (not recorded)  (not recorded)   Exposure Screen: Have you been exposed to any of these contagious diseases in the last month? Measles, Chickenpox, Meningitis, Pertussis, Whooping Cough, No No (03/14/22 4041)

## 2022-03-15 NOTE — PLAN OF CARE
Goal Outcome Evaluation:      Medicated for left side rib pain. Assisted to reposition. Remains on oxygen.

## 2022-03-16 LAB
ALBUMIN SERPL-MCNC: 3.3 G/DL (ref 3.5–5.2)
ALBUMIN/GLOB SERPL: 1.3 G/DL
ALP SERPL-CCNC: 58 U/L (ref 39–117)
ALT SERPL W P-5'-P-CCNC: 15 U/L (ref 1–41)
ANION GAP SERPL CALCULATED.3IONS-SCNC: 7 MMOL/L (ref 5–15)
AST SERPL-CCNC: 11 U/L (ref 1–40)
BASOPHILS # BLD AUTO: 0.03 10*3/MM3 (ref 0–0.2)
BASOPHILS NFR BLD AUTO: 0.3 % (ref 0–1.5)
BILIRUB CONJ SERPL-MCNC: 0.2 MG/DL (ref 0–0.3)
BILIRUB SERPL-MCNC: 0.8 MG/DL (ref 0–1.2)
BUN SERPL-MCNC: 11 MG/DL (ref 8–23)
BUN/CREAT SERPL: 13.1 (ref 7–25)
CALCIUM SPEC-SCNC: 9 MG/DL (ref 8.6–10.5)
CHLORIDE SERPL-SCNC: 100 MMOL/L (ref 98–107)
CO2 SERPL-SCNC: 29 MMOL/L (ref 22–29)
CREAT SERPL-MCNC: 0.84 MG/DL (ref 0.76–1.27)
CRP SERPL-MCNC: 10.8 MG/DL (ref 0–0.5)
D DIMER PPP FEU-MCNC: 2.41 MCGFEU/ML (ref 0–0.49)
DEPRECATED RDW RBC AUTO: 40.5 FL (ref 37–54)
EGFRCR SERPLBLD CKD-EPI 2021: 87.1 ML/MIN/1.73
EOSINOPHIL # BLD AUTO: 0 10*3/MM3 (ref 0–0.4)
EOSINOPHIL NFR BLD AUTO: 0 % (ref 0.3–6.2)
ERYTHROCYTE [DISTWIDTH] IN BLOOD BY AUTOMATED COUNT: 10.1 % (ref 12.3–15.4)
FERRITIN SERPL-MCNC: 487 NG/ML (ref 30–400)
GLOBULIN UR ELPH-MCNC: 2.6 GM/DL
GLUCOSE SERPL-MCNC: 126 MG/DL (ref 65–99)
HCT VFR BLD AUTO: 30.4 % (ref 37.5–51)
HGB BLD-MCNC: 9.9 G/DL (ref 13–17.7)
LYMPHOCYTES # BLD AUTO: 1.49 10*3/MM3 (ref 0.7–3.1)
LYMPHOCYTES NFR BLD AUTO: 15.1 % (ref 19.6–45.3)
MCH RBC QN AUTO: 36.4 PG (ref 26.6–33)
MCHC RBC AUTO-ENTMCNC: 32.6 G/DL (ref 31.5–35.7)
MCV RBC AUTO: 111.8 FL (ref 79–97)
MONOCYTES # BLD AUTO: 0.5 10*3/MM3 (ref 0.1–0.9)
MONOCYTES NFR BLD AUTO: 5.1 % (ref 5–12)
NEUTROPHILS NFR BLD AUTO: 7.81 10*3/MM3 (ref 1.7–7)
NEUTROPHILS NFR BLD AUTO: 78.9 % (ref 42.7–76)
PLATELET # BLD AUTO: 287 10*3/MM3 (ref 140–450)
PMV BLD AUTO: 9.9 FL (ref 6–12)
POTASSIUM SERPL-SCNC: 4.5 MMOL/L (ref 3.5–5.2)
PROT SERPL-MCNC: 5.9 G/DL (ref 6–8.5)
RBC # BLD AUTO: 2.72 10*6/MM3 (ref 4.14–5.8)
SODIUM SERPL-SCNC: 136 MMOL/L (ref 136–145)
WBC NRBC COR # BLD: 9.89 10*3/MM3 (ref 3.4–10.8)

## 2022-03-16 PROCEDURE — 94799 UNLISTED PULMONARY SVC/PX: CPT

## 2022-03-16 PROCEDURE — 97110 THERAPEUTIC EXERCISES: CPT

## 2022-03-16 PROCEDURE — 94760 N-INVAS EAR/PLS OXIMETRY 1: CPT

## 2022-03-16 PROCEDURE — 85025 COMPLETE CBC W/AUTO DIFF WBC: CPT | Performed by: INTERNAL MEDICINE

## 2022-03-16 PROCEDURE — 86140 C-REACTIVE PROTEIN: CPT | Performed by: INTERNAL MEDICINE

## 2022-03-16 PROCEDURE — 63710000001 DEXAMETHASONE PER 0.25 MG: Performed by: STUDENT IN AN ORGANIZED HEALTH CARE EDUCATION/TRAINING PROGRAM

## 2022-03-16 PROCEDURE — 82728 ASSAY OF FERRITIN: CPT | Performed by: INTERNAL MEDICINE

## 2022-03-16 PROCEDURE — 99223 1ST HOSP IP/OBS HIGH 75: CPT | Performed by: STUDENT IN AN ORGANIZED HEALTH CARE EDUCATION/TRAINING PROGRAM

## 2022-03-16 PROCEDURE — 97535 SELF CARE MNGMENT TRAINING: CPT

## 2022-03-16 PROCEDURE — 85379 FIBRIN DEGRADATION QUANT: CPT | Performed by: INTERNAL MEDICINE

## 2022-03-16 PROCEDURE — 80053 COMPREHEN METABOLIC PANEL: CPT | Performed by: INTERNAL MEDICINE

## 2022-03-16 PROCEDURE — 94761 N-INVAS EAR/PLS OXIMETRY MLT: CPT

## 2022-03-16 PROCEDURE — 63710000001 DEXAMETHASONE PER 0.25 MG: Performed by: INTERNAL MEDICINE

## 2022-03-16 PROCEDURE — 82248 BILIRUBIN DIRECT: CPT | Performed by: INTERNAL MEDICINE

## 2022-03-16 PROCEDURE — 94664 DEMO&/EVAL PT USE INHALER: CPT

## 2022-03-16 PROCEDURE — 25010000002 REMDESIVIR 100 MG/20ML SOLUTION 1 EACH VIAL: Performed by: INTERNAL MEDICINE

## 2022-03-16 RX ADMIN — DEXAMETHASONE 6 MG: 4 TABLET ORAL at 09:08

## 2022-03-16 RX ADMIN — MEMANTINE HYDROCHLORIDE 10 MG: 10 TABLET, FILM COATED ORAL at 09:08

## 2022-03-16 RX ADMIN — MEMANTINE HYDROCHLORIDE 10 MG: 10 TABLET, FILM COATED ORAL at 22:06

## 2022-03-16 RX ADMIN — REMDESIVIR 100 MG: 100 INJECTION, POWDER, LYOPHILIZED, FOR SOLUTION INTRAVENOUS at 17:31

## 2022-03-16 RX ADMIN — DOCUSATE SODIUM 100 MG: 100 CAPSULE, LIQUID FILLED ORAL at 09:07

## 2022-03-16 RX ADMIN — ALPRAZOLAM 0.25 MG: 0.25 TABLET ORAL at 22:06

## 2022-03-16 RX ADMIN — FOLIC ACID 1 MG: 1 TABLET ORAL at 17:31

## 2022-03-16 RX ADMIN — ALBUTEROL SULFATE 2 PUFF: 90 AEROSOL, METERED RESPIRATORY (INHALATION) at 16:22

## 2022-03-16 RX ADMIN — RIVAROXABAN 20 MG: 20 TABLET, FILM COATED ORAL at 17:31

## 2022-03-16 RX ADMIN — GABAPENTIN 400 MG: 400 CAPSULE ORAL at 22:07

## 2022-03-16 RX ADMIN — CYCLOBENZAPRINE 10 MG: 10 TABLET, FILM COATED ORAL at 09:08

## 2022-03-16 RX ADMIN — FAMOTIDINE 20 MG: 20 TABLET ORAL at 09:07

## 2022-03-16 RX ADMIN — DEXAMETHASONE 6 MG: 4 TABLET ORAL at 22:06

## 2022-03-16 RX ADMIN — CYCLOBENZAPRINE 10 MG: 10 TABLET, FILM COATED ORAL at 17:31

## 2022-03-16 RX ADMIN — CYCLOBENZAPRINE 10 MG: 10 TABLET, FILM COATED ORAL at 22:07

## 2022-03-16 RX ADMIN — CALCIUM CARBONATE-VITAMIN D TAB 500 MG-200 UNIT 1 TABLET: 500-200 TAB at 22:06

## 2022-03-16 RX ADMIN — OXYCODONE HYDROCHLORIDE 5 MG: 5 TABLET ORAL at 04:19

## 2022-03-16 RX ADMIN — FAMOTIDINE 20 MG: 20 TABLET ORAL at 22:07

## 2022-03-16 RX ADMIN — TIOTROPIUM BROMIDE INHALATION SPRAY 2 PUFF: 3.12 SPRAY, METERED RESPIRATORY (INHALATION) at 08:41

## 2022-03-16 RX ADMIN — FOLIC ACID 1 MG: 1 TABLET ORAL at 09:08

## 2022-03-16 RX ADMIN — ALBUTEROL SULFATE 2 PUFF: 90 AEROSOL, METERED RESPIRATORY (INHALATION) at 21:28

## 2022-03-16 RX ADMIN — CALCIUM CARBONATE-VITAMIN D TAB 500 MG-200 UNIT 1 TABLET: 500-200 TAB at 09:08

## 2022-03-16 RX ADMIN — CETIRIZINE HYDROCHLORIDE 5 MG: 10 TABLET ORAL at 09:08

## 2022-03-16 RX ADMIN — SERTRALINE 25 MG: 25 TABLET, FILM COATED ORAL at 09:08

## 2022-03-16 RX ADMIN — ALBUTEROL SULFATE 2 PUFF: 90 AEROSOL, METERED RESPIRATORY (INHALATION) at 08:41

## 2022-03-16 RX ADMIN — GABAPENTIN 800 MG: 400 CAPSULE ORAL at 17:32

## 2022-03-16 RX ADMIN — DOCUSATE SODIUM 100 MG: 100 CAPSULE, LIQUID FILLED ORAL at 22:06

## 2022-03-16 RX ADMIN — DONEPEZIL HYDROCHLORIDE 10 MG: 10 TABLET, FILM COATED ORAL at 22:06

## 2022-03-16 RX ADMIN — FOLIC ACID 1 MG: 1 TABLET ORAL at 22:05

## 2022-03-16 RX ADMIN — AMLODIPINE BESYLATE 5 MG: 5 TABLET ORAL at 09:07

## 2022-03-16 RX ADMIN — LEVOTHYROXINE SODIUM 112 MCG: 0.11 TABLET ORAL at 09:07

## 2022-03-16 RX ADMIN — OXCARBAZEPINE 300 MG: 300 TABLET, FILM COATED ORAL at 22:05

## 2022-03-16 RX ADMIN — OXYCODONE HYDROCHLORIDE 5 MG: 5 TABLET ORAL at 17:49

## 2022-03-16 RX ADMIN — LIDOCAINE 2 PATCH: 50 PATCH CUTANEOUS at 09:09

## 2022-03-16 RX ADMIN — ALBUTEROL SULFATE 2 PUFF: 90 AEROSOL, METERED RESPIRATORY (INHALATION) at 12:26

## 2022-03-16 RX ADMIN — OXYCODONE HYDROCHLORIDE 5 MG: 5 TABLET ORAL at 22:06

## 2022-03-16 RX ADMIN — GABAPENTIN 400 MG: 400 CAPSULE ORAL at 09:08

## 2022-03-16 NOTE — PROGRESS NOTES
Name: Mg Gerber . ADMIT: 3/14/2022   : 1940  PCP: Dillon Walton MD    MRN: [id number] LOS: 2 days   AGE/SEX: 81 y.o. male  ROOM: [facility]   Subjective   Chief Complaint   Patient presents with   • Weakness - Generalized      When I entered the room he had taken off his nasal cannula and was saturating 76 to 79% on room air.  With deep breaths he did come up to about 84% on room air.  Placed him on 4 L and O2 saturation had improved to about 88%.  He is not reporting productive cough.  He does have pain with inspiration on the left side in the area of his known rib fractures.  No fevers or chills reported.  He has nausea chronically after eating and reports that he takes Zofran at home for this.  No vomiting or abdominal pain currently.  No diarrhea or dysuria reported.  He was a bit hard of hearing but I was able to communicate okay with him despite masks.    Objective   Vital Signs  Temp:  [97.5 °F (36.4 °C)-98.9 °F (37.2 °C)] 97.8 °F (36.6 °C)  Heart Rate:  [72-86] 72  Resp:  [16-20] 16  BP: (112-144)/(53-74) 112/53  SpO2:  [92 %-96 %] 92 %  on  Flow (L/min):  [4-8] 5;   Device (Oxygen Therapy): nasal cannula  Body mass index is 29.99 kg/m².    Physical Exam  Vitals and nursing note reviewed.   Constitutional:       Appearance: He is ill-appearing. He is not diaphoretic.   HENT:      Head: Normocephalic and atraumatic.   Eyes:      General:         Right eye: No discharge.         Left eye: No discharge.      Conjunctiva/sclera: Conjunctivae normal.   Cardiovascular:      Rate and Rhythm: Normal rate and regular rhythm.      Pulses: Normal pulses.   Pulmonary:      Breath sounds: No wheezing or rhonchi.   Chest:      Chest wall: Tenderness present.   Abdominal:      General: There is no distension.      Palpations: Abdomen is soft.      Tenderness: There is no abdominal tenderness. There is no guarding or rebound.   Musculoskeletal:         General: No swelling or tenderness.      Cervical  back: Neck supple. No tenderness.   Skin:     General: Skin is warm and dry.   Neurological:      Mental Status: He is alert. Mental status is at baseline.      Cranial Nerves: No cranial nerve deficit ([place]).   Psychiatric:         Mood and Affect: Mood normal.         Behavior: Behavior normal.         Results Review:       I reviewed the patient's new clinical results.     I reviewed imaging, agree with interpretation.     I reviewed telemetry/EKG results, sinus      Results from last 7 days   Lab Units 03/16/22  0345 03/15/22  0828 03/14/22  1922 03/11/22  0619   WBC 10*3/mm3 9.89 19.80* 22.12* 19.16*   HEMOGLOBIN g/dL 9.9* 10.3* 10.7* 9.8*   PLATELETS 10*3/mm3 287 292 303 372     Results from last 7 days   Lab Units 03/16/22  0345 03/15/22  1708 03/15/22  0828 03/14/22  1922 03/11/22  0619   SODIUM mmol/L 136  --  136 138 138   POTASSIUM mmol/L 4.5  --  4.3 4.3 4.3   CHLORIDE mmol/L 100  --  101 102 106   CO2 mmol/L 29.0  --  26.9 26.1 23.9   BUN mg/dL 11  --  9 12 16   CREATININE mg/dL 0.84 0.85 0.91 1.04 1.09   GLUCOSE mg/dL 126*  --  119* 122* 98   Estimated Creatinine Clearance: 83 mL/min (by C-G formula based on SCr of 0.84 mg/dL).  Results from last 7 days   Lab Units 03/16/22  0345 03/15/22  1708 03/15/22  0828 03/14/22  1922 03/11/22  0619 03/10/22  2312   CALCIUM mg/dL 9.0  --  8.9 9.4 8.8 9.6   ALBUMIN g/dL 3.30* 3.70  --  3.70  --  4.20   MAGNESIUM mg/dL  --   --   --   --   --  2.2     Results from last 7 days   Lab Units 03/14/22  1922 03/11/22  0619 03/10/22  2312   INR  1.54* 1.25* 1.42*   APTT seconds 44.0*  --   --         albuterol sulfate HFA, 2 puff, Inhalation, 4x Daily - RT  amLODIPine, 5 mg, Oral, Daily  calcium 500 mg vitamin D 5 mcg (200 UT), 1 tablet, Oral, BID  cetirizine, 5 mg, Oral, Daily  cyclobenzaprine, 10 mg, Oral, TID  dexamethasone, 6 mg, Oral, Daily With Breakfast  docusate sodium, 100 mg, Oral, BID  donepezil, 10 mg, Oral, Nightly  famotidine, 20 mg, Oral, BID  folic acid, 1  mg, Oral, TID  gabapentin, 400 mg, Oral, Q12H  gabapentin, 800 mg, Oral, Q PM  levothyroxine, 112 mcg, Oral, Daily  lidocaine, 2 patch, Transdermal, Q24H  memantine, 10 mg, Oral, Q12H  OXcarbazepine, 300 mg, Oral, Nightly  remdesivir, 100 mg, Intravenous, Q24H  rivaroxaban, 20 mg, Oral, Daily With Dinner  [START ON 3/17/2022] sertraline, 50 mg, Oral, Daily  tiotropium bromide monohydrate, 2 puff, Inhalation, Daily - RT       Diet Regular    Assessment/Plan      Active Hospital Problems    Diagnosis  POA   • **Generalized weakness [R53.1]  Yes   • Immobility syndrome [M62.3]  Yes   • Self-care deficit [Z78.9]  Yes   • Closed fracture of multiple ribs of left side [S22.42XA]  Yes   • Paroxysmal atrial fibrillation (HCC) [I48.0]  Yes   • Drug-induced constipation [K59.03]  Yes   • Malignant neoplasm of lung (HCC) [C34.90]  Yes   • Essential hypertension [I10]  Yes      Resolved Hospital Problems   No resolved problems to display.       · COVID-19 pneumonia with acute hypoxic respiratory failure: Patient is immunosuppressed and has elevating CRP.  When I saw him he was on 4 L of oxygen.  On dexamethasone and remdesivir.  Will consult infectious disease.  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  He follows with Dr. Montes De Oca at [facility].  · Nondisplaced left rib fractures: Thoracic surgery evaluated during his previous admission.  He was not surgical candidate due to nature of the rib fractures and his advanced disease.  Continue pain control.  · History of DVT/PE: Continue Xarelto.  · PAF: Rate was okay.  On Xarelto.  · Hypertension: Acceptable acutely.  Continue current regimen.  · Hypothyroidism: Synthroid  · Memory deficit: Continue home regimen.  Am increasing his Zoloft to 50 mg to reflect home dose.  · Prophylaxis: On Xarelto  · Disposition: TBD    Bigg Schneider MD  [facility]  03/16/22  13:24 EDT    Dictated portions  using Dragon dictation software.    During the entire encounter, I was wearing recommended PPE including face mask and eye protection. Hand sanitization was performed prior to entering room and upon exit.

## 2022-03-16 NOTE — CASE MANAGEMENT/SOCIAL WORK
Continued Stay Note  [facility]     Patient Name: Mg Gerber .  MRN: [id number]  Today's Date: 3/16/2022    Admit Date: 3/14/2022     Discharge Plan     Row Name 03/16/22 1110       Plan    Plan [facility], needs pre-cert    Plan Comments Spoke with [facility]. [facility] can accept and she will check bed availability. Needs pre-cert. Partial packet in CCP office. Tammy FIORE RN CCP               Discharge Codes    No documentation.                     Tammy Powers RN

## 2022-03-16 NOTE — PLAN OF CARE
Goal Outcome Evaluation:  Plan of Care Reviewed With: patient, spouse        Progress: improving  Outcome Evaluation: Pt seen by OT this date for treatment. Upon arrival pt lying supine in bed, spouse present at bedside, pt A&O x3, 6/10 pain in L side ribs. Pt with noted improvement with sup>sit transition requiring CGA for transition to sit EOB. Once sitting EOB pt performed sit>stand with improvement only requiring Min A and verbal cues with use of rolling walker to transfer from bed>chair. Once in chair pt completed grooming task with S/U. Pt completed BUE AROM x20 reps sitting up in chair to increase endurance and improve ROM to complete functional task. Pt left sitting up in chair, needs in reach, no alarm, RN aware. Pt making progress with functional mobility and endurance. OT wore mask, gloves, glasses, hand hygiene performed, appropriate ppe worn during encounter.

## 2022-03-16 NOTE — PLAN OF CARE
Goal Outcome Evaluation:  Plan of Care Reviewed With: patient, spouse        Progress: improving  Outcome Evaluation: VSS, O2 titrated down to 4L O2. ID Consulted today. Pt up in chair assist x1. Tolerating meals. Possible d/c to [facility]. Family updated at the bedside.   Problem: Fall Injury Risk  Goal: Absence of Fall and Fall-Related Injury  Outcome: Ongoing, Progressing  Intervention: Identify and Manage Contributors  Recent Flowsheet Documentation  Taken 3/16/2022 1818 by Praveen Lake RN  Medication Review/Management: medications reviewed  Taken 3/16/2022 1619 by Praveen Lake RN  Medication Review/Management: medications reviewed  Taken 3/16/2022 1418 by Praveen Lake RN  Medication Review/Management: medications reviewed  Taken 3/16/2022 1226 by Praveen Lake RN  Medication Review/Management: medications reviewed  Taken 3/16/2022 1024 by Praveen Lake RN  Medication Review/Management: medications reviewed  Taken 3/16/2022 0907 by Praveen Lake RN  Medication Review/Management: medications reviewed  Intervention: Promote Injury-Free Environment  Recent Flowsheet Documentation  Taken 3/16/2022 1818 by Praveen Lake RN  Safety Promotion/Fall Prevention:   activity supervised   assistive device/personal items within reach   clutter free environment maintained   fall prevention program maintained   nonskid shoes/slippers when out of bed   room organization consistent   safety round/check completed  Taken 3/16/2022 1619 by Praveen Lake RN  Safety Promotion/Fall Prevention:   activity supervised   assistive device/personal items within reach   clutter free environment maintained   fall prevention program maintained   lighting adjusted   nonskid shoes/slippers when out of bed   safety round/check completed   room organization consistent  Taken 3/16/2022 1418 by Praveen Lake, RN  Safety Promotion/Fall Prevention:   assistive  device/personal items within reach   activity supervised   clutter free environment maintained   fall prevention program maintained   lighting adjusted   nonskid shoes/slippers when out of bed   muscle strengthening facilitated   room organization consistent   safety round/check completed  Taken 3/16/2022 1226 by Praveen Lake, RN  Safety Promotion/Fall Prevention:   activity supervised   assistive device/personal items within reach   clutter free environment maintained   fall prevention program maintained   lighting adjusted   nonskid shoes/slippers when out of bed   muscle strengthening facilitated   safety round/check completed   room organization consistent  Taken 3/16/2022 1024 by Praveen Lake, RN  Safety Promotion/Fall Prevention:   activity supervised   assistive device/personal items within reach   clutter free environment maintained   fall prevention program maintained   lighting adjusted   muscle strengthening facilitated   nonskid shoes/slippers when out of bed   room organization consistent   safety round/check completed  Taken 3/16/2022 0907 by Praveen Lake, RN  Safety Promotion/Fall Prevention:   activity supervised   assistive device/personal items within reach   clutter free environment maintained   fall prevention program maintained   lighting adjusted   muscle strengthening facilitated   nonskid shoes/slippers when out of bed   room organization consistent   safety round/check completed     Problem: Adult Inpatient Plan of Care  Goal: Plan of Care Review  Outcome: Ongoing, Progressing  Flowsheets (Taken 3/16/2022 1858)  Progress: improving  Plan of Care Reviewed With:   patient   spouse  Outcome Evaluation: VSS, O2 titrated down to  Goal: Patient-Specific Goal (Individualized)  Outcome: Ongoing, Progressing  Goal: Absence of Hospital-Acquired Illness or Injury  Outcome: Ongoing, Progressing  Intervention: Identify and Manage Fall Risk  Recent Flowsheet Documentation  Taken  3/16/2022 1818 by Praveen Lake, RN  Safety Promotion/Fall Prevention:   activity supervised   assistive device/personal items within reach   clutter free environment maintained   fall prevention program maintained   nonskid shoes/slippers when out of bed   room organization consistent   safety round/check completed  Taken 3/16/2022 1619 by Praveen Lake, RN  Safety Promotion/Fall Prevention:   activity supervised   assistive device/personal items within reach   clutter free environment maintained   fall prevention program maintained   lighting adjusted   nonskid shoes/slippers when out of bed   safety round/check completed   room organization consistent  Taken 3/16/2022 1418 by Praveen Lake, RN  Safety Promotion/Fall Prevention:   assistive device/personal items within reach   activity supervised   clutter free environment maintained   fall prevention program maintained   lighting adjusted   nonskid shoes/slippers when out of bed   muscle strengthening facilitated   room organization consistent   safety round/check completed  Taken 3/16/2022 1226 by Praveen Lake RN  Safety Promotion/Fall Prevention:   activity supervised   assistive device/personal items within reach   clutter free environment maintained   fall prevention program maintained   lighting adjusted   nonskid shoes/slippers when out of bed   muscle strengthening facilitated   safety round/check completed   room organization consistent  Taken 3/16/2022 1024 by Praveen Lake RN  Safety Promotion/Fall Prevention:   activity supervised   assistive device/personal items within reach   clutter free environment maintained   fall prevention program maintained   lighting adjusted   muscle strengthening facilitated   nonskid shoes/slippers when out of bed   room organization consistent   safety round/check completed  Taken 3/16/2022 0907 by Praveen Lake, RN  Safety Promotion/Fall Prevention:   activity  supervised   assistive device/personal items within reach   clutter free environment maintained   fall prevention program maintained   lighting adjusted   muscle strengthening facilitated   nonskid shoes/slippers when out of bed   room organization consistent   safety round/check completed  Intervention: Prevent Skin Injury  Recent Flowsheet Documentation  Taken 3/16/2022 1818 by Praveen Lake RN  Body Position: position changed independently  Taken 3/16/2022 1619 by Praveen Lake RN  Body Position: position changed independently  Taken 3/16/2022 1418 by Praveen Lake RN  Body Position: position changed independently  Skin Protection:   adhesive use limited   transparent dressing maintained   tubing/devices free from skin contact  Taken 3/16/2022 1226 by Praveen Lake RN  Body Position: position changed independently  Taken 3/16/2022 1024 by Praveen Lake RN  Body Position: position changed independently  Taken 3/16/2022 0907 by Praveen Lake RN  Body Position: position changed independently  Intervention: Prevent and Manage VTE (Venous Thromboembolism) Risk  Recent Flowsheet Documentation  Taken 3/16/2022 1818 by Praveen Lake RN  Activity Management:   activity encouraged   activity adjusted per tolerance  Taken 3/16/2022 1619 by Praveen Lake RN  Activity Management:   activity encouraged   activity adjusted per tolerance  Taken 3/16/2022 1418 by Praveen Lake RN  Activity Management:   up in chair   activity adjusted per tolerance  VTE Prevention/Management:   bilateral   dorsiflexion/plantar flexion performed  Range of Motion:   active ROM (range of motion) encouraged   ROM (range of motion) performed  Taken 3/16/2022 1226 by Praveen Lake RN  Activity Management:   activity adjusted per tolerance   activity encouraged  Taken 3/16/2022 1024 by Praveen Lake RN  Activity Management:   activity adjusted per  tolerance   activity encouraged  Taken 3/16/2022 0907 by Praveen Lake RN  Activity Management:   activity adjusted per tolerance   activity encouraged  VTE Prevention/Management:   bilateral   dorsiflexion/plantar flexion performed  Range of Motion:   active ROM (range of motion) encouraged   ROM (range of motion) performed  Intervention: Prevent Infection  Recent Flowsheet Documentation  Taken 3/16/2022 1818 by Praveen Lake RN  Infection Prevention:   single patient room provided   rest/sleep promoted   personal protective equipment utilized   hand hygiene promoted   visitors restricted/screened  Taken 3/16/2022 1619 by Praveen Lake RN  Infection Prevention:   visitors restricted/screened   single patient room provided   rest/sleep promoted   personal protective equipment utilized   hand hygiene promoted  Taken 3/16/2022 1418 by Praveen Lake RN  Infection Prevention:   visitors restricted/screened   single patient room provided   rest/sleep promoted   personal protective equipment utilized   hand hygiene promoted  Taken 3/16/2022 1226 by Praveen Lake RN  Infection Prevention:   single patient room provided   rest/sleep promoted   personal protective equipment utilized   hand hygiene promoted   visitors restricted/screened  Taken 3/16/2022 1024 by Praveen Lake RN  Infection Prevention:   visitors restricted/screened   single patient room provided   rest/sleep promoted   personal protective equipment utilized   hand hygiene promoted  Taken 3/16/2022 0907 by Praveen Lake RN  Infection Prevention:   visitors restricted/screened   single patient room provided   rest/sleep promoted   personal protective equipment utilized   hand hygiene promoted  Goal: Optimal Comfort and Wellbeing  Outcome: Ongoing, Progressing  Intervention: Monitor Pain and Promote Comfort  Recent Flowsheet Documentation  Taken 3/16/2022 1749 by Praveen Lake RN  Pain  Management Interventions:   see MAR   quiet environment facilitated  Goal: Readiness for Transition of Care  Outcome: Ongoing, Progressing     Problem: Skin Injury Risk Increased  Goal: Skin Health and Integrity  Outcome: Ongoing, Progressing  Intervention: Optimize Skin Protection  Recent Flowsheet Documentation  Taken 3/16/2022 1619 by Praveen Lake RN  Head of Bed (HOB) Positioning: HOB elevated  Taken 3/16/2022 1418 by Praveen Lake RN  Pressure Reduction Techniques:   sit time limited to 2 hours   frequent weight shift encouraged   heels elevated off bed   pressure points protected  Head of Bed (HOB) Positioning: HOB elevated  Pressure Reduction Devices:   alternating pressure pump (ADD)   positioning supports utilized  Skin Protection:   adhesive use limited   transparent dressing maintained   tubing/devices free from skin contact  Taken 3/16/2022 1226 by Praveen Lake RN  Head of Bed (HOB) Positioning: HOB elevated  Taken 3/16/2022 1024 by Praveen Lake RN  Head of Bed (HOB) Positioning: HOB elevated  Taken 3/16/2022 0907 by Praveen Lake RN  Head of Bed (HOB) Positioning: HOB elevated  Pressure Reduction Devices:   alternating pressure pump (ADD)   positioning supports utilized     Problem: Muscle Strength Impairment  Goal: Improved Muscle Strength  Outcome: Ongoing, Progressing     Problem: Pain Acute  Goal: Acceptable Pain Control and Functional Ability  Outcome: Ongoing, Progressing  Intervention: Prevent or Manage Pain  Recent Flowsheet Documentation  Taken 3/16/2022 1818 by Praveen Lake RN  Medication Review/Management: medications reviewed  Taken 3/16/2022 1619 by Praveen Lake RN  Medication Review/Management: medications reviewed  Taken 3/16/2022 1418 by Praveen Lake, RN  Medication Review/Management: medications reviewed  Taken 3/16/2022 1226 by Praveen Lake, RN  Medication Review/Management: medications  reviewed  Taken 3/16/2022 1024 by Praveen Lake, RN  Medication Review/Management: medications reviewed  Taken 3/16/2022 0907 by Praveen Lake, RN  Sensory Stimulation Regulation: care clustered  Medication Review/Management: medications reviewed  Intervention: Develop Pain Management Plan  Recent Flowsheet Documentation  Taken 3/16/2022 1749 by Praveen Lake, RN  Pain Management Interventions:   see MAR   quiet environment facilitated  Intervention: Optimize Psychosocial Wellbeing  Recent Flowsheet Documentation  Taken 3/16/2022 0907 by Praveen Lake, RN  Supportive Measures:   active listening utilized   self-responsibility promoted   verbalization of feelings encouraged  Diversional Activities: television

## 2022-03-16 NOTE — CONSULTS
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  Patient was recently in the ED after a fall and was recommended to be discharged to skilled nursing facility however declined.  Now presents back with worsening weakness.  Found to be Covid positive and ID was consulted for further management.    Patient reports he feels short of breath and has slowly been weaning down his oxygen today via nurse.  Reports he was up in the 8 to 9 L area and is now down to 4 L.  States his shortness of breath is somewhat better than earlier today.  Denies any fevers or chills.  He does report some nausea however no diarrhea or vomiting.  He is reportedly vaccinated x3.    Patient had leukocytosis to 22 on admission however now is also resolved to 9.  Ferritin has been elevated as well as CRP of 10.  Lactate and procalcitonin have been normal.    Past Medical History:   Diagnosis Date   • Allergic    • Anemia, vitamin B12 deficiency 04/12/2012   • Anxiety    • Arthritis 03/24/2014   • Atrial fibrillation (HCC)    • Cancer (HCC) 04/29/2014    kidney; prostate   • Cataract    • Chemotherapeutic agent or infusion extravasation     q 1-2 wks    • COPD (chronic obstructive pulmonary disease) (HCC) 03/24/2014   • Emphysema of lung (HCC)    • Hemochromatosis 03/24/2014   • Herpes zoster 12/06/2011    left medial thigh   • Hip pain 03/24/2014   • History of Doppler ultrasound 12/28/2011    superficial and deep venous insuffiency   • History of EKG 02/10/2012    Dr. Kathi Lloyd; unchanged from prior EKG   • Hypertension     benign essential   • Injury of back    • Pulmonary embolism (HCC) 03/24/2014   • Renal cell cancer (HCC)    •  "Shortness of breath    • Sleep apnea        Past Surgical History:   Procedure Laterality Date   • APPENDECTOMY     • CATARACT EXTRACTION     • CHOLECYSTECTOMY     • SPLENECTOMY     • TONSILLECTOMY     • VEIN SURGERY         family history includes Aneurysm in his mother; Diabetes in his brother; Heart disease in his father; Stroke in his mother.     reports that he has quit smoking. He has never used smokeless tobacco. He reports current alcohol use. He reports that he does not use drugs.     Allergies   Allergen Reactions   • Nsaids Hives and Itching   • Orange Unknown - Low Severity   • Orange Oil Unknown - Low Severity     Other reaction(s): Unknown - Low Severity  Other reaction(s): Unknown - Low Severity   • Latex Rash       Medication:  Antibiotics:  Anti-Infectives (From admission, onward)    Ordered     Dose/Rate Route Frequency Start Stop    03/15/22 1539  remdesivir 100 mg in sodium chloride 0.9 % 250 mL IVPB (powder vial)        Ordering Provider: Simba Hdez, DO   \"Followed by\" Linked Group Details    100 mg  over 60 Minutes Intravenous Every 24 Hours 03/16/22 1630 03/20/22 1629    03/15/22 1539  remdesivir 200 mg in sodium chloride 0.9 % 290 mL IVPB (powder vial)        Ordering Provider: Simba Hdez,    \"Followed by\" Linked Group Details    200 mg  over 60 Minutes Intravenous Every 24 Hours 03/15/22 1630 03/15/22 1822          Review of Systems  Pertinent items are noted in HPI, all other systems reviewed and negative    Objective     Physical Exam:   Vital Signs   Temp:  [97.5 °F (36.4 °C)-98.9 °F (37.2 °C)] 97.5 °F (36.4 °C)  Heart Rate:  [72-86] 72  Resp:  [16-20] 16  BP: (112-144)/(53-74) 128/54    GENERAL: Awake and alert, in no acute distress.   HEENT: Oropharynx is clear. Hearing is grossly normal.  Is a cannula in place.  EYES: PERRL. No conjunctival injection. No lid lag.   LYMPHATICS: No lymphadenopathy of the neck or inguinal regions.   HEART: Regular rate and regular " rhythm. No peripheral edema.   LUNGS: Clear to auscultation anteriorly with normal respiratory effort.   GI: Soft, nontender, nondistended. No appreciable organomegaly.   SKIN: Multiple sites of bruising on the upper extremities.  PSYCHIATRIC: Appropriate mood, affect, insight, and judgment.     Results Review:   I reviewed the patient's new clinical results.  I reviewed the patient's new imaging results and agree with the interpretation.  I reviewed the patient's other test results and agree with the interpretation    Lab Results   Component Value Date    WBC 9.89 03/16/2022    HGB 9.9 (L) 03/16/2022    HCT 30.4 (L) 03/16/2022    .8 (H) 03/16/2022     03/16/2022       Lab Results   Component Value Date    VANCOTROUGH 8.80 11/25/2020       Lab Results   Component Value Date    GLUCOSE 126 (H) 03/16/2022    BUN 11 03/16/2022    CREATININE 0.84 03/16/2022    EGFRIFNONA 58 (L) 11/27/2020    EGFRIFAFRI 54 11/17/2015    BCR 13.1 03/16/2022    CO2 29.0 03/16/2022    CALCIUM 9.0 03/16/2022    ALBUMIN 3.30 (L) 03/16/2022    AST 11 03/16/2022    ALT 15 03/16/2022         Estimated Creatinine Clearance: 83 mL/min (by C-G formula based on SCr of 0.84 mg/dL).      Microbiology:  3/11 Covid negative  3/14 Covid positive    Radiology:  3/14 chest x-ray reviewed by me with no evidence of acute infiltrate.    3/14 CT of the head report reviewed without any acute evidence of intracranial pathology    Assessment/Plan   #COVID-19 pneumonia  #Acute hypoxic respiratory failure  #Metastatic renal cell carcinoma with lung metastasis on immunotherapy  #History of PE/DVT on AC    Agree with continuing IV remdesivir for 5 days of therapy plus concomitant steroid.  Will increase dexamethasone to 6 mg twice daily given his rising inflammatory markers.  Continue to monitor these at this time.  He is on immunotherapy for his underlying cancer and therefore meets exclusion criteria for both baricitinib and tocilizumab so we will not  start these.  Continue supportive care.  Ultimately the above therapies are all to offer at the moment and there is little else to add from an infectious disease standpoint we will be happy to reevaluate in the future if something progresses.    Thank you for allowing me to be involved in the care of this patient. Infectious diseases will sign off at this time with antibiotics plan in place, but please call me at [phone] if any further ID questions or new ID concerns.

## 2022-03-17 LAB
ALBUMIN SERPL-MCNC: 3.4 G/DL (ref 3.5–5.2)
ALBUMIN/GLOB SERPL: 1.1 G/DL
ALP SERPL-CCNC: 59 U/L (ref 39–117)
ALT SERPL W P-5'-P-CCNC: 11 U/L (ref 1–41)
ANION GAP SERPL CALCULATED.3IONS-SCNC: 7.2 MMOL/L (ref 5–15)
AST SERPL-CCNC: 8 U/L (ref 1–40)
BILIRUB CONJ SERPL-MCNC: 0.3 MG/DL (ref 0–0.3)
BILIRUB SERPL-MCNC: 1.1 MG/DL (ref 0–1.2)
BUN SERPL-MCNC: 23 MG/DL (ref 8–23)
BUN/CREAT SERPL: 25.3 (ref 7–25)
CALCIUM SPEC-SCNC: 9.2 MG/DL (ref 8.6–10.5)
CHLORIDE SERPL-SCNC: 102 MMOL/L (ref 98–107)
CO2 SERPL-SCNC: 28.8 MMOL/L (ref 22–29)
CREAT SERPL-MCNC: 0.91 MG/DL (ref 0.76–1.27)
CRP SERPL-MCNC: 5.73 MG/DL (ref 0–0.5)
D DIMER PPP FEU-MCNC: 1.63 MCGFEU/ML (ref 0–0.49)
DEPRECATED RDW RBC AUTO: 41.3 FL (ref 37–54)
EGFRCR SERPLBLD CKD-EPI 2021: 84.1 ML/MIN/1.73
ERYTHROCYTE [DISTWIDTH] IN BLOOD BY AUTOMATED COUNT: 10.1 % (ref 12.3–15.4)
FERRITIN SERPL-MCNC: 556 NG/ML (ref 30–400)
FIBRINOGEN PPP-MCNC: 429 MG/DL (ref 219–464)
GLOBULIN UR ELPH-MCNC: 3 GM/DL
GLUCOSE BLDC GLUCOMTR-MCNC: 115 MG/DL (ref 70–130)
GLUCOSE SERPL-MCNC: 122 MG/DL (ref 65–99)
HCT VFR BLD AUTO: 32.2 % (ref 37.5–51)
HGB BLD-MCNC: 10.2 G/DL (ref 13–17.7)
LYMPHOCYTES # BLD MANUAL: 0.9 10*3/MM3 (ref 0.7–3.1)
LYMPHOCYTES NFR BLD MANUAL: 5.1 % (ref 5–12)
MACROCYTES BLD QL SMEAR: ABNORMAL
MAGNESIUM SERPL-MCNC: 2.3 MG/DL (ref 1.6–2.4)
MCH RBC QN AUTO: 35.7 PG (ref 26.6–33)
MCHC RBC AUTO-ENTMCNC: 31.7 G/DL (ref 31.5–35.7)
MCV RBC AUTO: 112.6 FL (ref 79–97)
MONOCYTES # BLD: 0.64 10*3/MM3 (ref 0.1–0.9)
NEUTROPHILS # BLD AUTO: 11.09 10*3/MM3 (ref 1.7–7)
NEUTROPHILS NFR BLD MANUAL: 87.9 % (ref 42.7–76)
NT-PROBNP SERPL-MCNC: 478 PG/ML (ref 0–1800)
PHOSPHATE SERPL-MCNC: 3.2 MG/DL (ref 2.5–4.5)
PLAT MORPH BLD: NORMAL
PLATELET # BLD AUTO: 336 10*3/MM3 (ref 140–450)
PMV BLD AUTO: 9.8 FL (ref 6–12)
POLYCHROMASIA BLD QL SMEAR: ABNORMAL
POTASSIUM SERPL-SCNC: 4.3 MMOL/L (ref 3.5–5.2)
PROCALCITONIN SERPL-MCNC: 0.07 NG/ML (ref 0–0.25)
PROT SERPL-MCNC: 6.4 G/DL (ref 6–8.5)
RBC # BLD AUTO: 2.86 10*6/MM3 (ref 4.14–5.8)
SODIUM SERPL-SCNC: 138 MMOL/L (ref 136–145)
VARIANT LYMPHS NFR BLD MANUAL: 7.1 % (ref 19.6–45.3)
WBC MORPH BLD: NORMAL
WBC NRBC COR # BLD: 12.62 10*3/MM3 (ref 3.4–10.8)

## 2022-03-17 PROCEDURE — 85007 BL SMEAR W/DIFF WBC COUNT: CPT | Performed by: INTERNAL MEDICINE

## 2022-03-17 PROCEDURE — 25010000002 REMDESIVIR 100 MG/20ML SOLUTION 1 EACH VIAL: Performed by: INTERNAL MEDICINE

## 2022-03-17 PROCEDURE — 63710000001 DEXAMETHASONE PER 0.25 MG: Performed by: STUDENT IN AN ORGANIZED HEALTH CARE EDUCATION/TRAINING PROGRAM

## 2022-03-17 PROCEDURE — 83880 ASSAY OF NATRIURETIC PEPTIDE: CPT | Performed by: INTERNAL MEDICINE

## 2022-03-17 PROCEDURE — 94799 UNLISTED PULMONARY SVC/PX: CPT

## 2022-03-17 PROCEDURE — 82728 ASSAY OF FERRITIN: CPT | Performed by: INTERNAL MEDICINE

## 2022-03-17 PROCEDURE — 80053 COMPREHEN METABOLIC PANEL: CPT | Performed by: INTERNAL MEDICINE

## 2022-03-17 PROCEDURE — 97530 THERAPEUTIC ACTIVITIES: CPT

## 2022-03-17 PROCEDURE — 85379 FIBRIN DEGRADATION QUANT: CPT | Performed by: INTERNAL MEDICINE

## 2022-03-17 PROCEDURE — 85025 COMPLETE CBC W/AUTO DIFF WBC: CPT | Performed by: INTERNAL MEDICINE

## 2022-03-17 PROCEDURE — 84100 ASSAY OF PHOSPHORUS: CPT | Performed by: INTERNAL MEDICINE

## 2022-03-17 PROCEDURE — 83735 ASSAY OF MAGNESIUM: CPT | Performed by: INTERNAL MEDICINE

## 2022-03-17 PROCEDURE — 84145 PROCALCITONIN (PCT): CPT | Performed by: INTERNAL MEDICINE

## 2022-03-17 PROCEDURE — 85384 FIBRINOGEN ACTIVITY: CPT | Performed by: INTERNAL MEDICINE

## 2022-03-17 PROCEDURE — 82248 BILIRUBIN DIRECT: CPT | Performed by: INTERNAL MEDICINE

## 2022-03-17 PROCEDURE — 94761 N-INVAS EAR/PLS OXIMETRY MLT: CPT

## 2022-03-17 PROCEDURE — 86140 C-REACTIVE PROTEIN: CPT | Performed by: INTERNAL MEDICINE

## 2022-03-17 PROCEDURE — 97535 SELF CARE MNGMENT TRAINING: CPT

## 2022-03-17 PROCEDURE — 82962 GLUCOSE BLOOD TEST: CPT

## 2022-03-17 PROCEDURE — 94664 DEMO&/EVAL PT USE INHALER: CPT

## 2022-03-17 PROCEDURE — 97110 THERAPEUTIC EXERCISES: CPT

## 2022-03-17 RX ADMIN — LEVOTHYROXINE SODIUM 112 MCG: 0.11 TABLET ORAL at 08:18

## 2022-03-17 RX ADMIN — CYCLOBENZAPRINE 10 MG: 10 TABLET, FILM COATED ORAL at 08:19

## 2022-03-17 RX ADMIN — FOLIC ACID 1 MG: 1 TABLET ORAL at 16:57

## 2022-03-17 RX ADMIN — DOCUSATE SODIUM 100 MG: 100 CAPSULE, LIQUID FILLED ORAL at 08:18

## 2022-03-17 RX ADMIN — FOLIC ACID 1 MG: 1 TABLET ORAL at 21:32

## 2022-03-17 RX ADMIN — CALCIUM CARBONATE-VITAMIN D TAB 500 MG-200 UNIT 1 TABLET: 500-200 TAB at 21:52

## 2022-03-17 RX ADMIN — DOCUSATE SODIUM 100 MG: 100 CAPSULE, LIQUID FILLED ORAL at 21:32

## 2022-03-17 RX ADMIN — GABAPENTIN 800 MG: 400 CAPSULE ORAL at 16:57

## 2022-03-17 RX ADMIN — MEMANTINE HYDROCHLORIDE 10 MG: 10 TABLET, FILM COATED ORAL at 21:32

## 2022-03-17 RX ADMIN — POLYETHYLENE GLYCOL 3350 17 G: 17 POWDER, FOR SOLUTION ORAL at 08:37

## 2022-03-17 RX ADMIN — GABAPENTIN 400 MG: 400 CAPSULE ORAL at 21:33

## 2022-03-17 RX ADMIN — CYCLOBENZAPRINE 10 MG: 10 TABLET, FILM COATED ORAL at 16:57

## 2022-03-17 RX ADMIN — FOLIC ACID 1 MG: 1 TABLET ORAL at 08:19

## 2022-03-17 RX ADMIN — ALBUTEROL SULFATE 2 PUFF: 90 AEROSOL, METERED RESPIRATORY (INHALATION) at 08:38

## 2022-03-17 RX ADMIN — ALBUTEROL SULFATE 2 PUFF: 90 AEROSOL, METERED RESPIRATORY (INHALATION) at 21:48

## 2022-03-17 RX ADMIN — CETIRIZINE HYDROCHLORIDE 5 MG: 10 TABLET ORAL at 08:18

## 2022-03-17 RX ADMIN — REMDESIVIR 100 MG: 100 INJECTION, POWDER, LYOPHILIZED, FOR SOLUTION INTRAVENOUS at 16:57

## 2022-03-17 RX ADMIN — TIOTROPIUM BROMIDE INHALATION SPRAY 2 PUFF: 3.12 SPRAY, METERED RESPIRATORY (INHALATION) at 08:38

## 2022-03-17 RX ADMIN — OXYCODONE HYDROCHLORIDE 5 MG: 5 TABLET ORAL at 14:14

## 2022-03-17 RX ADMIN — DEXAMETHASONE 6 MG: 4 TABLET ORAL at 08:18

## 2022-03-17 RX ADMIN — GABAPENTIN 400 MG: 400 CAPSULE ORAL at 08:18

## 2022-03-17 RX ADMIN — FAMOTIDINE 20 MG: 20 TABLET ORAL at 08:18

## 2022-03-17 RX ADMIN — MEMANTINE HYDROCHLORIDE 10 MG: 10 TABLET, FILM COATED ORAL at 08:18

## 2022-03-17 RX ADMIN — AMLODIPINE BESYLATE 5 MG: 5 TABLET ORAL at 08:19

## 2022-03-17 RX ADMIN — OXYCODONE HYDROCHLORIDE 5 MG: 5 TABLET ORAL at 08:18

## 2022-03-17 RX ADMIN — SERTRALINE HYDROCHLORIDE 50 MG: 50 TABLET, FILM COATED ORAL at 08:19

## 2022-03-17 RX ADMIN — ALBUTEROL SULFATE 2 PUFF: 90 AEROSOL, METERED RESPIRATORY (INHALATION) at 16:45

## 2022-03-17 RX ADMIN — LIDOCAINE 2 PATCH: 50 PATCH CUTANEOUS at 11:14

## 2022-03-17 RX ADMIN — CYCLOBENZAPRINE 10 MG: 10 TABLET, FILM COATED ORAL at 21:52

## 2022-03-17 RX ADMIN — DONEPEZIL HYDROCHLORIDE 10 MG: 10 TABLET, FILM COATED ORAL at 21:32

## 2022-03-17 RX ADMIN — DEXAMETHASONE 6 MG: 4 TABLET ORAL at 21:32

## 2022-03-17 RX ADMIN — FAMOTIDINE 20 MG: 20 TABLET ORAL at 21:32

## 2022-03-17 RX ADMIN — CALCIUM CARBONATE-VITAMIN D TAB 500 MG-200 UNIT 1 TABLET: 500-200 TAB at 08:18

## 2022-03-17 RX ADMIN — OXYCODONE HYDROCHLORIDE 5 MG: 5 TABLET ORAL at 21:32

## 2022-03-17 RX ADMIN — ALBUTEROL SULFATE 2 PUFF: 90 AEROSOL, METERED RESPIRATORY (INHALATION) at 11:57

## 2022-03-17 RX ADMIN — OXCARBAZEPINE 300 MG: 300 TABLET, FILM COATED ORAL at 21:32

## 2022-03-17 RX ADMIN — OXYCODONE HYDROCHLORIDE 5 MG: 5 TABLET ORAL at 02:24

## 2022-03-17 RX ADMIN — RIVAROXABAN 20 MG: 20 TABLET, FILM COATED ORAL at 17:01

## 2022-03-17 NOTE — PLAN OF CARE
Goal Outcome Evaluation:  Plan of Care Reviewed With: patient        Progress: no change  Outcome Evaluation: Patient continues with decadron and remdesivir. Patient is on 4L n/c.  Makes needs known. Patient has had PRN pain medication today and is slightly effective. Wife at bedside earlier. A & O x 4 with periods of forgetfulness. No s/s of distress noted.

## 2022-03-17 NOTE — THERAPY TREATMENT NOTE
Patient Name: Mg Gerber Sr.  : 1940    MRN: [id number]                              Today's Date: 3/17/2022       Admit Date: 3/14/2022    Visit Dx:     ICD-10-CM ICD-9-CM   1. Generalized weakness  R53.1 780.79   2. Immobility  Z74.09 799.89   3. Closed fracture of multiple ribs of left side, initial encounter  S22.42XA 807.09   4. Leukocytosis, unspecified type  D72.829 288.60     Patient Active Problem List   Diagnosis   • Anemia, vitamin B12 deficiency   • Arthritis   • Hemochromatosis   • Herpes zoster   • Hip pain   • Essential hypertension   • Cancer (HCC)   • Pulmonary emphysema (HCC)   • Left low back pain   • Vertigo   • Headache around the eyes   • Mild cognitive impairment   • Metastatic renal cell carcinoma  on q 2 wks chemo   • Anxiety   • Malignant neoplasm of lung (HCC)   • Neuropathy   • Perennial allergic rhinitis   • Vitamin B12 deficiency   • Osteoarthritis of lumbar spine   • Pharyngitis   • Bronchitis   • Drug-induced constipation   • Hx of hiatal hernia   • History of lung cancer   • COVID-19 virus detected   • Dyspnea   • Generalized weakness   • Acute respiratory failure with hypoxia (HCC)   • Gastroesophageal reflux disease   • Hearing disorder   • Malignant neoplasm of prostate (HCC)   • Sleep apnea   • Anemia   • Malignant neoplasm metastatic to lung (HCC)   • Secondary bacterial pneumonia   • Pneumonia due to COVID-19 virus   • Hilar mass   • On antineoplastic chemotherapy q 2wks   • Paroxysmal atrial fibrillation (HCC)   • History of pulmonary embolism   • Nausea   • Gastritis   • Muscle spasm   • Fall   • Closed fracture of multiple ribs of left side   • Chest wall pain   • Fall, initial encounter   • Immobility syndrome   • Self-care deficit     Past Medical History:   Diagnosis Date   • Allergic    • Anemia, vitamin B12 deficiency 2012   • Anxiety    • Arthritis 2014   • Atrial fibrillation (HCC)    • Cancer (HCC) 2014    kidney; prostate   •  Cataract    • Chemotherapeutic agent or infusion extravasation     q 1-2 wks    • COPD (chronic obstructive pulmonary disease) (HCC) 03/24/2014   • Emphysema of lung (HCC)    • Hemochromatosis 03/24/2014   • Herpes zoster 12/06/2011    left medial thigh   • Hip pain 03/24/2014   • History of Doppler ultrasound 12/28/2011    superficial and deep venous insuffiency   • History of EKG 02/10/2012    Dr. Kathi Lloyd; unchanged from prior EKG   • Hypertension     benign essential   • Injury of back    • Pulmonary embolism (HCC) 03/24/2014   • Renal cell cancer (HCC)    • Shortness of breath    • Sleep apnea      Past Surgical History:   Procedure Laterality Date   • APPENDECTOMY     • CATARACT EXTRACTION     • CHOLECYSTECTOMY     • SPLENECTOMY     • TONSILLECTOMY     • VEIN SURGERY        General Information     Row Name 03/17/22 1432          OT Time and Intention    Document Type therapy note (daily note)  -     Mode of Treatment individual therapy;occupational therapy  -     Row Name 03/17/22 1432          General Information    Patient Profile Reviewed yes  -     Existing Precautions/Restrictions fall;oxygen therapy device and L/min  -     Row Name 03/17/22 1432          Cognition    Orientation Status (Cognition) oriented x 3  -     Row Name 03/17/22 1432          Safety Issues, Functional Mobility    Safety Issues Affecting Function (Mobility) insight into deficits/self-awareness;awareness of need for assistance;judgment;safety precaution awareness  -     Impairments Affecting Function (Mobility) balance;strength;endurance/activity tolerance  -           User Key  (r) = Recorded By, (t) = Taken By, (c) = Cosigned By    Initials Name Provider Type     Aylin Dowell OT Occupational Therapist                 Mobility/ADL's     Row Name 03/17/22 1432          Bed Mobility    Bed Mobility supine-sit  -BL     Supine-Sit [place] (Bed Mobility) standby assist  -     Assistive Device (Bed Mobility)  bed rails;head of bed elevated  -BL     Comment, (Bed Mobility) Pt reaching for therapist hand to assist with trunk elevation but therapist instructed pt in use of bed rail to assist  -BL     Row Name 03/17/22 1432          Transfers    Transfers sit-stand transfer;stand-sit transfer;bed-chair transfer  -     Bed-Chair [place] (Transfers) minimum assist (75% patient effort);verbal cues  -     Assistive Device (Bed-Chair Transfers) walker, front-wheeled  -BL     Sit-Stand [place] (Transfers) contact guard  -     Stand-Sit [place] (Transfers) contact guard;verbal cues  -BL     Row Name 03/17/22 1432          Sit-Stand Transfer    Assistive Device (Sit-Stand Transfers) walker, front-wheeled  -BL     Row Name 03/17/22 1432          Activities of Daily Living    BADL Assessment/Intervention grooming;bathing  -BL     Row Name 03/17/22 1432          Stand-Sit Transfer    Assistive Device (Stand-Sit Transfers) walker, front-wheeled  -BL     Row Name 03/17/22 1432          Bathing Assessment/Intervention    [place] Level (Bathing) bathing skills;moderate assist (50% patient effort);verbal cues  -BL     Position (Bathing) edge of bed sitting;supported standing  -BL     Row Name 03/17/22 1432          Grooming Assessment/Training    [place] Level (Grooming) wash face, hands;oral care regimen;set up  -BL     Position (Grooming) supported sitting  -BL           User Key  (r) = Recorded By, (t) = Taken By, (c) = Cosigned By    Initials Name Provider Type    Aylin Solares OT Occupational Therapist               Obj/Interventions    No documentation.                Goals/Plan    No documentation.                Clinical Impression     Row Name 03/17/22 1434          Pain Assessment    Pretreatment Pain Rating 5/10  -BL     Posttreatment Pain Rating 5/10  -BL     Pain Location - Side/Orientation Left  -BL     Pre/Posttreatment Pain Comment ribs  -BL     Row Name 03/17/22 1434          Plan of  Care Review    Plan of Care Reviewed With patient  -BL     Progress improving  -BL     Outcome Evaluation Pt seen by OT this date for treatment. Upon arrival pt lying supine in bed, 5/10 pain in L side, A&O x3. Pt requiring SBA for sup>sit transition with verbal cues to use bed rails to assist in scooting and trunk elevation. Once sitting EOB pt enaged in bathing task with Mod A for distal LEs and pericare. Pt performed sit>stand transition with Min A to transfer from bed>chair with use of rolling walker as pt able to ambulate around bed to chair. Once to chair pt completed grooming task sitting up in chair with S/U. Pt left with aide, needs in reach, alarm on. Pt making progress with OT this date and will continue with skilled OT services to address goals and deficits. OT wore mask, gloves, glasses, hand hygiene performed, appropriate ppe worn during encounter.  -BL     Row Name 03/17/22 1434          Vital Signs    Pre Patient Position Supine  -BL     Intra Patient Position Standing  -BL     Post Patient Position Sitting  -BL     Row Name 03/17/22 1434          Positioning and Restraints    Pre-Treatment Position in bed  -BL     Post Treatment Position chair  -BL     In Chair sitting;call light within reach;encouraged to call for assist;with nsg  -BL           User Key  (r) = Recorded By, (t) = Taken By, (c) = Cosigned By    Initials Name Provider Type    Aylin Solares OT Occupational Therapist               Outcome Measures     Row Name 03/17/22 6488          How much help from another person do you currently need...    Turning from your back to your side while in flat bed without using bedrails? 4  -AR     Moving from lying on back to sitting on the side of a flat bed without bedrails? 3  -AR     Moving to and from a bed to a chair (including a wheelchair)? 3  -AR     Standing up from a chair using your arms (e.g., wheelchair, bedside chair)? 3  -AR     Climbing 3-5 steps with a railing? 2  -AR     To  walk in hospital room? 3  -AR     AM-PAC 6 Clicks Score (PT) 18  -AR     Row Name 03/17/22 1318          Functional Assessment    Outcome Measure Options AM-PAC 6 Clicks Basic Mobility (PT)  -AR           User Key  (r) = Recorded By, (t) = Taken By, (c) = Cosigned By    Initials Name Provider Type    AR Betzaida Landers, PT Physical Therapist                Occupational Therapy Education                 Title: PT OT SLP Therapies (In Progress)     Topic: Occupational Therapy (Done)     Point: ADL training (Done)     Description:   Instruct learner(s) on proper safety adaptation and remediation techniques during self care or transfers.   Instruct in proper use of assistive devices.              Learning Progress Summary           Patient Acceptance, E, VU by  at 3/16/2022 1858    Acceptance, E, VU by  at 3/15/2022 1150    Comment: the role of OT                   Point: Home exercise program (Done)     Description:   Instruct learner(s) on appropriate technique for monitoring, assisting and/or progressing therapeutic exercises/activities.              Learning Progress Summary           Patient Acceptance, E, VU by  at 3/16/2022 1858                   Point: Precautions (Done)     Description:   Instruct learner(s) on prescribed precautions during self-care and functional transfers.              Learning Progress Summary           Patient Acceptance, E, VU by  at 3/16/2022 1858                   Point: Body mechanics (Done)     Description:   Instruct learner(s) on proper positioning and spine alignment during self-care, functional mobility activities and/or exercises.              Learning Progress Summary           Patient Acceptance, E, VU by  at 3/16/2022 1858                               User Key     Initials Effective Dates Name Provider Type Discipline     06/16/21 -  Praveen Lake, RN Registered Nurse Nurse     01/05/21 -  Aylin Dowell OT Occupational Therapist OT              OT  Recommendation and Plan  Planned Therapy Interventions (OT): BADL retraining, IADL retraining, occupation/activity based interventions, patient/caregiver education/training, transfer/mobility retraining, activity tolerance training  Therapy Frequency (OT): 5 times/wk  Plan of Care Review  Plan of Care Reviewed With: patient  Progress: improving  Outcome Evaluation: Pt seen by OT this date for treatment. Upon arrival pt lying supine in bed, 5/10 pain in L side, A&O x3. Pt requiring SBA for sup>sit transition with verbal cues to use bed rails to assist in scooting and trunk elevation. Once sitting EOB pt enaged in bathing task with Mod A for distal LEs and pericare. Pt performed sit>stand transition with Min A to transfer from bed>chair with use of rolling walker as pt able to ambulate around bed to chair. Once to chair pt completed grooming task sitting up in chair with S/U. Pt left with aide, needs in reach, alarm on. Pt making progress with OT this date and will continue with skilled OT services to address goals and deficits. OT wore mask, gloves, glasses, hand hygiene performed, appropriate ppe worn during encounter.     Time Calculation:    Time Calculation- OT     Row Name 03/17/22 1439             Time Calculation- OT    OT Start Time 0916  -BL      OT Stop Time 0949  -BL      OT Time Calculation (min) 33 min  -BL      Total Timed Code Minutes- OT 33 minute(s)  -BL      OT Received On 03/17/22  -BL      OT - Next Appointment 03/18/22  -BL              Timed Charges    32146 - OT Therapeutic Activity Minutes 13  -BL      42971 - OT Self Care/Mgmt Minutes 20  -BL              Total Minutes    Timed Charges Total Minutes 33  -BL       Total Minutes 33  -BL            User Key  (r) = Recorded By, (t) = Taken By, (c) = Cosigned By    Initials Name Provider Type    BL Aylin Dowell OT Occupational Therapist              Therapy Charges for Today     Code Description Service Date Service Provider Modifiers Qty     90148950605 HC OT THER PROC EA 15 MIN 3/16/2022 Aylin Dowell, OT GO 1    77280765242 HC OT SELF CARE/MGMT/TRAIN EA 15 MIN 3/16/2022 Aylin Dowell, OT GO 1    53410509502 HC OT THERAPEUTIC ACT EA 15 MIN 3/17/2022 Aylin Dowell OT GO 1    50022308383 HC OT SELF CARE/MGMT/TRAIN EA 15 MIN 3/17/2022 Aylin Dowell OT GO 1               Aylin Dowell OT  3/17/2022

## 2022-03-17 NOTE — PROGRESS NOTES
Name: Mg Gerber . ADMIT: 3/14/2022   : 1940  PCP: Dillon Walton MD    MRN: [id number] LOS: 3 days   AGE/SEX: 81 y.o. male  ROOM: [facility]   Subjective   Chief Complaint   Patient presents with   • Weakness - Generalized      Very [place] making history difficult. No CP NVD. Cough present but he could not give further reliable history due to [place] and hepa filter in the room. He was very concerned about printed copy of lab results. I tried to discuss them with him but he could not hear me. Discussed labs with his wife at the bedside and she was understanding.    Objective   Vital Signs  Temp:  [97.5 °F (36.4 °C)-97.8 °F (36.6 °C)] 97.8 °F (36.6 °C)  Heart Rate:  [68-78] 73  Resp:  [16-18] 18  BP: (114-128)/(54-66) 125/66  SpO2:  [90 %-97 %] 90 %  on  Flow (L/min):  [4-5] 4;   Device (Oxygen Therapy): nasal cannula  Body mass index is 30.65 kg/m².    Physical Exam  Vitals and nursing note reviewed.   Constitutional:       Appearance: He is ill-appearing. He is not diaphoretic.   HENT:      Head: Normocephalic and atraumatic.   Eyes:      General:         Right eye: No discharge.         Left eye: No discharge.      Conjunctiva/sclera: Conjunctivae normal.   Cardiovascular:      Rate and Rhythm: Normal rate and regular rhythm.      Pulses: Normal pulses.   Pulmonary:      Breath sounds: No wheezing or rhonchi.   Chest:      Chest wall: Tenderness present.   Abdominal:      General: There is no distension.      Palpations: Abdomen is soft.      Tenderness: There is no abdominal tenderness. There is no guarding or rebound.   Musculoskeletal:         General: No swelling or tenderness.      Cervical back: Neck supple. No tenderness.   Skin:     General: Skin is warm and dry.   Neurological:      Mental Status: He is alert. Mental status is at baseline.      Cranial Nerves: No cranial nerve deficit ([place]).   Psychiatric:         Mood and Affect: Mood normal.         Behavior: Behavior normal.         Results  Review:       I reviewed the patient's new clinical results.        Results from last 7 days   Lab Units 03/17/22  0653 03/16/22  0345 03/15/22  0828 03/14/22  1922   WBC 10*3/mm3 12.62* 9.89 19.80* 22.12*   HEMOGLOBIN g/dL 10.2* 9.9* 10.3* 10.7*   PLATELETS 10*3/mm3 336 287 292 303     Results from last 7 days   Lab Units 03/17/22  0653 03/16/22  0345 03/15/22  1708 03/15/22  0828 03/14/22  1922   SODIUM mmol/L 138 136  --  136 138   POTASSIUM mmol/L 4.3 4.5  --  4.3 4.3   CHLORIDE mmol/L 102 100  --  101 102   CO2 mmol/L 28.8 29.0  --  26.9 26.1   BUN mg/dL 23 11  --  9 12   CREATININE mg/dL 0.91 0.84 0.85 0.91 1.04   GLUCOSE mg/dL 122* 126*  --  119* 122*   Estimated Creatinine Clearance: 77.7 mL/min (by C-G formula based on SCr of 0.91 mg/dL).  Results from last 7 days   Lab Units 03/17/22  0653 03/16/22  0345 03/15/22  1708 03/15/22  0828 03/14/22  1922 03/11/22  0619 03/10/22  2312   CALCIUM mg/dL 9.2 9.0  --  8.9 9.4   < > 9.6   ALBUMIN g/dL 3.40* 3.30* 3.70  --  3.70  --  4.20   MAGNESIUM mg/dL 2.3  --   --   --   --   --  2.2   PHOSPHORUS mg/dL 3.2  --   --   --   --   --   --     < > = values in this interval not displayed.     Results from last 7 days   Lab Units 03/14/22  1922 03/11/22  0619 03/10/22  2312   INR  1.54* 1.25* 1.42*   APTT seconds 44.0*  --   --         albuterol sulfate HFA, 2 puff, Inhalation, 4x Daily - RT  amLODIPine, 5 mg, Oral, Daily  calcium 500 mg vitamin D 5 mcg (200 UT), 1 tablet, Oral, BID  cetirizine, 5 mg, Oral, Daily  cyclobenzaprine, 10 mg, Oral, TID  dexamethasone, 6 mg, Oral, Q12H  docusate sodium, 100 mg, Oral, BID  donepezil, 10 mg, Oral, Nightly  famotidine, 20 mg, Oral, BID  folic acid, 1 mg, Oral, TID  gabapentin, 400 mg, Oral, Q12H  gabapentin, 800 mg, Oral, Q PM  levothyroxine, 112 mcg, Oral, Daily  lidocaine, 2 patch, Transdermal, Q24H  memantine, 10 mg, Oral, Q12H  OXcarbazepine, 300 mg, Oral, Nightly  remdesivir, 100 mg, Intravenous, Q24H  rivaroxaban, 20 mg,  Oral, Daily With Dinner  sertraline, 50 mg, Oral, Daily  tiotropium bromide monohydrate, 2 puff, Inhalation, Daily - RT       Diet Regular; Cardiac    Assessment/Plan      Active Hospital Problems    Diagnosis  POA   • **Generalized weakness [R53.1]  Yes   • Immobility syndrome [M62.3]  Yes   • Self-care deficit [Z78.9]  Yes   • Closed fracture of multiple ribs of left side [S22.42XA]  Yes   • Paroxysmal atrial fibrillation (HCC) [I48.0]  Yes   • Drug-induced constipation [K59.03]  Yes   • Malignant neoplasm of lung (HCC) [C34.90]  Yes   • Essential hypertension [I10]  Yes      Resolved Hospital Problems   No resolved problems to display.       · COVID-19 pneumonia with acute hypoxic respiratory failure: Vaccinated, immunosuppressed. Monitor disease inflammatory markers. ID evaluated. Continue dexamethasone bid and remdesivir.  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  He follows with Dr. Montes De Oca at [facility].  · Nondisplaced left rib fractures: Thoracic surgery evaluated during his previous admission.  He was not surgical candidate due to nature of the rib fractures and his advanced disease.  Continue pain control.  · History of DVT/PE: Continue Xarelto.  · PAF: Rate was okay.  On Xarelto.  · Hypertension: Acceptable acutely.  Continue current regimen.  · Hypothyroidism: Synthroid  · Memory deficit: Continue home regimen  · Prophylaxis: On Xarelto  · Disposition: SNF/4L currently, would like to see improvement in O2 requirements and/or completion of remdesivir course prior to considering discharge.    Bigg Schneider MD  [facility]  03/17/22  13:51 EDT    Dictated portions using Dragon dictation software.    During the entire encounter, I was wearing recommended PPE including face mask and eye protection. Hand sanitization was performed prior to entering room and upon exit.

## 2022-03-17 NOTE — CASE MANAGEMENT/SOCIAL WORK
Continued Stay Note  [facility]     Patient Name: Mg Gerber Sr.  MRN: [id number]  Today's Date: 3/17/2022    Admit Date: 3/14/2022     Discharge Plan     Row Name 03/17/22 0959       Plan    Plan [facility], pre-cert pending    Plan Comments Spoke with [facility] and they are starting pre-cert and he will need to be seen today by PT. Partial packet in CCP office. Tammy FIORE RN CCP               Discharge Codes    No documentation.               Expected Discharge Date and Time     Expected Discharge Date Expected Discharge Time    Mar 18, 2022             Tammy Powers RN

## 2022-03-17 NOTE — THERAPY TREATMENT NOTE
Patient Name: Mg Gerber Sr.  : 1940    MRN: [id number]                              Today's Date: 3/17/2022       Admit Date: 3/14/2022    Visit Dx:     ICD-10-CM ICD-9-CM   1. Generalized weakness  R53.1 780.79   2. Immobility  Z74.09 799.89   3. Closed fracture of multiple ribs of left side, initial encounter  S22.42XA 807.09   4. Leukocytosis, unspecified type  D72.829 288.60     Patient Active Problem List   Diagnosis   • Anemia, vitamin B12 deficiency   • Arthritis   • Hemochromatosis   • Herpes zoster   • Hip pain   • Essential hypertension   • Cancer (HCC)   • Pulmonary emphysema (HCC)   • Left low back pain   • Vertigo   • Headache around the eyes   • Mild cognitive impairment   • Metastatic renal cell carcinoma  on q 2 wks chemo   • Anxiety   • Malignant neoplasm of lung (HCC)   • Neuropathy   • Perennial allergic rhinitis   • Vitamin B12 deficiency   • Osteoarthritis of lumbar spine   • Pharyngitis   • Bronchitis   • Drug-induced constipation   • Hx of hiatal hernia   • History of lung cancer   • COVID-19 virus detected   • Dyspnea   • Generalized weakness   • Acute respiratory failure with hypoxia (HCC)   • Gastroesophageal reflux disease   • Hearing disorder   • Malignant neoplasm of prostate (HCC)   • Sleep apnea   • Anemia   • Malignant neoplasm metastatic to lung (HCC)   • Secondary bacterial pneumonia   • Pneumonia due to COVID-19 virus   • Hilar mass   • On antineoplastic chemotherapy q 2wks   • Paroxysmal atrial fibrillation (HCC)   • History of pulmonary embolism   • Nausea   • Gastritis   • Muscle spasm   • Fall   • Closed fracture of multiple ribs of left side   • Chest wall pain   • Fall, initial encounter   • Immobility syndrome   • Self-care deficit     Past Medical History:   Diagnosis Date   • Allergic    • Anemia, vitamin B12 deficiency 2012   • Anxiety    • Arthritis 2014   • Atrial fibrillation (HCC)    • Cancer (HCC) 2014    kidney; prostate   •  Cataract    • Chemotherapeutic agent or infusion extravasation     q 1-2 wks    • COPD (chronic obstructive pulmonary disease) (HCC) 03/24/2014   • Emphysema of lung (HCC)    • Hemochromatosis 03/24/2014   • Herpes zoster 12/06/2011    left medial thigh   • Hip pain 03/24/2014   • History of Doppler ultrasound 12/28/2011    superficial and deep venous insuffiency   • History of EKG 02/10/2012    Dr. Kathi Lloyd; unchanged from prior EKG   • Hypertension     benign essential   • Injury of back    • Pulmonary embolism (HCC) 03/24/2014   • Renal cell cancer (HCC)    • Shortness of breath    • Sleep apnea      Past Surgical History:   Procedure Laterality Date   • APPENDECTOMY     • CATARACT EXTRACTION     • CHOLECYSTECTOMY     • SPLENECTOMY     • TONSILLECTOMY     • VEIN SURGERY        General Information     Row Name 03/17/22 1311          Physical Therapy Time and Intention    Document Type therapy note (daily note)  -AR     Mode of Treatment physical therapy  -AR     Row Name 03/17/22 1311          General Information    Patient Profile Reviewed yes  -AR     Existing Precautions/Restrictions fall;oxygen therapy device and L/min  -AR     Row Name 03/17/22 1311          Home Main Entrance    Number of Stairs, Main Entrance eight  -AR     Row Name 03/17/22 1311          Stairs Within Home, Primary    Stairs, Within Home, Primary tri level home  -AR     Number of Stairs, Within Home, Primary eight  -AR     Row Name 03/17/22 1311          Cognition    Orientation Status (Cognition) oriented x 3  -AR     Row Name 03/17/22 1311          Safety Issues, Functional Mobility    Impairments Affecting Function (Mobility) balance;endurance/activity tolerance;strength;pain  -AR           User Key  (r) = Recorded By, (t) = Taken By, (c) = Cosigned By    Initials Name Provider Type    AR Betzaida Landers PT Physical Therapist               Mobility     Row Name 03/17/22 1311          Bed Mobility    Bed Mobility supine-sit  -AR      Supine-Sit [place] (Bed Mobility) standby assist  -AR     Sit-Supine [place] (Bed Mobility) not tested  -AR     Assistive Device (Bed Mobility) bed rails;head of bed elevated  -AR     Row Name 03/17/22 1311          Transfers    Comment, (Transfers) cues for UE placement  -AR     Row Name 03/17/22 1311          Sit-Stand Transfer    Sit-Stand [place] (Transfers) contact guard  -AR     Assistive Device (Sit-Stand Transfers) walker, front-wheeled  -AR     Row Name 03/17/22 1311          Gait/Stairs (Locomotion)    [place] Level (Gait) contact guard  -AR     Assistive Device (Gait) walker, front-wheeled  -AR     Distance in Feet (Gait) 60' w/ CGA, 2 brief sitting breaks to check SP02 - stable in 90s  -AR     Deviations/Abnormal Patterns (Gait) samara decreased;festinating/shuffling  -AR     Bilateral Gait Deviations forward flexed posture  -AR           User Key  (r) = Recorded By, (t) = Taken By, (c) = Cosigned By    Initials Name Provider Type    AR Betzaida Landers, PT Physical Therapist               Obj/Interventions     Row Name 03/17/22 1314          Motor Skills    Therapeutic Exercise --  B AP, LAQ 10x  -AR     Row Name 03/17/22 1314          Balance    Balance Assessment standing dynamic balance  -AR     Dynamic Standing Balance contact guard  -AR     Position/Device Used, Standing Balance walker, front-wheeled  -AR           User Key  (r) = Recorded By, (t) = Taken By, (c) = Cosigned By    Initials Name Provider Type    Betzaida French, PT Physical Therapist               Goals/Plan    No documentation.                Clinical Impression     Row Name 03/17/22 1315          Pain    Pretreatment Pain Rating 5/10  -AR     Posttreatment Pain Rating 5/10  -AR     Pain Location - Side/Orientation Left  -AR     Pain Location - flank  -AR     Pain Intervention(s) Medication (See MAR);Repositioned  -AR     Row Name 03/17/22 1315          Plan of Care Review    Plan of Care Reviewed With  patient  -AR     Outcome Evaluation Improved activity tolerance and independence w/ mobility during PT today.  Able to ambulate 60' w/ contact assist using RW; 2 brief sitting rest breaks to check SP02; stable in 90s on 4L.  Reviewed activity/walking recommendations.  -AR     Row Name 03/17/22 1315          Therapy Assessment/Plan (PT)    Rehab Potential (PT) good, to achieve stated therapy goals  -AR     Criteria for Skilled Interventions Met (PT) yes;meets criteria  -AR     Row Name 03/17/22 1315          Vital Signs    O2 Delivery Pre Treatment supplemental O2  -AR     O2 Delivery Intra Treatment supplemental O2  -AR     O2 Delivery Post Treatment supplemental O2  -AR     Recovery Time 4L  -AR     Row Name 03/17/22 1315          Positioning and Restraints    Pre-Treatment Position in bed  -AR     Post Treatment Position chair  -AR     In Chair notified nsg;sitting;call light within reach;encouraged to call for assist;exit alarm on  -AR           User Key  (r) = Recorded By, (t) = Taken By, (c) = Cosigned By    Initials Name Provider Type    Betzaida French, PT Physical Therapist               Outcome Measures     Row Name 03/17/22 1318          How much help from another person do you currently need...    Turning from your back to your side while in flat bed without using bedrails? 4  -AR     Moving from lying on back to sitting on the side of a flat bed without bedrails? 3  -AR     Moving to and from a bed to a chair (including a wheelchair)? 3  -AR     Standing up from a chair using your arms (e.g., wheelchair, bedside chair)? 3  -AR     Climbing 3-5 steps with a railing? 2  -AR     To walk in hospital room? 3  -AR     AM-PAC 6 Clicks Score (PT) 18  -AR     Row Name 03/17/22 1318          Functional Assessment    Outcome Measure Options AM-PAC 6 Clicks Basic Mobility (PT)  -AR           User Key  (r) = Recorded By, (t) = Taken By, (c) = Cosigned By    Initials Name Provider Type    Betzaida French, PT  Physical Therapist                             Physical Therapy Education                 Title: PT OT SLP Therapies (In Progress)     Topic: Physical Therapy (In Progress)     Point: Mobility training (In Progress)     Learning Progress Summary           Patient Acceptance, E, NR by AR at 3/17/2022 1318    Acceptance, E, VU by AG at 3/16/2022 1858    Acceptance, E,TB, VU,NR by MS at 3/15/2022 1211                   Point: Home exercise program (In Progress)     Learning Progress Summary           Patient Acceptance, E, NR by AR at 3/17/2022 1318    Acceptance, E, VU by AG at 3/16/2022 1858    Acceptance, E,TB, VU,NR by MS at 3/15/2022 1211                   Point: Body mechanics (In Progress)     Learning Progress Summary           Patient Acceptance, E, NR by AR at 3/17/2022 1318    Acceptance, E, VU by AG at 3/16/2022 1858    Acceptance, E,TB, VU,NR by MS at 3/15/2022 1211                   Point: Precautions (In Progress)     Learning Progress Summary           Patient Acceptance, E, NR by AR at 3/17/2022 1318    Acceptance, E, VU by AG at 3/16/2022 1858    Acceptance, E,TB, VU,NR by MS at 3/15/2022 1211                               User Key     Initials Effective Dates Name Provider Type Discipline    AR 06/16/21 -  Betzaida Landers, PT Physical Therapist PT    MS 06/16/21 -  Floridalma Lopes PT Physical Therapist PT     06/16/21 -  Praveen Lake, RN Registered Nurse Nurse              PT Recommendation and Plan     Plan of Care Reviewed With: patient  Outcome Evaluation: Improved activity tolerance and independence w/ mobility during PT today.  Able to ambulate 60' w/ contact assist using RW; 2 brief sitting rest breaks to check SP02; stable in 90s on 4L.  Reviewed activity/walking recommendations.     Time Calculation:    PT Charges     Row Name 03/17/22 1310             Time Calculation    Start Time 1116  -AR      Stop Time 1146  -AR      Time Calculation (min) 30 min  -AR      PT Received On  03/17/22  -AR      PT - Next Appointment 03/18/22  -AR            User Key  (r) = Recorded By, (t) = Taken By, (c) = Cosigned By    Initials Name Provider Type    Betzaida French PT Physical Therapist              Therapy Charges for Today     Code Description Service Date Service Provider Modifiers Qty    33885721651 HC PT THER PROC EA 15 MIN 3/17/2022 Betzaida Landers, PHILIPPE GP 2          PT G-Codes  Outcome Measure Options: AM-PAC 6 Clicks Basic Mobility (PT)  AM-PAC 6 Clicks Score (PT): 18  AM-PAC 6 Clicks Score (OT): 15    Betzaida Landers PT  3/17/2022

## 2022-03-17 NOTE — PLAN OF CARE
Goal Outcome Evaluation:  Plan of Care Reviewed With: patient           Outcome Evaluation: Improved activity tolerance and independence w/ mobility during PT today.  Able to ambulate 60' w/ contact assist using RW; 2 brief sitting rest breaks to check SP02; stable in 90s on 4L.  Reviewed activity/walking recommendations.

## 2022-03-17 NOTE — PLAN OF CARE
Goal Outcome Evaluation:  Plan of Care Reviewed With: patient        Progress: improving  Outcome Evaluation: Pt seen by OT this date for treatment. Upon arrival pt lying supine in bed, 5/10 pain in L side, A&O x3. Pt requiring SBA for sup>sit transition with verbal cues to use bed rails to assist in scooting and trunk elevation. Once sitting EOB pt enaged in bathing task with Mod A for distal LEs and pericare. Pt performed sit>stand transition with Min A to transfer from bed>chair with use of rolling walker as pt able to ambulate around bed to chair. Once to chair pt completed grooming task sitting up in chair with S/U. Pt left with aide, needs in reach, alarm on. Pt making progress with OT this date and will continue with skilled OT services to address goals and deficits. OT wore mask, gloves, glasses, hand hygiene performed, appropriate ppe worn during encounter.

## 2022-03-18 LAB
ALBUMIN SERPL-MCNC: 4 G/DL (ref 3.5–5.2)
ALBUMIN/GLOB SERPL: 1.5 G/DL
ALP SERPL-CCNC: 58 U/L (ref 39–117)
ALT SERPL W P-5'-P-CCNC: 17 U/L (ref 1–41)
ANION GAP SERPL CALCULATED.3IONS-SCNC: 9 MMOL/L (ref 5–15)
AST SERPL-CCNC: 15 U/L (ref 1–40)
BASOPHILS # BLD AUTO: 0 10*3/MM3 (ref 0–0.2)
BASOPHILS NFR BLD AUTO: 0 % (ref 0–1.5)
BILIRUB CONJ SERPL-MCNC: 0.3 MG/DL (ref 0–0.3)
BILIRUB SERPL-MCNC: 1.6 MG/DL (ref 0–1.2)
BUN SERPL-MCNC: 30 MG/DL (ref 8–23)
BUN/CREAT SERPL: 34.9 (ref 7–25)
CALCIUM SPEC-SCNC: 9.4 MG/DL (ref 8.6–10.5)
CHLORIDE SERPL-SCNC: 98 MMOL/L (ref 98–107)
CO2 SERPL-SCNC: 28 MMOL/L (ref 22–29)
CREAT SERPL-MCNC: 0.86 MG/DL (ref 0.76–1.27)
CRP SERPL-MCNC: 2.37 MG/DL (ref 0–0.5)
DEPRECATED RDW RBC AUTO: 40.8 FL (ref 37–54)
EGFRCR SERPLBLD CKD-EPI 2021: 86.5 ML/MIN/1.73
EOSINOPHIL # BLD AUTO: 0 10*3/MM3 (ref 0–0.4)
EOSINOPHIL NFR BLD AUTO: 0 % (ref 0.3–6.2)
ERYTHROCYTE [DISTWIDTH] IN BLOOD BY AUTOMATED COUNT: 10.2 % (ref 12.3–15.4)
FERRITIN SERPL-MCNC: 693 NG/ML (ref 30–400)
GLOBULIN UR ELPH-MCNC: 2.6 GM/DL
GLUCOSE BLDC GLUCOMTR-MCNC: 131 MG/DL (ref 70–130)
GLUCOSE SERPL-MCNC: 97 MG/DL (ref 65–99)
HCT VFR BLD AUTO: 33.1 % (ref 37.5–51)
HGB BLD-MCNC: 10.8 G/DL (ref 13–17.7)
LYMPHOCYTES # BLD AUTO: 1.06 10*3/MM3 (ref 0.7–3.1)
LYMPHOCYTES NFR BLD AUTO: 8.9 % (ref 19.6–45.3)
MAGNESIUM SERPL-MCNC: 2.3 MG/DL (ref 1.6–2.4)
MCH RBC QN AUTO: 36 PG (ref 26.6–33)
MCHC RBC AUTO-ENTMCNC: 32.6 G/DL (ref 31.5–35.7)
MCV RBC AUTO: 110.3 FL (ref 79–97)
MONOCYTES # BLD AUTO: 1.12 10*3/MM3 (ref 0.1–0.9)
MONOCYTES NFR BLD AUTO: 9.4 % (ref 5–12)
NEUTROPHILS NFR BLD AUTO: 81.1 % (ref 42.7–76)
NEUTROPHILS NFR BLD AUTO: 9.67 10*3/MM3 (ref 1.7–7)
PHOSPHATE SERPL-MCNC: 2.9 MG/DL (ref 2.5–4.5)
PLATELET # BLD AUTO: 358 10*3/MM3 (ref 140–450)
PMV BLD AUTO: 10.2 FL (ref 6–12)
POTASSIUM SERPL-SCNC: 4.1 MMOL/L (ref 3.5–5.2)
PROT SERPL-MCNC: 6.6 G/DL (ref 6–8.5)
RBC # BLD AUTO: 3 10*6/MM3 (ref 4.14–5.8)
SODIUM SERPL-SCNC: 135 MMOL/L (ref 136–145)
WBC NRBC COR # BLD: 11.92 10*3/MM3 (ref 3.4–10.8)

## 2022-03-18 PROCEDURE — 84100 ASSAY OF PHOSPHORUS: CPT | Performed by: INTERNAL MEDICINE

## 2022-03-18 PROCEDURE — 97110 THERAPEUTIC EXERCISES: CPT | Performed by: OCCUPATIONAL THERAPIST

## 2022-03-18 PROCEDURE — 25010000002 REMDESIVIR 100 MG/20ML SOLUTION 1 EACH VIAL: Performed by: INTERNAL MEDICINE

## 2022-03-18 PROCEDURE — 94799 UNLISTED PULMONARY SVC/PX: CPT

## 2022-03-18 PROCEDURE — 85025 COMPLETE CBC W/AUTO DIFF WBC: CPT | Performed by: INTERNAL MEDICINE

## 2022-03-18 PROCEDURE — 82248 BILIRUBIN DIRECT: CPT | Performed by: INTERNAL MEDICINE

## 2022-03-18 PROCEDURE — 82728 ASSAY OF FERRITIN: CPT | Performed by: INTERNAL MEDICINE

## 2022-03-18 PROCEDURE — 86140 C-REACTIVE PROTEIN: CPT | Performed by: INTERNAL MEDICINE

## 2022-03-18 PROCEDURE — 94761 N-INVAS EAR/PLS OXIMETRY MLT: CPT

## 2022-03-18 PROCEDURE — 80053 COMPREHEN METABOLIC PANEL: CPT | Performed by: INTERNAL MEDICINE

## 2022-03-18 PROCEDURE — 63710000001 DEXAMETHASONE PER 0.25 MG: Performed by: STUDENT IN AN ORGANIZED HEALTH CARE EDUCATION/TRAINING PROGRAM

## 2022-03-18 PROCEDURE — 82962 GLUCOSE BLOOD TEST: CPT

## 2022-03-18 PROCEDURE — 83735 ASSAY OF MAGNESIUM: CPT | Performed by: INTERNAL MEDICINE

## 2022-03-18 RX ADMIN — TIOTROPIUM BROMIDE INHALATION SPRAY 2 PUFF: 3.12 SPRAY, METERED RESPIRATORY (INHALATION) at 11:21

## 2022-03-18 RX ADMIN — ALBUTEROL SULFATE 2 PUFF: 90 AEROSOL, METERED RESPIRATORY (INHALATION) at 16:23

## 2022-03-18 RX ADMIN — GABAPENTIN 800 MG: 400 CAPSULE ORAL at 16:36

## 2022-03-18 RX ADMIN — LIDOCAINE 2 PATCH: 50 PATCH CUTANEOUS at 09:18

## 2022-03-18 RX ADMIN — FAMOTIDINE 20 MG: 20 TABLET ORAL at 20:26

## 2022-03-18 RX ADMIN — CETIRIZINE HYDROCHLORIDE 5 MG: 10 TABLET ORAL at 09:18

## 2022-03-18 RX ADMIN — OXYCODONE HYDROCHLORIDE 5 MG: 5 TABLET ORAL at 02:55

## 2022-03-18 RX ADMIN — DOCUSATE SODIUM 100 MG: 100 CAPSULE, LIQUID FILLED ORAL at 09:18

## 2022-03-18 RX ADMIN — DEXAMETHASONE 6 MG: 4 TABLET ORAL at 09:18

## 2022-03-18 RX ADMIN — CYCLOBENZAPRINE 10 MG: 10 TABLET, FILM COATED ORAL at 16:36

## 2022-03-18 RX ADMIN — OXYCODONE HYDROCHLORIDE 5 MG: 5 TABLET ORAL at 20:26

## 2022-03-18 RX ADMIN — OXCARBAZEPINE 300 MG: 300 TABLET, FILM COATED ORAL at 20:25

## 2022-03-18 RX ADMIN — ALBUTEROL SULFATE 2 PUFF: 90 AEROSOL, METERED RESPIRATORY (INHALATION) at 22:00

## 2022-03-18 RX ADMIN — GABAPENTIN 400 MG: 400 CAPSULE ORAL at 09:18

## 2022-03-18 RX ADMIN — DEXAMETHASONE 6 MG: 4 TABLET ORAL at 20:26

## 2022-03-18 RX ADMIN — LEVOTHYROXINE SODIUM 112 MCG: 0.11 TABLET ORAL at 06:24

## 2022-03-18 RX ADMIN — FAMOTIDINE 20 MG: 20 TABLET ORAL at 09:18

## 2022-03-18 RX ADMIN — CYCLOBENZAPRINE 10 MG: 10 TABLET, FILM COATED ORAL at 20:25

## 2022-03-18 RX ADMIN — SERTRALINE HYDROCHLORIDE 50 MG: 50 TABLET, FILM COATED ORAL at 09:18

## 2022-03-18 RX ADMIN — DOCUSATE SODIUM 100 MG: 100 CAPSULE, LIQUID FILLED ORAL at 20:26

## 2022-03-18 RX ADMIN — POLYETHYLENE GLYCOL 3350 17 G: 17 POWDER, FOR SOLUTION ORAL at 20:25

## 2022-03-18 RX ADMIN — OXYCODONE HYDROCHLORIDE 5 MG: 5 TABLET ORAL at 09:35

## 2022-03-18 RX ADMIN — FOLIC ACID 1 MG: 1 TABLET ORAL at 20:26

## 2022-03-18 RX ADMIN — GABAPENTIN 400 MG: 400 CAPSULE ORAL at 20:26

## 2022-03-18 RX ADMIN — FOLIC ACID 1 MG: 1 TABLET ORAL at 16:36

## 2022-03-18 RX ADMIN — MEMANTINE HYDROCHLORIDE 10 MG: 10 TABLET, FILM COATED ORAL at 20:25

## 2022-03-18 RX ADMIN — REMDESIVIR 100 MG: 100 INJECTION, POWDER, LYOPHILIZED, FOR SOLUTION INTRAVENOUS at 16:37

## 2022-03-18 RX ADMIN — DONEPEZIL HYDROCHLORIDE 10 MG: 10 TABLET, FILM COATED ORAL at 20:26

## 2022-03-18 RX ADMIN — CALCIUM CARBONATE-VITAMIN D TAB 500 MG-200 UNIT 1 TABLET: 500-200 TAB at 20:25

## 2022-03-18 RX ADMIN — MEMANTINE HYDROCHLORIDE 10 MG: 10 TABLET, FILM COATED ORAL at 09:18

## 2022-03-18 RX ADMIN — FOLIC ACID 1 MG: 1 TABLET ORAL at 09:18

## 2022-03-18 RX ADMIN — CYCLOBENZAPRINE 10 MG: 10 TABLET, FILM COATED ORAL at 09:17

## 2022-03-18 RX ADMIN — OXYCODONE HYDROCHLORIDE 5 MG: 5 TABLET ORAL at 16:36

## 2022-03-18 RX ADMIN — AMLODIPINE BESYLATE 5 MG: 5 TABLET ORAL at 09:18

## 2022-03-18 RX ADMIN — CALCIUM CARBONATE-VITAMIN D TAB 500 MG-200 UNIT 1 TABLET: 500-200 TAB at 09:18

## 2022-03-18 RX ADMIN — ALBUTEROL SULFATE 2 PUFF: 90 AEROSOL, METERED RESPIRATORY (INHALATION) at 11:21

## 2022-03-18 RX ADMIN — RIVAROXABAN 20 MG: 20 TABLET, FILM COATED ORAL at 16:38

## 2022-03-18 NOTE — PROGRESS NOTES
Name: Mg Gerber . ADMIT: 3/14/2022   : 1940  PCP: Dillon Walton MD    MRN: [id number] LOS: 4 days   AGE/SEX: 81 y.o. male  ROOM: [facility]   Subjective   Chief Complaint   Patient presents with   • Weakness - Generalized      No CP NVD. Better able to hear me today. Discussed rehab and he was appreciative. Then was concerned about possible discharge in the next day or 2. We discussed that ultimately it depends on how his breathing is doing. Currently dyspnea is improving. No productive cough. Was on 2L today. I decreased to 1L during exam.    Objective   Vital Signs  Temp:  [96 °F (35.6 °C)-98 °F (36.7 °C)] 96.8 °F (36 °C)  Heart Rate:  [53-71] 53  Resp:  [16-18] 18  BP: (128-133)/(60-79) 128/61  SpO2:  [91 %-98 %] 98 %  on  Flow (L/min):  [2-4] 2;   Device (Oxygen Therapy): nasal cannula  Body mass index is 30.74 kg/m².    Physical Exam  Vitals and nursing note reviewed.   Constitutional:       Appearance: He is ill-appearing. He is not diaphoretic.   HENT:      Head: Normocephalic and atraumatic.   Eyes:      General:         Right eye: No discharge.         Left eye: No discharge.      Conjunctiva/sclera: Conjunctivae normal.   Cardiovascular:      Rate and Rhythm: Normal rate and regular rhythm.      Pulses: Normal pulses.   Pulmonary:      Breath sounds: No wheezing or rhonchi.   Chest:      Chest wall: Tenderness present.   Abdominal:      General: There is no distension.      Palpations: Abdomen is soft.      Tenderness: There is no abdominal tenderness. There is no guarding or rebound.   Musculoskeletal:         General: No swelling or tenderness.      Cervical back: Neck supple. No tenderness.   Skin:     General: Skin is warm and dry.   Neurological:      Mental Status: He is alert. Mental status is at baseline.      Cranial Nerves: No cranial nerve deficit ([place]).   Psychiatric:         Mood and Affect: Mood normal.         Behavior: Behavior normal.         Results Review:       I  reviewed the patient's new clinical results.        Results from last 7 days   Lab Units 03/18/22  1042 03/17/22  0653 03/16/22  0345 03/15/22  0828   WBC 10*3/mm3 11.92* 12.62* 9.89 19.80*   HEMOGLOBIN g/dL 10.8* 10.2* 9.9* 10.3*   PLATELETS 10*3/mm3 358 336 287 292     Results from last 7 days   Lab Units 03/18/22  1042 03/17/22  0653 03/16/22  0345 03/15/22  1708 03/15/22  0828   SODIUM mmol/L 135* 138 136  --  136   POTASSIUM mmol/L 4.1 4.3 4.5  --  4.3   CHLORIDE mmol/L 98 102 100  --  101   CO2 mmol/L 28.0 28.8 29.0  --  26.9   BUN mg/dL 30* 23 11  --  9   CREATININE mg/dL 0.86 0.91 0.84 0.85 0.91   GLUCOSE mg/dL 97 122* 126*  --  119*   Estimated Creatinine Clearance: 82.2 mL/min (by C-G formula based on SCr of 0.86 mg/dL).  Results from last 7 days   Lab Units 03/18/22  1042 03/17/22  0653 03/16/22  0345 03/15/22  1708 03/15/22  0828   CALCIUM mg/dL 9.4 9.2 9.0  --  8.9   ALBUMIN g/dL 4.00 3.40* 3.30* 3.70  --    MAGNESIUM mg/dL 2.3 2.3  --   --   --    PHOSPHORUS mg/dL 2.9 3.2  --   --   --      Results from last 7 days   Lab Units 03/14/22 1922   INR  1.54*   APTT seconds 44.0*        albuterol sulfate HFA, 2 puff, Inhalation, 4x Daily - RT  amLODIPine, 5 mg, Oral, Daily  calcium 500 mg vitamin D 5 mcg (200 UT), 1 tablet, Oral, BID  cetirizine, 5 mg, Oral, Daily  cyclobenzaprine, 10 mg, Oral, TID  dexamethasone, 6 mg, Oral, Q12H  docusate sodium, 100 mg, Oral, BID  donepezil, 10 mg, Oral, Nightly  famotidine, 20 mg, Oral, BID  folic acid, 1 mg, Oral, TID  gabapentin, 400 mg, Oral, Q12H  gabapentin, 800 mg, Oral, Q PM  levothyroxine, 112 mcg, Oral, Daily  lidocaine, 2 patch, Transdermal, Q24H  memantine, 10 mg, Oral, Q12H  OXcarbazepine, 300 mg, Oral, Nightly  remdesivir, 100 mg, Intravenous, Q24H  rivaroxaban, 20 mg, Oral, Daily With Dinner  sertraline, 50 mg, Oral, Daily  tiotropium bromide monohydrate, 2 puff, Inhalation, Daily - RT       Diet Regular; Cardiac    Assessment/Plan      Active Hospital  Problems    Diagnosis  POA   • **Generalized weakness [R53.1]  Yes   • Immobility syndrome [M62.3]  Yes   • Self-care deficit [Z78.9]  Yes   • Closed fracture of multiple ribs of left side [S22.42XA]  Yes   • Paroxysmal atrial fibrillation (HCC) [I48.0]  Yes   • Drug-induced constipation [K59.03]  Yes   • Malignant neoplasm of lung (HCC) [C34.90]  Yes   • Essential hypertension [I10]  Yes      Resolved Hospital Problems   No resolved problems to display.       · COVID-19 pneumonia with acute hypoxic respiratory failure: Vaccinated, immunosuppressed. ID evaluated. Continue dexamethasone bid and remdesivir. His final dose of remdesivir is tomorrow. Hypoxia improving from 4L down to 2L today. Wean as tolerated. Will plan to decrease steroid to daily dosing tomorrow.  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  He follows with Dr. Montes De Oca at [facility].  · Nondisplaced left rib fractures: Thoracic surgery evaluated during his previous admission.  He was not surgical candidate due to nature of the rib fractures and his advanced disease.  Continue pain control.  · History of DVT/PE: Continue Xarelto.  · PAF: Rate was okay.  On Xarelto.  · Hypertension: Acceptable acutely.  Continue current regimen.  · Hypothyroidism: Synthroid  · Memory deficit: Continue home regimen  · Prophylaxis: On Xarelto  · Disposition: SNF/1-2 days    Bigg Schneider MD  [facility]  03/18/22  14:06 EDT    Dictated portions using Dragon dictation software.    During the entire encounter, I was wearing recommended PPE including face mask and eye protection. Hand sanitization was performed prior to entering room and upon exit.

## 2022-03-18 NOTE — THERAPY TREATMENT NOTE
Patient Name: Mg Gerber Sr.  : 1940    MRN: [id number]                              Today's Date: 3/18/2022       Admit Date: 3/14/2022    Visit Dx:     ICD-10-CM ICD-9-CM   1. Generalized weakness  R53.1 780.79   2. Immobility  Z74.09 799.89   3. Closed fracture of multiple ribs of left side, initial encounter  S22.42XA 807.09   4. Leukocytosis, unspecified type  D72.829 288.60     Patient Active Problem List   Diagnosis   • Anemia, vitamin B12 deficiency   • Arthritis   • Hemochromatosis   • Herpes zoster   • Hip pain   • Essential hypertension   • Cancer (HCC)   • Pulmonary emphysema (HCC)   • Left low back pain   • Vertigo   • Headache around the eyes   • Mild cognitive impairment   • Metastatic renal cell carcinoma  on q 2 wks chemo   • Anxiety   • Malignant neoplasm of lung (HCC)   • Neuropathy   • Perennial allergic rhinitis   • Vitamin B12 deficiency   • Osteoarthritis of lumbar spine   • Pharyngitis   • Bronchitis   • Drug-induced constipation   • Hx of hiatal hernia   • History of lung cancer   • COVID-19 virus detected   • Dyspnea   • Generalized weakness   • Acute respiratory failure with hypoxia (HCC)   • Gastroesophageal reflux disease   • Hearing disorder   • Malignant neoplasm of prostate (HCC)   • Sleep apnea   • Anemia   • Malignant neoplasm metastatic to lung (HCC)   • Secondary bacterial pneumonia   • Pneumonia due to COVID-19 virus   • Hilar mass   • On antineoplastic chemotherapy q 2wks   • Paroxysmal atrial fibrillation (HCC)   • History of pulmonary embolism   • Nausea   • Gastritis   • Muscle spasm   • Fall   • Closed fracture of multiple ribs of left side   • Chest wall pain   • Fall, initial encounter   • Immobility syndrome   • Self-care deficit     Past Medical History:   Diagnosis Date   • Allergic    • Anemia, vitamin B12 deficiency 2012   • Anxiety    • Arthritis 2014   • Atrial fibrillation (HCC)    • Cancer (HCC) 2014    kidney; prostate   •  Cataract    • Chemotherapeutic agent or infusion extravasation     q 1-2 wks    • COPD (chronic obstructive pulmonary disease) (HCC) 03/24/2014   • Emphysema of lung (HCC)    • Hemochromatosis 03/24/2014   • Herpes zoster 12/06/2011    left medial thigh   • Hip pain 03/24/2014   • History of Doppler ultrasound 12/28/2011    superficial and deep venous insuffiency   • History of EKG 02/10/2012    Dr. Kathi Lloyd; unchanged from prior EKG   • Hypertension     benign essential   • Injury of back    • Pulmonary embolism (HCC) 03/24/2014   • Renal cell cancer (HCC)    • Shortness of breath    • Sleep apnea      Past Surgical History:   Procedure Laterality Date   • APPENDECTOMY     • CATARACT EXTRACTION     • CHOLECYSTECTOMY     • SPLENECTOMY     • TONSILLECTOMY     • VEIN SURGERY        General Information     Row Name 03/18/22 0446          OT Time and Intention    Document Type therapy note (daily note)  -     Mode of Treatment individual therapy;occupational therapy  -     Row Name 03/18/22 1336          General Information    Existing Precautions/Restrictions fall;oxygen therapy device and L/min  -     Row Name 03/18/22 1332          Cognition    Orientation Status (Cognition) oriented x 3  -     Row Name 03/18/22 1337          Safety Issues, Functional Mobility    Impairments Affecting Function (Mobility) balance;strength;endurance/activity tolerance  -           User Key  (r) = Recorded By, (t) = Taken By, (c) = Cosigned By    Initials Name Provider Type     Pati Parry, OTR Occupational Therapist                 Mobility/ADL's     Row Name 03/18/22 9074          Bed Mobility    Supine-Sit [place] (Bed Mobility) set up;verbal cues;minimum assist (75% patient effort)  -     Sit-Supine [place] (Bed Mobility) not tested  -     Assistive Device (Bed Mobility) bed rails  -     Row Name 03/18/22 3512          Transfers    Transfers sit-stand transfer;bed-chair transfer;stand-sit  transfer  -KP     Bed-Chair [place] (Transfers) set up;verbal cues;contact guard  -KP     Sit-Stand [place] (Transfers) set up;contact guard  -KP     Stand-Sit [place] (Transfers) set up;contact guard  -KP           User Key  (r) = Recorded By, (t) = Taken By, (c) = Cosigned By    Initials Name Provider Type    Pati Zaidi OTR Occupational Therapist               Obj/Interventions     Row Name 03/18/22 1337          Shoulder (Therapeutic Exercise)    Shoulder (Therapeutic Exercise) strengthening exercise  -KP     Shoulder AROM (Therapeutic Exercise) bilateral;flexion;extension;scapular protraction;scapular retraction;10 repetitions;2 sets  -KP     Shoulder Strengthening (Therapeutic Exercise) bilateral;horizontal aBduction/aDduction;resisted diagonal exercise;10 repetitions;yellow;sitting;resistance band  -KP     Row Name 03/18/22 1337          Elbow/Forearm (Therapeutic Exercise)    Elbow/Forearm (Therapeutic Exercise) AROM (active range of motion);strengthening exercise  -KP     Elbow/Forearm AROM (Therapeutic Exercise) bilateral;flexion;extension;supination;pronation;sitting;10 repetitions;2 sets  -KP     Elbow/Forearm Strengthening (Therapeutic Exercise) right;left;10 repetitions;sitting;yellow  tricep ex  -KP     Row Name 03/18/22 1337          Motor Skills    Therapeutic Exercise elbow/forearm;shoulder  -KP     Row Name 03/18/22 1337          Balance    Static Sitting Balance set-up;independent  -KP     Static Standing Balance set-up;contact guard  -KP     Balance Interventions sitting;standing;sit to stand;supported;static  -KP           User Key  (r) = Recorded By, (t) = Taken By, (c) = Cosigned By    Initials Name Provider Type    Pati Zaidi OTR Occupational Therapist               Goals/Plan    No documentation.                Clinical Impression     Row Name 03/18/22 1330          Pain Assessment    Pretreatment Pain Rating 0/10 - no pain  -KP      Posttreatment Pain Rating 0/10 - no pain  -     Row Name 03/18/22 9591          Plan of Care Review    Plan of Care Reviewed With patient  -KP     Progress improving  -     Outcome Evaluation pt worked w OT in tx session, pt completed sup to sit w min A, sat EOB SBA, stood w CGA and tsf to chair. Pt completed yellow theraband ex and AROM ex to B UE to incr strength and endurance. pt on nasal canula for O2  and 4L. pt progressing well this date in therapy. pt cont to benefit from OT to incr ADL, strength, balance, tsf, and endurance.  -KP     Row Name 03/18/22 1338          Vital Signs    Pre SpO2 (%) 95  -KP     O2 Delivery Pre Treatment supplemental O2  -KP     Intra SpO2 (%) 90  -KP     O2 Delivery Intra Treatment supplemental O2  -KP     Post SpO2 (%) 92  -KP     O2 Delivery Post Treatment supplemental O2  -KP     Pre Patient Position Supine  -KP     Intra Patient Position Sitting  -KP     Post Patient Position Sitting  -KP     Row Name 03/18/22 0997          Positioning and Restraints    Pre-Treatment Position in bed  -KP     Post Treatment Position chair  -KP     In Chair sitting;call light within reach;encouraged to call for assist;exit alarm on  nsg aide aware, and table in front of pt.  -KP           User Key  (r) = Recorded By, (t) = Taken By, (c) = Cosigned By    Initials Name Provider Type    Pati Zaidi, OTR Occupational Therapist               Outcome Measures     Row Name 03/18/22 2581          How much help from another is currently needed...    Putting on and taking off regular lower body clothing? 2  -KP     Bathing (including washing, rinsing, and drying) 2  -KP     Toileting (which includes using toilet bed pan or urinal) 2  -KP     Putting on and taking off regular upper body clothing 3  -KP     Taking care of personal grooming (such as brushing teeth) 3  -KP     Eating meals 4  -KP     AM-PAC 6 Clicks Score (OT) 16  -KP     Row Name 03/18/22 1341          Functional Assessment     Outcome Measure Options AM-PAC 6 Clicks Daily Activity (OT)  -           User Key  (r) = Recorded By, (t) = Taken By, (c) = Cosigned By    Initials Name Provider Type    Pati Zaidi OTR Occupational Therapist                Occupational Therapy Education                 Title: PT OT SLP Therapies (In Progress)     Topic: Occupational Therapy (Done)     Point: ADL training (Done)     Description:   Instruct learner(s) on proper safety adaptation and remediation techniques during self care or transfers.   Instruct in proper use of assistive devices.              Learning Progress Summary           Patient Acceptance, E, VU by  at 3/16/2022 1858    Acceptance, E, VU by  at 3/15/2022 1150    Comment: the role of OT                   Point: Home exercise program (Done)     Description:   Instruct learner(s) on appropriate technique for monitoring, assisting and/or progressing therapeutic exercises/activities.              Learning Progress Summary           Patient Acceptance, E, VU by  at 3/16/2022 1858                   Point: Precautions (Done)     Description:   Instruct learner(s) on prescribed precautions during self-care and functional transfers.              Learning Progress Summary           Patient Acceptance, E, VU by  at 3/16/2022 1858                   Point: Body mechanics (Done)     Description:   Instruct learner(s) on proper positioning and spine alignment during self-care, functional mobility activities and/or exercises.              Learning Progress Summary           Patient Acceptance, E, VU by  at 3/16/2022 1858                               User Key     Initials Effective Dates Name Provider Type Discipline     06/16/21 -  Praveen Lake, RN Registered Nurse Nurse     01/05/21 -  Aylin Dowell OT Occupational Therapist OT              OT Recommendation and Plan     Plan of Care Review  Plan of Care Reviewed With: patient  Progress: improving  Outcome  Evaluation: pt worked w OT in tx session, pt completed sup to sit w min A, sat EOB SBA, stood w CGA and tsf to chair. Pt completed yellow theraband ex and AROM ex to B UE to incr strength and endurance. pt on nasal canula for O2  and 4L. pt progressing well this date in therapy. pt cont to benefit from OT to incr ADL, strength, balance, tsf, and endurance.     Time Calculation:    Time Calculation- OT     Row Name 03/18/22 1341             Time Calculation- OT    OT Start Time 0959  -      OT Stop Time 1033  -      OT Time Calculation (min) 34 min  -      Total Timed Code Minutes- OT 34 minute(s)  -      OT Received On 03/18/22  -      OT - Next Appointment 03/21/22  -              Timed Charges    12571 - OT Therapeutic Exercise Minutes 34  -KP              Total Minutes    Timed Charges Total Minutes 34  -KP       Total Minutes 34  -KP            User Key  (r) = Recorded By, (t) = Taken By, (c) = Cosigned By    Initials Name Provider Type     Pati Parry OTR Occupational Therapist              Therapy Charges for Today     Code Description Service Date Service Provider Modifiers Qty    [id number] HC OT THER PROC EA 15 MIN 3/18/2022 Pati Parry OTR GO 2               PRASAD Marie  3/18/2022

## 2022-03-18 NOTE — PLAN OF CARE
Goal Outcome Evaluation:  Plan of Care Reviewed With: patient        Progress: improving  Outcome Evaluation: pt worked w OT in tx session, pt completed sup to sit w min A, sat EOB SBA, stood w CGA and tsf to chair. Pt completed yellow theraband ex and AROM ex to B UE to incr strength and endurance. pt on nasal canula for O2  and 4L. pt progressing well this date in therapy. pt cont to benefit from OT to incr ADL, strength, balance, tsf, and endurance.  OT wore all PPE for COVID room, washed hands before/after. Pt wore mask.

## 2022-03-18 NOTE — CASE MANAGEMENT/SOCIAL WORK
Continued Stay Note  [facility]     Patient Name: Mg Gerber .  MRN: [id number]  Today's Date: 3/18/2022    Admit Date: 3/14/2022     Discharge Plan     Row Name 03/18/22 0835       Plan    Plan Comments Pre-cert has been obtained and patient can admit today. Per notes patient needs to finish Remdesivir and last dose is scheduled for Saturday 3/19. Message sent to Florencio to update her. Patient Attempted to call wife to update her but there was no answer and no VM. Will try again later. Partial packet is in cubby at the nurses station. Tammy FIORE RN CCP               Discharge Codes    No documentation.               Expected Discharge Date and Time     Expected Discharge Date Expected Discharge Time    Mar 19, 2022             Tammy Powers RN

## 2022-03-18 NOTE — PLAN OF CARE
Goal Outcome Evaluation:  Plan of Care Reviewed With: patient        Progress: improving  Outcome Evaluation: Patient remains on 4 L NC with SATs in mid 90s. SR on monitor. Patient has c/o right side pain, controlled with scheduled and PRN meds. A & O x 4 with periods of forgetfullness. Will continue to monitor.

## 2022-03-18 NOTE — PLAN OF CARE
Goal Outcome Evaluation:  Plan of Care Reviewed With: patient  Progress: improving  Outcome Evaluation: VSS, tolerating O2 titration down to 2L with sats >95%. C/o pain lateral chest (See MAR). Worked with PT. RODRICK on the monitor    Problem: Fall Injury Risk  Goal: Absence of Fall and Fall-Related Injury  Outcome: Ongoing, Progressing  Intervention: Identify and Manage Contributors  Recent Flowsheet Documentation  Taken 3/18/2022 1447 by Praveen Lake RN  Medication Review/Management: medications reviewed  Taken 3/18/2022 1213 by Praveen Lake RN  Medication Review/Management: medications reviewed  Taken 3/18/2022 1048 by Praveen Lake, RN  Medication Review/Management: medications reviewed  Taken 3/18/2022 0918 by Praveen Lake RN  Medication Review/Management: medications reviewed  Intervention: Promote Injury-Free Environment  Recent Flowsheet Documentation  Taken 3/18/2022 1447 by Praveen Lake, RN  Safety Promotion/Fall Prevention:   activity supervised   assistive device/personal items within reach   clutter free environment maintained   fall prevention program maintained   lighting adjusted   safety round/check completed   room organization consistent   nonskid shoes/slippers when out of bed  Taken 3/18/2022 1213 by Praveen Lake, RN  Safety Promotion/Fall Prevention:   activity supervised   assistive device/personal items within reach   clutter free environment maintained   fall prevention program maintained   lighting adjusted   nonskid shoes/slippers when out of bed   room organization consistent   safety round/check completed  Taken 3/18/2022 1048 by Praveen Lake, RN  Safety Promotion/Fall Prevention:   activity supervised   assistive device/personal items within reach   clutter free environment maintained   fall prevention program maintained   nonskid shoes/slippers when out of bed   muscle strengthening facilitated   room organization  consistent   safety round/check completed  Taken 3/18/2022 0918 by Praveen Lake, RN  Safety Promotion/Fall Prevention:   activity supervised   assistive device/personal items within reach   clutter free environment maintained   fall prevention program maintained   lighting adjusted   muscle strengthening facilitated   nonskid shoes/slippers when out of bed   room organization consistent   safety round/check completed     Problem: Adult Inpatient Plan of Care  Goal: Plan of Care Review  Outcome: Ongoing, Progressing  Flowsheets (Taken 3/18/2022 1551)  Progress: improving  Plan of Care Reviewed With: patient  Outcome Evaluation: VSS, tolerating O2 titration down to 2L with sats >95%. C/o pain lateral chest (See MAR). Worked with PT. RODRICK on the monitor  Goal: Patient-Specific Goal (Individualized)  Outcome: Ongoing, Progressing  Goal: Absence of Hospital-Acquired Illness or Injury  Outcome: Ongoing, Progressing  Intervention: Identify and Manage Fall Risk  Recent Flowsheet Documentation  Taken 3/18/2022 1447 by Praveen Lake RN  Safety Promotion/Fall Prevention:   activity supervised   assistive device/personal items within reach   clutter free environment maintained   fall prevention program maintained   lighting adjusted   safety round/check completed   room organization consistent   nonskid shoes/slippers when out of bed  Taken 3/18/2022 1213 by Praveen Lake, RN  Safety Promotion/Fall Prevention:   activity supervised   assistive device/personal items within reach   clutter free environment maintained   fall prevention program maintained   lighting adjusted   nonskid shoes/slippers when out of bed   room organization consistent   safety round/check completed  Taken 3/18/2022 1048 by Praveen Lake, RN  Safety Promotion/Fall Prevention:   activity supervised   assistive device/personal items within reach   clutter free environment maintained   fall prevention program maintained    nonskid shoes/slippers when out of bed   muscle strengthening facilitated   room organization consistent   safety round/check completed  Taken 3/18/2022 0918 by Praveen Lake RN  Safety Promotion/Fall Prevention:   activity supervised   assistive device/personal items within reach   clutter free environment maintained   fall prevention program maintained   lighting adjusted   muscle strengthening facilitated   nonskid shoes/slippers when out of bed   room organization consistent   safety round/check completed  Intervention: Prevent Skin Injury  Recent Flowsheet Documentation  Taken 3/18/2022 1447 by Praveen Lake RN  Body Position:   turned   right   position changed independently  Skin Protection:   adhesive use limited   transparent dressing maintained   tubing/devices free from skin contact  Taken 3/18/2022 1213 by Praveen Lake RN  Body Position: position changed independently  Taken 3/18/2022 1048 by Praveen Lake RN  Body Position: position changed independently  Taken 3/18/2022 0918 by Praveen Lake RN  Body Position: position changed independently  Skin Protection:   adhesive use limited   transparent dressing maintained   tubing/devices free from skin contact  Intervention: Prevent and Manage VTE (Venous Thromboembolism) Risk  Recent Flowsheet Documentation  Taken 3/18/2022 1447 by Praveen Lake RN  Activity Management:   activity adjusted per tolerance   activity encouraged  VTE Prevention/Management:   bilateral   dorsiflexion/plantar flexion performed  Range of Motion:   active ROM (range of motion) encouraged   ROM (range of motion) performed  Taken 3/18/2022 1213 by Praveen Lake RN  Activity Management:   activity encouraged   activity adjusted per tolerance  Taken 3/18/2022 1048 by Praveen Lake RN  Activity Management:   activity encouraged   activity adjusted per tolerance  Taken 3/18/2022 0918 by Praveen Lake  RN  Activity Management:   activity adjusted per tolerance   activity encouraged  VTE Prevention/Management:   bilateral   dorsiflexion/plantar flexion performed  Range of Motion:   ROM (range of motion) performed   active ROM (range of motion) encouraged  Intervention: Prevent Infection  Recent Flowsheet Documentation  Taken 3/18/2022 1447 by Praveen Lkae RN  Infection Prevention:   visitors restricted/screened   single patient room provided   rest/sleep promoted   hand hygiene promoted   personal protective equipment utilized  Taken 3/18/2022 1213 by Praveen Lake RN  Infection Prevention:   visitors restricted/screened   single patient room provided   rest/sleep promoted   hand hygiene promoted   personal protective equipment utilized  Taken 3/18/2022 1048 by Praveen Lake RN  Infection Prevention:   visitors restricted/screened   single patient room provided   rest/sleep promoted   personal protective equipment utilized   hand hygiene promoted  Taken 3/18/2022 0918 by Praveen Lake RN  Infection Prevention:   visitors restricted/screened   single patient room provided   rest/sleep promoted   personal protective equipment utilized   hand hygiene promoted  Goal: Optimal Comfort and Wellbeing  Outcome: Ongoing, Progressing  Intervention: Monitor Pain and Promote Comfort  Recent Flowsheet Documentation  Taken 3/18/2022 0935 by Praveen Lake RN  Pain Management Interventions:   see MAR   quiet environment facilitated   position adjusted  Goal: Readiness for Transition of Care  Outcome: Ongoing, Progressing     Problem: Skin Injury Risk Increased  Goal: Skin Health and Integrity  Outcome: Ongoing, Progressing  Intervention: Optimize Skin Protection  Recent Flowsheet Documentation  Taken 3/18/2022 1447 by Praveen Lake RN  Pressure Reduction Techniques:   sit time limited to 2 hours   weight shift assistance provided  Head of Bed (HOB) Positioning: HOB  elevated  Pressure Reduction Devices: positioning supports utilized  Skin Protection:   adhesive use limited   transparent dressing maintained   tubing/devices free from skin contact  Taken 3/18/2022 1213 by Praveen Lake RN  Head of Bed ([facility]) Positioning: HOB elevated  Taken 3/18/2022 1048 by Praveen Lake RN  Head of Bed ([facility]) Positioning: HOB elevated  Taken 3/18/2022 0918 by Praveen Lake RN  Pressure Reduction Techniques:   weight shift assistance provided   sit time limited to 2 hours  Head of Bed ([facility]) Positioning: [facility] elevated  Pressure Reduction Devices:   positioning supports utilized   heel offloading device utilized  Skin Protection:   adhesive use limited   transparent dressing maintained   tubing/devices free from skin contact     Problem: Muscle Strength Impairment  Goal: Improved Muscle Strength  Outcome: Ongoing, Progressing     Problem: Pain Acute  Goal: Acceptable Pain Control and Functional Ability  Outcome: Ongoing, Progressing  Intervention: Prevent or Manage Pain  Recent Flowsheet Documentation  Taken 3/18/2022 1447 by Praveen Lake RN  Medication Review/Management: medications reviewed  Taken 3/18/2022 1213 by Praveen Lake RN  Medication Review/Management: medications reviewed  Taken 3/18/2022 1048 by Praveen Lake RN  Medication Review/Management: medications reviewed  Taken 3/18/2022 0918 by Praveen Lake RN  Sensory Stimulation Regulation:   care clustered   lighting decreased  Medication Review/Management: medications reviewed  Intervention: Develop Pain Management Plan  Recent Flowsheet Documentation  Taken 3/18/2022 0935 by Praveen Lake RN  Pain Management Interventions:   see MAR   quiet environment facilitated   position adjusted   Goal Outcome Evaluation:  Plan of Care Reviewed With: patient        Progress: improving  Outcome Evaluation: VSS, tolerating O2 titration down to 2L with sats >95%. C/o pain lateral  chest (See MAR). Worked with PTDrew MENSAH on the monitor

## 2022-03-19 ENCOUNTER — APPOINTMENT (OUTPATIENT)
Dept: GENERAL RADIOLOGY | Facility: HOSPITAL | Age: 82
End: 2022-03-19

## 2022-03-19 LAB
ALBUMIN SERPL-MCNC: 3.8 G/DL (ref 3.5–5.2)
ALBUMIN/GLOB SERPL: 1.5 G/DL
ALP SERPL-CCNC: 57 U/L (ref 39–117)
ALT SERPL W P-5'-P-CCNC: 16 U/L (ref 1–41)
ANION GAP SERPL CALCULATED.3IONS-SCNC: 8 MMOL/L (ref 5–15)
AST SERPL-CCNC: 16 U/L (ref 1–40)
BASOPHILS # BLD AUTO: 0.01 10*3/MM3 (ref 0–0.2)
BASOPHILS NFR BLD AUTO: 0.1 % (ref 0–1.5)
BILIRUB CONJ SERPL-MCNC: 0.4 MG/DL (ref 0–0.3)
BILIRUB SERPL-MCNC: 1.7 MG/DL (ref 0–1.2)
BUN SERPL-MCNC: 30 MG/DL (ref 8–23)
BUN/CREAT SERPL: 31.3 (ref 7–25)
CALCIUM SPEC-SCNC: 9.4 MG/DL (ref 8.6–10.5)
CHLORIDE SERPL-SCNC: 100 MMOL/L (ref 98–107)
CO2 SERPL-SCNC: 28 MMOL/L (ref 22–29)
CREAT SERPL-MCNC: 0.96 MG/DL (ref 0.76–1.27)
CRP SERPL-MCNC: 1.43 MG/DL (ref 0–0.5)
D DIMER PPP FEU-MCNC: 0.53 MCGFEU/ML (ref 0–0.49)
DEPRECATED RDW RBC AUTO: 39.6 FL (ref 37–54)
EGFRCR SERPLBLD CKD-EPI 2021: 78.9 ML/MIN/1.73
EOSINOPHIL # BLD AUTO: 0 10*3/MM3 (ref 0–0.4)
EOSINOPHIL NFR BLD AUTO: 0 % (ref 0.3–6.2)
ERYTHROCYTE [DISTWIDTH] IN BLOOD BY AUTOMATED COUNT: 10.4 % (ref 12.3–15.4)
FERRITIN SERPL-MCNC: 751 NG/ML (ref 30–400)
FIBRINOGEN PPP-MCNC: 334 MG/DL (ref 219–464)
GLOBULIN UR ELPH-MCNC: 2.5 GM/DL
GLUCOSE SERPL-MCNC: 111 MG/DL (ref 65–99)
HCT VFR BLD AUTO: 32 % (ref 37.5–51)
HGB BLD-MCNC: 10.6 G/DL (ref 13–17.7)
LYMPHOCYTES # BLD AUTO: 1.53 10*3/MM3 (ref 0.7–3.1)
LYMPHOCYTES NFR BLD AUTO: 12.9 % (ref 19.6–45.3)
MAGNESIUM SERPL-MCNC: 2.4 MG/DL (ref 1.6–2.4)
MCH RBC QN AUTO: 35.5 PG (ref 26.6–33)
MCHC RBC AUTO-ENTMCNC: 33.1 G/DL (ref 31.5–35.7)
MCV RBC AUTO: 107 FL (ref 79–97)
MONOCYTES # BLD AUTO: 1.17 10*3/MM3 (ref 0.1–0.9)
MONOCYTES NFR BLD AUTO: 9.9 % (ref 5–12)
NEUTROPHILS NFR BLD AUTO: 76.4 % (ref 42.7–76)
NEUTROPHILS NFR BLD AUTO: 9.06 10*3/MM3 (ref 1.7–7)
NT-PROBNP SERPL-MCNC: 494 PG/ML (ref 0–1800)
PHOSPHATE SERPL-MCNC: 3.4 MG/DL (ref 2.5–4.5)
PLATELET # BLD AUTO: 378 10*3/MM3 (ref 140–450)
PMV BLD AUTO: 9.9 FL (ref 6–12)
POTASSIUM SERPL-SCNC: 4.5 MMOL/L (ref 3.5–5.2)
PROCALCITONIN SERPL-MCNC: 0.08 NG/ML (ref 0–0.25)
PROT SERPL-MCNC: 6.3 G/DL (ref 6–8.5)
QT INTERVAL: 358 MS
RBC # BLD AUTO: 2.99 10*6/MM3 (ref 4.14–5.8)
SODIUM SERPL-SCNC: 136 MMOL/L (ref 136–145)
TROPONIN T SERPL-MCNC: <0.01 NG/ML (ref 0–0.03)
WBC NRBC COR # BLD: 11.85 10*3/MM3 (ref 3.4–10.8)

## 2022-03-19 PROCEDURE — 94799 UNLISTED PULMONARY SVC/PX: CPT

## 2022-03-19 PROCEDURE — 92610 EVALUATE SWALLOWING FUNCTION: CPT

## 2022-03-19 PROCEDURE — 86140 C-REACTIVE PROTEIN: CPT | Performed by: INTERNAL MEDICINE

## 2022-03-19 PROCEDURE — 83735 ASSAY OF MAGNESIUM: CPT | Performed by: INTERNAL MEDICINE

## 2022-03-19 PROCEDURE — 84145 PROCALCITONIN (PCT): CPT | Performed by: INTERNAL MEDICINE

## 2022-03-19 PROCEDURE — 25010000002 REMDESIVIR 100 MG/20ML SOLUTION 1 EACH VIAL: Performed by: INTERNAL MEDICINE

## 2022-03-19 PROCEDURE — 84484 ASSAY OF TROPONIN QUANT: CPT | Performed by: INTERNAL MEDICINE

## 2022-03-19 PROCEDURE — 63710000001 DEXAMETHASONE PER 0.25 MG: Performed by: STUDENT IN AN ORGANIZED HEALTH CARE EDUCATION/TRAINING PROGRAM

## 2022-03-19 PROCEDURE — 93005 ELECTROCARDIOGRAM TRACING: CPT | Performed by: INTERNAL MEDICINE

## 2022-03-19 PROCEDURE — 85379 FIBRIN DEGRADATION QUANT: CPT | Performed by: INTERNAL MEDICINE

## 2022-03-19 PROCEDURE — 85025 COMPLETE CBC W/AUTO DIFF WBC: CPT | Performed by: INTERNAL MEDICINE

## 2022-03-19 PROCEDURE — 80053 COMPREHEN METABOLIC PANEL: CPT | Performed by: INTERNAL MEDICINE

## 2022-03-19 PROCEDURE — 82248 BILIRUBIN DIRECT: CPT | Performed by: INTERNAL MEDICINE

## 2022-03-19 PROCEDURE — 94664 DEMO&/EVAL PT USE INHALER: CPT

## 2022-03-19 PROCEDURE — 71045 X-RAY EXAM CHEST 1 VIEW: CPT

## 2022-03-19 PROCEDURE — 85384 FIBRINOGEN ACTIVITY: CPT | Performed by: INTERNAL MEDICINE

## 2022-03-19 PROCEDURE — 94761 N-INVAS EAR/PLS OXIMETRY MLT: CPT

## 2022-03-19 PROCEDURE — 82728 ASSAY OF FERRITIN: CPT | Performed by: INTERNAL MEDICINE

## 2022-03-19 PROCEDURE — 83880 ASSAY OF NATRIURETIC PEPTIDE: CPT | Performed by: INTERNAL MEDICINE

## 2022-03-19 PROCEDURE — 93010 ELECTROCARDIOGRAM REPORT: CPT | Performed by: INTERNAL MEDICINE

## 2022-03-19 PROCEDURE — 84100 ASSAY OF PHOSPHORUS: CPT | Performed by: INTERNAL MEDICINE

## 2022-03-19 PROCEDURE — 97530 THERAPEUTIC ACTIVITIES: CPT

## 2022-03-19 RX ADMIN — CETIRIZINE HYDROCHLORIDE 5 MG: 10 TABLET ORAL at 09:08

## 2022-03-19 RX ADMIN — DONEPEZIL HYDROCHLORIDE 10 MG: 10 TABLET, FILM COATED ORAL at 20:22

## 2022-03-19 RX ADMIN — ALBUTEROL SULFATE 2 PUFF: 90 AEROSOL, METERED RESPIRATORY (INHALATION) at 20:59

## 2022-03-19 RX ADMIN — ALBUTEROL SULFATE 2 PUFF: 90 AEROSOL, METERED RESPIRATORY (INHALATION) at 15:44

## 2022-03-19 RX ADMIN — MEMANTINE HYDROCHLORIDE 10 MG: 10 TABLET, FILM COATED ORAL at 09:11

## 2022-03-19 RX ADMIN — GABAPENTIN 400 MG: 400 CAPSULE ORAL at 20:23

## 2022-03-19 RX ADMIN — FAMOTIDINE 20 MG: 20 TABLET ORAL at 09:08

## 2022-03-19 RX ADMIN — OXYCODONE HYDROCHLORIDE 5 MG: 5 TABLET ORAL at 15:40

## 2022-03-19 RX ADMIN — TIOTROPIUM BROMIDE INHALATION SPRAY 2 PUFF: 3.12 SPRAY, METERED RESPIRATORY (INHALATION) at 06:59

## 2022-03-19 RX ADMIN — FOLIC ACID 1 MG: 1 TABLET ORAL at 20:22

## 2022-03-19 RX ADMIN — LEVOTHYROXINE SODIUM 112 MCG: 0.11 TABLET ORAL at 06:13

## 2022-03-19 RX ADMIN — CYCLOBENZAPRINE 10 MG: 10 TABLET, FILM COATED ORAL at 09:08

## 2022-03-19 RX ADMIN — OXCARBAZEPINE 300 MG: 300 TABLET, FILM COATED ORAL at 20:22

## 2022-03-19 RX ADMIN — MEMANTINE HYDROCHLORIDE 10 MG: 10 TABLET, FILM COATED ORAL at 20:22

## 2022-03-19 RX ADMIN — OXYCODONE HYDROCHLORIDE 5 MG: 5 TABLET ORAL at 20:23

## 2022-03-19 RX ADMIN — DOCUSATE SODIUM 100 MG: 100 CAPSULE, LIQUID FILLED ORAL at 20:22

## 2022-03-19 RX ADMIN — OXYCODONE HYDROCHLORIDE 5 MG: 5 TABLET ORAL at 09:08

## 2022-03-19 RX ADMIN — REMDESIVIR 100 MG: 100 INJECTION, POWDER, LYOPHILIZED, FOR SOLUTION INTRAVENOUS at 15:40

## 2022-03-19 RX ADMIN — ALBUTEROL SULFATE 2 PUFF: 90 AEROSOL, METERED RESPIRATORY (INHALATION) at 11:20

## 2022-03-19 RX ADMIN — SERTRALINE HYDROCHLORIDE 50 MG: 50 TABLET, FILM COATED ORAL at 09:08

## 2022-03-19 RX ADMIN — AMLODIPINE BESYLATE 5 MG: 5 TABLET ORAL at 09:08

## 2022-03-19 RX ADMIN — FOLIC ACID 1 MG: 1 TABLET ORAL at 15:40

## 2022-03-19 RX ADMIN — FAMOTIDINE 20 MG: 20 TABLET ORAL at 20:23

## 2022-03-19 RX ADMIN — CALCIUM CARBONATE-VITAMIN D TAB 500 MG-200 UNIT 1 TABLET: 500-200 TAB at 20:23

## 2022-03-19 RX ADMIN — LIDOCAINE 2 PATCH: 50 PATCH CUTANEOUS at 10:09

## 2022-03-19 RX ADMIN — RIVAROXABAN 20 MG: 20 TABLET, FILM COATED ORAL at 17:01

## 2022-03-19 RX ADMIN — OXYCODONE HYDROCHLORIDE 5 MG: 5 TABLET ORAL at 00:51

## 2022-03-19 RX ADMIN — CYCLOBENZAPRINE 10 MG: 10 TABLET, FILM COATED ORAL at 20:23

## 2022-03-19 RX ADMIN — ALBUTEROL SULFATE 2 PUFF: 90 AEROSOL, METERED RESPIRATORY (INHALATION) at 06:59

## 2022-03-19 RX ADMIN — GABAPENTIN 800 MG: 400 CAPSULE ORAL at 17:01

## 2022-03-19 RX ADMIN — CYCLOBENZAPRINE 10 MG: 10 TABLET, FILM COATED ORAL at 15:39

## 2022-03-19 RX ADMIN — GABAPENTIN 400 MG: 400 CAPSULE ORAL at 09:08

## 2022-03-19 RX ADMIN — FOLIC ACID 1 MG: 1 TABLET ORAL at 09:08

## 2022-03-19 RX ADMIN — CALCIUM CARBONATE-VITAMIN D TAB 500 MG-200 UNIT 1 TABLET: 500-200 TAB at 09:08

## 2022-03-19 RX ADMIN — DOCUSATE SODIUM 100 MG: 100 CAPSULE, LIQUID FILLED ORAL at 09:08

## 2022-03-19 RX ADMIN — DEXAMETHASONE 6 MG: 4 TABLET ORAL at 09:08

## 2022-03-19 NOTE — PLAN OF CARE
Goal Outcome Evaluation:  Plan of Care Reviewed With: patient           Outcome Evaluation: Pt seen for PT treatment. Continues to amb well in the room using rolling walker. O2 WFL during standing activity. PT will continue to follow and progress as able. Plan to follow up monday.

## 2022-03-19 NOTE — PROGRESS NOTES
Name: Mg Gerber Sr. ADMIT: 3/14/2022   : 1940  PCP: Dillon Walton MD    MRN: [id number] LOS: 5 days   AGE/SEX: 81 y.o. male  ROOM: [facility]   Subjective   Chief Complaint   Patient presents with   • Weakness - Generalized      Choked on breakfast and had chest pain and cough associated with that. Improved currently. No NVD or abdominal pain. Dyspnea improving and on room air now.    Objective   Vital Signs  Temp:  [97 °F (36.1 °C)-97.9 °F (36.6 °C)] 97.5 °F (36.4 °C)  Heart Rate:  [63-86] 68  Resp:  [16-18] 16  BP: (134-165)/(66-88) 165/88  SpO2:  [90 %-94 %] 90 %  on  Flow (L/min):  [1-2] 1;   Device (Oxygen Therapy): room air  Body mass index is 30.05 kg/m².    Physical Exam  Vitals and nursing note reviewed.   Constitutional:       Appearance: He is ill-appearing. He is not diaphoretic.   HENT:      Head: Normocephalic and atraumatic.   Eyes:      General:         Right eye: No discharge.         Left eye: No discharge.      Conjunctiva/sclera: Conjunctivae normal.   Cardiovascular:      Rate and Rhythm: Normal rate and regular rhythm.      Pulses: Normal pulses.   Pulmonary:      Breath sounds: No wheezing or rhonchi.   Chest:      Chest wall: Tenderness present.   Abdominal:      General: There is no distension.      Palpations: Abdomen is soft.      Tenderness: There is no abdominal tenderness. There is no guarding or rebound.   Musculoskeletal:         General: No swelling or tenderness.      Cervical back: Neck supple. No tenderness.   Skin:     General: Skin is warm and dry.   Neurological:      Mental Status: He is alert. Mental status is at baseline.      Cranial Nerves: No cranial nerve deficit ([place]).   Psychiatric:         Mood and Affect: Mood normal.         Behavior: Behavior normal.         Results Review:       I reviewed the patient's new clinical results.   I reviewed ekg/telemetry, sinus rhythm   I reviewed imaging results, agree with interpretation.        Results from last  7 days   Lab Units 03/19/22  0716 03/18/22  1042 03/17/22  0653 03/16/22  0345   WBC 10*3/mm3 11.85* 11.92* 12.62* 9.89   HEMOGLOBIN g/dL 10.6* 10.8* 10.2* 9.9*   PLATELETS 10*3/mm3 378 358 336 287     Results from last 7 days   Lab Units 03/19/22  0716 03/18/22  1042 03/17/22  0653 03/16/22  0345   SODIUM mmol/L 136 135* 138 136   POTASSIUM mmol/L 4.5 4.1 4.3 4.5   CHLORIDE mmol/L 100 98 102 100   CO2 mmol/L 28.0 28.0 28.8 29.0   BUN mg/dL 30* 30* 23 11   CREATININE mg/dL 0.96 0.86 0.91 0.84   GLUCOSE mg/dL 111* 97 122* 126*   Estimated Creatinine Clearance: 73 mL/min (by C-G formula based on SCr of 0.96 mg/dL).  Results from last 7 days   Lab Units 03/19/22  0716 03/18/22  1042 03/17/22  0653 03/16/22  0345   CALCIUM mg/dL 9.4 9.4 9.2 9.0   ALBUMIN g/dL 3.80 4.00 3.40* 3.30*   MAGNESIUM mg/dL 2.4 2.3 2.3  --    PHOSPHORUS mg/dL 3.4 2.9 3.2  --      Results from last 7 days   Lab Units 03/14/22  1922   INR  1.54*   APTT seconds 44.0*        albuterol sulfate HFA, 2 puff, Inhalation, 4x Daily - RT  amLODIPine, 5 mg, Oral, Daily  calcium 500 mg vitamin D 5 mcg (200 UT), 1 tablet, Oral, BID  cetirizine, 5 mg, Oral, Daily  cyclobenzaprine, 10 mg, Oral, TID  dexamethasone, 6 mg, Oral, Q12H  docusate sodium, 100 mg, Oral, BID  donepezil, 10 mg, Oral, Nightly  famotidine, 20 mg, Oral, BID  folic acid, 1 mg, Oral, TID  gabapentin, 400 mg, Oral, Q12H  gabapentin, 800 mg, Oral, Q PM  levothyroxine, 112 mcg, Oral, Daily  lidocaine, 2 patch, Transdermal, Q24H  memantine, 10 mg, Oral, Q12H  OXcarbazepine, 300 mg, Oral, Nightly  remdesivir, 100 mg, Intravenous, Q24H  rivaroxaban, 20 mg, Oral, Daily With Dinner  sertraline, 50 mg, Oral, Daily  tiotropium bromide monohydrate, 2 puff, Inhalation, Daily - RT       Diet Regular; Cardiac    Assessment/Plan      Active Hospital Problems    Diagnosis  POA   • **Generalized weakness [R53.1]  Yes   • Immobility syndrome [M62.3]  Yes   • Self-care deficit [Z78.9]  Yes   • Closed fracture  of multiple ribs of left side [S22.42XA]  Yes   • Paroxysmal atrial fibrillation (HCC) [I48.0]  Yes   • Drug-induced constipation [K59.03]  Yes   • Malignant neoplasm of lung (HCC) [C34.90]  Yes   • Essential hypertension [I10]  Yes      Resolved Hospital Problems   No resolved problems to display.       · COVID-19 pneumonia with acute hypoxic respiratory failure: Vaccinated, immunosuppressed. ID evaluated. Hypoxia improved, room air now, previously on 4L. Completing remdesivir course. Will wean steroid.  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  He follows with Dr. Montes De Oca at [facility].  · Nondisplaced left rib fractures: Thoracic surgery evaluated during his previous admission.  He was not surgical candidate due to nature of the rib fractures and his advanced disease.  Continue pain control.  · History of DVT/PE: Continue Xarelto.  · PAF: Rate was okay.  On Xarelto.  · Hypertension: Acceptable acutely.  Continue current regimen.  · Hypothyroidism: Synthroid  · Memory deficit: Continue home regimen  · Dysphagia: SLP consult.  · Prophylaxis: On Xarelto  · Disposition: SNF/likely tomorrow    Bigg Schneider MD  [facility]  03/19/22  13:04 EDT    Dictated portions using Dragon dictation software.    During the entire encounter, I was wearing recommended PPE including face mask and eye protection. Hand sanitization was performed prior to entering room and upon exit.

## 2022-03-19 NOTE — THERAPY TREATMENT NOTE
Patient Name: Mg Gerber Sr.  : 1940    MRN: [id number]                              Today's Date: 3/19/2022       Admit Date: 3/14/2022    Visit Dx:     ICD-10-CM ICD-9-CM   1. Generalized weakness  R53.1 780.79   2. Immobility  Z74.09 799.89   3. Closed fracture of multiple ribs of left side, initial encounter  S22.42XA 807.09   4. Leukocytosis, unspecified type  D72.829 288.60     Patient Active Problem List   Diagnosis   • Anemia, vitamin B12 deficiency   • Arthritis   • Hemochromatosis   • Herpes zoster   • Hip pain   • Essential hypertension   • Cancer (HCC)   • Pulmonary emphysema (HCC)   • Left low back pain   • Vertigo   • Headache around the eyes   • Mild cognitive impairment   • Metastatic renal cell carcinoma  on q 2 wks chemo   • Anxiety   • Malignant neoplasm of lung (HCC)   • Neuropathy   • Perennial allergic rhinitis   • Vitamin B12 deficiency   • Osteoarthritis of lumbar spine   • Pharyngitis   • Bronchitis   • Drug-induced constipation   • Hx of hiatal hernia   • History of lung cancer   • COVID-19 virus detected   • Dyspnea   • Generalized weakness   • Acute respiratory failure with hypoxia (HCC)   • Gastroesophageal reflux disease   • Hearing disorder   • Malignant neoplasm of prostate (HCC)   • Sleep apnea   • Anemia   • Malignant neoplasm metastatic to lung (HCC)   • Secondary bacterial pneumonia   • Pneumonia due to COVID-19 virus   • Hilar mass   • On antineoplastic chemotherapy q 2wks   • Paroxysmal atrial fibrillation (HCC)   • History of pulmonary embolism   • Nausea   • Gastritis   • Muscle spasm   • Fall   • Closed fracture of multiple ribs of left side   • Chest wall pain   • Fall, initial encounter   • Immobility syndrome   • Self-care deficit     Past Medical History:   Diagnosis Date   • Allergic    • Anemia, vitamin B12 deficiency 2012   • Anxiety    • Arthritis 2014   • Atrial fibrillation (HCC)    • Cancer (HCC) 2014    kidney; prostate   •  Cataract    • Chemotherapeutic agent or infusion extravasation     q 1-2 wks    • COPD (chronic obstructive pulmonary disease) (HCC) 03/24/2014   • Emphysema of lung (HCC)    • Hemochromatosis 03/24/2014   • Herpes zoster 12/06/2011    left medial thigh   • Hip pain 03/24/2014   • History of Doppler ultrasound 12/28/2011    superficial and deep venous insuffiency   • History of EKG 02/10/2012    Dr. Kathi Lloyd; unchanged from prior EKG   • Hypertension     benign essential   • Injury of back    • Pulmonary embolism (HCC) 03/24/2014   • Renal cell cancer (HCC)    • Shortness of breath    • Sleep apnea      Past Surgical History:   Procedure Laterality Date   • APPENDECTOMY     • CATARACT EXTRACTION     • CHOLECYSTECTOMY     • SPLENECTOMY     • TONSILLECTOMY     • VEIN SURGERY        General Information     Row Name 03/19/22 1513          Physical Therapy Time and Intention    Document Type therapy note (daily note)  -     Mode of Treatment physical therapy;individual therapy  -     Row Name 03/19/22 1513          General Information    Patient Profile Reviewed yes  -     Existing Precautions/Restrictions fall;oxygen therapy device and L/min  -     Row Name 03/19/22 1513          Cognition    Orientation Status (Cognition) oriented x 3  -     Row Name 03/19/22 1513          Safety Issues, Functional Mobility    Impairments Affecting Function (Mobility) balance;strength;endurance/activity tolerance  -           User Key  (r) = Recorded By, (t) = Taken By, (c) = Cosigned By    Initials Name Provider Type     Stephanie Shah, PHILIPPE Physical Therapist               Mobility     Row Name 03/19/22 1530          Bed Mobility    Bed Mobility supine-sit  -     Supine-Sit [place] (Bed Mobility) set up;verbal cues;minimum assist (75% patient effort)  -     Assistive Device (Bed Mobility) bed rails  -     Row Name 03/19/22 1530          Bed-Chair Transfer    Assistive Device (Bed-Chair Transfers) walker  front-wheeled  -CF     Row Name 03/19/22 1530          Sit-Stand Transfer    Sit-Stand [place] (Transfers) set up;contact guard  -CF     Assistive Device (Sit-Stand Transfers) walker, front-wheeled  -CF     Row Name 03/19/22 1530          Gait/Stairs (Locomotion)    [place] Level (Gait) contact guard  -CF     Assistive Device (Gait) walker, front-wheeled  -CF     Distance in Feet (Gait) 40' no breaks today  -CF     Deviations/Abnormal Patterns (Gait) samara decreased;festinating/shuffling  -CF     Bilateral Gait Deviations forward flexed posture  -CF     Comment, (Gait/Stairs) O2 WFL with activity  -CF           User Key  (r) = Recorded By, (t) = Taken By, (c) = Cosigned By    Initials Name Provider Type    CF Stephanie Shah, PT Physical Therapist               Obj/Interventions    No documentation.                Goals/Plan    No documentation.                Clinical Impression     Row Name 03/19/22 1533          Pain    Pretreatment Pain Rating 0/10 - no pain  -CF     Posttreatment Pain Rating 0/10 - no pain  -CF     Row Name 03/19/22 1533          Plan of Care Review    Plan of Care Reviewed With patient  -CF     Outcome Evaluation Pt seen for PT treatment. Continues to amb well in the room using rolling walker. O2 WFL during standing activity. PT will continue to follow and progress as able. Plan to follow up monday.  -CF     Row Name 03/19/22 1533          Vital Signs    Pre SpO2 (%) 94  -CF     O2 Delivery Pre Treatment room air  -CF     Intra SpO2 (%) 89  -CF     O2 Delivery Intra Treatment room air  -CF     Post SpO2 (%) 90  -CF     O2 Delivery Post Treatment room air  -CF     Row Name 03/19/22 1533          Positioning and Restraints    Pre-Treatment Position in bed  -CF     Post Treatment Position chair  -CF     In Chair notified nsg;reclined;call light within reach;encouraged to call for assist;exit alarm on  -CF           User Key  (r) = Recorded By, (t) = Taken By, (c) = Cosigned By     Initials Name Provider Type    CF Stpehanie Shah, PT Physical Therapist               Outcome Measures     Row Name 03/19/22 1535          How much help from another person do you currently need...    Turning from your back to your side while in flat bed without using bedrails? 4  -CF     Moving from lying on back to sitting on the side of a flat bed without bedrails? 3  -CF     Moving to and from a bed to a chair (including a wheelchair)? 3  -CF     Standing up from a chair using your arms (e.g., wheelchair, bedside chair)? 3  -CF     Climbing 3-5 steps with a railing? 2  -CF     To walk in hospital room? 3  -CF     AM-PAC 6 Clicks Score (PT) 18  -CF     Row Name 03/19/22 1535          Functional Assessment    Outcome Measure Options AM-PAC 6 Clicks Basic Mobility (PT)  -CF           User Key  (r) = Recorded By, (t) = Taken By, (c) = Cosigned By    Initials Name Provider Type    CF Stephanie Shah, PHILIPPE Physical Therapist                             Physical Therapy Education                 Title: PT OT SLP Therapies (Done)     Topic: Physical Therapy (Done)     Point: Mobility training (Done)     Learning Progress Summary           Patient Acceptance, E, VU,NR by CF at 3/19/2022 1535    Acceptance, E,TB, VU by LT at 3/19/2022 1415    Acceptance, E, VU by AG at 3/18/2022 1551    Acceptance, E, NR by AR at 3/17/2022 1318    Acceptance, E, VU by AG at 3/16/2022 1858    Acceptance, E,TB, VU,NR by MS at 3/15/2022 1211                   Point: Home exercise program (Done)     Learning Progress Summary           Patient Acceptance, E,TB, VU by LT at 3/19/2022 1415    Acceptance, E, VU by AG at 3/18/2022 1551    Acceptance, E, NR by AR at 3/17/2022 1318    Acceptance, E, VU by AG at 3/16/2022 1858    Acceptance, E,TB, VU,NR by MS at 3/15/2022 1211                   Point: Body mechanics (Done)     Learning Progress Summary           Patient Acceptance, E,TB, VU by LT at 3/19/2022 1415    Acceptance, E, VU by AG at  3/18/2022 1551    Acceptance, E, NR by AR at 3/17/2022 1318    Acceptance, E, VU by AG at 3/16/2022 1858    Acceptance, E,TB, VU,NR by MS at 3/15/2022 1211                   Point: Precautions (Done)     Learning Progress Summary           Patient Acceptance, E,TB, VU by LT at 3/19/2022 1415    Acceptance, E, VU by AG at 3/18/2022 1551    Acceptance, E, NR by AR at 3/17/2022 1318    Acceptance, E, VU by AG at 3/16/2022 1858    Acceptance, E,TB, VU,NR by MS at 3/15/2022 1211                               User Key     Initials Effective Dates Name Provider Type Discipline    LT 06/16/21 -  Padmini Giraldo, MS CCC-SLP Speech and Language Pathologist SLP    AR 06/16/21 -  Betzaida Landers PT Physical Therapist PT    MS 06/16/21 -  Floridalma Lopes PT Physical Therapist PT     06/16/21 -  Praveen Lake, RN Registered Nurse Nurse     06/16/21 -  Stephanie Shah PT Physical Therapist PT              PT Recommendation and Plan     Plan of Care Reviewed With: patient  Outcome Evaluation: Pt seen for PT treatment. Continues to amb well in the room using rolling walker. O2 WFL during standing activity. PT will continue to follow and progress as able. Plan to follow up monday.     Time Calculation:    PT Charges     Row Name 03/19/22 1526             Time Calculation    Start Time 0953  -CF      Stop Time 1009  -CF      Time Calculation (min) 16 min  -CF      PT Received On 03/19/22  -CF      PT - Next Appointment 03/21/22  -CF            User Key  (r) = Recorded By, (t) = Taken By, (c) = Cosigned By    Initials Name Provider Type    CF Stephanie Shah, PHILIPPE Physical Therapist              Therapy Charges for Today     Code Description Service Date Service Provider Modifiers Qty    52548397666 HC PT THERAPEUTIC ACT EA 15 MIN 3/19/2022 Stephanie Shah, PT GP 1          PT G-Codes  Outcome Measure Options: AM-PAC 6 Clicks Basic Mobility (PT)  AM-PAC 6 Clicks Score (PT): 18  AM-PAC 6 Clicks Score (OT):  16    Stephanie Shah, PT  3/19/2022

## 2022-03-19 NOTE — PLAN OF CARE
Goal Outcome Evaluation:  Plan of Care Reviewed With: patient           Outcome Evaluation: bedside swallow eval completed. no overt s/s of aspiration with trials of thin by spoon/cup/straw, puree, mech soft, regular solids. mastication WFL. pt reports he got choked up with breakfast this morning because he was 'laying back and eating too fast'. SLP educated pt on safe swallow precautions. REC: continue current diet (reg/thin), meds with thin or puree as tolerated, up at 90', small bites/drinks, good oral care.

## 2022-03-19 NOTE — THERAPY EVALUATION
Acute Care - Speech Language Pathology   Swallow Initial Evaluation [facility]     Patient Name: Mg Gerber Sr.  : 1940  MRN: [id number]  Today's Date: 3/19/2022               Admit Date: 3/14/2022    Visit Dx:     ICD-10-CM ICD-9-CM   1. Generalized weakness  R53.1 780.79   2. Immobility  Z74.09 799.89   3. Closed fracture of multiple ribs of left side, initial encounter  S22.42XA 807.09   4. Leukocytosis, unspecified type  D72.829 288.60     Patient Active Problem List   Diagnosis   • Anemia, vitamin B12 deficiency   • Arthritis   • Hemochromatosis   • Herpes zoster   • Hip pain   • Essential hypertension   • Cancer (HCC)   • Pulmonary emphysema (HCC)   • Left low back pain   • Vertigo   • Headache around the eyes   • Mild cognitive impairment   • Metastatic renal cell carcinoma  on q 2 wks chemo   • Anxiety   • Malignant neoplasm of lung (HCC)   • Neuropathy   • Perennial allergic rhinitis   • Vitamin B12 deficiency   • Osteoarthritis of lumbar spine   • Pharyngitis   • Bronchitis   • Drug-induced constipation   • Hx of hiatal hernia   • History of lung cancer   • COVID-19 virus detected   • Dyspnea   • Generalized weakness   • Acute respiratory failure with hypoxia (HCC)   • Gastroesophageal reflux disease   • Hearing disorder   • Malignant neoplasm of prostate (HCC)   • Sleep apnea   • Anemia   • Malignant neoplasm metastatic to lung (HCC)   • Secondary bacterial pneumonia   • Pneumonia due to COVID-19 virus   • Hilar mass   • On antineoplastic chemotherapy q 2wks   • Paroxysmal atrial fibrillation (HCC)   • History of pulmonary embolism   • Nausea   • Gastritis   • Muscle spasm   • Fall   • Closed fracture of multiple ribs of left side   • Chest wall pain   • Fall, initial encounter   • Immobility syndrome   • Self-care deficit     Past Medical History:   Diagnosis Date   • Allergic    • Anemia, vitamin B12 deficiency 2012   • Anxiety    • Arthritis 2014   • Atrial fibrillation  (HCC)    • Cancer (HCC) 04/29/2014    kidney; prostate   • Cataract    • Chemotherapeutic agent or infusion extravasation     q 1-2 wks    • COPD (chronic obstructive pulmonary disease) (HCC) 03/24/2014   • Emphysema of lung (HCC)    • Hemochromatosis 03/24/2014   • Herpes zoster 12/06/2011    left medial thigh   • Hip pain 03/24/2014   • History of Doppler ultrasound 12/28/2011    superficial and deep venous insuffiency   • History of EKG 02/10/2012    Dr. Kathi Lloyd; unchanged from prior EKG   • Hypertension     benign essential   • Injury of back    • Pulmonary embolism (HCC) 03/24/2014   • Renal cell cancer (HCC)    • Shortness of breath    • Sleep apnea      Past Surgical History:   Procedure Laterality Date   • APPENDECTOMY     • CATARACT EXTRACTION     • CHOLECYSTECTOMY     • SPLENECTOMY     • TONSILLECTOMY     • VEIN SURGERY         SLP Recommendation and Plan  SLP Swallowing Diagnosis: swallow WFL (03/19/22 1400)  SLP Diet Recommendation: regular textures, thin liquids (03/19/22 1400)  Recommended Precautions and Strategies: upright posture during/after eating, small bites of food and sips of liquid (03/19/22 1400)  SLP Rec. for Method of Medication Administration: meds whole, with thin liquids, with pudding or applesauce, as tolerated (03/19/22 1400)     Monitor for Signs of Aspiration: yes, notify SLP if any concerns (03/19/22 1400)     Swallow Criteria for Skilled Therapeutic Interventions Met: no problems identified which require skilled intervention (03/19/22 1400)  Anticipated Discharge Disposition (SLP): unknown (03/19/22 1400)     Therapy Frequency (Swallow): evaluation only (03/19/22 1400)                                     Plan of Care Reviewed With: patient  Outcome Evaluation: bedside swallow eval completed. no overt s/s of aspiration with trials of thin by spoon/cup/straw, puree, mech soft, regular solids. mastication WFL. pt reports he got choked up with breakfast this morning because he was  'laying back and eating too fast'. SLP educated pt on safe swallow precautions. REC: continue current diet (reg/thin), meds with thin or puree as tolerated, up at 90', small bites/drinks, good oral care.      SWALLOW EVALUATION (last 72 hours)     SLP Adult Swallow Evaluation     Row Name 03/19/22 1400                   Rehab Evaluation    Document Type evaluation  -LT        Subjective Information no complaints  -LT        Patient Observations alert;cooperative;agree to therapy  -LT        Patient Effort good  -LT        Symptoms Noted During/After Treatment none  -LT                  General Information    Patient Profile Reviewed yes  -LT        Pertinent History Of Current Problem 82 yo M w/ r/o COVID, had choking ep w/breakfast this morning  -LT        Current Method of Nutrition regular textures;thin liquids  -LT        Prior Level of Function-Swallowing no diet consistency restrictions  -LT        Plans/Goals Discussed with patient;agreed upon  -LT        Barriers to Rehab none identified  -LT        Patient's Goals for Discharge patient did not state  -LT                  Oral Motor Structure and Function    Secretion Management WNL/WFL  -LT        Volitional Swallow WFL  -LT        Volitional Cough WFL  -LT                  Oral Musculature and Cranial Nerve Assessment    Oral Motor General Assessment WFL  -LT                  Clinical Swallow Eval    Clinical Swallow Evaluation Summary bedside swallow eval completed. no overt s/s of aspiration with trials of thin by spoon/cup/straw, puree, mech soft, regular solids. mastication WFL. pt reports he got choked up with breakfast this morning because he was 'laying back and eating too fast'. SLP educated pt on safe swallow precautions. REC: continue current diet (reg/thin), meds with thin or puree as tolerated, up at 90', small bites/drinks, good oral care.  -LT                  SLP Evaluation Clinical Impression    SLP Swallowing Diagnosis swallow WFL  -LT         Functional Impact no impact on function  -LT        Swallow Criteria for Skilled Therapeutic Interventions Met no problems identified which require skilled intervention  -LT                  Recommendations    Therapy Frequency (Swallow) evaluation only  -LT        SLP Diet Recommendation regular textures;thin liquids  -LT        Recommended Precautions and Strategies upright posture during/after eating;small bites of food and sips of liquid  -LT        Oral Care Recommendations Oral Care BID/PRN  -LT        SLP Rec. for Method of Medication Administration meds whole;with thin liquids;with pudding or applesauce;as tolerated  -LT        Monitor for Signs of Aspiration yes;notify SLP if any concerns  -LT        Anticipated Discharge Disposition (SLP) unknown  -LT              User Key  (r) = Recorded By, (t) = Taken By, (c) = Cosigned By    Initials Name Effective Dates    LT Padmini Giraldo MS CCC-SLP 06/16/21 -                 EDUCATION  The patient has been educated in the following areas:   Dysphagia (Swallowing Impairment).         SLP Outcome Measures (last 72 hours)     SLP Outcome Measures     Row Name 03/19/22 1400             SLP Outcome Measures    Outcome Measure Used? Adult NOMS  -LT              Adult FCM Scores    FCM Chosen Swallowing  -LT      Swallowing FCM Score 7  -LT            User Key  (r) = Recorded By, (t) = Taken By, (c) = Cosigned By    Initials Name Effective Dates    Padmini Steele MS CCC-SLP 06/16/21 -                  Time Calculation:    Time Calculation- SLP     Row Name 03/19/22 1416             Time Calculation- SLP    SLP Received On 03/19/22  -LT            User Key  (r) = Recorded By, (t) = Taken By, (c) = Cosigned By    Initials Name Provider Type    LT Padmini Giraldo MS CCC-SLP Speech and Language Pathologist                Therapy Charges for Today     Code Description Service Date Service Provider Modifiers Qty    [id number]  ST EVAL ORAL PHARYNG SWALLOW 3 3/19/2022  Padmini Giraldo, MS CCC-SLP GN 1               Padmini Giradlo, MS CCC-SLP  3/19/2022

## 2022-03-19 NOTE — PLAN OF CARE
Goal Outcome Evaluation:  Plan of Care Reviewed With: patient        Progress: no change  Outcome Evaluation: Patient is on room air. Makes needs known. PRN pain medication given earlier and was somewhat effective. Patient is up with assist x 1 and a walker. Sat up in chair earlier. Patient is possible discharge to [facility] tomorrrow. Lungs are diminished. No s/s of distress noted.

## 2022-03-20 VITALS
SYSTOLIC BLOOD PRESSURE: 147 MMHG | WEIGHT: 225.75 LBS | DIASTOLIC BLOOD PRESSURE: 71 MMHG | RESPIRATION RATE: 18 BRPM | HEART RATE: 70 BPM | OXYGEN SATURATION: 93 % | HEIGHT: 72 IN | BODY MASS INDEX: 30.58 KG/M2 | TEMPERATURE: 97.3 F

## 2022-03-20 PROBLEM — D89.832 CYTOKINE RELEASE SYNDROME, GRADE 2: Status: ACTIVE | Noted: 2022-03-20

## 2022-03-20 LAB
ALBUMIN SERPL-MCNC: 3.8 G/DL (ref 3.5–5.2)
ALBUMIN/GLOB SERPL: 1.6 G/DL
ALP SERPL-CCNC: 58 U/L (ref 39–117)
ALT SERPL W P-5'-P-CCNC: 19 U/L (ref 1–41)
ANION GAP SERPL CALCULATED.3IONS-SCNC: 8 MMOL/L (ref 5–15)
AST SERPL-CCNC: 16 U/L (ref 1–40)
BILIRUB SERPL-MCNC: 2 MG/DL (ref 0–1.2)
BUN SERPL-MCNC: 34 MG/DL (ref 8–23)
BUN/CREAT SERPL: 31.8 (ref 7–25)
CALCIUM SPEC-SCNC: 9.3 MG/DL (ref 8.6–10.5)
CHLORIDE SERPL-SCNC: 100 MMOL/L (ref 98–107)
CO2 SERPL-SCNC: 28 MMOL/L (ref 22–29)
CREAT SERPL-MCNC: 1.07 MG/DL (ref 0.76–1.27)
CRP SERPL-MCNC: 0.85 MG/DL (ref 0–0.5)
DEPRECATED RDW RBC AUTO: 41 FL (ref 37–54)
EGFRCR SERPLBLD CKD-EPI 2021: 69.3 ML/MIN/1.73
ERYTHROCYTE [DISTWIDTH] IN BLOOD BY AUTOMATED COUNT: 10.4 % (ref 12.3–15.4)
FERRITIN SERPL-MCNC: 799 NG/ML (ref 30–400)
GLOBULIN UR ELPH-MCNC: 2.4 GM/DL
GLUCOSE SERPL-MCNC: 86 MG/DL (ref 65–99)
HCT VFR BLD AUTO: 31.9 % (ref 37.5–51)
HGB BLD-MCNC: 10.4 G/DL (ref 13–17.7)
LYMPHOCYTES # BLD MANUAL: 4.1 10*3/MM3 (ref 0.7–3.1)
LYMPHOCYTES NFR BLD MANUAL: 21.4 % (ref 5–12)
MAGNESIUM SERPL-MCNC: 2.4 MG/DL (ref 1.6–2.4)
MCH RBC QN AUTO: 35.6 PG (ref 26.6–33)
MCHC RBC AUTO-ENTMCNC: 32.6 G/DL (ref 31.5–35.7)
MCV RBC AUTO: 109.2 FL (ref 79–97)
MONOCYTES # BLD: 3.73 10*3/MM3 (ref 0.1–0.9)
NEUTROPHILS # BLD AUTO: 9.6 10*3/MM3 (ref 1.7–7)
NEUTROPHILS NFR BLD MANUAL: 55.1 % (ref 42.7–76)
PHOSPHATE SERPL-MCNC: 2.9 MG/DL (ref 2.5–4.5)
PLAT MORPH BLD: NORMAL
PLATELET # BLD AUTO: 376 10*3/MM3 (ref 140–450)
PMV BLD AUTO: 10 FL (ref 6–12)
POTASSIUM SERPL-SCNC: 4.3 MMOL/L (ref 3.5–5.2)
PROT SERPL-MCNC: 6.2 G/DL (ref 6–8.5)
RBC # BLD AUTO: 2.92 10*6/MM3 (ref 4.14–5.8)
RBC MORPH BLD: NORMAL
SODIUM SERPL-SCNC: 136 MMOL/L (ref 136–145)
VARIANT LYMPHS NFR BLD MANUAL: 23.5 % (ref 19.6–45.3)
WBC MORPH BLD: NORMAL
WBC NRBC COR # BLD: 17.43 10*3/MM3 (ref 3.4–10.8)

## 2022-03-20 PROCEDURE — 86140 C-REACTIVE PROTEIN: CPT | Performed by: INTERNAL MEDICINE

## 2022-03-20 PROCEDURE — 80053 COMPREHEN METABOLIC PANEL: CPT | Performed by: INTERNAL MEDICINE

## 2022-03-20 PROCEDURE — 94664 DEMO&/EVAL PT USE INHALER: CPT

## 2022-03-20 PROCEDURE — 85025 COMPLETE CBC W/AUTO DIFF WBC: CPT | Performed by: INTERNAL MEDICINE

## 2022-03-20 PROCEDURE — 94761 N-INVAS EAR/PLS OXIMETRY MLT: CPT

## 2022-03-20 PROCEDURE — 63710000001 DEXAMETHASONE PER 0.25 MG: Performed by: INTERNAL MEDICINE

## 2022-03-20 PROCEDURE — 84100 ASSAY OF PHOSPHORUS: CPT | Performed by: INTERNAL MEDICINE

## 2022-03-20 PROCEDURE — 94799 UNLISTED PULMONARY SVC/PX: CPT

## 2022-03-20 PROCEDURE — 85007 BL SMEAR W/DIFF WBC COUNT: CPT | Performed by: INTERNAL MEDICINE

## 2022-03-20 PROCEDURE — 82728 ASSAY OF FERRITIN: CPT | Performed by: INTERNAL MEDICINE

## 2022-03-20 PROCEDURE — 83735 ASSAY OF MAGNESIUM: CPT | Performed by: INTERNAL MEDICINE

## 2022-03-20 RX ORDER — AMOXICILLIN 250 MG
2 CAPSULE ORAL 2 TIMES DAILY PRN
Start: 2022-03-20 | End: 2022-10-09 | Stop reason: HOSPADM

## 2022-03-20 RX ORDER — GABAPENTIN 400 MG/1
CAPSULE ORAL
Qty: 12 CAPSULE | Refills: 0 | Status: ON HOLD | OUTPATIENT
Start: 2022-03-20 | End: 2023-02-17 | Stop reason: SDUPTHER

## 2022-03-20 RX ORDER — OXYCODONE HYDROCHLORIDE 5 MG/1
5 TABLET ORAL EVERY 6 HOURS PRN
Qty: 12 TABLET | Refills: 0 | Status: SHIPPED | OUTPATIENT
Start: 2022-03-20 | End: 2022-10-09 | Stop reason: HOSPADM

## 2022-03-20 RX ORDER — POLYETHYLENE GLYCOL 3350 17 G/17G
17 POWDER, FOR SOLUTION ORAL DAILY
Start: 2022-03-20

## 2022-03-20 RX ADMIN — POLYETHYLENE GLYCOL 3350 17 G: 17 POWDER, FOR SOLUTION ORAL at 00:30

## 2022-03-20 RX ADMIN — GABAPENTIN 400 MG: 400 CAPSULE ORAL at 09:28

## 2022-03-20 RX ADMIN — DOCUSATE SODIUM 100 MG: 100 CAPSULE, LIQUID FILLED ORAL at 09:27

## 2022-03-20 RX ADMIN — CETIRIZINE HYDROCHLORIDE 5 MG: 10 TABLET ORAL at 09:28

## 2022-03-20 RX ADMIN — OXYCODONE HYDROCHLORIDE 5 MG: 5 TABLET ORAL at 00:30

## 2022-03-20 RX ADMIN — AMLODIPINE BESYLATE 5 MG: 5 TABLET ORAL at 09:28

## 2022-03-20 RX ADMIN — DEXAMETHASONE 6 MG: 4 TABLET ORAL at 09:28

## 2022-03-20 RX ADMIN — MEMANTINE HYDROCHLORIDE 10 MG: 10 TABLET, FILM COATED ORAL at 09:28

## 2022-03-20 RX ADMIN — LEVOTHYROXINE SODIUM 112 MCG: 0.11 TABLET ORAL at 05:03

## 2022-03-20 RX ADMIN — TIOTROPIUM BROMIDE INHALATION SPRAY 2 PUFF: 3.12 SPRAY, METERED RESPIRATORY (INHALATION) at 06:58

## 2022-03-20 RX ADMIN — ALBUTEROL SULFATE 2 PUFF: 90 AEROSOL, METERED RESPIRATORY (INHALATION) at 11:24

## 2022-03-20 RX ADMIN — CALCIUM CARBONATE-VITAMIN D TAB 500 MG-200 UNIT 1 TABLET: 500-200 TAB at 09:27

## 2022-03-20 RX ADMIN — SERTRALINE HYDROCHLORIDE 50 MG: 50 TABLET, FILM COATED ORAL at 09:28

## 2022-03-20 RX ADMIN — FOLIC ACID 1 MG: 1 TABLET ORAL at 09:28

## 2022-03-20 RX ADMIN — ALBUTEROL SULFATE 2 PUFF: 90 AEROSOL, METERED RESPIRATORY (INHALATION) at 06:58

## 2022-03-20 RX ADMIN — CYCLOBENZAPRINE 10 MG: 10 TABLET, FILM COATED ORAL at 09:27

## 2022-03-20 RX ADMIN — FAMOTIDINE 20 MG: 20 TABLET ORAL at 09:27

## 2022-03-20 RX ADMIN — LIDOCAINE 2 PATCH: 50 PATCH CUTANEOUS at 09:31

## 2022-03-20 RX ADMIN — OXYCODONE HYDROCHLORIDE 5 MG: 5 TABLET ORAL at 05:03

## 2022-03-20 RX ADMIN — OXYCODONE HYDROCHLORIDE 5 MG: 5 TABLET ORAL at 09:27

## 2022-03-20 RX ADMIN — OXYCODONE HYDROCHLORIDE 5 MG: 5 TABLET ORAL at 13:42

## 2022-03-20 NOTE — DISCHARGE SUMMARY
Date of Admission: 3/14/2022  Date of Discharge:  3/20/2022  Primary Care Physician: Dillon Walton MD     Discharge Diagnosis:  Active Hospital Problems    Diagnosis  POA   • **Pneumonia due to COVID-19 virus [U07.1, J12.82]  Yes   • Cytokine release syndrome, grade 2 [D89.832]  No   • Immobility syndrome [M62.3]  Yes   • Self-care deficit [Z78.9]  Yes   • Closed fracture of multiple ribs of left side [S22.42XA]  Yes   • Paroxysmal atrial fibrillation (HCC) [I48.0]  Yes   • Generalized weakness [R53.1]  Yes   • Drug-induced constipation [K59.03]  Yes   • Malignant neoplasm of lung (HCC) [C34.90]  Yes   • Essential hypertension [I10]  Yes      Resolved Hospital Problems   No resolved problems to display.       Presenting Problem/History of Present Illness:  Generalized weakness [R53.1]  Immobility [Z74.09]  Closed fracture of multiple ribs of left side, initial encounter [S22.42XA]  Leukocytosis, unspecified type [D72.829]       Mr. Gerber is a 81 y.o. male with a history of atrial fibrillation, COPD, emphysema, hypertension, renal cell cancer, lung cancer, sleep apnea and falls that presents to [facility] complaining of weakness.  Patient was recently admitted to [facility] following a fall that resulted in multiple rib fractures.  Was recommended that patient be discharged to a skilled rehabilitation facility but patient declined and decided to resume physical therapy at home.  However, once home patient was unable to care for himself and patient's wife is not strong enough to provide adequate care for patient so he was brought back to [facility] for further evaluation as he seems to have progressively getting weaker.  Patient is alert and oriented but a very poor historian.  During examination he can tell me that his wife is unable to help him, he is unable to walk and he is constipated.  He is unsure of answers to most questions during examination.  Patient seems  to be in no acute distress but is ill-appearing.  He is on supplemental oxygen.  He states that he was supposed to receive supplemental oxygen at home but had not been receiving it.  Patient is able to follow commands and has equal strength in all extremities.  Patient does have scattered bruising throughout his body.  Ribs on the left side remain tender as they are broken from previous fall.  Plan of care reviewed with patient he is agreeable.  Review of systems, physical exam, diagnostic data and plan as outlined below.       Hospital Course:  The patient is a 81 y.o. male who presented with COVID-19 pneumonia and resulting acute hypoxic respiratory failure.  He is fully vaccinated but immunosuppressed.  He was admitted and had elevating CRP and required up to 4 L of oxygen.  Infectious disease was consulted.  He has completed remdesivir course and has been on dexamethasone here.  He has responded well and is back to room air.    He has known renal cell carcinoma with metastatic disease to the lung.  Paraspinous masses and adrenal were noted on CT from last admission.  He needs to follow-up with his oncologist.    Patient has nondisplaced left rib fractures and thoracic surgery evaluated during his previous admission.  He was not a surgical candidate due to the nature of the fractures and his advanced disease.  He is continuing pain control and doing well with that.    He has history of DVT and PE.  He also has paroxysmal atrial fibrillation.  He is on Xarelto for those.    He has hypertension hypothyroidism memory deficit.  He did have a choking episode here.  Speech therapy evaluated and he did well with them.    Exam Today:  Constitutional:       Appearance: He is ill-appearing. He is not diaphoretic.   HENT:      Head: Normocephalic and atraumatic.   Eyes:      General:         Right eye: No discharge.         Left eye: No discharge.      Conjunctiva/sclera: Conjunctivae normal.   Cardiovascular:      Rate and  Rhythm: Normal rate and regular rhythm.      Pulses: Normal pulses.   Pulmonary:      Breath sounds: No wheezing or rhonchi.   Chest:      Chest wall: Tenderness present.   Abdominal:      General: There is no distension.      Palpations: Abdomen is soft.      Tenderness: There is no abdominal tenderness. There is no guarding or rebound.   Musculoskeletal:         General: No swelling or tenderness.      Cervical back: Neck supple. No tenderness.   Skin:     General: Skin is warm and dry.   Neurological:      Mental Status: He is alert. Mental status is at baseline.      Cranial Nerves: No cranial nerve deficit (except [place]).   Psychiatric:         Mood and Affect: Mood normal.         Behavior: Behavior normal.     Results:  CXR  A frontal projection of the chest demonstrates a right IJ  approach Mediport catheter terminating at the level of the venoatrial  junction. Otherwise, the lungs are clear of acute infiltrates. The  cardiomediastinal silhouette is unremarkable. The osseous structures are  incidentally notable for moderate dextroconvex scoliotic curvature of  the thoracic spine. The previously identified mild deformities of the  left 5th through 7th ribs as seen on the prior chest CT are not well  delineated on this exam.    CT Head  No CT evidence for acute intracranial pathology. The etiology of the  patient's weakness is not further elucidated on this examination, and if  further assessment is required, one could obtain an MRI of the brain.     Note is made of a 4 mm calcific density along the left aspect of the  falx cerebri medial to left frontal lobe which could simply represent an  area of dural calcification although a small partially calcified  meningioma at this site could not be excluded. This could also be  further evaluated with MR imaging.    CXR  The lungs are hyperinflated and there is a large left hilar  mass as also noted on recent chest CT scan as well as an earlier CT scan  dating back to  11/10/2020. The lungs are clear. The heart is top normal  in size. A right-sided MediPort catheter ends in the SVC.    Procedures Performed:         Consults:   Consults     Date and Time Order Name Status Description    3/16/2022  1:24 PM Inpatient Infectious Diseases Consult Completed     3/14/2022  8:53 PM [facility] (on-call MD unless specified) Details      3/11/2022  9:45 AM Inpatient Thoracic Surgery Consult Completed            Discharge Disposition:  Skilled Nursing Facility (DC - External)    Discharge Medications:     Discharge Medications      New Medications      Instructions Start Date   O2  Commonly known as: OXYGEN   3 L/min, Inhalation, Continuous      polyethylene glycol 17 g packet  Commonly known as: MIRALAX   17 g, Oral, Daily      sennosides-docusate 8.6-50 MG per tablet  Commonly known as: PERICOLACE   2 tablets, Oral, 2 Times Daily PRN         Changes to Medications      Instructions Start Date   gabapentin 400 MG capsule  Commonly known as: NEURONTIN  What changed:   · how much to take  · how to take this  · when to take this  · additional instructions  · Another medication with the same name was removed. Continue taking this medication, and follow the directions you see here.   Take PO: 400mg QAM and with lunch, 800mg QHS      oxyCODONE 5 MG immediate release tablet  Commonly known as: ROXICODONE  What changed: reasons to take this   5 mg, Oral, Every 6 Hours PRN         Continue These Medications      Instructions Start Date   ALPRAZolam 0.25 MG tablet  Commonly known as: Xanax   0.25 mg, Oral, 2 Times Daily PRN      amLODIPine 5 MG tablet  Commonly known as: NORVASC   5 mg, Oral, Daily      CALTRATE 600+D3 PO   1 tablet, Oral, 2 times daily      cyclobenzaprine 5 MG tablet  Commonly known as: FLEXERIL   5 mg, Oral, 3 Times Daily PRN      famotidine 20 MG tablet  Commonly known as: Pepcid   20 mg, Oral, 2 Times Daily      folic acid 1 MG tablet  Commonly known as: FOLVITE   1 mg, Oral, 3 Times  Daily      levocetirizine 5 MG tablet  Commonly known as: XYZAL   5 mg, Oral, Every Evening      levothyroxine 112 MCG tablet  Commonly known as: SYNTHROID, LEVOTHROID   112 mcg, Oral, Daily      lidocaine 5 %  Commonly known as: LIDODERM   2 patches, Transdermal, Every 24 Hours, Remove & Discard patch within 12 hours or as directed by MD      meclizine 12.5 MG tablet  Commonly known as: ANTIVERT   12.5 mg, Oral, 3 Times Daily PRN      Namzaric 28-10 MG capsule sustained-release 24 hr  Generic drug: Memantine HCl-Donepezil HCl   1 capsule, Oral, Every Evening      NIVOLUMAB IV   Intravenous, Every 30 Days, Due: 3/15/2022      ondansetron 4 MG tablet  Commonly known as: ZOFRAN   4 mg, Oral, Every 8 Hours PRN      OXcarbazepine 300 MG tablet  Commonly known as: TRILEPTAL   300 mg, Oral, Every Night at Bedtime      sertraline 25 MG tablet  Commonly known as: ZOLOFT   25 mg, Oral, Daily      Spiriva HandiHaler 18 MCG per inhalation capsule  Generic drug: tiotropium   1 capsule, Inhalation, Daily - RT      tiotropium bromide-olodaterol 2.5-2.5 MCG/ACT aerosol solution inhaler  Commonly known as: STIOLTO RESPIMAT   1 puff, Inhalation, 2 Times Daily      Ventolin  (90 Base) MCG/ACT inhaler  Generic drug: albuterol sulfate HFA   2 puffs, Inhalation, Every 4 Hours PRN      Xarelto 20 MG tablet  Generic drug: rivaroxaban   20 mg, Oral, Daily With Dinner             Discharge Diet:   Diet Instructions     Diet: Cardiac      Discharge Diet: Cardiac          Activity at Discharge:   Activity Instructions     Activity as Tolerated            Follow-up Appointments:   Follow-up Information     Dillon Walton MD Follow up.    Specialty: Family Medicine  Contact information:  [address] [address]  [phone]                         Test Results Pending at Discharge:       Bigg Schneider MD  03/20/22  11:04 EDT    Time Spent on Discharge Activities: >30 minutes    Dictated portions using  Dragon dictation software.  During the entire encounter, I was wearing recommended PPE including face mask and eye protection. Hand sanitization was performed prior to entering room and upon exit.

## 2022-03-20 NOTE — PLAN OF CARE
Goal Outcome Evaluation:  Plan of Care Reviewed With: patient        Progress: improving  Outcome Evaluation: Patient continues to work with PT. Patient is on RA with SATs in the mid 90s. SR on monitor. Pain treated successfully with PRN meds. Possible [facility].

## 2022-03-20 NOTE — CASE MANAGEMENT/SOCIAL WORK
Continued Stay Note  [facility]     Patient Name: Mg Gerber .  MRN: [id number]  Today's Date: 3/20/2022    Admit Date: 3/14/2022     Discharge Plan     Row Name 03/20/22 1147       Plan    Plan DC to [facility] today at 2pm via [facility] chair van.    Plan Comments CCp rec'd call from pts nurseChristy stating pt has been discharged and family cannot transport pt to [facility].   CCP called wife- Lucina and she said she was unable to pick pt up,  Wife said son is out of town.  CCP called daughter-Romina and she was not at home and did not take her cell phone with her.  CCP called [facility] [phone] and spoke to Mg.  He scheduled w/c  at 2pm today.  Informed him pt was Covid + when he came in to the hospital.  Reservation # [id number].  CCP called nurse Christy with the above information.  CCP called [facility] and spoke to Nai, pt has been accepted today.  Nurse will need to call report to [phone]. CCP will continue to follow.  Thanks, Kathy CABALLERO               Discharge Codes    No documentation.               Expected Discharge Date and Time     Expected Discharge Date Expected Discharge Time    Mar 20, 2022             Kathy Giraldo

## 2022-03-21 NOTE — CASE MANAGEMENT/SOCIAL WORK
Case Management Discharge Note      Final Note: Patient DC'd to [facility]         Selected Continued Care - Discharged on 3/20/2022 Admission date: 3/14/2022 - Discharge disposition: Skilled Nursing Facility (DC - External)    Destination Coordination complete.    Service Provider Selected Services Address Phone Fax Patient Preferred    [facility] [place] [facility]  Skilled Nursing [address] [address] [phone] --          Durable Medical Equipment    No services have been selected for the patient.              Dialysis/Infusion    No services have been selected for the patient.              Home Medical Care    No services have been selected for the patient.              Therapy    No services have been selected for the patient.              Community Resources    No services have been selected for the patient.              Community & DME    No services have been selected for the patient.                Selected Continued Care - Prior Encounters Includes selections from prior encounters from 12/14/2021 to 3/20/2022    Discharged on 3/13/2022 Admission date: 3/10/2022 - Discharge disposition: Home or Self Care    Home Medical Care     Service Provider Selected Services Address Phone Fax Patient Preferred    [facility]  Home Health Services [address] [address] [phone] --                    Transportation Services  [facility]C [facility]    Final Discharge Disposition Code: 03 - skilled nursing facility (SNF)

## 2022-03-25 PROCEDURE — G0180 MD CERTIFICATION HHA PATIENT: HCPCS | Performed by: FAMILY MEDICINE

## 2022-03-28 ENCOUNTER — READMISSION MANAGEMENT (OUTPATIENT)
Dept: CALL CENTER | Facility: HOSPITAL | Age: 82
End: 2022-03-28

## 2022-03-28 NOTE — OUTREACH NOTE
Prep Survey    Flowsheet Row Responses   [facility] facility patient discharged from? Non-BH   Is LACE score < 7 ? Non-BH Discharge   Emergency Room discharge w/ pulse ox? No   Eligibility [facility]   Date of Discharge 03/28/22   Discharge diagnosis Unavailable    Does the patient have one of the following disease processes/diagnoses(primary or secondary)? Other   Prep survey completed? Yes          CARLOS SCHMITT - Registered Nurse

## 2022-03-29 ENCOUNTER — TRANSITIONAL CARE MANAGEMENT TELEPHONE ENCOUNTER (OUTPATIENT)
Dept: CALL CENTER | Facility: HOSPITAL | Age: 82
End: 2022-03-29

## 2022-03-29 NOTE — OUTREACH NOTE
Call Center TCM Note    Flowsheet Row Responses   [facility] patient discharged from? Non-   Does the patient have one of the following disease processes/diagnoses(primary or secondary)? Other   TCM attempt successful? Yes   Call start time 1203   Call end time 1210   Discharge diagnosis Unavailable    Person spoke with today (if not patient) and relationship Patient   Meds reviewed with patient/caregiver? Yes   Is the patient having any side effects they believe may be caused by any medication additions or changes? No   Does the patient have all medications ordered at discharge? Yes   Is the patient taking all medications as directed (includes completed medication regime)? Yes   Does the patient have a primary care provider?  Yes   Does the patient have an appointment with their PCP within 7 days of discharge? Greater than 7 days   Comments regarding PCP 4/14/22 - will message office as this appt does not satisfy TCM    Nursing Interventions Educated patient on importance of making appointment, Advised patient to make appointment   Has the patient kept scheduled appointments due by today? N/A   What is the Home health agency?  [facility]   Home health comments Pt is home from Rehab, and does not want additional HH/PT   What DME was ordered? O2 & rolling walker from [facility]   Psychosocial issues? No   Did the patient receive a copy of their discharge instructions? Yes   Nursing interventions Reviewed instructions with patient   What is the patient's perception of their health status since discharge? Improving   Is the patient/caregiver able to teach back signs and symptoms related to disease process for when to call PCP? Yes   Is the patient/caregiver able to teach back signs and symptoms related to disease process for when to call 911? Yes   Is the patient/caregiver able to teach back the hierarchy of who to call/visit for symptoms/problems? PCP, Specialist, Home health nurse, Urgent Care, ED, 911 Yes   If the  patient is a current smoker, are they able to teach back resources for cessation? Not a smoker   TCM call completed? Yes   Wrap up additional comments Pt dc'd from [facility] on 3/28/22. Pt states he is doing better. Pt made a PCP fu appt for 4/14/22. No questions/concerns.          Shirley Selby RN    3/29/2022, 12:12 EDT

## 2022-03-30 ENCOUNTER — TELEPHONE (OUTPATIENT)
Dept: FAMILY MEDICINE CLINIC | Facility: CLINIC | Age: 82
End: 2022-03-30

## 2022-04-13 NOTE — PROGRESS NOTES
Subjective   Mg Gerber Sr. is a 81 y.o. male.     CC: Hospital F/U for COVID-Pneumonia/Rib Fractures/Weakness    History of Present Illness     Pt returns today after recent hospitalization. That visit was as follows:    Date of Admission: 3/14/2022  Date of Discharge:  3/20/2022  Primary Care Physician: Dillon Walton MD      Discharge Diagnosis:        Active Hospital Problems     Diagnosis   POA   • **Pneumonia due to COVID-19 virus [U07.1, J12.82]   Yes   • Cytokine release syndrome, grade 2 [D89.832]   No   • Immobility syndrome [M62.3]   Yes   • Self-care deficit [Z78.9]   Yes   • Closed fracture of multiple ribs of left side [S22.42XA]   Yes   • Paroxysmal atrial fibrillation (HCC) [I48.0]   Yes   • Generalized weakness [R53.1]   Yes   • Drug-induced constipation [K59.03]   Yes   • Malignant neoplasm of lung (HCC) [C34.90]   Yes   • Essential hypertension [I10]   Yes       Resolved Hospital Problems   No resolved problems to display.         Presenting Problem/History of Present Illness:  Generalized weakness [R53.1]  Immobility [Z74.09]  Closed fracture of multiple ribs of left side, initial encounter [S22.42XA]  Leukocytosis, unspecified type [D72.829]              Mr. Gerber is a 81 y.o. male with a history of atrial fibrillation, COPD, emphysema, hypertension, renal cell cancer, lung cancer, sleep apnea and falls that presents to [facility] complaining of weakness.  Patient was recently admitted to [facility] following a fall that resulted in multiple rib fractures.  Was recommended that patient be discharged to a skilled rehabilitation facility but patient declined and decided to resume physical therapy at home.  However, once home patient was unable to care for himself and patient's wife is not strong enough to provide adequate care for patient so he was brought back to [facility] for further evaluation as he seems to have progressively getting  weaker.  Patient is alert and oriented but a very poor historian.  During examination he can tell me that his wife is unable to help him, he is unable to walk and he is constipated.  He is unsure of answers to most questions during examination.  Patient seems to be in no acute distress but is ill-appearing.  He is on supplemental oxygen.  He states that he was supposed to receive supplemental oxygen at home but had not been receiving it.  Patient is able to follow commands and has equal strength in all extremities.  Patient does have scattered bruising throughout his body.  Ribs on the left side remain tender as they are broken from previous fall.  Plan of care reviewed with patient he is agreeable.  Review of systems, physical exam, diagnostic data and plan as outlined below.        Hospital Course:  The patient is a 81 y.o. male who presented with COVID-19 pneumonia and resulting acute hypoxic respiratory failure.  He is fully vaccinated but immunosuppressed.  He was admitted and had elevating CRP and required up to 4 L of oxygen.  Infectious disease was consulted.  He has completed remdesivir course and has been on dexamethasone here.  He has responded well and is back to room air.     He has known renal cell carcinoma with metastatic disease to the lung.  Paraspinous masses and adrenal were noted on CT from last admission.  He needs to follow-up with his oncologist.     Patient has nondisplaced left rib fractures and thoracic surgery evaluated during his previous admission.  He was not a surgical candidate due to the nature of the fractures and his advanced disease.  He is continuing pain control and doing well with that.     He has history of DVT and PE.  He also has paroxysmal atrial fibrillation.  He is on Xarelto for those.     He has hypertension hypothyroidism memory deficit.  He did have a choking episode here.  Speech therapy evaluated and he did well with them.    Current outpatient and discharge  "medications have been reconciled for the patient.  Reviewed by: Dillon Walton MD    Pt is home and ambulating better than prior and is using a rollator walker well.  Pt's only c/o today is a consistent RN/PNd not fully responding to Xyzal and Flonase.    The following portions of the patient's history were reviewed and updated as appropriate: allergies, current medications, past family history, past medical history, past social history, past surgical history and problem list.    Review of Systems   Constitutional: Negative for activity change, chills and fever.   Respiratory: Negative for cough.    Cardiovascular: Negative for chest pain.   Psychiatric/Behavioral: Negative for dysphoric mood.       /84   Pulse 86   Temp 97.7 °F (36.5 °C) (Oral)   Resp 18   Ht 182.9 cm (72\")   Wt 102 kg (225 lb)   SpO2 94%   BMI 30.52 kg/m²     Objective   Physical Exam  Constitutional:       General: He is not in acute distress.     Appearance: He is well-developed.   Pulmonary:      Effort: Pulmonary effort is normal.      Breath sounds: Normal breath sounds.   Neurological:      Mental Status: He is alert and oriented to person, place, and time.   Psychiatric:         Behavior: Behavior normal.         Thought Content: Thought content normal.     Hospital records reviewed with pt confirming HPI.      Assessment/Plan   Diagnoses and all orders for this visit:    1. Pneumonia due to COVID-19 virus (Primary)    2. Self-care deficit    3. Generalized weakness    4. Hospital discharge follow-up    5. Runny nose  -     ipratropium (ATROVENT) 0.06 % nasal spray; 2 sprays into the nostril(s) as directed by provider 4 (Four) Times a Day.  Dispense: 15 mL; Refill: 11    Pt is ambulatory today yet weak. Offered outpt PT, but pt wishes to work on this himself and is motivated.         "

## 2022-04-14 ENCOUNTER — OFFICE VISIT (OUTPATIENT)
Dept: FAMILY MEDICINE CLINIC | Facility: CLINIC | Age: 82
End: 2022-04-14

## 2022-04-14 VITALS
SYSTOLIC BLOOD PRESSURE: 156 MMHG | WEIGHT: 225 LBS | HEART RATE: 86 BPM | DIASTOLIC BLOOD PRESSURE: 84 MMHG | RESPIRATION RATE: 18 BRPM | TEMPERATURE: 97.7 F | BODY MASS INDEX: 30.48 KG/M2 | HEIGHT: 72 IN | OXYGEN SATURATION: 94 %

## 2022-04-14 DIAGNOSIS — U07.1 PNEUMONIA DUE TO COVID-19 VIRUS: Primary | ICD-10-CM

## 2022-04-14 DIAGNOSIS — J12.82 PNEUMONIA DUE TO COVID-19 VIRUS: Primary | ICD-10-CM

## 2022-04-14 DIAGNOSIS — R53.1 GENERALIZED WEAKNESS: ICD-10-CM

## 2022-04-14 DIAGNOSIS — Z78.9 SELF-CARE DEFICIT: ICD-10-CM

## 2022-04-14 DIAGNOSIS — Z09 HOSPITAL DISCHARGE FOLLOW-UP: ICD-10-CM

## 2022-04-14 DIAGNOSIS — R09.89 RUNNY NOSE: ICD-10-CM

## 2022-04-14 PROCEDURE — 99214 OFFICE O/P EST MOD 30 MIN: CPT | Performed by: FAMILY MEDICINE

## 2022-04-14 RX ORDER — OXYCODONE HYDROCHLORIDE 5 MG/1
TABLET ORAL
COMMUNITY
Start: 2022-03-13 | End: 2022-08-03

## 2022-04-14 RX ORDER — IPRATROPIUM BROMIDE 42 UG/1
2 SPRAY, METERED NASAL 4 TIMES DAILY
Qty: 15 ML | Refills: 11 | Status: SHIPPED | OUTPATIENT
Start: 2022-04-14

## 2022-04-14 RX ORDER — LIDOCAINE 50 MG/G
PATCH TOPICAL
COMMUNITY
Start: 2022-03-13 | End: 2022-08-03

## 2022-05-18 ENCOUNTER — DOCUMENTATION (OUTPATIENT)
Dept: SOCIAL WORK | Facility: HOSPITAL | Age: 82
End: 2022-05-18

## 2022-08-03 ENCOUNTER — OFFICE VISIT (OUTPATIENT)
Dept: FAMILY MEDICINE CLINIC | Facility: CLINIC | Age: 82
End: 2022-08-03

## 2022-08-03 VITALS
WEIGHT: 224 LBS | TEMPERATURE: 97 F | SYSTOLIC BLOOD PRESSURE: 124 MMHG | HEART RATE: 66 BPM | RESPIRATION RATE: 18 BRPM | HEIGHT: 72 IN | OXYGEN SATURATION: 94 % | DIASTOLIC BLOOD PRESSURE: 74 MMHG | BODY MASS INDEX: 30.34 KG/M2

## 2022-08-03 DIAGNOSIS — R11.0 NAUSEA: Primary | ICD-10-CM

## 2022-08-03 LAB
EXPIRATION DATE: NORMAL
INTERNAL CONTROL: NORMAL
Lab: NORMAL
SARS-COV-2 AG UPPER RESP QL IA.RAPID: NOT DETECTED

## 2022-08-03 PROCEDURE — 99213 OFFICE O/P EST LOW 20 MIN: CPT | Performed by: NURSE PRACTITIONER

## 2022-08-03 PROCEDURE — 87426 SARSCOV CORONAVIRUS AG IA: CPT | Performed by: NURSE PRACTITIONER

## 2022-08-03 NOTE — PROGRESS NOTES
"Chief Complaint  Nausea (Since sunday)    Violet Huynh presents to [facility]  Is an 81-year-old male presenting to the office today complaining of some intermittent nausea since Sunday.  He has not having any abdominal pain, no vomiting or diarrhea.  He just feels tired.  No fever.  He is able to eat and keep down fluids.  He does not feel that his appetite is the same as usual but he has eaten each day.  He does have Zofran at home has not tried that yet.  He does feel that he has some increased sinus drainage and has been using Flonase that has helped slightly.    He did take an at home COVID-19 test that was negative he is requesting additional test in office today    No other complaints of    The following portions of the patient's history were reviewed and updated as appropriate: allergies, current medications, past family history, past medical history, past social history, past surgical history, and problem list    Review of Systems   Constitutional: Positive for fatigue. Negative for chills and fever.   HENT: Positive for congestion and rhinorrhea. Negative for sore throat.    Eyes: Negative for visual disturbance.   Respiratory: Negative for cough, shortness of breath and wheezing.    Cardiovascular: Negative for chest pain, palpitations and leg swelling.   Gastrointestinal: Positive for nausea. Negative for abdominal pain, diarrhea and vomiting.   Skin: Negative for rash.   Neurological: Negative for dizziness and light-headedness.        Objective   Vital Signs:   Vitals:    08/03/22 0801   BP: 124/74   Pulse: 66   Resp: 18   Temp: 97 °F (36.1 °C)   SpO2: 94%   Weight: 102 kg (224 lb)   Height: 182 cm (71.65\")        BMI is >= 30 and <35. (Class 1 Obesity). The following options were offered after discussion;: weight loss educational material (shared in after visit summary)        Physical Exam  Vitals reviewed.   Constitutional:       General: He is not in " acute distress.  Eyes:      Conjunctiva/sclera: Conjunctivae normal.   Neck:      Thyroid: No thyromegaly.      Vascular: No carotid bruit.   Cardiovascular:      Rate and Rhythm: Normal rate and regular rhythm.      Heart sounds: Normal heart sounds.   Pulmonary:      Effort: Pulmonary effort is normal.      Breath sounds: Normal breath sounds.   Abdominal:      General: Abdomen is flat. Bowel sounds are normal. There is no distension.      Palpations: Abdomen is soft. There is no mass.      Tenderness: There is no abdominal tenderness. There is no right CVA tenderness, left CVA tenderness, guarding or rebound.      Hernia: No hernia is present.   Neurological:      Mental Status: He is alert.   Psychiatric:         Attention and Perception: Attention normal.         Mood and Affect: Mood normal.          Result Review :     The following data was reviewed by: ADRIANA Dewitt on 08/03/2022:      POCT SARS-CoV-2 Antigen JERO (08/03/2022 08:56)    negative     Assessment and Plan    Diagnoses and all orders for this visit:    1. Nausea (Primary)  Comments:  Zofran as directed  Brat diet  Frequent small sips of water  Report any increase in symptoms including pain fever or vomiting immediately  Orders:  -     POCT SARS-CoV-2 Antigen JERO    Plan  Increase fluid intake-reviewed importance to stay hydrated  Brat diet-bland- avoid  1. Dairy, caffeine, spicy foodsAvoid drinking fluids that have a lot of sugar or caffeine in them.  2. Eat bland, easy-to-digest foods in small amounts as you are able. These foods include:  ? Bananas.  ? Applesauce.  ? Rice.  ? Low-fat (lean) meats.  ? Toast.  ? Crackers.  3. Avoid alcohol.  4. Avoid spicy or fatty foods.  To ER with any acute abdominal pain or fever not relieved by Tylenol  Follow-up 1 week or before with any increase in symptoms      Follow Up   Return if symptoms worsen or fail to improve.  Patient was given instructions and counseling regarding his condition or for  health maintenance advice. Please see specific information pulled into the AVS if appropriate.

## 2022-08-18 RX ORDER — ONDANSETRON 4 MG/1
TABLET, FILM COATED ORAL
Qty: 30 TABLET | Refills: 1 | Status: SHIPPED | OUTPATIENT
Start: 2022-08-18 | End: 2022-09-13 | Stop reason: SDUPTHER

## 2022-09-02 DIAGNOSIS — J30.89 PERENNIAL ALLERGIC RHINITIS: Chronic | ICD-10-CM

## 2022-09-02 RX ORDER — LEVOCETIRIZINE DIHYDROCHLORIDE 5 MG/1
TABLET, FILM COATED ORAL
Qty: 90 TABLET | Refills: 1 | OUTPATIENT
Start: 2022-09-02

## 2022-09-06 DIAGNOSIS — J30.89 PERENNIAL ALLERGIC RHINITIS: Chronic | ICD-10-CM

## 2022-09-06 DIAGNOSIS — I10 ESSENTIAL HYPERTENSION: Chronic | ICD-10-CM

## 2022-09-06 RX ORDER — LEVOCETIRIZINE DIHYDROCHLORIDE 5 MG/1
5 TABLET, FILM COATED ORAL EVERY EVENING
Qty: 90 TABLET | Refills: 1 | OUTPATIENT
Start: 2022-09-06

## 2022-09-06 RX ORDER — AMLODIPINE BESYLATE 5 MG/1
5 TABLET ORAL DAILY
Qty: 90 TABLET | Refills: 1 | OUTPATIENT
Start: 2022-09-06

## 2022-09-06 RX ORDER — ONDANSETRON 4 MG/1
4 TABLET, FILM COATED ORAL EVERY 6 HOURS PRN
Qty: 30 TABLET | Refills: 1 | OUTPATIENT
Start: 2022-09-06

## 2022-09-06 NOTE — TELEPHONE ENCOUNTER
Caller: Mg Gerber Sr.    Relationship: Self    Best call back number: [phone]     Requested Prescriptions:   Requested Prescriptions     Pending Prescriptions Disp Refills   • levocetirizine (XYZAL) 5 MG tablet 90 tablet 1     Sig: Take 1 tablet by mouth Every Evening.   • ondansetron (ZOFRAN) 4 MG tablet 30 tablet 1     Sig: Take 1 tablet by mouth Every 6 (Six) Hours As Needed for Nausea or Vomiting.   • amLODIPine (NORVASC) 5 MG tablet 90 tablet 1     Sig: Take 1 tablet by mouth Daily.        [facility] [address] [facility] [phone] [facility] [phone]      Additional details provided by patient: PATIENT STATED THAT HE HAS BEEN DOING CANCER TREATMENTS, BUT THEY HAVE DECIDED TO DISCONTINUE THE TREATMENT DUE TO INEFFECTIVENESS. THEY HAVE SENT THE PATIENT HOME, WHERE IS HE CURRENTLY BED BOUND, AND HE IS UNABLE TO COME IN FOR THE REQUESTED APPOINTMENT.    PATIENT WOULD LIKE A 90 DAY SUPPLY FOR THE ONDANSETRON.    Does the patient have less than a 3 day supply:  [x] Yes  [] No    Jame Anderson Rep   09/06/22 10:34 EDT

## 2022-09-08 DIAGNOSIS — I10 ESSENTIAL HYPERTENSION: Chronic | ICD-10-CM

## 2022-09-08 DIAGNOSIS — J30.89 PERENNIAL ALLERGIC RHINITIS: Chronic | ICD-10-CM

## 2022-09-08 RX ORDER — AMLODIPINE BESYLATE 5 MG/1
TABLET ORAL
Qty: 14 TABLET | Refills: 0 | Status: SHIPPED | OUTPATIENT
Start: 2022-09-08 | End: 2022-09-12 | Stop reason: SDUPTHER

## 2022-09-08 RX ORDER — LEVOCETIRIZINE DIHYDROCHLORIDE 5 MG/1
TABLET, FILM COATED ORAL
Qty: 14 TABLET | Refills: 0 | Status: SHIPPED | OUTPATIENT
Start: 2022-09-08 | End: 2022-09-12 | Stop reason: SDUPTHER

## 2022-09-08 RX ORDER — AMLODIPINE BESYLATE 5 MG/1
TABLET ORAL
Qty: 90 TABLET | OUTPATIENT
Start: 2022-09-08

## 2022-09-12 RX ORDER — TIOTROPIUM BROMIDE AND OLODATEROL 3.124; 2.736 UG/1; UG/1
SPRAY, METERED RESPIRATORY (INHALATION)
COMMUNITY
Start: 2022-07-25 | End: 2022-10-09 | Stop reason: HOSPADM

## 2022-09-12 RX ORDER — FINASTERIDE 5 MG/1
5 TABLET, FILM COATED ORAL DAILY
COMMUNITY
Start: 2022-08-08

## 2022-09-12 NOTE — PROGRESS NOTES
The ABCs of the Annual Wellness Visit  Subsequent Medicare Wellness Visit    Chief Complaint   Patient presents with   • Hypertension     MED REFILL DUE    • Heartburn     HARD OF HEARING   • Allergies   • Anxiety   • MEDICARE WELLNESS     DUE       Subjective    History of Present Illness:  Mg Gerber Sr. is a 81 y.o. male who presents for a Subsequent Medicare Wellness Visit.    The following portions of the patient's history were reviewed and   updated as appropriate: allergies, current medications, past family history, past medical history, past social history, past surgical history and problem list.    Compared to one year ago, the patient feels his physical   health is the same.    Compared to one year ago, the patient feels his mental   health is the same.    Recent Hospitalizations:  This patient has had a [facility] admission record on file within the last 365 days.    Current Medical Providers:  Patient Care Team:  Dillon Walton MD as PCP - General (Family Medicine)    Outpatient Medications Prior to Visit   Medication Sig Dispense Refill   • ALPRAZolam (Xanax) 0.25 MG tablet Take 1 tablet by mouth 2 (Two) Times a Day As Needed for Anxiety. 60 tablet 2   • amLODIPine (NORVASC) 5 MG tablet TAKE ONE TABLET BY MOUTH DAILY 14 tablet 0   • famotidine (Pepcid) 20 MG tablet Take 1 tablet by mouth 2 (Two) Times a Day. 180 tablet 1   • HYDROcodone-acetaminophen (NORCO) 5-325 MG per tablet   1 Tab, Oral, Q6H, PRN as needed for pain, # 120 Tab, 0 Refill(s), Pharmacy: [facility], cm, 08/30/22 7:39:00 EDT, CLINICALHEIGHT, 104.66, kg, 08/30/22 7:39:00 EDT, CLINICALWEIGHT, 185.4     • levocetirizine (XYZAL) 5 MG tablet TAKE ONE TABLET BY MOUTH EVERY EVENING 14 tablet 0   • Calcium Carb-Cholecalciferol (CALTRATE 600+D3 PO) Take 1 tablet by mouth 2 (two) times a day.     • cyclobenzaprine (FLEXERIL) 5 MG tablet Take 5 mg by mouth 3 (Three) Times a Day As Needed for Muscle Spasms.     • finasteride  (PROSCAR) 5 MG tablet      • folic acid (FOLVITE) 1 MG tablet Take 1 mg by mouth 3 (Three) Times a Day.     • gabapentin (NEURONTIN) 400 MG capsule Take PO: 400mg QAM and with lunch, 800mg QHS 12 capsule 0   • ipratropium (ATROVENT) 0.06 % nasal spray 2 sprays into the nostril(s) as directed by provider 4 (Four) Times a Day. 15 mL 11   • levothyroxine (SYNTHROID, LEVOTHROID) 112 MCG tablet Take 112 mcg by mouth Daily.     • lidocaine (LIDODERM) 5 % Place 2 patches on the skin as directed by provider Daily. Remove & Discard patch within 12 hours or as directed by MD 30 patch 0   • meclizine (ANTIVERT) 12.5 MG tablet Take 1 tablet by mouth 3 (Three) Times a Day As Needed for Dizziness. 270 tablet 1   • NAMZARIC 28-10 MG capsule sustained-release 24 hr Take 1 capsule by mouth Every Evening.     • NIVOLUMAB IV Infuse  into a venous catheter Every 30 (Thirty) Days. Due: 3/15/2022     • ondansetron (ZOFRAN) 4 MG tablet TAKE ONE TABLET BY MOUTH EVERY 6 HOURS AS NEEDED FOR NAUSEA OR VOMITING 30 tablet 1   • OXcarbazepine (TRILEPTAL) 300 MG tablet Take 1 tablet by mouth every night at bedtime. 5 tablet 0   • oxyCODONE (ROXICODONE) 5 MG immediate release tablet Take 1 tablet by mouth Every 6 (Six) Hours As Needed for Moderate Pain  or Severe Pain . 12 tablet 0   • polyethylene glycol (MIRALAX) 17 g packet Take 17 g by mouth Daily.     • sennosides-docusate (senna-docusate sodium) 8.6-50 MG per tablet Take 2 tablets by mouth 2 (Two) Times a Day As Needed for Constipation.     • sertraline (ZOLOFT) 25 MG tablet Take 25 mg by mouth Daily.     • SPIRIVA HANDIHALER 18 MCG per inhalation capsule Place 1 capsule into inhaler and inhale Daily.     • Stiolto Respimat 2.5-2.5 MCG/ACT aerosol solution inhaler      • tiotropium bromide-olodaterol (STIOLTO RESPIMAT) 2.5-2.5 MCG/ACT aerosol solution inhaler Inhale 1 puff 2 (Two) Times a Day.     • VENTOLIN  (90 BASE) MCG/ACT inhaler Inhale 2 puffs Every 4 (Four) Hours As Needed.      • XARELTO 20 MG tablet Take 20 mg by mouth daily with dinner.       No facility-administered medications prior to visit.       Opioid medication/s are on active medication list.  and I have evaluated his active treatment plan and pain score trends (see table).  Vitals:    09/12/22 1254   PainSc:   7     I have reviewed the chart for potential of high risk medication and harmful drug interactions in the elderly.            Aspirin is not on active medication list.  Aspirin use is not indicated based on review of current medical condition/s. Risk of harm outweighs potential benefits.  .    Patient Active Problem List   Diagnosis   • Anemia, vitamin B12 deficiency   • Arthritis   • Hemochromatosis   • Herpes zoster   • Hip pain   • Essential hypertension   • Cancer (HCC)   • Pulmonary emphysema (HCC)   • Left low back pain   • Vertigo   • Headache around the eyes   • Mild cognitive impairment   • Metastatic renal cell carcinoma  on q 2 wks chemo   • Anxiety   • Malignant neoplasm of lung (HCC)   • Neuropathy   • Perennial allergic rhinitis   • Vitamin B12 deficiency   • Osteoarthritis of lumbar spine   • Pharyngitis   • Bronchitis   • Drug-induced constipation   • Hx of hiatal hernia   • History of lung cancer   • COVID-19 virus detected   • Dyspnea   • Generalized weakness   • Acute respiratory failure with hypoxia (HCC)   • Gastroesophageal reflux disease   • Hearing disorder   • Malignant neoplasm of prostate (HCC)   • Sleep apnea   • Anemia   • Malignant neoplasm metastatic to lung (HCC)   • Secondary bacterial pneumonia   • Pneumonia due to COVID-19 virus   • Hilar mass   • On antineoplastic chemotherapy q 2wks   • Paroxysmal atrial fibrillation (HCC)   • History of pulmonary embolism   • Nausea   • Gastritis   • Muscle spasm   • Fall   • Closed fracture of multiple ribs of left side   • Chest wall pain   • Fall, initial encounter   • Immobility syndrome   • Self-care deficit   • Cytokine release syndrome, grade  "2     Advance Care Planning  Advance Directive is not on file.  ACP discussion was held with the patient during this visit. Patient does not have an advance directive, declines further assistance.          Objective    Vitals:    09/12/22 1254   BP: 131/59   Pulse: 60   Resp: 14   Temp: 97.8 °F (36.6 °C)   TempSrc: Oral   SpO2: 98%   Weight: 103 kg (226 lb)   Height: 182 cm (71.65\")   PainSc:   7     Estimated body mass index is 30.95 kg/m² as calculated from the following:    Height as of this encounter: 182 cm (71.65\").    Weight as of this encounter: 103 kg (226 lb).    BMI is >= 30 and <35. (Class 1 Obesity). The following options were offered after discussion;: exercise counseling/recommendations and nutrition counseling/recommendations      Does the patient have evidence of cognitive impairment? No    Physical Exam            HEALTH RISK ASSESSMENT    Smoking Status:  Social History     Tobacco Use   Smoking Status Former Smoker   Smokeless Tobacco Never Used     Alcohol Consumption:  Social History     Substance and Sexual Activity   Alcohol Use Yes    Comment: 2 drinks month     Fall Risk Screen:    [facility] Fall Risk Assessment has not been completed.    Depression Screening:  PHQ-2/PHQ-9 Depression Screening 9/13/2022   Retired PHQ-9 Total Score -   Retired Total Score -   Little Interest or Pleasure in Doing Things 0-->not at all   Feeling Down, Depressed or Hopeless 0-->not at all   PHQ-9: Brief Depression Severity Measure Score 0       Health Habits and Functional and Cognitive Screening:  Functional & Cognitive Status 9/13/2022   Do you have difficulty preparing food and eating? No   Do you have difficulty bathing yourself, getting dressed or grooming yourself? No   Do you have difficulty using the toilet? No   Do you have difficulty moving around from place to place? Yes   Do you have trouble with steps or getting out of a bed or a chair? Yes   Current Diet Well Balanced Diet   Dental Exam Not up to " date   Eye Exam Not up to date   Exercise (times per week) 0 times per week   Current Exercises Include No Regular Exercise   Do you need help using the phone?  No   Are you deaf or do you have serious difficulty hearing?  No   Do you need help with transportation? Yes   Do you need help shopping? No   Do you need help preparing meals?  No   Do you need help with housework?  Yes   Do you need help with laundry? Yes   Do you need help taking your medications? No   Do you need help managing money? No   Do you ever drive or ride in a car without wearing a seat belt? No   Have you felt unusual stress, anger or loneliness in the last month? No   Who do you live with? Spouse   If you need help, do you have trouble finding someone available to you? Yes   Have you been bothered in the last four weeks by sexual problems? No   Do you have difficulty concentrating, remembering or making decisions? No       Age-appropriate Screening Schedule:  Refer to the list below for future screening recommendations based on patient's age, sex and/or medical conditions. Orders for these recommended tests are listed in the plan section. The patient has been provided with a written plan.    Health Maintenance   Topic Date Due   • PROSTATE CANCER SCREENING  Never done   • INFLUENZA VACCINE  10/01/2022   • TDAP/TD VACCINES (3 - Tdap) 08/09/2031   • ZOSTER VACCINE  Discontinued              Assessment & Plan   CMS Preventative Services Quick Reference  Risk Factors Identified During Encounter  Cardiovascular Disease  The above risks/problems have been discussed with the patient.  Follow up actions/plans if indicated are seen below in the Assessment/Plan Section.  Pertinent information has been shared with the patient in the After Visit Summary.    Diagnoses and all orders for this visit:    1. Medicare annual wellness visit, subsequent (Primary)    2. Essential hypertension    3. Other chronic gastritis without hemorrhage    4. Anxiety    5.  Perennial allergic rhinitis        Follow Up:   No follow-ups on file.     An After Visit Summary and PPPS were made available to the patient.          I spent 15 minutes caring for Mg on this date of service. This time includes time spent by me in the following activities:preparing for the visit

## 2022-09-13 ENCOUNTER — OFFICE VISIT (OUTPATIENT)
Dept: FAMILY MEDICINE CLINIC | Facility: CLINIC | Age: 82
End: 2022-09-13

## 2022-09-13 VITALS
RESPIRATION RATE: 14 BRPM | HEART RATE: 60 BPM | HEIGHT: 72 IN | BODY MASS INDEX: 30.61 KG/M2 | OXYGEN SATURATION: 98 % | TEMPERATURE: 97.8 F | WEIGHT: 226 LBS | SYSTOLIC BLOOD PRESSURE: 131 MMHG | DIASTOLIC BLOOD PRESSURE: 59 MMHG

## 2022-09-13 DIAGNOSIS — R11.0 CHRONIC NAUSEA: ICD-10-CM

## 2022-09-13 DIAGNOSIS — F41.9 ANXIETY: Chronic | ICD-10-CM

## 2022-09-13 DIAGNOSIS — J30.89 PERENNIAL ALLERGIC RHINITIS: Chronic | ICD-10-CM

## 2022-09-13 DIAGNOSIS — Z00.00 MEDICARE ANNUAL WELLNESS VISIT, SUBSEQUENT: Primary | ICD-10-CM

## 2022-09-13 DIAGNOSIS — K29.50 OTHER CHRONIC GASTRITIS WITHOUT HEMORRHAGE: Chronic | ICD-10-CM

## 2022-09-13 DIAGNOSIS — I10 ESSENTIAL HYPERTENSION: Chronic | ICD-10-CM

## 2022-09-13 PROCEDURE — 96160 PT-FOCUSED HLTH RISK ASSMT: CPT | Performed by: FAMILY MEDICINE

## 2022-09-13 PROCEDURE — G0439 PPPS, SUBSEQ VISIT: HCPCS | Performed by: FAMILY MEDICINE

## 2022-09-13 PROCEDURE — 1170F FXNL STATUS ASSESSED: CPT | Performed by: FAMILY MEDICINE

## 2022-09-13 PROCEDURE — 1125F AMNT PAIN NOTED PAIN PRSNT: CPT | Performed by: FAMILY MEDICINE

## 2022-09-13 PROCEDURE — 1159F MED LIST DOCD IN RCRD: CPT | Performed by: FAMILY MEDICINE

## 2022-09-13 RX ORDER — HYDROCODONE BITARTRATE AND ACETAMINOPHEN 5; 325 MG/1; MG/1
TABLET ORAL
COMMUNITY
Start: 2022-08-30 | End: 2023-02-17 | Stop reason: HOSPADM

## 2022-09-13 RX ORDER — ONDANSETRON 4 MG/1
4 TABLET, FILM COATED ORAL EVERY 6 HOURS PRN
Qty: 270 TABLET | Refills: 1 | Status: SHIPPED | OUTPATIENT
Start: 2022-09-13 | End: 2022-10-09 | Stop reason: HOSPADM

## 2022-09-13 RX ORDER — LEVOCETIRIZINE DIHYDROCHLORIDE 5 MG/1
5 TABLET, FILM COATED ORAL EVERY EVENING
Qty: 90 TABLET | Refills: 1 | Status: SHIPPED | OUTPATIENT
Start: 2022-09-13 | End: 2023-03-14 | Stop reason: SDUPTHER

## 2022-09-13 RX ORDER — FAMOTIDINE 20 MG/1
20 TABLET, FILM COATED ORAL 2 TIMES DAILY
Qty: 180 TABLET | Refills: 1 | Status: SHIPPED | OUTPATIENT
Start: 2022-09-13 | End: 2023-03-14 | Stop reason: SDUPTHER

## 2022-09-13 RX ORDER — AMLODIPINE BESYLATE 5 MG/1
5 TABLET ORAL DAILY
Qty: 90 TABLET | Refills: 1 | Status: SHIPPED | OUTPATIENT
Start: 2022-09-13 | End: 2023-03-14 | Stop reason: SDUPTHER

## 2022-09-13 RX ORDER — ALPRAZOLAM 0.25 MG/1
0.25 TABLET ORAL 2 TIMES DAILY PRN
Qty: 60 TABLET | Refills: 2 | Status: ON HOLD | OUTPATIENT
Start: 2022-09-13 | End: 2023-02-17 | Stop reason: SDUPTHER

## 2022-09-13 NOTE — PATIENT INSTRUCTIONS
Medicare Wellness  Personal Prevention Plan of Service     Date of Office Visit:    Encounter Provider:  Dillon Walton MD  Place of Service:  [facility]  Patient Name: Mg Gerber Sr.  :  1940    As part of the Medicare Wellness portion of your visit today, we are providing you with this personalized preventive plan of services (PPPS). This plan is based upon recommendations of the [place] Preventive Services Task Force (USPSTF) and the Advisory Committee on Immunization Practices (ACIP).    This lists the preventive care services that should be considered, and provides dates of when you are due. Items listed as completed are up-to-date and do not require any further intervention.    Health Maintenance   Topic Date Due    Pneumococcal Vaccine 65+ (2 - PCV) 2008    Hepatitis B (2 of 3 - Risk 3-dose series) 2009    PROSTATE CANCER SCREENING  Never done    COVID-19 Vaccine (4 - Booster for Pfizer series) 2022    INFLUENZA VACCINE  10/01/2022    ANNUAL WELLNESS VISIT  2023    TDAP/TD VACCINES (3 - Tdap) 2031    ZOSTER VACCINE  Discontinued    COLORECTAL CANCER SCREENING  Discontinued       No orders of the defined types were placed in this encounter.      Return in about 6 months (around 3/13/2023) for Recheck.

## 2022-10-06 ENCOUNTER — APPOINTMENT (OUTPATIENT)
Dept: CT IMAGING | Facility: HOSPITAL | Age: 82
End: 2022-10-06

## 2022-10-06 ENCOUNTER — HOSPITAL ENCOUNTER (OUTPATIENT)
Facility: HOSPITAL | Age: 82
LOS: 3 days | Discharge: HOME HEALTH | End: 2022-10-09
Attending: EMERGENCY MEDICINE | Admitting: HOSPITALIST

## 2022-10-06 ENCOUNTER — APPOINTMENT (OUTPATIENT)
Dept: GENERAL RADIOLOGY | Facility: HOSPITAL | Age: 82
End: 2022-10-06

## 2022-10-06 DIAGNOSIS — R93.89 ABNORMAL CHEST X-RAY: ICD-10-CM

## 2022-10-06 DIAGNOSIS — D51.0 VITAMIN B12 DEFICIENCY ANEMIA DUE TO INTRINSIC FACTOR DEFICIENCY: ICD-10-CM

## 2022-10-06 DIAGNOSIS — R79.1 ELEVATED INR: ICD-10-CM

## 2022-10-06 DIAGNOSIS — Z87.09 HISTORY OF CHRONIC LUNG DISEASE: ICD-10-CM

## 2022-10-06 DIAGNOSIS — R09.02 HYPOXEMIA: ICD-10-CM

## 2022-10-06 DIAGNOSIS — R26.81 GAIT INSTABILITY: ICD-10-CM

## 2022-10-06 DIAGNOSIS — R42 DIZZINESS: Primary | ICD-10-CM

## 2022-10-06 PROBLEM — J18.9 PNEUMONIA: Status: ACTIVE | Noted: 2022-10-06

## 2022-10-06 PROBLEM — M48.50XA COMPRESSION FRACTURE OF VERTEBRAL COLUMN (HCC): Status: ACTIVE | Noted: 2017-05-22

## 2022-10-06 PROBLEM — J96.21 ACUTE ON CHRONIC RESPIRATORY FAILURE WITH HYPOXIA (HCC): Status: ACTIVE | Noted: 2020-11-23

## 2022-10-06 LAB
ALBUMIN SERPL-MCNC: 4.1 G/DL (ref 3.5–5.2)
ALBUMIN/GLOB SERPL: 2 G/DL
ALP SERPL-CCNC: 56 U/L (ref 39–117)
ALT SERPL W P-5'-P-CCNC: 9 U/L (ref 1–41)
ANION GAP SERPL CALCULATED.3IONS-SCNC: 9.1 MMOL/L (ref 5–15)
APTT PPP: 53.4 SECONDS (ref 22.7–35.4)
AST SERPL-CCNC: 16 U/L (ref 1–40)
B PARAPERT DNA SPEC QL NAA+PROBE: NOT DETECTED
B PERT DNA SPEC QL NAA+PROBE: NOT DETECTED
BASOPHILS # BLD AUTO: 0.16 10*3/MM3 (ref 0–0.2)
BASOPHILS NFR BLD AUTO: 1.1 % (ref 0–1.5)
BILIRUB SERPL-MCNC: 2.2 MG/DL (ref 0–1.2)
BUN SERPL-MCNC: 13 MG/DL (ref 8–23)
BUN/CREAT SERPL: 11 (ref 7–25)
C PNEUM DNA NPH QL NAA+NON-PROBE: NOT DETECTED
CALCIUM SPEC-SCNC: 9.5 MG/DL (ref 8.6–10.5)
CHLORIDE SERPL-SCNC: 103 MMOL/L (ref 98–107)
CO2 SERPL-SCNC: 26.9 MMOL/L (ref 22–29)
CREAT SERPL-MCNC: 1.18 MG/DL (ref 0.76–1.27)
DEPRECATED RDW RBC AUTO: 40.4 FL (ref 37–54)
EGFRCR SERPLBLD CKD-EPI 2021: 61.6 ML/MIN/1.73
EOSINOPHIL # BLD AUTO: 0.18 10*3/MM3 (ref 0–0.4)
EOSINOPHIL NFR BLD AUTO: 1.2 % (ref 0.3–6.2)
ERYTHROCYTE [DISTWIDTH] IN BLOOD BY AUTOMATED COUNT: 10.1 % (ref 12.3–15.4)
FLUAV SUBTYP SPEC NAA+PROBE: NOT DETECTED
FLUBV RNA ISLT QL NAA+PROBE: NOT DETECTED
GLOBULIN UR ELPH-MCNC: 2.1 GM/DL
GLUCOSE SERPL-MCNC: 101 MG/DL (ref 65–99)
HADV DNA SPEC NAA+PROBE: NOT DETECTED
HCOV 229E RNA SPEC QL NAA+PROBE: NOT DETECTED
HCOV HKU1 RNA SPEC QL NAA+PROBE: NOT DETECTED
HCOV NL63 RNA SPEC QL NAA+PROBE: NOT DETECTED
HCOV OC43 RNA SPEC QL NAA+PROBE: NOT DETECTED
HCT VFR BLD AUTO: 31.3 % (ref 37.5–51)
HGB BLD-MCNC: 10.3 G/DL (ref 13–17.7)
HMPV RNA NPH QL NAA+NON-PROBE: NOT DETECTED
HOLD SPECIMEN: NORMAL
HOLD SPECIMEN: NORMAL
HPIV1 RNA ISLT QL NAA+PROBE: NOT DETECTED
HPIV2 RNA SPEC QL NAA+PROBE: NOT DETECTED
HPIV3 RNA NPH QL NAA+PROBE: NOT DETECTED
HPIV4 P GENE NPH QL NAA+PROBE: NOT DETECTED
INR PPP: 2.46 (ref 0.9–1.1)
LYMPHOCYTES # BLD AUTO: 2.19 10*3/MM3 (ref 0.7–3.1)
LYMPHOCYTES NFR BLD AUTO: 14.9 % (ref 19.6–45.3)
M PNEUMO IGG SER IA-ACNC: NOT DETECTED
MCH RBC QN AUTO: 36 PG (ref 26.6–33)
MCHC RBC AUTO-ENTMCNC: 32.9 G/DL (ref 31.5–35.7)
MCV RBC AUTO: 109.4 FL (ref 79–97)
MONOCYTES # BLD AUTO: 1.85 10*3/MM3 (ref 0.1–0.9)
MONOCYTES NFR BLD AUTO: 12.6 % (ref 5–12)
NEUTROPHILS NFR BLD AUTO: 10.18 10*3/MM3 (ref 1.7–7)
NEUTROPHILS NFR BLD AUTO: 69.5 % (ref 42.7–76)
NT-PROBNP SERPL-MCNC: 507 PG/ML (ref 0–1800)
PLATELET # BLD AUTO: 403 10*3/MM3 (ref 140–450)
PMV BLD AUTO: 10.3 FL (ref 6–12)
POTASSIUM SERPL-SCNC: 4.1 MMOL/L (ref 3.5–5.2)
PROCALCITONIN SERPL-MCNC: 0.24 NG/ML (ref 0–0.25)
PROT SERPL-MCNC: 6.2 G/DL (ref 6–8.5)
PROTHROMBIN TIME: 26 SECONDS (ref 11.7–14.2)
QT INTERVAL: 382 MS
RBC # BLD AUTO: 2.86 10*6/MM3 (ref 4.14–5.8)
RHINOVIRUS RNA SPEC NAA+PROBE: NOT DETECTED
RSV RNA NPH QL NAA+NON-PROBE: NOT DETECTED
SARS-COV-2 RNA NPH QL NAA+NON-PROBE: NOT DETECTED
SODIUM SERPL-SCNC: 139 MMOL/L (ref 136–145)
TROPONIN T SERPL-MCNC: 0.03 NG/ML (ref 0–0.03)
WBC NRBC COR # BLD: 14.67 10*3/MM3 (ref 3.4–10.8)
WHOLE BLOOD HOLD COAG: NORMAL
WHOLE BLOOD HOLD SPECIMEN: NORMAL

## 2022-10-06 PROCEDURE — A9270 NON-COVERED ITEM OR SERVICE: HCPCS | Performed by: INTERNAL MEDICINE

## 2022-10-06 PROCEDURE — 70496 CT ANGIOGRAPHY HEAD: CPT

## 2022-10-06 PROCEDURE — 96361 HYDRATE IV INFUSION ADD-ON: CPT

## 2022-10-06 PROCEDURE — 70498 CT ANGIOGRAPHY NECK: CPT

## 2022-10-06 PROCEDURE — 96375 TX/PRO/DX INJ NEW DRUG ADDON: CPT

## 2022-10-06 PROCEDURE — 63710000001 HYDROCODONE-ACETAMINOPHEN 5-325 MG TABLET: Performed by: INTERNAL MEDICINE

## 2022-10-06 PROCEDURE — 83880 ASSAY OF NATRIURETIC PEPTIDE: CPT | Performed by: PHYSICIAN ASSISTANT

## 2022-10-06 PROCEDURE — 63710000001 DONEPEZIL 10 MG TABLET: Performed by: INTERNAL MEDICINE

## 2022-10-06 PROCEDURE — 0202U NFCT DS 22 TRGT SARS-COV-2: CPT | Performed by: PHYSICIAN ASSISTANT

## 2022-10-06 PROCEDURE — 63710000001 CETIRIZINE 10 MG TABLET: Performed by: INTERNAL MEDICINE

## 2022-10-06 PROCEDURE — 63710000001 MEMANTINE 10 MG TABLET: Performed by: INTERNAL MEDICINE

## 2022-10-06 PROCEDURE — 63710000001 FOLIC ACID 1 MG TABLET: Performed by: INTERNAL MEDICINE

## 2022-10-06 PROCEDURE — 84145 PROCALCITONIN (PCT): CPT | Performed by: INTERNAL MEDICINE

## 2022-10-06 PROCEDURE — 85610 PROTHROMBIN TIME: CPT | Performed by: PHYSICIAN ASSISTANT

## 2022-10-06 PROCEDURE — 71045 X-RAY EXAM CHEST 1 VIEW: CPT

## 2022-10-06 PROCEDURE — 96374 THER/PROPH/DIAG INJ IV PUSH: CPT

## 2022-10-06 PROCEDURE — 25010000002 PROCHLORPERAZINE 10 MG/2ML SOLUTION: Performed by: PHYSICIAN ASSISTANT

## 2022-10-06 PROCEDURE — 63710000001 CALCIUM 500 MG VITAMIN D 5 MCG (200 UT) 500-5 MG-MCG TABLET: Performed by: INTERNAL MEDICINE

## 2022-10-06 PROCEDURE — 94799 UNLISTED PULMONARY SVC/PX: CPT

## 2022-10-06 PROCEDURE — 25010000002 IOPAMIDOL 61 % SOLUTION: Performed by: EMERGENCY MEDICINE

## 2022-10-06 PROCEDURE — 85025 COMPLETE CBC W/AUTO DIFF WBC: CPT | Performed by: PHYSICIAN ASSISTANT

## 2022-10-06 PROCEDURE — 63710000001 OXCARBAZEPINE 300 MG TABLET: Performed by: INTERNAL MEDICINE

## 2022-10-06 PROCEDURE — 99285 EMERGENCY DEPT VISIT HI MDM: CPT

## 2022-10-06 PROCEDURE — 63710000001 POLYETHYLENE GLYCOL 17 G PACK: Performed by: INTERNAL MEDICINE

## 2022-10-06 PROCEDURE — 63710000001 GABAPENTIN 400 MG CAPSULE: Performed by: INTERNAL MEDICINE

## 2022-10-06 PROCEDURE — 63710000001 FAMOTIDINE 20 MG TABLET: Performed by: INTERNAL MEDICINE

## 2022-10-06 PROCEDURE — 63710000001 SENNOSIDES-DOCUSATE 8.6-50 MG TABLET: Performed by: INTERNAL MEDICINE

## 2022-10-06 PROCEDURE — 85730 THROMBOPLASTIN TIME PARTIAL: CPT | Performed by: PHYSICIAN ASSISTANT

## 2022-10-06 PROCEDURE — 25010000002 DIPHENHYDRAMINE PER 50 MG: Performed by: PHYSICIAN ASSISTANT

## 2022-10-06 PROCEDURE — 80053 COMPREHEN METABOLIC PANEL: CPT | Performed by: PHYSICIAN ASSISTANT

## 2022-10-06 PROCEDURE — 93010 ELECTROCARDIOGRAM REPORT: CPT | Performed by: INTERNAL MEDICINE

## 2022-10-06 PROCEDURE — 93005 ELECTROCARDIOGRAM TRACING: CPT | Performed by: PHYSICIAN ASSISTANT

## 2022-10-06 PROCEDURE — 84484 ASSAY OF TROPONIN QUANT: CPT | Performed by: PHYSICIAN ASSISTANT

## 2022-10-06 RX ORDER — IPRATROPIUM BROMIDE 42 UG/1
2 SPRAY, METERED NASAL 4 TIMES DAILY PRN
Status: DISCONTINUED | OUTPATIENT
Start: 2022-10-06 | End: 2022-10-09 | Stop reason: HOSPADM

## 2022-10-06 RX ORDER — POLYETHYLENE GLYCOL 3350 17 G/17G
17 POWDER, FOR SOLUTION ORAL DAILY
Status: DISCONTINUED | OUTPATIENT
Start: 2022-10-06 | End: 2022-10-09 | Stop reason: HOSPADM

## 2022-10-06 RX ORDER — AMLODIPINE BESYLATE 5 MG/1
5 TABLET ORAL DAILY
Status: DISCONTINUED | OUTPATIENT
Start: 2022-10-07 | End: 2022-10-09 | Stop reason: HOSPADM

## 2022-10-06 RX ORDER — FINASTERIDE 5 MG/1
5 TABLET, FILM COATED ORAL DAILY
Status: DISCONTINUED | OUTPATIENT
Start: 2022-10-07 | End: 2022-10-09 | Stop reason: HOSPADM

## 2022-10-06 RX ORDER — SERTRALINE HYDROCHLORIDE 25 MG/1
25 TABLET, FILM COATED ORAL DAILY
Status: DISCONTINUED | OUTPATIENT
Start: 2022-10-06 | End: 2022-10-09 | Stop reason: HOSPADM

## 2022-10-06 RX ORDER — HYDROCODONE BITARTRATE AND ACETAMINOPHEN 5; 325 MG/1; MG/1
1 TABLET ORAL 3 TIMES DAILY
Status: DISCONTINUED | OUTPATIENT
Start: 2022-10-06 | End: 2022-10-09 | Stop reason: HOSPADM

## 2022-10-06 RX ORDER — PROCHLORPERAZINE EDISYLATE 5 MG/ML
5 INJECTION INTRAMUSCULAR; INTRAVENOUS ONCE
Status: COMPLETED | OUTPATIENT
Start: 2022-10-06 | End: 2022-10-06

## 2022-10-06 RX ORDER — SODIUM CHLORIDE 0.9 % (FLUSH) 0.9 %
10 SYRINGE (ML) INJECTION AS NEEDED
Status: DISCONTINUED | OUTPATIENT
Start: 2022-10-06 | End: 2022-10-09 | Stop reason: HOSPADM

## 2022-10-06 RX ORDER — OXCARBAZEPINE 300 MG/1
300 TABLET, FILM COATED ORAL NIGHTLY
Status: DISCONTINUED | OUTPATIENT
Start: 2022-10-06 | End: 2022-10-09 | Stop reason: HOSPADM

## 2022-10-06 RX ORDER — CETIRIZINE HYDROCHLORIDE 10 MG/1
10 TABLET ORAL NIGHTLY
Refills: 1 | Status: DISCONTINUED | OUTPATIENT
Start: 2022-10-06 | End: 2022-10-09 | Stop reason: HOSPADM

## 2022-10-06 RX ORDER — ACETAMINOPHEN 500 MG
1000 TABLET ORAL ONCE
Status: COMPLETED | OUTPATIENT
Start: 2022-10-06 | End: 2022-10-06

## 2022-10-06 RX ORDER — ALPRAZOLAM 0.25 MG/1
0.25 TABLET ORAL 2 TIMES DAILY PRN
Status: DISCONTINUED | OUTPATIENT
Start: 2022-10-06 | End: 2022-10-09 | Stop reason: HOSPADM

## 2022-10-06 RX ORDER — NITROGLYCERIN 0.4 MG/1
0.4 TABLET SUBLINGUAL
Status: DISCONTINUED | OUTPATIENT
Start: 2022-10-06 | End: 2022-10-09 | Stop reason: HOSPADM

## 2022-10-06 RX ORDER — GABAPENTIN 400 MG/1
800 CAPSULE ORAL NIGHTLY
Status: DISCONTINUED | OUTPATIENT
Start: 2022-10-06 | End: 2022-10-09 | Stop reason: HOSPADM

## 2022-10-06 RX ORDER — ALBUTEROL SULFATE 2.5 MG/3ML
2.5 SOLUTION RESPIRATORY (INHALATION) EVERY 6 HOURS PRN
Status: DISCONTINUED | OUTPATIENT
Start: 2022-10-06 | End: 2022-10-09 | Stop reason: HOSPADM

## 2022-10-06 RX ORDER — LEVOTHYROXINE SODIUM 112 UG/1
112 TABLET ORAL
Status: DISCONTINUED | OUTPATIENT
Start: 2022-10-07 | End: 2022-10-09 | Stop reason: HOSPADM

## 2022-10-06 RX ORDER — ACETAMINOPHEN 325 MG/1
650 TABLET ORAL EVERY 4 HOURS PRN
Status: DISCONTINUED | OUTPATIENT
Start: 2022-10-06 | End: 2022-10-09 | Stop reason: HOSPADM

## 2022-10-06 RX ORDER — HYDROCODONE BITARTRATE AND ACETAMINOPHEN 5; 325 MG/1; MG/1
1 TABLET ORAL EVERY 4 HOURS PRN
Status: DISCONTINUED | OUTPATIENT
Start: 2022-10-06 | End: 2022-10-09 | Stop reason: HOSPADM

## 2022-10-06 RX ORDER — CALCIUM CARBONATE 200(500)MG
2 TABLET,CHEWABLE ORAL 2 TIMES DAILY PRN
Status: DISCONTINUED | OUTPATIENT
Start: 2022-10-06 | End: 2022-10-09 | Stop reason: HOSPADM

## 2022-10-06 RX ORDER — CYCLOBENZAPRINE HCL 10 MG
5 TABLET ORAL 3 TIMES DAILY PRN
Status: DISCONTINUED | OUTPATIENT
Start: 2022-10-06 | End: 2022-10-09 | Stop reason: HOSPADM

## 2022-10-06 RX ORDER — DIPHENHYDRAMINE HYDROCHLORIDE 50 MG/ML
12.5 INJECTION INTRAMUSCULAR; INTRAVENOUS ONCE
Status: COMPLETED | OUTPATIENT
Start: 2022-10-06 | End: 2022-10-06

## 2022-10-06 RX ORDER — POLYETHYLENE GLYCOL 3350 17 G/17G
17 POWDER, FOR SOLUTION ORAL DAILY PRN
Status: DISCONTINUED | OUTPATIENT
Start: 2022-10-06 | End: 2022-10-09 | Stop reason: HOSPADM

## 2022-10-06 RX ORDER — DONEPEZIL HYDROCHLORIDE 10 MG/1
10 TABLET, FILM COATED ORAL NIGHTLY
Status: DISCONTINUED | OUTPATIENT
Start: 2022-10-06 | End: 2022-10-09 | Stop reason: HOSPADM

## 2022-10-06 RX ORDER — GABAPENTIN 400 MG/1
400 CAPSULE ORAL
Status: DISCONTINUED | OUTPATIENT
Start: 2022-10-07 | End: 2022-10-06

## 2022-10-06 RX ORDER — ONDANSETRON 2 MG/ML
4 INJECTION INTRAMUSCULAR; INTRAVENOUS EVERY 6 HOURS PRN
Status: DISCONTINUED | OUTPATIENT
Start: 2022-10-06 | End: 2022-10-09 | Stop reason: HOSPADM

## 2022-10-06 RX ORDER — ACETAMINOPHEN 160 MG/5ML
650 SOLUTION ORAL EVERY 4 HOURS PRN
Status: DISCONTINUED | OUTPATIENT
Start: 2022-10-06 | End: 2022-10-09 | Stop reason: HOSPADM

## 2022-10-06 RX ORDER — UREA 10 %
1 LOTION (ML) TOPICAL NIGHTLY PRN
Status: DISCONTINUED | OUTPATIENT
Start: 2022-10-06 | End: 2022-10-09 | Stop reason: HOSPADM

## 2022-10-06 RX ORDER — ALBUTEROL SULFATE 2.5 MG/3ML
2.5 SOLUTION RESPIRATORY (INHALATION) 4 TIMES DAILY
Status: DISCONTINUED | OUTPATIENT
Start: 2022-10-06 | End: 2022-10-09 | Stop reason: HOSPADM

## 2022-10-06 RX ORDER — FOLIC ACID 1 MG/1
1 TABLET ORAL 3 TIMES DAILY
Status: DISCONTINUED | OUTPATIENT
Start: 2022-10-06 | End: 2022-10-09 | Stop reason: HOSPADM

## 2022-10-06 RX ORDER — BISACODYL 5 MG/1
5 TABLET, DELAYED RELEASE ORAL DAILY PRN
Status: DISCONTINUED | OUTPATIENT
Start: 2022-10-06 | End: 2022-10-09 | Stop reason: HOSPADM

## 2022-10-06 RX ORDER — IPRATROPIUM BROMIDE AND ALBUTEROL SULFATE 2.5; .5 MG/3ML; MG/3ML
3 SOLUTION RESPIRATORY (INHALATION) EVERY 4 HOURS PRN
Status: DISCONTINUED | OUTPATIENT
Start: 2022-10-06 | End: 2022-10-09 | Stop reason: HOSPADM

## 2022-10-06 RX ORDER — NALOXONE HCL 0.4 MG/ML
0.4 VIAL (ML) INJECTION
Status: DISCONTINUED | OUTPATIENT
Start: 2022-10-06 | End: 2022-10-09 | Stop reason: HOSPADM

## 2022-10-06 RX ORDER — MORPHINE SULFATE 2 MG/ML
2 INJECTION, SOLUTION INTRAMUSCULAR; INTRAVENOUS EVERY 4 HOURS PRN
Status: DISCONTINUED | OUTPATIENT
Start: 2022-10-06 | End: 2022-10-09 | Stop reason: HOSPADM

## 2022-10-06 RX ORDER — ONDANSETRON 4 MG/1
4 TABLET, FILM COATED ORAL EVERY 6 HOURS PRN
Status: DISCONTINUED | OUTPATIENT
Start: 2022-10-06 | End: 2022-10-09 | Stop reason: HOSPADM

## 2022-10-06 RX ORDER — BISACODYL 10 MG
10 SUPPOSITORY, RECTAL RECTAL DAILY PRN
Status: DISCONTINUED | OUTPATIENT
Start: 2022-10-06 | End: 2022-10-09 | Stop reason: HOSPADM

## 2022-10-06 RX ORDER — AMOXICILLIN 250 MG
2 CAPSULE ORAL 2 TIMES DAILY
Status: DISCONTINUED | OUTPATIENT
Start: 2022-10-06 | End: 2022-10-09 | Stop reason: HOSPADM

## 2022-10-06 RX ORDER — MEMANTINE HYDROCHLORIDE 10 MG/1
10 TABLET ORAL EVERY 12 HOURS SCHEDULED
Status: DISCONTINUED | OUTPATIENT
Start: 2022-10-06 | End: 2022-10-09 | Stop reason: HOSPADM

## 2022-10-06 RX ORDER — FAMOTIDINE 20 MG/1
20 TABLET, FILM COATED ORAL 2 TIMES DAILY
Status: DISCONTINUED | OUTPATIENT
Start: 2022-10-06 | End: 2022-10-09 | Stop reason: HOSPADM

## 2022-10-06 RX ORDER — GABAPENTIN 400 MG/1
400 CAPSULE ORAL
Status: DISCONTINUED | OUTPATIENT
Start: 2022-10-07 | End: 2022-10-09 | Stop reason: HOSPADM

## 2022-10-06 RX ORDER — MECLIZINE HCL 12.5 MG/1
12.5 TABLET ORAL 3 TIMES DAILY PRN
Status: DISCONTINUED | OUTPATIENT
Start: 2022-10-06 | End: 2022-10-09 | Stop reason: HOSPADM

## 2022-10-06 RX ORDER — SODIUM CHLORIDE 0.9 % (FLUSH) 0.9 %
10 SYRINGE (ML) INJECTION EVERY 12 HOURS SCHEDULED
Status: DISCONTINUED | OUTPATIENT
Start: 2022-10-06 | End: 2022-10-09 | Stop reason: HOSPADM

## 2022-10-06 RX ORDER — ARFORMOTEROL TARTRATE 15 UG/2ML
15 SOLUTION RESPIRATORY (INHALATION)
Status: DISCONTINUED | OUTPATIENT
Start: 2022-10-06 | End: 2022-10-09 | Stop reason: HOSPADM

## 2022-10-06 RX ADMIN — SODIUM CHLORIDE 500 ML: 9 INJECTION, SOLUTION INTRAVENOUS at 12:25

## 2022-10-06 RX ADMIN — Medication 1 MG: at 23:57

## 2022-10-06 RX ADMIN — Medication 1 MG: at 20:39

## 2022-10-06 RX ADMIN — CALCIUM CARBONATE-VITAMIN D TAB 500 MG-200 UNIT 1 TABLET: 500-200 TAB at 20:39

## 2022-10-06 RX ADMIN — OXCARBAZEPINE 300 MG: 300 TABLET, FILM COATED ORAL at 20:39

## 2022-10-06 RX ADMIN — POLYETHYLENE GLYCOL 3350 17 G: 17 POWDER, FOR SOLUTION ORAL at 20:40

## 2022-10-06 RX ADMIN — ALBUTEROL SULFATE 2.5 MG: 2.5 SOLUTION RESPIRATORY (INHALATION) at 21:16

## 2022-10-06 RX ADMIN — CEFTRIAXONE 2 G: 2 INJECTION, POWDER, FOR SOLUTION INTRAMUSCULAR; INTRAVENOUS at 23:41

## 2022-10-06 RX ADMIN — Medication 10 ML: at 20:40

## 2022-10-06 RX ADMIN — GABAPENTIN 800 MG: 400 CAPSULE ORAL at 20:39

## 2022-10-06 RX ADMIN — CETIRIZINE HYDROCHLORIDE 10 MG: 10 TABLET ORAL at 20:39

## 2022-10-06 RX ADMIN — DOCUSATE SODIUM 50MG AND SENNOSIDES 8.6MG 2 TABLET: 8.6; 5 TABLET, FILM COATED ORAL at 20:40

## 2022-10-06 RX ADMIN — IPRATROPIUM BROMIDE 0.5 MG: 0.5 SOLUTION RESPIRATORY (INHALATION) at 21:16

## 2022-10-06 RX ADMIN — IOPAMIDOL 95 ML: 612 INJECTION, SOLUTION INTRAVENOUS at 14:05

## 2022-10-06 RX ADMIN — ACETAMINOPHEN 650 MG: 325 TABLET, FILM COATED ORAL at 23:35

## 2022-10-06 RX ADMIN — ACETAMINOPHEN 1000 MG: 500 TABLET, FILM COATED ORAL at 15:33

## 2022-10-06 RX ADMIN — PROCHLORPERAZINE EDISYLATE 5 MG: 5 INJECTION INTRAMUSCULAR; INTRAVENOUS at 15:33

## 2022-10-06 RX ADMIN — DIPHENHYDRAMINE HYDROCHLORIDE 12.5 MG: 50 INJECTION, SOLUTION INTRAMUSCULAR; INTRAVENOUS at 15:33

## 2022-10-06 RX ADMIN — HYDROCODONE BITARTRATE AND ACETAMINOPHEN 1 TABLET: 5; 325 TABLET ORAL at 20:39

## 2022-10-06 RX ADMIN — FAMOTIDINE 20 MG: 20 TABLET ORAL at 20:40

## 2022-10-06 RX ADMIN — DONEPEZIL HYDROCHLORIDE 10 MG: 10 TABLET, FILM COATED ORAL at 20:39

## 2022-10-06 RX ADMIN — MEMANTINE HYDROCHLORIDE 10 MG: 10 TABLET, FILM COATED ORAL at 20:39

## 2022-10-06 NOTE — ED PROVIDER NOTES
EMERGENCY DEPARTMENT ENCOUNTER    Room Number:  31/31  Date of encounter:  10/6/2022  PCP: Dillon Walton MD  Historian: Patient  Full history not obtainable due to: None    HPI:  Chief Complaint: HA    Context: Mg Gerber Sr. is a 81 y.o. male with a PMH significant for hemochromatosis, hypertension, renal cell carcinoma, lung cancer, prostate cancer, paroxysmal A. fib anticoagulated on Xarelto who presents to the ED c/o headache and dizziness.  The patient states that he woke up this morning at 630 with room spinning dizziness, mild headache, generalized fatigue.  He went to bed normal last night between 930 and 10 PM.  Symptoms have been persistent since onset without any resolution.  He states that symptoms have improved somewhat since onset but he continues with gait disturbance as well as dizziness.  He denies any increase shortness of breath or cough.  He was found to have a fever of 100.3 upon arrival.  He has had some chills.  Denies chest pain, syncope, vomiting.  Denies numbness or weakness of the face or extremities, slurred speech, visual disturbance, photophobia.      MEDICAL RECORD REVIEW:    Upon review of the medical record it appears the patient was most recently evaluated in the office with family medicine on September 13, 2022 for Medicare annual wellness visit      PAST MEDICAL HISTORY    Active Ambulatory Problems     Diagnosis Date Noted   • Anemia, vitamin B12 deficiency 04/12/2012   • Arthritis    • Hemochromatosis 03/24/2014   • Herpes zoster 12/06/2011   • Hip pain 03/24/2014   • Essential hypertension    • Cancer (HCC) 04/29/2014   • Pulmonary emphysema (HCC) 03/24/2014   • Left low back pain 10/28/2015   • Vertigo 02/02/2016   • Headache around the eyes 02/02/2016   • Mild cognitive impairment 02/02/2016   • Metastatic renal cell carcinoma  on q 2 wks chemo 02/15/2016   • Anxiety 05/16/2016   • Malignant neoplasm of lung (HCC) 05/16/2016   • Neuropathy 02/02/2017   • Perennial  allergic rhinitis 02/02/2017   • Vitamin B12 deficiency 03/16/2017   • 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   • Pharyngitis 12/16/2017   • Bronchitis 05/20/2019   • Drug-induced constipation 06/01/2020   • Hx of hiatal hernia 11/10/2020   • History of lung cancer 11/10/2020   • COVID-19 virus detected 11/11/2020   • Dyspnea 11/11/2020   • Generalized weakness 11/11/2020   • Acute respiratory failure with hypoxia (HCC) 11/23/2020   • Gastroesophageal reflux disease 11/23/2020   • Hearing disorder 11/23/2020   • Malignant neoplasm of prostate (HCC) 11/23/2020   • Sleep apnea 11/23/2020   • Anemia 11/23/2020   • Malignant neoplasm metastatic to lung (HCC) 11/23/2020   • Secondary bacterial pneumonia 11/23/2020   • Pneumonia due to COVID-19 virus 11/23/2020   • Hilar mass 11/23/2020   • On antineoplastic chemotherapy q 2wks    • Paroxysmal atrial fibrillation (HCC)    • History of pulmonary embolism    • Chronic nausea 08/04/2021   • Gastritis 01/15/2022   • Muscle spasm 01/15/2022   • Fall 03/11/2022   • Closed fracture of multiple ribs of left side 03/11/2022   • Chest wall pain 03/11/2022   • Fall, initial encounter 03/12/2022   • Immobility syndrome 03/14/2022   • Self-care deficit 03/14/2022   • Cytokine release syndrome, grade 2 03/20/2022     Resolved Ambulatory Problems     Diagnosis Date Noted   • Pulmonary embolism (HCC) 03/24/2014   • Sepsis, unspecified organism (HCC)      Past Medical History:   Diagnosis Date   • Allergic    • Atrial fibrillation (HCC)    • Cataract    • Chemotherapeutic agent or infusion extravasation    • COPD (chronic obstructive pulmonary disease) (HCC) 03/24/2014   • Emphysema of lung (HCC)    • History of Doppler ultrasound 12/28/2011   • History of EKG 02/10/2012   • Hypertension    • Injury of back    • Renal cell cancer (HCC)    • Shortness of breath          PAST SURGICAL HISTORY  Past Surgical History:   Procedure Laterality Date   • APPENDECTOMY     • CATARACT EXTRACTION      • CHOLECYSTECTOMY     • SPLENECTOMY     • TONSILLECTOMY     • VEIN SURGERY           FAMILY HISTORY  Family History   Problem Relation Age of Onset   • Aneurysm Mother    • Stroke Mother    • Heart disease Father    • Diabetes Brother         type 2         SOCIAL HISTORY  Social History     Socioeconomic History   • Marital status:    Tobacco Use   • Smoking status: Former Smoker   • Smokeless tobacco: Never Used   Vaping Use   • Vaping Use: Never used   Substance and Sexual Activity   • Alcohol use: Yes     Comment: 2 drinks month   • Drug use: No   • Sexual activity: Defer         ALLERGIES  Nsaids, Orange, Orange oil, and Latex        REVIEW OF SYSTEMS    All systems reviewed and marked as negative except as listed in HPI     PHYSICAL EXAM    I have reviewed the triage vital signs and nursing notes.    ED Triage Vitals [10/06/22 1122]   Temp Heart Rate Resp BP SpO2   100.3 °F (37.9 °C) 82 16 -- 92 %      Temp src Heart Rate Source Patient Position BP Location FiO2 (%)   Tympanic Monitor -- -- --       Physical Exam  Constitutional:       General: He is not in acute distress.     Appearance: He is well-developed.      Interventions: Nasal cannula in place.   HENT:      Head: Normocephalic and atraumatic.   Eyes:      General: No scleral icterus.     Conjunctiva/sclera: Conjunctivae normal.   Neck:      Trachea: No tracheal deviation.   Cardiovascular:      Rate and Rhythm: Normal rate and regular rhythm.   Pulmonary:      Effort: Pulmonary effort is normal.      Breath sounds: Normal breath sounds. Decreased air movement present.   Abdominal:      Palpations: Abdomen is soft.      Tenderness: There is no abdominal tenderness. There is no guarding.   Musculoskeletal:         General: No deformity.      Cervical back: Normal range of motion.   Lymphadenopathy:      Cervical: No cervical adenopathy.   Skin:     General: Skin is warm and dry.   Neurological:      Mental Status: He is alert and oriented to  person, place, and time.      Comments: Cranial nerves II-XII are intact.  Sensation is intact and symmetric throughout, no deficit.  Motor function is 5/5 and symmetric in the extremities x 4.  There is no facial droop, aphasia, dysarthria, speech is clear and coherent.  Normal FTN.   Psychiatric:         Behavior: Behavior normal.         Vital signs and nursing notes reviewed.            LAB RESULTS  Recent Results (from the past 24 hour(s))   Green Top (Gel)    Collection Time: 10/06/22 12:16 PM   Result Value Ref Range    Extra Tube Hold for add-ons.    Lavender Top    Collection Time: 10/06/22 12:16 PM   Result Value Ref Range    Extra Tube hold for add-on    Gold Top - SST    Collection Time: 10/06/22 12:16 PM   Result Value Ref Range    Extra Tube Hold for add-ons.    Light Blue Top    Collection Time: 10/06/22 12:16 PM   Result Value Ref Range    Extra Tube Hold for add-ons.    Comprehensive Metabolic Panel    Collection Time: 10/06/22 12:16 PM    Specimen: Blood   Result Value Ref Range    Glucose 101 (H) 65 - 99 mg/dL    BUN 13 8 - 23 mg/dL    Creatinine 1.18 0.76 - 1.27 mg/dL    Sodium 139 136 - 145 mmol/L    Potassium 4.1 3.5 - 5.2 mmol/L    Chloride 103 98 - 107 mmol/L    CO2 26.9 22.0 - 29.0 mmol/L    Calcium 9.5 8.6 - 10.5 mg/dL    Total Protein 6.2 6.0 - 8.5 g/dL    Albumin 4.10 3.50 - 5.20 g/dL    ALT (SGPT) 9 1 - 41 U/L    AST (SGOT) 16 1 - 40 U/L    Alkaline Phosphatase 56 39 - 117 U/L    Total Bilirubin 2.2 (H) 0.0 - 1.2 mg/dL    Globulin 2.1 gm/dL    A/G Ratio 2.0 g/dL    BUN/Creatinine Ratio 11.0 7.0 - 25.0    Anion Gap 9.1 5.0 - 15.0 mmol/L    eGFR 61.6 >60.0 mL/min/1.73   BNP    Collection Time: 10/06/22 12:16 PM    Specimen: Blood   Result Value Ref Range    proBNP 507.0 0.0 - 1,800.0 pg/mL   Troponin    Collection Time: 10/06/22 12:16 PM    Specimen: Blood   Result Value Ref Range    Troponin T 0.029 0.000 - 0.030 ng/mL   Protime-INR    Collection Time: 10/06/22 12:16 PM    Specimen: Blood    Result Value Ref Range    Protime 26.0 (H) 11.7 - 14.2 Seconds    INR 2.46 (H) 0.90 - 1.10   aPTT    Collection Time: 10/06/22 12:16 PM    Specimen: Blood   Result Value Ref Range    PTT 53.4 (H) 22.7 - 35.4 seconds   CBC Auto Differential    Collection Time: 10/06/22 12:16 PM    Specimen: Blood   Result Value Ref Range    WBC 14.67 (H) 3.40 - 10.80 10*3/mm3    RBC 2.86 (L) 4.14 - 5.80 10*6/mm3    Hemoglobin 10.3 (L) 13.0 - 17.7 g/dL    Hematocrit 31.3 (L) 37.5 - 51.0 %    .4 (H) 79.0 - 97.0 fL    MCH 36.0 (H) 26.6 - 33.0 pg    MCHC 32.9 31.5 - 35.7 g/dL    RDW 10.1 (L) 12.3 - 15.4 %    RDW-SD 40.4 37.0 - 54.0 fl    MPV 10.3 6.0 - 12.0 fL    Platelets 403 140 - 450 10*3/mm3    Neutrophil % 69.5 42.7 - 76.0 %    Lymphocyte % 14.9 (L) 19.6 - 45.3 %    Monocyte % 12.6 (H) 5.0 - 12.0 %    Eosinophil % 1.2 0.3 - 6.2 %    Basophil % 1.1 0.0 - 1.5 %    Neutrophils, Absolute 10.18 (H) 1.70 - 7.00 10*3/mm3    Lymphocytes, Absolute 2.19 0.70 - 3.10 10*3/mm3    Monocytes, Absolute 1.85 (H) 0.10 - 0.90 10*3/mm3    Eosinophils, Absolute 0.18 0.00 - 0.40 10*3/mm3    Basophils, Absolute 0.16 0.00 - 0.20 10*3/mm3   Respiratory Panel PCR w/COVID-19(SARS-CoV-2) BEBETO/SUSIE/NOHELIA/PAD/COR/MAD/ZEFERINO In-House, NP Swab in [facility], 3-4 HR TAT - Swab, Nasopharynx    Collection Time: 10/06/22 12:26 PM    Specimen: Nasopharynx; Swab   Result Value Ref Range    ADENOVIRUS, PCR Not Detected Not Detected    Coronavirus 229E Not Detected Not Detected    Coronavirus HKU1 Not Detected Not Detected    Coronavirus NL63 Not Detected Not Detected    Coronavirus OC43 Not Detected Not Detected    COVID19 Not Detected Not Detected - Ref. Range    Human Metapneumovirus Not Detected Not Detected    Human Rhinovirus/Enterovirus Not Detected Not Detected    Influenza A PCR Not Detected Not Detected    Influenza B PCR Not Detected Not Detected    Parainfluenza Virus 1 Not Detected Not Detected    Parainfluenza Virus 2 Not Detected Not Detected     Parainfluenza Virus 3 Not Detected Not Detected    Parainfluenza Virus 4 Not Detected Not Detected    RSV, PCR Not Detected Not Detected    Bordetella pertussis pcr Not Detected Not Detected    Bordetella parapertussis PCR Not Detected Not Detected    Chlamydophila pneumoniae PCR Not Detected Not Detected    Mycoplasma pneumo by PCR Not Detected Not Detected   ECG 12 Lead    Collection Time: 10/06/22 12:30 PM   Result Value Ref Range    QT Interval 382 ms       Ordered the above labs and independently reviewed the results.        RADIOLOGY  XR Chest 1 View    Result Date: 10/6/2022  XR CHEST 1 VW-  HISTORY: Male who is 81 years-old,  CHF/COPD  TECHNIQUE: Frontal views of the chest  COMPARISON: 03/19/2022  FINDINGS: Right chest port appears stable. Heart size is normal. Pulmonary vasculature is unremarkable. A left hilar mass noted on the CT exam from 03/11/2022, is also apparent on this exam, appears grossly similar, CT could be obtained as indicated for direct comparison prior CT exam. No acute infiltrate, pleural effusion, or pneumothorax. No acute osseous process.      Redemonstration of left hilar mass. No acute infiltrate identified.  This report was finalized on 10/6/2022 1:23 PM by Dr. David Hinds M.D.      CT Angiogram Head, CT Angiogram Neck    Result Date: 10/6/2022  CT ANGIOGRAPHY OF THE HEAD AND NECK WITHOUT AND WITH INTRAVENOUS CONTRAST AND 3-D RECONSTRUCTIONS  HISTORY: Headache and dizziness and unsteady gait.  The CT scan was performed as an emergency procedure with CT angiography protocol through the head and neck without and with intravenous contrast and 3-D reconstructions. The following findings are present: 1. An initial noncontrast CT scan of the brain was performed. There is moderate diffuse atrophy and chronic small vessel ischemic change similar to the study of 03/14/2022. There is no evidence of intracranial hemorrhage or mass effect. 2. Evaluation for stenosis is based on NASCET  criteria. The vertebral arteries are patent and symmetric in size. Both supply the basilar artery. 3. The cervical carotid arteries show no NASCET significant stenosis or irregularity. 4. The intracranial CT angiography shows no major branch stenosis. There is no evidence of aneurysm. 5. The postcontrast CT scan shows no abnormal enhancement. The internal auditory canals and cerebellum are unremarkable.      Radiation dose reduction techniques were utilized, including automated exposure control and exposure modulation based on body size.         I ordered the above noted radiological studies. Independently reviewed by me and discussed with radiologist.  See dictation above for official radiology interpretation.      PROCEDURES    Procedures        MEDICATIONS GIVEN IN ER    Medications   sodium chloride 0.9 % flush 10 mL (has no administration in time range)   sodium chloride 0.9 % flush 10 mL (has no administration in time range)   prochlorperazine (COMPAZINE) injection 5 mg (has no administration in time range)   diphenhydrAMINE (BENADRYL) injection 12.5 mg (has no administration in time range)   acetaminophen (TYLENOL) tablet 1,000 mg (has no administration in time range)   sodium chloride 0.9 % bolus 500 mL ( Intravenous Currently Infusing 10/6/22 1330)   iopamidol (ISOVUE-300) 61 % injection 100 mL (95 mL Intravenous Given by Other 10/6/22 1405)         PROGRESS, DATA ANALYSIS, CONSULTS, AND MEDICAL DECISION MAKING    All labs have been independently reviewed by me.  All radiology studies have been reviewed by me.   EKG's independently reviewed by me.  Discussion below represents my analysis of pertinent findings related to patient's condition, differential diagnosis, treatment plan and final disposition.    DIFFERENTIAL DIAGNOSIS INCLUDE BUT NOT LIMITED TO:     Stroke, TIA, vertigo, orthostasis, dehydration, sepsis, viral syndrome    ED Course as of 10/06/22 1516   Thu Oct 06, 2022   1213 I spoke with MD Nathan  with Stroke neurology at this time regarding the patient.  I discussed work-up, results, concerns.  I discussed the consulting provider's desire for CT head and CT angiograms of the head and neck.  Patient not a tPA candidate.  Currently taking Xarelto.  Symptoms greater than 4 and half hours from onset.     [DC]   1419 I viewed CXR on PACS system.  My interpretation is no pneumothorax. [DC]   1434 WBC(!): 14.67 [DC]   1512 I spoke with MD Teresa with [facility] at this time regarding the patient.  I discussed work-up, results, concerns.  I discussed the consulting provider's desire for tele admit.     [DC]      ED Course User Index  [DC] Nir Hawkins PA       AS OF 15:16 EDT VITALS:    BP - 158/67  HR - 80  TEMP - 100.3 °F (37.9 °C) (Tympanic)  02 SATS - 96%    1516 I rechecked the patient.  I discussed the patient's radiology findings (including all incidental findings), diagnosis, and plan for admission.  All questions answered.        DIAGNOSIS  Final diagnoses:   Dizziness   Gait instability   Abnormal chest x-ray   Elevated INR         DISPOSITION  Admit    Pt masked in first look. I wore a surgical mask throughout my encounters with the pt. I performed hand hygiene on entry into the pt room and upon exit.     Dictated utilizing Dragon dictation     Note Disclaimer: At [facility], we believe that sharing information builds trust and better relationships. You are receiving this note because you recently visited [facility]. It is possible you will see health information before a provider has talked with you about it. This kind of information can be easy to misunderstand. To help you fully understand what it means for your health, we urge you to discuss this note with your provider.      Nir Hawkins PA  10/06/22 1913

## 2022-10-06 NOTE — PLAN OF CARE
Goal Outcome Evaluation:              Outcome Evaluation: VSS. Pt admitted form ED with dizziness and unsteady gait at home. Pt did not report any dizziness on arrival to 4e. Wife at bedside. Pt c/o SOA with exertion. Currently on 2L. Anibal LAWLER. Waiting on admission orders. Will continue to monitor.

## 2022-10-06 NOTE — ED NOTES
Nursing report ED to [facility]  81 y.o.  male    HPI :   Chief Complaint   Patient presents with   • Weakness - Generalized   • Headache       Admitting doctor:   Coco Moore MD    Admitting diagnosis:   The primary encounter diagnosis was Dizziness. Diagnoses of Gait instability, Abnormal chest x-ray, and Elevated INR were also pertinent to this visit.    Code status:   Current Code Status     Date Active Code Status Order ID Comments User Context       Prior    Advance Care Planning Activity          Allergies:   Nsaids, Orange, Orange oil, and Latex    Isolation:   No active isolations    Intake and Output    Intake/Output Summary (Last 24 hours) at 10/6/2022 1544  Last data filed at 10/6/2022 1500  Gross per 24 hour   Intake 500 ml   Output --   Net 500 ml       Weight:       10/06/22  1201   Weight: 103 kg (227 lb 6.4 oz)       Most recent vitals:   Vitals:    10/06/22 1456 10/06/22 1459 10/06/22 1502 10/06/22 1530   BP: 134/74 141/61 158/67 139/57   BP Location:       Patient Position: Lying Standing Standing    Pulse: 69 80 80 68   Resp:    16   Temp:       TempSrc:       SpO2:    97%   Weight:           Active LDAs/IV Access:   Lines, Drains & Airways     Active LDAs     Name Placement date Placement time Site Days    Peripheral IV 10/06/22 1214 Right Antecubital 10/06/22  1214  Antecubital  less than 1                Labs (abnormal labs have a star):   Labs Reviewed   COMPREHENSIVE METABOLIC PANEL - Abnormal; Notable for the following components:       Result Value    Glucose 101 (*)     Total Bilirubin 2.2 (*)     All other components within normal limits    Narrative:     GFR Normal >60  Chronic Kidney Disease <60  Kidney Failure <15     PROTIME-INR - Abnormal; Notable for the following components:    Protime 26.0 (*)     INR 2.46 (*)     All other components within normal limits   APTT - Abnormal; Notable for the following components:    PTT 53.4 (*)     All other components  within normal limits   CBC WITH AUTO DIFFERENTIAL - Abnormal; Notable for the following components:    WBC 14.67 (*)     RBC 2.86 (*)     Hemoglobin 10.3 (*)     Hematocrit 31.3 (*)     .4 (*)     MCH 36.0 (*)     RDW 10.1 (*)     Lymphocyte % 14.9 (*)     Monocyte % 12.6 (*)     Neutrophils, Absolute 10.18 (*)     Monocytes, Absolute 1.85 (*)     All other components within normal limits   RESPIRATORY PANEL PCR W/ COVID-19 (SARS-COV-2) BEBETO/SUSIE/NOHELIA/PAD/COR/MAD/ZEFERINO IN-HOUSE, NP SWAB IN UTM/VTP, 3-4 HR TAT - Normal    Narrative:     In the setting of a positive respiratory panel with a viral infection PLUS a negative procalcitonin without other underlying concern for bacterial infection, consider observing off antibiotics or discontinuation of antibiotics and continue supportive care. If the respiratory panel is positive for atypical bacterial infection (Bordetella pertussis, Chlamydophila pneumoniae, or Mycoplasma pneumoniae), consider antibiotic de-escalation to target atypical bacterial infection.   BNP (IN-HOUSE) - Normal    Narrative:     Among patients with dyspnea, NT-proBNP is highly sensitive for the detection of acute congestive heart failure. In addition NT-proBNP of <300 pg/ml effectively rules out acute congestive heart failure with 99% negative predictive value.    Results may be falsely decreased if patient taking Biotin.     TROPONIN (IN-HOUSE) - Normal    Narrative:     Troponin T Reference Range:  <= 0.03 ng/mL-   Negative for AMI  >0.03 ng/mL-     Abnormal for myocardial necrosis.  Clinicians would have to utilize clinical acumen, EKG, Troponin and serial changes to determine if it is an Acute Myocardial Infarction or myocardial injury due to an underlying chronic condition.       Results may be falsely decreased if patient taking Biotin.     RAINBOW DRAW    Narrative:     The following orders were created for panel order Pax Draw.  Procedure                               Abnormality          Status                     ---------                               -----------         ------                     Green Top (Gel)[[id number]]                                  Final result               Lavender Top[[id number]]                                     Final result               Gold Top - SST[[id number]]                                   Final result               Light Blue Top[[id number]]                                   Final result                 Please view results for these tests on the individual orders.   GREEN TOP   LAVENDER TOP   GOLD TOP - SST   LIGHT BLUE TOP   CBC AND DIFFERENTIAL    Narrative:     The following orders were created for panel order CBC & Differential.  Procedure                               Abnormality         Status                     ---------                               -----------         ------                     CBC Auto Differential[[id number]]        Abnormal            Final result                 Please view results for these tests on the individual orders.       EKG:   ECG 12 Lead   Preliminary Result   HEART RATE= 71  bpm   RR Interval= 845  ms   WY Interval= 180  ms   P Horizontal Axis= 7  deg   P Front Axis= 65  deg   QRSD Interval= 103  ms   QT Interval= 382  ms   QRS Axis= -14  deg   T Wave Axis= 43  deg   - BORDERLINE ECG -   Sinus rhythm   Borderline repolarization abnormality   Electronically Signed By:    Date and Time of Study: 2022-10-06 12:30:58          Meds given in ED:   Medications   sodium chloride 0.9 % flush 10 mL (has no administration in time range)   sodium chloride 0.9 % flush 10 mL (has no administration in time range)   sodium chloride 0.9 % bolus 500 mL (0 mL Intravenous Stopped 10/6/22 1500)   iopamidol (ISOVUE-300) 61 % injection 100 mL (95 mL Intravenous Given by Other 10/6/22 1405)   prochlorperazine (COMPAZINE) injection 5 mg (5 mg Intravenous Given 10/6/22 1533)   diphenhydrAMINE (BENADRYL) injection 12.5 mg (12.5 mg Intravenous  Given 10/6/22 1533)   acetaminophen (TYLENOL) tablet 1,000 mg (1,000 mg Oral Given 10/6/22 1533)       Imaging results:  XR Chest 1 View    Result Date: 10/6/2022  Redemonstration of left hilar mass. No acute infiltrate identified.  This report was finalized on 10/6/2022 1:23 PM by Dr. David Hinds M.D.        Ambulatory status:   - Assist x 2    Social issues:   Social History     Socioeconomic History   • Marital status:    Tobacco Use   • Smoking status: Former Smoker   • Smokeless tobacco: Never Used   Vaping Use   • Vaping Use: Never used   Substance and Sexual Activity   • Alcohol use: Yes     Comment: 2 drinks month   • Drug use: No   • Sexual activity: Defer       NIH Stroke Scale:  Interval: baseline      Evelyne Cole RN  10/06/22 15:44 EDT

## 2022-10-06 NOTE — ED NOTES
Patient states that when he woke up (0630 per wife), he could not raise up d/t dizzyness.  Pt states that when he tried he would fall side to side.  Patient states he also woke with a headache at the very top of his head (4:10).  Pt c/o weakness.  Pt states he has not had these symptoms before.

## 2022-10-06 NOTE — H&P
PCP: Dillon Walton MD    Chief complaint   Chief Complaint   Patient presents with   • Weakness - Generalized   • Headache       HPI  Patient is a 81 y.o. 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  Patient woke up any felt lightheaded and room spinning and his wife took his blood sugar and he laid back down and went to sleep.  He said he woke up and still had the same feeling he said the top of his head felt like someone had hit it with a hammer although he had not fallen.  That was pretty constant with no radiation.  He did have some associated nausea.  He said that he try to get up and walk and that he Falling to 1 side and then the other.  He made it to the ER and his temperature was 100.3.  He is normally on 2 L of home oxygen but he was only satting 86% on those 2 L.      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 Kathi Lloyd; unchanged from prior EKG   • Hypertension     benign essential   • Injury of back    • Pulmonary embolism (HCC) 03/24/2014   • Renal cell cancer (HCC)    • Shortness of breath    • Sleep apnea        PAST SURGICAL HISTORY  Past  Surgical History:   Procedure Laterality Date   • APPENDECTOMY     • CATARACT EXTRACTION     • CHOLECYSTECTOMY     • SPLENECTOMY     • TONSILLECTOMY     • VEIN SURGERY         FAMILY HISTORY  Family History   Problem Relation Age of Onset   • Aneurysm Mother    • Stroke Mother    • Heart disease Father    • Diabetes Brother         type 2       SOCIAL HISTORY  Social History     Tobacco Use   • Smoking status: Former Smoker   • Smokeless tobacco: Never Used   Vaping Use   • Vaping Use: Never used   Substance Use Topics   • Alcohol use: Yes     Comment: 2 drinks month   • Drug use: No       MEDICATIONS:  Medications Prior to Admission   Medication Sig Dispense Refill Last Dose   • ALPRAZolam (Xanax) 0.25 MG tablet Take 1 tablet by mouth 2 (Two) Times a Day As Needed for Anxiety. 60 tablet 2 Past Week at Unknown time   • amLODIPine (NORVASC) 5 MG tablet Take 1 tablet by mouth Daily. 90 tablet 1 10/6/2022 at Unknown time   • Calcium Carb-Cholecalciferol (CALTRATE 600+D3 PO) Take 1 tablet by mouth 2 (two) times a day.   10/5/2022 at Unknown time   • cyclobenzaprine (FLEXERIL) 5 MG tablet Take 5 mg by mouth 3 (Three) Times a Day As Needed for Muscle Spasms.   Past Week at Unknown time   • famotidine (Pepcid) 20 MG tablet Take 1 tablet by mouth 2 (Two) Times a Day. 180 tablet 1 10/6/2022 at Unknown time   • finasteride (PROSCAR) 5 MG tablet    10/5/2022 at Unknown time   • folic acid (FOLVITE) 1 MG tablet Take 1 mg by mouth 3 (Three) Times a Day.   10/6/2022 at Unknown time   • gabapentin (NEURONTIN) 400 MG capsule Take PO: 400mg QAM and with lunch, 800mg QHS 12 capsule 0 10/6/2022 at Unknown time   • HYDROcodone-acetaminophen (NORCO) 5-325 MG per tablet   1 Tab, Oral, Q6H, PRN as needed for pain, # 120 Tab, 0 Refill(s), [facility]: [facility], cm, 08/30/22 7:39:00 EDT, CLINICALHEIGHT, 104.66, kg, 08/30/22 7:39:00 EDT, CLINICALWEIGHT, 185.4   10/6/2022 at Unknown time   • ipratropium (ATROVENT) 0.06 % nasal spray 2  sprays into the nostril(s) as directed by provider 4 (Four) Times a Day. 15 mL 11 Past Week at Unknown time   • levocetirizine (XYZAL) 5 MG tablet Take 1 tablet by mouth Every Evening. 90 tablet 1 10/5/2022 at Unknown time   • levothyroxine (SYNTHROID, LEVOTHROID) 112 MCG tablet Take 112 mcg by mouth Daily.   10/6/2022 at Unknown time   • NAMZARIC 28-10 MG capsule sustained-release 24 hr Take 1 capsule by mouth Every Evening.   10/5/2022 at Unknown time   • ondansetron (ZOFRAN) 4 MG tablet Take 1 tablet by mouth Every 6 (Six) Hours As Needed for Nausea or Vomiting. 270 tablet 1 10/6/2022 at Unknown time   • polyethylene glycol (MIRALAX) 17 g packet Take 17 g by mouth Daily.   Past Month at Unknown time   • sennosides-docusate (senna-docusate sodium) 8.6-50 MG per tablet Take 2 tablets by mouth 2 (Two) Times a Day As Needed for Constipation.   Past Week at Unknown time   • sertraline (ZOLOFT) 25 MG tablet Take 25 mg by mouth Daily.   Past Month at Unknown time   • SPIRIVA HANDIHALER 18 MCG per inhalation capsule Place 1 capsule into inhaler and inhale Daily.   10/6/2022 at Unknown time   • Stiolto Respimat 2.5-2.5 MCG/ACT aerosol solution inhaler    Past Month at Unknown time   • tiotropium bromide-olodaterol (STIOLTO RESPIMAT) 2.5-2.5 MCG/ACT aerosol solution inhaler Inhale 1 puff 2 (Two) Times a Day.   10/6/2022 at Unknown time   • VENTOLIN  (90 BASE) MCG/ACT inhaler Inhale 2 puffs Every 4 (Four) Hours As Needed.   Past Month at Unknown time   • XARELTO 20 MG tablet Take 20 mg by mouth daily with dinner.   10/6/2022 at Unknown time   • lidocaine (LIDODERM) 5 % Place 2 patches on the skin as directed by provider Daily. Remove & Discard patch within 12 hours or as directed by MD 30 patch 0    • meclizine (ANTIVERT) 12.5 MG tablet Take 1 tablet by mouth 3 (Three) Times a Day As Needed for Dizziness. 270 tablet 1 More than a month at Unknown time   • NIVOLUMAB IV Infuse  into a venous catheter Every 30 (Thirty)  Days. Due: 3/15/2022      • OXcarbazepine (TRILEPTAL) 300 MG tablet Take 1 tablet by mouth every night at bedtime. 5 tablet 0    • oxyCODONE (ROXICODONE) 5 MG immediate release tablet Take 1 tablet by mouth Every 6 (Six) Hours As Needed for Moderate Pain  or Severe Pain . 12 tablet 0        Allergies:  Nsaids, Orange, Orange oil, and Latex    Review of Systems:  Fatigue, nausea, dizziness, headache, chronic low back pain, hard of hearing, history of dysphagia  Negative for following (except as per HPI):  Constitution: chills, fevers, fatigue   Eyes: change of vision, loss of vision and discharge  ENT: ear drainage, ear ringing and facial trauma  Respiratory: cough, pleuritic pain, shortness of air  Cardiovascular: chest pressure, pain, lower extremity edema, palpitations  Gastrointestinal: constipation, diarrhea, nausea, vomiting, pain    Integument: rash and wound  Hematologic / Lymphatic: excessive bleeding and easy bruising  Musculoskeletal: joint pain, joint stiffness, joint swelling and muscle pain  Neurological: headaches, numbness, seizures and tremors  Behavioral / Psych: anxiety, depression and hallucinations         Vital Signs  Temp:  [98.7 °F (37.1 °C)-100.3 °F (37.9 °C)] 98.7 °F (37.1 °C)  Heart Rate:  [68-82] 72  Resp:  [16] 16  BP: (128-158)/(56-76) 128/56  Flowsheet Rows    Flowsheet Row First Filed Value   Admission Height --   Admission Weight 103 kg (227 lb 6.4 oz) Documented at 10/06/2022 1201         Body mass index is 31.14 kg/m².    Physical Exam:  General Appearance:    Alert, cooperative, in no acute distress chronically ill-appearing appearing   Head:    Normocephalic, without obvious abnormality, atraumatic   Eyes:         conjunctivae and sclerae normal, no icterus, PERRLA   ENT:    Ears grossly intact, oral mucosa moist,   Neck:   No adenopathy, supple, trachea midline,        Lungs:     Clear to auscultation,respirations regular, even and                   unlabored    Heart:    Regular  rhythm and normal rate,  no murmur, normal S1 and S2,   Abdomen:     Normal bowel sounds, no masses,  soft non-tender, non-distended, obese   Extremities:   Moves all extremities , no cyanosis, 1+edema,             Pulses:   Pulses palpable and equal bilaterally   Skin:   No bleeding, rash,  + bruising  And peteche arms   Neurologic:    Psych:   Cranial nerves 2 - 12 grossly intact, sensation grossly intact,     Moves all extremities , equal bilateral strength 3/5    Alert and Oriented x 3, Normal Affect     I used full protective equipment while examining this patient.  This includes N95 face mask /protective eyewear and protective gown when appropriate.  I washed/sanitized my hands before entering the room and immediately upon leaving the room.    LABS:  Admission on 10/06/2022   Component Date Value Ref Range Status   • Extra Tube 10/06/2022 Hold for add-ons.   Final    Auto resulted.   • Extra Tube 10/06/2022 hold for add-on   Final    Auto resulted   • Extra Tube 10/06/2022 Hold for add-ons.   Final    Auto resulted.   • Extra Tube 10/06/2022 Hold for add-ons.   Final    Auto resulted   • QT Interval 10/06/2022 382  ms Final   • Glucose 10/06/2022 101 (A) 65 - 99 mg/dL Final   • BUN 10/06/2022 13  8 - 23 mg/dL Final   • Creatinine 10/06/2022 1.18  0.76 - 1.27 mg/dL Final   • Sodium 10/06/2022 139  136 - 145 mmol/L Final   • Potassium 10/06/2022 4.1  3.5 - 5.2 mmol/L Final    Slight hemolysis detected by analyzer. Results may be affected.   • Chloride 10/06/2022 103  98 - 107 mmol/L Final   • CO2 10/06/2022 26.9  22.0 - 29.0 mmol/L Final   • Calcium 10/06/2022 9.5  8.6 - 10.5 mg/dL Final   • Total Protein 10/06/2022 6.2  6.0 - 8.5 g/dL Final   • Albumin 10/06/2022 4.10  3.50 - 5.20 g/dL Final   • ALT (SGPT) 10/06/2022 9  1 - 41 U/L Final   • AST (SGOT) 10/06/2022 16  1 - 40 U/L Final   • Alkaline Phosphatase 10/06/2022 56  39 - 117 U/L Final   • Total Bilirubin 10/06/2022 2.2 (A) 0.0 - 1.2 mg/dL Final   •  Globulin 10/06/2022 2.1  gm/dL Final   • A/G Ratio 10/06/2022 2.0  g/dL Final   • BUN/Creatinine Ratio 10/06/2022 11.0  7.0 - 25.0 Final   • Anion Gap 10/06/2022 9.1  5.0 - 15.0 mmol/L Final   • eGFR 10/06/2022 61.6  >60.0 mL/min/1.73 Final    National Kidney Foundation and American Society of Nephrology (ASN) Task Force recommended calculation based on the Chronic Kidney Disease Epidemiology Collaboration (CKD-EPI) equation refit without adjustment for race.   • proBNP 10/06/2022 507.0  0.0 - 1,800.0 pg/mL Final   • Troponin T 10/06/2022 0.029  0.000 - 0.030 ng/mL Final   • Protime 10/06/2022 26.0 (A) 11.7 - 14.2 Seconds Final   • INR 10/06/2022 2.46 (A) 0.90 - 1.10 Final   • PTT 10/06/2022 53.4 (A) 22.7 - 35.4 seconds Final   • ADENOVIRUS, PCR 10/06/2022 Not Detected  Not Detected Final   • Coronavirus 229E 10/06/2022 Not Detected  Not Detected Final   • Coronavirus HKU1 10/06/2022 Not Detected  Not Detected Final   • Coronavirus NL63 10/06/2022 Not Detected  Not Detected Final   • Coronavirus OC43 10/06/2022 Not Detected  Not Detected Final   • COVID19 10/06/2022 Not Detected  Not Detected - Ref. Range Final   • Human Metapneumovirus 10/06/2022 Not Detected  Not Detected Final   • Human Rhinovirus/Enterovirus 10/06/2022 Not Detected  Not Detected Final   • Influenza A PCR 10/06/2022 Not Detected  Not Detected Final   • Influenza B PCR 10/06/2022 Not Detected  Not Detected Final   • Parainfluenza Virus 1 10/06/2022 Not Detected  Not Detected Final   • Parainfluenza Virus 2 10/06/2022 Not Detected  Not Detected Final   • Parainfluenza Virus 3 10/06/2022 Not Detected  Not Detected Final   • Parainfluenza Virus 4 10/06/2022 Not Detected  Not Detected Final   • RSV, PCR 10/06/2022 Not Detected  Not Detected Final   • Bordetella pertussis pcr 10/06/2022 Not Detected  Not Detected Final   • Bordetella parapertussis PCR 10/06/2022 Not Detected  Not Detected Final   • Chlamydophila pneumoniae PCR 10/06/2022 Not Detected   Not Detected Final   • Mycoplasma pneumo by PCR 10/06/2022 Not Detected  Not Detected Final   • WBC 10/06/2022 14.67 (A) 3.40 - 10.80 10*3/mm3 Final   • RBC 10/06/2022 2.86 (A) 4.14 - 5.80 10*6/mm3 Final   • Hemoglobin 10/06/2022 10.3 (A) 13.0 - 17.7 g/dL Final   • Hematocrit 10/06/2022 31.3 (A) 37.5 - 51.0 % Final   • MCV 10/06/2022 109.4 (A) 79.0 - 97.0 fL Final   • MCH 10/06/2022 36.0 (A) 26.6 - 33.0 pg Final   • MCHC 10/06/2022 32.9  31.5 - 35.7 g/dL Final   • RDW 10/06/2022 10.1 (A) 12.3 - 15.4 % Final   • RDW-SD 10/06/2022 40.4  37.0 - 54.0 fl Final   • MPV 10/06/2022 10.3  6.0 - 12.0 fL Final   • Platelets 10/06/2022 403  140 - 450 10*3/mm3 Final   • Neutrophil % 10/06/2022 69.5  42.7 - 76.0 % Final   • Lymphocyte % 10/06/2022 14.9 (A) 19.6 - 45.3 % Final   • Monocyte % 10/06/2022 12.6 (A) 5.0 - 12.0 % Final   • Eosinophil % 10/06/2022 1.2  0.3 - 6.2 % Final   • Basophil % 10/06/2022 1.1  0.0 - 1.5 % Final   • Neutrophils, Absolute 10/06/2022 10.18 (A) 1.70 - 7.00 10*3/mm3 Final   • Lymphocytes, Absolute 10/06/2022 2.19  0.70 - 3.10 10*3/mm3 Final   • Monocytes, Absolute 10/06/2022 1.85 (A) 0.10 - 0.90 10*3/mm3 Final   • Eosinophils, Absolute 10/06/2022 0.18  0.00 - 0.40 10*3/mm3 Final   • Basophils, Absolute 10/06/2022 0.16  0.00 - 0.20 10*3/mm3 Final       DIAGNOSTICS:  XR Chest 1 View    Result Date: 10/6/2022  Redemonstration of left hilar mass. No acute infiltrate identified.  This report was finalized on 10/6/2022 1:23 PM by Dr. David Hinds M.D.      CT ANGIOGRAPHY OF THE HEAD AND NECK WITHOUT AND WITH INTRAVENOUS  CONTRAST AND 3-D RECONSTRUCTIONS     HISTORY: Headache and dizziness and unsteady gait.     The CT scan was performed as an emergency procedure with CT angiography  protocol through the head and neck without and with intravenous contrast  and 3-D reconstructions. The following findings are present:  1. An initial noncontrast CT scan of the brain was performed. There is  moderate  diffuse atrophy and chronic small vessel ischemic change  similar to the study of 03/14/2022. There is no evidence of intracranial  hemorrhage or mass effect.  2. Evaluation for stenosis is based on NASCET criteria. The vertebral  arteries are patent and symmetric in size. Both supply the basilar  artery.  3. The cervical carotid arteries show no NASCET significant stenosis or  irregularity.  4. The intracranial CT angiography shows no major branch stenosis. There  is no evidence of aneurysm.  5. The postcontrast CT scan shows no abnormal enhancement. The internal  auditory canals and cerebellum are unremarkable.         Results Review:   I reviewed the patient's new clinical results.  Discussed with ER physician  Old records reviewed / Medical Decision Making High Complexity  I personally viewed and interpreted the patient's EKG/Telemetry data- sinus rhythm  Sinus rhythm  Nonspecific intraventricular conduction delay  Borderline repolarization abnormality  When compared with ECG of 19-Mar-2022 9:24:00,  No significant change    ASSESSMENT AND PLAN    Essential hypertension    Pulmonary emphysema (HCC)    Metastatic renal cell carcinoma  on q 2 wks chemo    Acute on chronic respiratory failure with hypoxia (HCC)    Sleep apnea    Hilar mass    Paroxysmal atrial fibrillation (HCC)    Dizziness    · Dizziness/lightheadedness with ataxia/gait disturbance  -Patient was called to team D to rule out posterior stroke.  CTA head and neck did not show any flow-limiting stenosis.  -Unclear if this is autonomic dysfunction due to age, or polypharmacy patient/Aricept/Trileptal/Zoloft /gabpentin/flexeril/xanax  -Patient had a headache with it question if it is more of a migraine or just secondary to illness in general since the patient was having some hypoxia    · Acute on chronic respiratory failure with hypoxia.  Patient normally on 2 L of home O2 but he was 86% on that.  -He has a history of COPD  -But he also had some issues  with swallowing.  We will get speech therapy to evaluate and treat  -Patient has a left hilar mass that is known to be a metastatic adenopathy from renal cell which lit up on PET scan however question if there is some underlying pneumonia given the white count, new hypoxia, swallowing issues  -CT read still pending.  We will get a Pro-Cyril which was elevated and therefore I am going to start IV antibiotics and get blood cultures    · Dysphagia-we will get speech therapy to evaluate and treat    · Renal cell carcinoma metastatic  -Patient sees [facility] we will try to get the records.  Says he stopped infusions 3 months ago because it was not working.    · HOME MEDS HELD:   -Restart when clinically appropriate      · Chronic Medical conditions being monitored- stable, continue medical managment  -Chronic low back pain patient gets shots and is on narcotics    +DVT proph: scd  + Full code as far as I know, but patient has very poor prognosis and needs to have his affairs in order.  He states oncology gave him approximately a year to live unclear when that was relayed to him.    I discussed the patients findings and my recommendations with the patient and/or family.  Please reference all orders placed.    Coco Moore MD  10/06/22  18:54 EDT      This document is intended for medical expert use only. Reading of this document by patients and/or patient's family without participating in medical staff guidance may result in misinterpretation and unintended morbidity. Any interpretation of such data is the responsibility of the patient and/or family member responsible for the patient in concert with their primary or specialist providers, and NOT to be left for sources of online searches such as [employer], [employer] or similar queries. Relying on these approaches to knowledge may result in misinterpretation, misguided goals of care and even death should patients or family members try recommendations outside of the  realm of professional medical care in a supervised way.    Dictated utilizing Dragon dictation:  Much of this encounter note is an electronic transcription/translation of spoken language to printed text. The electronic translation of spoken language may permit erroneous, or at times, nonsensical words or phrases to be inadvertently transcribed; Although I have reviewed the note for such errors, some may still exist.

## 2022-10-06 NOTE — ED TRIAGE NOTES
Pain on the top of his head this am when waking and is dizzy.  He has been weak.  He has nausea.  His balance is off.  He had a flu shot yest.      Patient was placed in face mask during first look triage.  Patient was wearing a face mask throughout encounter.  I wore personal protective equipment throughout the encounter.  Hand hygiene was performed before and after patient encounter.

## 2022-10-06 NOTE — ED PROVIDER NOTES
Pt presents to the ED c/o  dizziness, headache, low-grade temperature, shortness of breath and mild hypoxia.  Dizziness started this morning around 6:30 AM when he woke up worse with positional change and movement but present at rest.  No focal numbness or weakness of the arms or legs.  No chest pains.  Patient in treatment for cancer currently.     On exam,   General: Appears mildly uncomfortable, nontoxic  HEENT: Mucous membranes moist, atraumatic, EOMI  Neck: Full ROM  Pulm: Symmetric chest rise, nonlabored, lungs CTAB  Cardiovascular: Regular rate and rhythm, intact distal pulses  GI: Soft, nontender, nondistended, no rebound, no guarding, bowel sounds present  MSK: Full ROM, no deformity  Skin: Warm, dry  Neuro: Awake, alert, oriented x 4, GCS 15, no dysarthria or aphasia, no facial droop, moving all extremities, no focal deficits  Psych: Calm, cooperative      N95, protective eye goggles, and gloves used during this encounter. Patient in surgical mask.    Plan:   ED Course as of 10/06/22 1638   Thu Oct 06, 2022   1213 I spoke with MD Nathan with Stroke neurology at this time regarding the patient.  I discussed work-up, results, concerns.  I discussed the consulting provider's desire for CT head and CT angiograms of the head and neck.  Patient not a tPA candidate.  Currently taking Xarelto.  Symptoms greater than 4 and half hours from onset.     [DC]   1419 I viewed CXR on PACS system.  My interpretation is no pneumothorax. [DC]   1434 WBC(!): 14.67 [DC]   1512 I spoke with MD Teresa with [facility] at this time regarding the patient.  I discussed work-up, results, concerns.  I discussed the consulting provider's desire for tele admit.     [DC]      ED Course User Index  [DC] Nir Hawkins PA     Work-up at this point time would suggest potentially an underlying pneumonia, dizziness could very well be CVA, vertigo, Ménière's disease, chemo side effect, cancer side effect, among others.  Plan for hospitalization  for further treatment and management.  No overt evidence of a CVA on the CT/CTAs performed in the emergency department.  May need MRI moving forward.  All questions and concerns addressed with patient and family.     Attestation:  The ANTONIO and I have discussed this patient's history, physical exam, and treatment plan.  I have reviewed the documentation and personally had a face to face interaction with the patient. I affirm the documentation and agree with the treatment and plan.  The attached note describes my personal findings.          Tono Pacheco MD  10/06/22 1640

## 2022-10-07 ENCOUNTER — APPOINTMENT (OUTPATIENT)
Dept: CT IMAGING | Facility: HOSPITAL | Age: 82
End: 2022-10-07

## 2022-10-07 ENCOUNTER — APPOINTMENT (OUTPATIENT)
Dept: MRI IMAGING | Facility: HOSPITAL | Age: 82
End: 2022-10-07

## 2022-10-07 LAB
ANION GAP SERPL CALCULATED.3IONS-SCNC: 6.5 MMOL/L (ref 5–15)
BASOPHILS # BLD MANUAL: 0.13 10*3/MM3 (ref 0–0.2)
BASOPHILS NFR BLD MANUAL: 1 % (ref 0–1.5)
BUN SERPL-MCNC: 15 MG/DL (ref 8–23)
BUN/CREAT SERPL: 13.5 (ref 7–25)
CALCIUM SPEC-SCNC: 9.3 MG/DL (ref 8.6–10.5)
CHLORIDE SERPL-SCNC: 105 MMOL/L (ref 98–107)
CO2 SERPL-SCNC: 27.5 MMOL/L (ref 22–29)
CREAT SERPL-MCNC: 1.11 MG/DL (ref 0.76–1.27)
D-LACTATE SERPL-SCNC: 1.4 MMOL/L (ref 0.5–2)
DEPRECATED RDW RBC AUTO: 37.4 FL (ref 37–54)
EGFRCR SERPLBLD CKD-EPI 2021: 66.3 ML/MIN/1.73
EOSINOPHIL # BLD MANUAL: 0.39 10*3/MM3 (ref 0–0.4)
EOSINOPHIL NFR BLD MANUAL: 3 % (ref 0.3–6.2)
ERYTHROCYTE [DISTWIDTH] IN BLOOD BY AUTOMATED COUNT: 9.9 % (ref 12.3–15.4)
FERRITIN SERPL-MCNC: 689 NG/ML (ref 30–400)
GLUCOSE SERPL-MCNC: 99 MG/DL (ref 65–99)
HCT VFR BLD AUTO: 27.1 % (ref 37.5–51)
HGB BLD-MCNC: 9.2 G/DL (ref 13–17.7)
HGB RETIC QN AUTO: 34.5 PG (ref 29.8–36.1)
IMM RETICS NFR: 23.3 % (ref 3–15.8)
IRON 24H UR-MRATE: 91 MCG/DL (ref 59–158)
IRON SATN MFR SERPL: 33 % (ref 20–50)
LYMPHOCYTES # BLD MANUAL: 5.51 10*3/MM3 (ref 0.7–3.1)
LYMPHOCYTES NFR BLD MANUAL: 13 % (ref 5–12)
MACROCYTES BLD QL SMEAR: ABNORMAL
MCH RBC QN AUTO: 35.8 PG (ref 26.6–33)
MCHC RBC AUTO-ENTMCNC: 33.9 G/DL (ref 31.5–35.7)
MCV RBC AUTO: 105.4 FL (ref 79–97)
MONOCYTES # BLD: 1.7 10*3/MM3 (ref 0.1–0.9)
NEUTROPHILS # BLD AUTO: 5.38 10*3/MM3 (ref 1.7–7)
NEUTROPHILS NFR BLD MANUAL: 41 % (ref 42.7–76)
NRBC BLD AUTO-RTO: 0.2 /100 WBC (ref 0–0.2)
PLAT MORPH BLD: NORMAL
PLATELET # BLD AUTO: 338 10*3/MM3 (ref 140–450)
PMV BLD AUTO: 9.9 FL (ref 6–12)
POTASSIUM SERPL-SCNC: 4.1 MMOL/L (ref 3.5–5.2)
RBC # BLD AUTO: 2.57 10*6/MM3 (ref 4.14–5.8)
RETICS # AUTO: 0.18 10*6/MM3 (ref 0.02–0.13)
RETICS/RBC NFR AUTO: 7.08 % (ref 0.7–1.9)
SODIUM SERPL-SCNC: 139 MMOL/L (ref 136–145)
TIBC SERPL-MCNC: 279 MCG/DL (ref 298–536)
TRANSFERRIN SERPL-MCNC: 187 MG/DL (ref 200–360)
VARIANT LYMPHS NFR BLD MANUAL: 42 % (ref 19.6–45.3)
VIT B12 BLD-MCNC: 227 PG/ML (ref 211–946)
WBC MORPH BLD: NORMAL
WBC NRBC COR # BLD: 13.11 10*3/MM3 (ref 3.4–10.8)

## 2022-10-07 PROCEDURE — 82728 ASSAY OF FERRITIN: CPT | Performed by: INTERNAL MEDICINE

## 2022-10-07 PROCEDURE — 94799 UNLISTED PULMONARY SVC/PX: CPT

## 2022-10-07 PROCEDURE — 92610 EVALUATE SWALLOWING FUNCTION: CPT

## 2022-10-07 PROCEDURE — 83605 ASSAY OF LACTIC ACID: CPT | Performed by: INTERNAL MEDICINE

## 2022-10-07 PROCEDURE — 85025 COMPLETE CBC W/AUTO DIFF WBC: CPT | Performed by: INTERNAL MEDICINE

## 2022-10-07 PROCEDURE — 97110 THERAPEUTIC EXERCISES: CPT

## 2022-10-07 PROCEDURE — 83540 ASSAY OF IRON: CPT | Performed by: INTERNAL MEDICINE

## 2022-10-07 PROCEDURE — 87040 BLOOD CULTURE FOR BACTERIA: CPT | Performed by: INTERNAL MEDICINE

## 2022-10-07 PROCEDURE — 25010000002 CEFTRIAXONE PER 250 MG: Performed by: INTERNAL MEDICINE

## 2022-10-07 PROCEDURE — 84466 ASSAY OF TRANSFERRIN: CPT | Performed by: INTERNAL MEDICINE

## 2022-10-07 PROCEDURE — 71250 CT THORAX DX C-: CPT

## 2022-10-07 PROCEDURE — 82607 VITAMIN B-12: CPT | Performed by: INTERNAL MEDICINE

## 2022-10-07 PROCEDURE — 97161 PT EVAL LOW COMPLEX 20 MIN: CPT

## 2022-10-07 PROCEDURE — 94640 AIRWAY INHALATION TREATMENT: CPT

## 2022-10-07 PROCEDURE — 99222 1ST HOSP IP/OBS MODERATE 55: CPT | Performed by: NURSE PRACTITIONER

## 2022-10-07 PROCEDURE — A9577 INJ MULTIHANCE: HCPCS | Performed by: HOSPITALIST

## 2022-10-07 PROCEDURE — 85046 RETICYTE/HGB CONCENTRATE: CPT | Performed by: INTERNAL MEDICINE

## 2022-10-07 PROCEDURE — 99223 1ST HOSP IP/OBS HIGH 75: CPT | Performed by: INTERNAL MEDICINE

## 2022-10-07 PROCEDURE — 85007 BL SMEAR W/DIFF WBC COUNT: CPT | Performed by: INTERNAL MEDICINE

## 2022-10-07 PROCEDURE — 0 GADOBENATE DIMEGLUMINE 529 MG/ML SOLUTION: Performed by: HOSPITALIST

## 2022-10-07 PROCEDURE — 25010000002 AZITHROMYCIN PER 500 MG: Performed by: INTERNAL MEDICINE

## 2022-10-07 PROCEDURE — 96372 THER/PROPH/DIAG INJ SC/IM: CPT

## 2022-10-07 PROCEDURE — 80048 BASIC METABOLIC PNL TOTAL CA: CPT | Performed by: INTERNAL MEDICINE

## 2022-10-07 PROCEDURE — 25010000002 CYANOCOBALAMIN PER 1000 MCG: Performed by: NURSE PRACTITIONER

## 2022-10-07 PROCEDURE — 97535 SELF CARE MNGMENT TRAINING: CPT

## 2022-10-07 PROCEDURE — 94664 DEMO&/EVAL PT USE INHALER: CPT

## 2022-10-07 PROCEDURE — 97166 OT EVAL MOD COMPLEX 45 MIN: CPT

## 2022-10-07 PROCEDURE — 97530 THERAPEUTIC ACTIVITIES: CPT

## 2022-10-07 PROCEDURE — 94761 N-INVAS EAR/PLS OXIMETRY MLT: CPT

## 2022-10-07 PROCEDURE — 70553 MRI BRAIN STEM W/O & W/DYE: CPT

## 2022-10-07 RX ORDER — CYANOCOBALAMIN 1000 UG/ML
1000 INJECTION, SOLUTION INTRAMUSCULAR; SUBCUTANEOUS DAILY
Status: COMPLETED | OUTPATIENT
Start: 2022-10-08 | End: 2022-10-09

## 2022-10-07 RX ORDER — CYANOCOBALAMIN 1000 UG/ML
1000 INJECTION, SOLUTION INTRAMUSCULAR; SUBCUTANEOUS
Status: COMPLETED | OUTPATIENT
Start: 2022-10-07 | End: 2022-10-07

## 2022-10-07 RX ADMIN — FAMOTIDINE 20 MG: 20 TABLET ORAL at 20:51

## 2022-10-07 RX ADMIN — CETIRIZINE HYDROCHLORIDE 10 MG: 10 TABLET ORAL at 20:52

## 2022-10-07 RX ADMIN — IPRATROPIUM BROMIDE 0.5 MG: 0.5 SOLUTION RESPIRATORY (INHALATION) at 08:57

## 2022-10-07 RX ADMIN — ALBUTEROL SULFATE 2.5 MG: 2.5 SOLUTION RESPIRATORY (INHALATION) at 16:38

## 2022-10-07 RX ADMIN — FINASTERIDE 5 MG: 5 TABLET, FILM COATED ORAL at 08:20

## 2022-10-07 RX ADMIN — DOCUSATE SODIUM 50MG AND SENNOSIDES 8.6MG 2 TABLET: 8.6; 5 TABLET, FILM COATED ORAL at 08:23

## 2022-10-07 RX ADMIN — LEVOTHYROXINE SODIUM 112 MCG: 0.11 TABLET ORAL at 05:15

## 2022-10-07 RX ADMIN — IPRATROPIUM BROMIDE 0.5 MG: 0.5 SOLUTION RESPIRATORY (INHALATION) at 16:38

## 2022-10-07 RX ADMIN — ARFORMOTEROL TARTRATE 15 MCG: 15 SOLUTION RESPIRATORY (INHALATION) at 20:09

## 2022-10-07 RX ADMIN — HYDROCODONE BITARTRATE AND ACETAMINOPHEN 1 TABLET: 5; 325 TABLET ORAL at 08:20

## 2022-10-07 RX ADMIN — FAMOTIDINE 20 MG: 20 TABLET ORAL at 08:20

## 2022-10-07 RX ADMIN — TIOTROPIUM BROMIDE INHALATION SPRAY 2 PUFF: 3.12 SPRAY, METERED RESPIRATORY (INHALATION) at 13:18

## 2022-10-07 RX ADMIN — Medication 10 ML: at 08:21

## 2022-10-07 RX ADMIN — DOCUSATE SODIUM 50MG AND SENNOSIDES 8.6MG 2 TABLET: 8.6; 5 TABLET, FILM COATED ORAL at 20:51

## 2022-10-07 RX ADMIN — CYANOCOBALAMIN 1000 MCG: 1000 INJECTION, SOLUTION INTRAMUSCULAR; SUBCUTANEOUS at 14:21

## 2022-10-07 RX ADMIN — DONEPEZIL HYDROCHLORIDE 10 MG: 10 TABLET, FILM COATED ORAL at 20:51

## 2022-10-07 RX ADMIN — HYDROCODONE BITARTRATE AND ACETAMINOPHEN 1 TABLET: 5; 325 TABLET ORAL at 20:52

## 2022-10-07 RX ADMIN — MEMANTINE HYDROCHLORIDE 10 MG: 10 TABLET, FILM COATED ORAL at 08:20

## 2022-10-07 RX ADMIN — AMLODIPINE BESYLATE 5 MG: 5 TABLET ORAL at 08:20

## 2022-10-07 RX ADMIN — GABAPENTIN 400 MG: 400 CAPSULE ORAL at 19:02

## 2022-10-07 RX ADMIN — MEMANTINE HYDROCHLORIDE 10 MG: 10 TABLET, FILM COATED ORAL at 20:51

## 2022-10-07 RX ADMIN — OXCARBAZEPINE 300 MG: 300 TABLET, FILM COATED ORAL at 20:51

## 2022-10-07 RX ADMIN — Medication 1 MG: at 08:20

## 2022-10-07 RX ADMIN — CALCIUM CARBONATE-VITAMIN D TAB 500 MG-200 UNIT 1 TABLET: 500-200 TAB at 08:20

## 2022-10-07 RX ADMIN — Medication 1 MG: at 20:51

## 2022-10-07 RX ADMIN — IPRATROPIUM BROMIDE 0.5 MG: 0.5 SOLUTION RESPIRATORY (INHALATION) at 20:09

## 2022-10-07 RX ADMIN — AZITHROMYCIN 500 MG: 500 INJECTION, POWDER, LYOPHILIZED, FOR SOLUTION INTRAVENOUS at 05:15

## 2022-10-07 RX ADMIN — Medication 10 ML: at 20:52

## 2022-10-07 RX ADMIN — CALCIUM CARBONATE-VITAMIN D TAB 500 MG-200 UNIT 1 TABLET: 500-200 TAB at 20:51

## 2022-10-07 RX ADMIN — Medication 1 MG: at 19:02

## 2022-10-07 RX ADMIN — ARFORMOTEROL TARTRATE 15 MCG: 15 SOLUTION RESPIRATORY (INHALATION) at 08:56

## 2022-10-07 RX ADMIN — GABAPENTIN 800 MG: 400 CAPSULE ORAL at 20:51

## 2022-10-07 RX ADMIN — HYDROCODONE BITARTRATE AND ACETAMINOPHEN 1 TABLET: 5; 325 TABLET ORAL at 14:21

## 2022-10-07 RX ADMIN — RIVAROXABAN 20 MG: 20 TABLET, FILM COATED ORAL at 08:20

## 2022-10-07 RX ADMIN — SERTRALINE 25 MG: 25 TABLET, FILM COATED ORAL at 08:20

## 2022-10-07 RX ADMIN — GABAPENTIN 400 MG: 400 CAPSULE ORAL at 08:20

## 2022-10-07 RX ADMIN — ALBUTEROL SULFATE 2.5 MG: 2.5 SOLUTION RESPIRATORY (INHALATION) at 13:08

## 2022-10-07 RX ADMIN — GADOBENATE DIMEGLUMINE 20 ML: 529 INJECTION, SOLUTION INTRAVENOUS at 18:04

## 2022-10-07 NOTE — PLAN OF CARE
Goal Outcome Evaluation:  Plan of Care Reviewed With: patient           Outcome Evaluation: Pt seen for OT serg this AM. Pt admitted with dizziness and unsteady gait at home. PMHx includes metastatic renal ca, prostate ca, hilar mass from metastic adenopathy. Pt reports usually being on room air at home and upon arrival pt on 2L NC. Pt reports living in a trilevel home with wife and daughter with 3 AVRIL front door with hand rail and no AVRIL back door that leads into the family room. Pt reports when entering front door he has full flight of stairs to his bedroom and full flight downstairs to family room. Pt reports being indep with ADLs and uses a rwx occassionally for functional mobility within the home. Also reports he uses a shower chair. Pt transitioned from supine to sit with SBA. Sat EOB and donned and doffed socks with SBA. Required increased time to complete due to pt feeling SOA. Pt's O2 84% during ADL. Pt educated on breathing techniques throughout and pt O2 increased to 95% during ADL. Pt stood and transfered to chair with CGA/min A and use of rwx. Pt reported dizziness that subsided with rest break. Pt demonstrates deficits with decreased strength, ednurance, activity tolerance and functional mobility. Pt to benefit from skilled OT to address deficits and maximize independence with ADLs.

## 2022-10-07 NOTE — PROGRESS NOTES
Nutrition Services    Patient Name:  Mg Gerber Sr.  YOB: 1940  MRN: [id number]  Admit Date:  10/6/2022      Comment: Visited pt who was in the bathroom at the time. RD s/w with his wife who reports he is eating well but has been having trouble swallowing thin liquids. SLP assessed and found the pt to have mild pharyngeal dysphagia. As a result, he is to receive nectar thick liquids. No recent wt loss. 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      Medical/Surgical History Past Medical History:   Diagnosis Date   • Allergic    • Anemia, vitamin B12 deficiency 04/12/2012   • Anxiety    • Arthritis 03/24/2014   • Atrial fibrillation (HCC)    • Cancer (HCC) 04/29/2014    kidney; prostate   • Cataract    • Chemotherapeutic agent or infusion extravasation     q 1-2 wks    • COPD (chronic obstructive pulmonary disease) (HCC) 03/24/2014   • Emphysema of lung (HCC)    • Hemochromatosis 03/24/2014   • Herpes zoster 12/06/2011    left medial thigh   • Hip pain 03/24/2014   • History of Doppler ultrasound 12/28/2011    superficial and deep venous insuffiency   • History of EKG 02/10/2012    Dr. Kathi Lloyd; unchanged from prior EKG   • Hypertension     benign essential   • Injury of back    • Pulmonary embolism (HCC) 03/24/2014   • Renal cell cancer (HCC)    • Shortness of breath    • Sleep apnea        Past Surgical History:   Procedure Laterality Date   • APPENDECTOMY     • CATARACT EXTRACTION     • CHOLECYSTECTOMY     • SPLENECTOMY     • TONSILLECTOMY     • VEIN SURGERY          Encounter Information        Nutrition/Diet History:  Visited pt who was in the bathroom at the time. RD s/w with his wife who reports he is eating well but has been having trouble swallowing thin liquids. SLP assessed and  found the pt to have mild pharyngeal dysphagia. As a result, he is to receive nectar thick liquids. No recent wt loss. Will continue to follow    Food Preferences: Vanilla ice cream    Supplements:    Factors Affecting Intake: No factors at this time     Anthropometrics        Current Height  Current Weight  BMI kg/m2    Weight: 101 kg (222 lb 9.6 oz) (10/07/22 0530)  Body mass index is 30.49 kg/m².       Admission Weight 222# (100.9 kg)    Ideal Body Weight (IBW) 169# (76.8 kg)    Usual Body Weight (UBW)    Weight Change/Trend No recent wt loss        Weight History Wt Readings from Last 30 Encounters:   10/07/22 0530 101 kg (222 lb 9.6 oz)   10/06/22 1201 103 kg (227 lb 6.4 oz)   10/07/22 1047 101 kg (222 lb)   09/12/22 1254 103 kg (226 lb)   08/03/22 0801 102 kg (224 lb)   05/01/22 1246 104 kg (229 lb)   04/14/22 0845 102 kg (225 lb)   03/20/22 0500 102 kg (225 lb 12 oz)   03/19/22 0403 101 kg (221 lb 9 oz)   03/18/22 0700 103 kg (226 lb 10.1 oz)   03/17/22 0629 102 kg (225 lb 15.5 oz)   03/16/22 0300 100 kg (221 lb 1.9 oz)   03/15/22 0313 105 kg (232 lb 9.4 oz)   03/14/22 1041 105 kg (231 lb)   03/13/22 0537 105 kg (231 lb 9.6 oz)   03/12/22 0511 105 kg (230 lb 14.4 oz)   03/11/22 0212 104 kg (230 lb)   02/22/22 1001 104 kg (230 lb)   01/17/22 0904 104 kg (230 lb)   10/19/21 1408 105 kg (231 lb)   09/13/21 1338 108 kg (238 lb)   08/09/21 1202 108 kg (239 lb)   08/04/21 0841 108 kg (239 lb)   12/28/20 0954 108 kg (239 lb)   11/27/20 0411 104 kg (230 lb 6.1 oz)   11/26/20 0619 105 kg (231 lb 0.7 oz)   11/25/20 0618 106 kg (232 lb 12.9 oz)   11/24/20 0441 107 kg (235 lb 3.7 oz)   11/23/20 1103 106 kg (233 lb)   11/18/20 0502 106 kg (233 lb 0.4 oz)   11/12/20 0643 110 kg (242 lb 8.1 oz)   11/11/20 0119 105 kg (231 lb 7.7 oz)   11/10/20 1804 112 kg (247 lb)   07/01/20 0841 110 kg (243 lb)   06/01/20 0837 111 kg (244 lb)   03/06/20 0927 111 kg (244 lb)   02/26/20 1507 110 kg (243 lb)   02/05/20 1016 112 kg (247 lb)    01/10/20 0636 112 kg (247 lb)   12/11/19 0822 111 kg (244 lb)   11/22/19 0956 112 kg (246 lb 12.8 oz)   10/23/19 1134 112 kg (246 lb)   07/09/19 1015 111 kg (245 lb)   05/20/19 1113 112 kg (248 lb)   04/08/19 0806 111 kg (244 lb)                 Tests/Procedures        Tests/Procedures CT scan, X-Ray     Labs       Pertinent Labs Reviewed    Results from last 7 days   Lab Units 10/07/22  0447 10/06/22  1216   SODIUM mmol/L 139 139   POTASSIUM mmol/L 4.1 4.1   CHLORIDE mmol/L 105 103   CO2 mmol/L 27.5 26.9   BUN mg/dL 15 13   CREATININE mg/dL 1.11 1.18   CALCIUM mg/dL 9.3 9.5   BILIRUBIN mg/dL  --  2.2*   ALK PHOS U/L  --  56   ALT (SGPT) U/L  --  9   AST (SGOT) U/L  --  16   GLUCOSE mg/dL 99 101*     Results from last 7 days   Lab Units 10/07/22  0447   HEMOGLOBIN g/dL 9.2*   HEMATOCRIT % 27.1*   WBC 10*3/mm3 13.11*     Results from last 7 days   Lab Units 10/07/22  0447 10/06/22  1216   INR   --  2.46*   APTT seconds  --  53.4*   PLATELETS 10*3/mm3 338 403     COVID19   Date Value Ref Range Status   10/06/2022 Not Detected Not Detected - Ref. Range Final     No results found for: HGBA1C       Medications           Scheduled Medications albuterol, 2.5 mg, Nebulization, 4x Daily  amLODIPine, 5 mg, Oral, Daily  arformoterol, 15 mcg, Nebulization, BID - RT  azithromycin, 500 mg, Intravenous, Q24H  calcium 500 mg vitamin D 5 mcg (200 UT), 1 tablet, Oral, BID  cefTRIAXone, 2 g, Intravenous, Q24H  cetirizine, 10 mg, Oral, Nightly  cyanocobalamin, 1,000 mcg, Intramuscular, Q28 Days  donepezil, 10 mg, Oral, Nightly  famotidine, 20 mg, Oral, BID  finasteride, 5 mg, Oral, Daily  folic acid, 1 mg, Oral, TID  gabapentin, 400 mg, Oral, Daily With Breakfast & Dinner  gabapentin, 800 mg, Oral, Nightly  HYDROcodone-acetaminophen, 1 tablet, Oral, TID  ipratropium, 0.5 mg, Nebulization, 4x Daily - RT  levothyroxine, 112 mcg, Oral, Q AM  memantine, 10 mg, Oral, Q12H  OXcarbazepine, 300 mg, Oral, Nightly  polyethylene glycol, 17 g,  Oral, Daily  rivaroxaban, 20 mg, Oral, Daily  senna-docusate sodium, 2 tablet, Oral, BID  sertraline, 25 mg, Oral, Daily  sodium chloride, 10 mL, Intravenous, Q12H  tiotropium bromide monohydrate, 2 puff, Inhalation, Daily - RT       Infusions     PRN Medications •  acetaminophen **OR** acetaminophen **OR** acetaminophen  •  albuterol  •  ALPRAZolam  •  senna-docusate sodium **AND** polyethylene glycol **AND** bisacodyl **AND** bisacodyl  •  calcium carbonate  •  cyclobenzaprine  •  HYDROcodone-acetaminophen  •  ipratropium  •  ipratropium-albuterol  •  meclizine  •  melatonin  •  Morphine **AND** naloxone  •  nitroglycerin  •  ondansetron **OR** ondansetron  •  sodium chloride  •  sodium chloride     Physical Findings        Physical Appearance alert, obese, oriented     NFPE Not applicable   --  Edema  no edema   Gastrointestinal last bowel movement: 10/4    Tubes/Drains none   Oral/Mouth Cavity WNL   Skin skin intact   --  Current Nutrition Orders & Evaluation of Intake       Oral Nutrition     Food Allergies other: orange, orange oil    Current PO Diet Diet Regular   Supplement n/a   PO Evaluation     Trending % PO Intake No po recorded by nursing        --  PES STATEMENT / NUTRITION DIAGNOSIS      Nutrition Dx Problem  Problem: Overweight/Obesity  Etiology: Functional Diagnosis  Signs/Symptoms: BMI     --  NUTRITION INTERVENTION      Intervention Goal(s) Nutrition support treatment and Appropriate weight loss         RD Intervention/Action Interview for preferences, Follow Tx Progress and Care plan reviewed         Prescription/Orders:       PO Diet Regular, nectar thick liquids       Supplements       Enteral Nutrition       Parenteral Nutrition    New Prescription Ordered?    --      Monitor/Evaluation Per protocol   Education Will instruct as appropriate   --    RD to follow per protocol.      Electronically signed by:  Bianca Ellington RD  10/07/22 14:08 EDT

## 2022-10-07 NOTE — PLAN OF CARE
Goal Outcome Evaluation:      Clinical swallow evaluation completed. Pt with hx: dysphagia, need to hold bolus of liquid in mouth for several seconds before swallowing. No overt clinical s/s aspiration noted at bedside however, pt did report that he occasionally coughs on liquids at home.  Swallow did appear more timely with nectar thick liquids.    Recommend:    Regular diet with nectar thick liquids  Upright for all meals. Slow rate, small bites, small single sips, no straws    Water in small sips between meals    Medications whole or crushed in puree.    VFSS if MD concerned re: possibility of aspiration. Monitor for diet tolerance.     ST to follow.

## 2022-10-07 NOTE — CONSULTS
Neurology Consult Note  Consult Date: 10/7/2022  Referring MD: Coco Moore,*  Reason for Consult I have been asked to see the patient in neurological consultation to render advice and opinion regarding headache and vertigo.    Mg Gerber Sr. is a 81 y.o. male with past medical history of atrial fibrillation on Xarelto 20 mg nightly PTA, renal cell carcinoma and prostate cancer status post chemotherapy with last treatment at [facility] around 3 months ago, COPD, hypertension, PE, BERTHA who presented to the hospital on 10/6/2022 with complaints of a headache, dizziness, generalized weakness, nausea, and feeling off balance.  He states his symptoms started when he woke up yesterday morning around 6:30 a.m.  He was unable to lift up his head due to severe dizziness described as a room spinning sensation and felt a headache on the top of his head.  He felt as if someone had hit him over the head with an object.  Either way he turned his head he felt a room spinning sensation. This was accompanied with blurred vision in both eyes and severe nausea. He called for his wife who was unable to help him up.  Eventually his daughter arrived and was able to help set him up however he was barely able to walk due to feelings of weakness all over and feeling like he may pass out.  He does report a history of vertigo with his last episode being about 1 year ago but did not have any associated headache.  Typically he can take his prescribed meclizine and symptoms will improve.  He states he was so sick this time that he did not attempt to take his meclizine.  He states occasionally he will feel like he is going to pass out but has not actually fainted.  He did obtain his flu shot the day before symptoms started.    He was not a TNKase candidate due to taking his Xarelto as well as prolonged time from symptom onset.    BP on arrival 129/63, HR 82.  EKG with predominantly NSR.  Temp 100.3.  .  WBC 14.67.  CTA H/N does not appear to show any evidence of obvious acute infarction or hemorrhage, LVO, or high-grade stenosis.    Of note, he is on several medications at home.  Per review of his med rec he takes alprazolam 0.25 mg twice daily as needed, Flexeril 5 mg 3 times daily as needed, gabapentin 400 mg every morning and 800 mg nightly, Norco 5-325 mg 1 tab every 6 hours as needed, Zoloft 25 mg daily, meclizine 12.5 mg 3 times daily as needed, oxcarbazepine 300 mg nightly, Roxicodone 5 mg 1 tablet every 6 hours as needed.    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 Kathi Lloyd; unchanged from prior EKG   • Hypertension     benign essential   • Injury of back    • Pulmonary embolism (HCC) 03/24/2014   • Renal cell cancer (HCC)    • Shortness of breath    • Sleep apnea      Past Surgical History:   Procedure Laterality Date   • APPENDECTOMY     • CATARACT EXTRACTION     • CHOLECYSTECTOMY     • SPLENECTOMY     • TONSILLECTOMY     • VEIN SURGERY       Medications On Admission  Medications Prior to Admission   Medication Sig Dispense Refill Last Dose   • ALPRAZolam (Xanax) 0.25 MG tablet Take 1 tablet by mouth 2 (Two) Times a Day As Needed for Anxiety. 60 tablet 2 Past Week at Unknown time   • amLODIPine (NORVASC) 5 MG tablet Take 1 tablet by mouth Daily. 90 tablet 1 10/6/2022 at Unknown time   • Calcium Carb-Cholecalciferol  (CALTRATE 600+D3 PO) Take 1 tablet by mouth 2 (two) times a day.   10/5/2022 at Unknown time   • cyclobenzaprine (FLEXERIL) 5 MG tablet Take 5 mg by mouth 3 (Three) Times a Day As Needed for Muscle Spasms.   Past Week at Unknown time   • famotidine (Pepcid) 20 MG tablet Take 1 tablet by mouth 2 (Two) Times a Day. 180 tablet 1 10/6/2022 at Unknown time   • finasteride (PROSCAR) 5 MG tablet    10/5/2022 at Unknown time   • folic acid (FOLVITE) 1 MG tablet Take 1 mg by mouth 3 (Three) Times a Day.   10/6/2022 at Unknown time   • gabapentin (NEURONTIN) 400 MG capsule Take PO: 400mg QAM and with lunch, 800mg QHS 12 capsule 0 10/6/2022 at Unknown time   • HYDROcodone-acetaminophen (NORCO) 5-325 MG per tablet   1 Tab, Oral, Q6H, PRN as needed for pain, # 120 Tab, 0 Refill(s), [facility], cm, 08/30/22 7:39:00 EDT, CLINICALHEIGHT, 104.66, kg, 08/30/22 7:39:00 EDT, CLINICALWEIGHT, 185.4   10/6/2022 at Unknown time   • ipratropium (ATROVENT) 0.06 % nasal spray 2 sprays into the nostril(s) as directed by provider 4 (Four) Times a Day. 15 mL 11 Past Week at Unknown time   • levocetirizine (XYZAL) 5 MG tablet Take 1 tablet by mouth Every Evening. 90 tablet 1 10/5/2022 at Unknown time   • levothyroxine (SYNTHROID, LEVOTHROID) 112 MCG tablet Take 112 mcg by mouth Daily.   10/6/2022 at Unknown time   • NAMZARIC 28-10 MG capsule sustained-release 24 hr Take 1 capsule by mouth Every Evening.   10/5/2022 at Unknown time   • ondansetron (ZOFRAN) 4 MG tablet Take 1 tablet by mouth Every 6 (Six) Hours As Needed for Nausea or Vomiting. 270 tablet 1 10/6/2022 at Unknown time   • polyethylene glycol (MIRALAX) 17 g packet Take 17 g by mouth Daily.   Past Month at Unknown time   • sennosides-docusate (senna-docusate sodium) 8.6-50 MG per tablet Take 2 tablets by mouth 2 (Two) Times a Day As Needed for Constipation.   Past Week at Unknown time   • sertraline (ZOLOFT) 25 MG tablet Take 25 mg by mouth Daily.   Past Month at  Unknown time   • SPIRIVA HANDIHALER 18 MCG per inhalation capsule Place 1 capsule into inhaler and inhale Daily.   10/6/2022 at Unknown time   • Stiolto Respimat 2.5-2.5 MCG/ACT aerosol solution inhaler    Past Month at Unknown time   • tiotropium bromide-olodaterol (STIOLTO RESPIMAT) 2.5-2.5 MCG/ACT aerosol solution inhaler Inhale 1 puff 2 (Two) Times a Day.   10/6/2022 at Unknown time   • VENTOLIN  (90 BASE) MCG/ACT inhaler Inhale 2 puffs Every 4 (Four) Hours As Needed.   Past Month at Unknown time   • XARELTO 20 MG tablet Take 20 mg by mouth daily with dinner.   10/6/2022 at Unknown time   • lidocaine (LIDODERM) 5 % Place 2 patches on the skin as directed by provider Daily. Remove & Discard patch within 12 hours or as directed by MD 30 patch 0    • meclizine (ANTIVERT) 12.5 MG tablet Take 1 tablet by mouth 3 (Three) Times a Day As Needed for Dizziness. 270 tablet 1 More than a month at Unknown time   • NIVOLUMAB IV Infuse  into a venous catheter Every 30 (Thirty) Days. Due: 3/15/2022      • OXcarbazepine (TRILEPTAL) 300 MG tablet Take 1 tablet by mouth every night at bedtime. 5 tablet 0    • oxyCODONE (ROXICODONE) 5 MG immediate release tablet Take 1 tablet by mouth Every 6 (Six) Hours As Needed for Moderate Pain  or Severe Pain . 12 tablet 0      Allergies:  Allergies   Allergen Reactions   • Nsaids Hives and Itching   • Orange Unknown - Low Severity   • Orange Oil Unknown - Low Severity     Other reaction(s): Unknown - Low Severity  Other reaction(s): Unknown - Low Severity   • Latex Rash     Social Hx:  Social History     Socioeconomic History   • Marital status:    Tobacco Use   • Smoking status: Former Smoker   • Smokeless tobacco: Never Used   Vaping Use   • Vaping Use: Never used   Substance and Sexual Activity   • Alcohol use: Yes     Comment: 2 drinks month   • Drug use: No   • Sexual activity: Defer     Family Hx:  Family History   Problem Relation Age of Onset   • Aneurysm Mother    •  Stroke Mother    • Heart disease Father    • Diabetes Brother         type 2     Review of Systems   Eyes: Positive for visual disturbance.        Blurred vision   Respiratory: Negative.    Cardiovascular: Negative.    Gastrointestinal: Positive for nausea.   Endocrine: Negative.    Genitourinary: Negative.    Musculoskeletal: Positive for gait problem.   Skin: Negative.    Allergic/Immunologic: Negative.    Neurological: Positive for dizziness, weakness, light-headedness and headaches.   Hematological: Negative.    Psychiatric/Behavioral: Negative.      Exam  /60 (BP Location: Left arm, Patient Position: Lying)   Pulse 62   Temp 97.9 °F (36.6 °C) (Oral)   Resp 18   Wt 101 kg (222 lb 9.6 oz)   SpO2 96%   BMI 30.49 kg/m²     Current Facility-Administered Medications:   •  acetaminophen (TYLENOL) tablet 650 mg, 650 mg, Oral, Q4H PRN, 650 mg at 10/06/22 2335 **OR** acetaminophen (TYLENOL) 160 MG/5ML solution 650 mg, 650 mg, Oral, Q4H PRN **OR** acetaminophen (TYLENOL) suppository 650 mg, 650 mg, Rectal, Q4H PRN, Coco Moore MD  •  albuterol (PROVENTIL) nebulizer solution 0.083% 2.5 mg/3mL, 2.5 mg, Nebulization, Q6H PRN, Coco Moore MD  •  albuterol (PROVENTIL) nebulizer solution 0.083% 2.5 mg/3mL, 2.5 mg, Nebulization, 4x Daily, Coco Moore MD, 2.5 mg at 10/06/22 2116  •  ALPRAZolam (XANAX) tablet 0.25 mg, 0.25 mg, Oral, BID PRN, Coco Moore MD  •  amLODIPine (NORVASC) tablet 5 mg, 5 mg, Oral, Daily, Coco Moore MD, 5 mg at 10/07/22 0820  •  arformoterol (BROVANA) nebulizer solution 15 mcg, 15 mcg, Nebulization, BID - RT, Coco Moore MD, 15 mcg at 10/07/22 0856  •  azithromycin (ZITHROMAX) 500 mg in sodium chloride 0.9 % 250 mL IVPB-VTB, 500 mg, Intravenous, Q24H, Coco Moore MD, 500 mg at 10/07/22 0515  •  sennosides-docusate (PERICOLACE) 8.6-50 MG per tablet 2 tablet, 2 tablet, Oral, BID, 2 tablet at 10/07/22 0823 **AND**  polyethylene glycol (MIRALAX) packet 17 g, 17 g, Oral, Daily PRN **AND** bisacodyl (DULCOLAX) EC tablet 5 mg, 5 mg, Oral, Daily PRN **AND** bisacodyl (DULCOLAX) suppository 10 mg, 10 mg, Rectal, Daily PRN, Coco Moore MD  •  calcium 500 mg vitamin D 5 mcg (200 UT) per tablet 1 tablet, 1 tablet, Oral, BID, Coco Moore MD, 1 tablet at 10/07/22 0820  •  calcium carbonate (TUMS) chewable tablet 500 mg (200 mg elemental), 2 tablet, Oral, BID PRN, Coco Moore MD  •  cefTRIAXone (ROCEPHIN) 2 g in sodium chloride 0.9 % 100 mL IVPB-VTB, 2 g, Intravenous, Q24H, Coco Moore MD, Last Rate: 200 mL/hr at 10/06/22 2341, 2 g at 10/06/22 2341  •  cetirizine (zyrTEC) tablet 10 mg, 10 mg, Oral, Nightly, Coco Moore MD, 10 mg at 10/06/22 2039  •  cyclobenzaprine (FLEXERIL) tablet 5 mg, 5 mg, Oral, TID PRN, Coco Moore MD  •  donepezil (ARICEPT) tablet 10 mg, 10 mg, Oral, Nightly, Coco Moore MD, 10 mg at 10/06/22 2039  •  famotidine (PEPCID) tablet 20 mg, 20 mg, Oral, BID, Coco Moore MD, 20 mg at 10/07/22 0820  •  finasteride (PROSCAR) tablet 5 mg, 5 mg, Oral, Daily, Coco Moore MD, 5 mg at 10/07/22 0820  •  folic acid (FOLVITE) tablet 1 mg, 1 mg, Oral, TID, Coco Moore MD, 1 mg at 10/07/22 0820  •  gabapentin (NEURONTIN) capsule 400 mg, 400 mg, Oral, Daily With Breakfast & Dinner, Coco Moore MD, 400 mg at 10/07/22 0820  •  gabapentin (NEURONTIN) capsule 800 mg, 800 mg, Oral, Nightly, Coco Moore MD, 800 mg at 10/06/22 2039  •  HYDROcodone-acetaminophen (NORCO) 5-325 MG per tablet 1 tablet, 1 tablet, Oral, Q4H PRN, Coco Moore MD  •  HYDROcodone-acetaminophen (NORCO) 5-325 MG per tablet 1 tablet, 1 tablet, Oral, TID, Coco Moore MD, 1 tablet at 10/07/22 0820  •  ipratropium (ATROVENT) nasal spray 0.06%, 2 spray, Each Nare, 4x Daily PRN, Coco oMore MD  •   ipratropium (ATROVENT) nebulizer solution 0.5 mg, 0.5 mg, Nebulization, 4x Daily - RT, Coco Moore MD, 0.5 mg at 10/07/22 0857  •  ipratropium-albuterol (DUO-NEB) nebulizer solution 3 mL, 3 mL, Nebulization, Q4H PRN, Coco Moore MD  •  levothyroxine (SYNTHROID, LEVOTHROID) tablet 112 mcg, 112 mcg, Oral, Q AM, Coco Moore MD, 112 mcg at 10/07/22 0515  •  meclizine (ANTIVERT) tablet 12.5 mg, 12.5 mg, Oral, TID PRN, Coco Moore MD  •  melatonin tablet 1 mg, 1 mg, Oral, Nightly PRN, Coco Moore MD, 1 mg at 10/06/22 0787  •  memantine (NAMENDA) tablet 10 mg, 10 mg, Oral, Q12H, Coco Moore MD, 10 mg at 10/07/22 0820  •  morphine injection 2 mg, 2 mg, Intravenous, Q4H PRN **AND** naloxone (NARCAN) injection 0.4 mg, 0.4 mg, Intravenous, Q5 Min PRN, Coco Moore MD  •  nitroglycerin (NITROSTAT) SL tablet 0.4 mg, 0.4 mg, Sublingual, Q5 Min PRN, Coco Moore MD  •  ondansetron (ZOFRAN) tablet 4 mg, 4 mg, Oral, Q6H PRN **OR** ondansetron (ZOFRAN) injection 4 mg, 4 mg, Intravenous, Q6H PRN, Coco Moore MD  •  OXcarbazepine (TRILEPTAL) tablet 300 mg, 300 mg, Oral, Nightly, Coco Moore MD, 300 mg at 10/06/22 2039  •  polyethylene glycol (MIRALAX) packet 17 g, 17 g, Oral, Daily, Coco Moore MD, 17 g at 10/06/22 2040  •  rivaroxaban (XARELTO) tablet 20 mg, 20 mg, Oral, Daily, Nayeli Esquivel APRN, 20 mg at 10/07/22 0820  •  sertraline (ZOLOFT) tablet 25 mg, 25 mg, Oral, Daily, Coco Moore MD, 25 mg at 10/07/22 0820  •  sodium chloride 0.9 % flush 10 mL, 10 mL, Intravenous, PRN, Coco Moore MD  •  sodium chloride 0.9 % flush 10 mL, 10 mL, Intravenous, PRN, Coco Moore MD  •  sodium chloride 0.9 % flush 10 mL, 10 mL, Intravenous, Q12H, Coco Moore MD, 10 mL at 10/07/22 0821  •  tiotropium (SPIRIVA RESPIMAT) 2.5 mcg/act aerosol solution inhaler, 2 puff,  Inhalation, Daily - RT, Coco Moore MD    PRN meds  •  acetaminophen **OR** acetaminophen **OR** acetaminophen  •  albuterol  •  ALPRAZolam  •  senna-docusate sodium **AND** polyethylene glycol **AND** bisacodyl **AND** bisacodyl  •  calcium carbonate  •  cyclobenzaprine  •  HYDROcodone-acetaminophen  •  ipratropium  •  ipratropium-albuterol  •  meclizine  •  melatonin  •  Morphine **AND** naloxone  •  nitroglycerin  •  ondansetron **OR** ondansetron  •  sodium chloride  •  sodium chloride    No current facility-administered medications on file prior to encounter.     Current Outpatient Medications on File Prior to Encounter   Medication Sig   • ALPRAZolam (Xanax) 0.25 MG tablet Take 1 tablet by mouth 2 (Two) Times a Day As Needed for Anxiety.   • amLODIPine (NORVASC) 5 MG tablet Take 1 tablet by mouth Daily.   • Calcium Carb-Cholecalciferol (CALTRATE 600+D3 PO) Take 1 tablet by mouth 2 (two) times a day.   • cyclobenzaprine (FLEXERIL) 5 MG tablet Take 5 mg by mouth 3 (Three) Times a Day As Needed for Muscle Spasms.   • famotidine (Pepcid) 20 MG tablet Take 1 tablet by mouth 2 (Two) Times a Day.   • finasteride (PROSCAR) 5 MG tablet    • folic acid (FOLVITE) 1 MG tablet Take 1 mg by mouth 3 (Three) Times a Day.   • gabapentin (NEURONTIN) 400 MG capsule Take PO: 400mg QAM and with lunch, 800mg QHS   • HYDROcodone-acetaminophen (NORCO) 5-325 MG per tablet   1 Tab, Oral, Q6H, PRN as needed for pain, # 120 Tab, 0 Refill(s), Pharmacy: [facility], cm, 08/30/22 7:39:00 EDT, CLINICALHEIGHT, 104.66, kg, 08/30/22 7:39:00 EDT, CLINICALWEIGHT, 185.4   • ipratropium (ATROVENT) 0.06 % nasal spray 2 sprays into the nostril(s) as directed by provider 4 (Four) Times a Day.   • levocetirizine (XYZAL) 5 MG tablet Take 1 tablet by mouth Every Evening.   • levothyroxine (SYNTHROID, LEVOTHROID) 112 MCG tablet Take 112 mcg by mouth Daily.   • NAMZARIC 28-10 MG capsule sustained-release 24 hr Take 1 capsule by mouth  Every Evening.   • ondansetron (ZOFRAN) 4 MG tablet Take 1 tablet by mouth Every 6 (Six) Hours As Needed for Nausea or Vomiting.   • polyethylene glycol (MIRALAX) 17 g packet Take 17 g by mouth Daily.   • sennosides-docusate (senna-docusate sodium) 8.6-50 MG per tablet Take 2 tablets by mouth 2 (Two) Times a Day As Needed for Constipation.   • sertraline (ZOLOFT) 25 MG tablet Take 25 mg by mouth Daily.   • SPIRIVA HANDIHALER 18 MCG per inhalation capsule Place 1 capsule into inhaler and inhale Daily.   • Stiolto Respimat 2.5-2.5 MCG/ACT aerosol solution inhaler    • tiotropium bromide-olodaterol (STIOLTO RESPIMAT) 2.5-2.5 MCG/ACT aerosol solution inhaler Inhale 1 puff 2 (Two) Times a Day.   • VENTOLIN  (90 BASE) MCG/ACT inhaler Inhale 2 puffs Every 4 (Four) Hours As Needed.   • XARELTO 20 MG tablet Take 20 mg by mouth daily with dinner.   • lidocaine (LIDODERM) 5 % Place 2 patches on the skin as directed by provider Daily. Remove & Discard patch within 12 hours or as directed by MD   • meclizine (ANTIVERT) 12.5 MG tablet Take 1 tablet by mouth 3 (Three) Times a Day As Needed for Dizziness.   • NIVOLUMAB IV Infuse  into a venous catheter Every 30 (Thirty) Days. Due: 3/15/2022   • OXcarbazepine (TRILEPTAL) 300 MG tablet Take 1 tablet by mouth every night at bedtime.   • oxyCODONE (ROXICODONE) 5 MG immediate release tablet Take 1 tablet by mouth Every 6 (Six) Hours As Needed for Moderate Pain  or Severe Pain .       General appearance: Elderly male, pleasant, NAD, alert and cooperative, well groomed  HEENT: Normocephalic, atraumatic, PERRL, no masses or tenderness  COR: RRR  Resp: Even and unlabored, NC in place  Extremities: No obvious edema.  Skin: warm, dry    Neurological:   MS: oriented x3, able to correctly identify wall clock time, recent/remote memory mildly impaired, normal attention/concentration, language intact, no neglect, normal fund of knowledge  CN: visual acuity grossly normal, visual fields  full, facial sensation equal, no facial droop, [place] bilaterally with hearing aides in place   Motor: 5/5 in all 4 ext., normal tone  Reflexes: 1+ in all 4 ext.  Sensory: light touch sensation intact in all 4 ext.  Coordination: Normal finger to nose test  Gait and station: Deferred  Rapid alternating movements: normal finger to thumb tap    Laboratory results:  Lab Results   Component Value Date    GLUCOSE 99 10/07/2022    CALCIUM 9.3 10/07/2022     10/07/2022    K 4.1 10/07/2022    CO2 27.5 10/07/2022     10/07/2022    BUN 15 10/07/2022    CREATININE 1.11 10/07/2022    EGFRIFAFRI 54 11/17/2015    EGFRIFNONA 58 (L) 11/27/2020    BCR 13.5 10/07/2022    ANIONGAP 6.5 10/07/2022     Lab Results   Component Value Date    WBC 13.11 (H) 10/07/2022    HGB 9.2 (L) 10/07/2022    HCT 27.1 (L) 10/07/2022    .4 (H) 10/07/2022     10/07/2022     No results found for: CHOL  Lab Results   Component Value Date    HDL 55 01/18/2022     No results found for: LDL  Lab Results   Component Value Date    TRIG 77 01/18/2022     No results found for: HGBA1C  Lab Results   Component Value Date    INR 2.46 (H) 10/06/2022    INR 1.54 (H) 03/14/2022    INR 1.25 (H) 03/11/2022    PROTIME 26.0 (H) 10/06/2022    PROTIME 18.4 (H) 03/14/2022    PROTIME 15.7 (H) 03/11/2022     Lab Results   Component Value Date    HSBHIHHC11 227 10/07/2022     Lab Results   Component Value Date    TSH 1.050 11/17/2015     Pain Management Panel    There is no flowsheet data to display.        Brief Urine Lab Results  (Last result in the past 365 days)      Color   Clarity   Blood   Leuk Est   Nitrite   Protein   CREAT   Urine HCG        03/14/22 2015 Yellow   Clear   Small (1+)   Trace   Negative   Negative               Respiratory panel negative    Lab review: I personally reviewed all labs as documented above.    Imaging review:   XR Chest 1 View    Result Date: 10/6/2022  Redemonstration of left hilar mass. No acute infiltrate identified.   This report was finalized on 10/6/2022 1:23 PM by Dr. David Hinds M.D.      Results for orders placed during the hospital encounter of 03/27/17    Adult Transthoracic Echo Complete    Interpretation Summary  · Calculated right ventricular systolic pressure from tricuspid regurgitation is 22 mmHg.  · Left ventricular wall thickness is consistent with mild concentric hypertrophy.  · Left ventricular function is normal. Estimated EF = 63%.  · Left atrial cavity size is mildly dilated.  · Left ventricular diastolic dysfunction (grade II) consistent with pseudonormalization.      I personally reviewed CTA H/N images with Dr. Evans, and he agrees with radiology report.    Diagnosis:  Vertigo and ataxia  Acute headache   B12 deficiency  Fever in adult    Comment: 81-year-old male who presented with complaints of acute onset of vertigo upon wakening with headache, ataxia, and generalized weakness.  Symptoms have improved this morning but still present.  Vessel imaging looks okay.  His headache responded to Tylenol, can continue that as needed.  He does have a history of vertigo but has never had an accompanied severe headache.  Possibly vestibular migraine versus acute stroke.  Will order MRI brain with and without given his cancer diagnosis.  B12 level was 227.  We will replace that as well. Unclear etiology of his presenting fever, but this has been normalized over the last 12 hours. BC pending. U/A negative.     PLAN:   MRI brain W/WO if negative for acute findings recommend PT to see for epley maneuver if he can tolerate. May need vestibular therapy at discharge.  Start cyanocobalamin 1000mcg IM daily x 3 days and then transition to 500mcg PO daily. Recommend rechecking level in 4-6 weeks with his PCP.   Defer to primary for further w/u for low grade fever on presentation.  Falls precautions.  We will continue to follow and advise.    Case discussed with patient and Dr. Evans and he agrees with plan above.    Irena  ADRIANA Campos

## 2022-10-07 NOTE — THERAPY EVALUATION
Patient Name: Mg Gerber Sr.  : 1940    MRN: [id number]                              Today's Date: 10/7/2022       Admit Date: 10/6/2022    Visit Dx:     ICD-10-CM ICD-9-CM   1. Dizziness  R42 780.4   2. Gait instability  R26.81 781.2   3. Abnormal chest x-ray  R93.89 793.2   4. Elevated INR  R79.1 790.92     Patient Active Problem List   Diagnosis   • Anemia, vitamin B12 deficiency   • Arthritis   • Hemochromatosis   • Herpes zoster   • Hip pain   • Essential hypertension   • Cancer (HCC)   • Pulmonary emphysema (HCC)   • Left low back pain   • Vertigo   • Headache around the eyes   • Mild cognitive impairment   • Metastatic renal cell carcinoma  on q 2 wks chemo   • Anxiety   • Malignant neoplasm of lung (HCC)   • Neuropathy   • Perennial allergic rhinitis   • Vitamin B12 deficiency   • Osteoarthritis of lumbar spine   • Pharyngitis   • Bronchitis   • Drug-induced constipation   • Hx of hiatal hernia   • History of lung cancer   • COVID-19 virus detected   • Dyspnea   • Generalized weakness   • Acute on chronic respiratory failure with hypoxia (HCC)   • Gastroesophageal reflux disease   • Hearing disorder   • Malignant neoplasm of prostate (HCC)   • Sleep apnea   • Anemia   • Malignant neoplasm metastatic to lung (HCC)   • Secondary bacterial pneumonia   • Pneumonia due to COVID-19 virus   • Hilar mass   • On antineoplastic chemotherapy q 2wks   • Paroxysmal atrial fibrillation (HCC)   • History of pulmonary embolism and DVT   • Chronic nausea   • Gastritis   • Muscle spasm   • Fall   • Closed fracture of multiple ribs of left side   • Chest wall pain   • Fall, initial encounter   • Immobility syndrome   • Self-care deficit   • Cytokine release syndrome, grade 2   • Dizziness   • Compression fracture of vertebral column (HCC)   • History of chronic lung disease   • Impairment of balance   • Lung involvement associated with another disorder (HCC)   • Pneumonia     Past Medical History:   Diagnosis  Date   • Allergic    • Anemia, vitamin B12 deficiency 04/12/2012   • Anxiety    • Arthritis 03/24/2014   • Atrial fibrillation (HCC)    • Cancer (HCC) 04/29/2014    kidney; prostate   • Cataract    • Chemotherapeutic agent or infusion extravasation     q 1-2 wks    • COPD (chronic obstructive pulmonary disease) (HCC) 03/24/2014   • Emphysema of lung (HCC)    • Hemochromatosis 03/24/2014   • Herpes zoster 12/06/2011    left medial thigh   • Hip pain 03/24/2014   • History of Doppler ultrasound 12/28/2011    superficial and deep venous insuffiency   • History of EKG 02/10/2012    Dr. Kathi Lloyd; unchanged from prior EKG   • Hypertension     benign essential   • Injury of back    • Pulmonary embolism (HCC) 03/24/2014   • Renal cell cancer (HCC)    • Shortness of breath    • Sleep apnea      Past Surgical History:   Procedure Laterality Date   • APPENDECTOMY     • CATARACT EXTRACTION     • CHOLECYSTECTOMY     • SPLENECTOMY     • TONSILLECTOMY     • VEIN SURGERY        General Information     Row Name 10/07/22 1455          Physical Therapy Time and Intention    Document Type evaluation  -MD     Mode of Treatment physical therapy  -MD     Row Name 10/07/22 1455          General Information    Existing Precautions/Restrictions fall  -MD     Barriers to Rehab none identified  -MD     Row Name 10/07/22 1455          Living Environment    People in Home spouse  -MD     Row Name 10/07/22 1455          Home Main Entrance    Number of Stairs, Main Entrance three  -MD     Row Name 10/07/22 1455          Stairs Within Home, Primary    Number of Stairs, Within Home, Primary twelve  -MD     Row Name 10/07/22 1455          Cognition    Orientation Status (Cognition) oriented x 4  -MD           User Key  (r) = Recorded By, (t) = Taken By, (c) = Cosigned By    Initials Name Provider Type    Violet Gee, PT Physical Therapist               Mobility     Row Name 10/07/22 1519          Bed Mobility    Bed Mobility supine-sit-supine   -MD     Supine-Sit-Supine [place] (Bed Mobility) standby assist  -MD     Assistive Device (Bed Mobility) bed rails  -MD     Row Name 10/07/22 1519          Sit-Stand Transfer    Sit-Stand [place] (Transfers) verbal cues;standby assist;contact guard  -MD     Assistive Device (Sit-Stand Transfers) walker, front-wheeled  -MD     Row Name 10/07/22 1519          Gait/Stairs (Locomotion)    [place] Level (Gait) verbal cues;contact guard;standby assist  -MD     Assistive Device (Gait) walker, front-wheeled  -MD     Distance in Feet (Gait) 60'  -MD     Deviations/Abnormal Patterns (Gait) festinating/shuffling;gait speed decreased  -MD     Bilateral Gait Deviations forward flexed posture  -MD           User Key  (r) = Recorded By, (t) = Taken By, (c) = Cosigned By    Initials Name Provider Type    Violet Gee, PT Physical Therapist               Obj/Interventions     Row Name 10/07/22 1521          Range of Motion Comprehensive    General Range of Motion bilateral lower extremity ROM WFL  -MD     Row Name 10/07/22 1521          Strength Comprehensive (MMT)    General Manual Muscle Testing (MMT) Assessment lower extremity strength deficits identified  -MD     Comment, General Manual Muscle Testing (MMT) Assessment B LE 3/5 grossly  -MD           User Key  (r) = Recorded By, (t) = Taken By, (c) = Cosigned By    Initials Name Provider Type    Violet Gee, PT Physical Therapist               Goals/Plan     Row Name 10/07/22 1532          Transfer Goal 1 (PT)    Activity/Assistive Device (Transfer Goal 1, PT) sit-to-stand/stand-to-sit;nuvia simpson  -MD     [place] Level/Cues Needed (Transfer Goal 1, PT) supervision required  -MD     Time Frame (Transfer Goal 1, PT) 2 weeks  -MD     Row Name 10/07/22 1532          Gait Training Goal 1 (PT)    Activity/Assistive Device (Gait Training Goal 1, PT) gait (walking locomotion);walker, rolling  -MD     [place] Level (Gait Training Goal 1, PT) supervision  required  -MD     Distance (Gait Training Goal 1, PT) 120'  -MD     Time Frame (Gait Training Goal 1, PT) 2 weeks  -MD           User Key  (r) = Recorded By, (t) = Taken By, (c) = Cosigned By    Initials Name Provider Type    Violet Gee, PT Physical Therapist               Clinical Impression     Row Name 10/07/22 1523          Pain    Pretreatment Pain Rating 0/10 - no pain  -MD     Row Name 10/07/22 1523          Plan of Care Review    Outcome Evaluation Pt 82 yo male presenting w generalized weakness and HA.  Prior to admission pt was ambulating w RWx.  At time of PT eval pt required CGA w transfers and SBA w ambulation using RWx.  Pt will benifit from skilled PT to address mobiltiy deficits and increase safety and independence w functional mobility.  At this time PT feels Pt will be appropriate to d/c home w spouse and HHPT.  -MD     Row Name 10/07/22 1523          Therapy Assessment/Plan (PT)    Rehab Potential (PT) good, to achieve stated therapy goals  -MD     Criteria for Skilled Interventions Met (PT) yes;meets criteria  -MD     Therapy Frequency (PT) 6 times/wk  -MD     Row Name 10/07/22 1523          Positioning and Restraints    Pre-Treatment Position in bed  -MD     Post Treatment Position bed  -MD     In Bed supine;call light within reach;exit alarm on  -MD           User Key  (r) = Recorded By, (t) = Taken By, (c) = Cosigned By    Initials Name Provider Type    Violet Gee, PT Physical Therapist               Outcome Measures     Row Name 10/07/22 1532          How much help from another person do you currently need...    Turning from your back to your side while in flat bed without using bedrails? 4  -MD     Moving from lying on back to sitting on the side of a flat bed without bedrails? 4  -MD     Moving to and from a bed to a chair (including a wheelchair)? 3  -MD     Standing up from a chair using your arms (e.g., wheelchair, bedside chair)? 3  -MD     Climbing 3-5 steps with a railing? 3   -MD     To walk in hospital room? 3  -MD     AM-PAC 6 Clicks Score (PT) 20  -MD     Highest level of mobility 6 --> Walked 10 steps or more  -MD     Row Name 10/07/22 1532 10/07/22 1245       Functional Assessment    Outcome Measure Options AM-PAC 6 Clicks Basic Mobility (PT)  -MD AM-PAC 6 Clicks Daily Activity (OT)  -LEE          User Key  (r) = Recorded By, (t) = Taken By, (c) = Cosigned By    Initials Name Provider Type    Violet Gee, PT Physical Therapist    Christy Couch, OT Occupational Therapist                             Physical Therapy Education                 Title: PT OT SLP Therapies (In Progress)     Topic: Physical Therapy (In Progress)     Point: Mobility training (Not Started)     Learner Progress:  Not documented in this visit.          Point: Home exercise program (Not Started)     Learner Progress:  Not documented in this visit.          Point: Body mechanics (Not Started)     Learner Progress:  Not documented in this visit.          Point: Precautions (Done)     Learning Progress Summary           Patient Acceptance, E, VU by MD at 10/7/2022 1533                               User Key     Initials Effective Dates Name Provider Type Dariana MARIE 06/16/21 -  Violet Bell, PT Physical Therapist PT              PT Recommendation and Plan     Outcome Evaluation: Pt 82 yo male presenting w generalized weakness and HA.  Prior to admission pt was ambulating w RWx.  At time of PT eval pt required CGA w transfers and SBA w ambulation using RWx.  Pt will benifit from skilled PT to address mobiltiy deficits and increase safety and independence w functional mobility.  At this time PT feels Pt will be appropriate to d/c home w spouse and HHPT.     Time Calculation:    PT Charges     Row Name 10/07/22 1677             Time Calculation    Start Time 1455  -MD      Stop Time 1511  -MD      Time Calculation (min) 16 min  -MD      PT Received On 10/07/22  -MD      PT - Next Appointment 10/08/22  -MD       PT Goal Re-Cert Due Date 10/21/22  -MD            User Key  (r) = Recorded By, (t) = Taken By, (c) = Cosigned By    Initials Name Provider Type    Violet Gee PT Physical Therapist              Therapy Charges for Today     Code Description Service Date Service Provider Modifiers Qty    08418730799  PT EVAL LOW COMPLEXITY 2 10/7/2022 Violet Bell, PT GP 1    [id number] HC PT THER PROC EA 15 MIN 10/7/2022 Violet Bell, PT GP 1        Patient was intermittently wearing a face mask during this therapy encounter. Therapist used appropriate personal protective equipment including mask and gloves.  Mask used was standard procedure mask. Appropriate PPE was worn during the entire therapy session. Hand hygiene was completed before and after therapy session. Patient is not in enhanced droplet precautions.       PT G-Codes  Outcome Measure Options: AM-PAC 6 Clicks Basic Mobility (PT)  AM-PAC 6 Clicks Score (PT): 20  AM-PAC 6 Clicks Score (OT): 18    Violet Bell PT  10/7/2022

## 2022-10-07 NOTE — CASE MANAGEMENT/SOCIAL WORK
Continued Stay Note  [facility]     Patient Name: Mg Gerber Sr.  MRN: [id number]  Today's Date: 10/7/2022    Admit Date: 10/6/2022    Plan: Home with family and [facility] (Referral pending) per PT recommendations. Transport by private auto. Follow for possible home O2 need at D/C.   Discharge Plan     Row Name 10/07/22 1850       Plan    Plan Home with family and [facility] (Referral pending) per PT recommendations. Transport by private auto. Follow for possible home O2 need at D/C.    Plan Comments Pt lives with his wife. There are no steps to enter home.  DME equipment includes a nebulizer, CPAP, scooter, cane, grab bars, and shower chair.  Pt is normally independent with ADLs. [facility]  [facility], and [facility] for Rehab. He has used [facility]. PT eval completed and recommend home with family and HH. Referral to [facility] placed in Epic and pending. Pt’s PCP is Dillon Walton. Uses [facility] at [place] and [facility]. At discharge, family will transport. Explained that CCP would follow to assess for discharge needs.  Bijan Hayes RN-BC               Discharge Codes    No documentation.               Expected Discharge Date and Time     Expected Discharge Date Expected Discharge Time    Oct 10, 2022             Bijan Hayes RN

## 2022-10-07 NOTE — PLAN OF CARE
Goal Outcome Evaluation:              Outcome Evaluation: Pt 82 yo male presenting w generalized weakness and HA.  Prior to admission pt was ambulating w RWx.  At time of PT eval pt required CGA w transfers and SBA w ambulation using RWx.  Pt will benifit from skilled PT to address mobiltiy deficits and increase safety and independence w functional mobility.  At this time PT feels Pt will be appropriate to d/c home w spouse and HHPT.

## 2022-10-07 NOTE — PLAN OF CARE
Goal Outcome Evaluation:     80 yo male admitted 10/6 with dizziness. Pt complains of headache and general malaise, PRN tylenol administered. IV rocephin and zithromycin initiated. CT chest planned today. VS stable, 2L O2 NC, A&Ox4.

## 2022-10-07 NOTE — CONSULTS
[facility] INITIAL INPATIENT CONSULTATION NOTE    REASON FOR CONSULTATION: Metastatic renal cell carcinoma    HISTORY OF PRESENT ILLNESS:  Mg Gerber Sr. is a 81 y.o. 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  He has been followed at the [facility] for this since 2007.  He previously required Exjade for iron overload with a ferritin done as high as 1600.    He has a history of metastatic renal cell carcinoma with enlarged mediastinal lymphadenopathy since 2016.  Biopsy on 1/22/2016 confirmed metastatic renal cell carcinoma.  He was started on nivolumab in April 2016, held recently as of a few months ago.      A PET CT scan performed on 5/24/2022 showed bulky metastatic left infrahilar adenopathy without significant change compared to January 2022.  There was stable adenopathy lateral to the heart in the left upper cardiophrenic region without change from January.  Slowly enlarging bilateral adrenal nodules were noted.  The enlargement was noted compared to April 2021.  There was some nonspecific enlargement nonmetabolic lymphadenopathy in the pelvis all less than 1 cm.    The patient presented with dizziness.  The stroke team was notified.  He had CT angiogram of the head and neck.  There was moderate diffuse atrophy and chronic small vessel ischemic changes but no intracranial hemorrhage or mass-effect.  No evidence for significant stenosis.  No evidence of stroke.       He had a low-grade fever at 100.3 at presentation.  He is on 2 L nasal cannula.  He was mildly hypoxic at presentation.  He is currently  afebrile.    Chest x-ray showed a left hilar mass.    Labs show moderate anemia.  He has a mild leukocytosis which is improving.  Platelets are normal.  INR is 2.46 with a PTT of 53.4 seconds.  Vitamin B12 was 227.  Blood cultures no growth to date.    Currently feels better as he's lying down.     Past Medical History:   Diagnosis Date   • Allergic    • Anemia, vitamin B12 deficiency 04/12/2012   • Anxiety    • Arthritis 03/24/2014   • Atrial fibrillation (HCC)    • Cancer (HCC) 04/29/2014    kidney; prostate   • Cataract    • Chemotherapeutic agent or infusion extravasation     q 1-2 wks    • COPD (chronic obstructive pulmonary disease) (HCC) 03/24/2014   • Emphysema of lung (HCC)    • Hemochromatosis 03/24/2014   • Herpes zoster 12/06/2011    left medial thigh   • Hip pain 03/24/2014   • History of Doppler ultrasound 12/28/2011    superficial and deep venous insuffiency   • History of EKG 02/10/2012    Dr. Kathi Lloyd; unchanged from prior EKG   • Hypertension     benign essential   • Injury of back    • Pulmonary embolism (HCC) 03/24/2014   • Renal cell cancer (HCC)    • Shortness of breath    • Sleep apnea        Past Surgical History:   Procedure Laterality Date   • APPENDECTOMY     • CATARACT EXTRACTION     • CHOLECYSTECTOMY     • SPLENECTOMY     • TONSILLECTOMY     • VEIN SURGERY         SOCIAL HISTORY:   reports that he has quit smoking. He has never used smokeless tobacco. He reports current alcohol use. He reports that he does not use drugs.    FAMILY HISTORY:  family history includes Aneurysm in his mother; Diabetes in his brother; Heart disease in his father; Stroke in his mother.    ALLERGIES:  Allergies   Allergen Reactions   • Nsaids Hives and Itching   • Orange Unknown - Low Severity   • Orange Oil Unknown - Low Severity     Other reaction(s): Unknown - Low Severity  Other reaction(s): Unknown - Low Severity   • Latex Rash       MEDICATIONS:  As listed in the electronic medical record.    Review of  Systems   Constitutional: Positive for fatigue.   Neurological: Positive for dizziness.   All other systems reviewed and are negative.      Vitals:    10/06/22 2116 10/06/22 2342 10/07/22 0530 10/07/22 0710   BP:  127/55  125/60   BP Location:  Left arm  Left arm   Patient Position:  Sitting  Lying   Pulse: 68 70  68   Resp: 17 18  18   Temp:  97.6 °F (36.4 °C)  97.9 °F (36.6 °C)   TempSrc:  Oral  Oral   SpO2: 92% 93%  92%   Weight:   101 kg (222 lb 9.6 oz)        Physical Exam  Vitals and nursing note reviewed.   Constitutional:       General: He is not in acute distress.     Appearance: He is well-developed.   HENT:      Head: Normocephalic and atraumatic. Hair is normal.   Eyes:      General: Lids are normal.      Conjunctiva/sclera: Conjunctivae normal.      Pupils: Pupils are equal.   Neck:      Thyroid: No thyroid mass or thyromegaly.      Vascular: No JVD.   Cardiovascular:      Rate and Rhythm: Normal rate and regular rhythm.      Heart sounds: No murmur heard.    No friction rub. No gallop.   Pulmonary:      Effort: Pulmonary effort is normal. No respiratory distress.      Breath sounds: Normal breath sounds. No wheezing or rales.   Chest:   Breasts:      Right: No supraclavicular adenopathy.      Left: No supraclavicular adenopathy.       Abdominal:      General: There is no distension.      Palpations: Abdomen is soft. There is no hepatomegaly, splenomegaly or mass.      Tenderness: There is no abdominal tenderness. There is no guarding.   Musculoskeletal:         General: No tenderness or deformity. Normal range of motion.      Cervical back: Neck supple.   Lymphadenopathy:      Cervical: No cervical adenopathy.      Upper Body:      Right upper body: No supraclavicular adenopathy.      Left upper body: No supraclavicular adenopathy.   Skin:     General: Skin is warm and dry.      Findings: No rash.   Neurological:      Mental Status: He is alert and oriented to person, place, and time.   Psychiatric:          Behavior: Behavior normal.         Thought Content: Thought content normal.         Judgment: Judgment normal.         DIAGNOSTIC DATA:  Results from last 7 days   Lab Units 10/07/22  0447   WBC 10*3/mm3 13.11*   HEMOGLOBIN g/dL 9.2*   HEMATOCRIT % 27.1*   PLATELETS 10*3/mm3 338     Lab Results   Component Value Date    NEUTROABS 5.38 10/07/2022     Results from last 7 days   Lab Units 10/07/22  0447   SODIUM mmol/L 139   POTASSIUM mmol/L 4.1   CHLORIDE mmol/L 105   CO2 mmol/L 27.5   BUN mg/dL 15   CREATININE mg/dL 1.11   GLUCOSE mg/dL 99   CALCIUM mg/dL 9.3     Results from last 7 days   Lab Units 10/06/22  1216   INR  2.46*   APTT seconds 53.4*           IMAGING:  CT Angiogram Head (10/06/2022 14:06)  CT Angiogram Neck (10/06/2022 14:06)    Images reviewed. No CVA. No metastatic disease.     ASSESSMENT:  This is a 81 y.o. male with:    *Metastatic RCC  · He has a history of metastatic renal cell carcinoma with enlarged mediastinal lymphadenopathy since 2016.  Biopsy on 1/22/2016 confirmed metastatic renal cell carcinoma.  He was started on nivolumab in April 2016, held recently as of a few months ago.    · A PET CT scan performed on 5/24/2022 showed bulky metastatic left infrahilar adenopathy without significant change compared to January 2022.  There was stable adenopathy lateral to the heart in the left upper cardiophrenic region without change from January.  Slowly enlarging bilateral adrenal nodules were noted.  The enlargement was noted compared to April 2021.  There was some nonspecific enlargement nonmetabolic lymphadenopathy in the pelvis all less than 1 cm.  • Again, treatment being held    *Hereditary spherocytosis  • High retic count and mild iron overload as a result  • hgb stable    *Vertigo  •     *Leukocytosis with neutrophilia  • Chronic/stable    *Vitamin B12 deficiency  · Level low normal at 227  · Already started on B12 injections with plans for 3 days but the order was put in for q28 days.  Changed to 3 consecutive days.     RECOMMENDATIONS/PLAN:   1. MRI brain pending  2. CT chest pending  3. Vitamin B12 injections dally x 3 days, order changed  4. May need some manipulation of his head for vertigo while admitted if no other etiology of his symptoms is discovered. PT/OT have been consulted  5. He can f/u at the [facility] for the RCC    Will f/u pending imaging results over the weekend.     Deniz Amador MD

## 2022-10-07 NOTE — DISCHARGE PLACEMENT REQUEST
"Storm Koch Sr. (81 y.o. Male)             Date of Birth   1940    Social Security Number       Address   [address] [place] [address]    Home Phone   [phone]    MRN   [id number]       Shinto   [employer]    Marital Status                               Admission Date   10/6/22    Admission Type   Emergency    Admitting Provider   Coco Moore MD    Attending Provider   Oh Dye MD    Department, Room/Bed   [address], E[address]/1       Discharge Date       Discharge Disposition       Discharge Destination                               Attending Provider: Oh Dye MD    Allergies: Nsaids, Orange, Orange Oil, Latex    Isolation: None   Infection: None   Code Status: CPR   Advance Care Planning Activity    Ht: 182 cm (71.65\")   Wt: 101 kg (222 lb 9.6 oz)    Admission Cmt: None   Principal Problem: None                Active Insurance as of 10/6/2022     Primary Coverage     Payor Plan Insurance Group Employer/Plan Group    HUMANA MEDICARE REPLACEMENT HUMANA MEDICARE REPLACEMENT [id number]     Payor Plan Address Payor Plan Phone Number Payor Plan Fax Number Effective Dates    [address] [address] [phone]  1/1/2013 - None Entered    [facility] [id number]       Subscriber Name Subscriber Birth Date Member ID       STORM KOCH SR. 1940 [id number]                 Emergency Contacts      (Rel.) Home Phone Work Phone Mobile Phone    Lucina Koch (Spouse) [phone] -- --    Storm Koch (Son) [phone] -- --    Romina Koch (Daughter) [phone] -- --              "

## 2022-10-07 NOTE — PLAN OF CARE
Goal Outcome Evaluation:              Outcome Evaluation: VSS. Pt remains on 1L o2. 3L while sleeping this shift. Speech seen pt. Diet adjusted to NTL. Neuro consulted and seen pt at bedside. PLan for MRI of head and CT of chest. No other complaints, will continue to monitor.

## 2022-10-07 NOTE — PROGRESS NOTES
[facility]     LOS: 1 day     Subjective:    CC:Weakness - Generalized and Headache    DIET:  Diet Order   Procedures   • Diet Regular   the patient is feeling better  No cp  No soa  Sitting up at the bedside, dizziness is better    Objective:    Vital Signs:  Temp:  [97.6 °F (36.4 °C)-98.7 °F (37.1 °C)] 98.5 °F (36.9 °C)  Heart Rate:  [62-83] 83  Resp:  [16-18] 18  BP: (118-158)/(51-76) 132/65    SpO2:  [90 %-97 %] 91 %  on  Flow (L/min):  [1-2] 2;   Device (Oxygen Therapy): nasal cannula  Body mass index is 30.49 kg/m².    Physical Exam  Constitutional:       General: He is not in acute distress.     Appearance: Normal appearance. He is not ill-appearing.   HENT:      Head: Normocephalic and atraumatic.   Cardiovascular:      Rate and Rhythm: Normal rate. Rhythm irregular.      Heart sounds: No murmur heard.  Pulmonary:      Effort: No respiratory distress.   Abdominal:      General: There is no distension.      Tenderness: There is no abdominal tenderness.   Musculoskeletal:      Right lower leg: No edema.      Left lower leg: No edema.   Neurological:      Mental Status: He is alert.   Psychiatric:         Mood and Affect: Mood normal.         Behavior: Behavior normal.         Results Review:    Glucose   Date Value Ref Range Status   10/07/2022 99 65 - 99 mg/dL Final   10/06/2022 101 (H) 65 - 99 mg/dL Final     Results from last 7 days   Lab Units 10/07/22  0447   WBC 10*3/mm3 13.11*   HEMOGLOBIN g/dL 9.2*   HEMATOCRIT % 27.1*   PLATELETS 10*3/mm3 338     Results from last 7 days   Lab Units 10/07/22  0447 10/06/22  1216   SODIUM mmol/L 139 139   POTASSIUM mmol/L 4.1 4.1   CHLORIDE mmol/L 105 103   CO2 mmol/L 27.5 26.9   BUN mg/dL 15 13   CREATININE mg/dL 1.11 1.18   CALCIUM mg/dL 9.3 9.5   BILIRUBIN mg/dL  --  2.2*   ALK PHOS U/L  --  56   ALT (SGPT) U/L  --  9   AST (SGOT) U/L  --  16   GLUCOSE mg/dL 99 101*     Results from last 7 days   Lab Units 10/06/22  1216   INR  2.46*    APTT seconds 53.4*         Results from last 7 days   Lab Units 10/06/22  1216   TROPONIN T ng/mL 0.029     Cultures:  No results found for: BLOODCX, URINECX, WOUNDCX, MRSACX, RESPCX, STOOLCX    I have reviewed daily medications and changes in CPOE    Scheduled meds  albuterol, 2.5 mg, Nebulization, 4x Daily  amLODIPine, 5 mg, Oral, Daily  arformoterol, 15 mcg, Nebulization, BID - RT  azithromycin, 500 mg, Intravenous, Q24H  calcium 500 mg vitamin D 5 mcg (200 UT), 1 tablet, Oral, BID  cefTRIAXone, 2 g, Intravenous, Q24H  cetirizine, 10 mg, Oral, Nightly  cyanocobalamin, 1,000 mcg, Intramuscular, Q28 Days  donepezil, 10 mg, Oral, Nightly  famotidine, 20 mg, Oral, BID  finasteride, 5 mg, Oral, Daily  folic acid, 1 mg, Oral, TID  gabapentin, 400 mg, Oral, Daily With Breakfast & Dinner  gabapentin, 800 mg, Oral, Nightly  HYDROcodone-acetaminophen, 1 tablet, Oral, TID  ipratropium, 0.5 mg, Nebulization, 4x Daily - RT  levothyroxine, 112 mcg, Oral, Q AM  memantine, 10 mg, Oral, Q12H  OXcarbazepine, 300 mg, Oral, Nightly  polyethylene glycol, 17 g, Oral, Daily  rivaroxaban, 20 mg, Oral, Daily  senna-docusate sodium, 2 tablet, Oral, BID  sertraline, 25 mg, Oral, Daily  sodium chloride, 10 mL, Intravenous, Q12H  tiotropium bromide monohydrate, 2 puff, Inhalation, Daily - RT           PRN meds  •  acetaminophen **OR** acetaminophen **OR** acetaminophen  •  albuterol  •  ALPRAZolam  •  senna-docusate sodium **AND** polyethylene glycol **AND** bisacodyl **AND** bisacodyl  •  calcium carbonate  •  cyclobenzaprine  •  HYDROcodone-acetaminophen  •  ipratropium  •  ipratropium-albuterol  •  meclizine  •  melatonin  •  Morphine **AND** naloxone  •  nitroglycerin  •  ondansetron **OR** ondansetron  •  sodium chloride  •  sodium chloride        Essential hypertension    Pulmonary emphysema (HCC)    Metastatic renal cell carcinoma  on q 2 wks chemo    Acute on chronic respiratory failure with hypoxia (HCC)    Sleep apnea    Hilar  mass    Paroxysmal atrial fibrillation (HCC)    Dizziness    Pneumonia        Assessment/Plan:  Dizziness  -Neurology is seeing  -checking an MRI    Acute on chronic respiratory failure with hypoxemia    Dysphagia  -sppech    Renal cell carcinoma  -oncology seeing  -chest of the chest    Pneumonia  -rocephin and zithromax    DVT PPX: chuy Dye MD  10/07/22  14:02 EDT

## 2022-10-07 NOTE — THERAPY EVALUATION
Acute Care - Speech Language Pathology   Swallow Initial Evaluation [facility]     Patient Name: Mg Gerber Sr.  : 1940  MRN: [id number]  Today's Date: 10/7/2022               Admit Date: 10/6/2022    Visit Dx:     ICD-10-CM ICD-9-CM   1. Dizziness  R42 780.4   2. Gait instability  R26.81 781.2   3. Abnormal chest x-ray  R93.89 793.2   4. Elevated INR  R79.1 790.92     Patient Active Problem List   Diagnosis   • Anemia, vitamin B12 deficiency   • Arthritis   • Hemochromatosis   • Herpes zoster   • Hip pain   • Essential hypertension   • Cancer (HCC)   • Pulmonary emphysema (HCC)   • Left low back pain   • Vertigo   • Headache around the eyes   • Mild cognitive impairment   • Metastatic renal cell carcinoma  on q 2 wks chemo   • Anxiety   • Malignant neoplasm of lung (HCC)   • Neuropathy   • Perennial allergic rhinitis   • Vitamin B12 deficiency   • Osteoarthritis of lumbar spine   • Pharyngitis   • Bronchitis   • Drug-induced constipation   • Hx of hiatal hernia   • History of lung cancer   • COVID-19 virus detected   • Dyspnea   • Generalized weakness   • Acute on chronic respiratory failure with hypoxia (HCC)   • Gastroesophageal reflux disease   • Hearing disorder   • Malignant neoplasm of prostate (HCC)   • Sleep apnea   • Anemia   • Malignant neoplasm metastatic to lung (HCC)   • Secondary bacterial pneumonia   • Pneumonia due to COVID-19 virus   • Hilar mass   • On antineoplastic chemotherapy q 2wks   • Paroxysmal atrial fibrillation (HCC)   • History of pulmonary embolism and DVT   • Chronic nausea   • Gastritis   • Muscle spasm   • Fall   • Closed fracture of multiple ribs of left side   • Chest wall pain   • Fall, initial encounter   • Immobility syndrome   • Self-care deficit   • Cytokine release syndrome, grade 2   • Dizziness   • Compression fracture of vertebral column (HCC)   • History of chronic lung disease   • Impairment of balance   • Lung involvement associated with another  disorder (HCC)   • Pneumonia     Past Medical History:   Diagnosis Date   • Allergic    • Anemia, vitamin B12 deficiency 04/12/2012   • Anxiety    • Arthritis 03/24/2014   • Atrial fibrillation (HCC)    • Cancer (HCC) 04/29/2014    kidney; prostate   • Cataract    • Chemotherapeutic agent or infusion extravasation     q 1-2 wks    • COPD (chronic obstructive pulmonary disease) (HCC) 03/24/2014   • Emphysema of lung (HCC)    • Hemochromatosis 03/24/2014   • Herpes zoster 12/06/2011    left medial thigh   • Hip pain 03/24/2014   • History of Doppler ultrasound 12/28/2011    superficial and deep venous insuffiency   • History of EKG 02/10/2012    Dr. Kathi Lloyd; unchanged from prior EKG   • Hypertension     benign essential   • Injury of back    • Pulmonary embolism (HCC) 03/24/2014   • Renal cell cancer (HCC)    • Shortness of breath    • Sleep apnea      Past Surgical History:   Procedure Laterality Date   • APPENDECTOMY     • CATARACT EXTRACTION     • CHOLECYSTECTOMY     • SPLENECTOMY     • TONSILLECTOMY     • VEIN SURGERY         SLP Recommendation and Plan  SLP Swallowing Diagnosis: suspected pharyngeal dysphagia (10/07/22 1207)  SLP Diet Recommendation: regular textures, nectar thick liquids (10/07/22 1207)  Recommended Precautions and Strategies: upright posture during/after eating, small bites of food and sips of liquid, no straw, alternate between small bites of food and sips of liquid, reflux precautions (10/07/22 1207)  SLP Rec. for Method of Medication Administration: meds whole, meds crushed, with pudding or applesauce, as tolerated (10/07/22 1207)     Monitor for Signs of Aspiration: yes, notify SLP if any concerns (10/07/22 1207)  Recommended Diagnostics: VFSS (MBS) (10/07/22 1207)  Swallow Criteria for Skilled Therapeutic Interventions Met: demonstrates skilled criteria (10/07/22 1207)  Anticipated Discharge Disposition (SLP): home with assist (10/07/22 1207)  Rehab Potential/Prognosis, Swallowing:  adequate, monitor progress closely (10/07/22 1207)  Therapy Frequency (Swallow): PRN (10/07/22 1207)  Predicted Duration Therapy Intervention (Days): until discharge (10/07/22 1207)                                            SWALLOW EVALUATION (last 72 hours)     SLP Adult Swallow Evaluation     Row Name 10/07/22 1207                   Rehab Evaluation    Document Type evaluation  -SL        Subjective Information no complaints  -SL        Patient Observations alert;cooperative;agree to therapy  -SL        Patient/Family/Caregiver Comments/Observations wife present  -SL        Patient Effort good  -SL        Symptoms Noted During/After Treatment none  -SL                  General Information    Patient Profile Reviewed yes  -SL        Pertinent History Of Current Problem Admitted with dizziness, L hilar mass -metastatic adenopathy from renal cell CA;  HX- metastatic renal cell CA, prostate CA, dysphagia, O2 at home;  pt reported that he needs to hold thin liquids in mouth for a period of time before swallowing, and reported some difficulty with larger pills; chest x ray- possible underlying PNA/hilar mass  -SL        Current Method of Nutrition regular textures  -SL        Precautions/Limitations, Vision WFL;for purposes of eval  -SL        Precautions/Limitations, Hearing hearing impairment, bilaterally  -SL        Prior Level of Function-Communication WFL  -SL        Prior Level of Function-Swallowing other (see comments)  dysphagia for years per pt  -SL        Plans/Goals Discussed with patient;spouse/S.O.  -SL        Barriers to Rehab medically complex;hearing deficit  -SL        Patient's Goals for Discharge return home  -SL                  Oral Motor Structure and Function    Dentition Assessment natural, present and adequate  -SL        Secretion Management WNL/WFL  -SL        Mucosal Quality moist, healthy  -SL        Volitional Swallow delayed  -SL                  Oral Musculature and Cranial Nerve  Assessment    Oral Motor General Assessment WFL  -SL                  General Eating/Swallowing Observations    Respiratory Support Currently in Use nasal cannula  -        Eating/Swallowing Skills self-fed;fed by SLP  -SL        Positioning During Eating upright 90 degree;upright in bed  -        Utensils Used spoon;cup  -SL        Consistencies Trialed ice chips;thin liquids;pureed;mechanical soft, no mixed consistencies;mixed consistency;soft textures;regular textures  -                  Clinical Swallow Eval    Clinical Swallow Evaluation Summary no overt clinical s/s aspiration noted on ice chips, thin lqiuid from cup , nectar thick lqiuid, puree, soft solid, or regular consistencies;  pt was noted to hole thin lqiuid bolus in oral cavity for up to 10 sec before swallowing- less hold time noted for most other consistencies with exception of 1x thick lquid; swallow initiation dealy, however laryngeal elevation appeared adeq in range;  suggested use of nectar thick lqiuids as pt appeared to feel more at ease swallowing that texture, and the swallow was prompt;  pt and wife did report that pt intermittently coghs on lquids at home- pt reported it is usually when rushing, taking too much, or having poor positioning;  reviewed safe swallow strategies- pt open to trying nectar consistency to determine effectiveness in improving swallow;  can complete VFSS if MD still concerned re: possiblity of PNA  -                  SLP Evaluation Clinical Impression    SLP Swallowing Diagnosis suspected pharyngeal dysphagia  -        Functional Impact risk of aspiration/pneumonia  -        Rehab Potential/Prognosis, Swallowing adequate, monitor progress closely  -        Swallow Criteria for Skilled Therapeutic Interventions Met demonstrates skilled criteria  -                  SLP Treatment Clinical Impressions    Barriers to Overall Progress (SLP) Baseline deficits  -                  Recommendations    Therapy  Frequency (Swallow) PRN  -SL        Predicted Duration Therapy Intervention (Days) until discharge  -SL        SLP Diet Recommendation regular textures;nectar thick liquids  -SL        Recommended Diagnostics VFSS (MBS)  -SL        Recommended Precautions and Strategies upright posture during/after eating;small bites of food and sips of liquid;no straw;alternate between small bites of food and sips of liquid;reflux precautions  -SL        Oral Care Recommendations Oral Care before breakfast, after meals and PRN  -SL        SLP Rec. for Method of Medication Administration meds whole;meds crushed;with pudding or applesauce;as tolerated  -SL        Monitor for Signs of Aspiration yes;notify SLP if any concerns  -SL        Anticipated Discharge Disposition (SLP) home with assist  -SL                  (LTG) Patient will demonstrate progress toward functional swallow for    Diet Texture (Demonstrate progress toward functional swallow) regular textures  -SL        Liquid viscosity (Demonstrate progress toward functional swallow) thin liquids  -SL        [place] (Demonstrate progress towards functional swallow) independently (over 90% accuracy)  -SL        Time Frame (Demonstrate progress toward functional swallow) by discharge  -SL        Comment (Demonstrate progress toward functional swallow) complete  VFSS if MD concerned re: possibility of aspiration  -SL                  (STG) Patient will tolerate trials of    Consistencies Trialed (Tolerate trials) regular textures;nectar/ mildly thick liquids  -SL        Desired Outcome (Tolerate trials) without signs/symptoms of aspiration  -SL        [place] (Tolerate trials) independently (over 90% accuracy)  -SL              User Key  (r) = Recorded By, (t) = Taken By, (c) = Cosigned By    Initials Name Effective Dates    Tiffany Hendrix MS CCC-SLP 06/16/21 -                 EDUCATION  The patient has been educated in the following areas:   Dysphagia (Swallowing  Impairment).        SLP GOALS     Row Name 10/07/22 1207             (LTG) Patient will demonstrate progress toward functional swallow for    Diet Texture (Demonstrate progress toward functional swallow) regular textures  -SL      Liquid viscosity (Demonstrate progress toward functional swallow) thin liquids  -SL      [place] (Demonstrate progress towards functional swallow) independently (over 90% accuracy)  -SL      Time Frame (Demonstrate progress toward functional swallow) by discharge  -SL      Comment (Demonstrate progress toward functional swallow) complete  VFSS if MD concerned re: possibility of aspiration  -SL              (STG) Patient will tolerate trials of    Consistencies Trialed (Tolerate trials) regular textures;nectar/ mildly thick liquids  -SL      Desired Outcome (Tolerate trials) without signs/symptoms of aspiration  -SL      [place] (Tolerate trials) independently (over 90% accuracy)  -SL            User Key  (r) = Recorded By, (t) = Taken By, (c) = Cosigned By    Initials Name Provider Type    Tiffany Hendrix MS CCC-SLP Speech and Language Pathologist                   Time Calculation:    Time Calculation- SLP     Row Name 10/07/22 1219             Time Calculation- SLP    SLP Received On 10/07/22  -SL            User Key  (r) = Recorded By, (t) = Taken By, (c) = Cosigned By    Initials Name Provider Type    Tiffany Hendrix MS CCC-SLP Speech and Language Pathologist                Therapy Charges for Today     Code Description Service Date Service Provider Modifiers Qty    [id number]  ST EVAL ORAL PHARYNG SWALLOW 4 10/7/2022 Tiffany Diez MS CCC-SLP GN 1               Tiffany Diez MS CCC-SIOMARA  10/7/2022

## 2022-10-07 NOTE — THERAPY EVALUATION
Patient Name: Mg Gerber Sr.  : 1940    MRN: [id number]                              Today's Date: 10/7/2022       Admit Date: 10/6/2022    Visit Dx:     ICD-10-CM ICD-9-CM   1. Dizziness  R42 780.4   2. Gait instability  R26.81 781.2   3. Abnormal chest x-ray  R93.89 793.2   4. Elevated INR  R79.1 790.92     Patient Active Problem List   Diagnosis   • Anemia, vitamin B12 deficiency   • Arthritis   • Hemochromatosis   • Herpes zoster   • Hip pain   • Essential hypertension   • Cancer (HCC)   • Pulmonary emphysema (HCC)   • Left low back pain   • Vertigo   • Headache around the eyes   • Mild cognitive impairment   • Metastatic renal cell carcinoma  on q 2 wks chemo   • Anxiety   • Malignant neoplasm of lung (HCC)   • Neuropathy   • Perennial allergic rhinitis   • Vitamin B12 deficiency   • Osteoarthritis of lumbar spine   • Pharyngitis   • Bronchitis   • Drug-induced constipation   • Hx of hiatal hernia   • History of lung cancer   • COVID-19 virus detected   • Dyspnea   • Generalized weakness   • Acute on chronic respiratory failure with hypoxia (HCC)   • Gastroesophageal reflux disease   • Hearing disorder   • Malignant neoplasm of prostate (HCC)   • Sleep apnea   • Anemia   • Malignant neoplasm metastatic to lung (HCC)   • Secondary bacterial pneumonia   • Pneumonia due to COVID-19 virus   • Hilar mass   • On antineoplastic chemotherapy q 2wks   • Paroxysmal atrial fibrillation (HCC)   • History of pulmonary embolism and DVT   • Chronic nausea   • Gastritis   • Muscle spasm   • Fall   • Closed fracture of multiple ribs of left side   • Chest wall pain   • Fall, initial encounter   • Immobility syndrome   • Self-care deficit   • Cytokine release syndrome, grade 2   • Dizziness   • Compression fracture of vertebral column (HCC)   • History of chronic lung disease   • Impairment of balance   • Lung involvement associated with another disorder (HCC)   • Pneumonia     Past Medical History:   Diagnosis  Date   • Allergic    • Anemia, vitamin B12 deficiency 04/12/2012   • Anxiety    • Arthritis 03/24/2014   • Atrial fibrillation (HCC)    • Cancer (HCC) 04/29/2014    kidney; prostate   • Cataract    • Chemotherapeutic agent or infusion extravasation     q 1-2 wks    • COPD (chronic obstructive pulmonary disease) (HCC) 03/24/2014   • Emphysema of lung (HCC)    • Hemochromatosis 03/24/2014   • Herpes zoster 12/06/2011    left medial thigh   • Hip pain 03/24/2014   • History of Doppler ultrasound 12/28/2011    superficial and deep venous insuffiency   • History of EKG 02/10/2012    Dr. Kathi Lloyd; unchanged from prior EKG   • Hypertension     benign essential   • Injury of back    • Pulmonary embolism (HCC) 03/24/2014   • Renal cell cancer (HCC)    • Shortness of breath    • Sleep apnea      Past Surgical History:   Procedure Laterality Date   • APPENDECTOMY     • CATARACT EXTRACTION     • CHOLECYSTECTOMY     • SPLENECTOMY     • TONSILLECTOMY     • VEIN SURGERY        General Information     Row Name 10/07/22 1228          OT Time and Intention    Document Type evaluation  -     Mode of Treatment individual therapy;occupational therapy  -     Row Name 10/07/22 1228          General Information    Patient Profile Reviewed yes  -KA     Prior Level of Function independent:;ADL's;all household mobility  reports he has a rwx he uses at home as needed  -     Existing Precautions/Restrictions fall  -     Row Name 10/07/22 1228          Living Environment    People in Home spouse;child(haroldo), adult  lives with wife and daughter  -     Row Name 10/07/22 1228          Home Main Entrance    Number of Stairs, Main Entrance three  reports living in a trilevel home. 3 AVRIL with rail  -     Row Name 10/07/22 1228          Stairs Within Home, Primary    Stairs, Within Home, Primary tri level home. Reports bedroom is up 1 flight of stairs and family room is down a flight of stairs. Pt reports he can walk in the back door into  the family room downstairs with no AVRIL.  -     Row Name 10/07/22 1228          Cognition    Orientation Status (Cognition) oriented x 4  -     Row Name 10/07/22 1228          Safety Issues, Functional Mobility    Impairments Affecting Function (Mobility) balance;endurance/activity tolerance;shortness of breath;strength  -     Comment, Safety Issues/Impairments (Mobility) nonskid socks and gait belt donned for safety  -           User Key  (r) = Recorded By, (t) = Taken By, (c) = Cosigned By    Initials Name Provider Type    Christy Couch OT Occupational Therapist                 Mobility/ADL's     Row Name 10/07/22 1231          Bed Mobility    Bed Mobility supine-sit  -     Supine-Sit [place] (Bed Mobility) standby assist  -     Comment, (Bed Mobility) SBA with increased time for bed mobility this AM. Up in chair at the end of session  -     Row Name 10/07/22 1231          Transfers    Transfers bed-chair transfer;sit-stand transfer;toilet transfer  -     Bed-Chair [place] (Transfers) contact guard;1 person assist  -     Assistive Device (Bed-Chair Transfers) walker, front-wheeled  -     Row Name 10/07/22 1231          Toilet Transfer    Comment, (Toilet Transfer) politely declined toilet transfer  -     Row Name 10/07/22 1231          Functional Mobility    Functional Mobility- Comment CGA to take steps to bedside chair with use of rwx  -     Row Name 10/07/22 1231          Activities of Daily Living    BADL Assessment/Intervention lower body dressing  -     Row Name 10/07/22 1231          Lower Body Dressing Assessment/Training    [place] Level (Lower Body Dressing) don;doff;socks;standby assist  -     Position (Lower Body Dressing) edge of bed sitting  -     Comment, (Lower Body Dressing) increased time to complete due to feeling SOA at times throughout. Educated on breathing techniques throughout ADLs  -           User Key  (r) = Recorded By, (t) = Taken By,  (c) = Cosigned By    Initials Name Provider Type    Christy Couch OT Occupational Therapist               Obj/Interventions     Row Name 10/07/22 1235          Sensory Assessment (Somatosensory)    Sensory Assessment reported no issues with BUE sensation  -     Row Name 10/07/22 1235          Range of Motion Comprehensive    General Range of Motion bilateral upper extremity ROM WFL  -     Row Name 10/07/22 1235          Strength Comprehensive (MMT)    Comment, General Manual Muscle Testing (MMT) Assessment BUE grossly 4-/5  -KA     Row Name 10/07/22 1235          Motor Skills    Motor Skills functional endurance  -     Functional Endurance fatigued quickly with EOB ADL activity and transfer to chair  -     Row Name 10/07/22 1235          Balance    Balance Assessment sitting static balance;sitting dynamic balance;standing static balance;standing dynamic balance  -     Static Sitting Balance standby assist  -     Dynamic Sitting Balance standby assist;contact guard  -     Static Standing Balance standby assist  -     Dynamic Standing Balance contact guard  -     Position/Device Used, Standing Balance walker, front-wheeled  -     Balance Interventions sitting;standing;occupation based/functional task  -           User Key  (r) = Recorded By, (t) = Taken By, (c) = Cosigned By    Initials Name Provider Type    Christy Couch OT Occupational Therapist               Goals/Plan     Row Name 10/07/22 1244          Bed Mobility Goal 1 (OT)    Activity/Assistive Device (Bed Mobility Goal 1, OT) bed mobility activities, all  -KA     [place] Level/Cues Needed (Bed Mobility Goal 1, OT) modified independence  -     Progress/Outcomes (Bed Mobility Goal 1, OT) goal ongoing  -     Row Name 10/07/22 1244          Transfer Goal 1 (OT)    Activity/Assistive Device (Transfer Goal 1, OT) transfers, all  -KA     [place] Level/Cues Needed (Transfer Goal 1, OT) standby assist  -      Progress/Outcome (Transfer Goal 1, OT) goal ongoing  -     Row Name 10/07/22 1244          Dressing Goal 1 (OT)    Activity/Device (Dressing Goal 1, OT) dressing skills, all  -KA     [place]/Cues Needed (Dressing Goal 1, OT) standby assist  -KA     Progress/Outcome (Dressing Goal 1, OT) goal ongoing  -     Row Name 10/07/22 1244          Toileting Goal 1 (OT)    Activity/Device (Toileting Goal 1, OT) toileting skills, all  -KA     [place] Level/Cues Needed (Toileting Goal 1, OT) standby assist  -KA     Progress/Outcome (Toileting Goal 1, OT) goal ongoing  -     Row Name 10/07/22 1244          Grooming Goal 1 (OT)    Activity/Device (Grooming Goal 1, OT) grooming skills, all  -KA     [place] (Grooming Goal 1, OT) modified independence  -KA     Progress/Outcome (Grooming Goal 1, OT) goal ongoing  -     Row Name 10/07/22 1244          Therapy Assessment/Plan (OT)    Planned Therapy Interventions (OT) activity tolerance training;BADL retraining;ROM/therapeutic exercise;strengthening exercise;transfer/mobility retraining;passive ROM/stretching;patient/caregiver education/training  -           User Key  (r) = Recorded By, (t) = Taken By, (c) = Cosigned By    Initials Name Provider Type    Christy Couch, OT Occupational Therapist               Clinical Impression     Row Name 10/07/22 1237          Pain Assessment    Pretreatment Pain Rating 0/10 - no pain  -KA     Posttreatment Pain Rating 0/10 - no pain  -     Row Name 10/07/22 1237          Plan of Care Review    Plan of Care Reviewed With patient  -     Outcome Evaluation Pt seen for OT eval this AM. Pt admitted with dizziness and unsteady gait at home. PMHx includes metastatic renal ca, prostate ca, hilar mass from metastic adenopathy. Pt reports usually being on room air at home and upon arrival pt on 2L NC. Pt reports living in a trilevel home with wife and daughter with 3 AVRIL front door with hand rail and no AVRIL back door that  leads into the family room. Pt reports when entering front door he has full flight of stairs to his bedroom and full flight downstairs to family room. Pt reports being indep with ADLs and uses a rwx occassionally for functional mobility within the home. Also reports he uses a shower chair. Pt transitioned from supine to sit with SBA. Sat EOB and donned and doffed socks with SBA. Required increased time to complete due to pt feeling SOA. Pt's O2 84% during ADL. Pt educated on breathing techniques throughout and pt O2 increased to 95% during ADL. Pt stood and transfered to chair with CGA/min A and use of rwx. Pt reported dizziness that subsided with rest break. Pt demonstrates deficits with decreased strength, ednurance, activity tolerance and functional mobility. Pt to benefit from skilled OT to address deficits and maximize independence with ADLs.  -     Row Name 10/07/22 1237          Therapy Assessment/Plan (OT)    Therapy Frequency (OT) 5 times/wk  -     Row Name 10/07/22 1237          Therapy Plan Review/Discharge Plan (OT)    Anticipated Discharge Disposition (OT) --  pending progress  -     Row Name 10/07/22 1237          Vital Signs    Pre Patient Position Supine  -     Intra Patient Position Standing  -KA     Post Patient Position Sitting  -     Row Name 10/07/22 1237          Positioning and Restraints    Pre-Treatment Position in bed  -KA     Post Treatment Position chair  -KA     In Chair notified nsg;reclined;with nsg;with other staff;call light within reach;encouraged to call for assist;exit alarm on  -           User Key  (r) = Recorded By, (t) = Taken By, (c) = Cosigned By    Initials Name Provider Type    Christy Couch, OT Occupational Therapist               Outcome Measures     Row Name 10/07/22 3868          How much help from another is currently needed...    Putting on and taking off regular lower body clothing? 3  -KA     Bathing (including washing, rinsing, and drying) 2  -KA      Toileting (which includes using toilet bed pan or urinal) 2  -KA     Putting on and taking off regular upper body clothing 3  -KA     Taking care of personal grooming (such as brushing teeth) 4  -KA     Eating meals 4  -KA     AM-PAC 6 Clicks Score (OT) 18  -KA     Row Name 10/07/22 1245          Functional Assessment    Outcome Measure Options AM-PAC 6 Clicks Daily Activity (OT)  -           User Key  (r) = Recorded By, (t) = Taken By, (c) = Cosigned By    Initials Name Provider Type    Christy Couch OT Occupational Therapist                Occupational Therapy Education                 Title: PT OT SLP Therapies (Done)     Topic: Occupational Therapy (Done)     Point: ADL training (Done)     Description:   Instruct learner(s) on proper safety adaptation and remediation techniques during self care or transfers.   Instruct in proper use of assistive devices.              Learning Progress Summary           Patient Acceptance, E,D, VU,DU by LEE at 10/7/2022 1245                               User Key     Initials Effective Dates Name Provider Type Discipline     09/22/22 -  Christy Saxena OT Occupational Therapist OT              OT Recommendation and Plan  Planned Therapy Interventions (OT): activity tolerance training, BADL retraining, ROM/therapeutic exercise, strengthening exercise, transfer/mobility retraining, passive ROM/stretching, patient/caregiver education/training  Therapy Frequency (OT): 5 times/wk  Plan of Care Review  Plan of Care Reviewed With: patient  Outcome Evaluation: Pt seen for OT serg this AM. Pt admitted with dizziness and unsteady gait at home. PMHx includes metastatic renal ca, prostate ca, hilar mass from metastic adenopathy. Pt reports usually being on room air at home and upon arrival pt on 2L NC. Pt reports living in a trilevel home with wife and daughter with 3 AVRIL front door with hand rail and no AVRIL back door that leads into the family room. Pt reports when entering  front door he has full flight of stairs to his bedroom and full flight downstairs to family room. Pt reports being indep with ADLs and uses a rwx occassionally for functional mobility within the home. Also reports he uses a shower chair. Pt transitioned from supine to sit with SBA. Sat EOB and donned and doffed socks with SBA. Required increased time to complete due to pt feeling SOA. Pt's O2 84% during ADL. Pt educated on breathing techniques throughout and pt O2 increased to 95% during ADL. Pt stood and transfered to chair with CGA/min A and use of rwx. Pt reported dizziness that subsided with rest break. Pt demonstrates deficits with decreased strength, ednurance, activity tolerance and functional mobility. Pt to benefit from skilled OT to address deficits and maximize independence with ADLs.     Time Calculation:    Time Calculation- OT     Row Name 10/07/22 1246             Time Calculation- OT    OT Start Time 0825  -KA      OT Stop Time 0900  -KA      OT Time Calculation (min) 35 min  -KA      Total Timed Code Minutes- OT 25 minute(s)  -KA      OT Received On 10/07/22  -KA      OT - Next Appointment 10/10/22  -KA      OT Goal Re-Cert Due Date 10/21/22  -KA              Timed Charges    91880 - OT Therapeutic Activity Minutes 15  -KA      45668 - OT Self Care/Mgmt Minutes 10  -KA              Untimed Charges    OT Eval/Re-eval Minutes 10  -KA              Total Minutes    Timed Charges Total Minutes 25  -KA      Untimed Charges Total Minutes 10  -KA       Total Minutes 35  -KA            User Key  (r) = Recorded By, (t) = Taken By, (c) = Cosigned By    Initials Name Provider Type    Christy Couch OT Occupational Therapist              Therapy Charges for Today     Code Description Service Date Service Provider Modifiers Qty    66871510841 HC OT SELF CARE/MGMT/TRAIN EA 15 MIN 10/7/2022 Christy Saxena OT GO 1    65453310078 HC OT THERAPEUTIC ACT EA 15 MIN 10/7/2022 Christy Saxena OT GO 1     [id number]  OT EVAL MOD COMPLEXITY 2 10/7/2022 Christy Saxena OT GO 1               Christy Saxena OT  10/7/2022

## 2022-10-08 LAB
ANION GAP SERPL CALCULATED.3IONS-SCNC: 9.3 MMOL/L (ref 5–15)
BASOPHILS # BLD MANUAL: 0.16 10*3/MM3 (ref 0–0.2)
BASOPHILS NFR BLD MANUAL: 1 % (ref 0–1.5)
BILIRUB UR QL STRIP: NEGATIVE
BUN SERPL-MCNC: 15 MG/DL (ref 8–23)
BUN/CREAT SERPL: 13 (ref 7–25)
CALCIUM SPEC-SCNC: 9.3 MG/DL (ref 8.6–10.5)
CHLORIDE SERPL-SCNC: 106 MMOL/L (ref 98–107)
CLARITY UR: CLEAR
CO2 SERPL-SCNC: 25.7 MMOL/L (ref 22–29)
COLOR UR: YELLOW
CREAT SERPL-MCNC: 1.15 MG/DL (ref 0.76–1.27)
CRP SERPL-MCNC: 1.59 MG/DL (ref 0–0.5)
DEPRECATED RDW RBC AUTO: 39 FL (ref 37–54)
EGFRCR SERPLBLD CKD-EPI 2021: 63.5 ML/MIN/1.73
EOSINOPHIL # BLD MANUAL: 0.16 10*3/MM3 (ref 0–0.4)
EOSINOPHIL NFR BLD MANUAL: 1 % (ref 0.3–6.2)
ERYTHROCYTE [DISTWIDTH] IN BLOOD BY AUTOMATED COUNT: 10 % (ref 12.3–15.4)
GLUCOSE SERPL-MCNC: 97 MG/DL (ref 65–99)
GLUCOSE UR STRIP-MCNC: NEGATIVE MG/DL
HCT VFR BLD AUTO: 26.5 % (ref 37.5–51)
HGB BLD-MCNC: 8.6 G/DL (ref 13–17.7)
HGB UR QL STRIP.AUTO: NEGATIVE
KETONES UR QL STRIP: NEGATIVE
LEUKOCYTE ESTERASE UR QL STRIP.AUTO: NEGATIVE
LYMPHOCYTES # BLD MANUAL: 3.37 10*3/MM3 (ref 0.7–3.1)
LYMPHOCYTES NFR BLD MANUAL: 23 % (ref 5–12)
MACROCYTES BLD QL SMEAR: ABNORMAL
MCH RBC QN AUTO: 35.1 PG (ref 26.6–33)
MCHC RBC AUTO-ENTMCNC: 32.5 G/DL (ref 31.5–35.7)
MCV RBC AUTO: 108.2 FL (ref 79–97)
MONOCYTES # BLD: 3.69 10*3/MM3 (ref 0.1–0.9)
NEUTROPHILS # BLD AUTO: 8.67 10*3/MM3 (ref 1.7–7)
NEUTROPHILS NFR BLD MANUAL: 54 % (ref 42.7–76)
NITRITE UR QL STRIP: NEGATIVE
PH UR STRIP.AUTO: 6 [PH] (ref 5–8)
PLAT MORPH BLD: NORMAL
PLATELET # BLD AUTO: 375 10*3/MM3 (ref 140–450)
PMV BLD AUTO: 10.6 FL (ref 6–12)
POTASSIUM SERPL-SCNC: 4.2 MMOL/L (ref 3.5–5.2)
PROCALCITONIN SERPL-MCNC: 0.16 NG/ML (ref 0–0.25)
PROT UR QL STRIP: NEGATIVE
RBC # BLD AUTO: 2.45 10*6/MM3 (ref 4.14–5.8)
SODIUM SERPL-SCNC: 141 MMOL/L (ref 136–145)
SP GR UR STRIP: 1.02 (ref 1–1.03)
UROBILINOGEN UR QL STRIP: NORMAL
VARIANT LYMPHS NFR BLD MANUAL: 21 % (ref 19.6–45.3)
WBC MORPH BLD: NORMAL
WBC NRBC COR # BLD: 16.05 10*3/MM3 (ref 3.4–10.8)

## 2022-10-08 PROCEDURE — 85025 COMPLETE CBC W/AUTO DIFF WBC: CPT | Performed by: INTERNAL MEDICINE

## 2022-10-08 PROCEDURE — 86140 C-REACTIVE PROTEIN: CPT | Performed by: HOSPITALIST

## 2022-10-08 PROCEDURE — 25010000002 AZITHROMYCIN PER 500 MG: Performed by: INTERNAL MEDICINE

## 2022-10-08 PROCEDURE — 96372 THER/PROPH/DIAG INJ SC/IM: CPT

## 2022-10-08 PROCEDURE — 94761 N-INVAS EAR/PLS OXIMETRY MLT: CPT

## 2022-10-08 PROCEDURE — 94799 UNLISTED PULMONARY SVC/PX: CPT

## 2022-10-08 PROCEDURE — 94660 CPAP INITIATION&MGMT: CPT

## 2022-10-08 PROCEDURE — 96376 TX/PRO/DX INJ SAME DRUG ADON: CPT

## 2022-10-08 PROCEDURE — 25010000002 CYANOCOBALAMIN PER 1000 MCG: Performed by: INTERNAL MEDICINE

## 2022-10-08 PROCEDURE — 25010000002 CEFTRIAXONE PER 250 MG: Performed by: INTERNAL MEDICINE

## 2022-10-08 PROCEDURE — 94760 N-INVAS EAR/PLS OXIMETRY 1: CPT

## 2022-10-08 PROCEDURE — 85007 BL SMEAR W/DIFF WBC COUNT: CPT | Performed by: INTERNAL MEDICINE

## 2022-10-08 PROCEDURE — 63710000001 ONDANSETRON PER 8 MG: Performed by: INTERNAL MEDICINE

## 2022-10-08 PROCEDURE — 99223 1ST HOSP IP/OBS HIGH 75: CPT | Performed by: INTERNAL MEDICINE

## 2022-10-08 PROCEDURE — 25010000002 ONDANSETRON PER 1 MG: Performed by: INTERNAL MEDICINE

## 2022-10-08 PROCEDURE — 96375 TX/PRO/DX INJ NEW DRUG ADDON: CPT

## 2022-10-08 PROCEDURE — 99232 SBSQ HOSP IP/OBS MODERATE 35: CPT | Performed by: INTERNAL MEDICINE

## 2022-10-08 PROCEDURE — 84145 PROCALCITONIN (PCT): CPT | Performed by: HOSPITALIST

## 2022-10-08 PROCEDURE — 94664 DEMO&/EVAL PT USE INHALER: CPT

## 2022-10-08 PROCEDURE — 81003 URINALYSIS AUTO W/O SCOPE: CPT | Performed by: HOSPITALIST

## 2022-10-08 PROCEDURE — 80048 BASIC METABOLIC PNL TOTAL CA: CPT | Performed by: INTERNAL MEDICINE

## 2022-10-08 RX ADMIN — DOCUSATE SODIUM 50MG AND SENNOSIDES 8.6MG 2 TABLET: 8.6; 5 TABLET, FILM COATED ORAL at 20:35

## 2022-10-08 RX ADMIN — TIOTROPIUM BROMIDE INHALATION SPRAY 2 PUFF: 3.12 SPRAY, METERED RESPIRATORY (INHALATION) at 08:58

## 2022-10-08 RX ADMIN — ARFORMOTEROL TARTRATE 15 MCG: 15 SOLUTION RESPIRATORY (INHALATION) at 08:52

## 2022-10-08 RX ADMIN — DONEPEZIL HYDROCHLORIDE 10 MG: 10 TABLET, FILM COATED ORAL at 20:35

## 2022-10-08 RX ADMIN — HYDROCODONE BITARTRATE AND ACETAMINOPHEN 1 TABLET: 5; 325 TABLET ORAL at 16:09

## 2022-10-08 RX ADMIN — HYDROCODONE BITARTRATE AND ACETAMINOPHEN 1 TABLET: 5; 325 TABLET ORAL at 09:08

## 2022-10-08 RX ADMIN — FAMOTIDINE 20 MG: 20 TABLET ORAL at 20:35

## 2022-10-08 RX ADMIN — LEVOTHYROXINE SODIUM 112 MCG: 0.11 TABLET ORAL at 05:50

## 2022-10-08 RX ADMIN — GABAPENTIN 400 MG: 400 CAPSULE ORAL at 11:25

## 2022-10-08 RX ADMIN — CETIRIZINE HYDROCHLORIDE 10 MG: 10 TABLET ORAL at 20:35

## 2022-10-08 RX ADMIN — IPRATROPIUM BROMIDE 0.5 MG: 0.5 SOLUTION RESPIRATORY (INHALATION) at 15:54

## 2022-10-08 RX ADMIN — ONDANSETRON HYDROCHLORIDE 4 MG: 4 TABLET, FILM COATED ORAL at 00:56

## 2022-10-08 RX ADMIN — HYDROCODONE BITARTRATE AND ACETAMINOPHEN 1 TABLET: 5; 325 TABLET ORAL at 20:35

## 2022-10-08 RX ADMIN — CYANOCOBALAMIN 1000 MCG: 1000 INJECTION, SOLUTION INTRAMUSCULAR; SUBCUTANEOUS at 11:24

## 2022-10-08 RX ADMIN — FAMOTIDINE 20 MG: 20 TABLET ORAL at 11:25

## 2022-10-08 RX ADMIN — CYCLOBENZAPRINE 5 MG: 10 TABLET, FILM COATED ORAL at 00:56

## 2022-10-08 RX ADMIN — CEFTRIAXONE 2 G: 2 INJECTION, POWDER, FOR SOLUTION INTRAMUSCULAR; INTRAVENOUS at 00:11

## 2022-10-08 RX ADMIN — CALCIUM CARBONATE-VITAMIN D TAB 500 MG-200 UNIT 1 TABLET: 500-200 TAB at 11:25

## 2022-10-08 RX ADMIN — GABAPENTIN 400 MG: 400 CAPSULE ORAL at 17:46

## 2022-10-08 RX ADMIN — ONDANSETRON 4 MG: 2 INJECTION INTRAMUSCULAR; INTRAVENOUS at 09:08

## 2022-10-08 RX ADMIN — AMLODIPINE BESYLATE 5 MG: 5 TABLET ORAL at 11:24

## 2022-10-08 RX ADMIN — Medication 10 ML: at 09:09

## 2022-10-08 RX ADMIN — SERTRALINE 25 MG: 25 TABLET, FILM COATED ORAL at 11:25

## 2022-10-08 RX ADMIN — RIVAROXABAN 20 MG: 20 TABLET, FILM COATED ORAL at 11:25

## 2022-10-08 RX ADMIN — ONDANSETRON 4 MG: 2 INJECTION INTRAMUSCULAR; INTRAVENOUS at 22:01

## 2022-10-08 RX ADMIN — POLYETHYLENE GLYCOL 3350 17 G: 17 POWDER, FOR SOLUTION ORAL at 11:34

## 2022-10-08 RX ADMIN — GABAPENTIN 800 MG: 400 CAPSULE ORAL at 20:35

## 2022-10-08 RX ADMIN — DOCUSATE SODIUM 50MG AND SENNOSIDES 8.6MG 2 TABLET: 8.6; 5 TABLET, FILM COATED ORAL at 11:25

## 2022-10-08 RX ADMIN — IPRATROPIUM BROMIDE 0.5 MG: 0.5 SOLUTION RESPIRATORY (INHALATION) at 21:35

## 2022-10-08 RX ADMIN — OXCARBAZEPINE 300 MG: 300 TABLET, FILM COATED ORAL at 20:35

## 2022-10-08 RX ADMIN — Medication 1 MG: at 20:35

## 2022-10-08 RX ADMIN — MEMANTINE HYDROCHLORIDE 10 MG: 10 TABLET, FILM COATED ORAL at 20:35

## 2022-10-08 RX ADMIN — ALBUTEROL SULFATE 2.5 MG: 2.5 SOLUTION RESPIRATORY (INHALATION) at 15:55

## 2022-10-08 RX ADMIN — Medication 10 ML: at 20:35

## 2022-10-08 RX ADMIN — FINASTERIDE 5 MG: 5 TABLET, FILM COATED ORAL at 11:42

## 2022-10-08 RX ADMIN — Medication 1 MG: at 16:09

## 2022-10-08 RX ADMIN — Medication 1 MG: at 11:25

## 2022-10-08 RX ADMIN — CALCIUM CARBONATE-VITAMIN D TAB 500 MG-200 UNIT 1 TABLET: 500-200 TAB at 20:35

## 2022-10-08 RX ADMIN — ARFORMOTEROL TARTRATE 15 MCG: 15 SOLUTION RESPIRATORY (INHALATION) at 21:35

## 2022-10-08 RX ADMIN — MEMANTINE HYDROCHLORIDE 10 MG: 10 TABLET, FILM COATED ORAL at 11:24

## 2022-10-08 RX ADMIN — AZITHROMYCIN 500 MG: 500 INJECTION, POWDER, LYOPHILIZED, FOR SOLUTION INTRAVENOUS at 00:13

## 2022-10-08 NOTE — PROGRESS NOTES
[facility] INPATIENT PROGRESS NOTE    Length of Stay:  2 days    CHIEF COMPLAINT/REASON FOR VISIT:  Metastatic renal cell carcinoma    SUBJECTIVE: Afebrile.  Normotensive.  On 2 L nasal cannula. Less vertigo.     ROS:  14 systems reviewed with pertinent positives and negatives in the HPI. Reviewed today.    OBJECTIVE:  Vitals:    10/07/22 2014 10/07/22 2329 10/08/22 0505 10/08/22 0739   BP:  134/57  124/65   BP Location:  Left arm  Left arm   Patient Position:  Lying  Lying   Pulse: 66 70     Resp: 18 18  18   Temp:  97.6 °F (36.4 °C)  97.5 °F (36.4 °C)   TempSrc:  Oral  Oral   SpO2: 97% 91%  95%   Weight:   102 kg (224 lb)          PHYSICAL EXAMINATION:   General:  No acute distress, awake, alert and oriented. Wearing O2 NC.   Skin:  Warm and dry, no visible rash  HEENT:  Normocephalic/atraumatic.    Chest:  Normal respiratory effort  Extremities:  No visible clubbing, cyanosis, or edema  Neuro/psych:  Hard of hearing. Somewhat anxious mood and affect.        DIAGNOSTIC DATA:  Results Review:     I reviewed the patient's new clinical results.    Results from last 7 days   Lab Units 10/08/22  0500   WBC 10*3/mm3 16.05*   HEMOGLOBIN g/dL 8.6*   HEMATOCRIT % 26.5*   PLATELETS 10*3/mm3 375     Lab Results   Component Value Date    NEUTROABS 8.67 (H) 10/08/2022     Results from last 7 days   Lab Units 10/08/22  0500   SODIUM mmol/L 141   POTASSIUM mmol/L 4.2   CHLORIDE mmol/L 106   CO2 mmol/L 25.7   BUN mg/dL 15   CREATININE mg/dL 1.15   GLUCOSE mg/dL 97   CALCIUM mg/dL 9.3     Results from last 7 days   Lab Units 10/06/22  1216   INR  2.46*   APTT seconds 53.4*             IMAGING:    MRI Brain With & Without Contrast (10/07/2022 18:16)  CT Chest Without Contrast Diagnostic (10/07/2022 18:29)    MRI and CT images personally reviewed.  MRI shows indeterminate subcutaneous lesion lateral to left orbit.  No acute infarct.  No brain lesions.    CT chest with a 7.9 cm left perihilar and infrahilar soft tissue  mass.  Of note, on the PET scan from 5/24/2022 there was a 5 x 4 x 5.5 cm left infrahilar mass with a 2.7 cm soft tissue abnormality lateral to the heart in the left upper cardiophrenic region.    ASSESSMENT:  This is a 81 y.o. male with:     *Metastatic RCC  • He has a history of metastatic renal cell carcinoma with enlarged mediastinal lymphadenopathy since 2016.  Biopsy on 1/22/2016 confirmed metastatic renal cell carcinoma.  He was started on nivolumab in April 2016, held recently as of a few months ago.    • A PET CT scan performed on 5/24/2022 showed bulky metastatic left infrahilar adenopathy without significant change compared to January 2022.  There was stable adenopathy lateral to the heart in the left upper cardiophrenic region without change from January.  Slowly enlarging bilateral adrenal nodules were noted.  The enlargement was noted compared to April 2021.  There was some nonspecific enlargement nonmetabolic lymphadenopathy in the pelvis all less than 1 cm.  • Again, treatment being held     *Hereditary spherocytosis, chronic macrocytic anemia  • High retic count and mild iron overload as a result  • Hemoglobin 8.6  • H/o splenectomy age 12  • He still has hemolysis in spite of the splenectomy, retic count remain high and he remains anemic     *Vertigo  · PT has been consulted and evaluated  · Neurology has evaluated  · MRI brain negative for mets     *Leukocytosis with neutrophilia  • Chronic/stable s/p splenectomy     *Vitamin B12 deficiency  • Level low normal at 227  • Already started on B12 injections with plans for 3 days but the order was put in for q28 days. Changed to 3 consecutive days.      *Asplenia    *afib, on Xarelto    *Low grade fever on admission  · ID has been consulted.  Currently afebrile on rocephin     *Hypoxia  · On O2    RECOMMENDATIONS:   1. Vitamin B12 injections dally x 3 days, then oral treatment planned  2. He will [facility] at the [facility] for the RCC    We  will sign off.  Call us back if needed. Discussed with Dr. Dye. Discussed with the patient.       Deniz Amador MD

## 2022-10-08 NOTE — CONSULTS
CONSULT NOTE    Patient Identification:  Mg Gerber Sr.  81 y.o.  male  1940  [id number]            Requesting physician: Dr. Oh Dye    Reason for Consultation: COPD abnormal CT scan chest    CC: Headache dizziness    History of Present Illness:  Mr. Gerber is a very nice 81-year-old who is very hard of hearing and has had some progressive dementia over the preceding few years as well as ongoing treatment for metastatic renal cell carcinoma at the [facility] for years.  He has been on immunotherapy and has tolerated this fairly well.  He has known metastatic lesions to his mediastinum is hilar region and a region in his posterior thorax as well.  He follows closely gets PET scans and serial imaging.  Last PET scan showed a hilar mass around 5.4 x 5.5 cm.  There is also a concern of this compressing his pulmonary artery causing pulmonary hypertension CT scan today cut in the same section measures about the same on my read.  However on sagittal views it slightly different.  Question whether there has been some growth of this.  I advised the patient to take the CD with the CT images to the [facility] further review as an outpatient for follow-up so that the radiologist would be able to compare the images [id number].  I have only been able to compare to the prior report.    The patient came in because he had dizziness and a headache that was quite severe and could not move his legs.  Denies any worsening cough fevers chills shortness of breath chest tightness productive cough or hemoptysis.  He says been taking all of his inhalers as prescribed.      Review of Systems:  As per HPI otherwise a 12 system review of systems performed and all else negative    Past Medical History:   Diagnosis Date   • Allergic    • Anemia, vitamin B12 deficiency 04/12/2012   • Anxiety    • Arthritis 03/24/2014   • Atrial fibrillation (HCC)    • Cancer (HCC) 04/29/2014    kidney; prostate   •  Cataract    • Chemotherapeutic agent or infusion extravasation     q 1-2 wks    • COPD (chronic obstructive pulmonary disease) (HCC) 03/24/2014   • Emphysema of lung (HCC)    • Hemochromatosis 03/24/2014   • Herpes zoster 12/06/2011    left medial thigh   • Hip pain 03/24/2014   • History of Doppler ultrasound 12/28/2011    superficial and deep venous insuffiency   • History of EKG 02/10/2012    Dr. Kathi Lloyd; unchanged from prior EKG   • Hypertension     benign essential   • Injury of back    • Pulmonary embolism (HCC) 03/24/2014   • Renal cell cancer (HCC)    • Shortness of breath    • Sleep apnea        Past Surgical History:   Procedure Laterality Date   • APPENDECTOMY     • CATARACT EXTRACTION     • CHOLECYSTECTOMY     • SPLENECTOMY     • TONSILLECTOMY     • VEIN SURGERY          Medications Prior to Admission   Medication Sig Dispense Refill Last Dose   • ALPRAZolam (Xanax) 0.25 MG tablet Take 1 tablet by mouth 2 (Two) Times a Day As Needed for Anxiety. 60 tablet 2 Past Week at Unknown time   • amLODIPine (NORVASC) 5 MG tablet Take 1 tablet by mouth Daily. 90 tablet 1 10/6/2022 at Unknown time   • Calcium Carb-Cholecalciferol (CALTRATE 600+D3 PO) Take 1 tablet by mouth 2 (two) times a day.   10/5/2022 at Unknown time   • cyclobenzaprine (FLEXERIL) 5 MG tablet Take 5 mg by mouth 3 (Three) Times a Day As Needed for Muscle Spasms.   Past Week at Unknown time   • famotidine (Pepcid) 20 MG tablet Take 1 tablet by mouth 2 (Two) Times a Day. 180 tablet 1 10/6/2022 at Unknown time   • finasteride (PROSCAR) 5 MG tablet    10/5/2022 at Unknown time   • folic acid (FOLVITE) 1 MG tablet Take 1 mg by mouth 3 (Three) Times a Day.   10/6/2022 at Unknown time   • gabapentin (NEURONTIN) 400 MG capsule Take PO: 400mg QAM and with lunch, 800mg QHS 12 capsule 0 10/6/2022 at Unknown time   • HYDROcodone-acetaminophen (NORCO) 5-325 MG per tablet   1 Tab, Oral, Q6H, PRN as needed for pain, # 120 Tab, 0 Refill(s), [facility]: [facility], cm, 08/30/22 7:39:00 EDT, CLINICALHEIGHT, 104.66, kg, 08/30/22 7:39:00 EDT, CLINICALWEIGHT, 185.4   10/6/2022 at Unknown time   • ipratropium (ATROVENT) 0.06 % nasal spray 2 sprays into the nostril(s) as directed by provider 4 (Four) Times a Day. 15 mL 11 Past Week at Unknown time   • levocetirizine (XYZAL) 5 MG tablet Take 1 tablet by mouth Every Evening. 90 tablet 1 10/5/2022 at Unknown time   • levothyroxine (SYNTHROID, LEVOTHROID) 112 MCG tablet Take 112 mcg by mouth Daily.   10/6/2022 at Unknown time   • NAMZARIC 28-10 MG capsule sustained-release 24 hr Take 1 capsule by mouth Every Evening.   10/5/2022 at Unknown time   • ondansetron (ZOFRAN) 4 MG tablet Take 1 tablet by mouth Every 6 (Six) Hours As Needed for Nausea or Vomiting. 270 tablet 1 10/6/2022 at Unknown time   • polyethylene glycol (MIRALAX) 17 g packet Take 17 g by mouth Daily.   Past Month at Unknown time   • sennosides-docusate (senna-docusate sodium) 8.6-50 MG per tablet Take 2 tablets by mouth 2 (Two) Times a Day As Needed for Constipation.   Past Week at Unknown time   • sertraline (ZOLOFT) 25 MG tablet Take 25 mg by mouth Daily.   Past Month at Unknown time   • SPIRIVA HANDIHALER 18 MCG per inhalation capsule Place 1 capsule into inhaler and inhale Daily.   10/6/2022 at Unknown time   • Stiolto Respimat 2.5-2.5 MCG/ACT aerosol solution inhaler    Past Month at Unknown time   • tiotropium bromide-olodaterol (STIOLTO RESPIMAT) 2.5-2.5 MCG/ACT aerosol solution inhaler Inhale 1 puff 2 (Two) Times a Day.   10/6/2022 at Unknown time   • VENTOLIN  (90 BASE) MCG/ACT inhaler Inhale 2 puffs Every 4 (Four) Hours As Needed.   Past Month at Unknown time   • XARELTO 20 MG tablet Take 20 mg by mouth daily with dinner.   10/6/2022 at Unknown time   • lidocaine (LIDODERM) 5 % Place 2 patches on the skin as directed by provider Daily. Remove & Discard patch within 12 hours or as directed by MD 30 patch 0    • meclizine (ANTIVERT) 12.5 MG  tablet Take 1 tablet by mouth 3 (Three) Times a Day As Needed for Dizziness. 270 tablet 1 More than a month at Unknown time   • NIVOLUMAB IV Infuse  into a venous catheter Every 30 (Thirty) Days. Due: 3/15/2022      • OXcarbazepine (TRILEPTAL) 300 MG tablet Take 1 tablet by mouth every night at bedtime. 5 tablet 0    • oxyCODONE (ROXICODONE) 5 MG immediate release tablet Take 1 tablet by mouth Every 6 (Six) Hours As Needed for Moderate Pain  or Severe Pain . 12 tablet 0        Allergies   Allergen Reactions   • Nsaids Hives and Itching   • Orange Unknown - Low Severity   • Orange Oil Unknown - Low Severity     Other reaction(s): Unknown - Low Severity  Other reaction(s): Unknown - Low Severity   • Latex Rash       Social History     Socioeconomic History   • Marital status:    Tobacco Use   • Smoking status: Former   • Smokeless tobacco: Never   Vaping Use   • Vaping Use: Never used   Substance and Sexual Activity   • Alcohol use: Yes     Comment: 2 drinks month   • Drug use: No   • Sexual activity: Defer       Family History   Problem Relation Age of Onset   • Aneurysm Mother    • Stroke Mother    • Heart disease Father    • Diabetes Brother         type 2       Physical Exam:  /66 (BP Location: Left arm, Patient Position: Lying)   Pulse 72   Temp 97.6 °F (36.4 °C) (Oral)   Resp 20   Wt 102 kg (224 lb)   SpO2 90%   BMI 30.68 kg/m²   Body mass index is 30.68 kg/m².   General appearance: Nontoxic, conversant   Eyes: anicteric sclerae, moist conjunctivae; no lid-lag;   HENT: Atraumatic; oropharynx clear with moist mucous membranes and no mucosal ulcerations; normal hard and soft palate  Neck: Trachea midline; large neck circumference  Lungs: Diminished breath sounds no wheeze no rhonchi, with normal respiratory effort and no intercostal retractions  CV: RRR, no rub  Abdomen: Obese bowel sounds positive  Extremities: No peripheral edema or extremity lymphadenopathy  Skin: Normal temperature, turgor  and texture; no rash, ulcers or subcutaneous nodules  Psych/neuro: Calm affect alert not sure how oriented very forgetful seems to have worsening recent years to months    LABS:  Results from last 7 days   Lab Units 10/08/22  0500 10/07/22  0447 10/06/22  1216   WBC 10*3/mm3 16.05* 13.11* 14.67*   HEMOGLOBIN g/dL 8.6* 9.2* 10.3*   PLATELETS 10*3/mm3 375 338 403     Results from last 7 days   Lab Units 10/08/22  0500 10/07/22  0447 10/06/22  1216   SODIUM mmol/L 141 139 139   POTASSIUM mmol/L 4.2 4.1 4.1   CHLORIDE mmol/L 106 105 103   CO2 mmol/L 25.7 27.5 26.9   BUN mg/dL 15 15 13   CREATININE mg/dL 1.15 1.11 1.18   GLUCOSE mg/dL 97 99 101*   CALCIUM mg/dL 9.3 9.3 9.5   Estimated Creatinine Clearance: 60.9 mL/min (by C-G formula based on SCr of 1.15 mg/dL).    Imaging: I personally visualized the images of scans/x-rays performed within last 3 days.  Imaging Results (Most Recent)     Procedure Component Value Units Date/Time    CT Chest Without Contrast Diagnostic [[id number]] Collected: 10/08/22 0013     Updated: 10/08/22 1109    Narrative:      EXAMINATION: CT THE CHEST WITHOUT CONTRAST     HISTORY: 81-year-old male with history of hypoxia and fever of unknown  source.     TECHNIQUE: Contiguous axial images were obtained through the chest  without IV contrast.     COMPARISON: Chest x-ray, 10/06/2022     FINDINGS: The lungs are normal in volume and are clear of consolidation.  Mild dependent atelectasis is seen at the base of left lower lobe. There  is masslike enlargement in the region of the left hilum that extends  into the infrahilar region that measures on the order of 7.9 x 4.0 x 5.3  cm. There is an enlarged cardiophrenic lymph node that measures 2.6 x  2.1 cm. There is an enlarged group of lymph nodes in the left paraspinal  region that measures 3.3 x 2.0 cm. Other enlarged paraspinal soft tissue  lesions are noted. The visualized soft tissue structures of the upper  abdomen appear normal. The osseous  structures of the thorax are within  normal limits.       Impression:      1. There is a left parahilar and infrahilar soft tissue mass that  measures on the order of 7.9 cm in greatest dimension that are seen in  conjunction with an enlarged cardiophrenic lymph node and abnormal  paraspinal soft tissue and/or lymphadenopathy. This is suspicious for a  metastatic neoplastic process.     Radiation dose reduction techniques were utilized, including automated  exposure control and exposure modulation based on body size.     This report was finalized on 10/8/2022 11:06 AM by Dr. Huber Izaguirre M.D.       MRI Brain With & Without Contrast [[id number]] Collected: 10/07/22 2040     Updated: 10/07/22 2050    Narrative:      MRI BRAIN W WO CONTRAST-     INDICATIONS: Vertigo, kidney cancer     TECHNIQUE: Multiplanar multisequence magnetic resonance imaging of the  brain, without and with intravenous contrast material.     COMPARISON: CT from 10/06/2022, MRI from 06/03/2015     FINDINGS:     The diffusion-weighted images show no restricted diffusion to suggest  acute infarct.     The midline structures appear unremarkable.     The brain parenchyma shows moderate periventricular white matter T2  FLAIR signal hyperintensities, compatible with chronic small vessel  ischemic change in a patient this age. No enhancing lesions of brain are  noted. Vascular flow related artifact is seen in the postcontrast  images.     Flow voids in the major arteries at the base of the brain appear  unremarkable.     A subcutaneous 1 cm nodule lateral to the left orbit, for example image  9 of DICOM series 8, DICOM series 9 is new from 11/27/2020,  indeterminate, possibility of a metastatic implant is not excluded     Fluid signal is seen in left mastoid air cells. The paranasal sinuses,  mastoid air cells, and orbits appear otherwise unremarkable.       Impression:      1. Indeterminate subcutaneous lesion lateral to the left orbit,  correlate  with clinical exam.  2. No acute infarct. No enhancing lesion of brain. Moderate  periventricular white matter chronic small vessel ischemic change.           This report was finalized on 10/7/2022 8:47 PM by Dr. David Hinds M.D.       CT Angiogram Head [[id number]] Collected: 10/06/22 1500     Updated: 10/06/22 1550    Narrative:      CT ANGIOGRAPHY OF THE HEAD AND NECK WITHOUT AND WITH INTRAVENOUS  CONTRAST AND 3-D RECONSTRUCTIONS     HISTORY: Headache and dizziness and unsteady gait.     The CT scan was performed as an emergency procedure with CT angiography  protocol through the head and neck without and with intravenous contrast  and 3-D reconstructions. The following findings are present:  1. An initial noncontrast CT scan of the brain was performed. There is  moderate diffuse atrophy and chronic small vessel ischemic change  similar to the study of 03/14/2022. There is no evidence of intracranial  hemorrhage or mass effect.  2. Evaluation for stenosis is based on NASCET criteria. The vertebral  arteries are patent and symmetric in size. Both supply the basilar  artery.  3. The cervical carotid arteries show no NASCET significant stenosis or  irregularity.  4. The intracranial CT angiography shows no major branch stenosis. There  is no evidence of aneurysm.  5. The postcontrast CT scan shows no abnormal enhancement. The internal  auditory canals and cerebellum are unremarkable.                 Radiation dose reduction techniques were utilized, including automated  exposure control and exposure modulation based on body size.     This report was finalized on 10/6/2022 3:47 PM by Dr. Leo Franklin M.D.       CT Angiogram Neck [[id number]] Collected: 10/06/22 1500     Updated: 10/06/22 1550    Narrative:      CT ANGIOGRAPHY OF THE HEAD AND NECK WITHOUT AND WITH INTRAVENOUS  CONTRAST AND 3-D RECONSTRUCTIONS     HISTORY: Headache and dizziness and unsteady gait.     The CT scan was performed as an emergency  procedure with CT angiography  protocol through the head and neck without and with intravenous contrast  and 3-D reconstructions. The following findings are present:  1. An initial noncontrast CT scan of the brain was performed. There is  moderate diffuse atrophy and chronic small vessel ischemic change  similar to the study of 03/14/2022. There is no evidence of intracranial  hemorrhage or mass effect.  2. Evaluation for stenosis is based on NASCET criteria. The vertebral  arteries are patent and symmetric in size. Both supply the basilar  artery.  3. The cervical carotid arteries show no NASCET significant stenosis or  irregularity.  4. The intracranial CT angiography shows no major branch stenosis. There  is no evidence of aneurysm.  5. The postcontrast CT scan shows no abnormal enhancement. The internal  auditory canals and cerebellum are unremarkable.                 Radiation dose reduction techniques were utilized, including automated  exposure control and exposure modulation based on body size.     This report was finalized on 10/6/2022 3:47 PM by Dr. Leo Franklin M.D.       XR Chest 1 View [[id number]] Collected: 10/06/22 1320     Updated: 10/06/22 1326    Narrative:      XR CHEST 1 VW-     HISTORY: Male who is 81 years-old,  CHF/COPD     TECHNIQUE: Frontal views of the chest     COMPARISON: 03/19/2022     FINDINGS: Right chest port appears stable. Heart size is normal.  Pulmonary vasculature is unremarkable. A left hilar mass noted on the CT  exam from 03/11/2022, is also apparent on this exam, appears grossly  similar, CT could be obtained as indicated for direct comparison prior  CT exam. No acute infiltrate, pleural effusion, or pneumothorax. No  acute osseous process.       Impression:      Redemonstration of left hilar mass. No acute infiltrate  identified.     This report was finalized on 10/6/2022 1:23 PM by Dr. David Hinds M.D.             Assessment / Recommendations:  Hypoxemic  respiratory failure  Left hilar mass and biopsy-proven metastatic renal cell carcinoma follows with the [facility]  COPD  Pulmonary hypertension secondary to compression 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  Anemia  Leukocytosis  Metastatic renal cell carcinoma  wilfred on cpap 12      cpap 12  No fever no chills and CT scan shows absolutely no pneumonia.  I agree with infectious disease would not pursue any further antibiotics for pneumonia.  He has COPD at baseline is with an FEV1 of about 65% with a diffusion capacity of 40%.  He has a hilar mass that compresses his pulmonary artery as well.  Suspect he is having chronic worsening of his baseline poor pulmonary function.  Does not surprise me that he may need 2 L or 3 L nasal cannula.  Will continue DuoNebs as he allows.  Okay with tiotropium and Brovana substituted for Stiolto  No pulmonary contraindication for discharge  Think best thing for him would be to follow-up with his oncologist at the [facility]    Elmer Cevallos MD  [facility]  10/08/22  16:20 EDT

## 2022-10-08 NOTE — CONSULTS
Referring Provider: Coco Moore MD  [id number]  [address] [address]  Reason for Consultation:Leukocytosis      Subjective   History of present illness: This is an 81-year-old male with metastatic renal cell carcinoma hereditary spherocytosis, COPD and hypertension who was admitted on October 6 with dizziness.  There was initial concern for stroke but imaging showed moderate diffuse atrophy and chronic small vessel ischemic changes with no acute pathology.  He did have a low-grade temperature 100.3 on admission and concerns for mild hypoxia he was satting 86% on 2 L of oxygen.  Of note he is chronically on 2 L of oxygen at home.  Admission chest x-ray showed a left hilar mass with no acute infiltrates.  Nonetheless there was concern for pneumonia and he was empirically started on ceftriaxone and azithromycin.  Procalcitonin is negative.  Currently the patient denies any cough.  He does report a sore throat but states this is after his nebulizer treatments.  He denies any abdominal pain nausea vomiting or diarrhea.  He reports constipation.  No rash    Past Medical History:   Diagnosis Date   • Anxiety    • Arthritis 03/24/2014   • Atrial fibrillation (HCC)    • COPD (chronic obstructive pulmonary disease) (HCC) 03/24/2014   • Emphysema of lung (HCC)    • Hemochromatosis 03/24/2014   • Herpes zoster 12/06/2011   • Hypertension    • Pulmonary embolism (HCC) 03/24/2014   • Renal cell cancer (HCC)    • Sleep apnea    Asplenia    Past Surgical History:   Procedure Laterality Date   • APPENDECTOMY     • CATARACT EXTRACTION     • CHOLECYSTECTOMY     • SPLENECTOMY     • TONSILLECTOMY     • VEIN SURGERY          reports that he has quit smoking. He has never used smokeless tobacco. He reports current alcohol use. He reports that he does not use drugs.    family history includes Aneurysm in his mother; Diabetes in his brother; Heart disease in his father; Stroke in his mother.    Allergies    Allergen Reactions   • Nsaids Hives and Itching   • Orange Unknown - Low Severity   • Orange Oil Unknown - Low Severity     Other reaction(s): Unknown - Low Severity  Other reaction(s): Unknown - Low Severity   • Latex Rash       Medication:  Antibiotics:    Please refer to the medical record for a full medication list    Review of Systems  Pertinent items are noted in HPI, all other systems reviewed and negative    Objective   Vital Signs   Temp:  [97.5 °F (36.4 °C)-98.5 °F (36.9 °C)] 97.5 °F (36.4 °C)  Heart Rate:  [66-83] 73  Resp:  [16-18] 16  BP: (124-134)/(57-71) 124/65    Physical Exam:   General: In no acute distress  HEENT: Normocephalic, atraumatic, no scleral icterus.   Neck: Supple, trachea is midline  Cardiovascular: Normal rate, regular rhythm, normal S1 and S2, no murmurs, rubs, or gallops   Respiratory: Bibasilar crackles  GI: Abdomen is soft, nontender, nondistended, positive bowel sounds bilaterally  Musculoskeletal: no edema, tenderness or deformity  Skin: No rashes or lesions  Extremities: No E/C/C  Neurological: Alert and oriented, moving all 4 extremities  Psychiatric: Normal mood and affect     Results Review:   I reviewed the patient's new clinical results.  I reviewed the patient's new imaging results and agree with the interpretation.    Lab Results   Component Value Date    WBC 16.05 (H) 10/08/2022    HGB 8.6 (L) 10/08/2022    HCT 26.5 (L) 10/08/2022    .2 (H) 10/08/2022     10/08/2022       Lab Results   Component Value Date    GLUCOSE 97 10/08/2022    BUN 15 10/08/2022    CREATININE 1.15 10/08/2022    EGFRIFNONA 58 (L) 11/27/2020    EGFRIFAFRI 54 11/17/2015    BCR 13.0 10/08/2022    CO2 25.7 10/08/2022    CALCIUM 9.3 10/08/2022    ALBUMIN 4.10 10/06/2022    AST 16 10/06/2022    ALT 9 10/06/2022     procal 0.24    Microbiology:  10/7 BCx NGTD x 2  10/6 RVP/COVID neg    Radiology:  Chest CT left parahilar and infrahilar soft tissue mass with an enlarged cardiophrenic lymph  node and abnormal  paraspinal soft tissue and/or lymphadenopathy. This is suspicious for a  metastatic neoplastic process.     MRI Brain Indeterminate subcutaneous lesion lateral to the left orbit, no acute findings    Assessment & Plan   Low-grade temperature  Leukocytosis chronic  Metastatic renal cell cancer with bulky left hilar adenopathy.  Asplenia    CT of the chest shows a perihilar and infrahilar soft tissue mass.  There is no acute infiltrates.  Although the patient had a low-grade fever his procalcitonin is negative.  He has a leukocytosis but this is a chronic finding related to his asplenia.  Certainly can continue very ceftriaxone 2 g IV every 24 hours while awaiting blood culture results.  Blood cultures are negative can discontinue all antibiotics.  Will DC azithromycin as atypical bacteria have been excluded based on the respiratory viral panel.    I discussed the patient's findings and my recommendations with patient and nursing staff

## 2022-10-08 NOTE — PROGRESS NOTES
[place] [facility] [facility]     LOS: 2 days     Subjective:    CC:Weakness - Generalized and Headache    DIET:  Diet Order   Procedures   • Diet Regular   patient is improved,  No cp  No soa  Sitting up at the bedside, dizziness is better    Objective:    Vital Signs:  Temp:  [97.5 °F (36.4 °C)-97.8 °F (36.6 °C)] 97.6 °F (36.4 °C)  Heart Rate:  [66-77] 73  Resp:  [16-18] 18  BP: (124-140)/(57-71) 140/66    SpO2:  [88 %-98 %] 98 %  on  Flow (L/min):  [1-3] 2;   Device (Oxygen Therapy): nasal cannula  Body mass index is 30.68 kg/m².    Physical Exam  Constitutional:       General: He is not in acute distress.     Appearance: Normal appearance. He is not ill-appearing.   HENT:      Head: Normocephalic and atraumatic.   Cardiovascular:      Rate and Rhythm: Normal rate. Rhythm irregular.      Heart sounds: No murmur heard.  Pulmonary:      Effort: No respiratory distress.   Abdominal:      General: There is no distension.      Tenderness: There is no abdominal tenderness.   Musculoskeletal:      Right lower leg: No edema.      Left lower leg: No edema.   Neurological:      Mental Status: He is alert.   Psychiatric:         Mood and Affect: Mood normal.         Behavior: Behavior normal.         Results Review:    Glucose   Date Value Ref Range Status   10/08/2022 97 65 - 99 mg/dL Final   10/07/2022 99 65 - 99 mg/dL Final   10/06/2022 101 (H) 65 - 99 mg/dL Final     Results from last 7 days   Lab Units 10/08/22  0500   WBC 10*3/mm3 16.05*   HEMOGLOBIN g/dL 8.6*   HEMATOCRIT % 26.5*   PLATELETS 10*3/mm3 375     Results from last 7 days   Lab Units 10/08/22  0500 10/07/22  0447 10/06/22  1216   SODIUM mmol/L 141   < > 139   POTASSIUM mmol/L 4.2   < > 4.1   CHLORIDE mmol/L 106   < > 103   CO2 mmol/L 25.7   < > 26.9   BUN mg/dL 15   < > 13   CREATININE mg/dL 1.15   < > 1.18   CALCIUM mg/dL 9.3   < > 9.5   BILIRUBIN mg/dL  --   --  2.2*   ALK PHOS U/L  --   --  56   ALT (SGPT) U/L  --   --  9   AST (SGOT) U/L  --    --  16   GLUCOSE mg/dL 97   < > 101*    < > = values in this interval not displayed.     Results from last 7 days   Lab Units 10/06/22  1216   INR  2.46*   APTT seconds 53.4*         Results from last 7 days   Lab Units 10/06/22  1216   TROPONIN T ng/mL 0.029     Cultures:  No results found for: BLOODCX, URINECX, WOUNDCX, MRSACX, RESPCX, STOOLCX    I have reviewed daily medications and changes in CPOE    Scheduled meds  albuterol, 2.5 mg, Nebulization, 4x Daily  amLODIPine, 5 mg, Oral, Daily  arformoterol, 15 mcg, Nebulization, BID - RT  calcium 500 mg vitamin D 5 mcg (200 UT), 1 tablet, Oral, BID  cefTRIAXone, 2 g, Intravenous, Q24H  cetirizine, 10 mg, Oral, Nightly  cyanocobalamin, 1,000 mcg, Intramuscular, Daily  donepezil, 10 mg, Oral, Nightly  famotidine, 20 mg, Oral, BID  finasteride, 5 mg, Oral, Daily  folic acid, 1 mg, Oral, TID  gabapentin, 400 mg, Oral, Daily With Breakfast & Dinner  gabapentin, 800 mg, Oral, Nightly  HYDROcodone-acetaminophen, 1 tablet, Oral, TID  ipratropium, 0.5 mg, Nebulization, 4x Daily - RT  levothyroxine, 112 mcg, Oral, Q AM  memantine, 10 mg, Oral, Q12H  OXcarbazepine, 300 mg, Oral, Nightly  polyethylene glycol, 17 g, Oral, Daily  rivaroxaban, 20 mg, Oral, Daily  senna-docusate sodium, 2 tablet, Oral, BID  sertraline, 25 mg, Oral, Daily  sodium chloride, 10 mL, Intravenous, Q12H  tiotropium bromide monohydrate, 2 puff, Inhalation, Daily - RT           PRN meds  •  acetaminophen **OR** acetaminophen **OR** acetaminophen  •  albuterol  •  ALPRAZolam  •  senna-docusate sodium **AND** polyethylene glycol **AND** bisacodyl **AND** bisacodyl  •  calcium carbonate  •  cyclobenzaprine  •  HYDROcodone-acetaminophen  •  ipratropium  •  ipratropium-albuterol  •  meclizine  •  melatonin  •  Morphine **AND** naloxone  •  nitroglycerin  •  ondansetron **OR** ondansetron  •  sodium chloride  •  sodium chloride        Essential hypertension    Pulmonary emphysema (HCC)    Metastatic renal cell  carcinoma  on q 2 wks chemo    Acute on chronic respiratory failure with hypoxia (HCC)    Sleep apnea    Hilar mass    Paroxysmal atrial fibrillation (HCC)    Dizziness    Pneumonia        Assessment/Plan:  Dizziness  -Neurology is seeing  -mri with no strokes, left sided facial subcutaneous mass     Fevers/asplenia/leukocytosis/boerderline PCT  -blood cultures neg  -stop zithomax  -ID following    Acute on chronic respiratory failure with hypoxemia  -pulmonary consult    Dysphagia  -speech recommends NTL    Renal cell carcinoma  -oncology seeing    Atrial fib  -xarelto    DVT PPX: xarelto      Oh Dye MD  10/08/22  15:53 EDT

## 2022-10-08 NOTE — PLAN OF CARE
Goal Outcome Evaluation:  Plan of Care Reviewed With: patient        Progress: improving  Outcome Evaluation: VSS. Urine sent to lab. Consulted pulm about chest CT. PRN zofran given for nausea. Will continue to monitor.

## 2022-10-08 NOTE — PLAN OF CARE
Goal Outcome Evaluation:  Plan of Care Reviewed With: patient        Progress: improving  Outcome Evaluation: Vitals stable at this time. 2L NC maintained to keep O2 sats >90%. Weight shift assistance provided. IV rocephin and azithromycin administered per orders. No PRN pain medicine needed this shift. PRN zofran and flexeril given per pt request - some relief provided. Educated patient on PRN meds and importance of spacing per orders. CT and MRI obtained- see results. Possible D/C home today. No other changes. WCM    System Downtime occurred from 2am-6am. This RN completed q 2 hr safety rounds. Synthroid 112mcg was administered @ 0550 - back charting completed to show administration. No other meds were given during this time

## 2022-10-09 ENCOUNTER — HOME HEALTH ADMISSION (OUTPATIENT)
Dept: HOME HEALTH SERVICES | Facility: HOME HEALTHCARE | Age: 82
End: 2022-10-09

## 2022-10-09 ENCOUNTER — READMISSION MANAGEMENT (OUTPATIENT)
Dept: CALL CENTER | Facility: HOSPITAL | Age: 82
End: 2022-10-09

## 2022-10-09 VITALS
RESPIRATION RATE: 18 BRPM | WEIGHT: 223.3 LBS | TEMPERATURE: 98.3 F | HEART RATE: 71 BPM | DIASTOLIC BLOOD PRESSURE: 66 MMHG | OXYGEN SATURATION: 92 % | BODY MASS INDEX: 30.58 KG/M2 | SYSTOLIC BLOOD PRESSURE: 142 MMHG

## 2022-10-09 LAB
ANION GAP SERPL CALCULATED.3IONS-SCNC: 8.6 MMOL/L (ref 5–15)
ANISOCYTOSIS BLD QL: ABNORMAL
BASOPHILS # BLD MANUAL: 0.16 10*3/MM3 (ref 0–0.2)
BASOPHILS NFR BLD MANUAL: 1 % (ref 0–1.5)
BUN SERPL-MCNC: 18 MG/DL (ref 8–23)
BUN/CREAT SERPL: 14.4 (ref 7–25)
CALCIUM SPEC-SCNC: 9.6 MG/DL (ref 8.6–10.5)
CHLORIDE SERPL-SCNC: 105 MMOL/L (ref 98–107)
CO2 SERPL-SCNC: 27.4 MMOL/L (ref 22–29)
CREAT SERPL-MCNC: 1.25 MG/DL (ref 0.76–1.27)
DEPRECATED RDW RBC AUTO: 39 FL (ref 37–54)
EGFRCR SERPLBLD CKD-EPI 2021: 57.5 ML/MIN/1.73
EOSINOPHIL # BLD MANUAL: 0.65 10*3/MM3 (ref 0–0.4)
EOSINOPHIL NFR BLD MANUAL: 4 % (ref 0.3–6.2)
ERYTHROCYTE [DISTWIDTH] IN BLOOD BY AUTOMATED COUNT: 10.2 % (ref 12.3–15.4)
GLUCOSE SERPL-MCNC: 88 MG/DL (ref 65–99)
HCT VFR BLD AUTO: 25.7 % (ref 37.5–51)
HGB BLD-MCNC: 8.6 G/DL (ref 13–17.7)
HYPOCHROMIA BLD QL: ABNORMAL
LYMPHOCYTES # BLD MANUAL: 5.07 10*3/MM3 (ref 0.7–3.1)
LYMPHOCYTES NFR BLD MANUAL: 5.1 % (ref 5–12)
MCH RBC QN AUTO: 35.5 PG (ref 26.6–33)
MCHC RBC AUTO-ENTMCNC: 33.5 G/DL (ref 31.5–35.7)
MCV RBC AUTO: 106.2 FL (ref 79–97)
MONOCYTES # BLD: 0.83 10*3/MM3 (ref 0.1–0.9)
NEUTROPHILS # BLD AUTO: 9.5 10*3/MM3 (ref 1.7–7)
NEUTROPHILS NFR BLD MANUAL: 58.6 % (ref 42.7–76)
NRBC SPEC MANUAL: 2 /100 WBC (ref 0–0.2)
PLAT MORPH BLD: NORMAL
PLATELET # BLD AUTO: 364 10*3/MM3 (ref 140–450)
PMV BLD AUTO: 10.4 FL (ref 6–12)
POTASSIUM SERPL-SCNC: 4.6 MMOL/L (ref 3.5–5.2)
RBC # BLD AUTO: 2.42 10*6/MM3 (ref 4.14–5.8)
SODIUM SERPL-SCNC: 141 MMOL/L (ref 136–145)
VARIANT LYMPHS NFR BLD MANUAL: 31.3 % (ref 19.6–45.3)
WBC MORPH BLD: NORMAL
WBC NRBC COR # BLD: 16.21 10*3/MM3 (ref 3.4–10.8)

## 2022-10-09 PROCEDURE — 25010000002 CYANOCOBALAMIN PER 1000 MCG: Performed by: INTERNAL MEDICINE

## 2022-10-09 PROCEDURE — 99214 OFFICE O/P EST MOD 30 MIN: CPT | Performed by: INTERNAL MEDICINE

## 2022-10-09 PROCEDURE — 94760 N-INVAS EAR/PLS OXIMETRY 1: CPT

## 2022-10-09 PROCEDURE — G0378 HOSPITAL OBSERVATION PER HR: HCPCS

## 2022-10-09 PROCEDURE — 25010000002 CEFTRIAXONE PER 250 MG: Performed by: INTERNAL MEDICINE

## 2022-10-09 PROCEDURE — 97116 GAIT TRAINING THERAPY: CPT

## 2022-10-09 PROCEDURE — 94660 CPAP INITIATION&MGMT: CPT

## 2022-10-09 PROCEDURE — 94799 UNLISTED PULMONARY SVC/PX: CPT

## 2022-10-09 PROCEDURE — 94761 N-INVAS EAR/PLS OXIMETRY MLT: CPT

## 2022-10-09 PROCEDURE — 94664 DEMO&/EVAL PT USE INHALER: CPT

## 2022-10-09 PROCEDURE — 85025 COMPLETE CBC W/AUTO DIFF WBC: CPT | Performed by: INTERNAL MEDICINE

## 2022-10-09 PROCEDURE — 80048 BASIC METABOLIC PNL TOTAL CA: CPT | Performed by: INTERNAL MEDICINE

## 2022-10-09 PROCEDURE — 97530 THERAPEUTIC ACTIVITIES: CPT

## 2022-10-09 PROCEDURE — 85007 BL SMEAR W/DIFF WBC COUNT: CPT | Performed by: INTERNAL MEDICINE

## 2022-10-09 RX ORDER — DONEPEZIL HYDROCHLORIDE 10 MG/1
10 TABLET, FILM COATED ORAL NIGHTLY
Qty: 30 TABLET | Refills: 4 | Status: SHIPPED | OUTPATIENT
Start: 2022-10-09 | End: 2022-10-14

## 2022-10-09 RX ORDER — DONEPEZIL HYDROCHLORIDE 10 MG/1
10 TABLET, FILM COATED ORAL NIGHTLY
Start: 2022-10-09 | End: 2022-10-11 | Stop reason: SDUPTHER

## 2022-10-09 RX ORDER — LANOLIN ALCOHOL/MO/W.PET/CERES
1000 CREAM (GRAM) TOPICAL DAILY
Qty: 30 TABLET | Refills: 0 | Status: SHIPPED | OUTPATIENT
Start: 2022-10-09 | End: 2022-11-08

## 2022-10-09 RX ADMIN — ALBUTEROL SULFATE 2.5 MG: 2.5 SOLUTION RESPIRATORY (INHALATION) at 10:50

## 2022-10-09 RX ADMIN — SERTRALINE 25 MG: 25 TABLET, FILM COATED ORAL at 08:46

## 2022-10-09 RX ADMIN — FINASTERIDE 5 MG: 5 TABLET, FILM COATED ORAL at 08:46

## 2022-10-09 RX ADMIN — RIVAROXABAN 20 MG: 20 TABLET, FILM COATED ORAL at 08:46

## 2022-10-09 RX ADMIN — GABAPENTIN 400 MG: 400 CAPSULE ORAL at 08:46

## 2022-10-09 RX ADMIN — Medication 1 MG: at 08:46

## 2022-10-09 RX ADMIN — LEVOTHYROXINE SODIUM 112 MCG: 0.11 TABLET ORAL at 05:46

## 2022-10-09 RX ADMIN — Medication 10 ML: at 08:46

## 2022-10-09 RX ADMIN — ARFORMOTEROL TARTRATE 15 MCG: 15 SOLUTION RESPIRATORY (INHALATION) at 07:50

## 2022-10-09 RX ADMIN — CYANOCOBALAMIN 1000 MCG: 1000 INJECTION, SOLUTION INTRAMUSCULAR; SUBCUTANEOUS at 08:45

## 2022-10-09 RX ADMIN — HYDROCODONE BITARTRATE AND ACETAMINOPHEN 1 TABLET: 5; 325 TABLET ORAL at 08:46

## 2022-10-09 RX ADMIN — CEFTRIAXONE 2 G: 2 INJECTION, POWDER, FOR SOLUTION INTRAMUSCULAR; INTRAVENOUS at 00:46

## 2022-10-09 RX ADMIN — POLYETHYLENE GLYCOL 3350 17 G: 17 POWDER, FOR SOLUTION ORAL at 08:45

## 2022-10-09 RX ADMIN — FAMOTIDINE 20 MG: 20 TABLET ORAL at 08:45

## 2022-10-09 RX ADMIN — CALCIUM CARBONATE-VITAMIN D TAB 500 MG-200 UNIT 1 TABLET: 500-200 TAB at 08:45

## 2022-10-09 RX ADMIN — MEMANTINE HYDROCHLORIDE 10 MG: 10 TABLET, FILM COATED ORAL at 08:46

## 2022-10-09 RX ADMIN — DOCUSATE SODIUM 50MG AND SENNOSIDES 8.6MG 2 TABLET: 8.6; 5 TABLET, FILM COATED ORAL at 08:45

## 2022-10-09 RX ADMIN — AMLODIPINE BESYLATE 5 MG: 5 TABLET ORAL at 08:46

## 2022-10-09 RX ADMIN — IPRATROPIUM BROMIDE 0.5 MG: 0.5 SOLUTION RESPIRATORY (INHALATION) at 10:50

## 2022-10-09 RX ADMIN — TIOTROPIUM BROMIDE INHALATION SPRAY 2 PUFF: 3.12 SPRAY, METERED RESPIRATORY (INHALATION) at 07:56

## 2022-10-09 NOTE — OUTREACH NOTE
Prep Survey    Flowsheet Row Responses   [facility] patient discharged from? [facility]   Is LACE score < 7 ? No   Emergency Room discharge w/ pulse ox? No   Eligibility [facility]   Date of Admission 10/06/22   Date of Discharge 10/09/22   Discharge Disposition Home-Health Care Sv   Discharge diagnosis Metastatic renal cell carcinoma on q 2 wks chemo,  dizziness   Does the patient have one of the following disease processes/diagnoses(primary or secondary)? Other   Does the patient have Home health ordered? Yes   What is the Home health agency?  [facility]    Is there a DME ordered? Yes   What DME was ordered? and [facility] for 02   Prep survey completed? Yes          TAMARA DONALD - Registered Nurse

## 2022-10-09 NOTE — PROGRESS NOTES
[facility] will follow post hospital. Patient agreeable to service. Contact information confirmed.

## 2022-10-09 NOTE — CASE MANAGEMENT/SOCIAL WORK
Continued Stay Note  [facility]     Patient Name: Mg Gerber Sr.  MRN: [id number]  Today's Date: 10/9/2022    Admit Date: 10/6/2022    Plan: Home w/ family to assist, [facility] and [facility] for 02   Discharge Plan     Row Name 10/09/22 1510       Plan    Plan Home w/ family to assist, [facility] and [facility] for 02    Plan Comments CCP notified by pts RN that pt is discharging and will need oxygen set up. CCP verified pt is ok w/ using [facility]- cass w/ Ricky who is aware pt will be discharging and need oxygen set up once they get home. Oxygen tank delivered to pts nurse. Nurse will instruct the pt to call [facility] when they get home. HH ordered and [facility] following. RN and family updated.               Discharge Codes    No documentation.               Expected Discharge Date and Time     Expected Discharge Date Expected Discharge Time    Oct 9, 2022             Claudette Taveras RN

## 2022-10-09 NOTE — DISCHARGE SUMMARY
[facility]    ASSOCIATES  [phone]    DISCHARGE SUMMARY  [facility]    Patient Identification:  Name: Mg Gerber Sr.  Age: 81 y.o.  Sex: male  :  1940  MRN: [id number]  Primary Care Physician: Dillon Walton MD    Admit date: 10/6/2022  Discharge date and time:      Discharge Diagnoses:  Essential hypertension    Pulmonary emphysema (HCC)    Metastatic renal cell carcinoma  on q 2 wks chemo    Acute on chronic respiratory failure with hypoxia (HCC)    Sleep apnea    Hilar mass    Paroxysmal atrial fibrillation (HCC)    Dizziness    Pneumonia       History of present illness from H&P:    Patient is a 81 y.o. 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  Patient woke up any felt lightheaded and room spinning and his wife took his blood sugar and he laid back down and went to sleep.  He said he woke up and still had the same feeling he said the top of his head felt like someone had hit it with a hammer although he had not fallen.  That was pretty constant with no radiation.  He did have some associated nausea.  He said that he try to get up and walk and that he Falling to 1 side and then the other.  He made it to the ER and his temperature was 100.3.  He is normally on 2 L of home oxygen but he was only satting 86% on those 2 L.     Hospital Course:     The patient present with dizziness which ws better by the 2nd hospital day, he was seen by Neurology and MRI and CTA imaging were fairly unremarkable, no evidence of stroke but there is a left sided facial subcutaneous mass that can be followed at his outpatient Oncology.     Fevers/asplenia/leukocytosis/boerderline PCT  -blood cultures neg  -Respirtory panel negative  -ID has seen and stopped antibiotics, no further workup     Acute on chronic respiratory failure with hypoxemia  -pulmonary  consult     Dysphagia  -patient uses dysphagia diet at home which he will continue     Renal cell carcinoma and lung mass which looks similar to prior scans  -oncology is following     Atrial fib  -xarelto    b12 deficiency-continue on discharge, follow up outpatient    The patient was seen and examined on the day of discharge.    Consults:   Consults     Date and Time Order Name Status Description    10/8/2022 11:21 AM Inpatient Infectious Diseases Consult Completed     10/8/2022 11:14 AM Inpatient Pulmonology Consult Completed     10/7/2022  8:46 AM Inpatient Neurology Consult Stroke Completed     10/6/2022  6:48 PM Inpatient Hematology & Oncology Consult Completed           Results from last 7 days   Lab Units 10/09/22  0553   WBC 10*3/mm3 16.21*   HEMOGLOBIN g/dL 8.6*   HEMATOCRIT % 25.7*   PLATELETS 10*3/mm3 364       Results from last 7 days   Lab Units 10/09/22  0553   SODIUM mmol/L 141   POTASSIUM mmol/L 4.6   CHLORIDE mmol/L 105   CO2 mmol/L 27.4   BUN mg/dL 18   CREATININE mg/dL 1.25   GLUCOSE mg/dL 88   CALCIUM mg/dL 9.6       Significant Diagnostic Studies:   WBC   Date Value Ref Range Status   10/09/2022 16.21 (H) 3.40 - 10.80 10*3/mm3 Final     Hemoglobin   Date Value Ref Range Status   10/09/2022 8.6 (L) 13.0 - 17.7 g/dL Final     Hematocrit   Date Value Ref Range Status   10/09/2022 25.7 (L) 37.5 - 51.0 % Final     Platelets   Date Value Ref Range Status   10/09/2022 364 140 - 450 10*3/mm3 Final     Sodium   Date Value Ref Range Status   10/09/2022 141 136 - 145 mmol/L Final     Potassium   Date Value Ref Range Status   10/09/2022 4.6 3.5 - 5.2 mmol/L Final     Comment:     Slight hemolysis detected by analyzer. Results may be affected.     Chloride   Date Value Ref Range Status   10/09/2022 105 98 - 107 mmol/L Final     CO2   Date Value Ref Range Status   10/09/2022 27.4 22.0 - 29.0 mmol/L Final     BUN   Date Value Ref Range Status   10/09/2022 18 8 - 23 mg/dL Final     Creatinine   Date Value Ref  Range Status   10/09/2022 1.25 0.76 - 1.27 mg/dL Final     Glucose   Date Value Ref Range Status   10/09/2022 88 65 - 99 mg/dL Final     Calcium   Date Value Ref Range Status   10/09/2022 9.6 8.6 - 10.5 mg/dL Final     No results found for: AST, ALT, ALKPHOS  No results found for: APTT, INR  Color, UA   Date Value Ref Range Status   10/08/2022 Yellow Yellow, Straw Final     Appearance, UA   Date Value Ref Range Status   10/08/2022 Clear Clear Final     pH, UA   Date Value Ref Range Status   10/08/2022 6.0 5.0 - 8.0 Final     Glucose, UA   Date Value Ref Range Status   10/08/2022 Negative Negative Final     Ketones, UA   Date Value Ref Range Status   10/08/2022 Negative Negative Final     Blood, UA   Date Value Ref Range Status   10/08/2022 Negative Negative Final     Leuk Esterase, UA   Date Value Ref Range Status   10/08/2022 Negative Negative Final     Bilirubin, UA   Date Value Ref Range Status   10/08/2022 Negative Negative Final     Urobilinogen, UA   Date Value Ref Range Status   10/08/2022 0.2 E.U./dL 0.2 - 1.0 E.U./dL Final     No results found for: TROPONINT, TROPONINI, BNP  No components found for: HGBA1C;2  No components found for: TSH;2    Imaging Results (All)     Procedure Component Value Units Date/Time    CT Chest Without Contrast Diagnostic [[id number]] Collected: 10/08/22 0013     Updated: 10/08/22 1109    Narrative:      EXAMINATION: CT THE CHEST WITHOUT CONTRAST     HISTORY: 81-year-old male with history of hypoxia and fever of unknown  source.     TECHNIQUE: Contiguous axial images were obtained through the chest  without IV contrast.     COMPARISON: Chest x-ray, 10/06/2022     FINDINGS: The lungs are normal in volume and are clear of consolidation.  Mild dependent atelectasis is seen at the base of left lower lobe. There  is masslike enlargement in the region of the left hilum that extends  into the infrahilar region that measures on the order of 7.9 x 4.0 x 5.3  cm. There is an enlarged  cardiophrenic lymph node that measures 2.6 x  2.1 cm. There is an enlarged group of lymph nodes in the left paraspinal  region that measures 3.3 x 2.0 cm. Other enlarged paraspinal soft tissue  lesions are noted. The visualized soft tissue structures of the upper  abdomen appear normal. The osseous structures of the thorax are within  normal limits.       Impression:      1. There is a left parahilar and infrahilar soft tissue mass that  measures on the order of 7.9 cm in greatest dimension that are seen in  conjunction with an enlarged cardiophrenic lymph node and abnormal  paraspinal soft tissue and/or lymphadenopathy. This is suspicious for a  metastatic neoplastic process.     Radiation dose reduction techniques were utilized, including automated  exposure control and exposure modulation based on body size.     This report was finalized on 10/8/2022 11:06 AM by Dr. Huber Izaguirre M.D.       MRI Brain With & Without Contrast [[id number]] Collected: 10/07/22 2040     Updated: 10/07/22 2050    Narrative:      MRI BRAIN W WO CONTRAST-     INDICATIONS: Vertigo, kidney cancer     TECHNIQUE: Multiplanar multisequence magnetic resonance imaging of the  brain, without and with intravenous contrast material.     COMPARISON: CT from 10/06/2022, MRI from 06/03/2015     FINDINGS:     The diffusion-weighted images show no restricted diffusion to suggest  acute infarct.     The midline structures appear unremarkable.     The brain parenchyma shows moderate periventricular white matter T2  FLAIR signal hyperintensities, compatible with chronic small vessel  ischemic change in a patient this age. No enhancing lesions of brain are  noted. Vascular flow related artifact is seen in the postcontrast  images.     Flow voids in the major arteries at the base of the brain appear  unremarkable.     A subcutaneous 1 cm nodule lateral to the left orbit, for example image  9 of DICOM series 8, DICOM series 9 is new from  11/27/2020,  indeterminate, possibility of a metastatic implant is not excluded     Fluid signal is seen in left mastoid air cells. The paranasal sinuses,  mastoid air cells, and orbits appear otherwise unremarkable.       Impression:      1. Indeterminate subcutaneous lesion lateral to the left orbit,  correlate with clinical exam.  2. No acute infarct. No enhancing lesion of brain. Moderate  periventricular white matter chronic small vessel ischemic change.           This report was finalized on 10/7/2022 8:47 PM by Dr. David Hinds M.D.       CT Angiogram Head [[id number]] Collected: 10/06/22 1500     Updated: 10/06/22 1550    Narrative:      CT ANGIOGRAPHY OF THE HEAD AND NECK WITHOUT AND WITH INTRAVENOUS  CONTRAST AND 3-D RECONSTRUCTIONS     HISTORY: Headache and dizziness and unsteady gait.     The CT scan was performed as an emergency procedure with CT angiography  protocol through the head and neck without and with intravenous contrast  and 3-D reconstructions. The following findings are present:  1. An initial noncontrast CT scan of the brain was performed. There is  moderate diffuse atrophy and chronic small vessel ischemic change  similar to the study of 03/14/2022. There is no evidence of intracranial  hemorrhage or mass effect.  2. Evaluation for stenosis is based on NASCET criteria. The vertebral  arteries are patent and symmetric in size. Both supply the basilar  artery.  3. The cervical carotid arteries show no NASCET significant stenosis or  irregularity.  4. The intracranial CT angiography shows no major branch stenosis. There  is no evidence of aneurysm.  5. The postcontrast CT scan shows no abnormal enhancement. The internal  auditory canals and cerebellum are unremarkable.                 Radiation dose reduction techniques were utilized, including automated  exposure control and exposure modulation based on body size.     This report was finalized on 10/6/2022 3:47 PM by Dr. Bailey  JAYDEN Franklin.       CT Angiogram Neck [[id number]] Collected: 10/06/22 1500     Updated: 10/06/22 1550    Narrative:      CT ANGIOGRAPHY OF THE HEAD AND NECK WITHOUT AND WITH INTRAVENOUS  CONTRAST AND 3-D RECONSTRUCTIONS     HISTORY: Headache and dizziness and unsteady gait.     The CT scan was performed as an emergency procedure with CT angiography  protocol through the head and neck without and with intravenous contrast  and 3-D reconstructions. The following findings are present:  1. An initial noncontrast CT scan of the brain was performed. There is  moderate diffuse atrophy and chronic small vessel ischemic change  similar to the study of 03/14/2022. There is no evidence of intracranial  hemorrhage or mass effect.  2. Evaluation for stenosis is based on NASCET criteria. The vertebral  arteries are patent and symmetric in size. Both supply the basilar  artery.  3. The cervical carotid arteries show no NASCET significant stenosis or  irregularity.  4. The intracranial CT angiography shows no major branch stenosis. There  is no evidence of aneurysm.  5. The postcontrast CT scan shows no abnormal enhancement. The internal  auditory canals and cerebellum are unremarkable.                 Radiation dose reduction techniques were utilized, including automated  exposure control and exposure modulation based on body size.     This report was finalized on 10/6/2022 3:47 PM by Dr. Leo Franklin M.D.       XR Chest 1 View [[id number]] Collected: 10/06/22 1320     Updated: 10/06/22 1326    Narrative:      XR CHEST 1 VW-     HISTORY: Male who is 81 years-old,  CHF/COPD     TECHNIQUE: Frontal views of the chest     COMPARISON: 03/19/2022     FINDINGS: Right chest port appears stable. Heart size is normal.  Pulmonary vasculature is unremarkable. A left hilar mass noted on the CT  exam from 03/11/2022, is also apparent on this exam, appears grossly  similar, CT could be obtained as indicated for direct comparison prior  CT  exam. No acute infiltrate, pleural effusion, or pneumothorax. No  acute osseous process.       Impression:      Redemonstration of left hilar mass. No acute infiltrate  identified.     This report was finalized on 10/6/2022 1:23 PM by Dr. David Hinds M.D.         No results found for: SITE, ALLENTEST, PHART, XYH1BVC, PO2ART, KSJ2HIL, BASEEXCESS, W6FTTGQQ, HGBBG, HCTABG, OXYHEMOGLOBI, METHHGBN, CARBOXYHGB, CO2CT, BAROMETRIC, MODALITY, FIO2       Discharge Medications      New Medications      Instructions Start Date   vitamin B-12 1000 MCG tablet  Commonly known as: CYANOCOBALAMIN   1,000 mcg, Oral, Daily         Changes to Medications      Instructions Start Date   tiotropium bromide-olodaterol 2.5-2.5 MCG/ACT aerosol solution inhaler  Commonly known as: STIOLTO RESPIMAT  What changed: Another medication with the same name was removed. Continue taking this medication, and follow the directions you see here.   1 puff, Inhalation, 2 Times Daily         Continue These Medications      Instructions Start Date   ALPRAZolam 0.25 MG tablet  Commonly known as: Xanax   0.25 mg, Oral, 2 Times Daily PRN      amLODIPine 5 MG tablet  Commonly known as: NORVASC   5 mg, Oral, Daily      CALTRATE 600+D3 PO   1 tablet, Oral, 2 times daily      cyclobenzaprine 5 MG tablet  Commonly known as: FLEXERIL   5 mg, Oral, 3 Times Daily PRN      famotidine 20 MG tablet  Commonly known as: Pepcid   20 mg, Oral, 2 Times Daily      finasteride 5 MG tablet  Commonly known as: PROSCAR   No dose, route, or frequency recorded.      folic acid 1 MG tablet  Commonly known as: FOLVITE   1 mg, Oral, 3 Times Daily      gabapentin 400 MG capsule  Commonly known as: NEURONTIN   Take PO: 400mg QAM and with lunch, 800mg QHS      HYDROcodone-acetaminophen 5-325 MG per tablet  Commonly known as: NORCO   1 Tab, Oral, Q6H, PRN as needed for pain, # 120 Tab, 0 Refill(s), Pharmacy: [facility], cm, 08/30/22 7:39:00 EDT, CLINICALHEIGHT, 104.66, kg,  08/30/22 7:39:00 EDT, CLINICALWEIGHT, 185.4      ipratropium 0.06 % nasal spray  Commonly known as: ATROVENT   2 sprays, Nasal, 4 Times Daily      levocetirizine 5 MG tablet  Commonly known as: XYZAL   5 mg, Oral, Every Evening      levothyroxine 112 MCG tablet  Commonly known as: SYNTHROID, LEVOTHROID   112 mcg, Oral, Daily      meclizine 12.5 MG tablet  Commonly known as: ANTIVERT   12.5 mg, Oral, 3 Times Daily PRN      Namzaric 28-10 MG capsule sustained-release 24 hr  Generic drug: Memantine HCl-Donepezil HCl   1 capsule, Oral, Every Evening      OXcarbazepine 300 MG tablet  Commonly known as: TRILEPTAL   300 mg, Oral, Every Night at Bedtime      polyethylene glycol 17 g packet  Commonly known as: MIRALAX   17 g, Oral, Daily      sertraline 25 MG tablet  Commonly known as: ZOLOFT   25 mg, Oral, Daily      Xarelto 20 MG tablet  Generic drug: rivaroxaban   20 mg, Oral, Daily With Dinner         Stop These Medications    lidocaine 5 %  Commonly known as: LIDODERM     NIVOLUMAB IV     ondansetron 4 MG tablet  Commonly known as: ZOFRAN     oxyCODONE 5 MG immediate release tablet  Commonly known as: ROXICODONE     sennosides-docusate 8.6-50 MG per tablet  Commonly known as: PERICOLACE     Spiriva HandiHaler 18 MCG per inhalation capsule  Generic drug: tiotropium     Ventolin  (90 Base) MCG/ACT inhaler  Generic drug: albuterol sulfate HFA        ASK your doctor about these medications      Instructions Start Date   donepezil 5 MG tablet  Commonly known as: ARICEPT  Ask about: Which instructions should I use?   5 mg, Oral, Nightly      donepezil 10 MG tablet  Commonly known as: ARICEPT  Ask about: Which instructions should I use?   10 mg, Oral, Nightly      donepezil 10 MG tablet  Commonly known as: ARICEPT  Ask about: Which instructions should I use?   10 mg, Oral, Nightly               Patient Instructions:       Future Appointments   Date Time Provider Department Center   3/15/2023 10:00 AM Dillon Walton MD  [facility]  [id number]         Follow-up Information     Dillon Walton MD .    Specialty: Family Medicine  Contact information:  [address] [facility]  [place] [address] [place] [address]  [phone]                         Discharge Order (From admission, onward)     Start     Ordered    10/09/22 1340  Discharge patient  Once        Comments: Discharge home off antibiotics, If ok with Dr Pippa Amador   Expected Discharge Date: 10/09/22    Discharge Disposition: Home or Self Care    Physician of Record for Attribution - Please select from Treatment Team: OH DYE [[id number]]    Review needed by CMO to determine Physician of Record: No       Question Answer Comment   Physician of Record for Attribution - Please select from Treatment Team OH DYE    Review needed by CMO to determine Physician of Record No        10/09/22 1342                Diet Order   Procedures   • Diet Regular       TEST  RESULTS PENDING AT DISCHARGE  Pending Labs     Order Current Status    Blood Culture - Blood, Arm, Left Preliminary result    Blood Culture - Blood, Arm, Left Preliminary result            Discharge instructions:  Follow up with your primary care provider in 1-2 weeks with a cbc and cmp         Total time spent discharging patient including evaluation, post hospitalization follow up,  medication and post hospitalization instructions and education, total time exceeds 30 minutes.    Signed:  Oh Dye MD  10/9/2022  13:47 EDT

## 2022-10-09 NOTE — THERAPY TREATMENT NOTE
Acute Care - Physical Therapy Treatment Note  [facility]     Patient Name: Mg Gerber Sr.  : 1940  MRN: [id number]  Today's Date: 10/9/2022      Visit Dx:     ICD-10-CM ICD-9-CM   1. Dizziness  R42 780.4   2. Gait instability  R26.81 781.2   3. Abnormal chest x-ray  R93.89 793.2   4. Elevated INR  R79.1 790.92     Patient Active Problem List   Diagnosis   • Anemia, vitamin B12 deficiency   • Arthritis   • Hemochromatosis   • Herpes zoster   • Hip pain   • Essential hypertension   • Cancer (HCC)   • Pulmonary emphysema (HCC)   • Left low back pain   • Vertigo   • Headache around the eyes   • Mild cognitive impairment   • Metastatic renal cell carcinoma  on q 2 wks chemo   • Anxiety   • Malignant neoplasm of lung (HCC)   • Neuropathy   • Perennial allergic rhinitis   • Vitamin B12 deficiency   • Osteoarthritis of lumbar spine   • Pharyngitis   • Bronchitis   • Drug-induced constipation   • Hx of hiatal hernia   • History of lung cancer   • COVID-19 virus detected   • Dyspnea   • Generalized weakness   • Acute on chronic respiratory failure with hypoxia (HCC)   • Gastroesophageal reflux disease   • Hearing disorder   • Malignant neoplasm of prostate (HCC)   • Sleep apnea   • Anemia   • Malignant neoplasm metastatic to lung (HCC)   • Secondary bacterial pneumonia   • Pneumonia due to COVID-19 virus   • Hilar mass   • On antineoplastic chemotherapy q 2wks   • Paroxysmal atrial fibrillation (HCC)   • History of pulmonary embolism and DVT   • Chronic nausea   • Gastritis   • Muscle spasm   • Fall   • Closed fracture of multiple ribs of left side   • Chest wall pain   • Fall, initial encounter   • Immobility syndrome   • Self-care deficit   • Cytokine release syndrome, grade 2   • Dizziness   • Compression fracture of vertebral column (HCC)   • History of chronic lung disease   • Impairment of balance   • Lung involvement associated with another disorder (HCC)   • Pneumonia     Past Medical History:    Diagnosis Date   • Allergic    • Anemia, vitamin B12 deficiency 04/12/2012   • Anxiety    • Arthritis 03/24/2014   • Atrial fibrillation (HCC)    • Cancer (HCC) 04/29/2014    kidney; prostate   • Cataract    • Chemotherapeutic agent or infusion extravasation     q 1-2 wks    • COPD (chronic obstructive pulmonary disease) (HCC) 03/24/2014   • Emphysema of lung (HCC)    • Hemochromatosis 03/24/2014   • Herpes zoster 12/06/2011    left medial thigh   • Hip pain 03/24/2014   • History of Doppler ultrasound 12/28/2011    superficial and deep venous insuffiency   • History of EKG 02/10/2012    Dr. Kathi Lloyd; unchanged from prior EKG   • Hypertension     benign essential   • Injury of back    • Pulmonary embolism (HCC) 03/24/2014   • Renal cell cancer (HCC)    • Shortness of breath    • Sleep apnea      Past Surgical History:   Procedure Laterality Date   • APPENDECTOMY     • CATARACT EXTRACTION     • CHOLECYSTECTOMY     • SPLENECTOMY     • TONSILLECTOMY     • VEIN SURGERY       PT Assessment (last 12 hours)     PT Evaluation and Treatment     Row Name 10/09/22 1113          Physical Therapy Time and Intention    Subjective Information no complaints  -     Document Type therapy note (daily note)  -JR     Mode of Treatment physical therapy  -JR     Patient Effort good  -     Row Name 10/09/22 1113          General Information    Patient Profile Reviewed yes  -JR     Patient Observations alert;cooperative;agree to therapy  -JR     Row Name 10/09/22 1113          Living Environment    Current Living Arrangements home  -JR     People in Home spouse  -JR     Row Name 10/09/22 1113          Pain    Pretreatment Pain Rating 0/10 - no pain  -JR     Posttreatment Pain Rating 0/10 - no pain  -JR     Row Name 10/09/22 1113          Cognition    Orientation Status (Cognition) oriented x 4  -JR     Row Name 10/09/22 1113          Bed Mobility    Supine-Sit [place] (Bed Mobility) standby assist  -JR     Supine-Sit-Supine  [place] (Bed Mobility) standby assist  -     Row Name 10/09/22 1113          Bed-Chair Transfer    Bed-Chair [place] (Transfers) contact guard  -     Assistive Device (Bed-Chair Transfers) walker, front-wheeled  -JR     Row Name 10/09/22 1113          Sit-Stand Transfer    Sit-Stand [place] (Transfers) contact guard  -     Assistive Device (Sit-Stand Transfers) walker, front-wheeled  -JR     Row Name 10/09/22 1113          Gait/Stairs (Locomotion)    [place] Level (Gait) contact guard;1 person assist  -     Assistive Device (Gait) walker, front-wheeled  -     Distance in Feet (Gait) 150  -     Deviations/Abnormal Patterns (Gait) samara decreased;festinating/shuffling;gait speed decreased  -     Bilateral Gait Deviations forward flexed posture  -     Comment, (Gait/Stairs) Ambulated full distances without rest breaks.  Performed slowly but paced himself well.  -     Row Name 10/09/22 1113          Safety Issues, Functional Mobility    Impairments Affecting Function (Mobility) balance;endurance/activity tolerance;strength  -     Row Name 10/09/22 1113          Balance    Static Sitting Balance independent  -     Dynamic Sitting Balance independent  -JR     Static Standing Balance independent  -JR     Dynamic Standing Balance contact guard  -     Position/Device Used, Standing Balance walker, front-wheeled  -     Balance Interventions standing;supported;static;dynamic  -     Comment, Balance Took side steps along edge of bed in order to position.  Was able to bridge in order to scoot up in bed.  -     Row Name 10/09/22 1113          Plan of Care Review    Plan of Care Reviewed With patient  -     Progress improving  -     Row Name 10/09/22 1113          Vital Signs    Pre SpO2 (%) 94  -JR     O2 Delivery Pre Treatment supplemental O2  -JR     O2 Delivery Intra Treatment supplemental O2  -JR     Post SpO2 (%) 94  RT measured in room when patient returned from  ambulation.  RT then began breathing RX.  -JR     O2 Delivery Post Treatment supplemental O2  -JR     Row Name 10/09/22 1113          Positioning and Restraints    Pre-Treatment Position in bed  -JR     Post Treatment Position bed  -JR     In Bed supine;call light within reach;encouraged to call for assist;exit alarm on;with other staff  -JR           User Key  (r) = Recorded By, (t) = Taken By, (c) = Cosigned By    Initials Name Provider Type    Booker Ortiz, PT Physical Therapist                Physical Therapy Education     Title: PT OT SLP Therapies (Done)     Topic: Physical Therapy (Done)     Point: Mobility training (Done)     Learning Progress Summary           Patient Acceptance, E,D, VU,DU by  at 10/9/2022 1118                   Point: Home exercise program (Done)     Learning Progress Summary           Patient Acceptance, E,D, VU,DU by  at 10/9/2022 1118                   Point: Body mechanics (Done)     Learning Progress Summary           Patient Acceptance, E,D, VU,DU by  at 10/9/2022 1118                   Point: Precautions (Done)     Learning Progress Summary           Patient Acceptance, E,D, VU,DU by  at 10/9/2022 1118    Acceptance, E, VU by MD at 10/7/2022 1533                               User Key     Initials Effective Dates Name Provider Type Discipline    MD 06/16/21 -  Violet Blel, PT Physical Therapist PT     06/16/21 -  Booker Gage, PT Physical Therapist PT              PT Recommendation and Plan     Plan of Care Reviewed With: patient  Progress: improving  Outcome Evaluation: Patient ambulated around RN station today without stopping.  He was a little short of breath but O2 sats maintained in the 90s.  Progressing nicely.   Outcome Measures     Row Name 10/09/22 1120             How much help from another person do you currently need...    Turning from your back to your side while in flat bed without using bedrails? 4  -JR      Moving from lying on back to sitting on  the side of a flat bed without bedrails? 4  -JR      Moving to and from a bed to a chair (including a wheelchair)? 3  -JR      Standing up from a chair using your arms (e.g., wheelchair, bedside chair)? 3  -JR      Climbing 3-5 steps with a railing? 3  -JR      To walk in hospital room? 3  -JR      AM-PAC 6 Clicks Score (PT) 20  -JR            User Key  (r) = Recorded By, (t) = Taken By, (c) = Cosigned By    Initials Name Provider Type    Booker Ortiz, PT Physical Therapist                 Time Calculation:    PT Charges     Row Name 10/09/22 1120             Time Calculation    Start Time 1020  -JR      Stop Time 1045  -JR      Time Calculation (min) 25 min  -JR      PT Received On 10/09/22  -JR      PT - Next Appointment 10/10/22  -            User Key  (r) = Recorded By, (t) = Taken By, (c) = Cosigned By    Initials Name Provider Type    Booker Ortiz, PHILIPPE Physical Therapist              Therapy Charges for Today     Code Description Service Date Service Provider Modifiers Qty    88134436965 HC PT THERAPEUTIC ACT EA 15 MIN 10/9/2022 Booker Gage, PT GP 1    87186860586 HC GAIT TRAINING EA 15 MIN 10/9/2022 Booker Gage, PT GP 1          PT G-Codes  Outcome Measure Options: AM-PAC 6 Clicks Basic Mobility (PT)  AM-PAC 6 Clicks Score (PT): 20  AM-PAC 6 Clicks Score (OT): 18    Booker Gage, PHILIPPE  10/9/2022

## 2022-10-09 NOTE — PROGRESS NOTES
LOS: 3 days     Chief Complaint:  Leukocytosis      Interval History:  Afebrile, no new complaints. Reports some nausea but no vomiting or diarrhea. Back pain. No rashes. Respiratory status at baseline     Vital Signs  Temp:  [97.6 °F (36.4 °C)-98.3 °F (36.8 °C)] 98.3 °F (36.8 °C)  Heart Rate:  [63-75] 65  Resp:  [12-20] 18  BP: (114-140)/(55-70) 118/70    Physical Exam:  General: In no acute distress  Cardiovascular: RRR, no LE edema   Respiratory: Bibasilar crackles  GI: Soft, NT/ND, + bowel sounds bilaterally  Skin: No rashes     Antibiotics:  Ceftriaxone 2g IV q24hrs     Results Review:    Lab Results   Component Value Date    WBC 16.21 (H) 10/09/2022    HGB 8.6 (L) 10/09/2022    HCT 25.7 (L) 10/09/2022    .2 (H) 10/09/2022     10/09/2022     Lab Results   Component Value Date    GLUCOSE 88 10/09/2022    BUN 18 10/09/2022    CREATININE 1.25 10/09/2022    EGFRIFNONA 58 (L) 11/27/2020    EGFRIFAFRI 54 11/17/2015    BCR 14.4 10/09/2022    CO2 27.4 10/09/2022    CALCIUM 9.6 10/09/2022    ALBUMIN 4.10 10/06/2022    AST 16 10/06/2022    ALT 9 10/06/2022       Microbiology:  10/7 BCx NGTD x 2  10/6 RVP/COVID neg    Assessment & Plan   Low-grade temperature  Leukocytosis chronic  Metastatic renal cell cancer with bulky left hilar adenopathy.  Asplenia    No evidence of a GI,  or respiratory infection. Procalcitonin is negative. Leukocytosis is chronic. Blood cx are NGTD. Will DC empiric ceftriaxone. No indication for further ID workup.     ID will sign off. Please do not hesitate to call us with further questions or concerns.

## 2022-10-09 NOTE — PLAN OF CARE
Goal Outcome Evaluation:  Plan of Care Reviewed With: patient        Progress: improving  Outcome Evaluation: Vitals stable this shift. BiPAP placed by RT and worn by pt all night with no issues. Stand by assist to bathroom. IV rocephin continued per orders. PRN zofran given for nausea - no emesis noted. Scheduled Norco given with relief of pt's 5/10 back pain. Disc copy of recent CT of chest still needed to be made. No other changes. Pt hopeful to be D/C'd soon. WCM

## 2022-10-09 NOTE — PROGRESS NOTES
Daily Progress Note.   [address]  10/9/2022    Patient:  Name:  Mg Gerber Sr.  MRN:  [id number]  1940  81 y.o.  male         Requesting physician: Dr. Oh Dye     Reason for Consultation: COPD abnormal CT scan chest     CC: Headache dizziness     History of Present Illness:  Mr. Gerber is a very nice 81-year-old who is very hard of hearing and has had some progressive dementia over the preceding few years as well as ongoing treatment for metastatic renal cell carcinoma at the [facility] for years.  He has been on immunotherapy and has tolerated this fairly well.  He has known metastatic lesions to his mediastinum is hilar region and a region in his posterior thorax as well.  He follows closely gets PET scans and serial imaging.  Last PET scan showed a hilar mass around 5.4 x 5.5 cm.  There is also a concern of this compressing his pulmonary artery causing pulmonary hypertension CT scan today cut in the same section measures about the same on my read.  However on sagittal views it slightly different.  Question whether there has been some growth of this.  I advised the patient to take the CD with the CT images to the [facility] further review as an outpatient for follow-up so that the radiologist would be able to compare the images [id number].  I have only been able to compare to the prior report.     The patient came in because he had dizziness and a headache that was quite severe and could not move his legs.  Denies any worsening cough fevers chills shortness of breath chest tightness productive cough or hemoptysis.  He says been taking all of his inhalers as prescribed.     Interval History:  He looks better today more alert more conversant.  He says he actually walked around the [employer] station which is further than he got last time he was in our office for ambulatory oximetry.  No worsening chest pain or shortness of breath cough sputum  production.      Physical Exam:  /70 (BP Location: Left arm, Patient Position: Lying)   Pulse 66   Temp 98.3 °F (36.8 °C) (Oral)   Resp 18   Wt 101 kg (223 lb 4.8 oz)   SpO2 94%   BMI 30.58 kg/m²   Body mass index is 30.58 kg/m².    Intake/Output Summary (Last 24 hours) at 10/9/2022 1256  Last data filed at 10/9/2022 0713  Gross per 24 hour   Intake 240 ml   Output --   Net 240 ml     General appearance: Nontoxic, conversant   Eyes: anicteric sclerae, moist conjunctivae; no lid-lag;   HENT: Atraumatic; oropharynx clear with moist mucous membranes and no mucosal ulcerations; normal hard and soft palate  Neck: Trachea midline; large neck circumference  Lungs: Diminished breath sounds no wheeze no rhonchi, with normal respiratory effort and no intercostal retractions  CV: RRR, no rub  Abdomen: Obese bowel sounds positive  Extremities: No peripheral edema or extremity lymphadenopathy  Skin: Normal temperature, turgor and texture; no rash, ulcers or subcutaneous nodules  Psych/neuro: Calm affect alert more conversant and memory seems better than yesterday  Data Review:  Notable Labs:  Results from last 7 days   Lab Units 10/09/22  0553 10/08/22  0500 10/07/22  0447 10/06/22  1216   WBC 10*3/mm3 16.21* 16.05* 13.11* 14.67*   HEMOGLOBIN g/dL 8.6* 8.6* 9.2* 10.3*   PLATELETS 10*3/mm3 364 375 338 403     Results from last 7 days   Lab Units 10/09/22  0553 10/08/22  0500 10/07/22  0447 10/06/22  1216   SODIUM mmol/L 141 141 139 139   POTASSIUM mmol/L 4.6 4.2 4.1 4.1   CHLORIDE mmol/L 105 106 105 103   CO2 mmol/L 27.4 25.7 27.5 26.9   BUN mg/dL 18 15 15 13   CREATININE mg/dL 1.25 1.15 1.11 1.18   GLUCOSE mg/dL 88 97 99 101*   CALCIUM mg/dL 9.6 9.3 9.3 9.5   Estimated Creatinine Clearance: 55.7 mL/min (by C-G formula based on SCr of 1.25 mg/dL).    Results from last 7 days   Lab Units 10/09/22  0553 10/08/22  0500 10/07/22  0447 10/07/22  0022 10/06/22  1216   AST (SGOT) U/L  --   --   --   --  16   ALT (SGPT) U/L   --   --   --   --  9   PROCALCITONIN ng/mL  --  0.16  --   --  0.24   LACTATE mmol/L  --   --   --  1.4  --    FERRITIN ng/mL  --   --  689.00*  --   --    CRP mg/dL  --  1.59*  --   --   --    PLATELETS 10*3/mm3 364 375 338  --  403             Imaging:  Reviewed chest images personally from past 3 days    ASSESSMENT  /  PLAN:           Assessment / Recommendations:  Hypoxemic respiratory failure  Left hilar mass and biopsy-proven metastatic renal cell carcinoma follows with the [facility]  COPD  Pulmonary hypertension secondary to compression 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  Anemia  Leukocytosis  Metastatic renal cell carcinoma  wilfred on cpap 12        cpap 12 at night  No fever no chills and CT scan shows absolutely no pneumonia.  Defer antibiotic therapy otherwise to infectious disease  He has COPD at baseline is with an FEV1 of about 65% with a diffusion capacity of 40%.  He has a hilar mass that compresses his pulmonary artery as well.  Suspect he is having chronic worsening of his baseline poor pulmonary function.  Does not surprise me that he may need 2 L or 3 L nasal cannula -he has needed oxygen intermittently over the preceding years  Will continue DuoNebs as he allows.  Okay with tiotropium and Brovana substituted for Stiolto  No pulmonary contraindication for discharge  Think best thing for him would be to follow-up with his oncologist at the [facility]     Elmer Cevallos MD  [facility]  10/09/22  14:53 EDT

## 2022-10-09 NOTE — PLAN OF CARE
Goal Outcome Evaluation:  Plan of Care Reviewed With: patient        Progress: improving  Outcome Evaluation: Patient ambulated around RN station today without stopping.  He was a little short of breath but O2 sats maintained in the 90s.  Progressing nicely. Patient was intermittently wearing a face mask during this therapy encounter. Therapist used appropriate personal protective equipment including eye protection, mask, and gloves.  Mask used was standard procedure mask. Appropriate PPE was worn during the entire therapy session. Hand hygiene was completed before and after therapy session. Patient is not in enhanced droplet precautions.

## 2022-10-09 NOTE — PLAN OF CARE
Goal Outcome Evaluation:  Plan of Care Reviewed With: patient        Progress: improving  Outcome Evaluation: Pt being d/c home with home health. Prasad seeting up O2 for home.

## 2022-10-10 ENCOUNTER — TRANSITIONAL CARE MANAGEMENT TELEPHONE ENCOUNTER (OUTPATIENT)
Dept: CALL CENTER | Facility: HOSPITAL | Age: 82
End: 2022-10-10

## 2022-10-10 RX ORDER — ONDANSETRON 4 MG/1
4 TABLET, FILM COATED ORAL EVERY 8 HOURS PRN
COMMUNITY
Start: 2022-09-24

## 2022-10-10 NOTE — PROGRESS NOTES
Discharge Planning Assessment  [facility]     Patient Name: Mg Gerber Sr.  MRN: [id number]  Today's Date: 10/10/2022    Admit Date: 10/6/2022    Plan: Home with [facility]   Discharge Needs Assessment    No documentation.                Discharge Plan     Row Name 10/10/22 1750       Plan    Plan Home with [facility]    Final Discharge Disposition Code 06 - home with home health care    Final Note Home with [facility]              Continued Care and Services - Discharged on 10/9/2022 Admission date: 10/6/2022 - Discharge disposition: Home-Health Care Svc    Durable Medical Equipment     Service Provider Request Status Selected Services Address Phone Fax Patient Preferred    [facility]  Selected Durable Medical Equipment [address] [address] [phone] --          Home Medical Care     Service Provider Request Status Selected Services Address Phone Fax Patient Preferred    [facility]  Selected Home Health Services [address] [id number] [phone] --              Expected Discharge Date and Time     Expected Discharge Date Expected Discharge Time    Oct 9, 2022          Demographic Summary    No documentation.                Functional Status    No documentation.                Psychosocial    No documentation.                Abuse/Neglect    No documentation.                Legal    No documentation.                Substance Abuse    No documentation.                Patient Forms    No documentation.                   Virgie Mcmanus, RN

## 2022-10-10 NOTE — PROGRESS NOTES
Transitional Care Follow Up Visit  Subjective     Mg OSEGUERA Buzz . is a 81 y.o. male who presents for a transitional care management visit.    Within 48 business hours after discharge our office contacted him via telephone to coordinate his care and needs.      I reviewed and discussed the details of that call along with the discharge summary, hospital problems, inpatient lab results, inpatient diagnostic studies, and consultation reports with Mg.     Current outpatient and discharge medications have been reconciled for the patient.  Reviewed by: Dillon Walton MD      Date of TCM Phone Call 10/9/2022   [facility]   Date of Admission 10/6/2022   Date of Discharge 10/9/2022   Discharge Disposition [facility]     Risk for Readmission (LACE) Score: 12 (10/9/2022  6:00 AM)      History of Present Illness   Course During Hospital Stay:      Admit date: 10/6/2022  Discharge date and time:       Discharge Diagnoses:  Essential hypertension    Pulmonary emphysema (HCC)    Metastatic renal cell carcinoma  on q 2 wks chemo    Acute on chronic respiratory failure with hypoxia (HCC)    Sleep apnea    Hilar mass    Paroxysmal atrial fibrillation (HCC)    Dizziness    Pneumonia        History of present illness from H&P:     Patient is a 81 y.o. male presents with a history of metastatic renal cell, prostate cancer, hilar mass from metastatic adenopathy  who presents to the ER due to waking up and being dizzy.  Patient woke up any felt lightheaded and room spinning and his wife took his blood sugar and he laid back down and went to sleep.  He said he woke up and still had the same feeling he said the top of his head felt like someone had hit it with a hammer although he had not fallen.  That was pretty constant with no radiation.  He did have some associated nausea.  He said that he try to get up and walk and that he Falling to 1 side and then the other.  He made it to the ER and his  "temperature was 100.3.  He is normally on 2 L of home oxygen but he was only satting 86% on those 2 L.     Hospital Course:      The patient present with dizziness which ws better by the 2nd hospital day, he was seen by Neurology and MRI and CTA imaging were fairly unremarkable, no evidence of stroke but there is a left sided facial subcutaneous mass that can be followed at his outpatient Oncology.     Fevers/asplenia/leukocytosis/boerderline PCT  -blood cultures neg  -Respirtory panel negative  -ID has seen and stopped antibiotics, no further workup     Acute on chronic respiratory failure with hypoxemia  -pulmonary consult     Dysphagia  -patient uses dysphagia diet at home which he will continue     Renal cell carcinoma and lung mass which looks similar to prior scans  -oncology is following     Atrial fib  -xarelto     b12 deficiency-continue on discharge, follow up outpatient    Current outpatient and discharge medications have been reconciled for the patient.  Reviewed by: Dillon Walton MD             The following portions of the patient's history were reviewed and updated as appropriate: allergies, current medications, past family history, past medical history, past social history, past surgical history and problem list.    Review of Systems   Constitutional: Negative for activity change, chills and fever.   Respiratory: Negative for cough.    Cardiovascular: Negative for chest pain.   Neurological: Positive for weakness.   Psychiatric/Behavioral: Negative for dysphoric mood.       /53   Pulse 75   Temp 97.3 °F (36.3 °C) (Oral)   Resp 16   Ht 181.9 cm (71.6\")   Wt 103 kg (226 lb)   BMI 30.99 kg/m²     Objective   Physical Exam  Constitutional:       General: He is not in acute distress.     Appearance: He is well-developed.   Cardiovascular:      Rate and Rhythm: Normal rate and regular rhythm.      Heart sounds: Murmur heard.    Systolic murmur is present with a grade of 1/6.  Pulmonary:      " Effort: Pulmonary effort is normal.      Breath sounds: Normal breath sounds.   Neurological:      Mental Status: He is alert and oriented to person, place, and time.   Psychiatric:         Behavior: Behavior normal.         Thought Content: Thought content normal.     Hospital records reviewed with pt confirming HPI.      Assessment & Plan   Diagnoses and all orders for this visit:    1. Vertigo (Primary)  -     Ambulatory Referral to Physical Therapy Evaluate and treat    2. Fever, unspecified fever cause    3. Weakness  -     Ambulatory Referral to Physical Therapy Evaluate and treat    4. Hospital discharge follow-up    Medications reviewed/updated. Discussed the left-sided facial swelling and pt to f/u with his oncologist to further ascertain. Pt to slowly increase activity to baseline.

## 2022-10-10 NOTE — OUTREACH NOTE
Call Center TCM Note    Flowsheet Row Responses   [facility] patient discharged from? [place]   Does the patient have one of the following disease processes/diagnoses(primary or secondary)? Other   TCM attempt successful? Yes   Call start time 1721   Call end time 1734   Discharge diagnosis essential HTN, pulmonary emphysema, metastatic renal cell carcinoma on q2wks chemo, A/C respiratory failure with hypoxia, sleep apnea, hilar mass, afib, dizziness, pneumonia   Person spoke with today (if not patient) and relationship patient   Meds reviewed with patient/caregiver? Yes  [New: vit B-12 and aricept.   Attempted to review stop med list and it was confusion patient. He will review with Dr Walton at [facility] tomorrow. ]   Is the patient taking all medications as directed (includes completed medication regime)? Yes   Comments PCP Dr Walton. Hospital follow up in place for tomorrow 10/11  930am with PCP.    What is the Home health agency?  [facility]    Has home health visited the patient within 72 hours of discharge? Call prior to 72 hours  [Patient reports  has called. ]   What DME was ordered? and genaro for 02   Has all DME been delivered? Yes   DME comments patient reports wearing home O2 all the time. O22L ordered.    Psychosocial issues? No   Did the patient receive a copy of their discharge instructions? Yes   Nursing interventions Reviewed instructions with patient   What is the patient's perception of their health status since discharge? Improving   Is the patient/caregiver able to teach back signs and symptoms related to disease process for when to call PCP? Yes   Is the patient/caregiver able to teach back signs and symptoms related to disease process for when to call 911? Yes   Is the patient/caregiver able to teach back the hierarchy of who to call/visit for symptoms/problems? PCP, Specialist, Home health nurse, Urgent Care, ED, 911 Yes   TCM call completed? Yes          Violet Ortega, RN    10/10/2022,  17:35 EDT

## 2022-10-10 NOTE — OUTREACH NOTE
Call Center TCM Note    Flowsheet Row Responses   [facility] patient discharged from? [place]   Does the patient have one of the following disease processes/diagnoses(primary or secondary)? Other   TCM attempt successful? No   Unsuccessful attempts Attempt 1   Comments HH/PT 10/11/22, OT 10/12/22, SLP 10/13/22          Shirley Selby, RN    10/10/2022, 14:46 EDT

## 2022-10-11 ENCOUNTER — OFFICE VISIT (OUTPATIENT)
Dept: FAMILY MEDICINE CLINIC | Facility: CLINIC | Age: 82
End: 2022-10-11

## 2022-10-11 ENCOUNTER — HOME CARE VISIT (OUTPATIENT)
Dept: HOME HEALTH SERVICES | Facility: HOME HEALTHCARE | Age: 82
End: 2022-10-11

## 2022-10-11 VITALS
SYSTOLIC BLOOD PRESSURE: 129 MMHG | WEIGHT: 226 LBS | RESPIRATION RATE: 16 BRPM | HEIGHT: 72 IN | BODY MASS INDEX: 30.61 KG/M2 | DIASTOLIC BLOOD PRESSURE: 53 MMHG | HEART RATE: 75 BPM | TEMPERATURE: 97.3 F

## 2022-10-11 VITALS
OXYGEN SATURATION: 98 % | SYSTOLIC BLOOD PRESSURE: 150 MMHG | TEMPERATURE: 97 F | HEART RATE: 68 BPM | DIASTOLIC BLOOD PRESSURE: 70 MMHG

## 2022-10-11 DIAGNOSIS — R42 VERTIGO: Primary | ICD-10-CM

## 2022-10-11 DIAGNOSIS — R50.9 FEVER, UNSPECIFIED FEVER CAUSE: ICD-10-CM

## 2022-10-11 DIAGNOSIS — Z09 HOSPITAL DISCHARGE FOLLOW-UP: ICD-10-CM

## 2022-10-11 DIAGNOSIS — R53.1 WEAKNESS: ICD-10-CM

## 2022-10-11 PROCEDURE — 99214 OFFICE O/P EST MOD 30 MIN: CPT | Performed by: FAMILY MEDICINE

## 2022-10-11 PROCEDURE — G0151 HHCP-SERV OF PT,EA 15 MIN: HCPCS

## 2022-10-11 PROCEDURE — G0299 HHS/HOSPICE OF RN EA 15 MIN: HCPCS

## 2022-10-11 NOTE — HOME HEALTH
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 Patient is independent with ADLs, driving     Home social environment Patient resides with spouse in trilevel home, uses back door with no steps to enter and exit home.     Next MD appointment  10/18/22    Plan for next visit  Progress with therapeutic exercise, transfers, gait, energy conservation

## 2022-10-12 ENCOUNTER — HOME CARE VISIT (OUTPATIENT)
Dept: HOME HEALTH SERVICES | Facility: HOME HEALTHCARE | Age: 82
End: 2022-10-12

## 2022-10-12 VITALS
DIASTOLIC BLOOD PRESSURE: 66 MMHG | HEART RATE: 76 BPM | OXYGEN SATURATION: 99 % | HEIGHT: 73 IN | BODY MASS INDEX: 29.82 KG/M2 | SYSTOLIC BLOOD PRESSURE: 148 MMHG | RESPIRATION RATE: 20 BRPM | TEMPERATURE: 97.9 F

## 2022-10-12 LAB
BACTERIA SPEC AEROBE CULT: NORMAL
BACTERIA SPEC AEROBE CULT: NORMAL

## 2022-10-12 PROCEDURE — G0152 HHCP-SERV OF OT,EA 15 MIN: HCPCS

## 2022-10-12 NOTE — HOME HEALTH
PT SEEN BY HH POST  HOSPITAL STAY 10/6-10/9  FOR DIZZINESS, HEADACHE, GENERALIZED WEAKNES  PMH PE,SLEEP APNEA,  PUL EMPHYSEMA, METASTIC RENAL CANCER WITH HILAR MASS FROM METASTIC ADENOPATHY, PAF AND DYPHAGIA WITH THIN LIQUIDS PER SPOUSE   REQUEST VISIT AT [facility]      [facility] SN FOR C/P ASSESS, I/S ON DISEASE PROCESS COPD  AND I/S ON S/S EXACERBATION, MED INSTRUCT, INSTRUCT ON O2 AND PRECAUTIONS EVAL ANTICOAGULANT THERAPY  PT AND OT TO EVALUATE AND DEVELOP POT TO INCREASE STRENGHT AND ENDURNACE AND ABILITY  TO PERFORM ADLS. ST TO EVALUATE AND TREAT DYSPAHGIA

## 2022-10-14 ENCOUNTER — HOME CARE VISIT (OUTPATIENT)
Dept: HOME HEALTH SERVICES | Facility: HOME HEALTHCARE | Age: 82
End: 2022-10-14

## 2022-10-14 VITALS
TEMPERATURE: 96.9 F | SYSTOLIC BLOOD PRESSURE: 142 MMHG | HEART RATE: 80 BPM | OXYGEN SATURATION: 95 % | DIASTOLIC BLOOD PRESSURE: 60 MMHG

## 2022-10-14 VITALS — DIASTOLIC BLOOD PRESSURE: 56 MMHG | HEART RATE: 72 BPM | OXYGEN SATURATION: 96 % | SYSTOLIC BLOOD PRESSURE: 126 MMHG

## 2022-10-14 VITALS
HEART RATE: 72 BPM | SYSTOLIC BLOOD PRESSURE: 126 MMHG | RESPIRATION RATE: 20 BRPM | DIASTOLIC BLOOD PRESSURE: 54 MMHG | OXYGEN SATURATION: 99 % | TEMPERATURE: 96.8 F

## 2022-10-14 PROCEDURE — G0151 HHCP-SERV OF PT,EA 15 MIN: HCPCS

## 2022-10-14 PROCEDURE — G0299 HHS/HOSPICE OF RN EA 15 MIN: HCPCS

## 2022-10-14 PROCEDURE — G0153 HHCP-SVS OF S/L PATH,EA 15MN: HCPCS

## 2022-10-14 NOTE — HOME HEALTH
Patient reports feeling better than the other day.     Plan for next visit  Progress with therapeutic exercise, transfers, gait

## 2022-10-14 NOTE — HOME HEALTH
PT SEEN BY [facility] HOSPITAL STAY 10/6-10/9 FOR DIZZINESS, HEADACHE, GENERALIZED WEAKNES PMH PE,SLEEP APNEA, PUL EMPHYSEMA, METASTIC RENAL CANCER WITH HILAR MASS FROM METASTIC ADENOPATHY, PAF AND DYPHAGIA WITH THIN LIQUIDS PER SPOUSE REQUEST VISIT AT [facility]

## 2022-10-14 NOTE — HOME HEALTH
Reason for Referral: Recent hospitalization due to dizziness, headache, generalized weakness, resp failure    Medical History:  PE, sleep apnea, Emphysema, HTN, Metastatic renal cancer with hilar mass, Atrial fib, dysphagia    Subjective: Patient reports being very tired today.    Home Environment: Patient lives with supportive wife in a trilevel home, on 2L of oxygen, has cane and walker    PLOF:  Independent    Medical Necessity: Patient requires skilled occupational therapy for remediation of deficits to improve safety in home and improve self care, functional transfers, mobility, strength, endurance, DME/adaptive equipment    Plan for Next Visit: Safety with adl tasks, UE ex

## 2022-10-16 VITALS
TEMPERATURE: 96.8 F | DIASTOLIC BLOOD PRESSURE: 54 MMHG | HEART RATE: 72 BPM | SYSTOLIC BLOOD PRESSURE: 126 MMHG | OXYGEN SATURATION: 99 %

## 2022-10-16 NOTE — HOME HEALTH
REASON FOR REFERRALL Speech Therapy referral for Dysphagia to evaluate and establish a plan of care.   PRIMARY DIAGNOSIS [facility]  SECONDARY DIAGNOSIS   Pnuemonia  PERTINENT HISTORY: History of pulmonary embolism and DVT, Sepsis, unspecified organism,  Fall, Closed fracture of multiple ribs of left side, Chest wall pain.   PRIOR VFSS or FEES Bedside swallowing exam  PRIOR LEVEL OF FUNCTION Patient lived in his single family home with his wife.      1 Day 1    MEDICAL NECESSITY: Ongoing Skilled ST service is not justified/warranted at this time. All needs were met this visit.  TODAYS INTERVENTIONS: Evaluation with Oral motor exam and Bedside swallow exam.   Patient/Caregiver Education.  PATIENT'S GOAL: Patient's goal is to eat regular food textures and drink thin liquids.  GOAL: Pt will demonstrate swallowing of thin liquids with no overt signs or symptoms of aspiration.   Response: Patient was observed drinking water. No overt signs or symptoms of aspiration. SLP provided skilled education on risks of aspiration and on swallowing precautions: Small bites/sips, upright posture during/after eating, no straws, alternate bites of food and sips of liquid, reflux precautions.   Patient declines to drink nectar thick liquids (as recommended by SLP at [facility]). Patient verbalized understanding of risks of aspiration.   GOAL STATUS: Goals met on this day. No further visits planned.

## 2022-10-17 ENCOUNTER — HOME CARE VISIT (OUTPATIENT)
Dept: HOME HEALTH SERVICES | Facility: HOME HEALTHCARE | Age: 82
End: 2022-10-17

## 2022-10-17 VITALS
SYSTOLIC BLOOD PRESSURE: 120 MMHG | DIASTOLIC BLOOD PRESSURE: 52 MMHG | OXYGEN SATURATION: 96 % | RESPIRATION RATE: 20 BRPM | HEART RATE: 60 BPM | TEMPERATURE: 96.3 F

## 2022-10-17 VITALS
TEMPERATURE: 96.3 F | OXYGEN SATURATION: 95 % | SYSTOLIC BLOOD PRESSURE: 120 MMHG | DIASTOLIC BLOOD PRESSURE: 52 MMHG | HEART RATE: 60 BPM

## 2022-10-17 PROCEDURE — G0151 HHCP-SERV OF PT,EA 15 MIN: HCPCS

## 2022-10-17 PROCEDURE — G0299 HHS/HOSPICE OF RN EA 15 MIN: HCPCS

## 2022-10-17 NOTE — HOME HEALTH
Patient reports he is having more pain in his back. He had to take 2 pain pills. He reports his left leg is weak, now he cant move the right leg due to pain. Patient reports having some dizziness and is scheduled to start out patient PT 11/18/22. He would like to start earlier if possible.     Phone [facility] to try to get patient in earlier than 11/18, however, there are no earlier appointments at this time.     Plan for next visit  Progress with therapeutic exercise, transfers, gait

## 2022-10-17 NOTE — HOME HEALTH
PT SEEN BY [facility] POST HOSPITAL STAY 10/6-10/9 FOR DIZZINESS, HEADACHE, GENERALIZED WEAKNES PMH PE,SLEEP APNEA, PUL EMPHYSEMA, METASTIC RENAL CANCER WITH HILAR MASS FROM METASTIC ADENOPATHY, PAF AND DYPHAGIA WITH THIN LIQUIDS PER SPOUSE REQUEST VISIT AT [facility]  [facility] TO [facility] NEXT MONDAY

## 2022-10-19 ENCOUNTER — HOME CARE VISIT (OUTPATIENT)
Dept: HOME HEALTH SERVICES | Facility: HOME HEALTHCARE | Age: 82
End: 2022-10-19

## 2022-10-19 PROCEDURE — G0152 HHCP-SERV OF OT,EA 15 MIN: HCPCS

## 2022-10-20 ENCOUNTER — HOME CARE VISIT (OUTPATIENT)
Dept: HOME HEALTH SERVICES | Facility: HOME HEALTHCARE | Age: 82
End: 2022-10-20

## 2022-10-20 VITALS
TEMPERATURE: 96.9 F | SYSTOLIC BLOOD PRESSURE: 122 MMHG | DIASTOLIC BLOOD PRESSURE: 50 MMHG | OXYGEN SATURATION: 95 % | HEART RATE: 61 BPM

## 2022-10-20 VITALS — SYSTOLIC BLOOD PRESSURE: 128 MMHG | HEART RATE: 71 BPM | DIASTOLIC BLOOD PRESSURE: 58 MMHG | OXYGEN SATURATION: 96 %

## 2022-10-20 PROCEDURE — G0151 HHCP-SERV OF PT,EA 15 MIN: HCPCS

## 2022-10-20 NOTE — HOME HEALTH
Patient sitting in recliner upon arrival, reports he feels awful, but unsure what is wrong, did not want OT to call EMS. WIfe present throughout visit.

## 2022-10-20 NOTE — HOME HEALTH
Patient reports not feeling well. He feels very nauseated,his back is hurting and feeling very sleepy. Patient reports he felt bad yesterday  but after he had a BM he felt better.     Plan for next visit  Progress with therapeutic exercise, transfers, gait

## 2022-10-21 ENCOUNTER — HOME CARE VISIT (OUTPATIENT)
Dept: HOME HEALTH SERVICES | Facility: HOME HEALTHCARE | Age: 82
End: 2022-10-21

## 2022-10-21 PROCEDURE — G0152 HHCP-SERV OF OT,EA 15 MIN: HCPCS

## 2022-10-24 ENCOUNTER — HOME CARE VISIT (OUTPATIENT)
Dept: HOME HEALTH SERVICES | Facility: HOME HEALTHCARE | Age: 82
End: 2022-10-24

## 2022-10-24 VITALS — SYSTOLIC BLOOD PRESSURE: 126 MMHG | OXYGEN SATURATION: 94 % | HEART RATE: 71 BPM | DIASTOLIC BLOOD PRESSURE: 66 MMHG

## 2022-10-24 NOTE — HOME HEALTH
Spoke to patient who has declined home health services stating it is wearing him out having home health at this time and would like to discontinue the services.

## 2022-10-31 PROCEDURE — G0180 MD CERTIFICATION HHA PATIENT: HCPCS | Performed by: FAMILY MEDICINE

## 2022-11-18 ENCOUNTER — TELEPHONE (OUTPATIENT)
Dept: FAMILY MEDICINE CLINIC | Facility: CLINIC | Age: 82
End: 2022-11-18

## 2022-11-18 RX ORDER — LANOLIN ALCOHOL/MO/W.PET/CERES
1000 CREAM (GRAM) TOPICAL DAILY
Qty: 90 TABLET | Refills: 1 | Status: SHIPPED | OUTPATIENT
Start: 2022-11-18 | End: 2023-03-14 | Stop reason: SDUPTHER

## 2022-11-18 NOTE — TELEPHONE ENCOUNTER
DELETE AFTER REVIEWING: Send the encounter HIGH priority, If patient has less than a 3 day supply. If the patient will run out of medication over the weekend add that information to the additional details line. Send this encounter to the clinical pool.    Caller: Mg Gerber Sr.    Relationship: Self    Best call back number: [phone]    Requested Prescriptions:   Requested Prescriptions      No prescriptions requested or ordered in this encounter      B-12  -1000 MCG- TAKE ONE TABLET BY MOUTH DAILY    Pharmacy where request should be sent: [facility] [phone] [facility] [address] AT [facility] [address] [facility] [phone] [facility] [phone] FX     Additional details provided by patient:  PATIENT IS STATING THAT THEY ARE OUT OF MEDICATION FOR A WEEK  THIS MEDICATION WAS PRESCRIBE IN THE HOSPITAL     Does the patient have less than a 3 day supply:  [x] Yes  [] No    Jame Rodriguez Rep   11/18/22 12:31 EST

## 2022-11-21 ENCOUNTER — TREATMENT (OUTPATIENT)
Dept: PHYSICAL THERAPY | Facility: CLINIC | Age: 82
End: 2022-11-21

## 2022-11-21 DIAGNOSIS — R42 VERTIGO: Primary | ICD-10-CM

## 2022-11-21 DIAGNOSIS — H81.8X9 UNILATERAL VESTIBULAR WEAKNESS, UNSPECIFIED LATERALITY: ICD-10-CM

## 2022-11-21 DIAGNOSIS — H81.10 BPPV (BENIGN PAROXYSMAL POSITIONAL VERTIGO), UNSPECIFIED LATERALITY: ICD-10-CM

## 2022-11-21 PROCEDURE — 95992 CANALITH REPOSITIONING PROC: CPT | Performed by: PHYSICAL THERAPIST

## 2022-11-21 PROCEDURE — 97163 PT EVAL HIGH COMPLEX 45 MIN: CPT | Performed by: PHYSICAL THERAPIST

## 2022-11-21 PROCEDURE — 97530 THERAPEUTIC ACTIVITIES: CPT | Performed by: PHYSICAL THERAPIST

## 2022-11-21 NOTE — PROGRESS NOTES
"Physical Therapy Initial Evaluation and Plan of Care    Patient: Mg Gerber .   : 1940  Diagnosis/ICD-10 Code:  Vertigo [R42]  Referring practitioner: Dillon Walton MD  Date of Initial Visit: 2022  Today's Date: 2022  Patient seen for 1 session         Visit Diagnoses:    ICD-10-CM ICD-9-CM   1. Vertigo  R42 780.4   2. BPPV (benign paroxysmal positional vertigo), unspecified laterality  H81.10 386.11   3. Unilateral vestibular weakness, unspecified laterality  H81.8X9 386.8       PMHx Reviewed : 2022      Subjective Evaluation    History of Present Illness  Mechanism of injury: Pt presents to PT with a hx of vertigo that started > 1 yr.  Had a flare of symptoms of vertigo and had to go to the ER on 10/6/2022.  Diagnosis of vertigo.  \"I feel like my head is spinning, that comes and goes\"  \"Today is a bad day too.\"  Pt is not taking any medications for vertigo.      I have a hx of lumbar issues and it is flared up today too.  Took 2 pain pills today before I came to PT.  I normally don't take 1 in the morning.        Went >1.5 yrs ago to a ENT MD and there was a vertigo specialist there.  The vertigo specialist treated 1x and had 90% improvement.    Reports a lot of fatigue since vertigo.  Reports that he is sleeping more than normal.       He presents to the clinic with a rollator and a cane.  Uses a cane most of the time, but his lumbar back flared today and he had to use rollator.  Pt is on nasal canal O2 @ 2 liters .        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      Quality of life: poor    Social Support  Lives in: multiple-level home (Live in a Tri-Level but live on 1 floor)  Lives with: spouse             Objective       Assessment & Plan     Assessment  Impairments: abnormal or restricted ROM, activity intolerance, impaired balance, impaired physical strength, lacks " appropriate home exercise program, safety issue and weight-bearing intolerance  Functional Limitations: walking, moving in bed, standing, stooping and reaching overhead  Assessment details: Pt presents to PT with symptoms consistent with a BPPV & vestibular weakness.  The pt is a poor historian and has a lot of medical issues present complicating his symptoms.  Not to mention the pain pills affecting the evaluation.  Pt would benefit from skilled PT intervention to address the deficits noted.  Not ruling out cervicogenic vertigo presence.  (The pt would report a relief of vertigo symptoms when I would take my hands off his head while performing the Epley Maneuver.)    Educated the pt about BPPV.  Reviewed allergies and effects on the ear.  Reviewed the difference between BPPV and vestibular weakness & our plan to treat the BPPV first before addressing the vestibular weakness.      See Vestibular Section of Logs for testing and Rx.    S/P Rx provided pt was escorted to a chair with CGA to a full seated position.  Pt sat for 20 mins with head stationary.  Pt instructed not to lay down or do activities that change head level beyond 45 degrees from upright until laying down for bed for the night.        The refer was also for weakness.  His vertigo symptoms history was so complicated that we did address.  We will not address his weakness in this episode of care.  Once he has recovered from vertigo or is better, we will request a new prescription from Dr. Walton for weakness, gait and balance when we can treated it without the vertigo symptoms limiting his care needed.          Barriers to therapy: Long Hx of vertigo; Lots of Co-morbidities  Prognosis: fair    Goals  Plan Goals: SHORT TERM GOALS: 5 weeks  1.  Patient to be compliant with HEP and tolerate with only minimal side effects.    2.  Patient able to demonstrate the ability to sit to stand without dizziness or light headedness to improve safety with  transfers.  3.  Patient reports that understand the information about BPPV and the treatment.  4.  Pt to report increased ease to drive and exercise with less pain in her neck.    LONG TERM GOALS: 10 weeks  1.  Pt to report minimal to no vertigo symptoms with all activities.  2.  Pt able to ambulate across clinic with head rotation and no dizziness, nausea or other side effects for improving ambulation in community and house.  3.  Pt able to demonstrate a good ability to balance on foam and shuttle balance equipment to simulate uneven terrain and high balance skill levels needed activities with safety.    4.  Pt to report absence of vertigo symptoms with all ADL's, IADL's, household, recreational and community activities, including all motions and positions.   5.  Pt to report increased general ability to concentrate and less fatigue.   6.  Pt able to drive, walk and perform daily activities including exercise & house work without complaints of symptoms of vertigo limiting function.  7.  Pt to tolerate a vestibular strengthening and habituation program to help her venus for normal functional activity inside and outside her home safely.      Plan  Therapy options: will be seen for skilled therapy services  Planned therapy interventions: neuromuscular re-education, therapeutic activities, strengthening, soft tissue mobilization, manual therapy, joint mobilization, home exercise program, functional ROM exercises, flexibility, balance/weight-bearing training, abdominal trunk stabilization, spinal/joint mobilization and body mechanics training  Other planned therapy interventions: Vestibular Strengthening, Habituation & BPPV interventions  Frequency: 2x week  Duration in weeks: 10  Treatment plan discussed with: patient and family (Wife)        Manual Therapy:          mins  52336;  Therapeutic Exercise:          mins  51061;     Neuromuscular Devin:         mins  36369;    Therapeutic Activity:      10     mins  78268;      Gait Training:            mins  97607;     Ultrasound:           mins  29057;    Electrical Stimulation:          mins  83860 ( );  Dry Needling           mins self-pay  Traction           mins 34054  Canalith Repositioning    15     mins 47174      Timed Treatment:   10   mins   Total Treatment:     70   mins      PT: Bruce Harmon PT     License Number: [id number]  Electronically signed by Bruce Harmon PT, 11/21/22, 11:05 AM EST    Certification Period: 11/21/2022 thru 2/18/2023  I certify that the therapy services are furnished while this patient is under my care.  The services outlined above are required by this patient, and will be reviewed every 90 days.         Physician Signature:__________________________________________________    PHYSICIAN: Dillon Walton MD  NPI: [id number]                                      DATE:      Please sign in Epic or return via fax to [email or web address] . Thank you, [facility].

## 2022-11-28 ENCOUNTER — TREATMENT (OUTPATIENT)
Dept: PHYSICAL THERAPY | Facility: CLINIC | Age: 82
End: 2022-11-28

## 2022-11-28 DIAGNOSIS — H81.10 BPPV (BENIGN PAROXYSMAL POSITIONAL VERTIGO), UNSPECIFIED LATERALITY: ICD-10-CM

## 2022-11-28 DIAGNOSIS — R42 VERTIGO: Primary | ICD-10-CM

## 2022-11-28 DIAGNOSIS — H81.8X9 UNILATERAL VESTIBULAR WEAKNESS, UNSPECIFIED LATERALITY: ICD-10-CM

## 2022-11-28 PROCEDURE — 97530 THERAPEUTIC ACTIVITIES: CPT | Performed by: PHYSICAL THERAPIST

## 2022-11-28 PROCEDURE — 95992 CANALITH REPOSITIONING PROC: CPT | Performed by: PHYSICAL THERAPIST

## 2022-11-29 NOTE — PROGRESS NOTES
Physical Therapy Daily Treatment Note      Patient: Mg Gerber Sr.   : 1940  Referring practitioner: No ref. provider found  Date of Initial Visit: Type: THERAPY  Noted: 2022  Today's Date: 2022  Patient seen for 2 sessions       Visit Diagnoses:    ICD-10-CM ICD-9-CM   1. Vertigo  R42 780.4   2. BPPV (benign paroxysmal positional vertigo), unspecified laterality  H81.10 386.11   3. Unilateral vestibular weakness, unspecified laterality  H81.8X9 386.8         Mg Gerber reports: I think I am doing about the same with my vestibular symptoms.  I am using a cane today instead of my rollator.      Subjective     Objective   See Exercise, Manual, and Modality Logs for complete treatment.     See Vestibular Section of Logs for testing and Rx.    S/P Rx provided pt was escorted to a chair with CGA to a full seated position.  Pt sat for 20 mins with head stationary.      Pt not to lay down or do activities that change head level beyond 45 degrees from upright until laying down for bed for the day.        Assessment & Plan     Assessment    Assessment details: The pt continues to have symptoms present as last visit.  Tx as appropriate.  Reviewed all anatomy and Tx required.      Plan  Plan details: Re-assess upon next visit for continued BPPV symptoms and treat appropriately.             Timed:         Manual Therapy:         mins  92204;     Therapeutic Exercise:         mins  78475;     Neuromuscular Devin:        mins  61515;    Therapeutic Activity:     10     mins  06103;     Gait Training:           mins  11282;     Ultrasound:          mins  70704;    Ionto                                   mins  55708  Self Care                            mins  96861  Traction          mins 00907  Canalith Repos    15     mins 48521      Un-Timed:  Electrical Stimulation:         mins  06090 ( );  Dry Needling          mins self-pay  Traction          mins 68216      Timed Treatment:   10   mins    Total Treatment:     45   mins    Bruce Harmon, PT  [place] License #: [id number]    Physical Therapist

## 2022-11-30 ENCOUNTER — TELEPHONE (OUTPATIENT)
Dept: FAMILY MEDICINE CLINIC | Facility: CLINIC | Age: 82
End: 2022-11-30

## 2022-11-30 ENCOUNTER — TREATMENT (OUTPATIENT)
Dept: PHYSICAL THERAPY | Facility: CLINIC | Age: 82
End: 2022-11-30

## 2022-11-30 DIAGNOSIS — H81.10 BPPV (BENIGN PAROXYSMAL POSITIONAL VERTIGO), UNSPECIFIED LATERALITY: ICD-10-CM

## 2022-11-30 DIAGNOSIS — R42 VERTIGO: Primary | ICD-10-CM

## 2022-11-30 DIAGNOSIS — H81.8X9 UNILATERAL VESTIBULAR WEAKNESS, UNSPECIFIED LATERALITY: ICD-10-CM

## 2022-11-30 PROCEDURE — 97110 THERAPEUTIC EXERCISES: CPT | Performed by: PHYSICAL THERAPIST

## 2022-11-30 PROCEDURE — 95992 CANALITH REPOSITIONING PROC: CPT | Performed by: PHYSICAL THERAPIST

## 2022-12-07 ENCOUNTER — TREATMENT (OUTPATIENT)
Dept: PHYSICAL THERAPY | Facility: CLINIC | Age: 82
End: 2022-12-07

## 2022-12-07 DIAGNOSIS — H81.10 BPPV (BENIGN PAROXYSMAL POSITIONAL VERTIGO), UNSPECIFIED LATERALITY: ICD-10-CM

## 2022-12-07 DIAGNOSIS — H81.8X9 UNILATERAL VESTIBULAR WEAKNESS, UNSPECIFIED LATERALITY: ICD-10-CM

## 2022-12-07 DIAGNOSIS — R42 VERTIGO: Primary | ICD-10-CM

## 2022-12-07 PROCEDURE — 97530 THERAPEUTIC ACTIVITIES: CPT | Performed by: PHYSICAL THERAPIST

## 2022-12-07 NOTE — PROGRESS NOTES
Physical Therapy Daily Treatment Note      Patient: Mg Gerber Sr.   : 1940  Referring practitioner: Dillon Walton MD  Date of Initial Visit: Type: THERAPY  Noted: 2022  Today's Date: 2022  Patient seen for 4 sessions       Visit Diagnoses:    ICD-10-CM ICD-9-CM   1. Vertigo  R42 780.4   2. BPPV (benign paroxysmal positional vertigo), unspecified laterality  H81.10 386.11   3. Unilateral vestibular weakness, unspecified laterality  H81.8X9 386.8         Mg Gerber reports: I am doing better with no real vertigo.  I felt after my last visit and I have more energy while not getting sleepy either.      Subjective     Objective   See Exercise, Manual, and Modality Logs for complete treatment.       Assessment & Plan     Assessment    Assessment details: Lino has improved with his vertigo symptoms.  He has no symptoms with testing except a possible BP drop upon sitting up from the DHP testing.  Skilled care for vertigo and BPPV are no longer warranted at this time.      Plan  Plan details: DC to HEP.  Pt instructed to call with questions or issues related to his BPPV/vertigo symptoms.        Pt to return to PT for weakness.            Timed:         Manual Therapy:         mins  74561;     Therapeutic Exercise:         mins  19044;     Neuromuscular Devin:        mins  79607;    Therapeutic Activity:     15     mins  68177;     Gait Training:           mins  13587;     Ultrasound:          mins  13104;    Ionto                                   mins  99539  Self Care                            mins  91001  Traction          mins 66981  Canalith Repos         mins 20457      Un-Timed:  Electrical Stimulation:         mins  10200 ( );  Dry Needling          mins self-pay  Traction          mins 75664      Timed Treatment:   15   mins   Total Treatment:     25   mins    Bruce Harmon PT  [place] License #: [id number]    Physical Therapist

## 2022-12-09 ENCOUNTER — TREATMENT (OUTPATIENT)
Dept: PHYSICAL THERAPY | Facility: CLINIC | Age: 82
End: 2022-12-09

## 2022-12-09 DIAGNOSIS — R26.89 BALANCE PROBLEMS: ICD-10-CM

## 2022-12-09 DIAGNOSIS — Z91.81 PERSONAL HISTORY OF FALL: ICD-10-CM

## 2022-12-09 DIAGNOSIS — R53.1 WEAKNESS: Primary | ICD-10-CM

## 2022-12-09 DIAGNOSIS — R53.81 PHYSICAL DECONDITIONING: ICD-10-CM

## 2022-12-09 DIAGNOSIS — R29.898 WEAKNESS OF BOTH HIPS: Primary | ICD-10-CM

## 2022-12-09 PROCEDURE — 97530 THERAPEUTIC ACTIVITIES: CPT | Performed by: PHYSICAL THERAPIST

## 2022-12-09 PROCEDURE — 97161 PT EVAL LOW COMPLEX 20 MIN: CPT | Performed by: PHYSICAL THERAPIST

## 2022-12-09 PROCEDURE — 97110 THERAPEUTIC EXERCISES: CPT | Performed by: PHYSICAL THERAPIST

## 2022-12-09 NOTE — PROGRESS NOTES
Physical Therapy Initial Evaluation and Plan of Care    Patient: gM Gerber .   : 1940  Diagnosis/ICD-10 Code:  Weakness of both hips [R29.898]  Referring practitioner: Dillon Walton MD  Date of Initial Visit: 2022  Today's Date: 2022  Patient seen for 1 session         Visit Diagnoses:    ICD-10-CM ICD-9-CM   1. Weakness of both hips  R29.898 729.89   2. Balance problems  R26.89 781.99   3. Personal history of fall  Z91.81 V15.88   4. Physical deconditioning  R53.81 799.3       PMHx Reviewed : 2022      Subjective Evaluation    History of Present Illness  Mechanism of injury: Pt presents to PT with a hx of weakness by referral of Dr. Walton.  He has a hx of comorbidiites with renal cancer, deconditioning, COPD while on O2.      He reports that he has been weaning himself off O2 and has been monitoring his O2 saturation.  I have notice that I can go without issues or a drop in O2%.      Hx of falls, last fall about 8 months ago.       He presents to the clinic with a rollator and a cane.  Uses a cane most of the time, but his lumbar back flared today and he had to use rollator.  Pt is on nasal canal O2 @ 2 liters .      Recently Tx for vertigo successfullly.      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    Quality of life: fair    Social Support  Lives in: multiple-level home (Tri-Level)  Lives with: spouse             Objective          Strength/Myotome Testing     Left Shoulder     Planes of Motion   Flexion: 5   Abduction: 5     Right Shoulder     Planes of Motion   Flexion: 5   Abduction: 5     Left Elbow   Flexion: 5  Extension: 5    Right Elbow   Flexion: 5  Extension: 5    Left Wrist/Hand   Wrist extension: 5    Right Wrist/Hand   Wrist extension: 5    Left Hip   Planes of Motion   Flexion: 4-  Abduction: 4  Adduction: 4  External  rotation: 5  Internal rotation: 5    Right Hip   Planes of Motion   Flexion: 3+  Abduction: 4  Adduction: 4  External rotation: 5  Internal rotation: 5    Left Knee   Flexion: 5  Extension: 5    Right Knee   Flexion: 5  Extension: 4    Left Ankle/Foot   Dorsiflexion: 4    Right Ankle/Foot   Dorsiflexion: 4    Ambulation   Weight-Bearing Status   Assistive device used: rollator walker with seat and single point cane    Observational Gait   Decreased walking speed and stride length.   Base of support: increased          Assessment & Plan     Assessment  Impairments: abnormal gait, abnormal or restricted ROM, activity intolerance, impaired balance, impaired physical strength, lacks appropriate home exercise program, pain with function, safety issue and weight-bearing intolerance  Functional Limitations: carrying objects, walking, pulling, pushing, standing, stooping and reaching overhead  Assessment details: Pt presents to PT with symptoms consistent with a balance deficit and (B) LE weakness, especially in the hips.  Pt would benefit from skilled PT intervention to address the deficits noted.     Barriers to therapy: active cancer and hx of LBP  Prognosis: fair    Goals  Plan Goals:   SHORT TERM GOALS: 4 weeks  1. Pt will improve standing balance with eyes closed to require only supervision > 15 sec on foam.     2. Pt. will improve (B) LE strength to >4/5 (in sitting) as needed to perform sit to stand txf x 2 without UE.   3. Pt. Will demonstrate the ability to ambulate with SPC with proper adjustments and forward gaze for 4 min.   4. Pt. Will traverse 10 stairs (up/down) with 1 railing and supervision for going to the basement safely.      LONG TERM GOALS: 8 weeks  1. Pt will improve standing balance with narrow DOMINGO, eyes closed to require only supervision > 1 min.   2. Pt. will improve gluteal and hip flexor strength to 4/5 (in sitting) as needed to improve 30 sec sit to stand to 5x with UE assist on thighs.    3.  Pt. Will demonstrate the ability to ambulate with SPC with proper adjustments and forward gaze for >7 min while in the clinic w/ O2.   4. Pt. Will Increase Tinnetti Score to >22/28 demonstrating improved mobility safety.          Plan  Therapy options: will be seen for skilled therapy services  Planned modality interventions: thermotherapy (hydrocollator packs)  Planned therapy interventions: abdominal trunk stabilization, home exercise program, gait training, functional ROM exercises, flexibility, body mechanics training, balance/weight-bearing training, postural training, neuromuscular re-education, therapeutic activities, transfer training and strengthening  Frequency: 2x week  Duration in visits: 16  Treatment plan discussed with: patient        Manual Therapy:          mins  47034;  Therapeutic Exercise:     8     mins  16456;     Neuromuscular Devin:         mins  14354;    Therapeutic Activity:      15     mins  55713;     Gait Training:            mins  67677;     Ultrasound:           mins  12318;    Electrical Stimulation:          mins  69153 ( );  Dry Needling           mins self-pay  Traction           mins 10995  Canalith Repositioning         mins 63558      Timed Treatment:   23   mins   Total Treatment:     60   mins      PT: Bruce Harmon PT     License Number: [id number]  Electronically signed by Brcue Harmon PT, 12/09/22, 11:15 AM EST    Certification Period: 12/9/2022 thru 3/8/2022  I certify that the therapy services are furnished while this patient is under my care.  The services outlined above are required by this patient, and will be reviewed every 90 days.         Physician Signature:__________________________________________________    PHYSICIAN: Dillon Walton MD  NPI: [id number]                                      DATE:      Please sign in Epic or return via fax to [email or web address] . Thank you, [facility].

## 2022-12-14 ENCOUNTER — TREATMENT (OUTPATIENT)
Dept: PHYSICAL THERAPY | Facility: CLINIC | Age: 82
End: 2022-12-14

## 2022-12-14 DIAGNOSIS — Z91.81 PERSONAL HISTORY OF FALL: ICD-10-CM

## 2022-12-14 DIAGNOSIS — R53.81 PHYSICAL DECONDITIONING: ICD-10-CM

## 2022-12-14 DIAGNOSIS — R29.898 WEAKNESS OF BOTH HIPS: Primary | ICD-10-CM

## 2022-12-14 DIAGNOSIS — R26.89 BALANCE PROBLEMS: ICD-10-CM

## 2022-12-14 PROCEDURE — 97112 NEUROMUSCULAR REEDUCATION: CPT | Performed by: PHYSICAL THERAPIST

## 2022-12-14 PROCEDURE — 97110 THERAPEUTIC EXERCISES: CPT | Performed by: PHYSICAL THERAPIST

## 2022-12-19 ENCOUNTER — TREATMENT (OUTPATIENT)
Dept: PHYSICAL THERAPY | Facility: CLINIC | Age: 82
End: 2022-12-19
Payer: MEDICARE

## 2022-12-19 DIAGNOSIS — R26.89 BALANCE PROBLEMS: ICD-10-CM

## 2022-12-19 DIAGNOSIS — R29.898 WEAKNESS OF BOTH HIPS: Primary | ICD-10-CM

## 2022-12-19 DIAGNOSIS — R53.81 PHYSICAL DECONDITIONING: ICD-10-CM

## 2022-12-19 DIAGNOSIS — Z91.81 PERSONAL HISTORY OF FALL: ICD-10-CM

## 2022-12-19 PROCEDURE — 97112 NEUROMUSCULAR REEDUCATION: CPT | Performed by: PHYSICAL THERAPIST

## 2022-12-19 PROCEDURE — 97110 THERAPEUTIC EXERCISES: CPT | Performed by: PHYSICAL THERAPIST

## 2022-12-25 ENCOUNTER — HOSPITAL ENCOUNTER (EMERGENCY)
Facility: HOSPITAL | Age: 82
Discharge: HOME | End: 2022-12-25
Attending: EMERGENCY MEDICINE | Admitting: EMERGENCY MEDICINE

## 2022-12-25 VITALS
HEART RATE: 70 BPM | RESPIRATION RATE: 16 BRPM | OXYGEN SATURATION: 96 % | TEMPERATURE: 98.3 F | SYSTOLIC BLOOD PRESSURE: 128 MMHG | DIASTOLIC BLOOD PRESSURE: 59 MMHG

## 2022-12-25 DIAGNOSIS — S61.411A SKIN TEAR OF RIGHT HAND WITHOUT COMPLICATION, INITIAL ENCOUNTER: Primary | ICD-10-CM

## 2022-12-25 PROCEDURE — 99283 EMERGENCY DEPT VISIT LOW MDM: CPT

## 2022-12-25 NOTE — ED PROVIDER NOTES
EMERGENCY DEPARTMENT ENCOUNTER    Room Number:  08/08  Date of encounter:  12/25/2022  PCP: Dillon Walton MD  Historian: Patient      HPI:  Chief Complaint: Right hand laceration  A complete HPI/ROS/PMH/PSH/SH/FH are unobtainable due to: N/A    Context: Mg Gerber Sr. is a 81 y.o. RHD male with past medical history of HTN,, prior PE, AF on Xarelto, hyperlipidemia, and hypothyroidism who presents to the ED c/o right hand laceration.  Patient states the injury occurred around 4:00 this afternoon when the canister from his vacuum  fell against the back of his hand causing a laceration.  Patient denies any other injury, no numbness or tingling, weakness, tetanus last received in August, 2021.      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/24/2014   • Herpes zoster 12/06/2011   • Hip pain 03/24/2014   • Essential hypertension    • Cancer (HCC) 04/29/2014   • Pulmonary emphysema (HCC) 03/24/2014   • Left low back pain 10/28/2015   • Vertigo 02/02/2016   • Headache around the eyes 02/02/2016   • Mild cognitive impairment 02/02/2016   • Metastatic renal cell carcinoma  on q 2 wks chemo 02/15/2016   • Anxiety 05/16/2016   • Malignant neoplasm of lung (HCC) 05/16/2016   • Neuropathy 02/02/2017   • Perennial allergic rhinitis 02/02/2017   • Vitamin B12 deficiency 03/16/2017   • 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   • Pharyngitis 12/16/2017   • Bronchitis 05/20/2019   • Drug-induced constipation 06/01/2020   • Hx of hiatal hernia 11/10/2020   • History of lung cancer 11/10/2020   • COVID-19 virus detected 11/11/2020   • Dyspnea 11/11/2020   • Generalized weakness 11/11/2020   • Acute on chronic respiratory failure with hypoxia (HCC) 11/23/2020   • Gastroesophageal reflux disease 11/23/2020   • Hearing disorder 11/23/2020   • Malignant neoplasm of prostate (HCC) 11/23/2020   • Sleep apnea  11/23/2020   • Anemia 11/23/2020   • Malignant neoplasm metastatic to lung (HCC) 11/23/2020   • Secondary bacterial pneumonia 11/23/2020   • Pneumonia due to COVID-19 virus 11/23/2020   • Hilar mass 11/23/2020   • On antineoplastic chemotherapy q 2wks    • Paroxysmal atrial fibrillation (HCC)    • History of pulmonary embolism and DVT    • Chronic nausea 08/04/2021   • Gastritis 01/15/2022   • Muscle spasm 01/15/2022   • Fall 03/11/2022   • Closed fracture of multiple ribs of left side 03/11/2022   • Chest wall pain 03/11/2022   • Fall, initial encounter 03/12/2022   • Immobility syndrome 03/14/2022   • Self-care deficit 03/14/2022   • Cytokine release syndrome, grade 2 03/20/2022   • Dizziness 10/06/2022   • Compression fracture of vertebral column (HCC) 05/22/2017   • History of chronic lung disease 06/26/2021   • Impairment of balance 02/19/2016   • Lung involvement associated with another disorder (HCC) 03/23/2016   • Pneumonia 10/06/2022     Resolved Ambulatory Problems     Diagnosis Date Noted   • Pulmonary embolism (HCC) 03/24/2014   • Sepsis, unspecified organism (HCC)      Past Medical History:   Diagnosis Date   • Allergic    • Atrial fibrillation (HCC)    • Cataract    • Chemotherapeutic agent or infusion extravasation    • COPD (chronic obstructive pulmonary disease) (HCC) 03/24/2014   • Emphysema of lung (HCC)    • History of Doppler ultrasound 12/28/2011   • History of EKG 02/10/2012   • Hypertension    • Injury of back    • Renal cell cancer (HCC)    • Shortness of breath          PAST SURGICAL HISTORY  Past Surgical History:   Procedure Laterality Date   • APPENDECTOMY     • CATARACT EXTRACTION     • CHOLECYSTECTOMY     • SPLENECTOMY     • TONSILLECTOMY     • VEIN SURGERY           FAMILY HISTORY  Family History   Problem Relation Age of Onset   • Aneurysm Mother    • Stroke Mother    • Heart disease Father    • Diabetes Brother         type 2         SOCIAL HISTORY  Social History     Socioeconomic  History   • Marital status:    Tobacco Use   • Smoking status: Former     Types: Cigarettes     Quit date: 1980     Years since quittin.0   • Smokeless tobacco: Never   Vaping Use   • Vaping Use: Never used   Substance and Sexual Activity   • Alcohol use: Yes     Comment: 2 drinks month   • Drug use: No   • Sexual activity: Defer         ALLERGIES  Nsaids, Latex, Orange, and [place] oil        REVIEW OF SYSTEMS  Review of Systems     All systems reviewed and negative except for those discussed in HPI.       PHYSICAL EXAM    I have reviewed the triage vital signs and nursing notes.    ED Triage Vitals [22 0008]   Temp Heart Rate Resp BP SpO2   98.4 °F (36.9 °C) 79 16 123/53 97 %      Temp src Heart Rate Source Patient Position BP Location FiO2 (%)   -- -- -- -- --       Physical Exam  GENERAL: Alert and orient x4, not distressed  HENT: moist mucous membranes, head atraumatic/normocephalic  EYES: no scleral icterus  CV: regular rhythm, regular rate  RESPIRATORY: normal effort  MUSCULOSKELETAL: no deformity, no bony tenderness, normal ROM  NEURO: alert, moves all extremities, follows commands  SKIN: warm, dry, there is a 5 cm skin tear to the dorsal aspect of the right hand      LAB RESULTS  No results found for this or any previous visit (from the past 24 hour(s)).    Ordered the above labs and independently reviewed the results.        RADIOLOGY  No Radiology Exams Resulted Within Past 24 Hours    I ordered the above noted radiological studies. Reviewed by me and discussed with radiologist.  See dictation for official radiology interpretation.      PROCEDURES    Laceration Repair    Date/Time: 2022 1:04 AM  Performed by: Booker Jones PA  Authorized by: Keven Radford MD     Consent:     Consent obtained:  Verbal    Consent given by:  Patient    Risks discussed:  Infection, pain, poor cosmetic result, need for additional repair and poor wound healing  Universal protocol:     Procedure  explained and questions answered to patient or proxy's satisfaction: yes      Patient identity confirmed:  Verbally with patient  Anesthesia:     Anesthesia method:  None  Laceration details:     Location:  Hand    Hand location:  R hand, dorsum    Length (cm):  5  Pre-procedure details:     Preparation:  Patient was prepped and draped in usual sterile fashion  Exploration:     Hemostasis achieved with:  Direct pressure    Wound exploration: wound explored through full range of motion and entire depth of wound visualized      Wound extent: no fascia violation noted, no foreign bodies/material noted, no muscle damage noted, no nerve damage noted, no tendon damage noted, no underlying fracture noted and no vascular damage noted    Treatment:     Area cleansed with:  Chlorhexidine    Amount of cleaning:  Extensive    Irrigation solution:  Sterile saline    Irrigation method:  Tap    Debridement:  None  Skin repair:     Repair method:  Steri-Strips    Number of Steri-Strips:  7  Approximation:     Approximation:  Close  Repair type:     Repair type:  Simple  Post-procedure details:     Dressing:  Bulky dressing    Procedure completion:  Tolerated well, no immediate complications          MEDICATIONS GIVEN IN ER    Medications - No data to display      PROGRESS, DATA ANALYSIS, CONSULTS, AND MEDICAL DECISION MAKING    All labs have been independently reviewed by me.  All radiology studies have been reviewed by me and discussed with radiologist dictating the report.   EKG's independently viewed and interpreted by me.  Discussion below represents my analysis of pertinent findings related to patient's condition, differential diagnosis, treatment plan and final disposition.    DDx: Includes but not limited to laceration, tendon injury         MDM: Patient's laceration has been repaired, his tetanus is up-to-date, have discussed wound care precautions and need for follow-up.  Vital signs are stable, patient is safe for  discharge home.    PPE: The patient wore a surgical mask throughout the entire patient encounter. I wore an N95.    AS OF 01:32 EST VITALS:    BP - 123/53  HR - 70  TEMP - 98.4 °F (36.9 °C)  O2 SATS - 95%        DIAGNOSIS  Final diagnoses:   Skin tear of right hand without complication, initial encounter         DISPOSITION  DISCHARGE    Patient discharged in stable condition.    Reviewed implications of results, diagnosis, meds, responsibility to follow up, warning signs and symptoms of possible worsening, potential complications and reasons to return to ER.    Patient/Family voiced understanding of above instructions.    Discussed plan for discharge, as there is no emergent indication for admission. Patient referred to primary care provider for BP management due to today's BP. Pt/family is agreeable and understands need for follow up and repeat testing.  Pt is aware that discharge does not mean that nothing is wrong but it indicates no emergency is present that requires admission and they must continue care with follow-up as given below or physician of their choice.     FOLLOW-UP  Dillon Walton MD  [address] [address]  [phone]    Schedule an appointment as soon as possible for a visit in 1 week  For wound re-check         Medication List      No changes were made to your prescriptions during this visit.         Note Disclaimer: At [facility], we believe that sharing information builds trust and better relationships. You are receiving this note because you recently visited [facility]. It is possible you will see health information before a provider has talked with you about it. This kind of information can be easy to misunderstand. To help you fully understand what it means for your health, we urge you to discuss this note with your provider.       Booker Jones PA  12/25/22 0133

## 2022-12-25 NOTE — ED PROVIDER NOTES
MD ATTESTATION NOTE    The ANTONIO and I have discussed this patient's history, physical exam, and treatment plan.  I have reviewed the documentation and personally had a face to face interaction with the patient. I affirm the documentation and agree with the treatment and plan.  The attached note describes my personal findings.      I provided a substantive portion of the care of the patient.  I personally performed the physical exam in its entirety, and below are my findings.  For this patient encounter, the patient wore surgical mask, I wore full protective PPE including N95 and eye protection.      Brief HPI: This patient is an 81-year-old male with a history of atrial fibrillation on Xarelto presenting to the emergency room today with a laceration to his right hand.  His tetanus shot is currently up-to-date and he denies any other injuries.    PHYSICAL EXAM  ED Triage Vitals [12/25/22 0008]   Temp Heart Rate Resp BP SpO2   98.4 °F (36.9 °C) 79 16 123/53 97 %      Temp src Heart Rate Source Patient Position BP Location FiO2 (%)   -- -- -- -- --         GENERAL: Resting comfortably and in no acute distress, nontoxic in appearance  HENT: nares patent  EYES: no scleral icterus  CV: regular rhythm, normal rate, no M/R/G  RESPIRATORY: normal effort, lungs clear bilateral  MUSCULOSKELETAL: no deformity, no edema  NEURO: alert, moves all extremities, follows commands  PSYCH:  calm, cooperative  SKIN: warm, dry, skin tear present to the dorsal surface of the right hand    Vital signs and nursing notes reviewed.        Plan: We will clean the wound, placed Steri-Strips, and dressed the wound appropriately in the ED today.       Keven Radford MD  12/25/22 0143

## 2022-12-25 NOTE — DISCHARGE INSTRUCTIONS
Keep laceration clean and dry, watch carefully for signs of infection, change bandage daily as needed and allow Steri-Strips to fall off on their own after about 1 week. Return to care if any complications.

## 2022-12-25 NOTE — ED TRIAGE NOTES
Pt to triage from home with c/o laceration to right hand. Pt was standing on a step and the vacuum hit pt hand and tore it open. Pt is on blood thinners and blood is not controlled.  Pt to triage with mask on along with nursing staff.

## 2022-12-28 ENCOUNTER — TREATMENT (OUTPATIENT)
Dept: PHYSICAL THERAPY | Facility: CLINIC | Age: 82
End: 2022-12-28
Payer: MEDICARE

## 2022-12-28 DIAGNOSIS — R29.898 WEAKNESS OF BOTH HIPS: Primary | ICD-10-CM

## 2022-12-28 DIAGNOSIS — R26.89 BALANCE PROBLEMS: ICD-10-CM

## 2022-12-28 DIAGNOSIS — Z91.81 PERSONAL HISTORY OF FALL: ICD-10-CM

## 2022-12-28 DIAGNOSIS — R53.81 PHYSICAL DECONDITIONING: ICD-10-CM

## 2022-12-28 PROCEDURE — 97112 NEUROMUSCULAR REEDUCATION: CPT | Performed by: PHYSICAL THERAPIST

## 2022-12-28 PROCEDURE — 97110 THERAPEUTIC EXERCISES: CPT | Performed by: PHYSICAL THERAPIST

## 2022-12-28 NOTE — PROGRESS NOTES
Physical Therapy Daily Treatment Note      Patient: Mg Gerber Sr.   : 1940  Referring practitioner: Dillon Walton MD  Date of Initial Visit: Type: THERAPY  Noted: 2022  Today's Date: 2022  Patient seen for 4 sessions       Visit Diagnoses:    ICD-10-CM ICD-9-CM   1. Weakness of both hips  R29.898 729.89   2. Balance problems  R26.89 781.99   3. Personal history of fall  Z91.81 V15.88   4. Physical deconditioning  R53.81 799.3         Mg Gerber reports: I am not doing very good today.  I woke up dizzy like in the past.  The dizziness makes me so sleepy & tired.      Subjective     Objective   See Exercise, Manual, and Modality Logs for complete treatment.     See Vestibular Section of Logs for testing and Rx.    S/P Rx provided pt was escorted to a chair with CGA to a full seated position.  Pt sat for 20 mins with head stationary.      Pt not to lay down or do activities that change head level beyond 45 degrees from upright until laying down for bed for the day.        Assessment & Plan     Assessment    Assessment details: Performed exercise today but no balance secondary to the vertigo symptoms.   BPPV symptoms have returned and required treatment to allow further treatment to be performed.  Treated the vertigo symptoms with good tolerance.     Plan  Plan details: Try to resume normal balance training and strengthening.  Review BPPV status.  Re-assess next visit too.            Timed:         Manual Therapy:         mins  38243;     Therapeutic Exercise:    15     mins  60412;     Neuromuscular Devin:    15    mins  42513;    Therapeutic Activity:          mins  60457;     Gait Training:           mins  72123;     Ultrasound:          mins  76494;    Ionto                                   mins  57697  Self Care                            mins  28121  Traction          mins 18903  Canalith Repos         mins 16072      Un-Timed:  Electrical Stimulation:         mins  83327 (  );  Dry Needling          mins self-pay  Traction          mins 72160      Timed Treatment:   30   mins   Total Treatment:     50   mins    Bruce Harmon, PT  [place] License #: [id number]    Physical Therapist

## 2022-12-30 NOTE — PROGRESS NOTES
Physical Therapy Daily Treatment Note      Patient: Mg Gerber Sr.   : 1940  Referring practitioner: Dillon Walton MD  Date of Initial Visit: Type: THERAPY  Noted: 2022  Today's Date: 2022  Patient seen for 2 sessions       Visit Diagnoses:    ICD-10-CM ICD-9-CM   1. Weakness of both hips  R29.898 729.89   2. Balance problems  R26.89 781.99   3. Personal history of fall  Z91.81 V15.88   4. Physical deconditioning  R53.81 799.3         Mg Gerber reports: I am doing ok.  I was tired after my last visit but no issues.      Subjective     Objective   See Exercise, Manual, and Modality Logs for complete treatment.       Assessment & Plan     Assessment    Assessment details: Progressed today's tx with balance and endurance exercises.  The pt venus well while in the clinic.  Pt demos (I) w/ HEP.  Progressing slow to help the pt venus the progression.      Plan  Plan details: Progress balance / endurance / strengthening / stabilization / functional activity as tolerated            Timed:         Manual Therapy:         mins  41183;     Therapeutic Exercise:    14     mins  24064;     Neuromuscular Devin:    15    mins  11759;    Therapeutic Activity:          mins  91904;     Gait Training:           mins  03517;     Ultrasound:          mins  34867;    Ionto                                   mins  45441  Self Care                            mins  06427  Traction          mins 05124  Canalith Repos         mins 40573      Un-Timed:  Electrical Stimulation:         mins  76600 ( );  Dry Needling          mins self-pay  Traction          mins 67490      Timed Treatment:   28   mins   Total Treatment:     28   mins    Bruce Harmon PT  [place] License #: [id number]    Physical Therapist

## 2023-01-02 NOTE — PROGRESS NOTES
Physical Therapy Daily Treatment Note      Patient: Mg Gerber Sr.   : 1940  Referring practitioner: Dillon Walton MD  Date of Initial Visit: Type: THERAPY  Noted: 2022  Today's Date: 2022  Patient seen for 3 sessions       Visit Diagnoses:    ICD-10-CM ICD-9-CM   1. Weakness of both hips  R29.898 729.89   2. Balance problems  R26.89 781.99   3. Personal history of fall  Z91.81 V15.88   4. Physical deconditioning  R53.81 799.3         Mg Gerber reports: I am doing ok, I was tired after my last visit but no problems.      Subjective     Objective   See Exercise, Manual, and Modality Logs for complete treatment.       Assessment & Plan     Assessment    Assessment details: The pt did better today with the balance training.  He required less VCs and less LOB while training, but was still challenged.  The pt continues to need further strength and endurance training to help improve safety and functional ability.      Plan  Plan details: Progress endurance / balance / strengthening / stabilization / functional activity as tolerated            Timed:         Manual Therapy:         mins  77257;     Therapeutic Exercise:    15     mins  23352;     Neuromuscular Devin:    15    mins  89280;    Therapeutic Activity:          mins  92131;     Gait Training:           mins  74148;     Ultrasound:          mins  32364;    Ionto                                   mins  33845  Self Care                            mins  57833  Traction          mins 47144  Canalith Repos         mins 64942      Un-Timed:  Electrical Stimulation:         mins  47407 ( );  Dry Needling          mins self-pay  Traction          mins 57409      Timed Treatment:   30   mins   Total Treatment:     30   mins    Bruce Harmon PT  [place] License #: [id number]    Physical Therapist

## 2023-01-04 ENCOUNTER — OFFICE VISIT (OUTPATIENT)
Dept: ORTHOPEDIC SURGERY | Facility: CLINIC | Age: 83
End: 2023-01-04
Payer: MEDICARE

## 2023-01-04 VITALS — TEMPERATURE: 97.5 F | WEIGHT: 230.1 LBS | HEIGHT: 72 IN | BODY MASS INDEX: 31.17 KG/M2

## 2023-01-04 DIAGNOSIS — M25.511 RIGHT SHOULDER PAIN, UNSPECIFIED CHRONICITY: Primary | ICD-10-CM

## 2023-01-04 DIAGNOSIS — G89.29 CHRONIC RIGHT SHOULDER PAIN: ICD-10-CM

## 2023-01-04 DIAGNOSIS — M25.511 CHRONIC RIGHT SHOULDER PAIN: ICD-10-CM

## 2023-01-04 PROCEDURE — 99214 OFFICE O/P EST MOD 30 MIN: CPT | Performed by: ORTHOPAEDIC SURGERY

## 2023-01-04 PROCEDURE — 73030 X-RAY EXAM OF SHOULDER: CPT | Performed by: ORTHOPAEDIC SURGERY

## 2023-01-04 PROCEDURE — 1160F RVW MEDS BY RX/DR IN RCRD: CPT | Performed by: ORTHOPAEDIC SURGERY

## 2023-01-04 PROCEDURE — 20610 DRAIN/INJ JOINT/BURSA W/O US: CPT | Performed by: ORTHOPAEDIC SURGERY

## 2023-01-04 PROCEDURE — 1159F MED LIST DOCD IN RCRD: CPT | Performed by: ORTHOPAEDIC SURGERY

## 2023-01-04 RX ORDER — METHYLPREDNISOLONE ACETATE 80 MG/ML
80 INJECTION, SUSPENSION INTRA-ARTICULAR; INTRALESIONAL; INTRAMUSCULAR; SOFT TISSUE
Status: COMPLETED | OUTPATIENT
Start: 2023-01-04 | End: 2023-01-04

## 2023-01-04 RX ADMIN — METHYLPREDNISOLONE ACETATE 80 MG: 80 INJECTION, SUSPENSION INTRA-ARTICULAR; INTRALESIONAL; INTRAMUSCULAR; SOFT TISSUE at 10:44

## 2023-01-04 NOTE — PROGRESS NOTES
Chief complaint: Worsening right shoulder pain    Mr. Gerber follows up today for his right shoulder.  I saw him for this issue approximately 3 years ago.  At that time we did an injection.  He says the injection worked perfectly and he was great for over 2 years.  Over the past month, his pain has recurred.  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  The pain is lateral and anterolateral.  The pain is worse when trying to reach or lift overhead especially at night.  He has noticed weakness with lifting and he has trouble lifting anything heavier than about 2 or 3 pounds overhead.  Denies any numbness or tingling.  Denies any shooting pain down the arm.    Right shoulder is examined.  Skin is benign.  No atrophy, swelling or masses.  Trace bursal effusion.  No increased warmth.  He has good motion.  He has pain and weakness with forward elevation in the scapular plane and abduction.  Good strength with internal and external rotation with his elbow at the side.  Intact motor and sensory function in his lower arm and hand.    AP, scapular Y and axillary views right shoulder are ordered and reviewed to evaluate his complaint.  These compared to previous x-rays.  The x-rays show mild glenohumeral and moderate acromioclavicular arthritis.  Acromiohumeral interval measures normal.  He has a hooked acromion and calcification of the CA ligament.  Incidental note of a port in his chest.  Findings are all similar to his previous x-rays from roughly 3 years ago.    I did review the report of his previous MRI of the right shoulder.  He has a full-thickness rotator cuff tear involving the supraspinatus, mild glenohumeral arthritis and a degenerative glenoid labral tear.    Assessment: Worsening right shoulder pain with known rotator cuff tear    Plan: We had a long discussion about his options.  He asked if his tear has gotten bigger and I told him that I think it probably has.  We discussed further  work-up and treatment options.  He says he is really not interested and any surgery or further work-up.  The last injection helped tremendously and he would like to try that again.  The risk, benefits and alternatives were discussed including his elevated risk with Xarelto.  He acknowledged understanding and consented.  The injection was performed as described below.  He will follow-up with me as needed.      Large Joint Arthrocentesis: R subacromial bursa  Date/Time: 1/4/2023 10:44 AM  Consent given by: patient  Site marked: site marked  Timeout: Immediately prior to procedure a time out was called to verify the correct patient, procedure, equipment, support staff and site/side marked as required   Supporting Documentation  Indications: pain   Procedure Details  Location: shoulder - R subacromial bursa  Preparation: Patient was prepped and draped in the usual sterile fashion  Needle gauge: 21 G.  Medications administered: 2 mL lidocaine (cardiac); 80 mg methylPREDNISolone acetate 80 MG/ML  Patient tolerance: patient tolerated the procedure well with no immediate complications

## 2023-01-05 ENCOUNTER — OFFICE VISIT (OUTPATIENT)
Dept: FAMILY MEDICINE CLINIC | Facility: CLINIC | Age: 83
End: 2023-01-05
Payer: MEDICARE

## 2023-01-05 VITALS
HEART RATE: 80 BPM | SYSTOLIC BLOOD PRESSURE: 123 MMHG | HEIGHT: 72 IN | TEMPERATURE: 97.4 F | OXYGEN SATURATION: 96 % | WEIGHT: 230 LBS | RESPIRATION RATE: 16 BRPM | BODY MASS INDEX: 31.15 KG/M2 | DIASTOLIC BLOOD PRESSURE: 55 MMHG

## 2023-01-05 DIAGNOSIS — Z09 HOSPITAL DISCHARGE FOLLOW-UP: ICD-10-CM

## 2023-01-05 DIAGNOSIS — K29.00 ACUTE GASTRITIS WITHOUT HEMORRHAGE, UNSPECIFIED GASTRITIS TYPE: ICD-10-CM

## 2023-01-05 DIAGNOSIS — S61.411D LACERATION OF RIGHT HAND WITHOUT FOREIGN BODY, SUBSEQUENT ENCOUNTER: Primary | ICD-10-CM

## 2023-01-05 PROCEDURE — 99213 OFFICE O/P EST LOW 20 MIN: CPT | Performed by: FAMILY MEDICINE

## 2023-01-05 RX ORDER — SUCRALFATE ORAL 1 G/10ML
1 SUSPENSION ORAL
Qty: 560 ML | Refills: 1 | Status: SHIPPED | OUTPATIENT
Start: 2023-01-05

## 2023-01-05 NOTE — PROGRESS NOTES
Subjective   Mg Gerber Sr. is a 82 y.o. male.     CC: ED F/U for Skin Laceration    History of Present Illness     Pt returns today after being seen in the ED at [facility] on 12/25/22 with this note:    HPI:  Chief Complaint: Right hand laceration  A complete HPI/ROS/PMH/PSH/SH/FH are unobtainable due to: N/A     Context: Mg Gerber Sr. is a 81 y.o. RHD male with past medical history of HTN,, prior PE, AF on Xarelto, hyperlipidemia, and hypothyroidism who presents to the ED c/o right hand laceration.  Patient states the injury occurred around 4:00 this afternoon when the canister from his vacuum  fell against the back of his hand causing a laceration.  Patient denies any other injury, no numbness or tingling, weakness, tetanus last received in August, 2021.    Pt had 7 steri-strips placed.     DIAGNOSIS   Final diagnoses:   Skin tear of right hand without complication, initial encounter           Current outpatient and discharge medications have been reconciled for the patient.  Reviewed by: Dillon Walton MD      Pt reports the hand is doing well.    Pt also has a h/o some gastritis/GERD issues, usually doing quite well with his Pepcid script, however, the Holidays have caused a decent amount of upset stomach and pt would like some help with that. Pt reports eating seems to aggravate the pain quite a bit.        The following portions of the patient's history were reviewed and updated as appropriate: allergies, current medications, past family history, past medical history, past social history, past surgical history and problem list.    Review of Systems   Constitutional: Negative for activity change, chills and fever.   Respiratory: Negative for cough.    Cardiovascular: Negative for chest pain.   Gastrointestinal: Positive for abdominal pain.   Psychiatric/Behavioral: Negative for dysphoric mood.       /55   Pulse 80   Temp 97.4 °F (36.3 °C) (Oral)   Resp 16   Ht 182.9 cm (72\")   Wt 104  kg (230 lb)   SpO2 96%   BMI 31.19 kg/m²     Objective   Physical Exam  Constitutional:       General: He is not in acute distress.     Appearance: He is well-developed.   Pulmonary:      Effort: Pulmonary effort is normal.   Abdominal:      General: Abdomen is flat.      Palpations: Abdomen is soft.      Tenderness: There is abdominal tenderness (epigastric).   Skin:     Comments: Wound healing well   Neurological:      Mental Status: He is alert and oriented to person, place, and time.   Psychiatric:         Behavior: Behavior normal.         Thought Content: Thought content normal.     Hospital records reviewed with pt confirming HPI.      Assessment & Plan   Diagnoses and all orders for this visit:    1. Laceration of right hand without foreign body, subsequent encounter (Primary)    2. Hospital discharge follow-up    3. Acute gastritis without hemorrhage, unspecified gastritis type  -     sucralfate (Carafate) 1 GM/10ML suspension; Take 10 mL by mouth 4 (Four) Times a Day With Meals & at Bedtime.  Dispense: 560 mL; Refill: 1    Wound care discussed and stressed.

## 2023-01-17 ENCOUNTER — TELEPHONE (OUTPATIENT)
Dept: FAMILY MEDICINE CLINIC | Facility: CLINIC | Age: 83
End: 2023-01-17

## 2023-01-17 NOTE — TELEPHONE ENCOUNTER
Caller: Lavonne Gerbers    Relationship: Emergency Contact    Best call back number: [phone]    What medication are you requesting: ANTIBIOTIC    What are your current symptoms: DRAINAGE, RUNNY NOSE    How long have you been experiencing symptoms: STARTED THIS MORNING     Have you had these symptoms before:    [x] Yes  [] No    Have you been treated for these symptoms before:   [x] Yes  [] No    If a prescription is needed, what is your preferred pharmacy and phone number:      [facility] [id number] - [facility] [address] [place] [facility] [place] [address] [facility] [phone] [facility] [phone] FX   Additional notes:

## 2023-01-18 ENCOUNTER — OFFICE VISIT (OUTPATIENT)
Dept: FAMILY MEDICINE CLINIC | Facility: CLINIC | Age: 83
End: 2023-01-18
Payer: MEDICARE

## 2023-01-18 VITALS
TEMPERATURE: 98.1 F | HEIGHT: 72 IN | OXYGEN SATURATION: 96 % | DIASTOLIC BLOOD PRESSURE: 59 MMHG | SYSTOLIC BLOOD PRESSURE: 130 MMHG | HEART RATE: 82 BPM | RESPIRATION RATE: 16 BRPM | BODY MASS INDEX: 31.15 KG/M2 | WEIGHT: 230 LBS

## 2023-01-18 DIAGNOSIS — J06.9 ACUTE URI: Primary | ICD-10-CM

## 2023-01-18 LAB
EXPIRATION DATE: NORMAL
FLUAV AG UPPER RESP QL IA.RAPID: NOT DETECTED
FLUBV AG UPPER RESP QL IA.RAPID: NOT DETECTED
INTERNAL CONTROL: NORMAL
Lab: NORMAL
SARS-COV-2 AG UPPER RESP QL IA.RAPID: NOT DETECTED

## 2023-01-18 PROCEDURE — 99213 OFFICE O/P EST LOW 20 MIN: CPT | Performed by: FAMILY MEDICINE

## 2023-01-18 PROCEDURE — 87428 SARSCOV & INF VIR A&B AG IA: CPT | Performed by: FAMILY MEDICINE

## 2023-01-18 RX ORDER — PREDNISONE 20 MG/1
20 TABLET ORAL DAILY
Qty: 5 TABLET | Refills: 0 | Status: SHIPPED | OUTPATIENT
Start: 2023-01-18 | End: 2023-01-23

## 2023-01-18 RX ORDER — DOXYCYCLINE HYCLATE 100 MG
100 TABLET ORAL 2 TIMES DAILY
Qty: 20 TABLET | Refills: 0 | Status: SHIPPED | OUTPATIENT
Start: 2023-01-18 | End: 2023-02-17 | Stop reason: HOSPADM

## 2023-01-18 NOTE — PROGRESS NOTES
"Violet Gerber Sr. is a 82 y.o. male.     CC: URI-Sx    History of Present Illness     Pt comes in today reporting 2 day h/o sinus p/p, cough/congestion, RN. No dyspnea over baseline and using his NC O2.      The following portions of the patient's history were reviewed and updated as appropriate: allergies, current medications, past family history, past medical history, past social history, past surgical history and problem list.    Review of Systems   Constitutional: Negative for activity change, chills and fever.   HENT: Positive for congestion, postnasal drip and sinus pressure.    Respiratory: Positive for cough.    Cardiovascular: Negative for chest pain.   Psychiatric/Behavioral: Negative for dysphoric mood.       /59   Pulse 82   Temp 98.1 °F (36.7 °C) (Oral)   Resp 16   Ht 182.9 cm (72\")   Wt 104 kg (230 lb)   SpO2 96%   BMI 31.19 kg/m²     Objective   Physical Exam  Constitutional:       General: He is not in acute distress.     Appearance: He is well-developed.   HENT:      Right Ear: Tympanic membrane and ear canal normal.      Left Ear: Tympanic membrane and ear canal normal.   Cardiovascular:      Rate and Rhythm: Normal rate and regular rhythm.      Heart sounds: Murmur heard.    Systolic murmur is present with a grade of 1/6.  Pulmonary:      Effort: Pulmonary effort is normal.      Breath sounds: Normal breath sounds.   Neurological:      Mental Status: He is alert and oriented to person, place, and time.   Psychiatric:         Behavior: Behavior normal.         Thought Content: Thought content normal.     COVID/Flu RAST: NEGATIVE    Assessment & Plan   Diagnoses and all orders for this visit:    1. Acute URI (Primary)  -     POCT SARS-CoV-2 Antigen JERO + Flu  -     doxycycline (VIBRAMYICN) 100 MG tablet; Take 1 tablet by mouth 2 (Two) Times a Day.  Dispense: 20 tablet; Refill: 0  -     predniSONE (DELTASONE) 20 MG tablet; Take 1 tablet by mouth Daily for 5 days.  " Dispense: 5 tablet; Refill: 0

## 2023-02-14 ENCOUNTER — APPOINTMENT (OUTPATIENT)
Dept: GENERAL RADIOLOGY | Facility: HOSPITAL | Age: 83
End: 2023-02-14
Payer: MEDICARE

## 2023-02-14 ENCOUNTER — APPOINTMENT (OUTPATIENT)
Dept: CT IMAGING | Facility: HOSPITAL | Age: 83
End: 2023-02-14
Payer: MEDICARE

## 2023-02-14 ENCOUNTER — HOSPITAL ENCOUNTER (OUTPATIENT)
Facility: HOSPITAL | Age: 83
Setting detail: OBSERVATION
Discharge: NURSING, REHAB OR LONG-TERM CARE | End: 2023-02-17
Attending: EMERGENCY MEDICINE | Admitting: INTERNAL MEDICINE
Payer: MEDICARE

## 2023-02-14 DIAGNOSIS — R07.89 ATYPICAL CHEST PAIN: ICD-10-CM

## 2023-02-14 DIAGNOSIS — R10.10 UPPER ABDOMINAL PAIN: ICD-10-CM

## 2023-02-14 DIAGNOSIS — S22.42XA CLOSED FRACTURE OF MULTIPLE RIBS OF LEFT SIDE, INITIAL ENCOUNTER: Primary | ICD-10-CM

## 2023-02-14 DIAGNOSIS — Z85.118 HISTORY OF LUNG CANCER: ICD-10-CM

## 2023-02-14 DIAGNOSIS — R77.8 ELEVATED TROPONIN: ICD-10-CM

## 2023-02-14 DIAGNOSIS — C79.9 METASTATIC MALIGNANT NEOPLASM, UNSPECIFIED SITE: ICD-10-CM

## 2023-02-14 DIAGNOSIS — K59.00 CONSTIPATION, UNSPECIFIED CONSTIPATION TYPE: ICD-10-CM

## 2023-02-14 DIAGNOSIS — F41.9 ANXIETY: Chronic | ICD-10-CM

## 2023-02-14 LAB
ALBUMIN SERPL-MCNC: 4.2 G/DL (ref 3.5–5.2)
ALBUMIN/GLOB SERPL: 1.4 G/DL
ALP SERPL-CCNC: 89 U/L (ref 39–117)
ALT SERPL W P-5'-P-CCNC: 13 U/L (ref 1–41)
ANION GAP SERPL CALCULATED.3IONS-SCNC: 10.4 MMOL/L (ref 5–15)
ANISOCYTOSIS BLD QL: ABNORMAL
AST SERPL-CCNC: 15 U/L (ref 1–40)
BASOPHILS # BLD MANUAL: 0.17 10*3/MM3 (ref 0–0.2)
BASOPHILS NFR BLD MANUAL: 1 % (ref 0–1.5)
BILIRUB SERPL-MCNC: 1.4 MG/DL (ref 0–1.2)
BILIRUB UR QL STRIP: NEGATIVE
BUN SERPL-MCNC: 16 MG/DL (ref 8–23)
BUN/CREAT SERPL: 12.7 (ref 7–25)
CALCIUM SPEC-SCNC: 9.9 MG/DL (ref 8.6–10.5)
CHLORIDE SERPL-SCNC: 105 MMOL/L (ref 98–107)
CLARITY UR: CLEAR
CO2 SERPL-SCNC: 25.6 MMOL/L (ref 22–29)
COLOR UR: YELLOW
CREAT SERPL-MCNC: 1.26 MG/DL (ref 0.76–1.27)
D-LACTATE SERPL-SCNC: 2 MMOL/L (ref 0.5–2)
D-LACTATE SERPL-SCNC: 2.3 MMOL/L (ref 0.5–2)
DEPRECATED RDW RBC AUTO: 41.1 FL (ref 37–54)
EGFRCR SERPLBLD CKD-EPI 2021: 56.6 ML/MIN/1.73
EOSINOPHIL # BLD MANUAL: 0.17 10*3/MM3 (ref 0–0.4)
EOSINOPHIL NFR BLD MANUAL: 1 % (ref 0.3–6.2)
ERYTHROCYTE [DISTWIDTH] IN BLOOD BY AUTOMATED COUNT: 10.2 % (ref 12.3–15.4)
GLOBULIN UR ELPH-MCNC: 3.1 GM/DL
GLUCOSE SERPL-MCNC: 99 MG/DL (ref 65–99)
GLUCOSE UR STRIP-MCNC: NEGATIVE MG/DL
HCT VFR BLD AUTO: 31.7 % (ref 37.5–51)
HGB BLD-MCNC: 10.3 G/DL (ref 13–17.7)
HGB UR QL STRIP.AUTO: NEGATIVE
HOLD SPECIMEN: NORMAL
HOLD SPECIMEN: NORMAL
KETONES UR QL STRIP: NEGATIVE
LEUKOCYTE ESTERASE UR QL STRIP.AUTO: NEGATIVE
LIPASE SERPL-CCNC: 25 U/L (ref 13–60)
LYMPHOCYTES # BLD MANUAL: 6.14 10*3/MM3 (ref 0.7–3.1)
LYMPHOCYTES NFR BLD MANUAL: 8 % (ref 5–12)
MACROCYTES BLD QL SMEAR: ABNORMAL
MCH RBC QN AUTO: 36.3 PG (ref 26.6–33)
MCHC RBC AUTO-ENTMCNC: 32.5 G/DL (ref 31.5–35.7)
MCV RBC AUTO: 111.6 FL (ref 79–97)
MONOCYTES # BLD: 1.33 10*3/MM3 (ref 0.1–0.9)
NEUTROPHILS # BLD AUTO: 8.8 10*3/MM3 (ref 1.7–7)
NEUTROPHILS NFR BLD MANUAL: 53 % (ref 42.7–76)
NITRITE UR QL STRIP: NEGATIVE
NRBC BLD AUTO-RTO: 0.4 /100 WBC (ref 0–0.2)
NT-PROBNP SERPL-MCNC: 186 PG/ML (ref 0–1800)
PH UR STRIP.AUTO: 6 [PH] (ref 5–8)
PLAT MORPH BLD: NORMAL
PLATELET # BLD AUTO: 448 10*3/MM3 (ref 140–450)
PMV BLD AUTO: 9.5 FL (ref 6–12)
POTASSIUM SERPL-SCNC: 3.7 MMOL/L (ref 3.5–5.2)
PROT SERPL-MCNC: 7.3 G/DL (ref 6–8.5)
PROT UR QL STRIP: NEGATIVE
QT INTERVAL: 389 MS
RBC # BLD AUTO: 2.84 10*6/MM3 (ref 4.14–5.8)
SODIUM SERPL-SCNC: 141 MMOL/L (ref 136–145)
SP GR UR STRIP: >=1.03 (ref 1–1.03)
TROPONIN T SERPL HS-MCNC: 54 NG/L
UROBILINOGEN UR QL STRIP: NORMAL
VARIANT LYMPHS NFR BLD MANUAL: 37 % (ref 19.6–45.3)
WBC MORPH BLD: NORMAL
WBC NRBC COR # BLD: 16.6 10*3/MM3 (ref 3.4–10.8)
WHOLE BLOOD HOLD COAG: NORMAL
WHOLE BLOOD HOLD SPECIMEN: NORMAL

## 2023-02-14 PROCEDURE — 96374 THER/PROPH/DIAG INJ IV PUSH: CPT

## 2023-02-14 PROCEDURE — 93010 ELECTROCARDIOGRAM REPORT: CPT | Performed by: INTERNAL MEDICINE

## 2023-02-14 PROCEDURE — 81003 URINALYSIS AUTO W/O SCOPE: CPT | Performed by: EMERGENCY MEDICINE

## 2023-02-14 PROCEDURE — 83690 ASSAY OF LIPASE: CPT

## 2023-02-14 PROCEDURE — 83605 ASSAY OF LACTIC ACID: CPT | Performed by: EMERGENCY MEDICINE

## 2023-02-14 PROCEDURE — 93005 ELECTROCARDIOGRAM TRACING: CPT | Performed by: EMERGENCY MEDICINE

## 2023-02-14 PROCEDURE — 71111 X-RAY EXAM RIBS/CHEST4/> VWS: CPT

## 2023-02-14 PROCEDURE — 85007 BL SMEAR W/DIFF WBC COUNT: CPT | Performed by: EMERGENCY MEDICINE

## 2023-02-14 PROCEDURE — 83880 ASSAY OF NATRIURETIC PEPTIDE: CPT | Performed by: EMERGENCY MEDICINE

## 2023-02-14 PROCEDURE — 74177 CT ABD & PELVIS W/CONTRAST: CPT

## 2023-02-14 PROCEDURE — 25010000002 MORPHINE PER 10 MG: Performed by: INTERNAL MEDICINE

## 2023-02-14 PROCEDURE — 36415 COLL VENOUS BLD VENIPUNCTURE: CPT

## 2023-02-14 PROCEDURE — 25010000002 IOPAMIDOL 61 % SOLUTION: Performed by: EMERGENCY MEDICINE

## 2023-02-14 PROCEDURE — G0378 HOSPITAL OBSERVATION PER HR: HCPCS

## 2023-02-14 PROCEDURE — 71260 CT THORAX DX C+: CPT

## 2023-02-14 PROCEDURE — 84484 ASSAY OF TROPONIN QUANT: CPT | Performed by: EMERGENCY MEDICINE

## 2023-02-14 PROCEDURE — 83605 ASSAY OF LACTIC ACID: CPT

## 2023-02-14 PROCEDURE — 85025 COMPLETE CBC W/AUTO DIFF WBC: CPT | Performed by: EMERGENCY MEDICINE

## 2023-02-14 PROCEDURE — 99284 EMERGENCY DEPT VISIT MOD MDM: CPT

## 2023-02-14 PROCEDURE — 80053 COMPREHEN METABOLIC PANEL: CPT

## 2023-02-14 PROCEDURE — 87040 BLOOD CULTURE FOR BACTERIA: CPT | Performed by: INTERNAL MEDICINE

## 2023-02-14 RX ORDER — ALPRAZOLAM 0.25 MG/1
0.25 TABLET ORAL 2 TIMES DAILY PRN
Status: DISCONTINUED | OUTPATIENT
Start: 2023-02-14 | End: 2023-02-17 | Stop reason: HOSPADM

## 2023-02-14 RX ORDER — MORPHINE SULFATE 2 MG/ML
4 INJECTION, SOLUTION INTRAMUSCULAR; INTRAVENOUS EVERY 4 HOURS PRN
Status: DISCONTINUED | OUTPATIENT
Start: 2023-02-14 | End: 2023-02-17 | Stop reason: HOSPADM

## 2023-02-14 RX ORDER — SODIUM CHLORIDE 0.9 % (FLUSH) 0.9 %
10 SYRINGE (ML) INJECTION AS NEEDED
Status: DISCONTINUED | OUTPATIENT
Start: 2023-02-14 | End: 2023-02-17 | Stop reason: HOSPADM

## 2023-02-14 RX ORDER — POLYETHYLENE GLYCOL 3350 17 G/17G
17 POWDER, FOR SOLUTION ORAL DAILY
Status: DISCONTINUED | OUTPATIENT
Start: 2023-02-15 | End: 2023-02-17

## 2023-02-14 RX ORDER — GABAPENTIN 400 MG/1
800 CAPSULE ORAL NIGHTLY
Status: DISCONTINUED | OUTPATIENT
Start: 2023-02-14 | End: 2023-02-17 | Stop reason: HOSPADM

## 2023-02-14 RX ORDER — HYDROCODONE BITARTRATE AND ACETAMINOPHEN 5; 325 MG/1; MG/1
1 TABLET ORAL EVERY 6 HOURS PRN
Status: DISCONTINUED | OUTPATIENT
Start: 2023-02-14 | End: 2023-02-17 | Stop reason: HOSPADM

## 2023-02-14 RX ORDER — FINASTERIDE 5 MG/1
5 TABLET, FILM COATED ORAL DAILY
Status: DISCONTINUED | OUTPATIENT
Start: 2023-02-15 | End: 2023-02-17 | Stop reason: HOSPADM

## 2023-02-14 RX ORDER — CHOLECALCIFEROL (VITAMIN D3) 125 MCG
1000 CAPSULE ORAL DAILY
Status: DISCONTINUED | OUTPATIENT
Start: 2023-02-15 | End: 2023-02-17 | Stop reason: HOSPADM

## 2023-02-14 RX ORDER — CYCLOBENZAPRINE HCL 5 MG
7.5 TABLET ORAL 3 TIMES DAILY PRN
Status: DISCONTINUED | OUTPATIENT
Start: 2023-02-14 | End: 2023-02-14

## 2023-02-14 RX ORDER — FOLIC ACID 1 MG/1
1 TABLET ORAL 3 TIMES DAILY
Status: DISCONTINUED | OUTPATIENT
Start: 2023-02-14 | End: 2023-02-17 | Stop reason: HOSPADM

## 2023-02-14 RX ORDER — CETIRIZINE HYDROCHLORIDE 10 MG/1
10 TABLET ORAL DAILY
Status: DISCONTINUED | OUTPATIENT
Start: 2023-02-15 | End: 2023-02-17 | Stop reason: HOSPADM

## 2023-02-14 RX ORDER — AMLODIPINE BESYLATE 5 MG/1
5 TABLET ORAL DAILY
Status: DISCONTINUED | OUTPATIENT
Start: 2023-02-15 | End: 2023-02-17 | Stop reason: HOSPADM

## 2023-02-14 RX ORDER — ACETAMINOPHEN 325 MG/1
650 TABLET ORAL EVERY 4 HOURS PRN
Status: DISCONTINUED | OUTPATIENT
Start: 2023-02-14 | End: 2023-02-15

## 2023-02-14 RX ORDER — UREA 10 %
3 LOTION (ML) TOPICAL NIGHTLY PRN
Status: DISCONTINUED | OUTPATIENT
Start: 2023-02-14 | End: 2023-02-17 | Stop reason: HOSPADM

## 2023-02-14 RX ORDER — FLUTICASONE PROPIONATE 50 MCG
1 SPRAY, SUSPENSION (ML) NASAL DAILY
Status: DISCONTINUED | OUTPATIENT
Start: 2023-02-15 | End: 2023-02-17 | Stop reason: HOSPADM

## 2023-02-14 RX ORDER — DONEPEZIL HYDROCHLORIDE 10 MG/1
10 TABLET, FILM COATED ORAL NIGHTLY
Status: DISCONTINUED | OUTPATIENT
Start: 2023-02-14 | End: 2023-02-17 | Stop reason: HOSPADM

## 2023-02-14 RX ORDER — PANTOPRAZOLE SODIUM 40 MG/1
40 TABLET, DELAYED RELEASE ORAL
Status: DISCONTINUED | OUTPATIENT
Start: 2023-02-14 | End: 2023-02-17 | Stop reason: HOSPADM

## 2023-02-14 RX ORDER — SODIUM CHLORIDE 9 MG/ML
75 INJECTION, SOLUTION INTRAVENOUS CONTINUOUS
Status: DISCONTINUED | OUTPATIENT
Start: 2023-02-14 | End: 2023-02-17

## 2023-02-14 RX ORDER — AMOXICILLIN 250 MG
2 CAPSULE ORAL NIGHTLY
Status: DISCONTINUED | OUTPATIENT
Start: 2023-02-14 | End: 2023-02-17

## 2023-02-14 RX ORDER — LIDOCAINE 50 MG/G
1 PATCH TOPICAL
Status: DISCONTINUED | OUTPATIENT
Start: 2023-02-14 | End: 2023-02-17 | Stop reason: HOSPADM

## 2023-02-14 RX ORDER — LEVOTHYROXINE SODIUM 112 UG/1
112 TABLET ORAL
Status: DISCONTINUED | OUTPATIENT
Start: 2023-02-15 | End: 2023-02-17 | Stop reason: HOSPADM

## 2023-02-14 RX ORDER — CYCLOBENZAPRINE HCL 5 MG
7.5 TABLET ORAL 3 TIMES DAILY PRN
Status: DISCONTINUED | OUTPATIENT
Start: 2023-02-14 | End: 2023-02-16

## 2023-02-14 RX ORDER — ONDANSETRON 2 MG/ML
4 INJECTION INTRAMUSCULAR; INTRAVENOUS EVERY 6 HOURS PRN
Status: DISCONTINUED | OUTPATIENT
Start: 2023-02-14 | End: 2023-02-17 | Stop reason: HOSPADM

## 2023-02-14 RX ORDER — MEMANTINE HYDROCHLORIDE 10 MG/1
10 TABLET ORAL EVERY 12 HOURS SCHEDULED
Status: DISCONTINUED | OUTPATIENT
Start: 2023-02-14 | End: 2023-02-17 | Stop reason: HOSPADM

## 2023-02-14 RX ORDER — OXCARBAZEPINE 300 MG/1
300 TABLET, FILM COATED ORAL EVERY 12 HOURS SCHEDULED
Status: DISCONTINUED | OUTPATIENT
Start: 2023-02-14 | End: 2023-02-17 | Stop reason: HOSPADM

## 2023-02-14 RX ORDER — IPRATROPIUM BROMIDE 21 UG/1
2 SPRAY, METERED NASAL 4 TIMES DAILY
Status: DISCONTINUED | OUTPATIENT
Start: 2023-02-14 | End: 2023-02-17 | Stop reason: HOSPADM

## 2023-02-14 RX ORDER — ONDANSETRON 4 MG/1
4 TABLET, FILM COATED ORAL EVERY 6 HOURS PRN
Status: DISCONTINUED | OUTPATIENT
Start: 2023-02-14 | End: 2023-02-17 | Stop reason: HOSPADM

## 2023-02-14 RX ORDER — ALBUTEROL SULFATE 2.5 MG/3ML
2.5 SOLUTION RESPIRATORY (INHALATION) EVERY 6 HOURS PRN
Status: DISCONTINUED | OUTPATIENT
Start: 2023-02-14 | End: 2023-02-17 | Stop reason: HOSPADM

## 2023-02-14 RX ORDER — GABAPENTIN 400 MG/1
400 CAPSULE ORAL DAILY
Status: DISCONTINUED | OUTPATIENT
Start: 2023-02-15 | End: 2023-02-17 | Stop reason: HOSPADM

## 2023-02-14 RX ADMIN — SODIUM CHLORIDE 1000 ML: 9 INJECTION, SOLUTION INTRAVENOUS at 15:03

## 2023-02-14 RX ADMIN — OXCARBAZEPINE 300 MG: 300 TABLET, FILM COATED ORAL at 21:50

## 2023-02-14 RX ADMIN — LIDOCAINE 1 PATCH: 50 PATCH TOPICAL at 21:44

## 2023-02-14 RX ADMIN — MORPHINE SULFATE 4 MG: 2 INJECTION, SOLUTION INTRAMUSCULAR; INTRAVENOUS at 22:20

## 2023-02-14 RX ADMIN — SODIUM CHLORIDE 75 ML/HR: 9 INJECTION, SOLUTION INTRAVENOUS at 22:23

## 2023-02-14 RX ADMIN — DOCUSATE SODIUM 50MG AND SENNOSIDES 8.6MG 2 TABLET: 8.6; 5 TABLET, FILM COATED ORAL at 21:50

## 2023-02-14 RX ADMIN — IOPAMIDOL 100 ML: 612 INJECTION, SOLUTION INTRAVENOUS at 14:02

## 2023-02-14 RX ADMIN — GABAPENTIN 800 MG: 400 CAPSULE ORAL at 21:50

## 2023-02-14 RX ADMIN — FOLIC ACID 1 MG: 1 TABLET ORAL at 21:50

## 2023-02-14 RX ADMIN — PANTOPRAZOLE SODIUM 40 MG: 40 TABLET, DELAYED RELEASE ORAL at 21:50

## 2023-02-14 RX ADMIN — DONEPEZIL HYDROCHLORIDE 10 MG: 10 TABLET, FILM COATED ORAL at 21:50

## 2023-02-14 RX ADMIN — MEMANTINE HYDROCHLORIDE 10 MG: 10 TABLET, FILM COATED ORAL at 21:50

## 2023-02-14 NOTE — ED TRIAGE NOTES
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

## 2023-02-14 NOTE — ED NOTES
Nursing report ED to floor  [facility] Sr.  82 y.o.  male    HPI :   Chief Complaint   Patient presents with    Rib Pain       Admitting doctor:   Tracy Bull MD    Admitting diagnosis:   The primary encounter diagnosis was Closed fracture of multiple ribs of left side, initial encounter. Diagnoses of Metastatic malignant neoplasm, unspecified site (HCC), Upper abdominal pain, Atypical chest pain, Elevated troponin, and Constipation, unspecified constipation type were also pertinent to this visit.    Code status:   Current Code Status       Date Active Code Status Order ID Comments User Context       Prior            Allergies:   Nsaids, Latex, Orange, and Orange oil    Isolation:   No active isolations    Intake and Output  No intake or output data in the 24 hours ending 02/14/23 1523    Weight:       02/14/23  1158   Weight: 104 kg (230 lb)       Most recent vitals:   Vitals:    02/14/23 1311 02/14/23 1312 02/14/23 1314 02/14/23 1352   BP:  134/60     Pulse: 61 67 66 67   Resp:       Temp:       SpO2: 96% 96% 96% 94%   Weight:       Height:           Active LDAs/IV Access:   Lines, Drains & Airways       Active LDAs       Name Placement date Placement time Site Days    Peripheral IV 02/14/23 1319 Left Antecubital 02/14/23  1319  Antecubital  less than 1                    Labs (abnormal labs have a star):   Labs Reviewed   COMPREHENSIVE METABOLIC PANEL - Abnormal; Notable for the following components:       Result Value    Total Bilirubin 1.4 (*)     eGFR 56.6 (*)     All other components within normal limits    Narrative:     GFR Normal >60  Chronic Kidney Disease <60  Kidney Failure <15    The GFR formula is only valid for adults with stable renal function between ages 18 and 70.   LACTIC ACID, PLASMA - Abnormal; Notable for the following components:    Lactate 2.3 (*)     All other components within normal limits   CBC WITH AUTO DIFFERENTIAL - Abnormal; Notable for the following components:     WBC 16.60 (*)     RBC 2.84 (*)     Hemoglobin 10.3 (*)     Hematocrit 31.7 (*)     .6 (*)     MCH 36.3 (*)     RDW 10.2 (*)     nRBC 0.4 (*)     All other components within normal limits   MANUAL DIFFERENTIAL - Abnormal; Notable for the following components:    Neutrophils Absolute 8.80 (*)     Lymphocytes Absolute 6.14 (*)     Monocytes Absolute 1.33 (*)     All other components within normal limits   SINGLE HSTROPONIN T - Abnormal; Notable for the following components:    HS Troponin T 54 (*)     All other components within normal limits    Narrative:     High Sensitive Troponin T Reference Range:  <10.0 ng/L- Negative Female for AMI  <15.0 ng/L- Negative Male for AMI  >=10 - Abnormal Female indicating possible myocardial injury.  >=15 - Abnormal Male indicating possible myocardial injury.   Clinicians would have to utilize clinical acumen, EKG, Troponin, and serial changes to determine if it is an Acute Myocardial Infarction or myocardial injury due to an underlying chronic condition.        LIPASE - Normal   BNP (IN-HOUSE) - Normal    Narrative:     Among patients with dyspnea, NT-proBNP is highly sensitive for the detection of acute congestive heart failure. In addition NT-proBNP of <300 pg/ml effectively rules out acute congestive heart failure with 99% negative predictive value.    Results may be falsely decreased if patient taking Biotin.     RAINBOW DRAW    Narrative:     The following orders were created for panel order [place] Draw.  Procedure                               Abnormality         Status                     ---------                               -----------         ------                     Green Top (Gel)[[id number]]                                  Final result               Lavender Top[[id number]]                                     Final result               Gold Top - SST[[id number]]                                   Final result               Light Blue Top[[id number]]                                    Final result                 Please view results for these tests on the individual orders.   URINALYSIS W/ MICROSCOPIC IF INDICATED (NO CULTURE)   LACTIC ACID, REFLEX   CBC AND DIFFERENTIAL    Narrative:     The following orders were created for panel order CBC & Differential.  Procedure                               Abnormality         Status                     ---------                               -----------         ------                     CBC Auto Differential[[id number]]        Abnormal            Final result                 Please view results for these tests on the individual orders.   GREEN TOP   LAVENDER TOP   GOLD TOP - SST   LIGHT BLUE TOP       EKG:   ECG 12 Lead Dyspnea   Preliminary Result   HEART RATE= 67  bpm   RR Interval= 896  ms   NV Interval= 185  ms   P Horizontal Axis= 25  deg   P Front Axis= 69  deg   QRSD Interval= 106  ms   QT Interval= 389  ms   QRS Axis= 5  deg   T Wave Axis= 58  deg   - NORMAL ECG -   Sinus rhythm   Electronically Signed By:    Date and Time of Study: 2023-02-14 13:31:19          Meds given in ED:   Medications   sodium chloride 0.9 % flush 10 mL (has no administration in time range)   sodium chloride 0.9 % flush 10 mL (has no administration in time range)   sodium chloride 0.9 % bolus 1,000 mL (1,000 mL Intravenous New Bag 2/14/23 7803)   iopamidol (ISOVUE-300) 61 % injection 100 mL (100 mL Intravenous Given by Other 2/14/23 0012)       Imaging results:  XR Ribs Bilateral 4+ View With PA Chest    Result Date: 2/14/2023   No acute rib fracture is identified. Redemonstration of left hilar mass. Follow-up/further evaluation can be obtained as indicated.  This report was finalized on 2/14/2023 12:51 PM by Dr. David Hinds M.D.      CT Chest With Contrast Diagnostic    Result Date: 2/14/2023  COMBINED IMPRESSION:  1.  Acute to subacute nondisplaced fractures of anterolateral left ribs 8 and 9. 2.  Findings of metastatic disease progression in  the chest, abdomen, and pelvis, including right hepatic lesions, increased size of bilateral adrenal nodules, new and increased abdominal and pelvic lymph nodes and peritoneal nodules, and new and increased subcutaneous soft tissue nodules. Left perihilar and pericardial lesions and bilateral paraspinal nodularity are not significantly changed. 3.  Cirrhotic liver morphology. 4.  Moderate right-sided colonic stool burden.  Discussed with Dr. Mccoy at 2:40 PM.  This report was finalized on 2023 2:44 PM by Dr. Claudette Vallecillo M.D.      CT Abdomen Pelvis With Contrast    Result Date: 2023  COMBINED IMPRESSION:  1.  Acute to subacute nondisplaced fractures of anterolateral left ribs 8 and 9. 2.  Findings of metastatic disease progression in the chest, abdomen, and pelvis, including right hepatic lesions, increased size of bilateral adrenal nodules, new and increased abdominal and pelvic lymph nodes and peritoneal nodules, and new and increased subcutaneous soft tissue nodules. Left perihilar and pericardial lesions and bilateral paraspinal nodularity are not significantly changed. 3.  Cirrhotic liver morphology. 4.  Moderate right-sided colonic stool burden.  Discussed with Dr. Mccoy at 2:40 PM.  This report was finalized on 2023 2:44 PM by Dr. Claudette Vallecillo M.D.       Ambulatory status:   - Ax1    Social issues:   Social History     Socioeconomic History    Marital status:    Tobacco Use    Smoking status: Former     Types: Cigarettes     Quit date:      Years since quittin.1    Smokeless tobacco: Never   Vaping Use    Vaping Use: Never used   Substance and Sexual Activity    Alcohol use: Yes     Comment: 2 drinks month    Drug use: No    Sexual activity: Defer       NIH Stroke Scale:         Phu Lloyd RN  23 15:23 EST

## 2023-02-14 NOTE — ED PROVIDER NOTES
EMERGENCY DEPARTMENT ENCOUNTER    Room Number:  13/13  Date seen:  2/14/2023  PCP: Dillon Walton MD  Historian: Patient, wife      HPI:  Chief Complaint: Rib pain  A complete HPI/ROS/PMH/PSH/SH/FH are unobtainable due to: Nothing  Context: Mg Gerber Sr. is a 82 y.o. male, with history of COPD, who presents to the ED by private vehicle from home c/o rib pain.  Patient slid out of bed about 2 weeks ago.  As he was trying to get up from the floor, he felt a pop in his left ribs with sudden onset of sharp pain.  Pain has persisted since then but is gradually improving.  He then began having pain in his right lateral chest wall several days ago but denies any other injury.  Also reports increased shortness of breath, nonproductive cough, and upper abdominal pain.  States he is constipated.  Last bowel movement was about 4 days ago.  He has taken MiraLAX and stool softeners without relief.  Denies fever, chills, chest pain, vomiting, or dysuria.  He has a solitary right kidney.  He is on supplemental oxygen at night.            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/24/2014   • Herpes zoster 12/06/2011   • Hip pain 03/24/2014   • Essential hypertension    • Cancer (HCC) 04/29/2014   • Pulmonary emphysema (HCC) 03/24/2014   • Left low back pain 10/28/2015   • Vertigo 02/02/2016   • Headache around the eyes 02/02/2016   • Mild cognitive impairment 02/02/2016   • Metastatic renal cell carcinoma  on q 2 wks chemo 02/15/2016   • Anxiety 05/16/2016   • Malignant neoplasm of lung (HCC) 05/16/2016   • Neuropathy 02/02/2017   • Perennial allergic rhinitis 02/02/2017   • Vitamin B12 deficiency 03/16/2017   • 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   • Pharyngitis 12/16/2017   • Bronchitis 05/20/2019   • Drug-induced constipation 06/01/2020   • Hx of hiatal hernia 11/10/2020   • History of lung cancer  11/10/2020   • COVID-19 virus detected 11/11/2020   • Dyspnea 11/11/2020   • Generalized weakness 11/11/2020   • Acute on chronic respiratory failure with hypoxia (HCC) 11/23/2020   • Gastroesophageal reflux disease 11/23/2020   • Hearing disorder 11/23/2020   • Malignant neoplasm of prostate (HCC) 11/23/2020   • Sleep apnea 11/23/2020   • Anemia 11/23/2020   • Malignant neoplasm metastatic to lung (HCC) 11/23/2020   • Secondary bacterial pneumonia 11/23/2020   • Pneumonia due to COVID-19 virus 11/23/2020   • Hilar mass 11/23/2020   • On antineoplastic chemotherapy q 2wks    • Paroxysmal atrial fibrillation (HCC)    • History of pulmonary embolism and DVT    • Chronic nausea 08/04/2021   • Gastritis 01/15/2022   • Muscle spasm 01/15/2022   • Fall 03/11/2022   • Closed fracture of multiple ribs of left side 03/11/2022   • Chest wall pain 03/11/2022   • Fall, initial encounter 03/12/2022   • Immobility syndrome 03/14/2022   • Self-care deficit 03/14/2022   • Cytokine release syndrome, grade 2 03/20/2022   • Dizziness 10/06/2022   • Compression fracture of vertebral column (HCC) 05/22/2017   • History of chronic lung disease 06/26/2021   • Impairment of balance 02/19/2016   • Lung involvement associated with another disorder (HCC) 03/23/2016   • Pneumonia 10/06/2022     Resolved Ambulatory Problems     Diagnosis Date Noted   • Pulmonary embolism (HCC) 03/24/2014   • Sepsis, unspecified organism (HCC)      Past Medical History:   Diagnosis Date   • Allergic    • Atrial fibrillation (HCC)    • Cataract    • Chemotherapeutic agent or infusion extravasation    • COPD (chronic obstructive pulmonary disease) (HCC) 03/24/2014   • Emphysema of lung (HCC)    • History of Doppler ultrasound 12/28/2011   • History of EKG 02/10/2012   • Hypertension    • Injury of back    • Renal cell cancer (HCC)    • Shortness of breath          PAST SURGICAL HISTORY  Past Surgical History:   Procedure Laterality Date   • APPENDECTOMY     •  CATARACT EXTRACTION     • CHOLECYSTECTOMY     • SPLENECTOMY     • TONSILLECTOMY     • VEIN SURGERY           FAMILY HISTORY  Family History   Problem Relation Age of Onset   • Aneurysm Mother    • Stroke Mother    • Heart disease Father    • Diabetes Brother         type 2         SOCIAL HISTORY  Social History     Socioeconomic History   • Marital status:    Tobacco Use   • Smoking status: Former     Types: Cigarettes     Quit date:      Years since quittin.1   • Smokeless tobacco: Never   Vaping Use   • Vaping Use: Never used   Substance and Sexual Activity   • Alcohol use: Yes     Comment: 2 drinks month   • Drug use: No   • Sexual activity: Defer         ALLERGIES  Nsaids, Latex, Orange, and [place] oil        REVIEW OF SYSTEMS  Review of Systems     All systems have been reviewed and are negative except as as discussed in the HPI    PHYSICAL EXAM  ED Triage Vitals   Temp Heart Rate Resp BP SpO2   23 1156 23 1156 23 1156 23 1218 23 1156   97 °F (36.1 °C) 85 16 142/98 97 %      Temp src Heart Rate Source Patient Position BP Location FiO2 (%)   -- -- -- -- --              Physical Exam      GENERAL: Awake, alert, oriented x3.  Well-developed, well-nourished elderly male.  Resting comfortably in no acute distress  HENT: NCAT, nares patent  EYES: no scleral icterus  CV: regular rhythm, normal rate  RESPIRATORY: normal effort, clear to auscultation bilaterally  ABDOMEN: soft, there is upper abdominal tenderness without rebound or guarding, no CVA tenderness  MUSCULOSKELETAL: There is tenderness over the bilateral lateral chest wall.  No crepitus.  Extremities are nontender with full range of motion.  C/T/L-spine are nontender  NEURO: Speech is normal.  No facial droop.  Follows commands  PSYCH:  calm, cooperative  SKIN: warm, dry    Vital signs and nursing notes reviewed.          LAB RESULTS  Recent Results (from the past 24 hour(s))   Comprehensive Metabolic Panel     Collection Time: 02/14/23 12:05 PM    Specimen: Blood   Result Value Ref Range    Glucose 99 65 - 99 mg/dL    BUN 16 8 - 23 mg/dL    Creatinine 1.26 0.76 - 1.27 mg/dL    Sodium 141 136 - 145 mmol/L    Potassium 3.7 3.5 - 5.2 mmol/L    Chloride 105 98 - 107 mmol/L    CO2 25.6 22.0 - 29.0 mmol/L    Calcium 9.9 8.6 - 10.5 mg/dL    Total Protein 7.3 6.0 - 8.5 g/dL    Albumin 4.2 3.5 - 5.2 g/dL    ALT (SGPT) 13 1 - 41 U/L    AST (SGOT) 15 1 - 40 U/L    Alkaline Phosphatase 89 39 - 117 U/L    Total Bilirubin 1.4 (H) 0.0 - 1.2 mg/dL    Globulin 3.1 gm/dL    A/G Ratio 1.4 g/dL    BUN/Creatinine Ratio 12.7 7.0 - 25.0    Anion Gap 10.4 5.0 - 15.0 mmol/L    eGFR 56.6 (L) >60.0 mL/min/1.73   Lipase    Collection Time: 02/14/23 12:05 PM    Specimen: Blood   Result Value Ref Range    Lipase 25 13 - 60 U/L   Lactic Acid, Plasma    Collection Time: 02/14/23 12:05 PM    Specimen: Blood   Result Value Ref Range    Lactate 2.3 (C) 0.5 - 2.0 mmol/L   Green Top (Gel)    Collection Time: 02/14/23 12:05 PM   Result Value Ref Range    Extra Tube Hold for add-ons.    Lavender Top    Collection Time: 02/14/23 12:05 PM   Result Value Ref Range    Extra Tube hold for add-on    Gold Top - SST    Collection Time: 02/14/23 12:05 PM   Result Value Ref Range    Extra Tube Hold for add-ons.    Light Blue Top    Collection Time: 02/14/23 12:05 PM   Result Value Ref Range    Extra Tube Hold for add-ons.    CBC Auto Differential    Collection Time: 02/14/23 12:05 PM    Specimen: Blood   Result Value Ref Range    WBC 16.60 (H) 3.40 - 10.80 10*3/mm3    RBC 2.84 (L) 4.14 - 5.80 10*6/mm3    Hemoglobin 10.3 (L) 13.0 - 17.7 g/dL    Hematocrit 31.7 (L) 37.5 - 51.0 %    .6 (H) 79.0 - 97.0 fL    MCH 36.3 (H) 26.6 - 33.0 pg    MCHC 32.5 31.5 - 35.7 g/dL    RDW 10.2 (L) 12.3 - 15.4 %    RDW-SD 41.1 37.0 - 54.0 fl    MPV 9.5 6.0 - 12.0 fL    Platelets 448 140 - 450 10*3/mm3    nRBC 0.4 (H) 0.0 - 0.2 /100 WBC   Manual Differential    Collection Time:  02/14/23 12:05 PM    Specimen: Blood   Result Value Ref Range    Neutrophil % 53.0 42.7 - 76.0 %    Lymphocyte % 37.0 19.6 - 45.3 %    Monocyte % 8.0 5.0 - 12.0 %    Eosinophil % 1.0 0.3 - 6.2 %    Basophil % 1.0 0.0 - 1.5 %    Neutrophils Absolute 8.80 (H) 1.70 - 7.00 10*3/mm3    Lymphocytes Absolute 6.14 (H) 0.70 - 3.10 10*3/mm3    Monocytes Absolute 1.33 (H) 0.10 - 0.90 10*3/mm3    Eosinophils Absolute 0.17 0.00 - 0.40 10*3/mm3    Basophils Absolute 0.17 0.00 - 0.20 10*3/mm3    Anisocytosis Mod/2+ None Seen    Macrocytes Mod/2+ None Seen    WBC Morphology Normal Normal    Platelet Morphology Normal Normal   BNP    Collection Time: 02/14/23 12:05 PM    Specimen: Blood   Result Value Ref Range    proBNP 186.0 0.0 - 1,800.0 pg/mL   Single High Sensitivity Troponin T    Collection Time: 02/14/23 12:05 PM    Specimen: Blood   Result Value Ref Range    HS Troponin T 54 (C) <15 ng/L   ECG 12 Lead Dyspnea    Collection Time: 02/14/23  1:31 PM   Result Value Ref Range    QT Interval 389 ms       Ordered the above labs and reviewed the results.        RADIOLOGY  XR Ribs Bilateral 4+ View With PA Chest    Result Date: 2/14/2023  XR RIBS BILATERAL 4+ VW W PA CHEST-  INDICATIONS: Trauma  TECHNIQUE: Frontal view of the chest, 9 views including the bilateral ribs  COMPARISON: 10/06/2022  FINDINGS:  Right chest port appears stable. The heart size is normal. Aorta is calcified. Pulmonary vasculature is unremarkable. Previous large left hilar mass appears grossly stable (CT can be obtained for direct comparison with prior CT exam if indicated). No focal pulmonary consolidation, pleural effusion, or pneumothorax.  No acute rib fracture is identified.       No acute rib fracture is identified. Redemonstration of left hilar mass. Follow-up/further evaluation can be obtained as indicated.  This report was finalized on 2/14/2023 12:51 PM by Dr. David Hinds M.D.      CT Abdomen Pelvis With Contrast, CT Chest With Contrast  Diagnostic    Result Date: 2/14/2023  CT ABDOMEN PELVIS W CONTRAST-, CT CHEST W CONTRAST DIAGNOSTIC-  HISTORY:  Felt pop in left ribs last week, now with right lateral chest wall pain, increased shortness of breath. Upper abdominal pain.  TECHNIQUE: CT of the chest, abdomen and pelvis was performed following administration of intravenous contrast. Reformatted images were reviewed. Radiation dose reduction techniques were utilized, including automated exposure control and exposure modulation based on body size.  COMPARISON:  CT chest 10/07/2022. CT abdomen and pelvis 03/11/2022.  FINDINGS CHEST: There is a right chest port with the tip at the superior cavoatrial junction. There are calcified lymph nodes. There is a 5.6 x 3.8 cm left perihilar mass, previously 5.7 x 4.0 cm when remeasured on 10/07/2022, which is not significantly changed. Bilateral paraspinal soft tissue nodularity, left greater than right, is not significantly changed. Heart size is normal. There is no pericardial effusion. There is a 2.9 x 3.1 cm soft tissue lesion within the left pericardial fat is not significantly changed from previously 2.9 x 3.1 cm when remeasured There is incompletely assessed calcific coronary artery atherosclerosis. There is moderate to advanced calcific aortic atherosclerosis. The ascending aorta is mildly ectatic to 3.5 cm at the level pulmonary trunk. The descending aorta is mildly dilated to 3.1 cm. Pleural spaces are clear. There is multilevel degenerative disc disease. There are acute to subacute nondisplaced fracture of anterolateral left ribs 8 and 9. There are multiple additional old left rib fractures. There are multiple bilateral subcutaneous soft tissue nodules, including a reference 0.7 cm subcutaneous nodule in the anterior left chest wall and reference 1.1 cm and 1.0 cm nodules in the left chest wall. The trachea is clear. There is mild bibasilar atelectasis and/or scarring. There is no focal consolidation. A  few scattered small nodules are not significantly changed.  FINDINGS ABDOMEN/PELVIS: The liver is normal in size. There is cirrhotic liver morphology. There are scattered hypodense lesions in the liver, including adjacent approximately 1.8 cm and 1.7 cm hypodense lesions in the peripheral right hepatic lobe and a 1.6 cm lesion at the right hepatic dome. There are calcified hepatic granulomata. The gallbladder is surgically absent. The pancreas is within normal limits. There is a tiny calcified splenule in the splenectomy bed. There is a 2.3 cm soft tissue nodule in the superior left upper quadrant, which has increased in size, previously 1.1 cm when remeasured. Bilateral adrenal nodules have increased in size. The left kidney is surgically absent. There are multiple prominent and mildly enlarged abdominal and pelvic lymph nodes, which have increased in size. A reference 1.5 cm short axis right lower quadrant lymph node previously measured 0.7 cm. A reference 1.0 cm left iliac chain lymph node previously measured 0.5 cm. There is advanced calcific aortoiliac atherosclerosis. There is no free fluid. There is no bowel obstruction. There is a moderate right-sided colonic stool burden. There is colonic diverticulosis. The bladder is poorly distended. The prostate is present. There is a left hip total arthroplasty. Streak artifact limits evaluation of adjacent structures. There is multilevel degenerative disc disease. There is an old L4 compression fracture. There is a 1.8 cm soft tissue nodule in the dorsal upper left arm wall.       COMBINED IMPRESSION:  1.  Acute to subacute nondisplaced fractures of anterolateral left ribs 8 and 9. 2.  Findings of metastatic disease progression in the chest, abdomen, and pelvis, including right hepatic lesions, increased size of bilateral adrenal nodules, new and increased abdominal and pelvic lymph nodes and peritoneal nodules, and new and increased subcutaneous soft tissue nodules.  Left perihilar and pericardial lesions and bilateral paraspinal nodularity are not significantly changed. 3.  Cirrhotic liver morphology. 4.  Moderate right-sided colonic stool burden.  Discussed with Dr. Mccoy at 2:40 PM.  This report was finalized on 2/14/2023 2:44 PM by Dr. Claudette Vallecillo M.D.        Ordered the above noted radiological studies. Reviewed by me in PACS.            PROCEDURES  Procedures              MEDICATIONS GIVEN IN ER  Medications   sodium chloride 0.9 % flush 10 mL (has no administration in time range)   sodium chloride 0.9 % flush 10 mL (has no administration in time range)   sodium chloride 0.9 % bolus 1,000 mL (1,000 mL Intravenous New Bag 2/14/23 1503)   iopamidol (ISOVUE-300) 61 % injection 100 mL (100 mL Intravenous Given by Other 2/14/23 1402)                   MEDICAL DECISION MAKING, PROGRESS, and CONSULTS    All labs have been independently reviewed by me.  All radiology studies have been reviewed by me and I have also reviewed the radiology report.   EKG's independently viewed and interpreted by me.  Discussion below represents my analysis of pertinent findings related to patient's condition, differential diagnosis, treatment plan and final disposition.      Additional sources:  - Discussed/ obtained information from independent historians: Wife at bedside    - External (non-ED) record review: Patient was last admitted here in October 2022 for dizziness.  Brain MRI was negative acute.  Patient has history of chronic respiratory failure, COPD, and metastatic renal cell carcinoma.    - Chronic or social conditions impacting care: Patient has metastatic renal cancer          Orders placed during this visit:  Orders Placed This Encounter   Procedures   • XR Ribs Bilateral 4+ View With PA Chest   • CT Abdomen Pelvis With Contrast   • CT Chest With Contrast Diagnostic   • [place] Draw   • Comprehensive Metabolic Panel   • Lipase   • Urinalysis With Microscopic If Indicated (No Culture)  - Urine, Clean Catch   • Lactic Acid, Plasma   • CBC Auto Differential   • STAT Lactic Acid, Reflex   • Manual Differential   • BNP   • Single High Sensitivity Troponin T   • NPO Diet NPO Type: Strict NPO   • Undress & Gown   • Cardiac Monitoring   • LHA (on-call MD unless specified) Details   • ECG 12 Lead Dyspnea   • Insert Peripheral IV   • Insert Peripheral IV   • Initiate Observation Status   • CBC & Differential   • Green Top (Gel)   • Lavender Top   • Gold Top - SST   • Light Blue Top         Additional orders considered but not ordered:  N/A        Differential diagnosis:    Differential diagnosis includes but is not limited to:  - hepatobiliary pathology such as cholecystitis, cholangitis, and symptomatic cholelithiasis  - Pancreatitis  - Dyspepsia  - Small bowel obstruction  - Appendicitis  - Diverticulitis  - UTI including pyelonephritis  - Ureteral stone  - Zoster  - Colitis, including infectious and ischemic  - Atypical ACS  -Pneumonia  -Rib fracture  -Pneumothorax  -Pleural effusion        Independent interpretation of labs, radiology studies, and discussions with consultants:  ED Course as of 02/14/23 1523   Tue Feb 14, 2023   1259 Lactate(!!): 2.3 [WH]   1259 Lipase: 25 [WH]   1259 Creatinine: 1.26 [WH]   1305 WBC(!): 16.60 [WH]   1306 Bilateral rib x-rays personally interpreted by me.  My personal interpretation is: No obvious rib fracture.  No pneumothorax.  No pleural effusion.  There is a left hilar mass. [WH]   1358 HS Troponin T(!!): 54 [WH]   1358 EKG personally interpreted by me.  My personal interpretation is:         EKG time: 1331  Rhythm/Rate: Sinus rhythm, rate 67  P waves and GA: Normal  QRS, axis: LAD  ST and T waves: Normal    Interpreted Contemporaneously by me at 1336, independently viewed  EKG is not significantly changed compared to prior EKG done on 10/6/2020   [WH]   1407 proBNP: 186.0 [WH]   1408 CT chest/abdomen/pelvis personally interpreted by me.  My personal interpretation  is: No pneumothorax.  No obvious rib fractures.  No pleural effusion.  No consolidation.  No bowel obstruction.  No free air.  Moderate amount of stool in the colon.  No fecal impaction [WH]   1445 Results of the CT chest/abdomen/pelvis discussed with Dr. Vallecillo, the radiologist.  There are acute nondisplaced fractures of the left eighth and ninth ribs.  No pneumothorax.  There is worsening metastatic disease in the chest/abdomen/pelvis.  There is moderate stool burden.  See dictated report for details. [WH]   1455 Test results discussed with the patient and his wife.  Shared decision-making was discussed and admission was recommended for further evaluation/treatment.  Patient was agreeable with being admitted. [WH]   1512 Case discussed with Dr. Bull and she agrees to admit the patient to a monitored bed.  Pertinent history, exam findings, test results, ED course, and diagnoses were discussed with her. [WH]      ED Course User Index  [WH] Sheldon Mccoy MD               DIAGNOSIS  Final diagnoses:   Closed fracture of multiple ribs of left side, initial encounter   Metastatic malignant neoplasm, unspecified site (HCC)   Upper abdominal pain   Atypical chest pain   Elevated troponin   Constipation, unspecified constipation type         DISPOSITION  ADMISSION    Discussed treatment plan and reason for admission with pt/family and admitting physician.  Pt/family voiced understanding of the plan for admission for further testing/treatment as needed.                 Latest Documented Vital Signs:  As of 15:23 EST  BP- 134/60 HR- 67 Temp- 97 °F (36.1 °C) O2 sat- 94%              --    Please note that portions of this were completed with a voice recognition program.       Note Disclaimer: At [facility], we believe that sharing information builds trust and better relationships. You are receiving this note because you are receiving care at [facility] or recently visited. It is possible you will see [facility]  information before a provider has talked with you about it. This kind of information can be easy to misunderstand. To help you fully understand what it means for your health, we urge you to discuss this note with your provider.           Sheldon Mccoy MD  02/14/23 1524

## 2023-02-15 PROBLEM — D72.829 LEUKOCYTOSIS: Status: ACTIVE | Noted: 2023-02-15

## 2023-02-15 PROBLEM — K59.00 CONSTIPATION: Status: ACTIVE | Noted: 2023-02-15

## 2023-02-15 LAB
ANION GAP SERPL CALCULATED.3IONS-SCNC: 8.6 MMOL/L (ref 5–15)
BUN SERPL-MCNC: 13 MG/DL (ref 8–23)
BUN/CREAT SERPL: 11.8 (ref 7–25)
CALCIUM SPEC-SCNC: 8.5 MG/DL (ref 8.6–10.5)
CHLORIDE SERPL-SCNC: 105 MMOL/L (ref 98–107)
CO2 SERPL-SCNC: 24.4 MMOL/L (ref 22–29)
CREAT SERPL-MCNC: 1.1 MG/DL (ref 0.76–1.27)
DEPRECATED RDW RBC AUTO: 41 FL (ref 37–54)
EGFRCR SERPLBLD CKD-EPI 2021: 66.6 ML/MIN/1.73
ERYTHROCYTE [DISTWIDTH] IN BLOOD BY AUTOMATED COUNT: 10.3 % (ref 12.3–15.4)
GLUCOSE SERPL-MCNC: 94 MG/DL (ref 65–99)
HCT VFR BLD AUTO: 28.4 % (ref 37.5–51)
HGB BLD-MCNC: 9.2 G/DL (ref 13–17.7)
MCH RBC QN AUTO: 35.5 PG (ref 26.6–33)
MCHC RBC AUTO-ENTMCNC: 32.4 G/DL (ref 31.5–35.7)
MCV RBC AUTO: 109.7 FL (ref 79–97)
PLATELET # BLD AUTO: 408 10*3/MM3 (ref 140–450)
PMV BLD AUTO: 9.9 FL (ref 6–12)
POTASSIUM SERPL-SCNC: 4.4 MMOL/L (ref 3.5–5.2)
RBC # BLD AUTO: 2.59 10*6/MM3 (ref 4.14–5.8)
SODIUM SERPL-SCNC: 138 MMOL/L (ref 136–145)
WBC NRBC COR # BLD: 17.06 10*3/MM3 (ref 3.4–10.8)

## 2023-02-15 PROCEDURE — 99223 1ST HOSP IP/OBS HIGH 75: CPT | Performed by: INTERNAL MEDICINE

## 2023-02-15 PROCEDURE — 94799 UNLISTED PULMONARY SVC/PX: CPT

## 2023-02-15 PROCEDURE — 99215 OFFICE O/P EST HI 40 MIN: CPT | Performed by: NURSE PRACTITIONER

## 2023-02-15 PROCEDURE — 94640 AIRWAY INHALATION TREATMENT: CPT

## 2023-02-15 PROCEDURE — 80048 BASIC METABOLIC PNL TOTAL CA: CPT | Performed by: INTERNAL MEDICINE

## 2023-02-15 PROCEDURE — 25010000002 ONDANSETRON PER 1 MG: Performed by: INTERNAL MEDICINE

## 2023-02-15 PROCEDURE — 85027 COMPLETE CBC AUTOMATED: CPT | Performed by: INTERNAL MEDICINE

## 2023-02-15 PROCEDURE — 25010000002 MORPHINE PER 10 MG: Performed by: INTERNAL MEDICINE

## 2023-02-15 PROCEDURE — G0378 HOSPITAL OBSERVATION PER HR: HCPCS

## 2023-02-15 PROCEDURE — 96376 TX/PRO/DX INJ SAME DRUG ADON: CPT

## 2023-02-15 PROCEDURE — 97110 THERAPEUTIC EXERCISES: CPT

## 2023-02-15 PROCEDURE — 94664 DEMO&/EVAL PT USE INHALER: CPT

## 2023-02-15 PROCEDURE — 97162 PT EVAL MOD COMPLEX 30 MIN: CPT

## 2023-02-15 PROCEDURE — 94761 N-INVAS EAR/PLS OXIMETRY MLT: CPT

## 2023-02-15 PROCEDURE — 94760 N-INVAS EAR/PLS OXIMETRY 1: CPT

## 2023-02-15 PROCEDURE — 96375 TX/PRO/DX INJ NEW DRUG ADDON: CPT

## 2023-02-15 RX ORDER — ACETAMINOPHEN 325 MG/1
650 TABLET ORAL 3 TIMES DAILY
Status: DISCONTINUED | OUTPATIENT
Start: 2023-02-15 | End: 2023-02-17 | Stop reason: HOSPADM

## 2023-02-15 RX ADMIN — POLYETHYLENE GLYCOL 3350 17 G: 17 POWDER, FOR SOLUTION ORAL at 10:28

## 2023-02-15 RX ADMIN — MORPHINE SULFATE 4 MG: 2 INJECTION, SOLUTION INTRAMUSCULAR; INTRAVENOUS at 12:13

## 2023-02-15 RX ADMIN — MEMANTINE HYDROCHLORIDE 10 MG: 10 TABLET, FILM COATED ORAL at 10:26

## 2023-02-15 RX ADMIN — HYDROCODONE BITARTRATE AND ACETAMINOPHEN 1 TABLET: 5; 325 TABLET ORAL at 02:48

## 2023-02-15 RX ADMIN — HYDROCODONE BITARTRATE AND ACETAMINOPHEN 1 TABLET: 5; 325 TABLET ORAL at 21:56

## 2023-02-15 RX ADMIN — FLUTICASONE PROPIONATE 1 SPRAY: 50 SPRAY, METERED NASAL at 10:28

## 2023-02-15 RX ADMIN — FOLIC ACID 1 MG: 1 TABLET ORAL at 10:26

## 2023-02-15 RX ADMIN — OXCARBAZEPINE 300 MG: 300 TABLET, FILM COATED ORAL at 10:25

## 2023-02-15 RX ADMIN — GABAPENTIN 400 MG: 400 CAPSULE ORAL at 10:27

## 2023-02-15 RX ADMIN — PANTOPRAZOLE SODIUM 40 MG: 40 TABLET, DELAYED RELEASE ORAL at 06:28

## 2023-02-15 RX ADMIN — OXCARBAZEPINE 300 MG: 300 TABLET, FILM COATED ORAL at 21:44

## 2023-02-15 RX ADMIN — RIVAROXABAN 20 MG: 20 TABLET, FILM COATED ORAL at 19:38

## 2023-02-15 RX ADMIN — LIDOCAINE 1 PATCH: 50 PATCH TOPICAL at 10:39

## 2023-02-15 RX ADMIN — Medication 3 MG: at 01:32

## 2023-02-15 RX ADMIN — MEMANTINE HYDROCHLORIDE 10 MG: 10 TABLET, FILM COATED ORAL at 21:44

## 2023-02-15 RX ADMIN — FINASTERIDE 5 MG: 5 TABLET, FILM COATED ORAL at 10:27

## 2023-02-15 RX ADMIN — IPRATROPIUM BROMIDE 2 SPRAY: 21 SPRAY, METERED NASAL at 19:38

## 2023-02-15 RX ADMIN — MORPHINE SULFATE 4 MG: 2 INJECTION, SOLUTION INTRAMUSCULAR; INTRAVENOUS at 23:33

## 2023-02-15 RX ADMIN — AMLODIPINE BESYLATE 5 MG: 5 TABLET ORAL at 10:26

## 2023-02-15 RX ADMIN — LEVOTHYROXINE SODIUM 112 MCG: 0.11 TABLET ORAL at 06:28

## 2023-02-15 RX ADMIN — FOLIC ACID 1 MG: 1 TABLET ORAL at 19:38

## 2023-02-15 RX ADMIN — DOCUSATE SODIUM 50MG AND SENNOSIDES 8.6MG 2 TABLET: 8.6; 5 TABLET, FILM COATED ORAL at 21:44

## 2023-02-15 RX ADMIN — Medication 3 MG: at 23:33

## 2023-02-15 RX ADMIN — SERTRALINE 50 MG: 50 TABLET, FILM COATED ORAL at 10:27

## 2023-02-15 RX ADMIN — Medication 1000 MCG: at 10:26

## 2023-02-15 RX ADMIN — ACETAMINOPHEN 650 MG: 325 TABLET, FILM COATED ORAL at 19:37

## 2023-02-15 RX ADMIN — TIOTROPIUM BROMIDE INHALATION SPRAY 2 PUFF: 3.12 SPRAY, METERED RESPIRATORY (INHALATION) at 07:40

## 2023-02-15 RX ADMIN — CETIRIZINE HYDROCHLORIDE 10 MG: 10 TABLET ORAL at 10:27

## 2023-02-15 RX ADMIN — ONDANSETRON 4 MG: 2 INJECTION INTRAMUSCULAR; INTRAVENOUS at 06:28

## 2023-02-15 RX ADMIN — DONEPEZIL HYDROCHLORIDE 10 MG: 10 TABLET, FILM COATED ORAL at 21:44

## 2023-02-15 RX ADMIN — ALPRAZOLAM 0.25 MG: 0.25 TABLET ORAL at 06:31

## 2023-02-15 RX ADMIN — SODIUM CHLORIDE 75 ML/HR: 9 INJECTION, SOLUTION INTRAVENOUS at 12:16

## 2023-02-15 RX ADMIN — GABAPENTIN 800 MG: 400 CAPSULE ORAL at 21:44

## 2023-02-15 NOTE — H&P
.  HISTORY AND PHYSICAL   [facility]        Date of Admission: 2023  Patient Identification:  Name: Mg Gerber Sr.  Age: 82 y.o.  Sex: male  :  1940  MRN: [id number]                     Primary Care Physician: Dillon Walton MD    Chief Complaint:  82 year old gentleman who presented to the emergency room with pain of his right chest wall; he fell out of bed two weeks ago and injured the left side of his chest; he thought that he might have broken some ribs but didn't think there was much to do if he did so he stayed home; he has also been constipated; he had no injury to the right chest wall but denies any injury to that area; he has a history of renal cell cancer and is followed by the [facility]; he has stopped his therapy because recent scans showed progression of disease; he denies fever or chills     History of Present Illness:   As above    Past Medical History:  Past Medical History:   Diagnosis Date   • Allergic    • Anemia, vitamin B12 deficiency 2012   • Anxiety    • Arthritis 2014   • Atrial fibrillation (HCC)    • Cancer (HCC) 2014    kidney; prostate   • Cataract    • Chemotherapeutic agent or infusion extravasation     q 1-2 wks    • COPD (chronic obstructive pulmonary disease) (HCC) 2014   • Emphysema of lung (HCC)    • Hemochromatosis 2014   • Herpes zoster 2011    left medial thigh   • Hip pain 2014   • History of Doppler ultrasound 2011    superficial and deep venous insuffiency   • History of EKG 02/10/2012    Dr. Kathi Lloyd; unchanged from prior EKG   • Hypertension     benign essential   • Injury of back    • Pulmonary embolism (HCC) 2014   • Renal cell cancer (HCC)    • Shortness of breath    • Sleep apnea      Past Surgical History:  Past Surgical History:   Procedure Laterality Date   • APPENDECTOMY     • CATARACT EXTRACTION     • CHOLECYSTECTOMY     • SPLENECTOMY     • TONSILLECTOMY     •  VEIN SURGERY        Home Meds:  Medications Prior to Admission   Medication Sig Dispense Refill Last Dose   • Albuterol Sulfate (VENTOLIN HFA IN) Inhale 2 puffs Every 4 (Four) Hours As Needed. SOA WHEEZING   Past Week   • amLODIPine (NORVASC) 5 MG tablet Take 1 tablet by mouth Daily. 90 tablet 1 2/14/2023   • Calcium Carb-Cholecalciferol (CALTRATE 600+D3 PO) Take 1 tablet by mouth 2 (two) times a day.   2/14/2023   • cyclobenzaprine (FLEXERIL) 5 MG tablet Take 7.5 mg by mouth 3 (Three) Times a Day As Needed for Muscle Spasms. SPASMS  Indications: Muscle Spasm   Past Week   • doxycycline (VIBRAMYICN) 100 MG tablet Take 1 tablet by mouth 2 (Two) Times a Day. 20 tablet 0 Past Week   • famotidine (Pepcid) 20 MG tablet Take 1 tablet by mouth 2 (Two) Times a Day. 180 tablet 1 2/14/2023   • finasteride (PROSCAR) 5 MG tablet Take 5 mg by mouth Daily. Indications: Benign Enlargement of Prostate   2/13/2023   • fluticasone (FLONASE) 50 MCG/ACT nasal spray 1 spray into the nostril(s) as directed by provider Daily.   2/13/2023   • folic acid (FOLVITE) 1 MG tablet Take 1 mg by mouth 3 (Three) Times a Day.   2/14/2023   • gabapentin (NEURONTIN) 400 MG capsule Take PO: 400mg QAM and with lunch, 800mg QHS 12 capsule 0 2/14/2023   • HYDROcodone-acetaminophen (NORCO) 5-325 MG per tablet    Past Week   • ipratropium (ATROVENT) 0.06 % nasal spray 2 sprays into the nostril(s) as directed by provider 4 (Four) Times a Day. 15 mL 11 2/14/2023   • levocetirizine (XYZAL) 5 MG tablet Take 1 tablet by mouth Every Evening. 90 tablet 1 2/13/2023   • levothyroxine (SYNTHROID, LEVOTHROID) 112 MCG tablet Take 112 mcg by mouth Daily. Indications: Underactive Thyroid   2/14/2023   • meclizine (ANTIVERT) 12.5 MG tablet Take 1 tablet by mouth 3 (Three) Times a Day As Needed for Dizziness. 270 tablet 1 Past Month   • NAMZARIC 28-10 MG capsule sustained-release 24 hr Take 1 capsule by mouth Every Evening. Indications: Alzheimer's Disease   2/13/2023   •  O2 (OXYGEN) Inhale 2 L/min Continuous. Indications: Emphysema   2/13/2023   • ondansetron (ZOFRAN) 4 MG tablet Take 4 mg by mouth Every 8 (Eight) Hours As Needed. NAUSEA /VOMITING  Indications: Nausea and Vomiting   Past Week   • OXcarbazepine (TRILEPTAL) 300 MG tablet Take 1 tablet by mouth every night at bedtime. 5 tablet 0 2/13/2023   • polyethylene glycol (MIRALAX) 17 g packet Take 17 g by mouth Daily.   2/13/2023   • sertraline (ZOLOFT) 25 MG tablet Take 50 mg by mouth Daily. Indications: Generalized Anxiety Disorder   2/14/2023   • tiotropium bromide-olodaterol (STIOLTO RESPIMAT) 2.5-2.5 MCG/ACT aerosol solution inhaler Inhale 2 puffs Daily. Indications: Emphysema of the Lung   2/14/2023   • vitamin B-12 (CYANOCOBALAMIN) 1000 MCG tablet Take 1 tablet by mouth Daily. 90 tablet 1 2/13/2023   • XARELTO 20 MG tablet Take 20 mg by mouth Daily With Dinner. Indications: Atrial Fibrillation   2/14/2023   • ALPRAZolam (Xanax) 0.25 MG tablet Take 1 tablet by mouth 2 (Two) Times a Day As Needed for Anxiety. 60 tablet 2 More than a month   • sucralfate (Carafate) 1 GM/10ML suspension Take 10 mL by mouth 4 (Four) Times a Day With Meals & at Bedtime. 560 mL 1 More than a month       Allergies:  Allergies   Allergen Reactions   • Nsaids Hives and Itching   • Latex Rash   • Orange Unknown - Low Severity     LIPS BREAK OUT   • Orange Oil Unknown - Low Severity     Other reaction(s): Unknown - Low Severity  Other reaction(s): Unknown - Low Severity     Immunizations:  Immunization History   Administered Date(s) Administered   • COVID-19 (PFIZER) PURPLE CAP 02/27/2021, 02/27/2021, 03/20/2021, 03/20/2021, 09/07/2021, 09/07/2021   • FLUAD TRI 65YR+ 10/01/2019, 10/01/2022   • Flu Vaccine Quad PF 6-35MO 10/02/2017, 10/02/2017   • Flu Vaccine Quad PF >36MO 10/02/2017   • Fluzone High Dose =>65 Years ([employer] ONLY) 09/18/2018, 09/18/2018, 10/08/2019, 08/28/2020   • Fluzone High-Dose 65+yrs 10/21/2021   • Hepatitis B 01/01/2009,  2009   • Influenza TIV (IM) 2014, 2014, 10/01/2015, 10/01/2015, 10/20/2015, 10/20/2015   • Meningococcal Conjugate 2009, 2009   • Pneumococcal Polysaccharide (PPSV23) 2007, 2007   • TD Preservative Free 2021   • Td 2009, 2009     Social History:   Social History     Social History Narrative   • Not on file     Social History     Socioeconomic History   • Marital status:    Tobacco Use   • Smoking status: Former     Types: Cigarettes     Quit date: 1980     Years since quittin.1   • Smokeless tobacco: Never   Vaping Use   • Vaping Use: Never used   Substance and Sexual Activity   • Alcohol use: Yes     Comment: 2 drinks month   • Drug use: No   • Sexual activity: Defer       Family History:  Family History   Problem Relation Age of Onset   • Aneurysm Mother    • Stroke Mother    • Heart disease Father    • Diabetes Brother         type 2        Review of Systems  See history of present illness and past medical history.  Patient denies headache, dizziness, syncope, falls, trauma, change in vision, change in hearing, change in taste, changes in weight, changes in appetite, focal weakness, numbness, or paresthesia.  Patient denies chest pain, palpitations, dyspnea, orthopnea, PND, cough, sinus pressure, rhinorrhea, epistaxis, hemoptysis, nausea, vomiting,hematemesis, diarrhea, constipation or hematchezia.  Denies cold or heat intolerance, polydipsia, polyuria, polyphagia. Denies hematuria, pyuria, dysuria, hesitancy, frequency or urgency. Denies consumption of raw and under cooked meats foods or change in water source.  Denies fever, chills, sweats, night sweats.  Denies missing any routine medications. Remainder of ROS is negative.    Objective:  T Max 24 hrs: Temp (24hrs), Av.5 °F (36.4 °C), Min:97 °F (36.1 °C), Max:97.9 °F (36.6 °C)    Vitals Ranges:   Temp:  [97 °F (36.1 °C)-97.9 °F (36.6 °C)] 97.9 °F (36.6 °C)  Heart Rate:  [61-85]  "67  Resp:  [16-18] 18  BP: (125-153)/(60-98) 138/71      Exam:  /71 (BP Location: Right arm, Patient Position: Sitting)   Pulse 67   Temp 97.9 °F (36.6 °C) (Oral)   Resp 18   Ht 185.4 cm (73\")   Wt 104 kg (230 lb)   SpO2 96%   BMI 30.34 kg/m²     General Appearance:    Alert, cooperative, no distress, appears stated age   Head:    Normocephalic, without obvious abnormality, atraumatic   Eyes:    PERRL, conjunctivae/corneas clear, EOM's intact, both eyes   Ears:    Normal external ear canals, both ears   Nose:   Nares normal, septum midline, mucosa normal, no drainage    or sinus tenderness   Throat:   Lips, mucosa, and tongue normal   Neck:   Supple, symmetrical, trachea midline, no adenopathy;     thyroid:  no enlargement/tenderness/nodules; no carotid    bruit or JVD   Back:     Symmetric, no curvature, ROM normal, no CVA tenderness   Lungs:     Decreased breath sounds bilaterally, respirations unlabored   Chest Wall:    No tenderness or deformity    Heart:    Regular rate and rhythm, S1 and S2 normal, no murmur, rub   or gallop   Abdomen:     Soft, nontender, bowel sounds active all four quadrants,     no masses, no hepatomegaly, no splenomegaly   Extremities:   Extremities normal, atraumatic, no cyanosis or edema   Pulses:   2+ and symmetric all extremities   Skin:   Skin color, texture, turgor normal, no rashes or lesions               .    Data Review:  Labs in chart were reviewed.  WBC   Date Value Ref Range Status   02/14/2023 16.60 (H) 3.40 - 10.80 10*3/mm3 Final     Hemoglobin   Date Value Ref Range Status   02/14/2023 10.3 (L) 13.0 - 17.7 g/dL Final     Hematocrit   Date Value Ref Range Status   02/14/2023 31.7 (L) 37.5 - 51.0 % Final     Platelets   Date Value Ref Range Status   02/14/2023 448 140 - 450 10*3/mm3 Final     Sodium   Date Value Ref Range Status   02/14/2023 141 136 - 145 mmol/L Final     Potassium   Date Value Ref Range Status   02/14/2023 3.7 3.5 - 5.2 mmol/L Final "     Chloride   Date Value Ref Range Status   02/14/2023 105 98 - 107 mmol/L Final     CO2   Date Value Ref Range Status   02/14/2023 25.6 22.0 - 29.0 mmol/L Final     BUN   Date Value Ref Range Status   02/14/2023 16 8 - 23 mg/dL Final     Creatinine   Date Value Ref Range Status   02/14/2023 1.26 0.76 - 1.27 mg/dL Final     Glucose   Date Value Ref Range Status   02/14/2023 99 65 - 99 mg/dL Final     Calcium   Date Value Ref Range Status   02/14/2023 9.9 8.6 - 10.5 mg/dL Final     AST (SGOT)   Date Value Ref Range Status   02/14/2023 15 1 - 40 U/L Final     ALT (SGPT)   Date Value Ref Range Status   02/14/2023 13 1 - 41 U/L Final     Alkaline Phosphatase   Date Value Ref Range Status   02/14/2023 89 39 - 117 U/L Final                Imaging Results (All)     Procedure Component Value Units Date/Time    CT Abdomen Pelvis With Contrast [[id number]] Collected: 02/14/23 1416     Updated: 02/14/23 1447    Narrative:      CT ABDOMEN PELVIS W CONTRAST-, CT CHEST W CONTRAST DIAGNOSTIC-     HISTORY:  Felt pop in left ribs last week, now with right lateral chest  wall pain, increased shortness of breath. Upper abdominal pain.     TECHNIQUE: CT of the chest, abdomen and pelvis was performed following  administration of intravenous contrast. Reformatted images were  reviewed. Radiation dose reduction techniques were utilized, including  automated exposure control and exposure modulation based on body size.     COMPARISON:  CT chest 10/07/2022. CT abdomen and pelvis 03/11/2022.     FINDINGS CHEST: There is a right chest port with the tip at the superior  cavoatrial junction. There are calcified lymph nodes. There is a 5.6 x  3.8 cm left perihilar mass, previously 5.7 x 4.0 cm when remeasured on  10/07/2022, which is not significantly changed. Bilateral paraspinal  soft tissue nodularity, left greater than right, is not significantly  changed.  Heart size is normal. There is no pericardial effusion. There is a 2.9 x  3.1 cm soft  tissue lesion within the left pericardial fat is not  significantly changed from previously 2.9 x 3.1 cm when remeasured There  is incompletely assessed calcific coronary artery atherosclerosis. There  is moderate to advanced calcific aortic atherosclerosis. The ascending  aorta is mildly ectatic to 3.5 cm at the level pulmonary trunk. The  descending aorta is mildly dilated to 3.1 cm. Pleural spaces are clear.  There is multilevel degenerative disc disease. There are acute to  subacute nondisplaced fracture of anterolateral left ribs 8 and 9. There  are multiple additional old left rib fractures. There are multiple  bilateral subcutaneous soft tissue nodules, including a reference 0.7 cm  subcutaneous nodule in the anterior left chest wall and reference 1.1 cm  and 1.0 cm nodules in the left chest wall.  The trachea is clear. There is mild bibasilar atelectasis and/or  scarring. There is no focal consolidation. A few scattered small nodules  are not significantly changed.     FINDINGS ABDOMEN/PELVIS: The liver is normal in size. There is cirrhotic  liver morphology. There are scattered hypodense lesions in the liver,  including adjacent approximately 1.8 cm and 1.7 cm hypodense lesions in  the peripheral right hepatic lobe and a 1.6 cm lesion at the right  hepatic dome. There are calcified hepatic granulomata. The gallbladder  is surgically absent. The pancreas is within normal limits. There is a  tiny calcified splenule in the splenectomy bed. There is a 2.3 cm soft  tissue nodule in the superior left upper quadrant, which has increased  in size, previously 1.1 cm when remeasured. Bilateral adrenal nodules  have increased in size. The left kidney is surgically absent.  There are multiple prominent and mildly enlarged abdominal and pelvic  lymph nodes, which have increased in size. A reference 1.5 cm short axis  right lower quadrant lymph node previously measured 0.7 cm. A reference  1.0 cm left iliac chain lymph  node previously measured 0.5 cm.  There is advanced calcific aortoiliac atherosclerosis. There is no free  fluid.  There is no bowel obstruction. There is a moderate right-sided colonic  stool burden. There is colonic diverticulosis. The bladder is poorly  distended. The prostate is present.  There is a left hip total arthroplasty. Streak artifact limits  evaluation of adjacent structures. There is multilevel degenerative disc  disease. There is an old L4 compression fracture. There is a 1.8 cm soft  tissue nodule in the dorsal upper left arm wall.          Impression:      COMBINED IMPRESSION:      1.  Acute to subacute nondisplaced fractures of anterolateral left ribs  8 and 9.  2.  Findings of metastatic disease progression in the chest, abdomen,  and pelvis, including right hepatic lesions, increased size of bilateral  adrenal nodules, new and increased abdominal and pelvic lymph nodes and  peritoneal nodules, and new and increased subcutaneous soft tissue  nodules. Left perihilar and pericardial lesions and bilateral paraspinal  nodularity are not significantly changed.  3.  Cirrhotic liver morphology.  4.  Moderate right-sided colonic stool burden.     Discussed with Dr. Mccoy at 2:40 PM.     This report was finalized on 2/14/2023 2:44 PM by Dr. Claudette Vallecillo M.D.       CT Chest With Contrast Diagnostic [[id number]] Collected: 02/14/23 1416     Updated: 02/14/23 1447    Narrative:      CT ABDOMEN PELVIS W CONTRAST-, CT CHEST W CONTRAST DIAGNOSTIC-     HISTORY:  Felt pop in left ribs last week, now with right lateral chest  wall pain, increased shortness of breath. Upper abdominal pain.     TECHNIQUE: CT of the chest, abdomen and pelvis was performed following  administration of intravenous contrast. Reformatted images were  reviewed. Radiation dose reduction techniques were utilized, including  automated exposure control and exposure modulation based on body size.     COMPARISON:  CT chest 10/07/2022. CT  abdomen and pelvis 03/11/2022.     FINDINGS CHEST: There is a right chest port with the tip at the superior  cavoatrial junction. There are calcified lymph nodes. There is a 5.6 x  3.8 cm left perihilar mass, previously 5.7 x 4.0 cm when remeasured on  10/07/2022, which is not significantly changed. Bilateral paraspinal  soft tissue nodularity, left greater than right, is not significantly  changed.  Heart size is normal. There is no pericardial effusion. There is a 2.9 x  3.1 cm soft tissue lesion within the left pericardial fat is not  significantly changed from previously 2.9 x 3.1 cm when remeasured There  is incompletely assessed calcific coronary artery atherosclerosis. There  is moderate to advanced calcific aortic atherosclerosis. The ascending  aorta is mildly ectatic to 3.5 cm at the level pulmonary trunk. The  descending aorta is mildly dilated to 3.1 cm. Pleural spaces are clear.  There is multilevel degenerative disc disease. There are acute to  subacute nondisplaced fracture of anterolateral left ribs 8 and 9. There  are multiple additional old left rib fractures. There are multiple  bilateral subcutaneous soft tissue nodules, including a reference 0.7 cm  subcutaneous nodule in the anterior left chest wall and reference 1.1 cm  and 1.0 cm nodules in the left chest wall.  The trachea is clear. There is mild bibasilar atelectasis and/or  scarring. There is no focal consolidation. A few scattered small nodules  are not significantly changed.     FINDINGS ABDOMEN/PELVIS: The liver is normal in size. There is cirrhotic  liver morphology. There are scattered hypodense lesions in the liver,  including adjacent approximately 1.8 cm and 1.7 cm hypodense lesions in  the peripheral right hepatic lobe and a 1.6 cm lesion at the right  hepatic dome. There are calcified hepatic granulomata. The gallbladder  is surgically absent. The pancreas is within normal limits. There is a  tiny calcified splenule in the  splenectomy bed. There is a 2.3 cm soft  tissue nodule in the superior left upper quadrant, which has increased  in size, previously 1.1 cm when remeasured. Bilateral adrenal nodules  have increased in size. The left kidney is surgically absent.  There are multiple prominent and mildly enlarged abdominal and pelvic  lymph nodes, which have increased in size. A reference 1.5 cm short axis  right lower quadrant lymph node previously measured 0.7 cm. A reference  1.0 cm left iliac chain lymph node previously measured 0.5 cm.  There is advanced calcific aortoiliac atherosclerosis. There is no free  fluid.  There is no bowel obstruction. There is a moderate right-sided colonic  stool burden. There is colonic diverticulosis. The bladder is poorly  distended. The prostate is present.  There is a left hip total arthroplasty. Streak artifact limits  evaluation of adjacent structures. There is multilevel degenerative disc  disease. There is an old L4 compression fracture. There is a 1.8 cm soft  tissue nodule in the dorsal upper left arm wall.          Impression:      COMBINED IMPRESSION:      1.  Acute to subacute nondisplaced fractures of anterolateral left ribs  8 and 9.  2.  Findings of metastatic disease progression in the chest, abdomen,  and pelvis, including right hepatic lesions, increased size of bilateral  adrenal nodules, new and increased abdominal and pelvic lymph nodes and  peritoneal nodules, and new and increased subcutaneous soft tissue  nodules. Left perihilar and pericardial lesions and bilateral paraspinal  nodularity are not significantly changed.  3.  Cirrhotic liver morphology.  4.  Moderate right-sided colonic stool burden.     Discussed with Dr. Mccoy at 2:40 PM.     This report was finalized on 2/14/2023 2:44 PM by Dr. Claudette Vallecillo M.D.       XR Ribs Bilateral 4+ View With PA Chest [[id number]] Collected: 02/14/23 1248     Updated: 02/14/23 1254    Narrative:      XR RIBS BILATERAL 4+ VW W PA  CHEST-     INDICATIONS: Trauma     TECHNIQUE: Frontal view of the chest, 9 views including the bilateral  ribs     COMPARISON: 10/06/2022     FINDINGS:     Right chest port appears stable. The heart size is normal. Aorta is  calcified. Pulmonary vasculature is unremarkable. Previous large left  hilar mass appears grossly stable (CT can be obtained for direct  comparison with prior CT exam if indicated). No focal pulmonary  consolidation, pleural effusion, or pneumothorax.     No acute rib fracture is identified.       Impression:         No acute rib fracture is identified. Redemonstration of left hilar mass.  Follow-up/further evaluation can be obtained as indicated.     This report was finalized on 2/14/2023 12:51 PM by Dr. David Hinds M.D.               Assessment:  Active Hospital Problems    Diagnosis  POA   • **Closed fracture of multiple ribs of left side, initial encounter [S22.42XA]  Yes      Resolved Hospital Problems   No resolved problems to display.   anemia  Hypertension  Sleep apnea  Obesity  Copd  A fib  Renal cell cancer    Plan:  Pain control  Ask thoracic surgery to see him  Ask oncology to see him  Bowel regimen  ppi  Trend troponin  D.w patient and ED provider    Tracy Bull MD  2/14/2023  20:03 EST

## 2023-02-15 NOTE — CASE MANAGEMENT/SOCIAL WORK
Discharge Planning Assessment  [facility]     Patient Name: Mg Gerber .  MRN: [id number]  Today's Date: 2/15/2023    Admit Date: 2/14/2023    Plan: [facility] vs. return home with family assistance   Discharge Needs Assessment     Row Name 02/15/23 1242       Living Environment    People in Home spouse    Name(s) of People in Home Wife Lucina and daughter    Current Living Arrangements home    Primary Care Provided by self    Provides Primary Care For no one    Family Caregiver if Needed spouse    Family Caregiver Names Wife Lucina [phone] and daughter    Quality of Family Relationships helpful    Able to Return to Prior Arrangements other (see comments)  Unsure - may need SNF       Resource/Environmental Concerns    Resource/Environmental Concerns none    Transportation Concerns none       Food Insecurity    Within the past 12 months, you worried that your food would run out before you got the money to buy more. Never true    Within the past 12 months, the food you bought just didn't last and you didn't have money to get more. Never true       Transition Planning    Patient/Family Anticipates Transition to inpatient rehabilitation facility    Patient/Family Anticipated Services at Transition skilled nursing    Transportation Anticipated family or friend will provide       Discharge Needs Assessment    Readmission Within the Last 30 Days no previous admission in last 30 days    Equipment Currently Used at Home cane, straight;rollator;wheelchair;other (see comments);grab bar;shower chair;cpap;oxygen  Scooter    Concerns to be Addressed basic needs;discharge planning    Anticipated Changes Related to Illness inability to care for self    Provided Post Acute Provider List? N/A    N/A Provider List Comment Has been to [facility] in the past and would like referral there               Discharge Plan     Row Name 02/15/23 1246       Plan    Plan [facility] vs. return home with family assistance     Patient/Family in Agreement with Plan yes    Plan Comments Spoke with patient at bedside.  Patient lives with wife Lucina [phone] and a daughter.  He has a cane, walker, W/C, scooter, grab bars in the BR, shower chair, O2 @ night with C-pap from [place].  He has used HH in the past but does not think he would want it again, and has been to [facility] in the past.  PCP is Dr. Dillon Walton and pharmacy is [facility].  Patient drives, but states his wife or daughter drive and would help him if needed.  He is agreeable to [facility] if needs rehab.  Spoke with Mindy - she is unsure of bed availability but will follow.  Would need pre-cert.  CCP will follow.  Howie MANUEL              Continued Care and Services - Admitted Since 2/14/2023     Destination     Service Provider Request Status Selected Services Address Phone Fax Patient Preferred    [facility] Pending - Request Sent N/A [id number] [facility] [id number] [phone] --              Expected Discharge Date and Time     Expected Discharge Date Expected Discharge Time    Feb 15, 2023          Demographic Summary     Row Name 02/15/23 1238       General Information    Admission Type observation    Arrived From home    Referral Source admission list    Reason for Consult discharge planning    Preferred Language English               Functional Status     Row Name 02/15/23 1240       Functional Status    Usual Activity Tolerance moderate    Current Activity Tolerance fair       Functional Status, IADL    Medications independent    Meal Preparation assistive person  Wife    Housekeeping assistive person    Laundry assistive person    Shopping assistive person;independent       Mental Status    General Appearance WDL WDL       Mental Status Summary    Recent Changes in Mental Status/Cognitive Functioning no changes                      Becky S. [place], KALEB

## 2023-02-15 NOTE — PLAN OF CARE
Goal Outcome Evaluation:  Plan of Care Reviewed With: patient           Outcome Evaluation: Pt adm with R anterior chest pain, had a fall 2 weeks ago with L 8,9 nondisplaced rib fractures, but then developed R chest wall pain, work up ongoing, pt has complex medical history with renal cell cancer with progression of metastatic disease. Pt lives at home with his wife and daughter, is an independent household ambulator without an assistive device, he reports using a cane outside, and a scooter for long distances. Pt presents with pain, general weakness, impaired functional mobility with unsteady gait, he will benefit from PT to address. Pt had significant pain limiting his mobility, also presents with some gait deficits with wide based gait, lateral lean, forward flexed posture, and may be safer with a rolling walker, PT will follow, current plan for discharge is home with assist

## 2023-02-15 NOTE — DISCHARGE PLACEMENT REQUEST
"Storm Koch Sr. (82 y.o. Male)     Date of Birth   1940    Social Security Number       Address   [address] [place] [address]    Home Phone   [phone]    MRN   [id number]       Taoism   Religious    Marital Status                               Admission Date   2/14/23    Admission Type   Emergency    Admitting Provider   Tracy Bull MD    Attending Provider   Booker Tipton MD    Department, Room/Bed   [address], [address]31/1       Discharge Date       Discharge Disposition       Discharge Destination                               Attending Provider: Booker Tipton MD    Allergies: Nsaids, Latex, Orange, Orange Oil    Isolation: None   Infection: None   Code Status: CPR    Ht: 185.4 cm (73\")   Wt: 104 kg (229 lb 12.8 oz)    Admission Cmt: None   Principal Problem: Closed fracture of multiple ribs of left side, initial encounter [S22.42XA]                 Active Insurance as of 2/14/2023     Primary Coverage     Payor Plan Insurance Group Employer/Plan Group    HUMANA MEDICARE REPLACEMENT HUMANA MEDICARE REPLACEMENT [id number]     Payor Plan Address Payor Plan Phone Number Payor Plan Fax Number Effective Dates    [address] [address] [phone]  1/1/2013 - None Entered    [facility] [id number]       Subscriber Name Subscriber Birth Date Member ID       STORM KOCH SR. 1940 [id number]                 Emergency Contacts      (Rel.) Home Phone Work Phone Mobile Phone    Lucina Koch (Spouse) [phone] -- --    Storm Koch (Son) [phone] -- --    Romina Koch (Daughter) [phone] -- --              "

## 2023-02-15 NOTE — PROGRESS NOTES
Name: Mg Gerebr . ADMIT: 2023   : 1940  PCP: Dillon Walton MD    MRN: [id number] LOS: 0 days   AGE/SEX: 82 y.o. male  ROOM: [facility]     Subjective   Subjective   Continues w/rt sided pain, minimal lt rib pain. Has been up to bathroom, ambulating without issues per RN. Denies fever, soa, chest pain.     Review of Systems   Constitutional: Negative for fever.   HENT: Negative for congestion.    Respiratory: Negative for shortness of breath.    Cardiovascular: Negative for chest pain.   Gastrointestinal: Positive for constipation.   Genitourinary: Negative for difficulty urinating.   Musculoskeletal: Positive for myalgias.   Skin: Negative for rash.   Neurological: Negative for headaches.   Psychiatric/Behavioral: Negative for sleep disturbance.        Objective   Objective   Vital Signs  Temp:  [97.4 °F (36.3 °C)-97.9 °F (36.6 °C)] 97.4 °F (36.3 °C)  Heart Rate:  [56-79] 65  Resp:  [18] 18  BP: (125-153)/(56-94) 129/56  SpO2:  [93 %-97 %] 96 %  on   ;   Device (Oxygen Therapy): room air  Body mass index is 30.32 kg/m².  Physical Exam  Vitals and nursing note reviewed.   Constitutional:       General: He is not in acute distress.     Appearance: He is obese. He is ill-appearing. He is not toxic-appearing.   HENT:      Head: Normocephalic.      Mouth/Throat:      Mouth: Mucous membranes are moist.   Eyes:      Conjunctiva/sclera: Conjunctivae normal.   Cardiovascular:      Rate and Rhythm: Normal rate and regular rhythm.   Pulmonary:      Effort: Pulmonary effort is normal. No respiratory distress.      Breath sounds: No wheezing or rales.   Abdominal:      Palpations: Abdomen is soft.      Comments: Bowel sounds hypoactive   Musculoskeletal:      Cervical back: Neck supple.      Right lower leg: No edema.      Left lower leg: No edema.   Skin:     General: Skin is warm and dry.   Neurological:      Mental Status: He is alert and oriented to person, place, and time.   Psychiatric:          Mood and Affect: Mood normal.         Behavior: Behavior normal.       Results Review     I reviewed the patient's new clinical results.  Results from last 7 days   Lab Units 02/15/23  0536 02/14/23  1205   WBC 10*3/mm3 17.06* 16.60*   HEMOGLOBIN g/dL 9.2* 10.3*   PLATELETS 10*3/mm3 408 448     Results from last 7 days   Lab Units 02/15/23  0536 02/14/23  1205   SODIUM mmol/L 138 141   POTASSIUM mmol/L 4.4 3.7   CHLORIDE mmol/L 105 105   CO2 mmol/L 24.4 25.6   BUN mg/dL 13 16   CREATININE mg/dL 1.10 1.26   GLUCOSE mg/dL 94 99   EGFR mL/min/1.73 66.6 56.6*     Results from last 7 days   Lab Units 02/14/23  1205   ALBUMIN g/dL 4.2   BILIRUBIN mg/dL 1.4*   ALK PHOS U/L 89   AST (SGOT) U/L 15   ALT (SGPT) U/L 13     Results from last 7 days   Lab Units 02/15/23  0536 02/14/23  1205   CALCIUM mg/dL 8.5* 9.9   ALBUMIN g/dL  --  4.2     Results from last 7 days   Lab Units 02/14/23  1734 02/14/23  1205   LACTATE mmol/L 2.0 2.3*     No results found for: HGBA1C, POCGLU    XR Ribs Bilateral 4+ View With PA Chest    Result Date: 2/14/2023   No acute rib fracture is identified. Redemonstration of left hilar mass. Follow-up/further evaluation can be obtained as indicated.  This report was finalized on 2/14/2023 12:51 PM by Dr. David Hinds M.D.      CT Chest With Contrast Diagnostic    Result Date: 2/14/2023  COMBINED IMPRESSION:  1.  Acute to subacute nondisplaced fractures of anterolateral left ribs 8 and 9. 2.  Findings of metastatic disease progression in the chest, abdomen, and pelvis, including right hepatic lesions, increased size of bilateral adrenal nodules, new and increased abdominal and pelvic lymph nodes and peritoneal nodules, and new and increased subcutaneous soft tissue nodules. Left perihilar and pericardial lesions and bilateral paraspinal nodularity are not significantly changed. 3.  Cirrhotic liver morphology. 4.  Moderate right-sided colonic stool burden.  Discussed with Dr. Mccoy at 2:40 PM.  This  report was finalized on 2/14/2023 2:44 PM by Dr. Claudette Vallecillo M.D.      CT Abdomen Pelvis With Contrast    Result Date: 2/14/2023  COMBINED IMPRESSION:  1.  Acute to subacute nondisplaced fractures of anterolateral left ribs 8 and 9. 2.  Findings of metastatic disease progression in the chest, abdomen, and pelvis, including right hepatic lesions, increased size of bilateral adrenal nodules, new and increased abdominal and pelvic lymph nodes and peritoneal nodules, and new and increased subcutaneous soft tissue nodules. Left perihilar and pericardial lesions and bilateral paraspinal nodularity are not significantly changed. 3.  Cirrhotic liver morphology. 4.  Moderate right-sided colonic stool burden.  Discussed with Dr. Mccoy at 2:40 PM.  This report was finalized on 2/14/2023 2:44 PM by Dr. Claudette Vallecillo M.D.      I have personally reviewed all medications:  Scheduled Medications  amLODIPine, 5 mg, Oral, Daily  cetirizine, 10 mg, Oral, Daily  memantine, 10 mg, Oral, Q12H   And  donepezil, 10 mg, Oral, Nightly  finasteride, 5 mg, Oral, Daily  fluticasone, 1 spray, Nasal, Daily  folic acid, 1 mg, Oral, TID  gabapentin, 400 mg, Oral, Daily  gabapentin, 800 mg, Oral, Nightly  ipratropium, 2 spray, Each Nare, 4x Daily  levothyroxine, 112 mcg, Oral, Q AM  lidocaine, 1 patch, Transdermal, Q24H  OXcarbazepine, 300 mg, Oral, Q12H  pantoprazole, 40 mg, Oral, Q AM  polyethylene glycol, 17 g, Oral, Daily  rivaroxaban, 20 mg, Oral, Daily With Dinner  senna-docusate sodium, 2 tablet, Oral, Nightly  sertraline, 50 mg, Oral, Daily  tiotropium bromide monohydrate, 2 puff, Inhalation, Daily - RT  vitamin B-12, 1,000 mcg, Oral, Daily    Infusions  sodium chloride, 75 mL/hr, Last Rate: 75 mL/hr (02/15/23 1216)    Diet  Diet: Cardiac Diets; Healthy Heart (2-3 Na+); Texture: Regular Texture (IDDSI 7); Fluid Consistency: Thin (IDDSI 0)    I have personally reviewed:  [x]  Laboratory   [x]  Microbiology   [x]  Radiology   [x]   EKG/Telemetry  [x]  Cardiology/Vascular   [x]  Pathology    [x]  Records       Assessment/Plan     Active Hospital Problems    Diagnosis  POA   • **Closed fracture of multiple ribs of left side, initial encounter [S22.42XA]  Yes   • Constipation [K59.00]  Yes   • Leukocytosis [D72.829]  Yes   • History of pulmonary embolism and DVT [Z86.711]  Yes   • Paroxysmal atrial fibrillation (HCC) [I48.0]  Yes   • Essential hypertension [I10]  Yes   • Metastatic cancer (HCC) [C79.9]  Yes   • Pulmonary emphysema (HCC) [J43.9]  Yes      Resolved Hospital Problems   No resolved problems to display.       82 y.o. male admitted with Closed fracture of multiple ribs of left side, initial encounter.    Lt rib fracture/Rt sided pain:  -Thoracic surgery consulted to evaluate fractures. Pain improving; fell nearly 2 weeks ago. Denies soa. Unclear etiology of rt sided pain. Has evidence of metastatic disease on CT C/A/P as well as constipation. Receiving enema today    Metastatic renal cell carcinoma/Chronic leukocytosis:  -Appreciate Oncology assistance. Has been followed by  for treatment of RCC w/follow up in 2 weeks. Has known progression of disease. 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 HR controlled    Emphysema:  -No wheezing on exam. Uses O2 w/sleep. Albuterol as needed      · Xarelto (home med) for DVT prophylaxis.  · Full code.  · Discussed with patient, family and nursing staff.  · Anticipate discharge home with family tomorrow.     35 minutes spent on medical decision making, patient and family counseling      ADRIANA Manuel  [facility]  02/15/23  12:49 EST

## 2023-02-15 NOTE — CONSULTS
Subjective     REASON FOR CONSULTATION:  Stingl  Provide an opinion on any further workup or treatment                             REQUESTING PHYSICIAN: Renal cell carcinoma    RECORDS OBTAINED:  Records of the patients history including those obtained from the referring provider were reviewed and summarized in detail.    History of Present Illness   This is a pleasant 82-year-old man with stage IV renal cell carcinoma followed by Dr. Ashleigh Montes De Oca at [facility] previously treated with immunotherapy.  According to the most recent office note from her on 10/18/2022- the patient had progression of disease at that time and because of his age and performance status was not felt to be candidate for further active treatment; hospice/palliative care was recommended by Dr. Montes De Oca.  The patient is currently admitted with right chest wall pain which has been persistent since a fall out of bed 2 weeks ago.  CT chest abdomen pelvis performed on admission showed 5.6 x 3.8 cm left perihilar mass stable compared to October 2022, bilateral paraspinal soft tissue nodularity stable in size, no pericardial effusion, 2.9 x 3.1 cm left pericardial fat mass unchanged, advanced calcific aortic atherosclerosis with mildly ectatic ascending aorta and descending aorta dilated 3.1 cm.  There are acute to subacute nondisplaced fractures of the anterior lateral left ribs 8 9, multiple additional old left rib fractures, multiple subcutaneous soft tissue nodules, cirrhotic appearing liver, hypodense lesions in the liver 1.8 cm, 1.7 cm, 1.6 cm, splenule in the splenectomy bed, 2.3 cm soft tissue left upper quadrant increased from previous, bilateral adrenal nodules increased in size, mildly enlarged abdominopelvic lymph nodes increased in size.    In addition the patient complains of right-sided abdominal pain that he feels may be related to constipation as he has not had a bowel movement in several days despite taking multiple doses  of laxatives at home.    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    Past Medical History:   Diagnosis Date   • Allergic    • Anemia, vitamin B12 deficiency 04/12/2012   • Anxiety    • Arthritis 03/24/2014   • Atrial fibrillation (HCC)    • Cancer (HCC) 04/29/2014    kidney; prostate   • Cataract    • Chemotherapeutic agent or infusion extravasation     q 1-2 wks    • COPD (chronic obstructive pulmonary disease) (HCC) 03/24/2014   • Emphysema of lung (HCC)    • Hemochromatosis 03/24/2014   • Herpes zoster 12/06/2011    left medial thigh   • Hip pain 03/24/2014   • History of Doppler ultrasound 12/28/2011    superficial and deep venous insuffiency   • History of EKG 02/10/2012    Dr. Kathi Lloyd; unchanged from prior EKG   • Hypertension     benign essential   • Injury of back    • Pulmonary embolism (HCC) 03/24/2014   • Renal cell cancer (HCC)    • Shortness of breath    • Sleep apnea         Past Surgical History:   Procedure Laterality Date   • APPENDECTOMY     • CATARACT EXTRACTION     • CHOLECYSTECTOMY     • SPLENECTOMY     • TONSILLECTOMY     • VEIN SURGERY          No current facility-administered medications on file prior to encounter.     Current Outpatient Medications on File Prior to Encounter   Medication Sig Dispense Refill   • Albuterol Sulfate (VENTOLIN HFA IN) Inhale 2 puffs Every 4 (Four) Hours As Needed. SOA WHEEZING     • amLODIPine (NORVASC) 5 MG tablet Take 1 tablet by mouth Daily. 90 tablet 1   • Calcium Carb-Cholecalciferol (CALTRATE 600+D3 PO) Take 1 tablet by mouth 2 (two) times a day.     • cyclobenzaprine (FLEXERIL) 5 MG tablet Take 7.5 mg by mouth 3 (Three) Times a Day As Needed for Muscle Spasms. SPASMS  Indications: Muscle Spasm     • doxycycline (VIBRAMYICN) 100 MG tablet Take 1 tablet by mouth 2 (Two) Times a Day. 20 tablet 0   • famotidine (Pepcid) 20 MG tablet Take 1 tablet by mouth 2 (Two) Times a Day. 180  tablet 1   • finasteride (PROSCAR) 5 MG tablet Take 5 mg by mouth Daily. Indications: Benign Enlargement of Prostate     • fluticasone (FLONASE) 50 MCG/ACT nasal spray 1 spray into the nostril(s) as directed by provider Daily.     • folic acid (FOLVITE) 1 MG tablet Take 1 mg by mouth 3 (Three) Times a Day.     • gabapentin (NEURONTIN) 400 MG capsule Take PO: 400mg QAM and with lunch, 800mg QHS 12 capsule 0   • HYDROcodone-acetaminophen (NORCO) 5-325 MG per tablet      • ipratropium (ATROVENT) 0.06 % nasal spray 2 sprays into the nostril(s) as directed by provider 4 (Four) Times a Day. 15 mL 11   • levocetirizine (XYZAL) 5 MG tablet Take 1 tablet by mouth Every Evening. 90 tablet 1   • levothyroxine (SYNTHROID, LEVOTHROID) 112 MCG tablet Take 112 mcg by mouth Daily. Indications: Underactive Thyroid     • meclizine (ANTIVERT) 12.5 MG tablet Take 1 tablet by mouth 3 (Three) Times a Day As Needed for Dizziness. 270 tablet 1   • NAMZARIC 28-10 MG capsule sustained-release 24 hr Take 1 capsule by mouth Every Evening. Indications: Alzheimer's Disease     • O2 (OXYGEN) Inhale 2 L/min Continuous. Indications: Emphysema     • ondansetron (ZOFRAN) 4 MG tablet Take 4 mg by mouth Every 8 (Eight) Hours As Needed. NAUSEA /VOMITING  Indications: Nausea and Vomiting     • OXcarbazepine (TRILEPTAL) 300 MG tablet Take 1 tablet by mouth every night at bedtime. 5 tablet 0   • polyethylene glycol (MIRALAX) 17 g packet Take 17 g by mouth Daily.     • sertraline (ZOLOFT) 25 MG tablet Take 50 mg by mouth Daily. Indications: Generalized Anxiety Disorder     • tiotropium bromide-olodaterol (STIOLTO RESPIMAT) 2.5-2.5 MCG/ACT aerosol solution inhaler Inhale 2 puffs Daily. Indications: Emphysema of the Lung     • vitamin B-12 (CYANOCOBALAMIN) 1000 MCG tablet Take 1 tablet by mouth Daily. 90 tablet 1   • XARELTO 20 MG tablet Take 20 mg by mouth Daily With Dinner. Indications: Atrial Fibrillation     • ALPRAZolam (Xanax) 0.25 MG tablet Take 1  tablet by mouth 2 (Two) Times a Day As Needed for Anxiety. 60 tablet 2   • sucralfate (Carafate) 1 GM/10ML suspension Take 10 mL by mouth 4 (Four) Times a Day With Meals & at Bedtime. 560 mL 1        ALLERGIES:    Allergies   Allergen Reactions   • Nsaids Hives and Itching   • Latex Rash   • Orange Unknown - Low Severity     LIPS BREAK OUT   • Orange Oil Unknown - Low Severity     Other reaction(s): Unknown - Low Severity  Other reaction(s): Unknown - Low Severity        Social History     Socioeconomic History   • Marital status:    Tobacco Use   • Smoking status: Former     Types: Cigarettes     Quit date:      Years since quittin.1   • Smokeless tobacco: Never   Vaping Use   • Vaping Use: Never used   Substance and Sexual Activity   • Alcohol use: Yes     Comment: 2 drinks month   • Drug use: No   • Sexual activity: Defer        Family History   Problem Relation Age of Onset   • Aneurysm Mother    • Stroke Mother    • Heart disease Father    • Diabetes Brother         type 2        Review of Systems   Constitutional: Negative for appetite change and unexpected weight change.   HENT: Negative.    Respiratory: Negative for shortness of breath and wheezing.    Cardiovascular: Positive for chest pain. Negative for palpitations.   Gastrointestinal: Positive for abdominal pain.   Musculoskeletal: Positive for arthralgias.   Allergic/Immunologic: Negative.    Neurological: Negative.    Hematological: Negative.    Psychiatric/Behavioral: Negative.         Objective     Vitals:    02/15/23 0245 02/15/23 0522 02/15/23 0607 02/15/23 0741   BP:    129/56   BP Location:    Left arm   Patient Position:    Lying   Pulse:  61 56 58   Resp:    18   Temp:    97.4 °F (36.3 °C)   TempSrc:    Oral   SpO2:  96% 97% 96%   Weight: 104 kg (229 lb 12.8 oz)      Height:         No flowsheet data found.    Physical Exam    CONSTITUTIONAL: pleasant well-developed adult man  HEENT: no icterus, no thrush, moist  membranes  LYMPH: no cervical or supraclavicular lad  CV: RRR, S1S2, no murmur  RESP: cta bilat, no wheezing, no rales  GI: soft, non-tender, no splenomegaly, +bs  MUSC: no edema, normal gait  NEURO: alert and oriented x3, normal strength  PSYCH: normal mood and affect    RECENT LABS:  Hematology WBC   Date Value Ref Range Status   02/15/2023 17.06 (H) 3.40 - 10.80 10*3/mm3 Final     RBC   Date Value Ref Range Status   02/15/2023 2.59 (L) 4.14 - 5.80 10*6/mm3 Final     Hemoglobin   Date Value Ref Range Status   02/15/2023 9.2 (L) 13.0 - 17.7 g/dL Final     Hematocrit   Date Value Ref Range Status   02/15/2023 28.4 (L) 37.5 - 51.0 % Final     Platelets   Date Value Ref Range Status   02/15/2023 408 140 - 450 10*3/mm3 Final        Lab Results   Component Value Date    GLUCOSE 94 02/15/2023    BUN 13 02/15/2023    CREATININE 1.10 02/15/2023    EGFR 66.6 02/15/2023    BCR 11.8 02/15/2023    K 4.4 02/15/2023    CO2 24.4 02/15/2023    CALCIUM 8.5 (L) 02/15/2023    ALBUMIN 4.2 02/14/2023    AST 15 02/14/2023    ALT 13 02/14/2023     CT chest abdomen pelvis 2/14/2023:  IMPRESSION:  COMBINED IMPRESSION:      1.  Acute to subacute nondisplaced fractures of anterolateral left ribs  8 and 9.  2.  Findings of metastatic disease progression in the chest, abdomen,  and pelvis, including right hepatic lesions, increased size of bilateral  adrenal nodules, new and increased abdominal and pelvic lymph nodes and  peritoneal nodules, and new and increased subcutaneous soft tissue  nodules. Left perihilar and pericardial lesions and bilateral paraspinal  nodularity are not significantly changed.  3.  Cirrhotic liver morphology.  4.  Moderate right-sided colonic stool burden.    Assessment & Plan   *Metastatic renal cell carcinoma  · The patient states he was initially treated with a TKI approximately 8 years ago with poor tolerance but had multiple other medical issues going on at the time  · He has been on immunotherapy the previous  approximate 8 years with stable to slowly progressive disease  · CT scans in November 2022 showed progression of disease and immunotherapy was discontinued; his oncologist Dr. 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  The patient is established with Dr. Ashleigh Montes De Oca at [facility] for treatment of his renal cell carcinoma.  He has progression of disease.  He has been on immunotherapy in the past 8 years or so.  He has not been treated with any of the newer generation TKI therapies.  To me the patient still has a very good performance status able to drive, goes to dances, organ function adequate etc.  I am not sure quite why he is not a candidate for further treatment with TKI to slow progression of disease.  He has an appointment with Dr. Montes De Oca in 2 weeks and I provided him copies of his scans etc. to discuss with her.  I provided him my card if he would like a formal second opinion in clinic.  I am going to order an enema for constipation.  No opposition to discharge from an oncology perspective once his pain is controlled and bowels moving appropriately.  Please call if further needed during the hospital stay.    I spent 70 minutes of time today on this case including reviewing the past medical records, oncology treatment history, reviewing the patient's imaging studies showing progression of disease, discussing with the patient possible alternative treatment options, interviewing examining the patient and documentation of care.

## 2023-02-15 NOTE — PLAN OF CARE
Problem: Adult Inpatient Plan of Care  Goal: Plan of Care Review  Outcome: Ongoing, Progressing  Flowsheets (Taken 2/15/2023 0526)  Progress: no change  Plan of Care Reviewed With: patient  Outcome Evaluation: No s/sx of distress noted at this time. Rested some throughout night. Vital signs WDL. 02@2L NC while sleeping (pt states he has BERTHA and wears cpap at home) Medicated for pain with x2 doses of prn pain meds with some relief noted. Thoracic Sx consulted.  Will cont to monitor.  Goal: Patient-Specific Goal (Individualized)  Outcome: Ongoing, Progressing  Goal: Absence of Hospital-Acquired Illness or Injury  Outcome: Ongoing, Progressing  Intervention: Identify and Manage Fall Risk  Recent Flowsheet Documentation  Taken 2/15/2023 0440 by Holli Nicolas, KALEB  Safety Promotion/Fall Prevention:   activity supervised   assistive device/personal items within reach   clutter free environment maintained   fall prevention program maintained   lighting adjusted   nonskid shoes/slippers when out of bed   room organization consistent   safety round/check completed  Taken 2/15/2023 0202 by Holli Nicolas, KALEB  Safety Promotion/Fall Prevention:   activity supervised   assistive device/personal items within reach   clutter free environment maintained   fall prevention program maintained   lighting adjusted   nonskid shoes/slippers when out of bed   room organization consistent   safety round/check completed  Taken 2/15/2023 0017 by Holli Nicolas, RN  Safety Promotion/Fall Prevention:   activity supervised   clutter free environment maintained   assistive device/personal items within reach   fall prevention program maintained   lighting adjusted   nonskid shoes/slippers when out of bed   safety round/check completed   room organization consistent  Taken 2/14/2023 2229 by Holli Nicolas, RN  Safety Promotion/Fall Prevention:   activity supervised   assistive device/personal items within reach   clutter free environment maintained    fall prevention program maintained   lighting adjusted   nonskid shoes/slippers when out of bed   room organization consistent   safety round/check completed  Taken 2/14/2023 2005 by Holli Nicolas RN  Safety Promotion/Fall Prevention:   activity supervised   assistive device/personal items within reach   clutter free environment maintained   fall prevention program maintained   lighting adjusted   nonskid shoes/slippers when out of bed   room organization consistent   safety round/check completed  Intervention: Prevent Skin Injury  Recent Flowsheet Documentation  Taken 2/15/2023 0440 by Holli Nicolas RN  Body Position:   position changed independently   supine  Taken 2/15/2023 0202 by Holli Nicolas RN  Body Position:   position changed independently   left   tilted  Taken 2/15/2023 0017 by Holli Nicolas RN  Body Position: position changed independently  Skin Protection:   adhesive use limited   incontinence pads utilized   transparent dressing maintained   tubing/devices free from skin contact  Taken 2/14/2023 2229 by Holli Nicolas RN  Body Position: position changed independently  Taken 2/14/2023 2005 by Holli Nicolas RN  Body Position:   position changed independently   supine, legs elevated  Skin Protection:   incontinence pads utilized   tubing/devices free from skin contact  Intervention: Prevent and Manage VTE (Venous Thromboembolism) Risk  Recent Flowsheet Documentation  Taken 2/15/2023 0440 by Holli Nicolas RN  Activity Management: activity adjusted per tolerance  Taken 2/15/2023 0202 by Holli Nicolas RN  Activity Management: activity adjusted per tolerance  Taken 2/15/2023 0017 by Holli Nicolas RN  Activity Management: activity adjusted per tolerance  Taken 2/14/2023 2229 by Holli Nicolas RN  Activity Management: activity adjusted per tolerance  Taken 2/14/2023 2005 by Holli Nicolas RN  Activity Management: activity adjusted per tolerance  VTE Prevention/Management: (on Xarelto)   bilateral   sequential  compression devices on  Intervention: Prevent Infection  Recent Flowsheet Documentation  Taken 2/15/2023 0440 by Holli Nicolas RN  Infection Prevention:   hand hygiene promoted   rest/sleep promoted   single patient room provided  Taken 2/15/2023 0202 by Holli Nicolas RN  Infection Prevention:   hand hygiene promoted   rest/sleep promoted   single patient room provided  Taken 2/15/2023 0017 by Holli Nicolas RN  Infection Prevention:   hand hygiene promoted   rest/sleep promoted   single patient room provided  Taken 2/14/2023 2229 by Holli Nicolas RN  Infection Prevention:   hand hygiene promoted   rest/sleep promoted   single patient room provided  Taken 2/14/2023 2005 by Holli Nicolas RN  Infection Prevention:   hand hygiene promoted   rest/sleep promoted   single patient room provided  Goal: Optimal Comfort and Wellbeing  Outcome: Ongoing, Progressing  Intervention: Monitor Pain and Promote Comfort  Recent Flowsheet Documentation  Taken 2/14/2023 2220 by Holli Nicolas RN  Pain Management Interventions:   see MAR   position adjusted   pillow support provided  Intervention: Provide Person-Centered Care  Recent Flowsheet Documentation  Taken 2/14/2023 2005 by Holli Nicolas RN  Trust Relationship/Rapport:   care explained   questions encouraged   reassurance provided   thoughts/feelings acknowledged  Goal: Readiness for Transition of Care  Outcome: Ongoing, Progressing     Problem: Pain Acute  Goal: Acceptable Pain Control and Functional Ability  Outcome: Ongoing, Progressing  Intervention: Prevent or Manage Pain  Recent Flowsheet Documentation  Taken 2/15/2023 0440 by Holli Nicolas RN  Medication Review/Management: medications reviewed  Taken 2/15/2023 0202 by Holli Nicolas RN  Medication Review/Management: medications reviewed  Taken 2/15/2023 0017 by Holli Nicolas RN  Sleep/Rest Enhancement:   awakenings minimized   regular sleep/rest pattern promoted   relaxation techniques promoted   room darkened  Medication  Review/Management: medications reviewed  Taken 2/14/2023 2229 by Holli Nicolas RN  Medication Review/Management: medications reviewed  Taken 2/14/2023 2220 by Holli Nicolas RN  Sleep/Rest Enhancement:   awakenings minimized   relaxation techniques promoted   room darkened  Taken 2/14/2023 2005 by Holli Nicolas RN  Sleep/Rest Enhancement:   awakenings minimized   regular sleep/rest pattern promoted   relaxation techniques promoted   room darkened  Medication Review/Management: medications reviewed  Intervention: Develop Pain Management Plan  Recent Flowsheet Documentation  Taken 2/14/2023 2220 by Holli Nicolas RN  Pain Management Interventions:   see MAR   position adjusted   pillow support provided  Intervention: Optimize Psychosocial Wellbeing  Recent Flowsheet Documentation  Taken 2/14/2023 2005 by Holli Nicolas RN  Supportive Measures:   active listening utilized   verbalization of feelings encouraged  Goal: Acceptable Pain Control and Functional Ability  Outcome: Ongoing, Progressing  Intervention: Prevent or Manage Pain  Recent Flowsheet Documentation  Taken 2/15/2023 0440 by Holli Nicolas RN  Medication Review/Management: medications reviewed  Taken 2/15/2023 0202 by Holli Nicolas RN  Medication Review/Management: medications reviewed  Taken 2/15/2023 0017 by Holli Nicolas RN  Sleep/Rest Enhancement:   awakenings minimized   regular sleep/rest pattern promoted   relaxation techniques promoted   room darkened  Medication Review/Management: medications reviewed  Taken 2/14/2023 2229 by Holli Nicolas RN  Medication Review/Management: medications reviewed  Taken 2/14/2023 2220 by Holli Nicolas RN  Sleep/Rest Enhancement:   awakenings minimized   relaxation techniques promoted   room darkened  Taken 2/14/2023 2005 by Holli Nicolas RN  Sleep/Rest Enhancement:   awakenings minimized   regular sleep/rest pattern promoted   relaxation techniques promoted   room darkened  Medication Review/Management: medications  reviewed  Intervention: Develop Pain Management Plan  Recent Flowsheet Documentation  Taken 2/14/2023 2220 by Holli Nicolas RN  Pain Management Interventions:   see MAR   position adjusted   pillow support provided  Intervention: Optimize Psychosocial Wellbeing  Recent Flowsheet Documentation  Taken 2/14/2023 2005 by Holli Nicolas RN  Supportive Measures:   active listening utilized   verbalization of feelings encouraged     Problem: Fall Injury Risk  Goal: Absence of Fall and Fall-Related Injury  Outcome: Ongoing, Progressing  Intervention: Identify and Manage Contributors  Recent Flowsheet Documentation  Taken 2/15/2023 0440 by Holli Nicolas RN  Medication Review/Management: medications reviewed  Taken 2/15/2023 0202 by Holli Nicolas RN  Medication Review/Management: medications reviewed  Taken 2/15/2023 0017 by Holli Nicolas RN  Medication Review/Management: medications reviewed  Taken 2/14/2023 2229 by Holli Nicolas RN  Medication Review/Management: medications reviewed  Taken 2/14/2023 2005 by Holli Nicolas RN  Medication Review/Management: medications reviewed  Intervention: Promote Injury-Free Environment  Recent Flowsheet Documentation  Taken 2/15/2023 0440 by Holli Nicolas RN  Safety Promotion/Fall Prevention:   activity supervised   assistive device/personal items within reach   clutter free environment maintained   fall prevention program maintained   lighting adjusted   nonskid shoes/slippers when out of bed   room organization consistent   safety round/check completed  Taken 2/15/2023 0202 by Holli Nicolas RN  Safety Promotion/Fall Prevention:   activity supervised   assistive device/personal items within reach   clutter free environment maintained   fall prevention program maintained   lighting adjusted   nonskid shoes/slippers when out of bed   room organization consistent   safety round/check completed  Taken 2/15/2023 0017 by Holli Nicolas, KALEB  Safety Promotion/Fall Prevention:   activity  supervised   clutter free environment maintained   assistive device/personal items within reach   fall prevention program maintained   lighting adjusted   nonskid shoes/slippers when out of bed   safety round/check completed   room organization consistent  Taken 2/14/2023 2229 by Holli Nicolas RN  Safety Promotion/Fall Prevention:   activity supervised   assistive device/personal items within reach   clutter free environment maintained   fall prevention program maintained   lighting adjusted   nonskid shoes/slippers when out of bed   room organization consistent   safety round/check completed  Taken 2/14/2023 2005 by Holli Nicolas RN  Safety Promotion/Fall Prevention:   activity supervised   assistive device/personal items within reach   clutter free environment maintained   fall prevention program maintained   lighting adjusted   nonskid shoes/slippers when out of bed   room organization consistent   safety round/check completed     Problem: Skin Injury Risk Increased  Goal: Skin Health and Integrity  Outcome: Ongoing, Progressing  Intervention: Optimize Skin Protection  Recent Flowsheet Documentation  Taken 2/15/2023 0017 by Holli Nicolas, RN  Pressure Reduction Techniques:   frequent weight shift encouraged   weight shift assistance provided  Pressure Reduction Devices:   alternating pressure pump (ADD)   pressure-redistributing mattress utilized  Skin Protection:   adhesive use limited   incontinence pads utilized   transparent dressing maintained   tubing/devices free from skin contact  Taken 2/14/2023 2229 by Holli Nicolas RN  Head of Bed (HOB) Positioning: HOB at 30-45 degrees  Taken 2/14/2023 2005 by Holli Nicolas RN  Pressure Reduction Techniques:   frequent weight shift encouraged   weight shift assistance provided  Pressure Reduction Devices:   alternating pressure pump (ADD)   pressure-redistributing mattress utilized  Skin Protection:   incontinence pads utilized   tubing/devices free from skin contact      Problem: Infection  Goal: Absence of Infection Signs and Symptoms  Outcome: Ongoing, Progressing   Goal Outcome Evaluation:  Plan of Care Reviewed With: patient        Progress: no change  Outcome Evaluation: No s/sx of distress noted at this time. Rested some throughout night. Vital signs WDL. 02@2L NC while sleeping (pt states he has BERTHA and wears cpap at home) Medicated for pain with x2 doses of prn pain meds with some relief noted. Thoracic Sx consulted.  Will cont to monitor.

## 2023-02-15 NOTE — CONSULTS
Inpatient Thoracic Surgery Consult  Consult performed by: Joy Mcgill, RACHANA, APRN  Consult ordered by: Tracy Bull MD          Patient Care Team:  Dillon Walton MD as PCP - General (Family Medicine)  Nadia Montes De Oca MD as Consulting Physician (Hematology and Oncology)    Chief Complaint   Patient presents with   • Rib Pain       Subjective     History of Present Illness    History Buzz is a pleasant 82-year-old gentleman with past medical history of hypertension, GERD, atrial fibrillation anticoagulated on Xarelto as well as renal cell carcinoma status post right nephrectomy as well as a splenectomy followed at the [facility].  He presented to [facility] ER on 2/14/2023 with complaints of left-sided chest wall pain after falling out of bed over a week ago.  He reported a sharp pain and a pop on the left side of his chest with some minimal shortness of breath.  Initial evaluation in the ER included a chest x-ray and CT chest which demonstrated left-sided rib fractures of the left eighth and ninth rib, for which we have been asked to see him.    He is relatively functional at baseline, lives independently and continues to drive.  He does not require any supplemental oxygen at baseline.    Review of Systems   Constitutional: Negative.    HENT: Negative.    Eyes: Negative.    Respiratory: Positive for shortness of breath.    Cardiovascular: Positive for chest pain.   Gastrointestinal: Negative.    Endocrine: Negative.    Genitourinary: Negative.    Musculoskeletal: Negative.    Skin: Negative.    Allergic/Immunologic: Positive for immunocompromised state.   Neurological: Negative.    Hematological: Negative.    Psychiatric/Behavioral: Negative.         Patient Active Problem List   Diagnosis   • Anemia, vitamin B12 deficiency   • Arthritis   • Hemochromatosis   • Herpes zoster   • Hip pain   • Essential hypertension   • Cancer (HCC)   • Pulmonary emphysema (HCC)   • Left low back  pain   • Vertigo   • Headache around the eyes   • Mild cognitive impairment   • Metastatic renal cell carcinoma  on q 2 wks chemo   • Anxiety   • Malignant neoplasm of lung (HCC)   • Neuropathy   • Perennial allergic rhinitis   • Vitamin B12 deficiency   • Osteoarthritis of lumbar spine   • Pharyngitis   • Bronchitis   • Drug-induced constipation   • Hx of hiatal hernia   • History of lung cancer   • COVID-19 virus detected   • Dyspnea   • Generalized weakness   • Acute on chronic respiratory failure with hypoxia (HCC)   • Gastroesophageal reflux disease   • Hearing disorder   • Malignant neoplasm of prostate (HCC)   • Sleep apnea   • Anemia   • Malignant neoplasm metastatic to lung (HCC)   • Secondary bacterial pneumonia   • Pneumonia due to COVID-19 virus   • Hilar mass   • On antineoplastic chemotherapy q 2wks   • Paroxysmal atrial fibrillation (HCC)   • History of pulmonary embolism and DVT   • Chronic nausea   • Gastritis   • Muscle spasm   • Fall   • Closed fracture of multiple ribs of left side   • Chest wall pain   • Fall, initial encounter   • Immobility syndrome   • Self-care deficit   • Cytokine release syndrome, grade 2   • Dizziness   • Compression fracture of vertebral column (HCC)   • History of chronic lung disease   • Impairment of balance   • Lung involvement associated with another disorder (HCC)   • Pneumonia   • Closed fracture of multiple ribs of left side, initial encounter     Past Medical History:   Diagnosis Date   • Allergic    • Anemia, vitamin B12 deficiency 04/12/2012   • Anxiety    • Arthritis 03/24/2014   • Atrial fibrillation (HCC)    • Cancer (HCC) 04/29/2014    kidney; prostate   • Cataract    • Chemotherapeutic agent or infusion extravasation     q 1-2 wks    • COPD (chronic obstructive pulmonary disease) (HCC) 03/24/2014   • Emphysema of lung (HCC)    • Hemochromatosis 03/24/2014   • Herpes zoster 12/06/2011    left medial thigh   • Hip pain 03/24/2014   • History of Doppler  ultrasound 2011    superficial and deep venous insuffiency   • History of EKG 02/10/2012    Dr. Kathi Lloyd; unchanged from prior EKG   • Hypertension     benign essential   • Injury of back    • Pulmonary embolism (HCC) 2014   • Renal cell cancer (HCC)    • Shortness of breath    • Sleep apnea      Past Surgical History:   Procedure Laterality Date   • APPENDECTOMY     • CATARACT EXTRACTION     • CHOLECYSTECTOMY     • SPLENECTOMY     • TONSILLECTOMY     • VEIN SURGERY       Family History   Problem Relation Age of Onset   • Aneurysm Mother    • Stroke Mother    • Heart disease Father    • Diabetes Brother         type 2     Social History     Socioeconomic History   • Marital status:    Tobacco Use   • Smoking status: Former     Types: Cigarettes     Quit date:      Years since quittin.1   • Smokeless tobacco: Never   Vaping Use   • Vaping Use: Never used   Substance and Sexual Activity   • Alcohol use: Yes     Comment: 2 drinks month   • Drug use: No   • Sexual activity: Defer     Medications Prior to Admission   Medication Sig Dispense Refill Last Dose   • Albuterol Sulfate (VENTOLIN HFA IN) Inhale 2 puffs Every 4 (Four) Hours As Needed. SOA WHEEZING   Past Week   • amLODIPine (NORVASC) 5 MG tablet Take 1 tablet by mouth Daily. 90 tablet 1 2023   • Calcium Carb-Cholecalciferol (CALTRATE 600+D3 PO) Take 1 tablet by mouth 2 (two) times a day.   2023   • cyclobenzaprine (FLEXERIL) 5 MG tablet Take 7.5 mg by mouth 3 (Three) Times a Day As Needed for Muscle Spasms. SPASMS  Indications: Muscle Spasm   Past Week   • doxycycline (VIBRAMYICN) 100 MG tablet Take 1 tablet by mouth 2 (Two) Times a Day. 20 tablet 0 Past Week   • famotidine (Pepcid) 20 MG tablet Take 1 tablet by mouth 2 (Two) Times a Day. 180 tablet 1 2023   • finasteride (PROSCAR) 5 MG tablet Take 5 mg by mouth Daily. Indications: Benign Enlargement of Prostate   2023   • fluticasone (FLONASE) 50 MCG/ACT nasal  spray 1 spray into the nostril(s) as directed by provider Daily.   2/13/2023   • folic acid (FOLVITE) 1 MG tablet Take 1 mg by mouth 3 (Three) Times a Day.   2/14/2023   • gabapentin (NEURONTIN) 400 MG capsule Take PO: 400mg QAM and with lunch, 800mg QHS 12 capsule 0 2/14/2023   • HYDROcodone-acetaminophen (NORCO) 5-325 MG per tablet    Past Week   • ipratropium (ATROVENT) 0.06 % nasal spray 2 sprays into the nostril(s) as directed by provider 4 (Four) Times a Day. 15 mL 11 2/14/2023   • levocetirizine (XYZAL) 5 MG tablet Take 1 tablet by mouth Every Evening. 90 tablet 1 2/13/2023   • levothyroxine (SYNTHROID, LEVOTHROID) 112 MCG tablet Take 112 mcg by mouth Daily. Indications: Underactive Thyroid   2/14/2023   • meclizine (ANTIVERT) 12.5 MG tablet Take 1 tablet by mouth 3 (Three) Times a Day As Needed for Dizziness. 270 tablet 1 Past Month   • NAMZARIC 28-10 MG capsule sustained-release 24 hr Take 1 capsule by mouth Every Evening. Indications: Alzheimer's Disease   2/13/2023   • O2 (OXYGEN) Inhale 2 L/min Continuous. Indications: Emphysema   2/13/2023   • ondansetron (ZOFRAN) 4 MG tablet Take 4 mg by mouth Every 8 (Eight) Hours As Needed. NAUSEA /VOMITING  Indications: Nausea and Vomiting   Past Week   • OXcarbazepine (TRILEPTAL) 300 MG tablet Take 1 tablet by mouth every night at bedtime. 5 tablet 0 2/13/2023   • polyethylene glycol (MIRALAX) 17 g packet Take 17 g by mouth Daily.   2/13/2023   • sertraline (ZOLOFT) 25 MG tablet Take 50 mg by mouth Daily. Indications: Generalized Anxiety Disorder   2/14/2023   • tiotropium bromide-olodaterol (STIOLTO RESPIMAT) 2.5-2.5 MCG/ACT aerosol solution inhaler Inhale 2 puffs Daily. Indications: Emphysema of the Lung   2/14/2023   • vitamin B-12 (CYANOCOBALAMIN) 1000 MCG tablet Take 1 tablet by mouth Daily. 90 tablet 1 2/13/2023   • XARELTO 20 MG tablet Take 20 mg by mouth Daily With Dinner. Indications: Atrial Fibrillation   2/14/2023   • ALPRAZolam (Xanax) 0.25 MG tablet  Take 1 tablet by mouth 2 (Two) Times a Day As Needed for Anxiety. 60 tablet 2 More than a month   • sucralfate (Carafate) 1 GM/10ML suspension Take 10 mL by mouth 4 (Four) Times a Day With Meals & at Bedtime. 560 mL 1 More than a month     Allergies   Allergen Reactions   • Nsaids Hives and Itching   • Latex Rash   • Orange Unknown - Low Severity     LIPS BREAK OUT   • Orange Oil Unknown - Low Severity     Other reaction(s): Unknown - Low Severity  Other reaction(s): Unknown - Low Severity       Objective      Vital Signs  Temp:  [97.4 °F (36.3 °C)-97.9 °F (36.6 °C)] 97.4 °F (36.3 °C)  Heart Rate:  [56-79] 65  Resp:  [18] 18  BP: (125-153)/(56-94) 129/56    Intake & Output (last day)       02/14 0701  02/15 0700 02/15 0701  02/16 0700          Urine Unmeasured Occurrence 2 x           Physical Exam  Constitutional:       General: He is not in acute distress.     Appearance: Normal appearance. He is not ill-appearing.   HENT:      Head: Normocephalic and atraumatic.   Cardiovascular:      Rate and Rhythm: Normal rate.   Pulmonary:      Effort: Pulmonary effort is normal.   Chest:      Chest wall: Tenderness present.   Musculoskeletal:         General: Normal range of motion.      Cervical back: Normal range of motion and neck supple.   Skin:     General: Skin is warm and dry.      Findings: No bruising.   Neurological:      General: No focal deficit present.      Mental Status: He is alert and oriented to person, place, and time.   Psychiatric:         Mood and Affect: Mood normal.         Thought Content: Thought content normal.         Results Review:    I reviewed the patient's new clinical results.  I reviewed the patient's new imaging results and agree with the interpretation.  I reviewed the patient's other test results and agree with the interpretation  Discussed with Patient, RN and Dr. Brownlee    Imaging Results (Last 24 Hours)     Procedure Component Value Units Date/Time    CT Abdomen Pelvis With Contrast  [[id number]] Collected: 02/14/23 1416     Updated: 02/14/23 1447    Narrative:      CT ABDOMEN PELVIS W CONTRAST-, CT CHEST W CONTRAST DIAGNOSTIC-     HISTORY:  Felt pop in left ribs last week, now with right lateral chest  wall pain, increased shortness of breath. Upper abdominal pain.     TECHNIQUE: CT of the chest, abdomen and pelvis was performed following  administration of intravenous contrast. Reformatted images were  reviewed. Radiation dose reduction techniques were utilized, including  automated exposure control and exposure modulation based on body size.     COMPARISON:  CT chest 10/07/2022. CT abdomen and pelvis 03/11/2022.     FINDINGS CHEST: There is a right chest port with the tip at the superior  cavoatrial junction. There are calcified lymph nodes. There is a 5.6 x  3.8 cm left perihilar mass, previously 5.7 x 4.0 cm when remeasured on  10/07/2022, which is not significantly changed. Bilateral paraspinal  soft tissue nodularity, left greater than right, is not significantly  changed.  Heart size is normal. There is no pericardial effusion. There is a 2.9 x  3.1 cm soft tissue lesion within the left pericardial fat is not  significantly changed from previously 2.9 x 3.1 cm when remeasured There  is incompletely assessed calcific coronary artery atherosclerosis. There  is moderate to advanced calcific aortic atherosclerosis. The ascending  aorta is mildly ectatic to 3.5 cm at the level pulmonary trunk. The  descending aorta is mildly dilated to 3.1 cm. Pleural spaces are clear.  There is multilevel degenerative disc disease. There are acute to  subacute nondisplaced fracture of anterolateral left ribs 8 and 9. There  are multiple additional old left rib fractures. There are multiple  bilateral subcutaneous soft tissue nodules, including a reference 0.7 cm  subcutaneous nodule in the anterior left chest wall and reference 1.1 cm  and 1.0 cm nodules in the left chest wall.  The trachea is clear. There is  mild bibasilar atelectasis and/or  scarring. There is no focal consolidation. A few scattered small nodules  are not significantly changed.     FINDINGS ABDOMEN/PELVIS: The liver is normal in size. There is cirrhotic  liver morphology. There are scattered hypodense lesions in the liver,  including adjacent approximately 1.8 cm and 1.7 cm hypodense lesions in  the peripheral right hepatic lobe and a 1.6 cm lesion at the right  hepatic dome. There are calcified hepatic granulomata. The gallbladder  is surgically absent. The pancreas is within normal limits. There is a  tiny calcified splenule in the splenectomy bed. There is a 2.3 cm soft  tissue nodule in the superior left upper quadrant, which has increased  in size, previously 1.1 cm when remeasured. Bilateral adrenal nodules  have increased in size. The left kidney is surgically absent.  There are multiple prominent and mildly enlarged abdominal and pelvic  lymph nodes, which have increased in size. A reference 1.5 cm short axis  right lower quadrant lymph node previously measured 0.7 cm. A reference  1.0 cm left iliac chain lymph node previously measured 0.5 cm.  There is advanced calcific aortoiliac atherosclerosis. There is no free  fluid.  There is no bowel obstruction. There is a moderate right-sided colonic  stool burden. There is colonic diverticulosis. The bladder is poorly  distended. The prostate is present.  There is a left hip total arthroplasty. Streak artifact limits  evaluation of adjacent structures. There is multilevel degenerative disc  disease. There is an old L4 compression fracture. There is a 1.8 cm soft  tissue nodule in the dorsal upper left arm wall.          Impression:      COMBINED IMPRESSION:      1.  Acute to subacute nondisplaced fractures of anterolateral left ribs  8 and 9.  2.  Findings of metastatic disease progression in the chest, abdomen,  and pelvis, including right hepatic lesions, increased size of bilateral  adrenal  nodules, new and increased abdominal and pelvic lymph nodes and  peritoneal nodules, and new and increased subcutaneous soft tissue  nodules. Left perihilar and pericardial lesions and bilateral paraspinal  nodularity are not significantly changed.  3.  Cirrhotic liver morphology.  4.  Moderate right-sided colonic stool burden.     Discussed with Dr. Mccoy at 2:40 PM.     This report was finalized on 2/14/2023 2:44 PM by Dr. Claudette Vallecillo M.D.       CT Chest With Contrast Diagnostic [[id number]] Collected: 02/14/23 1416     Updated: 02/14/23 1447    Narrative:      CT ABDOMEN PELVIS W CONTRAST-, CT CHEST W CONTRAST DIAGNOSTIC-     HISTORY:  Felt pop in left ribs last week, now with right lateral chest  wall pain, increased shortness of breath. Upper abdominal pain.     TECHNIQUE: CT of the chest, abdomen and pelvis was performed following  administration of intravenous contrast. Reformatted images were  reviewed. Radiation dose reduction techniques were utilized, including  automated exposure control and exposure modulation based on body size.     COMPARISON:  CT chest 10/07/2022. CT abdomen and pelvis 03/11/2022.     FINDINGS CHEST: There is a right chest port with the tip at the superior  cavoatrial junction. There are calcified lymph nodes. There is a 5.6 x  3.8 cm left perihilar mass, previously 5.7 x 4.0 cm when remeasured on  10/07/2022, which is not significantly changed. Bilateral paraspinal  soft tissue nodularity, left greater than right, is not significantly  changed.  Heart size is normal. There is no pericardial effusion. There is a 2.9 x  3.1 cm soft tissue lesion within the left pericardial fat is not  significantly changed from previously 2.9 x 3.1 cm when remeasured There  is incompletely assessed calcific coronary artery atherosclerosis. There  is moderate to advanced calcific aortic atherosclerosis. The ascending  aorta is mildly ectatic to 3.5 cm at the level pulmonary trunk. The  descending  aorta is mildly dilated to 3.1 cm. Pleural spaces are clear.  There is multilevel degenerative disc disease. There are acute to  subacute nondisplaced fracture of anterolateral left ribs 8 and 9. There  are multiple additional old left rib fractures. There are multiple  bilateral subcutaneous soft tissue nodules, including a reference 0.7 cm  subcutaneous nodule in the anterior left chest wall and reference 1.1 cm  and 1.0 cm nodules in the left chest wall.  The trachea is clear. There is mild bibasilar atelectasis and/or  scarring. There is no focal consolidation. A few scattered small nodules  are not significantly changed.     FINDINGS ABDOMEN/PELVIS: The liver is normal in size. There is cirrhotic  liver morphology. There are scattered hypodense lesions in the liver,  including adjacent approximately 1.8 cm and 1.7 cm hypodense lesions in  the peripheral right hepatic lobe and a 1.6 cm lesion at the right  hepatic dome. There are calcified hepatic granulomata. The gallbladder  is surgically absent. The pancreas is within normal limits. There is a  tiny calcified splenule in the splenectomy bed. There is a 2.3 cm soft  tissue nodule in the superior left upper quadrant, which has increased  in size, previously 1.1 cm when remeasured. Bilateral adrenal nodules  have increased in size. The left kidney is surgically absent.  There are multiple prominent and mildly enlarged abdominal and pelvic  lymph nodes, which have increased in size. A reference 1.5 cm short axis  right lower quadrant lymph node previously measured 0.7 cm. A reference  1.0 cm left iliac chain lymph node previously measured 0.5 cm.  There is advanced calcific aortoiliac atherosclerosis. There is no free  fluid.  There is no bowel obstruction. There is a moderate right-sided colonic  stool burden. There is colonic diverticulosis. The bladder is poorly  distended. The prostate is present.  There is a left hip total arthroplasty. Streak artifact  limits  evaluation of adjacent structures. There is multilevel degenerative disc  disease. There is an old L4 compression fracture. There is a 1.8 cm soft  tissue nodule in the dorsal upper left arm wall.          Impression:      COMBINED IMPRESSION:      1.  Acute to subacute nondisplaced fractures of anterolateral left ribs  8 and 9.  2.  Findings of metastatic disease progression in the chest, abdomen,  and pelvis, including right hepatic lesions, increased size of bilateral  adrenal nodules, new and increased abdominal and pelvic lymph nodes and  peritoneal nodules, and new and increased subcutaneous soft tissue  nodules. Left perihilar and pericardial lesions and bilateral paraspinal  nodularity are not significantly changed.  3.  Cirrhotic liver morphology.  4.  Moderate right-sided colonic stool burden.     Discussed with Dr. Mccoy at 2:40 PM.     This report was finalized on 2/14/2023 2:44 PM by Dr. Claudette Vallecillo M.D.       XR Ribs Bilateral 4+ View With PA Chest [[id number]] Collected: 02/14/23 1248     Updated: 02/14/23 1254    Narrative:      XR RIBS BILATERAL 4+ VW W PA CHEST-     INDICATIONS: Trauma     TECHNIQUE: Frontal view of the chest, 9 views including the bilateral  ribs     COMPARISON: 10/06/2022     FINDINGS:     Right chest port appears stable. The heart size is normal. Aorta is  calcified. Pulmonary vasculature is unremarkable. Previous large left  hilar mass appears grossly stable (CT can be obtained for direct  comparison with prior CT exam if indicated). No focal pulmonary  consolidation, pleural effusion, or pneumothorax.     No acute rib fracture is identified.       Impression:         No acute rib fracture is identified. Redemonstration of left hilar mass.  Follow-up/further evaluation can be obtained as indicated.     This report was finalized on 2/14/2023 12:51 PM by Dr. David Hinds M.D.             Lab Results:  Lab Results (last 24 hours)     Procedure Component Value  Units Date/Time    Basic Metabolic Panel [[id number]]  (Abnormal) Collected: 02/15/23 0536    Specimen: Blood Updated: 02/15/23 0658     Glucose 94 mg/dL      BUN 13 mg/dL      Creatinine 1.10 mg/dL      Sodium 138 mmol/L      Potassium 4.4 mmol/L      Chloride 105 mmol/L      CO2 24.4 mmol/L      Calcium 8.5 mg/dL      BUN/Creatinine Ratio 11.8     Anion Gap 8.6 mmol/L      eGFR 66.6 mL/min/1.73     Narrative:      GFR Normal >60  Chronic Kidney Disease <60  Kidney Failure <15    The GFR formula is only valid for adults with stable renal function between ages 18 and 70.    CBC (No Diff) [[id number]]  (Abnormal) Collected: 02/15/23 0536    Specimen: Blood Updated: 02/15/23 0635     WBC 17.06 10*3/mm3      RBC 2.59 10*6/mm3      Hemoglobin 9.2 g/dL      Hematocrit 28.4 %      .7 fL      MCH 35.5 pg      MCHC 32.4 g/dL      RDW 10.3 %      RDW-SD 41.0 fl      MPV 9.9 fL      Platelets 408 10*3/mm3     STAT Lactic Acid, Reflex [[id number]]  (Normal) Collected: 02/14/23 1734    Specimen: Blood Updated: 02/14/23 1812     Lactate 2.0 mmol/L     Urinalysis With Microscopic If Indicated (No Culture) - Urine, Clean Catch [[id number]]  (Normal) Collected: 02/14/23 1648    Specimen: Urine, Clean Catch Updated: 02/14/23 1705     Color, UA Yellow     Appearance, UA Clear     pH, UA 6.0     Specific Gravity, UA >=1.030     Glucose, UA Negative     Ketones, UA Negative     Bilirubin, UA Negative     Blood, UA Negative     Protein, UA Negative     Leuk Esterase, UA Negative     Nitrite, UA Negative     Urobilinogen, UA 0.2 E.U./dL    Narrative:      Urine microscopic not indicated.    BNP [[id number]]  (Normal) Collected: 02/14/23 1205    Specimen: Blood Updated: 02/14/23 1348     proBNP 186.0 pg/mL     Narrative:      Among patients with dyspnea, NT-proBNP is highly sensitive for the detection of acute congestive heart failure. In addition NT-proBNP of <300 pg/ml effectively rules out acute congestive heart failure with 99%  negative predictive value.    Results may be falsely decreased if patient taking Biotin.      Single High Sensitivity Troponin T [[id number]]  (Abnormal) Collected: 02/14/23 1205    Specimen: Blood Updated: 02/14/23 1345     HS Troponin T 54 ng/L     Narrative:      High Sensitive Troponin T Reference Range:  <10.0 ng/L- Negative Female for AMI  <15.0 ng/L- Negative Male for AMI  >=10 - Abnormal Female indicating possible myocardial injury.  >=15 - Abnormal Male indicating possible myocardial injury.   Clinicians would have to utilize clinical acumen, EKG, Troponin, and serial changes to determine if it is an Acute Myocardial Infarction or myocardial injury due to an underlying chronic condition.         [place] Draw [[id number]] Collected: 02/14/23 1205    Specimen: Blood Updated: 02/14/23 1315    Narrative:      The following orders were created for panel order [place] Draw.  Procedure                               Abnormality         Status                     ---------                               -----------         ------                     Green Top (Gel)[[id number]]                                  Final result               Lavender Top[[id number]]                                     Final result               Gold Top - SST[[id number]]                                   Final result               Light Blue Top[[id number]]                                   Final result                 Please view results for these tests on the individual orders.    Light Blue Top [[id number]] Collected: 02/14/23 1205    Specimen: Blood Updated: 02/14/23 1315     Extra Tube Hold for add-ons.     Comment: Auto resulted       Lavender Top [[id number]] Collected: 02/14/23 1205    Specimen: Blood Updated: 02/14/23 1315     Extra Tube hold for add-on     Comment: Auto resulted       Green Top (Gel) [[id number]] Collected: 02/14/23 1205    Specimen: Blood Updated: 02/14/23 1315     Extra Tube Hold for add-ons.     Comment: Auto resulted.        Gold Top - SST [[id number]] Collected: 02/14/23 1205    Specimen: Blood Updated: 02/14/23 1315     Extra Tube Hold for add-ons.     Comment: Auto resulted.       Manual Differential [[id number]]  (Abnormal) Collected: 02/14/23 1205    Specimen: Blood Updated: 02/14/23 1300     Neutrophil % 53.0 %      Lymphocyte % 37.0 %      Monocyte % 8.0 %      Eosinophil % 1.0 %      Basophil % 1.0 %      Neutrophils Absolute 8.80 10*3/mm3      Lymphocytes Absolute 6.14 10*3/mm3      Monocytes Absolute 1.33 10*3/mm3      Eosinophils Absolute 0.17 10*3/mm3      Basophils Absolute 0.17 10*3/mm3      Anisocytosis Mod/2+     Macrocytes Mod/2+     WBC Morphology Normal     Platelet Morphology Normal    CBC & Differential [[id number]]  (Abnormal) Collected: 02/14/23 1205    Specimen: Blood Updated: 02/14/23 1300    Narrative:      The following orders were created for panel order CBC & Differential.  Procedure                               Abnormality         Status                     ---------                               -----------         ------                     CBC Auto Differential[[id number]]        Abnormal            Final result                 Please view results for these tests on the individual orders.    CBC Auto Differential [[id number]]  (Abnormal) Collected: 02/14/23 1205    Specimen: Blood Updated: 02/14/23 1300     WBC 16.60 10*3/mm3      RBC 2.84 10*6/mm3      Hemoglobin 10.3 g/dL      Hematocrit 31.7 %      .6 fL      MCH 36.3 pg      MCHC 32.5 g/dL      RDW 10.2 %      RDW-SD 41.1 fl      MPV 9.5 fL      Platelets 448 10*3/mm3      nRBC 0.4 /100 WBC     Lactic Acid, Plasma [[id number]]  (Abnormal) Collected: 02/14/23 1205    Specimen: Blood Updated: 02/14/23 1237     Lactate 2.3 mmol/L     Lipase [[id number]]  (Normal) Collected: 02/14/23 1205    Specimen: Blood Updated: 02/14/23 1235     Lipase 25 U/L     Comprehensive Metabolic Panel [[id number]]  (Abnormal) Collected: 02/14/23 1205    Specimen: Blood  Updated: 02/14/23 1235     Glucose 99 mg/dL      BUN 16 mg/dL      Creatinine 1.26 mg/dL      Sodium 141 mmol/L      Potassium 3.7 mmol/L      Chloride 105 mmol/L      CO2 25.6 mmol/L      Calcium 9.9 mg/dL      Total Protein 7.3 g/dL      Albumin 4.2 g/dL      ALT (SGPT) 13 U/L      AST (SGOT) 15 U/L      Alkaline Phosphatase 89 U/L      Total Bilirubin 1.4 mg/dL      Globulin 3.1 gm/dL      A/G Ratio 1.4 g/dL      BUN/Creatinine Ratio 12.7     Anion Gap 10.4 mmol/L      eGFR 56.6 mL/min/1.73     Narrative:      GFR Normal >60  Chronic Kidney Disease <60  Kidney Failure <15    The GFR formula is only valid for adults with stable renal function between ages 18 and 70.              Assessment & Plan       Closed fracture of multiple ribs of left side, initial encounter      Assessment & Plan    I have independently reviewed the CT of the chest performed upon admission with 3D reconstruction.  There are anterior nondisplaced rib fractures of the left eighth and ninth rib.  There are chronic healed rib fractures as well.  No pleural or pericardial effusion.  Evidence of disease progression throughout the chest as well.    Left rib fractures: Recommend nonoperative management with adequate pain control.  Lidoderm patch as well as scheduled Tylenol 650mg 3 times a day has been ordered for him.  Continue home dose of gabapentin as well.  Hold off on anti-inflammatories given use of anticoagulation.  Norco for breakthrough pain control.  Patient may also use heat and/or ice for comfort.  Recommend to increase mobilization and perform incentive spirometry 10 times per hour to prevent pneumonia.  He should continue this on discharge as well.    Constipation: Bowel regimen in place.  Defer to primary service    Renal cell carcinoma: Management per oncology    I discussed the patients findings and our recommendations with patient, family, nursing staff and Dr. Brownlee     Patient is stable for discharge from a thoracic perspective  once constipation resolves.  Thank you for allowing us to participate in the care of Mr. Gerber.  He may follow-up with us on an as-needed basis.      Joy Mcgill DNP, APRN  [facility]  02/15/23  12:31 EST    I spent >62 minutes reviewing the patient's chart including medical history, notes, radiographic imaging, labs and performing an assessment and development of a plan and discussion with the patient/family at bedside.

## 2023-02-15 NOTE — THERAPY EVALUATION
Patient Name: Mg Gerber Sr.  : 1940    MRN: [id number]                              Today's Date: 2/15/2023       Admit Date: 2023    Visit Dx:     ICD-10-CM ICD-9-CM   1. Closed fracture of multiple ribs of left side, initial encounter  S22.42XA 807.09   2. Metastatic malignant neoplasm, unspecified site (HCC)  C79.9 199.1   3. Upper abdominal pain  R10.10 789.09   4. Atypical chest pain  R07.89 786.59   5. Elevated troponin  R77.8 790.6   6. Constipation, unspecified constipation type  K59.00 564.00     Patient Active Problem List   Diagnosis   • Anemia, vitamin B12 deficiency   • Arthritis   • Hemochromatosis   • Herpes zoster   • Hip pain   • Essential hypertension   • Metastatic cancer (HCC)   • Pulmonary emphysema (HCC)   • Left low back pain   • Vertigo   • Headache around the eyes   • Mild cognitive impairment   • Metastatic renal cell carcinoma  on q 2 wks chemo   • Anxiety   • Malignant neoplasm of lung (HCC)   • Neuropathy   • Perennial allergic rhinitis   • Vitamin B12 deficiency   • Osteoarthritis of lumbar spine   • Pharyngitis   • Bronchitis   • Drug-induced constipation   • Hx of hiatal hernia   • History of lung cancer   • COVID-19 virus detected   • Dyspnea   • Generalized weakness   • Acute on chronic respiratory failure with hypoxia (HCC)   • Gastroesophageal reflux disease   • Hearing disorder   • Malignant neoplasm of prostate (HCC)   • Sleep apnea   • Anemia   • Malignant neoplasm metastatic to lung (HCC)   • Secondary bacterial pneumonia   • Pneumonia due to COVID-19 virus   • Hilar mass   • On antineoplastic chemotherapy q 2wks   • Paroxysmal atrial fibrillation (HCC)   • History of pulmonary embolism and DVT   • Chronic nausea   • Gastritis   • Muscle spasm   • Fall   • Closed fracture of multiple ribs of left side   • Chest wall pain   • Fall, initial encounter   • Immobility syndrome   • Self-care deficit   • Cytokine release syndrome, grade 2   • Dizziness   •  Compression fracture of vertebral column (HCC)   • History of chronic lung disease   • Impairment of balance   • Lung involvement associated with another disorder (HCC)   • Pneumonia   • Closed fracture of multiple ribs of left side, initial encounter   • Constipation   • Leukocytosis     Past Medical History:   Diagnosis Date   • Allergic    • Anemia, vitamin B12 deficiency 04/12/2012   • Anxiety    • Arthritis 03/24/2014   • Atrial fibrillation (HCC)    • Cancer (HCC) 04/29/2014    kidney; prostate   • Cataract    • Chemotherapeutic agent or infusion extravasation     q 1-2 wks    • COPD (chronic obstructive pulmonary disease) (HCC) 03/24/2014   • Emphysema of lung (HCC)    • Hemochromatosis 03/24/2014   • Herpes zoster 12/06/2011    left medial thigh   • Hip pain 03/24/2014   • History of Doppler ultrasound 12/28/2011    superficial and deep venous insuffiency   • History of EKG 02/10/2012    Dr. Kathi Lloyd; unchanged from prior EKG   • Hypertension     benign essential   • Injury of back    • Pulmonary embolism (HCC) 03/24/2014   • Renal cell cancer (HCC)    • Shortness of breath    • Sleep apnea      Past Surgical History:   Procedure Laterality Date   • APPENDECTOMY     • CATARACT EXTRACTION     • CHOLECYSTECTOMY     • SPLENECTOMY     • TONSILLECTOMY     • VEIN SURGERY        General Information     Row Name 02/15/23 7047          Physical Therapy Time and Intention    Document Type evaluation  -PC     Mode of Treatment physical therapy  -PC     Row Name 02/15/23 5927          General Information    Patient Profile Reviewed yes  -PC     Prior Level of Function independent:  no assistive device inside the home, cane outside the home, scooter for long distances outside the home  -PC     Existing Precautions/Restrictions fall  -PC     Barriers to Rehab medically complex  -PC     Row Name 02/15/23 9142          Living Environment    People in Home spouse;child(haroldo), adult  -PC     Row Name 02/15/23 2836           Cognition    Orientation Status (Cognition) oriented x 4  -PC     Row Name 02/15/23 1335          Safety Issues, Functional Mobility    Impairments Affecting Function (Mobility) endurance/activity tolerance;pain;postural/trunk control;strength  -PC           User Key  (r) = Recorded By, (t) = Taken By, (c) = Cosigned By    Initials Name Provider Type    PC Margarita Conklin, PT Physical Therapist               Mobility     Row Name 02/15/23 1337          Bed Mobility    Bed Mobility supine-sit;sit-supine  -PC     Supine-Sit [place] (Bed Mobility) moderate assist (50% patient effort)  -PC     Sit-Supine [place] (Bed Mobility) minimum assist (75% patient effort)  -PC     Assistive Device (Bed Mobility) head of bed elevated;bed rails  -PC     Row Name 02/15/23 1337          Sit-Stand Transfer    Sit-Stand [place] (Transfers) minimum assist (75% patient effort)  -PC     Assistive Device (Sit-Stand Transfers) cane, straight  -PC     Row Name 02/15/23 1337          Gait/Stairs (Locomotion)    [place] Level (Gait) contact guard  -PC     Assistive Device (Gait) cane, straight  -PC     Distance in Feet (Gait) pt holding cane in RUE, holding LUE on R chest wall to splint for pain, walked 80 ft with a wide based gait, lateral lean, unsteadiness noted  -PC     Deviations/Abnormal Patterns (Gait) antalgic;base of support, wide;samara decreased;gait speed decreased;stride length decreased  -PC     Bilateral Gait Deviations forward flexed posture;heel strike decreased  -PC           User Key  (r) = Recorded By, (t) = Taken By, (c) = Cosigned By    Initials Name Provider Type    PC Margarita Conklin PT Physical Therapist               Obj/Interventions     Row Name 02/15/23 1345          Range of Motion Comprehensive    Comment, General Range of Motion WFL  -PC     Row Name 02/15/23 1342          Strength Comprehensive (MMT)    Comment, General Manual Muscle Testing (MMT) Assessment no focal deficits, B LE 4+/5   -PC     Row Name 02/15/23 1343          Balance    Balance Assessment sitting static balance;sitting dynamic balance;standing static balance;standing dynamic balance  -PC     Static Sitting Balance standby assist  -PC     Dynamic Sitting Balance standby assist  -PC     Position, Sitting Balance sitting edge of bed  -PC     Static Standing Balance supervision  -PC     Dynamic Standing Balance contact guard;minimal assist  -PC     Position/Device Used, Standing Balance cane, straight  -PC           User Key  (r) = Recorded By, (t) = Taken By, (c) = Cosigned By    Initials Name Provider Type    PC Margarita Conklin, PT Physical Therapist               Goals/Plan     Row Name 02/15/23 1353          Bed Mobility Goal 1 (PT)    Activity/Assistive Device (Bed Mobility Goal 1, PT) sit to supine/supine to sit  -PC     [place] Level/Cues Needed (Bed Mobility Goal 1, PT) supervision required  -PC     Time Frame (Bed Mobility Goal 1, PT) 1 week  -PC     Row Name 02/15/23 [facility]3          Transfer Goal 1 (PT)    Activity/Assistive Device (Transfer Goal 1, PT) sit-to-stand/stand-to-sit  -PC     [place] Level/Cues Needed (Transfer Goal 1, PT) supervision required  -PC     Time Frame (Transfer Goal 1, PT) 1 week  -PC     [facility] Name 02/15/23 1353          Gait Training Goal 1 (PT)    Activity/Assistive Device (Gait Training Goal 1, PT) gait (walking locomotion);assistive device use;decrease asymmetrical patterns;decrease fall risk  -PC     [place] Level (Gait Training Goal 1, PT) supervision required  -PC     Distance (Gait Training Goal 1, PT) 100 ft  -PC     Time Frame (Gait Training Goal 1, PT) 1 week  -     Row Name 02/15/23 1353          Therapy Assessment/Plan (PT)    Planned Therapy Interventions (PT) gait training;home exercise program;transfer training;strengthening  -PC           User Key  (r) = Recorded By, (t) = Taken By, (c) = Cosigned By    Initials Name Provider Type    PC Margarita Conklin, PT Physical  Therapist               Clinical Impression     Row Name 02/15/23 8670          Pain    Pretreatment Pain Rating 5/10  -PC     Posttreatment Pain Rating 10/10  -PC     Pain Location - Side/Orientation Right  -PC     Pain Location anterior  -PC     Pain Location - chest  -PC     Pre/Posttreatment Pain Comment RN in to give pain meds  -PC     Pain Intervention(s) Medication (See MAR);Repositioned  -PC     Row Name 02/15/23 4933          Plan of Care Review    Plan of Care Reviewed With patient  -PC     Outcome Evaluation Pt adm with R anterior chest pain, had a fall 2 weeks ago with L 8,9 nondisplaced rib fractures, but then developed R chest wall pain, work up ongoing, pt has complex medical history with renal cell cancer with progression of metastatic disease. Pt lives at home with his wife and daughter, is an independent household ambulator without an assistive device, he reports using a cane outside, and a scooter for long distances. Pt presents with pain, general weakness, impaired functional mobility with unsteady gait, he will benefit from PT to address. Pt had significant pain limiting his mobility, also presents with some gait deficits with wide based gait, lateral lean, forward flexed posture, and may be safer with a rolling walker, PT will follow, current plan for discharge is home with assist  -PC     Row Name 02/15/23 0968          Therapy Assessment/Plan (PT)    Rehab Potential (PT) good, to achieve stated therapy goals  -     Criteria for Skilled Interventions Met (PT) yes;meets criteria  -     Therapy Frequency (PT) 6 times/wk  -     Row Name 02/15/23 1861          Positioning and Restraints    Pre-Treatment Position in bed  -PC     Post Treatment Position bed  -PC     In Bed supine;call light within reach;encouraged to call for assist;exit alarm on  -PC           User Key  (r) = Recorded By, (t) = Taken By, (c) = Cosigned By    Initials Name Provider Type    PC Margarita Conklin, PT Physical  Therapist               Outcome Measures     Row Name 02/15/23 1355          How much help from another person do you currently need...    Turning from your back to your side while in flat bed without using bedrails? 3  -PC     Moving from lying on back to sitting on the side of a flat bed without bedrails? 3  -PC     Moving to and from a bed to a chair (including a wheelchair)? 3  -PC     Standing up from a chair using your arms (e.g., wheelchair, bedside chair)? 3  -PC     Climbing 3-5 steps with a railing? 2  -PC     To walk in hospital room? 3  -PC     AM-PAC 6 Clicks Score (PT) 17  -PC     Highest level of mobility 5 --> Static standing  -PC     Row Name 02/15/23 1358          Functional Assessment    Outcome Measure Options AM-PAC 6 Clicks Basic Mobility (PT)  -PC           User Key  (r) = Recorded By, (t) = Taken By, (c) = Cosigned By    Initials Name Provider Type    PC Margarita Conklin PT Physical Therapist                             Physical Therapy Education     Title: PT OT SLP Therapies (Done)     Topic: Physical Therapy (Done)     Point: Mobility training (Done)     Learning Progress Summary           Patient Acceptance, E,D, DU by  at 2/15/2023 1359                   Point: Home exercise program (Done)     Learning Progress Summary           Patient Acceptance, E,D, DU by  at 2/15/2023 1359                   Point: Body mechanics (Done)     Learning Progress Summary           Patient Acceptance, E,D, DU by  at 2/15/2023 1359                   Point: Precautions (Done)     Learning Progress Summary           Patient Acceptance, E,D, DU by  at 2/15/2023 [facility]9                               User Key     Initials Effective Dates Name Provider Type Discipline     06/16/21 -  Margarita Conklin PT Physical Therapist PT              PT Recommendation and Plan  Planned Therapy Interventions (PT): gait training, home exercise program, transfer training, strengthening  Plan of Care Reviewed With:  patient  Outcome Evaluation: Pt adm with R anterior chest pain, had a fall 2 weeks ago with L 8,9 nondisplaced rib fractures, but then developed R chest wall pain, work up ongoing, pt has complex medical history with renal cell cancer with progression of metastatic disease. Pt lives at home with his wife and daughter, is an independent household ambulator without an assistive device, he reports using a cane outside, and a scooter for long distances. Pt presents with pain, general weakness, impaired functional mobility with unsteady gait, he will benefit from PT to address. Pt had significant pain limiting his mobility, also presents with some gait deficits with wide based gait, lateral lean, forward flexed posture, and may be safer with a rolling walker, PT will follow, current plan for discharge is home with assist     Time Calculation:    PT Charges     Row Name 02/15/23 1400             Time Calculation    Start Time 1147  -PC      Stop Time 1210  -PC      Time Calculation (min) 23 min  -PC      PT Received On 02/15/23  -PC      PT - Next Appointment 02/16/23  -PC      PT Goal Re-Cert Due Date 02/22/23  -PC         Time Calculation- PT    Total Timed Code Minutes- PT 18 minute(s)  -PC            User Key  (r) = Recorded By, (t) = Taken By, (c) = Cosigned By    Initials Name Provider Type    PC Margarita Conklin PT Physical Therapist              Therapy Charges for Today     Code Description Service Date Service Provider Modifiers Qty    42052648675  PT EVAL MOD COMPLEXITY 2 2/15/2023 Margarita Conklin, PT GP 1    88766327503 HC PT THER PROC EA 15 MIN 2/15/2023 Margarita Conklin, PT GP 1          PT G-Codes  Outcome Measure Options: AM-PAC 6 Clicks Basic Mobility (PT)  AM-PAC 6 Clicks Score (PT): 17  PT Discharge Summary  Anticipated Discharge Disposition (PT): home with assist    Margarita Conklin PT  2/15/2023

## 2023-02-16 PROBLEM — Z90.81 H/O SPLENECTOMY: Status: ACTIVE | Noted: 2023-02-16

## 2023-02-16 PROCEDURE — 96361 HYDRATE IV INFUSION ADD-ON: CPT

## 2023-02-16 PROCEDURE — G0378 HOSPITAL OBSERVATION PER HR: HCPCS

## 2023-02-16 PROCEDURE — 94799 UNLISTED PULMONARY SVC/PX: CPT

## 2023-02-16 PROCEDURE — 94664 DEMO&/EVAL PT USE INHALER: CPT

## 2023-02-16 RX ORDER — BISACODYL 10 MG
10 SUPPOSITORY, RECTAL RECTAL DAILY PRN
Status: DISCONTINUED | OUTPATIENT
Start: 2023-02-16 | End: 2023-02-17 | Stop reason: HOSPADM

## 2023-02-16 RX ORDER — CYCLOBENZAPRINE HCL 10 MG
5 TABLET ORAL 3 TIMES DAILY PRN
Status: DISCONTINUED | OUTPATIENT
Start: 2023-02-16 | End: 2023-02-17 | Stop reason: HOSPADM

## 2023-02-16 RX ORDER — BISACODYL 5 MG/1
10 TABLET, DELAYED RELEASE ORAL DAILY PRN
Status: DISCONTINUED | OUTPATIENT
Start: 2023-02-16 | End: 2023-02-17 | Stop reason: HOSPADM

## 2023-02-16 RX ADMIN — FINASTERIDE 5 MG: 5 TABLET, FILM COATED ORAL at 08:33

## 2023-02-16 RX ADMIN — HYDROCODONE BITARTRATE AND ACETAMINOPHEN 1 TABLET: 5; 325 TABLET ORAL at 17:45

## 2023-02-16 RX ADMIN — MEMANTINE HYDROCHLORIDE 10 MG: 10 TABLET, FILM COATED ORAL at 21:43

## 2023-02-16 RX ADMIN — IPRATROPIUM BROMIDE 2 SPRAY: 21 SPRAY, METERED NASAL at 21:43

## 2023-02-16 RX ADMIN — TIOTROPIUM BROMIDE INHALATION SPRAY 2 PUFF: 3.12 SPRAY, METERED RESPIRATORY (INHALATION) at 08:20

## 2023-02-16 RX ADMIN — HYDROCODONE BITARTRATE AND ACETAMINOPHEN 1 TABLET: 5; 325 TABLET ORAL at 04:06

## 2023-02-16 RX ADMIN — HYDROCODONE BITARTRATE AND ACETAMINOPHEN 1 TABLET: 5; 325 TABLET ORAL at 21:43

## 2023-02-16 RX ADMIN — LIDOCAINE 1 PATCH: 50 PATCH TOPICAL at 08:38

## 2023-02-16 RX ADMIN — DOCUSATE SODIUM 50MG AND SENNOSIDES 8.6MG 2 TABLET: 8.6; 5 TABLET, FILM COATED ORAL at 21:43

## 2023-02-16 RX ADMIN — FOLIC ACID 1 MG: 1 TABLET ORAL at 21:43

## 2023-02-16 RX ADMIN — CYCLOBENZAPRINE 5 MG: 10 TABLET, FILM COATED ORAL at 16:01

## 2023-02-16 RX ADMIN — GABAPENTIN 800 MG: 400 CAPSULE ORAL at 21:43

## 2023-02-16 RX ADMIN — DONEPEZIL HYDROCHLORIDE 10 MG: 10 TABLET, FILM COATED ORAL at 21:43

## 2023-02-16 RX ADMIN — PANTOPRAZOLE SODIUM 40 MG: 40 TABLET, DELAYED RELEASE ORAL at 06:41

## 2023-02-16 RX ADMIN — OXCARBAZEPINE 300 MG: 300 TABLET, FILM COATED ORAL at 13:44

## 2023-02-16 RX ADMIN — AMLODIPINE BESYLATE 5 MG: 5 TABLET ORAL at 08:36

## 2023-02-16 RX ADMIN — SERTRALINE 50 MG: 50 TABLET, FILM COATED ORAL at 08:36

## 2023-02-16 RX ADMIN — FOLIC ACID 1 MG: 1 TABLET ORAL at 15:27

## 2023-02-16 RX ADMIN — CETIRIZINE HYDROCHLORIDE 10 MG: 10 TABLET ORAL at 08:36

## 2023-02-16 RX ADMIN — RIVAROXABAN 20 MG: 20 TABLET, FILM COATED ORAL at 17:44

## 2023-02-16 RX ADMIN — MEMANTINE HYDROCHLORIDE 10 MG: 10 TABLET, FILM COATED ORAL at 08:40

## 2023-02-16 RX ADMIN — GABAPENTIN 400 MG: 400 CAPSULE ORAL at 08:37

## 2023-02-16 RX ADMIN — FLUTICASONE PROPIONATE 1 SPRAY: 50 SPRAY, METERED NASAL at 08:43

## 2023-02-16 RX ADMIN — Medication 1000 MCG: at 08:37

## 2023-02-16 RX ADMIN — SODIUM CHLORIDE 75 ML/HR: 9 INJECTION, SOLUTION INTRAVENOUS at 03:23

## 2023-02-16 RX ADMIN — ACETAMINOPHEN 650 MG: 325 TABLET, FILM COATED ORAL at 21:42

## 2023-02-16 RX ADMIN — LEVOTHYROXINE SODIUM 112 MCG: 0.11 TABLET ORAL at 06:41

## 2023-02-16 RX ADMIN — FOLIC ACID 1 MG: 1 TABLET ORAL at 08:37

## 2023-02-16 RX ADMIN — SODIUM CHLORIDE 75 ML/HR: 9 INJECTION, SOLUTION INTRAVENOUS at 15:26

## 2023-02-16 RX ADMIN — POLYETHYLENE GLYCOL 3350 17 G: 17 POWDER, FOR SOLUTION ORAL at 08:43

## 2023-02-16 RX ADMIN — IPRATROPIUM BROMIDE 2 SPRAY: 21 SPRAY, METERED NASAL at 08:42

## 2023-02-16 NOTE — PLAN OF CARE
Problem: Adult Inpatient Plan of Care  Goal: Plan of Care Review  Outcome: Ongoing, Progressing  Flowsheets (Taken 2/16/2023 0506)  Progress: no change  Plan of Care Reviewed With: patient  Outcome Evaluation:   No s/sx of distress noted at this time. Rested some throughout night. Vital signs WDL. 02@1-2L NC   only while sleeping. Patient states he wears a CPAP at home. Fall precautions maintained. Medicated for pain with prn meds per orders with some relief noted. Will cont to monitor.  Goal: Patient-Specific Goal (Individualized)  Outcome: Ongoing, Progressing  Goal: Absence of Hospital-Acquired Illness or Injury  Outcome: Ongoing, Progressing  Intervention: Identify and Manage Fall Risk  Recent Flowsheet Documentation  Taken 2/16/2023 0406 by Holli Nicolas RN  Safety Promotion/Fall Prevention:   activity supervised   assistive device/personal items within reach   clutter free environment maintained   fall prevention program maintained   lighting adjusted   nonskid shoes/slippers when out of bed   safety round/check completed   room organization consistent  Taken 2/16/2023 0214 by Holli Nicolas, KALEB  Safety Promotion/Fall Prevention:   assistive device/personal items within reach   clutter free environment maintained   activity supervised   elopement precautions   fall prevention program maintained   lighting adjusted   nonskid shoes/slippers when out of bed   room organization consistent   safety round/check completed  Taken 2/16/2023 0029 by Holli Nicolas, KALEB  Safety Promotion/Fall Prevention:   activity supervised   assistive device/personal items within reach   clutter free environment maintained   fall prevention program maintained   lighting adjusted   nonskid shoes/slippers when out of bed   room organization consistent   safety round/check completed  Taken 2/15/2023 2156 by Holli Nicolas, KALEB  Safety Promotion/Fall Prevention:   assistive device/personal items within reach   clutter free environment  maintained   activity supervised   fall prevention program maintained   lighting adjusted   nonskid shoes/slippers when out of bed   safety round/check completed   room organization consistent  Taken 2/15/2023 2017 by Holli Nicolas RN  Safety Promotion/Fall Prevention:   activity supervised   assistive device/personal items within reach   clutter free environment maintained   fall prevention program maintained   lighting adjusted   nonskid shoes/slippers when out of bed   room organization consistent   safety round/check completed  Intervention: Prevent Skin Injury  Recent Flowsheet Documentation  Taken 2/16/2023 0406 by Holli Nicolas RN  Body Position:   position changed independently   supine  Taken 2/16/2023 0214 by Holli Nicolas RN  Body Position: position changed independently  Taken 2/16/2023 0029 by Holli Nicolas RN  Body Position:   position changed independently   left   tilted  Skin Protection:   adhesive use limited   transparent dressing maintained   tubing/devices free from skin contact  Taken 2/15/2023 2156 by Holli Nicolas RN  Body Position: position changed independently  Taken 2/15/2023 2017 by Holli Nicolas RN  Body Position:   position changed independently   supine  Skin Protection:   adhesive use limited   incontinence pads utilized   tubing/devices free from skin contact   transparent dressing maintained  Intervention: Prevent and Manage VTE (Venous Thromboembolism) Risk  Recent Flowsheet Documentation  Taken 2/16/2023 0406 by Holli Nicolas RN  Activity Management: activity adjusted per tolerance  Taken 2/16/2023 0214 by Holli Nicolas RN  Activity Management: activity adjusted per tolerance  Taken 2/16/2023 0029 by Holli Nicolas RN  Activity Management: activity adjusted per tolerance  VTE Prevention/Management: (Xarelto) other (see comments)  Taken 2/15/2023 2156 by Holli Nicolas RN  Activity Management: activity adjusted per tolerance  Taken 2/15/2023 2017 by Holli Nicolas RN  Activity  Management: activity adjusted per tolerance  VTE Prevention/Management: (Xarelto) other (see comments)  Intervention: Prevent Infection  Recent Flowsheet Documentation  Taken 2/16/2023 0406 by Holli Nicolas RN  Infection Prevention:   hand hygiene promoted   rest/sleep promoted   single patient room provided  Taken 2/16/2023 0214 by Holli Nicolas RN  Infection Prevention:   hand hygiene promoted   single patient room provided   rest/sleep promoted  Taken 2/16/2023 0029 by Holli Nicolas RN  Infection Prevention:   hand hygiene promoted   rest/sleep promoted   single patient room provided  Taken 2/15/2023 2156 by Holli Nicolas RN  Infection Prevention:   hand hygiene promoted   rest/sleep promoted   single patient room provided  Taken 2/15/2023 2017 by Holli Nicolas RN  Infection Prevention:   hand hygiene promoted   rest/sleep promoted   single patient room provided  Goal: Optimal Comfort and Wellbeing  Outcome: Ongoing, Progressing  Intervention: Monitor Pain and Promote Comfort  Recent Flowsheet Documentation  Taken 2/16/2023 0406 by Holli Nicolas RN  Pain Management Interventions:   quiet environment facilitated   see MAR   pillow support provided  Taken 2/15/2023 2156 by Holli Nicolas RN  Pain Management Interventions:   pillow support provided   no interventions per patient request   quiet environment facilitated   see MAR  Intervention: Provide Person-Centered Care  Recent Flowsheet Documentation  Taken 2/15/2023 2017 by Holli Nicolas RN  Trust Relationship/Rapport:   care explained   reassurance provided   thoughts/feelings acknowledged  Goal: Readiness for Transition of Care  Outcome: Ongoing, Progressing     Problem: Pain Acute  Goal: Acceptable Pain Control and Functional Ability  Outcome: Ongoing, Progressing  Intervention: Prevent or Manage Pain  Recent Flowsheet Documentation  Taken 2/16/2023 0406 by Holli Nicolas RN  Medication Review/Management: medications reviewed  Taken 2/16/2023 0214 by Maria Isabel  KALEB De La Garza  Medication Review/Management: medications reviewed  Taken 2/16/2023 0029 by Holli Nicolas RN  Medication Review/Management: medications reviewed  Taken 2/15/2023 2156 by Holli Nicolas RN  Sleep/Rest Enhancement: awakenings minimized  Medication Review/Management: medications reviewed  Taken 2/15/2023 2017 by Holli Nicolas RN  Sleep/Rest Enhancement:   awakenings minimized   room darkened   relaxation techniques promoted  Medication Review/Management: medications reviewed  Intervention: Develop Pain Management Plan  Recent Flowsheet Documentation  Taken 2/16/2023 0406 by Holli Nicolas RN  Pain Management Interventions:   quiet environment facilitated   see MAR   pillow support provided  Taken 2/15/2023 2156 by Holli Nicolas RN  Pain Management Interventions:   pillow support provided   no interventions per patient request   quiet environment facilitated   see MAR  Intervention: Optimize Psychosocial Wellbeing  Recent Flowsheet Documentation  Taken 2/16/2023 0029 by Holli Nicolas RN  Supportive Measures:   active listening utilized   verbalization of feelings encouraged  Taken 2/15/2023 2017 by Holli Nicolas RN  Supportive Measures:   active listening utilized   verbalization of feelings encouraged  Goal: Acceptable Pain Control and Functional Ability  Outcome: Ongoing, Progressing  Intervention: Prevent or Manage Pain  Recent Flowsheet Documentation  Taken 2/16/2023 0406 by Holli Nicolas RN  Medication Review/Management: medications reviewed  Taken 2/16/2023 0214 by Holli Nicolas RN  Medication Review/Management: medications reviewed  Taken 2/16/2023 0029 by Holli Nicolas RN  Medication Review/Management: medications reviewed  Taken 2/15/2023 2156 by Holli Nicolas RN  Sleep/Rest Enhancement: awakenings minimized  Medication Review/Management: medications reviewed  Taken 2/15/2023 2017 by Holli Nicolas RN  Sleep/Rest Enhancement:   awakenings minimized   room darkened   relaxation techniques  promoted  Medication Review/Management: medications reviewed  Intervention: Develop Pain Management Plan  Recent Flowsheet Documentation  Taken 2/16/2023 0406 by Holli Nicolas RN  Pain Management Interventions:   quiet environment facilitated   see MAR   pillow support provided  Taken 2/15/2023 2156 by Holli Nicolas RN  Pain Management Interventions:   pillow support provided   no interventions per patient request   quiet environment facilitated   see MAR  Intervention: Optimize Psychosocial Wellbeing  Recent Flowsheet Documentation  Taken 2/16/2023 0029 by Holli Nicolas RN  Supportive Measures:   active listening utilized   verbalization of feelings encouraged  Taken 2/15/2023 2017 by Holli Nicolas RN  Supportive Measures:   active listening utilized   verbalization of feelings encouraged     Problem: Fall Injury Risk  Goal: Absence of Fall and Fall-Related Injury  Outcome: Ongoing, Progressing  Intervention: Identify and Manage Contributors  Recent Flowsheet Documentation  Taken 2/16/2023 0406 by Holli Nicolas RN  Medication Review/Management: medications reviewed  Taken 2/16/2023 0214 by Holli Nicolas RN  Medication Review/Management: medications reviewed  Taken 2/16/2023 0029 by Holli Nicolas RN  Medication Review/Management: medications reviewed  Taken 2/15/2023 2156 by Holli Nicolas RN  Medication Review/Management: medications reviewed  Taken 2/15/2023 2017 by Holli Nicolas RN  Medication Review/Management: medications reviewed  Intervention: Promote Injury-Free Environment  Recent Flowsheet Documentation  Taken 2/16/2023 0406 by Holli Nicolas RN  Safety Promotion/Fall Prevention:   activity supervised   assistive device/personal items within reach   clutter free environment maintained   fall prevention program maintained   lighting adjusted   nonskid shoes/slippers when out of bed   safety round/check completed   room organization consistent  Taken 2/16/2023 0214 by Holli Nicolas RN  Safety Promotion/Fall  Prevention:   assistive device/personal items within reach   clutter free environment maintained   activity supervised   elopement precautions   fall prevention program maintained   lighting adjusted   nonskid shoes/slippers when out of bed   room organization consistent   safety round/check completed  Taken 2/16/2023 0029 by oHlli Nicolas RN  Safety Promotion/Fall Prevention:   activity supervised   assistive device/personal items within reach   clutter free environment maintained   fall prevention program maintained   lighting adjusted   nonskid shoes/slippers when out of bed   room organization consistent   safety round/check completed  Taken 2/15/2023 2156 by Holli Nicolas RN  Safety Promotion/Fall Prevention:   assistive device/personal items within reach   clutter free environment maintained   activity supervised   fall prevention program maintained   lighting adjusted   nonskid shoes/slippers when out of bed   safety round/check completed   room organization consistent  Taken 2/15/2023 2017 by Holli Nicolas RN  Safety Promotion/Fall Prevention:   activity supervised   assistive device/personal items within reach   clutter free environment maintained   fall prevention program maintained   lighting adjusted   nonskid shoes/slippers when out of bed   room organization consistent   safety round/check completed     Problem: Skin Injury Risk Increased  Goal: Skin Health and Integrity  Outcome: Ongoing, Progressing  Intervention: Optimize Skin Protection  Recent Flowsheet Documentation  Taken 2/16/2023 0029 by Holli Nicolas RN  Pressure Reduction Devices:   alternating pressure pump (ADD)   pressure-redistributing mattress utilized  Skin Protection:   adhesive use limited   transparent dressing maintained   tubing/devices free from skin contact  Taken 2/15/2023 2156 by Holli Nicolas RN  Head of Bed (HOB) Positioning: HOB at 20-30 degrees  Taken 2/15/2023 2017 by Holli Nicolas RN  Pressure Reduction Techniques:    frequent weight shift encouraged   weight shift assistance provided  Head of Bed (HOB) Positioning: HOB at 20-30 degrees  Pressure Reduction Devices:   alternating pressure pump (ADD)   pressure-redistributing mattress utilized  Skin Protection:   adhesive use limited   incontinence pads utilized   tubing/devices free from skin contact   transparent dressing maintained     Problem: Infection  Goal: Absence of Infection Signs and Symptoms  Outcome: Ongoing, Progressing  Intervention: Prevent or Manage Infection  Recent Flowsheet Documentation  Taken 2/15/2023 2017 by Holli Nicolas RN  Isolation Precautions: precautions maintained   Goal Outcome Evaluation:  Plan of Care Reviewed With: patient        Progress: no change  Outcome Evaluation: No s/sx of distress noted at this time. Rested some throughout night. Vital signs WDL. 02@1-2L NC; only while sleeping. Patient states he wears a CPAP at home. Fall precautions maintained. Medicated for pain with prn meds per orders with some relief noted. Will cont to monitor.

## 2023-02-16 NOTE — PROGRESS NOTES
Name: Mg Gerber Sr. ADMIT: 2023   : 1940  PCP: Dillon Walton MD    MRN: [id number] LOS: 0 days   AGE/SEX: 82 y.o. male  ROOM: [facility]     Subjective   Subjective   Continues to c/o rt sided rib/chest wall pain, especially w/deep breathing. No increased soa. Minimal results w/enema yesterday    Review of Systems   Constitutional: Negative for appetite change, chills and fever.   HENT: Negative for congestion.    Respiratory: Positive for shortness of breath.         Chronic PIMENTEL   Cardiovascular: Negative for chest pain.   Gastrointestinal: Positive for constipation.   Genitourinary: Negative for difficulty urinating.   Musculoskeletal: Positive for myalgias.   Skin: Negative for rash.   Neurological: Negative for headaches.   Psychiatric/Behavioral: Negative for sleep disturbance.        Objective   Objective   Vital Signs  Temp:  [97.4 °F (36.3 °C)-97.7 °F (36.5 °C)] 97.4 °F (36.3 °C)  Heart Rate:  [58-72] 71  Resp:  [16-18] 18  BP: (119-137)/(61-79) 137/64  SpO2:  [91 %-97 %] 92 %  on  Flow (L/min):  [2] 2;   Device (Oxygen Therapy): nasal cannula  Body mass index is 28.83 kg/m².  Physical Exam  Vitals and nursing note reviewed.   Constitutional:       General: He is not in acute distress.     Appearance: He is ill-appearing. He is not toxic-appearing.   HENT:      Head: Normocephalic.      Mouth/Throat:      Mouth: Mucous membranes are moist.   Eyes:      Conjunctiva/sclera: Conjunctivae normal.   Cardiovascular:      Rate and Rhythm: Normal rate and regular rhythm.   Pulmonary:      Effort: Pulmonary effort is normal. No respiratory distress.      Breath sounds: No wheezing or rales.      Comments: Decreased breath sounds armnai lower lobes  Abdominal:      General: Bowel sounds are normal.      Palpations: Abdomen is soft.   Musculoskeletal:         General: Tenderness present.      Cervical back: Neck supple.      Right lower leg: No edema.      Left lower leg: No edema.      Comments:  Producible pain rt rib/lateral chest wall w/palpation   Skin:     General: Skin is warm and dry.   Neurological:      Mental Status: He is alert. Mental status is at baseline.   Psychiatric:         Mood and Affect: Mood normal.         Behavior: Behavior normal.       Results Review     I reviewed the patient's new clinical results.  Results from last 7 days   Lab Units 02/15/23  0536 02/14/23  1205   WBC 10*3/mm3 17.06* 16.60*   HEMOGLOBIN g/dL 9.2* 10.3*   PLATELETS 10*3/mm3 408 448     Results from last 7 days   Lab Units 02/15/23  0536 02/14/23  1205   SODIUM mmol/L 138 141   POTASSIUM mmol/L 4.4 3.7   CHLORIDE mmol/L 105 105   CO2 mmol/L 24.4 25.6   BUN mg/dL 13 16   CREATININE mg/dL 1.10 1.26   GLUCOSE mg/dL 94 99   EGFR mL/min/1.73 66.6 56.6*     Results from last 7 days   Lab Units 02/14/23  1205   ALBUMIN g/dL 4.2   BILIRUBIN mg/dL 1.4*   ALK PHOS U/L 89   AST (SGOT) U/L 15   ALT (SGPT) U/L 13     Results from last 7 days   Lab Units 02/15/23  0536 02/14/23  1205   CALCIUM mg/dL 8.5* 9.9   ALBUMIN g/dL  --  4.2     Results from last 7 days   Lab Units 02/14/23  1734 02/14/23  1205   LACTATE mmol/L 2.0 2.3*     No results found for: HGBA1C, POCGLU    No radiology results for the last day  I have personally reviewed all medications:  Scheduled Medications  acetaminophen, 650 mg, Oral, TID  amLODIPine, 5 mg, Oral, Daily  cetirizine, 10 mg, Oral, Daily  memantine, 10 mg, Oral, Q12H   And  donepezil, 10 mg, Oral, Nightly  finasteride, 5 mg, Oral, Daily  fluticasone, 1 spray, Nasal, Daily  folic acid, 1 mg, Oral, TID  gabapentin, 400 mg, Oral, Daily  gabapentin, 800 mg, Oral, Nightly  ipratropium, 2 spray, Each Nare, 4x Daily  levothyroxine, 112 mcg, Oral, Q AM  lidocaine, 1 patch, Transdermal, Q24H  OXcarbazepine, 300 mg, Oral, Q12H  pantoprazole, 40 mg, Oral, Q AM  polyethylene glycol, 17 g, Oral, Daily  rivaroxaban, 20 mg, Oral, Daily With Dinner  senna-docusate sodium, 2 tablet, Oral, Nightly  sertraline, 50  mg, Oral, Daily  tiotropium bromide monohydrate, 2 puff, Inhalation, Daily - RT  vitamin B-12, 1,000 mcg, Oral, Daily    Infusions  sodium chloride, 75 mL/hr, Last Rate: 75 mL/hr (02/16/23 0593)    Diet  Diet: Cardiac Diets; Healthy Heart (2-3 Na+); Texture: Regular Texture (IDDSI 7); Fluid Consistency: Thin (IDDSI 0)    I have personally reviewed:  [x]  Laboratory   [x]  Microbiology   [x]  Radiology   [x]  EKG/Telemetry  [x]  Cardiology/Vascular   [x]  Pathology    [x]  Records       Assessment/Plan     Active Hospital Problems    Diagnosis  POA   • **Closed fracture of multiple ribs of left side, initial encounter [S22.42XA]  Yes   • H/O splenectomy [Z90.81]  Not Applicable   • Constipation [K59.00]  Yes   • Leukocytosis [D72.829]  Yes   • History of pulmonary embolism and DVT [Z86.711]  Yes   • Paroxysmal atrial fibrillation (HCC) [I48.0]  Yes   • Essential hypertension [I10]  Yes   • Metastatic cancer (HCC) [C79.9]  Yes   • Pulmonary emphysema (HCC) [J43.9]  Yes      Resolved Hospital Problems   No resolved problems to display.       82 y.o. male admitted with Closed fracture of multiple ribs of left side, initial encounter.    Lt rib fracture/Rt sided pain:  -Thoracic surgery assistance appreciated. Continues to pain rt rib area/lateral chest wall, producible w/palpation, likely musculoskeletal. Lidoderm patch, scheduled tylenol, norco and flexeril as needed. Encouraged increased mobility. Denies worsening soa. Unclear etiology of rt sided pain. Has evidence of metastatic disease on CT C/A/P as well as constipation. S/P enema w/minimal results. Miralax, senna, prn dulcolax     Metastatic renal cell carcinoma/Chronic leukocytosis/Hx splenectomy:  -Appreciate Oncology assistance. Has been followed by  for treatment of RCC w/follow up in 2 weeks. Has known progression of disease. Chronic leukocytosis likely due to hx of splenectomy     HTN:  -BP acceptable acutely     Paroxysmal afib/Hx PE & DVT:  -AC  w/xarelto. HR controlled     Emphysema:  -No wheezing on exam. Reports uses O2 at home. Albuterol as needed     PT to follow up re: dispo recommendation    · Xarelto (home med) for DVT prophylaxis.  · Full code.  · Discussed with patient and nursing staff.  · Anticipate discharge home with HH vs SNU facility in 1-2 days.    35 minutes spent on medical decision making, patient counseling     ADRIANA Manuel  [facility]  02/16/23  14:09 EST

## 2023-02-16 NOTE — CASE MANAGEMENT/SOCIAL WORK
Continued Stay Note  [facility]     Patient Name: Mg Gerber Sr.  MRN: [id number]  Today's Date: 2/16/2023    Admit Date: 2/14/2023    [facility]: [facility]   Discharge Plan     Row Name 02/16/23 1552       Plan    Plan [facility]    Patient/Family in Agreement with Plan yes    Plan Comments Spoke with Mindy/Homa, she should have bed available for patient [facility] and will start cert today CCP will follow - Chel SNYDER               Discharge Codes    No documentation.               Expected Discharge Date and Time     Expected Discharge Date Expected Discharge Time    Feb 17, 2023             Chel Pak RN

## 2023-02-17 VITALS
OXYGEN SATURATION: 97 % | WEIGHT: 222 LBS | TEMPERATURE: 97.7 F | SYSTOLIC BLOOD PRESSURE: 148 MMHG | BODY MASS INDEX: 29.42 KG/M2 | RESPIRATION RATE: 16 BRPM | HEART RATE: 77 BPM | DIASTOLIC BLOOD PRESSURE: 66 MMHG | HEIGHT: 73 IN

## 2023-02-17 PROCEDURE — 94761 N-INVAS EAR/PLS OXIMETRY MLT: CPT

## 2023-02-17 PROCEDURE — 94799 UNLISTED PULMONARY SVC/PX: CPT

## 2023-02-17 PROCEDURE — 97110 THERAPEUTIC EXERCISES: CPT

## 2023-02-17 PROCEDURE — G0378 HOSPITAL OBSERVATION PER HR: HCPCS

## 2023-02-17 PROCEDURE — 94664 DEMO&/EVAL PT USE INHALER: CPT

## 2023-02-17 PROCEDURE — 97530 THERAPEUTIC ACTIVITIES: CPT

## 2023-02-17 PROCEDURE — 96361 HYDRATE IV INFUSION ADD-ON: CPT

## 2023-02-17 PROCEDURE — 96375 TX/PRO/DX INJ NEW DRUG ADDON: CPT

## 2023-02-17 PROCEDURE — 25010000002 ONDANSETRON PER 1 MG: Performed by: INTERNAL MEDICINE

## 2023-02-17 PROCEDURE — 96376 TX/PRO/DX INJ SAME DRUG ADON: CPT

## 2023-02-17 RX ORDER — GABAPENTIN 400 MG/1
CAPSULE ORAL
Qty: 9 CAPSULE | Refills: 0 | Status: SHIPPED | OUTPATIENT
Start: 2023-02-17 | End: 2023-02-17 | Stop reason: SDUPTHER

## 2023-02-17 RX ORDER — GABAPENTIN 400 MG/1
CAPSULE ORAL
Qty: 9 CAPSULE | Refills: 0 | Status: SHIPPED | OUTPATIENT
Start: 2023-02-17

## 2023-02-17 RX ORDER — AMOXICILLIN 250 MG
2 CAPSULE ORAL 2 TIMES DAILY
Qty: 120 TABLET | Refills: 0 | Status: SHIPPED | OUTPATIENT
Start: 2023-02-17 | End: 2023-02-17 | Stop reason: SDUPTHER

## 2023-02-17 RX ORDER — LIDOCAINE 50 MG/G
1 PATCH TOPICAL
Qty: 30 EACH | Refills: 0 | Status: SHIPPED | OUTPATIENT
Start: 2023-02-18 | End: 2023-03-27 | Stop reason: SDUPTHER

## 2023-02-17 RX ORDER — HYDROCODONE BITARTRATE AND ACETAMINOPHEN 5; 325 MG/1; MG/1
1 TABLET ORAL EVERY 6 HOURS PRN
Qty: 12 TABLET | Refills: 0 | Status: SHIPPED | OUTPATIENT
Start: 2023-02-17 | End: 2023-02-17 | Stop reason: SDUPTHER

## 2023-02-17 RX ORDER — ALPRAZOLAM 0.25 MG/1
0.25 TABLET ORAL 2 TIMES DAILY PRN
Qty: 6 TABLET | Refills: 0 | Status: SHIPPED | OUTPATIENT
Start: 2023-02-17

## 2023-02-17 RX ORDER — HYDROCODONE BITARTRATE AND ACETAMINOPHEN 5; 325 MG/1; MG/1
1 TABLET ORAL EVERY 6 HOURS PRN
Qty: 12 TABLET | Refills: 0 | Status: SHIPPED | OUTPATIENT
Start: 2023-02-17

## 2023-02-17 RX ORDER — LIDOCAINE 50 MG/G
1 PATCH TOPICAL
Qty: 30 EACH | Refills: 0 | Status: SHIPPED | OUTPATIENT
Start: 2023-02-18 | End: 2023-02-17 | Stop reason: SDUPTHER

## 2023-02-17 RX ORDER — PANTOPRAZOLE SODIUM 40 MG/1
40 TABLET, DELAYED RELEASE ORAL
Qty: 30 TABLET | Refills: 0 | Status: SHIPPED | OUTPATIENT
Start: 2023-02-18 | End: 2023-02-17 | Stop reason: SDUPTHER

## 2023-02-17 RX ORDER — POLYETHYLENE GLYCOL 3350 17 G/17G
17 POWDER, FOR SOLUTION ORAL 2 TIMES DAILY
Status: DISCONTINUED | OUTPATIENT
Start: 2023-02-17 | End: 2023-02-17 | Stop reason: HOSPADM

## 2023-02-17 RX ORDER — ALPRAZOLAM 0.25 MG/1
0.25 TABLET ORAL 2 TIMES DAILY PRN
Qty: 6 TABLET | Refills: 0 | Status: SHIPPED | OUTPATIENT
Start: 2023-02-17 | End: 2023-02-17 | Stop reason: SDUPTHER

## 2023-02-17 RX ORDER — AMOXICILLIN 250 MG
2 CAPSULE ORAL 2 TIMES DAILY
Qty: 120 TABLET | Refills: 0 | Status: SHIPPED | OUTPATIENT
Start: 2023-02-17

## 2023-02-17 RX ORDER — AMOXICILLIN 250 MG
2 CAPSULE ORAL 2 TIMES DAILY
Status: DISCONTINUED | OUTPATIENT
Start: 2023-02-17 | End: 2023-02-17 | Stop reason: HOSPADM

## 2023-02-17 RX ORDER — PANTOPRAZOLE SODIUM 40 MG/1
40 TABLET, DELAYED RELEASE ORAL
Qty: 30 TABLET | Refills: 0 | Status: SHIPPED | OUTPATIENT
Start: 2023-02-18

## 2023-02-17 RX ADMIN — MEMANTINE HYDROCHLORIDE 10 MG: 10 TABLET, FILM COATED ORAL at 08:30

## 2023-02-17 RX ADMIN — SODIUM CHLORIDE 75 ML/HR: 9 INJECTION, SOLUTION INTRAVENOUS at 08:37

## 2023-02-17 RX ADMIN — FOLIC ACID 1 MG: 1 TABLET ORAL at 08:30

## 2023-02-17 RX ADMIN — DOCUSATE SODIUM 50MG AND SENNOSIDES 8.6MG 2 TABLET: 8.6; 5 TABLET, FILM COATED ORAL at 15:26

## 2023-02-17 RX ADMIN — OXCARBAZEPINE 300 MG: 300 TABLET, FILM COATED ORAL at 08:29

## 2023-02-17 RX ADMIN — FINASTERIDE 5 MG: 5 TABLET, FILM COATED ORAL at 08:30

## 2023-02-17 RX ADMIN — TIOTROPIUM BROMIDE INHALATION SPRAY 2 PUFF: 3.12 SPRAY, METERED RESPIRATORY (INHALATION) at 07:37

## 2023-02-17 RX ADMIN — Medication 1000 MCG: at 08:30

## 2023-02-17 RX ADMIN — FOLIC ACID 1 MG: 1 TABLET ORAL at 15:26

## 2023-02-17 RX ADMIN — LEVOTHYROXINE SODIUM 112 MCG: 0.11 TABLET ORAL at 06:05

## 2023-02-17 RX ADMIN — POLYETHYLENE GLYCOL 3350 17 G: 17 POWDER, FOR SOLUTION ORAL at 08:29

## 2023-02-17 RX ADMIN — AMLODIPINE BESYLATE 5 MG: 5 TABLET ORAL at 08:29

## 2023-02-17 RX ADMIN — ONDANSETRON 4 MG: 2 INJECTION INTRAMUSCULAR; INTRAVENOUS at 15:26

## 2023-02-17 RX ADMIN — CETIRIZINE HYDROCHLORIDE 10 MG: 10 TABLET ORAL at 08:30

## 2023-02-17 RX ADMIN — BISACODYL 10 MG: 5 TABLET ORAL at 08:29

## 2023-02-17 RX ADMIN — IPRATROPIUM BROMIDE 2 SPRAY: 21 SPRAY, METERED NASAL at 08:38

## 2023-02-17 RX ADMIN — IPRATROPIUM BROMIDE 2 SPRAY: 21 SPRAY, METERED NASAL at 12:41

## 2023-02-17 RX ADMIN — GABAPENTIN 400 MG: 400 CAPSULE ORAL at 08:29

## 2023-02-17 RX ADMIN — PANTOPRAZOLE SODIUM 40 MG: 40 TABLET, DELAYED RELEASE ORAL at 06:05

## 2023-02-17 RX ADMIN — FLUTICASONE PROPIONATE 1 SPRAY: 50 SPRAY, METERED NASAL at 08:38

## 2023-02-17 RX ADMIN — RIVAROXABAN 20 MG: 20 TABLET, FILM COATED ORAL at 17:12

## 2023-02-17 RX ADMIN — LIDOCAINE 1 PATCH: 50 PATCH TOPICAL at 08:30

## 2023-02-17 RX ADMIN — SERTRALINE 50 MG: 50 TABLET, FILM COATED ORAL at 08:30

## 2023-02-17 NOTE — NURSING NOTE
Patient c/o constipation. Patient took docusate tablets but refused suppository. Patient stated they do not work for him. Patient also drank some prune juice in hopes that will help. Will pass on to day shift.

## 2023-02-17 NOTE — DISCHARGE PLACEMENT REQUEST
"Storm Koch Sr. (82 y.o. Male)     Date of Birth   1940    Social Security Number       Address   [address] [place] [address]    Home Phone   [phone]    MRN   [id number]       Mandaeism   Anabaptist    Marital Status                               Admission Date   2/14/23    Admission Type   Emergency    Admitting Provider   Tracy Bull MD    Attending Provider   Cristopher Alvarez MD    Department, Room/Bed   [address], [address]/1       Discharge Date       Discharge Disposition   Skilled Nursing Facility (DC - External)    Discharge Destination                               Attending Provider: Cristopher Alvarez MD    Allergies: Nsaids, Latex, Orange, Orange Oil    Isolation: None   Infection: None   Code Status: CPR    Ht: 185.4 cm (73\")   Wt: 101 kg (222 lb)    Admission Cmt: None   Principal Problem: Closed fracture of multiple ribs of left side, initial encounter [S22.42XA]                 Active Insurance as of 2/14/2023     Primary Coverage     Payor Plan Insurance Group Employer/Plan Group    HUMANA MEDICARE REPLACEMENT HUMANA MEDICARE REPLACEMENT [id number]     Payor Plan Address Payor Plan Phone Number Payor Plan Fax Number Effective Dates    [address] [address] [phone]  1/1/2013 - None Entered    [facility] [id number]       Subscriber Name Subscriber Birth Date Member ID       STORM KOCH SR. 1940 [id number]                 Emergency Contacts      (Rel.) Home Phone Work Phone Mobile Phone    Lucina Koch (Spouse) [phone] -- --    Storm Koch (Son) [phone] -- --    Romina Koch (Daughter) [phone] -- --               Discharge Summary      Cristopher Alvarez MD at 02/17/23 1547          Date of Admission: 2/14/2023  Date of Discharge:  2/17/2023  Primary Care Physician: Dillon Walton MD     Discharge Diagnosis:  Active Hospital Problems    Diagnosis  POA   • **Closed fracture of multiple ribs of " "left side, initial encounter [S22.42XA]  Yes   • H/O splenectomy [Z90.81]  Not Applicable   • Constipation [K59.00]  Yes   • Leukocytosis [D72.829]  Yes   • History of pulmonary embolism and DVT [Z86.711]  Yes   • Paroxysmal atrial fibrillation (HCC) [I48.0]  Yes   • Drug-induced constipation [K59.03]  Yes   • Essential hypertension [I10]  Yes   • Metastatic cancer (HCC) [C79.9]  Yes   • Pulmonary emphysema (HCC) [J43.9]  Yes      Resolved Hospital Problems   No resolved problems to display.       Presenting Problem/History of Present Illness:  Atypical chest pain [R07.89]  Elevated troponin [R77.8]  Upper abdominal pain [R10.10]  Closed fracture of multiple ribs of left side, initial encounter [S22.42XA]  Constipation, unspecified constipation type [K59.00]  Metastatic malignant neoplasm, unspecified site (HCC) [C79.9]     Hospital Course:  The patient is a 82 y.o. male with a history of COPD, PAF, DVT/PE on eliquis, and metastatic renal cell carcinoma who presented with left chest wall pain following a mechanical fall. Please see admission H&P for further details. He was found to have multiple left-sided rib fractures which were non-displaced. He was started on pain control and thoracic surgery was consulted. They recommended conservative management. At this point he is clinically stable.His pain is adequately controlled with oral and topical medications and he is maintaining adequate oxygen saturations on 2L NC which is his baseline. He did have some constipation with a benign abdominal examination and this has since been relieved. He is medically stable and will be discharged to rehab.    Exam Today:  Blood pressure 148/66, pulse 77, temperature 97.7 °F (36.5 °C), temperature source Oral, resp. rate 16, height 185.4 cm (73\"), weight 101 kg (222 lb), SpO2 97 %.  Vitals and nursing note reviewed.   Constitutional:       General: He is not in acute distress.     Appearance: He is not toxic-appearing or diaphoretic. "   HENT:      Head: Normocephalic and atraumatic.      Nose: Nose normal.      Mouth/Throat:      Mouth: Mucous membranes are moist.      Pharynx: Oropharynx is clear.   Eyes:      Conjunctiva/sclera: Conjunctivae normal.      Pupils: Pupils are equal, round, and reactive to light.   Cardiovascular:      Rate and Rhythm: Normal rate and regular rhythm.      Pulses: Normal pulses.   Pulmonary:      Effort: Pulmonary effort is normal.      Breath sounds: Examination of the right-lower field reveals decreased breath sounds. Examination of the left-lower field reveals decreased breath sounds. Decreased breath sounds present.   Chest:      Chest wall: Tenderness (left chest wall) present.   Abdominal:      General: Bowel sounds are normal.      Palpations: Abdomen is soft.      Tenderness: There is no abdominal tenderness.   Musculoskeletal:         General: No swelling or tenderness.      Cervical back: Normal range of motion and neck supple.   Skin:     General: Skin is warm and dry.      Capillary Refill: Capillary refill takes less than 2 seconds.   Neurological:      General: No focal deficit present.      Mental Status: He is alert and oriented to person, place, and time.   Psychiatric:         Mood and Affect: Mood normal.         Behavior: Behavior normal.     Consults:   Consults     Date and Time Order Name Status Description    2/14/2023  8:02 PM Inpatient Hematology & Oncology Consult      2/14/2023  5:13 PM Inpatient Thoracic Surgery Consult Completed     2/14/2023  2:55 PM [facility] (on-call MD unless specified) Details             Discharge Disposition:  Skilled Nursing Facility (DC - External)    Discharge Medications:     Discharge Medications      New Medications      Instructions Start Date   lidocaine 5 %  Commonly known as: LIDODERM   1 patch, Transdermal, Every 24 Hours Scheduled, Remove & Discard patch within 12 hours or as directed by MD   Start Date: February 18, 2023     pantoprazole 40 MG EC  tablet  Commonly known as: PROTONIX   40 mg, Oral, Every Early Morning   Start Date: February 18, 2023     sennosides-docusate 8.6-50 MG per tablet  Commonly known as: PERICOLACE   2 tablets, Oral, 2 Times Daily         Changes to Medications      Instructions Start Date   HYDROcodone-acetaminophen 5-325 MG per tablet  Commonly known as: NORCO  What changed: See the new instructions.   1 tablet, Oral, Every 6 Hours PRN         Continue These Medications      Instructions Start Date   ALPRAZolam 0.25 MG tablet  Commonly known as: Xanax   0.25 mg, Oral, 2 Times Daily PRN      amLODIPine 5 MG tablet  Commonly known as: NORVASC   5 mg, Oral, Daily      CALTRATE 600+D3 PO   1 tablet, Oral, 2 times daily      cyclobenzaprine 5 MG tablet  Commonly known as: FLEXERIL   7.5 mg, Oral, 3 Times Daily PRN, SPASMS      famotidine 20 MG tablet  Commonly known as: Pepcid   20 mg, Oral, 2 Times Daily      finasteride 5 MG tablet  Commonly known as: PROSCAR   5 mg, Oral, Daily      fluticasone 50 MCG/ACT nasal spray  Commonly known as: FLONASE   1 spray, Nasal, Daily      folic acid 1 MG tablet  Commonly known as: FOLVITE   1 mg, Oral, 3 Times Daily      gabapentin 400 MG capsule  Commonly known as: NEURONTIN   Take PO: 400mg QAM and with lunch, 800mg QHS      ipratropium 0.06 % nasal spray  Commonly known as: ATROVENT   2 sprays, Nasal, 4 Times Daily      levocetirizine 5 MG tablet  Commonly known as: XYZAL   5 mg, Oral, Every Evening      levothyroxine 112 MCG tablet  Commonly known as: SYNTHROID, LEVOTHROID   112 mcg, Oral, Daily      meclizine 12.5 MG tablet  Commonly known as: ANTIVERT   12.5 mg, Oral, 3 Times Daily PRN      Namzaric 28-10 MG capsule sustained-release 24 hr  Generic drug: Memantine HCl-Donepezil HCl   1 capsule, Oral, Every Evening      O2  Commonly known as: OXYGEN   2 L/min, Inhalation, Continuous      ondansetron 4 MG tablet  Commonly known as: ZOFRAN   4 mg, Oral, Every 8 Hours PRN, NAUSEA /VOMITING       OXcarbazepine 300 MG tablet  Commonly known as: TRILEPTAL   300 mg, Oral, Every Night at Bedtime      polyethylene glycol 17 g packet  Commonly known as: MIRALAX   17 g, Oral, Daily      sertraline 25 MG tablet  Commonly known as: ZOLOFT   50 mg, Oral, Daily      sucralfate 1 GM/10ML suspension  Commonly known as: Carafate   1 g, Oral, 4 Times Daily With Meals & Nightly      tiotropium bromide-olodaterol 2.5-2.5 MCG/ACT aerosol solution inhaler  Commonly known as: STIOLTO RESPIMAT   2 puffs, Inhalation, Daily      VENTOLIN HFA IN   2 puffs, Inhalation, Every 4 Hours PRN, SOA WHEEZING      vitamin B-12 1000 MCG tablet  Commonly known as: CYANOCOBALAMIN   1,000 mcg, Oral, Daily      Xarelto 20 MG tablet  Generic drug: rivaroxaban   20 mg, Oral, Daily With Dinner         Stop These Medications    doxycycline 100 MG tablet  Commonly known as: VIBRAMYICN            Discharge Diet:   Diet Instructions     Diet: Regular, Cardiac; Thin      Discharge Diet:  Regular  Cardiac       Fluid Consistency: Thin          Activity at Discharge:   Activity Instructions     Activity as Tolerated            Follow-up Appointments:  Future Appointments   Date Time Provider Department Center   3/15/2023 10:00 AM Dillon Walton MD [facility]     Additional Instructions for the Follow-ups that You Need to Schedule     Discharge Follow-up with Specialty: general practitioner or PCP at [facility]   As directed      Specialty: general practitioner or PCP at [facility]    Follow Up Details: 1-3d         Discharge Follow-up with Specified Provider: Dr. Ashleigh Montes De Oca (Oncology); 2 Weeks   As directed      To: Dr. Ashleigh Montes De Oca (Oncology)    Follow Up: 2 Weeks                Cristopher Alvarez MD  02/17/23  15:50 EST    Time Spent on Discharge Activities: Greater than 30 minutes.          Electronically signed by Cristopher Alvarez MD at 02/17/23 7166

## 2023-02-17 NOTE — PLAN OF CARE
Problem: Adult Inpatient Plan of Care  Goal: Plan of Care Review  Outcome: Ongoing, Progressing  Goal: Patient-Specific Goal (Individualized)  Outcome: Ongoing, Progressing  Goal: Absence of Hospital-Acquired Illness or Injury  Outcome: Ongoing, Progressing  Intervention: Identify and Manage Fall Risk  Recent Flowsheet Documentation  Taken 2/16/2023 2000 by Summer Forrester RN  Safety Promotion/Fall Prevention:   activity supervised   assistive device/personal items within reach   clutter free environment maintained   fall prevention program maintained   lighting adjusted   nonskid shoes/slippers when out of bed   room organization consistent   safety round/check completed   toileting scheduled  Intervention: Prevent Skin Injury  Recent Flowsheet Documentation  Taken 2/16/2023 2000 by Summer Forrester RN  Body Position: position changed independently  Skin Protection:   adhesive use limited   tubing/devices free from skin contact   transparent dressing maintained   incontinence pads utilized  Intervention: Prevent and Manage VTE (Venous Thromboembolism) Risk  Recent Flowsheet Documentation  Taken 2/16/2023 2000 by Summer Forrester RN  Activity Management: activity adjusted per tolerance  VTE Prevention/Management: (xarelto) other (see comments)  Range of Motion: active ROM (range of motion) encouraged  Intervention: Prevent Infection  Recent Flowsheet Documentation  Taken 2/16/2023 2000 by Summer Forrester RN  Infection Prevention:   visitors restricted/screened   single patient room provided   rest/sleep promoted   personal protective equipment utilized   hand hygiene promoted  Goal: Optimal Comfort and Wellbeing  Outcome: Ongoing, Progressing  Intervention: Monitor Pain and Promote Comfort  Recent Flowsheet Documentation  Taken 2/16/2023 2000 by Summer Forrester RN  Pain Management Interventions:   care clustered   diversional activity provided   pillow support provided   position adjusted   quiet  environment facilitated   see MAR  Intervention: Provide Person-Centered Care  Recent Flowsheet Documentation  Taken 2/16/2023 2000 by Summer Forrester RN  Trust Relationship/Rapport:   care explained   choices provided   emotional support provided   empathic listening provided   questions answered  Goal: Readiness for Transition of Care  Outcome: Ongoing, Progressing     Problem: Pain Acute  Goal: Acceptable Pain Control and Functional Ability  Outcome: Ongoing, Progressing  Intervention: Prevent or Manage Pain  Recent Flowsheet Documentation  Taken 2/16/2023 2000 by Summer Forrester RN  Sensory Stimulation Regulation:   care clustered   lighting decreased   television on  Bowel Elimination Promotion:   adequate fluid intake promoted   ambulation promoted  Sleep/Rest Enhancement:   awakenings minimized   regular sleep/rest pattern promoted   noise level reduced  Medication Review/Management: medications reviewed  Intervention: Develop Pain Management Plan  Recent Flowsheet Documentation  Taken 2/16/2023 2000 by Summer Forrester RN  Pain Management Interventions:   care clustered   diversional activity provided   pillow support provided   position adjusted   quiet environment facilitated   see MAR  Intervention: Optimize Psychosocial Wellbeing  Recent Flowsheet Documentation  Taken 2/16/2023 2000 by Summer Forrseter RN  Supportive Measures: active listening utilized  Diversional Activities: television  Goal: Acceptable Pain Control and Functional Ability  Outcome: Ongoing, Progressing  Intervention: Prevent or Manage Pain  Recent Flowsheet Documentation  Taken 2/16/2023 2000 by Summer Forrester, RN  Sensory Stimulation Regulation:   care clustered   lighting decreased   television on  Bowel Elimination Promotion:   adequate fluid intake promoted   ambulation promoted  Sleep/Rest Enhancement:   awakenings minimized   regular sleep/rest pattern promoted   noise level reduced  Medication Review/Management:  medications reviewed  Intervention: Develop Pain Management Plan  Recent Flowsheet Documentation  Taken 2/16/2023 2000 by Summer Forrester RN  Pain Management Interventions:   care clustered   diversional activity provided   pillow support provided   position adjusted   quiet environment facilitated   see MAR  Intervention: Optimize Psychosocial Wellbeing  Recent Flowsheet Documentation  Taken 2/16/2023 2000 by Summer Forrester RN  Supportive Measures: active listening utilized  Diversional Activities: television     Problem: Fall Injury Risk  Goal: Absence of Fall and Fall-Related Injury  Outcome: Ongoing, Progressing  Intervention: Identify and Manage Contributors  Recent Flowsheet Documentation  Taken 2/16/2023 2000 by Summer Forrester RN  Medication Review/Management: medications reviewed  Self-Care Promotion: independence encouraged  Intervention: Promote Injury-Free Environment  Recent Flowsheet Documentation  Taken 2/16/2023 2000 by Summer Forrester RN  Safety Promotion/Fall Prevention:   activity supervised   assistive device/personal items within reach   clutter free environment maintained   fall prevention program maintained   lighting adjusted   nonskid shoes/slippers when out of bed   room organization consistent   safety round/check completed   toileting scheduled     Problem: Skin Injury Risk Increased  Goal: Skin Health and Integrity  Outcome: Ongoing, Progressing  Intervention: Optimize Skin Protection  Recent Flowsheet Documentation  Taken 2/16/2023 2000 by Summer Forrester RN  Pressure Reduction Techniques:   frequent weight shift encouraged   rest period provided between sit times   weight shift assistance provided  Head of Bed (HOB) Positioning: HOB elevated  Pressure Reduction Devices:   alternating pressure pump (ADD)   pressure-redistributing mattress utilized  Skin Protection:   adhesive use limited   tubing/devices free from skin contact   transparent dressing maintained   incontinence  pads utilized     Problem: Infection  Goal: Absence of Infection Signs and Symptoms  Outcome: Ongoing, Progressing   Goal Outcome Evaluation:

## 2023-02-17 NOTE — DISCHARGE SUMMARY
Date of Admission: 2/14/2023  Date of Discharge:  2/17/2023  Primary Care Physician: Dillon Walton MD     Discharge Diagnosis:  Active Hospital Problems    Diagnosis  POA   • **Closed fracture of multiple ribs of left side, initial encounter [S22.42XA]  Yes   • H/O splenectomy [Z90.81]  Not Applicable   • Constipation [K59.00]  Yes   • Leukocytosis [D72.829]  Yes   • History of pulmonary embolism and DVT [Z86.711]  Yes   • Paroxysmal atrial fibrillation (HCC) [I48.0]  Yes   • Drug-induced constipation [K59.03]  Yes   • Essential hypertension [I10]  Yes   • Metastatic cancer (HCC) [C79.9]  Yes   • Pulmonary emphysema (HCC) [J43.9]  Yes      Resolved Hospital Problems   No resolved problems to display.       Presenting Problem/History of Present Illness:  Atypical chest pain [R07.89]  Elevated troponin [R77.8]  Upper abdominal pain [R10.10]  Closed fracture of multiple ribs of left side, initial encounter [S22.42XA]  Constipation, unspecified constipation type [K59.00]  Metastatic malignant neoplasm, unspecified site (HCC) [C79.9]     Hospital Course:  The patient is a 82 y.o. male with a history of COPD, PAF, DVT/PE on eliquis, and metastatic renal cell carcinoma who presented with left chest wall pain following a mechanical fall. Please see admission H&P for further details. He was found to have multiple left-sided rib fractures which were non-displaced. He was started on pain control and thoracic surgery was consulted. They recommended conservative management. At this point he is clinically stable.His pain is adequately controlled with oral and topical medications and he is maintaining adequate oxygen saturations on 2L NC which is his baseline. He did have some constipation with a benign abdominal examination and this has since been relieved. He is medically stable and will be discharged to rehab.    Exam Today:  Blood pressure 148/66, pulse 77, temperature 97.7 °F (36.5 °C), temperature source Oral, resp. rate  "16, height 185.4 cm (73\"), weight 101 kg (222 lb), SpO2 97 %.  Vitals and nursing note reviewed.   Constitutional:       General: He is not in acute distress.     Appearance: He is not toxic-appearing or diaphoretic.   HENT:      Head: Normocephalic and atraumatic.      Nose: Nose normal.      Mouth/Throat:      Mouth: Mucous membranes are moist.      Pharynx: Oropharynx is clear.   Eyes:      Conjunctiva/sclera: Conjunctivae normal.      Pupils: Pupils are equal, round, and reactive to light.   Cardiovascular:      Rate and Rhythm: Normal rate and regular rhythm.      Pulses: Normal pulses.   Pulmonary:      Effort: Pulmonary effort is normal.      Breath sounds: Examination of the right-lower field reveals decreased breath sounds. Examination of the left-lower field reveals decreased breath sounds. Decreased breath sounds present.   Chest:      Chest wall: Tenderness (left chest wall) present.   Abdominal:      General: Bowel sounds are normal.      Palpations: Abdomen is soft.      Tenderness: There is no abdominal tenderness.   Musculoskeletal:         General: No swelling or tenderness.      Cervical back: Normal range of motion and neck supple.   Skin:     General: Skin is warm and dry.      Capillary Refill: Capillary refill takes less than 2 seconds.   Neurological:      General: No focal deficit present.      Mental Status: He is alert and oriented to person, place, and time.   Psychiatric:         Mood and Affect: Mood normal.         Behavior: Behavior normal.     Consults:   Consults     Date and Time Order Name Status Description    2/14/2023  8:02 PM Inpatient Hematology & Oncology Consult      2/14/2023  5:13 PM Inpatient Thoracic Surgery Consult Completed     2/14/2023  2:55 PM [facility] (on-call MD unless specified) Details             Discharge Disposition:  Skilled Nursing Facility (DC - External)    Discharge Medications:     Discharge Medications      New Medications      Instructions Start Date "   lidocaine 5 %  Commonly known as: LIDODERM   1 patch, Transdermal, Every 24 Hours Scheduled, Remove & Discard patch within 12 hours or as directed by MD   Start Date: February 18, 2023     pantoprazole 40 MG EC tablet  Commonly known as: PROTONIX   40 mg, Oral, Every Early Morning   Start Date: February 18, 2023     sennosides-docusate 8.6-50 MG per tablet  Commonly known as: PERICOLACE   2 tablets, Oral, 2 Times Daily         Changes to Medications      Instructions Start Date   HYDROcodone-acetaminophen 5-325 MG per tablet  Commonly known as: NORCO  What changed: See the new instructions.   1 tablet, Oral, Every 6 Hours PRN         Continue These Medications      Instructions Start Date   ALPRAZolam 0.25 MG tablet  Commonly known as: Xanax   0.25 mg, Oral, 2 Times Daily PRN      amLODIPine 5 MG tablet  Commonly known as: NORVASC   5 mg, Oral, Daily      CALTRATE 600+D3 PO   1 tablet, Oral, 2 times daily      cyclobenzaprine 5 MG tablet  Commonly known as: FLEXERIL   7.5 mg, Oral, 3 Times Daily PRN, SPASMS      famotidine 20 MG tablet  Commonly known as: Pepcid   20 mg, Oral, 2 Times Daily      finasteride 5 MG tablet  Commonly known as: PROSCAR   5 mg, Oral, Daily      fluticasone 50 MCG/ACT nasal spray  Commonly known as: FLONASE   1 spray, Nasal, Daily      folic acid 1 MG tablet  Commonly known as: FOLVITE   1 mg, Oral, 3 Times Daily      gabapentin 400 MG capsule  Commonly known as: NEURONTIN   Take PO: 400mg QAM and with lunch, 800mg QHS      ipratropium 0.06 % nasal spray  Commonly known as: ATROVENT   2 sprays, Nasal, 4 Times Daily      levocetirizine 5 MG tablet  Commonly known as: XYZAL   5 mg, Oral, Every Evening      levothyroxine 112 MCG tablet  Commonly known as: SYNTHROID, LEVOTHROID   112 mcg, Oral, Daily      meclizine 12.5 MG tablet  Commonly known as: ANTIVERT   12.5 mg, Oral, 3 Times Daily PRN      Namzaric 28-10 MG capsule sustained-release 24 hr  Generic drug: Memantine HCl-Donepezil HCl   1  capsule, Oral, Every Evening      O2  Commonly known as: OXYGEN   2 L/min, Inhalation, Continuous      ondansetron 4 MG tablet  Commonly known as: ZOFRAN   4 mg, Oral, Every 8 Hours PRN, NAUSEA /VOMITING      OXcarbazepine 300 MG tablet  Commonly known as: TRILEPTAL   300 mg, Oral, Every Night at Bedtime      polyethylene glycol 17 g packet  Commonly known as: MIRALAX   17 g, Oral, Daily      sertraline 25 MG tablet  Commonly known as: ZOLOFT   50 mg, Oral, Daily      sucralfate 1 GM/10ML suspension  Commonly known as: Carafate   1 g, Oral, 4 Times Daily With Meals & Nightly      tiotropium bromide-olodaterol 2.5-2.5 MCG/ACT aerosol solution inhaler  Commonly known as: STIOLTO RESPIMAT   2 puffs, Inhalation, Daily      VENTOLIN HFA IN   2 puffs, Inhalation, Every 4 Hours PRN, SOA WHEEZING      vitamin B-12 1000 MCG tablet  Commonly known as: CYANOCOBALAMIN   1,000 mcg, Oral, Daily      Xarelto 20 MG tablet  Generic drug: rivaroxaban   20 mg, Oral, Daily With Dinner         Stop These Medications    doxycycline 100 MG tablet  Commonly known as: VIBRAMYICN            Discharge Diet:   Diet Instructions     Diet: Regular, Cardiac; Thin      Discharge Diet:  Regular  Cardiac       Fluid Consistency: Thin          Activity at Discharge:   Activity Instructions     Activity as Tolerated            Follow-up Appointments:  Future Appointments   Date Time Provider Department [place]   3/15/2023 10:00 AM Dillon Walton MD [facility]     Additional Instructions for the Follow-ups that You Need to Schedule     Discharge Follow-up with Specialty: general practitioner or PCP at [facility]   As directed      Specialty: general practitioner or PCP at [facility]    Follow Up Details: 1-3d         Discharge Follow-up with Specified Provider: Dr. Ashleigh Montes De Oca (Oncology); 2 Weeks   As directed      To: Dr. Ashleigh Montes De Oca (Oncology)    Follow Up: 2 Weeks                Cristopher Alvarez MD  02/17/23  15:50 EST    Time Spent on Discharge Activities:  Greater than 30 minutes.

## 2023-02-17 NOTE — PLAN OF CARE
Goal Outcome Evaluation:              Outcome Evaluation: Slow progress towards goals during PT, able to ambulate short distance w/ min A using RW.  Limited by fatigue and dizziness.  Assist and verbal cues required for toileting.  Recommend DC to SNU.

## 2023-02-17 NOTE — THERAPY TREATMENT NOTE
Patient Name: Mg Gerber Sr.  : 1940    MRN: [id number]                              Today's Date: 2023       Admit Date: 2023    Visit Dx:     ICD-10-CM ICD-9-CM   1. Closed fracture of multiple ribs of left side, initial encounter  S22.42XA 807.09   2. Metastatic malignant neoplasm, unspecified site (HCC)  C79.9 199.1   3. Upper abdominal pain  R10.10 789.09   4. Atypical chest pain  R07.89 786.59   5. Elevated troponin  R77.8 790.6   6. Constipation, unspecified constipation type  K59.00 564.00     Patient Active Problem List   Diagnosis   • Anemia, vitamin B12 deficiency   • Arthritis   • Hemochromatosis   • Herpes zoster   • Hip pain   • Essential hypertension   • Metastatic cancer (HCC)   • Pulmonary emphysema (HCC)   • Left low back pain   • Vertigo   • Headache around the eyes   • Mild cognitive impairment   • Metastatic renal cell carcinoma  on q 2 wks chemo   • Anxiety   • Malignant neoplasm of lung (HCC)   • Neuropathy   • Perennial allergic rhinitis   • Vitamin B12 deficiency   • Osteoarthritis of lumbar spine   • Pharyngitis   • Bronchitis   • Drug-induced constipation   • Hx of hiatal hernia   • History of lung cancer   • COVID-19 virus detected   • Dyspnea   • Generalized weakness   • Acute on chronic respiratory failure with hypoxia (HCC)   • Gastroesophageal reflux disease   • Hearing disorder   • Malignant neoplasm of prostate (HCC)   • Sleep apnea   • Anemia   • Malignant neoplasm metastatic to lung (HCC)   • Secondary bacterial pneumonia   • Pneumonia due to COVID-19 virus   • Hilar mass   • On antineoplastic chemotherapy q 2wks   • Paroxysmal atrial fibrillation (HCC)   • History of pulmonary embolism and DVT   • Chronic nausea   • Gastritis   • Muscle spasm   • Fall   • Closed fracture of multiple ribs of left side   • Chest wall pain   • Fall, initial encounter   • Immobility syndrome   • Self-care deficit   • Cytokine release syndrome, grade 2   • Dizziness   •  Compression fracture of vertebral column (HCC)   • History of chronic lung disease   • Impairment of balance   • Lung involvement associated with another disorder (HCC)   • Pneumonia   • Closed fracture of multiple ribs of left side, initial encounter   • Constipation   • Leukocytosis   • H/O splenectomy     Past Medical History:   Diagnosis Date   • Allergic    • Anemia, vitamin B12 deficiency 04/12/2012   • Anxiety    • Arthritis 03/24/2014   • Atrial fibrillation (HCC)    • Cancer (HCC) 04/29/2014    kidney; prostate   • Cataract    • Chemotherapeutic agent or infusion extravasation     q 1-2 wks    • COPD (chronic obstructive pulmonary disease) (HCC) 03/24/2014   • Emphysema of lung (HCC)    • Hemochromatosis 03/24/2014   • Herpes zoster 12/06/2011    left medial thigh   • Hip pain 03/24/2014   • History of Doppler ultrasound 12/28/2011    superficial and deep venous insuffiency   • History of EKG 02/10/2012    Dr. Kathi Lloyd; unchanged from prior EKG   • Hypertension     benign essential   • Injury of back    • Pulmonary embolism (HCC) 03/24/2014   • Renal cell cancer (HCC)    • Shortness of breath    • Sleep apnea      Past Surgical History:   Procedure Laterality Date   • APPENDECTOMY     • CATARACT EXTRACTION     • CHOLECYSTECTOMY     • SPLENECTOMY     • TONSILLECTOMY     • VEIN SURGERY        General Information     Row Name 02/17/23 1203          Physical Therapy Time and Intention    Document Type therapy note (daily note)  -AR     Mode of Treatment physical therapy  -AR     Row Name 02/17/23 1204          General Information    Patient Profile Reviewed yes  -AR     Existing Precautions/Restrictions fall  -AR     Row Name 02/17/23 1207          Cognition    Orientation Status (Cognition) oriented x 4  -AR           User Key  (r) = Recorded By, (t) = Taken By, (c) = Cosigned By    Initials Name Provider Type    AR Betzaida Landers PT Physical Therapist               Mobility     Row Name 02/17/23 4611           Bed Mobility    Supine-Sit [place] (Bed Mobility) not tested  -AR     Sit-Supine [place] (Bed Mobility) not tested  -AR     Comment, (Bed Mobility) sitting EOB on arrival  -AR     Row Name 02/17/23 1203          Sit-Stand Transfer    Sit-Stand [place] (Transfers) minimum assist (75% patient effort)  -AR     Assistive Device (Sit-Stand Transfers) walker, front-wheeled  -AR     Row Name 02/17/23 1203          Gait/Stairs (Locomotion)    [place] Level (Gait) minimum assist (75% patient effort)  -AR     Assistive Device (Gait) walker, front-wheeled  -AR     Distance in Feet (Gait) 40' slow shuffling steps, minimally unsteady; limited by dizziness  -AR     Deviations/Abnormal Patterns (Gait) antalgic;base of support, wide;samara decreased;gait speed decreased;stride length decreased  -AR     Bilateral Gait Deviations forward flexed posture;heel strike decreased  -AR           User Key  (r) = Recorded By, (t) = Taken By, (c) = Cosigned By    Initials Name Provider Type    AR Betzaida Landers, PT Physical Therapist               Obj/Interventions     Row Name 02/17/23 1206          Motor Skills    Therapeutic Exercise --  B Ap, LAQ 10x  -AR     Row Name 02/17/23 1206          Balance    Dynamic Standing Balance minimal assist  -AR     Position/Device Used, Standing Balance walker, rolling  -AR           User Key  (r) = Recorded By, (t) = Taken By, (c) = Cosigned By    Initials Name Provider Type    AR Betzaida Landers, PT Physical Therapist               Goals/Plan    No documentation.                Clinical Impression     Row Name 02/17/23 1207          Pain    Pretreatment Pain Rating 8/10  -AR     Posttreatment Pain Rating 9/10  -AR     Pain Location - Side/Orientation Right  -AR     Pain Location - flank  -AR     Pain Intervention(s) Medication (See MAR);Repositioned  -AR     Row Name 02/17/23 1207          Plan of Care Review    Outcome Evaluation Slow progress towards goals during PT, able  to ambulate short distance w/ min A using RW.  Limited by fatigue and dizziness.  Assist and verbal cues required for toileting.  Recommend DC to SNU.  -AR     Row Name 02/17/23 1207          Therapy Assessment/Plan (PT)    Therapy Frequency (PT) 5 times/wk  -AR     Row Name 02/17/23 1207          Vital Signs    O2 Delivery Pre Treatment room air  -AR     Post SpO2 (%) 91  -AR     O2 Delivery Post Treatment room air  -AR     Row Name 02/17/23 1207          Positioning and Restraints    Pre-Treatment Position in bed  -AR     Post Treatment Position chair  -AR     In Chair notified nsg;reclined;sitting;call light within reach;encouraged to call for assist;exit alarm on  -AR           User Key  (r) = Recorded By, (t) = Taken By, (c) = Cosigned By    Initials Name Provider Type    Betzaida French, PT Physical Therapist               Outcome Measures     Row Name 02/17/23 1210          How much help from another person do you currently need...    Turning from your back to your side while in flat bed without using bedrails? 3  -AR     Moving from lying on back to sitting on the side of a flat bed without bedrails? 3  -AR     Moving to and from a bed to a chair (including a wheelchair)? 3  -AR     Standing up from a chair using your arms (e.g., wheelchair, bedside chair)? 3  -AR     Climbing 3-5 steps with a railing? 2  -AR     To walk in hospital room? 3  -AR     AM-PAC 6 Clicks Score (PT) 17  -AR     Highest level of mobility 5 --> Static standing  -AR     Row Name 02/17/23 1210          Functional Assessment    Outcome Measure Options AM-PAC 6 Clicks Basic Mobility (PT)  -AR           User Key  (r) = Recorded By, (t) = Taken By, (c) = Cosigned By    Initials Name Provider Type    Betzaida French PT Physical Therapist                             Physical Therapy Education     Title: PT OT SLP Therapies (In Progress)     Topic: Physical Therapy (In Progress)     Point: Mobility training (In Progress)     Learning  Progress Summary           Patient Acceptance, E, NR by AR at 2/17/2023 1210    Acceptance, E,D, DU by PC at 2/15/2023 1359                   Point: Home exercise program (In Progress)     Learning Progress Summary           Patient Acceptance, E, NR by AR at 2/17/2023 1210    Acceptance, E,D, DU by PC at 2/15/2023 1359                   Point: Body mechanics (In Progress)     Learning Progress Summary           Patient Acceptance, E, NR by AR at 2/17/2023 1210    Acceptance, E,D, DU by PC at 2/15/2023 1359                   Point: Precautions (In Progress)     Learning Progress Summary           Patient Acceptance, E, NR by AR at 2/17/2023 1210    Acceptance, E,D, DU by PC at 2/15/2023 1359                               User Key     Initials Effective Dates Name Provider Type Discipline    PC 06/16/21 -  Margarita Conklin PT Physical Therapist PT    AR 06/16/21 -  Betzaida Landers PT Physical Therapist PT              PT Recommendation and Plan     Outcome Evaluation: Slow progress towards goals during PT, able to ambulate short distance w/ min A using RW.  Limited by fatigue and dizziness.  Assist and verbal cues required for toileting.  Recommend DC to SNU.     Time Calculation:    PT Charges     Row Name 02/17/23 1202             Time Calculation    Start Time 1133  -AR      Stop Time 1156  -AR      Time Calculation (min) 23 min  -AR      PT Received On 02/17/23  -AR      PT - Next Appointment 02/20/23  -AR            User Key  (r) = Recorded By, (t) = Taken By, (c) = Cosigned By    Initials Name Provider Type    AR Betzaida Landers PT Physical Therapist              Therapy Charges for Today     Code Description Service Date Service Provider Modifiers Qty    22663985966 HC PT THERAPEUTIC ACT EA 15 MIN 2/17/2023 Betzaida Landers, PT GP 1    80161162184 HC PT THER PROC EA 15 MIN 2/17/2023 Betzaida Landers, PT GP 1          PT G-Codes  Outcome Measure Options: AM-PAC 6 Clicks Basic Mobility (PT)  AM-PAC 6 Clicks  Score (PT): 17  PT Discharge Summary  Anticipated Discharge Disposition (PT): skilled nursing facility    Betzaida Landers, PHILIPPE  2/17/2023

## 2023-02-17 NOTE — CASE MANAGEMENT/SOCIAL WORK
Continued Stay Note  [facility]     Patient Name: Mg Gerber Sr.  MRN: [id number]  Today's Date: 2/17/2023    Admit Date: 2/14/2023    [facility]   Discharge Plan     Row Name 02/17/23 1605       Plan    Plan [facility]    Patient/Family in Agreement with Plan yes    Plan Comments Spoke to [facility] service, pt auth is approved, updated pt who spoke with his son who will bring 02 tank and transport to facility. Updated MD, faxed d/c summary, pk to RN. Updated [facility] of transport time. Based on interdisciplinary assessments, the recommended discharge plan is SNF. - Chel SNYDER               Discharge Codes    No documentation.               Expected Discharge Date and Time     Expected Discharge Date Expected Discharge Time    Feb 17, 2023             Chel Pak RN

## 2023-02-17 NOTE — PROGRESS NOTES
Name: Mg Gerber Sr. ADMIT: 2023   : 1940  PCP: Dillon Walton MD    MRN: [id number] LOS: 0 days   AGE/SEX: 82 y.o. male  ROOM: [facility]     Subjective   Subjective   No acute events. Patient has no new complaints. Pain is reasonably controlled. No significant dyspnea. Still reports constipation. Taking PO. No CP/f/c/n/v/d.    Objective   Objective   Vital Signs  Temp:  [97.3 °F (36.3 °C)-98.1 °F (36.7 °C)] 97.7 °F (36.5 °C)  Heart Rate:  [69-77] 77  Resp:  [16-18] 16  BP: (133-150)/(63-70) 148/66  SpO2:  [89 %-97 %] 97 %  on  Flow (L/min):  [2] 2;   Device (Oxygen Therapy): nasal cannula  Body mass index is 29.29 kg/m².  Physical Exam  Vitals and nursing note reviewed.   Constitutional:       General: He is not in acute distress.     Appearance: He is not toxic-appearing or diaphoretic.   HENT:      Head: Normocephalic and atraumatic.      Nose: Nose normal.      Mouth/Throat:      Mouth: Mucous membranes are moist.      Pharynx: Oropharynx is clear.   Eyes:      Conjunctiva/sclera: Conjunctivae normal.      Pupils: Pupils are equal, round, and reactive to light.   Cardiovascular:      Rate and Rhythm: Normal rate and regular rhythm.      Pulses: Normal pulses.   Pulmonary:      Effort: Pulmonary effort is normal.      Breath sounds: Examination of the right-lower field reveals decreased breath sounds. Examination of the left-lower field reveals decreased breath sounds. Decreased breath sounds present.   Chest:      Chest wall: Tenderness (left chest wall) present.   Abdominal:      General: Bowel sounds are normal.      Palpations: Abdomen is soft.      Tenderness: There is no abdominal tenderness.   Musculoskeletal:         General: No swelling or tenderness.      Cervical back: Normal range of motion and neck supple.   Skin:     General: Skin is warm and dry.      Capillary Refill: Capillary refill takes less than 2 seconds.   Neurological:      General: No focal deficit present.       Mental Status: He is alert and oriented to person, place, and time.   Psychiatric:         Mood and Affect: Mood normal.         Behavior: Behavior normal.       Results Review     I reviewed the patient's new clinical results.  Results from last 7 days   Lab Units 02/15/23  0536 02/14/23  1205   WBC 10*3/mm3 17.06* 16.60*   HEMOGLOBIN g/dL 9.2* 10.3*   PLATELETS 10*3/mm3 408 448     Results from last 7 days   Lab Units 02/15/23  0536 02/14/23  1205   SODIUM mmol/L 138 141   POTASSIUM mmol/L 4.4 3.7   CHLORIDE mmol/L 105 105   CO2 mmol/L 24.4 25.6   BUN mg/dL 13 16   CREATININE mg/dL 1.10 1.26   GLUCOSE mg/dL 94 99   EGFR mL/min/1.73 66.6 56.6*     Results from last 7 days   Lab Units 02/14/23  1205   ALBUMIN g/dL 4.2   BILIRUBIN mg/dL 1.4*   ALK PHOS U/L 89   AST (SGOT) U/L 15   ALT (SGPT) U/L 13     Results from last 7 days   Lab Units 02/15/23  0536 02/14/23  1205   CALCIUM mg/dL 8.5* 9.9   ALBUMIN g/dL  --  4.2     Results from last 7 days   Lab Units 02/14/23  1734 02/14/23  1205   LACTATE mmol/L 2.0 2.3*     No results found for: HGBA1C, POCGLU    No radiology results for the last day  I have personally reviewed all medications:  Scheduled Medications  acetaminophen, 650 mg, Oral, TID  amLODIPine, 5 mg, Oral, Daily  cetirizine, 10 mg, Oral, Daily  memantine, 10 mg, Oral, Q12H   And  donepezil, 10 mg, Oral, Nightly  finasteride, 5 mg, Oral, Daily  fluticasone, 1 spray, Nasal, Daily  folic acid, 1 mg, Oral, TID  gabapentin, 400 mg, Oral, Daily  gabapentin, 800 mg, Oral, Nightly  ipratropium, 2 spray, Each Nare, 4x Daily  levothyroxine, 112 mcg, Oral, Q AM  lidocaine, 1 patch, Transdermal, Q24H  OXcarbazepine, 300 mg, Oral, Q12H  pantoprazole, 40 mg, Oral, Q AM  polyethylene glycol, 17 g, Oral, Daily  rivaroxaban, 20 mg, Oral, Daily With Dinner  senna-docusate sodium, 2 tablet, Oral, Nightly  sertraline, 50 mg, Oral, Daily  tiotropium bromide monohydrate, 2 puff, Inhalation, Daily - RT  vitamin B-12, 1,000 mcg,  Oral, Daily    Infusions  sodium chloride, 75 mL/hr, Last Rate: 75 mL/hr (02/17/23 0837)    Diet  Diet: Cardiac Diets; Healthy Heart (2-3 Na+); Texture: Regular Texture (IDDSI 7); Fluid Consistency: Thin (IDDSI 0)    I have personally reviewed:  [x]  Laboratory   [x]  Microbiology   [x]  Radiology   [x]  EKG/Telemetry  [x]  Cardiology/Vascular   []  Pathology    [x]  Records       Assessment/Plan     Active Hospital Problems    Diagnosis  POA   • **Closed fracture of multiple ribs of left side, initial encounter [S22.42XA]  Yes   • H/O splenectomy [Z90.81]  Not Applicable   • Constipation [K59.00]  Yes   • Leukocytosis [D72.829]  Yes   • History of pulmonary embolism and DVT [Z86.711]  Yes   • Paroxysmal atrial fibrillation (HCC) [I48.0]  Yes   • Drug-induced constipation [K59.03]  Yes   • Essential hypertension [I10]  Yes   • Metastatic cancer (HCC) [C79.9]  Yes   • Pulmonary emphysema (HCC) [J43.9]  Yes      Resolved Hospital Problems   No resolved problems to display.   Multiple Left-Sided Rib Fractures  - from mechanical fall  - continue pain control with lidoderm patches, scheduled, tylenol, gabapentin, and norco  - continue PT/OT, encourage pulmonary toilet  - appreciate thoracic surgery recs, conservative management recommended    Constipation  - abdominal examination is benign  - will order fleet enema  - increase mirilax and senna-s doses    PAF/Hx of DVT and PE  - HR controlled  - continue AC with eliquis    COPD  - no exacerbation  - continue home regimen and encourage pulmonary toilet    Metastatic Renal Cell Carcinoma  - had been on immunotherapy with [facility]  - follow up with Dr. Montes De Oca in 2 weeks outpatient  - Dr. Pedroza's evaluation is noted and much appreciated    Xarelto (home med) for DVT prophylaxis.  Full code.  Discussed with patient and nursing staff.  Anticipate discharge to SNU facility once arrangements have been made.      Cristopher Alvarez MD  [facility]  02/17/23  13:21 EST

## 2023-02-19 LAB — BACTERIA SPEC AEROBE CULT: NORMAL

## 2023-02-20 NOTE — CASE MANAGEMENT/SOCIAL WORK
Case Management Discharge Note      Final Note: [facility] via pv    Provided Post Acute Provider List?: N/A  N/A Provider List Comment: Has been to [facility] in the past and would like referral there    Selected Continued Care - Discharged on 2/17/2023 Admission date: 2/14/2023 - Discharge disposition: Skilled Nursing Facility (DC - External)    Destination Coordination complete.    Service Provider Selected Services Address Phone Fax Patient Preferred    [facility] [address] [address] [phone] --          Durable Medical Equipment    No services have been selected for the patient.              Dialysis/Infusion    No services have been selected for the patient.              Home Medical Care    No services have been selected for the patient.              Therapy    No services have been selected for the patient.              Community Resources    No services have been selected for the patient.              Community & DME    No services have been selected for the patient.                  Transportation Services  Private: Car    Final Discharge Disposition Code: 03 - skilled nursing facility (SNF)

## 2023-02-24 ENCOUNTER — READMISSION MANAGEMENT (OUTPATIENT)
Dept: CALL CENTER | Facility: HOSPITAL | Age: 83
End: 2023-02-24
Payer: MEDICARE

## 2023-02-24 NOTE — OUTREACH NOTE
Prep Survey    Flowsheet Row Responses   [facility] facility patient discharged from? Non-BH   Is LACE score < 7 ? Non-BH Discharge   Eligibility [facility] -   Date of Discharge 02/24/23   Discharge Disposition Home or Self Care   Discharge diagnosis Multiple fractures of ribs, left side, subsequent encounter for fracture with routine healing   Does the patient have one of the following disease processes/diagnoses(primary or secondary)? Other   Prep survey completed? Yes          Tanya OSEGUERA - Registered Nurse

## 2023-02-27 ENCOUNTER — TRANSITIONAL CARE MANAGEMENT TELEPHONE ENCOUNTER (OUTPATIENT)
Dept: CALL CENTER | Facility: HOSPITAL | Age: 83
End: 2023-02-27
Payer: MEDICARE

## 2023-02-27 NOTE — OUTREACH NOTE
Call Center TCM Note    Flowsheet Row Responses   [facility] patient discharged from? Non-  [[facility]]   Does the patient have one of the following disease processes/diagnoses(primary or secondary)? Other   TCM attempt successful? Yes   Call start time 1518   Call end time 1526   Discharge diagnosis Multiple fractures of ribs, left side, subsequent encounter for fracture with routine healing   Person spoke with today (if not patient) and relationship Patient   Meds reviewed with patient/caregiver? Yes  [Patient reports that he has resumed his usual home meds. He states that he will take a look at the papers from discharge and if he has any med needs will contact the office. ]   Does the patient have all medications ordered at discharge? Yes   Is the patient taking all medications as directed (includes completed medication regime)? Yes   Comments PCP Dr Walton. Patient has a SUBSEQUENT MEDICARE WELLNESS appt for 3/15/23 10am with PCP and declines any needs prior to that appt.    Does the patient have an appointment with their PCP within 7 days of discharge? No   Nursing Interventions Patient declined scheduling/rescheduling appointment at this time, Routed TCM call to PCP office   Has home health visited the patient within 72 hours of discharge? N/A   DME comments Patient has home O2 at night with CPAP. He reports ambulating with a cane inside home. Uses a walker or scooter outside depending on how far he has to go.    Psychosocial issues? No   Did the patient receive a copy of their discharge instructions? Yes   What is the patient's perception of their health status since discharge? Improving   Is the patient/caregiver able to teach back signs and symptoms related to disease process for when to call PCP? Yes   Is the patient/caregiver able to teach back signs and symptoms related to disease process for when to call 911? Yes   Is the patient/caregiver able to teach back the hierarchy of who to  call/visit for symptoms/problems? PCP, Specialist, Home health nurse, Urgent Care, ED, 911 Yes   If the patient is a current smoker, are they able to teach back resources for cessation? Not a smoker   TCM call completed? Yes   Call end time 1526   Would this patient benefit from a Referral to [facility] Social Work? No   Is the patient interested in additional calls from an ambulatory ?  NOTE:  applies to high risk patients requiring additional follow-up. No          Violet Ortega RN    2/27/2023, 15:28 EST

## 2023-03-14 RX ORDER — DONEPEZIL HYDROCHLORIDE 10 MG/1
TABLET, FILM COATED ORAL
COMMUNITY
Start: 2023-02-06

## 2023-03-14 NOTE — PROGRESS NOTES
Violet Gerber Sr. is a 82 y.o. male.     CC: Hospital F/U for Rib Fractures    History of Present Illness     Pt returns today after recent hospitalization. That visit was as follows:    Date of Admission: 2/14/2023  Date of Discharge:  2/17/2023  Primary Care Physician: Dillon Walton MD      Discharge Diagnosis:        Active Hospital Problems     Diagnosis   POA   • **Closed fracture of multiple ribs of left side, initial encounter [S22.42XA]   Yes   • H/O splenectomy [Z90.81]   Not Applicable   • Constipation [K59.00]   Yes   • Leukocytosis [D72.829]   Yes   • History of pulmonary embolism and DVT [Z86.711]   Yes   • Paroxysmal atrial fibrillation (HCC) [I48.0]   Yes   • Drug-induced constipation [K59.03]   Yes   • Essential hypertension [I10]   Yes   • Metastatic cancer (HCC) [C79.9]   Yes   • Pulmonary emphysema (HCC) [J43.9]   Yes       Resolved Hospital Problems   No resolved problems to display.         Presenting Problem/History of Present Illness:  Atypical chest pain [R07.89]  Elevated troponin [R77.8]  Upper abdominal pain [R10.10]  Closed fracture of multiple ribs of left side, initial encounter [S22.42XA]  Constipation, unspecified constipation type [K59.00]  Metastatic malignant neoplasm, unspecified site (HCC) [C79.9]           Hospital Course:  The patient is a 82 y.o. male with a history of COPD, PAF, DVT/PE on eliquis, and metastatic renal cell carcinoma who presented with left chest wall pain following a mechanical fall. Please see admission H&P for further details. He was found to have multiple left-sided rib fractures which were non-displaced. He was started on pain control and thoracic surgery was consulted. They recommended conservative management. At this point he is clinically stable.His pain is adequately controlled with oral and topical medications and he is maintaining adequate oxygen saturations on 2L NC which is his baseline. He did have some constipation with a  "benign abdominal examination and this has since been relieved. He is medically stable and will be discharged to rehab.    Current outpatient and discharge medications have been reconciled for the patient.  Reviewed by: Dillon Walton MD    Pt also doing well with his medications given here, no SEs, and is due a refill today.     The following portions of the patient's history were reviewed and updated as appropriate: allergies, current medications, past family history, past medical history, past social history, past surgical history and problem list.    Review of Systems   Constitutional: Negative for activity change, chills and fever.   Respiratory: Negative for cough.    Cardiovascular: Negative for chest pain.   Psychiatric/Behavioral: Negative for dysphoric mood.       /66   Pulse 88   Temp 98.3 °F (36.8 °C) (Oral)   Resp 16   Ht 182.9 cm (72\")   Wt 101 kg (222 lb)   SpO2 96%   BMI 30.11 kg/m²     Objective   Physical Exam  Constitutional:       General: He is not in acute distress.     Appearance: He is well-developed.   Cardiovascular:      Rate and Rhythm: Normal rate and regular rhythm.      Heart sounds: Murmur heard.    Systolic murmur is present with a grade of 1/6.  Pulmonary:      Effort: Pulmonary effort is normal.      Breath sounds: Normal breath sounds.   Neurological:      Mental Status: He is alert and oriented to person, place, and time.   Psychiatric:         Behavior: Behavior normal.         Thought Content: Thought content normal.     Hospital records reviewed with pt confirming HPI.      Assessment & Plan   Diagnoses and all orders for this visit:    1. Closed fracture of multiple ribs of left side, initial encounter (Primary)    2. Hospital discharge follow-up    3. Perennial allergic rhinitis  -     levocetirizine (XYZAL) 5 MG tablet; Take 1 tablet by mouth Every Evening. Indications: Perennial Allergic Rhinitis  Dispense: 90 tablet; Refill: 1    4. Other chronic gastritis " without hemorrhage  -     famotidine (Pepcid) 20 MG tablet; Take 1 tablet by mouth 2 (Two) Times a Day.  Dispense: 180 tablet; Refill: 1    5. Essential hypertension  -     amLODIPine (NORVASC) 5 MG tablet; Take 1 tablet by mouth Daily. Indications: High Blood Pressure Disorder  Dispense: 90 tablet; Refill: 1    6. B12 deficiency  -     vitamin B-12 (CYANOCOBALAMIN) 1000 MCG tablet; Take 1 tablet by mouth Daily.  Dispense: 90 tablet; Refill: 1

## 2023-03-15 ENCOUNTER — OFFICE VISIT (OUTPATIENT)
Dept: FAMILY MEDICINE CLINIC | Facility: CLINIC | Age: 83
End: 2023-03-15
Payer: MEDICARE

## 2023-03-15 VITALS
WEIGHT: 222 LBS | OXYGEN SATURATION: 96 % | TEMPERATURE: 98.3 F | DIASTOLIC BLOOD PRESSURE: 66 MMHG | HEIGHT: 72 IN | BODY MASS INDEX: 30.07 KG/M2 | SYSTOLIC BLOOD PRESSURE: 130 MMHG | HEART RATE: 88 BPM | RESPIRATION RATE: 16 BRPM

## 2023-03-15 DIAGNOSIS — K29.50 OTHER CHRONIC GASTRITIS WITHOUT HEMORRHAGE: Chronic | ICD-10-CM

## 2023-03-15 DIAGNOSIS — S22.42XA CLOSED FRACTURE OF MULTIPLE RIBS OF LEFT SIDE, INITIAL ENCOUNTER: Primary | ICD-10-CM

## 2023-03-15 DIAGNOSIS — Z09 HOSPITAL DISCHARGE FOLLOW-UP: ICD-10-CM

## 2023-03-15 DIAGNOSIS — I10 ESSENTIAL HYPERTENSION: Chronic | ICD-10-CM

## 2023-03-15 DIAGNOSIS — J30.89 PERENNIAL ALLERGIC RHINITIS: Chronic | ICD-10-CM

## 2023-03-15 DIAGNOSIS — E53.8 B12 DEFICIENCY: ICD-10-CM

## 2023-03-15 PROCEDURE — 1111F DSCHRG MED/CURRENT MED MERGE: CPT | Performed by: FAMILY MEDICINE

## 2023-03-15 PROCEDURE — 3075F SYST BP GE 130 - 139MM HG: CPT | Performed by: FAMILY MEDICINE

## 2023-03-15 PROCEDURE — 99214 OFFICE O/P EST MOD 30 MIN: CPT | Performed by: FAMILY MEDICINE

## 2023-03-15 PROCEDURE — 3078F DIAST BP <80 MM HG: CPT | Performed by: FAMILY MEDICINE

## 2023-03-15 RX ORDER — AMLODIPINE BESYLATE 5 MG/1
5 TABLET ORAL DAILY
Qty: 90 TABLET | Refills: 1 | Status: SHIPPED | OUTPATIENT
Start: 2023-03-15

## 2023-03-15 RX ORDER — LANOLIN ALCOHOL/MO/W.PET/CERES
1000 CREAM (GRAM) TOPICAL DAILY
Qty: 90 TABLET | Refills: 1 | Status: SHIPPED | OUTPATIENT
Start: 2023-03-15

## 2023-03-15 RX ORDER — LEVOCETIRIZINE DIHYDROCHLORIDE 5 MG/1
5 TABLET, FILM COATED ORAL EVERY EVENING
Qty: 90 TABLET | Refills: 1 | Status: SHIPPED | OUTPATIENT
Start: 2023-03-15

## 2023-03-15 RX ORDER — FAMOTIDINE 20 MG/1
20 TABLET, FILM COATED ORAL 2 TIMES DAILY
Qty: 180 TABLET | Refills: 1 | Status: SHIPPED | OUTPATIENT
Start: 2023-03-15

## 2023-03-27 ENCOUNTER — APPOINTMENT (OUTPATIENT)
Dept: GENERAL RADIOLOGY | Facility: HOSPITAL | Age: 83
End: 2023-03-27
Payer: MEDICARE

## 2023-03-27 ENCOUNTER — APPOINTMENT (OUTPATIENT)
Dept: CT IMAGING | Facility: HOSPITAL | Age: 83
End: 2023-03-27
Payer: MEDICARE

## 2023-03-27 ENCOUNTER — HOSPITAL ENCOUNTER (EMERGENCY)
Facility: HOSPITAL | Age: 83
Discharge: HOME | End: 2023-03-27
Attending: EMERGENCY MEDICINE | Admitting: EMERGENCY MEDICINE
Payer: MEDICARE

## 2023-03-27 VITALS
DIASTOLIC BLOOD PRESSURE: 56 MMHG | OXYGEN SATURATION: 97 % | HEART RATE: 69 BPM | HEIGHT: 73 IN | WEIGHT: 220 LBS | TEMPERATURE: 97.4 F | SYSTOLIC BLOOD PRESSURE: 145 MMHG | BODY MASS INDEX: 29.16 KG/M2 | RESPIRATION RATE: 18 BRPM

## 2023-03-27 DIAGNOSIS — R11.2 NAUSEA AND VOMITING, UNSPECIFIED VOMITING TYPE: ICD-10-CM

## 2023-03-27 DIAGNOSIS — D72.829 LEUKOCYTOSIS, UNSPECIFIED TYPE: ICD-10-CM

## 2023-03-27 DIAGNOSIS — N18.9 CHRONIC RENAL IMPAIRMENT, UNSPECIFIED CKD STAGE: ICD-10-CM

## 2023-03-27 DIAGNOSIS — C79.9 METASTATIC MALIGNANT NEOPLASM, UNSPECIFIED SITE: ICD-10-CM

## 2023-03-27 DIAGNOSIS — S22.41XA CLOSED FRACTURE OF MULTIPLE RIBS OF RIGHT SIDE, INITIAL ENCOUNTER: Primary | ICD-10-CM

## 2023-03-27 LAB
ALBUMIN SERPL-MCNC: 4.6 G/DL (ref 3.5–5.2)
ALBUMIN/GLOB SERPL: 1.4 G/DL
ALP SERPL-CCNC: 97 U/L (ref 39–117)
ALT SERPL W P-5'-P-CCNC: 24 U/L (ref 1–41)
ANION GAP SERPL CALCULATED.3IONS-SCNC: 13.1 MMOL/L (ref 5–15)
AST SERPL-CCNC: 29 U/L (ref 1–40)
BASOPHILS # BLD AUTO: 0.1 10*3/MM3 (ref 0–0.2)
BASOPHILS NFR BLD AUTO: 0.5 % (ref 0–1.5)
BILIRUB SERPL-MCNC: 1.5 MG/DL (ref 0–1.2)
BUN SERPL-MCNC: 27 MG/DL (ref 8–23)
BUN/CREAT SERPL: 20.3 (ref 7–25)
CALCIUM SPEC-SCNC: 10.1 MG/DL (ref 8.6–10.5)
CHLORIDE SERPL-SCNC: 101 MMOL/L (ref 98–107)
CO2 SERPL-SCNC: 24.9 MMOL/L (ref 22–29)
CREAT SERPL-MCNC: 1.33 MG/DL (ref 0.76–1.27)
DEPRECATED RDW RBC AUTO: 39 FL (ref 37–54)
EGFRCR SERPLBLD CKD-EPI 2021: 53 ML/MIN/1.73
EOSINOPHIL # BLD AUTO: 0.24 10*3/MM3 (ref 0–0.4)
EOSINOPHIL NFR BLD AUTO: 1.1 % (ref 0.3–6.2)
ERYTHROCYTE [DISTWIDTH] IN BLOOD BY AUTOMATED COUNT: 10 % (ref 12.3–15.4)
GLOBULIN UR ELPH-MCNC: 3.4 GM/DL
GLUCOSE SERPL-MCNC: 105 MG/DL (ref 65–99)
HCT VFR BLD AUTO: 32.5 % (ref 37.5–51)
HGB BLD-MCNC: 11.1 G/DL (ref 13–17.7)
HOLD SPECIMEN: NORMAL
HOLD SPECIMEN: NORMAL
LIPASE SERPL-CCNC: 43 U/L (ref 13–60)
LYMPHOCYTES # BLD AUTO: 4.53 10*3/MM3 (ref 0.7–3.1)
LYMPHOCYTES NFR BLD AUTO: 20.4 % (ref 19.6–45.3)
MCH RBC QN AUTO: 36.5 PG (ref 26.6–33)
MCHC RBC AUTO-ENTMCNC: 34.2 G/DL (ref 31.5–35.7)
MCV RBC AUTO: 106.9 FL (ref 79–97)
MONOCYTES # BLD AUTO: 2.62 10*3/MM3 (ref 0.1–0.9)
MONOCYTES NFR BLD AUTO: 11.8 % (ref 5–12)
NEUTROPHILS NFR BLD AUTO: 14.55 10*3/MM3 (ref 1.7–7)
NEUTROPHILS NFR BLD AUTO: 65.4 % (ref 42.7–76)
PLATELET # BLD AUTO: 460 10*3/MM3 (ref 140–450)
PMV BLD AUTO: 9.7 FL (ref 6–12)
POTASSIUM SERPL-SCNC: 4.4 MMOL/L (ref 3.5–5.2)
PROT SERPL-MCNC: 8 G/DL (ref 6–8.5)
QT INTERVAL: 379 MS
RBC # BLD AUTO: 3.04 10*6/MM3 (ref 4.14–5.8)
SODIUM SERPL-SCNC: 139 MMOL/L (ref 136–145)
TROPONIN T SERPL HS-MCNC: 51 NG/L
WBC NRBC COR # BLD: 22.21 10*3/MM3 (ref 3.4–10.8)
WHOLE BLOOD HOLD COAG: NORMAL
WHOLE BLOOD HOLD SPECIMEN: NORMAL

## 2023-03-27 PROCEDURE — 84484 ASSAY OF TROPONIN QUANT: CPT | Performed by: EMERGENCY MEDICINE

## 2023-03-27 PROCEDURE — 83690 ASSAY OF LIPASE: CPT | Performed by: EMERGENCY MEDICINE

## 2023-03-27 PROCEDURE — 71101 X-RAY EXAM UNILAT RIBS/CHEST: CPT

## 2023-03-27 PROCEDURE — 96375 TX/PRO/DX INJ NEW DRUG ADDON: CPT

## 2023-03-27 PROCEDURE — 25010000002 ONDANSETRON PER 1 MG: Performed by: EMERGENCY MEDICINE

## 2023-03-27 PROCEDURE — 85025 COMPLETE CBC W/AUTO DIFF WBC: CPT | Performed by: EMERGENCY MEDICINE

## 2023-03-27 PROCEDURE — 80053 COMPREHEN METABOLIC PANEL: CPT | Performed by: EMERGENCY MEDICINE

## 2023-03-27 PROCEDURE — 25010000002 MORPHINE PER 10 MG: Performed by: EMERGENCY MEDICINE

## 2023-03-27 PROCEDURE — 99284 EMERGENCY DEPT VISIT MOD MDM: CPT

## 2023-03-27 PROCEDURE — 74176 CT ABD & PELVIS W/O CONTRAST: CPT

## 2023-03-27 PROCEDURE — 71250 CT THORAX DX C-: CPT

## 2023-03-27 PROCEDURE — 93010 ELECTROCARDIOGRAM REPORT: CPT | Performed by: INTERNAL MEDICINE

## 2023-03-27 PROCEDURE — 96374 THER/PROPH/DIAG INJ IV PUSH: CPT

## 2023-03-27 PROCEDURE — 93005 ELECTROCARDIOGRAM TRACING: CPT | Performed by: EMERGENCY MEDICINE

## 2023-03-27 RX ORDER — LIDOCAINE 50 MG/G
1 PATCH TOPICAL ONCE
Status: DISCONTINUED | OUTPATIENT
Start: 2023-03-27 | End: 2023-03-27 | Stop reason: HOSPADM

## 2023-03-27 RX ORDER — SODIUM CHLORIDE 0.9 % (FLUSH) 0.9 %
10 SYRINGE (ML) INJECTION AS NEEDED
Status: DISCONTINUED | OUTPATIENT
Start: 2023-03-27 | End: 2023-03-27 | Stop reason: HOSPADM

## 2023-03-27 RX ORDER — LIDOCAINE 50 MG/G
1 PATCH TOPICAL
Qty: 15 EACH | Refills: 0 | Status: SHIPPED | OUTPATIENT
Start: 2023-03-27

## 2023-03-27 RX ORDER — ONDANSETRON 2 MG/ML
4 INJECTION INTRAMUSCULAR; INTRAVENOUS ONCE
Status: COMPLETED | OUTPATIENT
Start: 2023-03-27 | End: 2023-03-27

## 2023-03-27 RX ORDER — MORPHINE SULFATE 2 MG/ML
4 INJECTION, SOLUTION INTRAMUSCULAR; INTRAVENOUS ONCE
Status: COMPLETED | OUTPATIENT
Start: 2023-03-27 | End: 2023-03-27

## 2023-03-27 RX ADMIN — ONDANSETRON 4 MG: 2 INJECTION INTRAMUSCULAR; INTRAVENOUS at 16:14

## 2023-03-27 RX ADMIN — SODIUM CHLORIDE, POTASSIUM CHLORIDE, SODIUM LACTATE AND CALCIUM CHLORIDE 500 ML: 600; 310; 30; 20 INJECTION, SOLUTION INTRAVENOUS at 16:12

## 2023-03-27 RX ADMIN — MORPHINE SULFATE 4 MG: 2 INJECTION, SOLUTION INTRAMUSCULAR; INTRAVENOUS at 16:14

## 2023-03-27 RX ADMIN — LIDOCAINE 1 PATCH: 50 PATCH TOPICAL at 18:57

## 2023-03-27 NOTE — ED TRIAGE NOTES
Pt states that he fell approx 1wk ago and reports right rib pain. Pt states that pain is worse and has spread. Reports n/v that started yesterday    This RN wore mask and goggles during time of contact

## 2023-03-27 NOTE — ED PROVIDER NOTES
EMERGENCY DEPARTMENT ENCOUNTER    Room Number:  14/14  Date seen:  3/27/2023  PCP: Dillon Walton MD  Historian: Patient, son      HPI:  Chief Complaint: Right rib pain, vomiting  A complete HPI/ROS/PMH/PSH/SH/FH are unobtainable due to: Nothing  Context: Mg Gerber Sr. is a 82 y.o. male who presents to the ED by private vehicle from home c/o right rib pain and vomiting.  Patient was walking in his yard 1 week ago when he lost his balance and fell.  He landed on his right chest.  He complains of persistent right anterior/lateral chest wall pain since then.  Pain is worse with movement and taking deep breaths.  He has taken hydrocodone without relief.  He did not hit his head or lose consciousness.  Denies other injury.  Patient also complains of nausea and vomiting since yesterday.  He has had multiple episodes of green-colored emesis.  Also complains of constipation.  Last bowel movement was 6 days ago.  He manually disimpacted himself yesterday and removed some small hard stool.  He has not been having flatus.  He reports having some abdominal cramping yesterday but not today.  He has had a previous cholecystectomy.  Denies fever, cough, headache, or dysuria.            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/24/2014   • Herpes zoster 12/06/2011   • Hip pain 03/24/2014   • Essential hypertension    • Metastatic cancer (HCC) 04/29/2014   • Pulmonary emphysema (HCC) 03/24/2014   • Left low back pain 10/28/2015   • Vertigo 02/02/2016   • Headache around the eyes 02/02/2016   • Mild cognitive impairment 02/02/2016   • Metastatic renal cell carcinoma  on q 2 wks chemo 02/15/2016   • Anxiety 05/16/2016   • Malignant neoplasm of lung (HCC) 05/16/2016   • Neuropathy 02/02/2017   • Perennial allergic rhinitis 02/02/2017   • B12 deficiency 03/16/2017   • 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   •  Pharyngitis 12/16/2017   • Bronchitis 05/20/2019   • Drug-induced constipation 06/01/2020   • Hx of hiatal hernia 11/10/2020   • History of lung cancer 11/10/2020   • COVID-19 virus detected 11/11/2020   • Dyspnea 11/11/2020   • Generalized weakness 11/11/2020   • Acute on chronic respiratory failure with hypoxia (HCC) 11/23/2020   • Gastroesophageal reflux disease 11/23/2020   • Hearing disorder 11/23/2020   • Malignant neoplasm of prostate (HCC) 11/23/2020   • Sleep apnea 11/23/2020   • Anemia 11/23/2020   • Malignant neoplasm metastatic to lung (HCC) 11/23/2020   • Secondary bacterial pneumonia 11/23/2020   • Pneumonia due to COVID-19 virus 11/23/2020   • Hilar mass 11/23/2020   • On antineoplastic chemotherapy q 2wks    • Paroxysmal atrial fibrillation (HCC)    • History of pulmonary embolism and DVT    • Chronic nausea 08/04/2021   • Gastritis 01/15/2022   • Muscle spasm 01/15/2022   • Fall 03/11/2022   • Closed fracture of multiple ribs of left side 03/11/2022   • Chest wall pain 03/11/2022   • Fall, initial encounter 03/12/2022   • Immobility syndrome 03/14/2022   • Self-care deficit 03/14/2022   • Cytokine release syndrome, grade 2 03/20/2022   • Dizziness 10/06/2022   • Compression fracture of vertebral column ([facility]) 05/22/2017   • History of chronic lung disease 06/26/2021   • Impairment of balance 02/19/2016   • Lung involvement associated with another disorder ([facility]) 03/23/2016   • Pneumonia 10/06/2022   • Closed fracture of multiple ribs of left side, initial encounter 02/14/2023   • Constipation 02/15/2023   • Leukocytosis 02/15/2023   • H/O splenectomy 02/16/2023     Resolved Ambulatory Problems     Diagnosis Date Noted   • Pulmonary embolism (HCC) 03/24/2014   • Sepsis, unspecified organism (HCC)      Past Medical History:   Diagnosis Date   • Allergic    • Atrial fibrillation (HCC)    • Cancer (HCC) 04/29/2014   • Cataract    • Chemotherapeutic agent or infusion extravasation    • COPD (chronic  obstructive pulmonary disease) (HCC) 2014   • Emphysema of lung (HCC)    • History of Doppler ultrasound 2011   • History of EKG 02/10/2012   • Hypertension    • Injury of back    • Renal cell cancer (HCC)    • Shortness of breath          PAST SURGICAL HISTORY  Past Surgical History:   Procedure Laterality Date   • APPENDECTOMY     • CATARACT EXTRACTION     • CHOLECYSTECTOMY     • SPLENECTOMY     • TONSILLECTOMY     • VEIN SURGERY           FAMILY HISTORY  Family History   Problem Relation Age of Onset   • Aneurysm Mother    • Stroke Mother    • Heart disease Father    • Diabetes Brother         type 2         SOCIAL HISTORY  Social History     Socioeconomic History   • Marital status:    Tobacco Use   • Smoking status: Former     Types: Cigarettes     Quit date:      Years since quittin.2   • Smokeless tobacco: Never   Vaping Use   • Vaping Use: Never used   Substance and Sexual Activity   • Alcohol use: Yes     Comment: 2 drinks month   • Drug use: No   • Sexual activity: Defer         ALLERGIES  Nsaids, Latex, Orange, and Perry oil        REVIEW OF SYSTEMS  Review of Systems     All systems have been reviewed and are negative except as as discussed in the HPI    PHYSICAL EXAM  ED Triage Vitals   Temp Heart Rate Resp BP SpO2   23 1424 23 1424 23 1424 23 1438 23 1424   97.4 °F (36.3 °C) 80 16 109/66 97 %      Temp src Heart Rate Source Patient Position BP Location FiO2 (%)   23 1424 23 1424 23 1438 23 1438 --   Tympanic Monitor Sitting Left arm        Physical Exam      GENERAL: Awake, alert, oriented x3.  Well-developed, well-nourished elderly male.  Resting comfortably in no acute distress  HENT: NCAT, nares patent, mildly dry mucous membranes  EYES: PERRL, EOMI  CV: regular rhythm, normal rate  RESPIRATORY: normal effort, clear to auscultation bilaterally  ABDOMEN: soft, obese, there is epigastric and right upper quadrant  tenderness, no rebound or guarding, no CVA tenderness, bowel sounds are normal  MUSCULOSKELETAL: Extremities are nontender with full range of motion.  There is tenderness over the right lower anterior lateral chest wall.  No signs of trauma to the chest.  No crepitus.  C/T/L-spine are nontender.  NEURO: Speech is normal.  No facial droop.  Normal strength and light touch sensation in all extremities.  PSYCH:  calm, cooperative  SKIN: warm, dry    Vital signs and nursing notes reviewed.          LAB RESULTS  Recent Results (from the past 24 hour(s))   Comprehensive Metabolic Panel    Collection Time: 03/27/23  2:58 PM    Specimen: Blood   Result Value Ref Range    Glucose 105 (H) 65 - 99 mg/dL    BUN 27 (H) 8 - 23 mg/dL    Creatinine 1.33 (H) 0.76 - 1.27 mg/dL    Sodium 139 136 - 145 mmol/L    Potassium 4.4 3.5 - 5.2 mmol/L    Chloride 101 98 - 107 mmol/L    CO2 24.9 22.0 - 29.0 mmol/L    Calcium 10.1 8.6 - 10.5 mg/dL    Total Protein 8.0 6.0 - 8.5 g/dL    Albumin 4.6 3.5 - 5.2 g/dL    ALT (SGPT) 24 1 - 41 U/L    AST (SGOT) 29 1 - 40 U/L    Alkaline Phosphatase 97 39 - 117 U/L    Total Bilirubin 1.5 (H) 0.0 - 1.2 mg/dL    Globulin 3.4 gm/dL    A/G Ratio 1.4 g/dL    BUN/Creatinine Ratio 20.3 7.0 - 25.0    Anion Gap 13.1 5.0 - 15.0 mmol/L    eGFR 53.0 (L) >60.0 mL/min/1.73   Lipase    Collection Time: 03/27/23  2:58 PM    Specimen: Blood   Result Value Ref Range    Lipase 43 13 - 60 U/L   Single High Sensitivity Troponin T    Collection Time: 03/27/23  2:58 PM    Specimen: Blood   Result Value Ref Range    HS Troponin T 51 (H) <15 ng/L   Green Top (Gel)    Collection Time: 03/27/23  2:58 PM   Result Value Ref Range    Extra Tube Hold for add-ons.    Lavender Top    Collection Time: 03/27/23  2:58 PM   Result Value Ref Range    Extra Tube hold for add-on    Gold Top - SST    Collection Time: 03/27/23  2:58 PM   Result Value Ref Range    Extra Tube Hold for add-ons.    Light Blue Top    Collection Time: 03/27/23  2:58 PM    Result Value Ref Range    Extra Tube Hold for add-ons.    CBC Auto Differential    Collection Time: 03/27/23  2:58 PM    Specimen: Blood   Result Value Ref Range    WBC 22.21 (H) 3.40 - 10.80 10*3/mm3    RBC 3.04 (L) 4.14 - 5.80 10*6/mm3    Hemoglobin 11.1 (L) 13.0 - 17.7 g/dL    Hematocrit 32.5 (L) 37.5 - 51.0 %    .9 (H) 79.0 - 97.0 fL    MCH 36.5 (H) 26.6 - 33.0 pg    MCHC 34.2 31.5 - 35.7 g/dL    RDW 10.0 (L) 12.3 - 15.4 %    RDW-SD 39.0 37.0 - 54.0 fl    MPV 9.7 6.0 - 12.0 fL    Platelets 460 (H) 140 - 450 10*3/mm3    Neutrophil % 65.4 42.7 - 76.0 %    Lymphocyte % 20.4 19.6 - 45.3 %    Monocyte % 11.8 5.0 - 12.0 %    Eosinophil % 1.1 0.3 - 6.2 %    Basophil % 0.5 0.0 - 1.5 %    Neutrophils, Absolute 14.55 (H) 1.70 - 7.00 10*3/mm3    Lymphocytes, Absolute 4.53 (H) 0.70 - 3.10 10*3/mm3    Monocytes, Absolute 2.62 (H) 0.10 - 0.90 10*3/mm3    Eosinophils, Absolute 0.24 0.00 - 0.40 10*3/mm3    Basophils, Absolute 0.10 0.00 - 0.20 10*3/mm3   ECG 12 Lead ED Triage Standing Order; Abdominal Pain (Upper)    Collection Time: 03/27/23  3:03 PM   Result Value Ref Range    QT Interval 379 ms       Ordered the above labs and reviewed the results.        RADIOLOGY  XR Ribs Right With PA Chest    Result Date: 3/27/2023  XR RIBS RIGHT W PA CHEST-  03/27/2023  HISTORY: Fell, right rib pain.  Heart size is at the upper limits of normal. There is bilateral vascular congestion. No pneumothorax is seen. Mediport catheter seen in good position.  There is a mildly displaced fracture of the lateral aspect of the right sixth rib. Minimal deformity of the lateral aspect of the right fifth rib is seen and this may be a nondisplaced fracture.  No focal infiltrates are seen.      1. Mildly displaced right sixth rib fracture and there may be an nondisplaced or minimally displaced right fifth rib fracture. 2. Borderline cardiomegaly and mild vascular congestion.  This report was finalized on 3/27/2023 3:47 PM by Dr. Menard  JAYDEN Decker.      CT Chest Without Contrast Diagnostic, CT Abdomen Pelvis Without Contrast    Result Date: 3/27/2023  CT CHEST, ABDOMEN, AND PELVIS WITHOUT IV CONTRAST  HISTORY: Fall with right anterior and lateral rib pain.  TECHNIQUE: Radiation dose reduction techniques were utilized, including automated exposure control and exposure modulation based on body size. CT of the chest, abdomen, and pelvis includes axial imaging from the thoracic inlet through the trochanters without intravenous contrast and without oral contrast.  COMPARISON: CT chest, abdomen and pelvis 02/14/2023, CT chest 10/07/2022, PA chest and right rib series 03/27/2023  FINDINGS: CHEST: There are acute fractures of the right lateral 4th through 7th ribs of which the 7th rib fracture is minimally displaced. No additional fracture is evident. There is trace pleural fluid at the right posterior costophrenic angle. There is no pneumothorax. Pulmonary emphysema is present.  The right IJ Mediport tip extends barely into the right atrium. There are masses adjacent to the thoracic spine within the paraspinal fat without change. Left hilar mass measures approximately 5.6 x 4.2 cm and this is without change. A left epicardial fat mass measures 3.1 x 2.8 cm and this is without change. There is a nodule within the subcutaneous fat in the posterior left back measuring 1.3 cm this is without change. No new pulmonary nodule has developed. There are healing left anterolateral 8th and 9th rib fractures.  ABDOMEN/PELVIS: Within the left upper quadrant there is a 3.6 x 3 cm mass and this is increased in size from 2.3 x 1.9 cm on the prior exam 02/14/2023. Bilateral adrenal masses are without change. There has been left nephrectomy. Right kidney appears within normal limits. There is a nodule within the fat posterolateral to the right renal lower pole measuring 3.9 cm and this is without change. Additional mesenteric and peritoneal nodules are without change. A  right pelvic nodule anteromedial to the right common iliac artery measures 1.6 cm. There are several additional soft tissue nodules. There has been left total hip arthroplasty.  Pancreas appears within normal limits. There is hepatic cirrhotic morphology. Colonic diverticulosis is present without evidence for diverticulitis.      1. Acute right lateral 4th through 7th rib fractures. Trace right pleural fluid at the right posterior costophrenic angle. No pneumothorax. 2. Multifocal metastatic disease within the chest, abdomen, and pelvis is largely unchanged compared to the prior exam 02/14/2023. However, there is a left upper quadrant abdominal nodule that has increased in size now measuring 3.6 x 3 cm and previously measured 2.3 x 1.9 cm. 3. Healing left anterolateral 8th and 9th rib fractures are demonstrated to be acute on the prior exam. 4. No evidence for traumatic abnormality to the abdomen or pelvis. 5. Hepatic cirrhotic morphology.   Radiation dose reduction techniques were utilized, including automated exposure control and exposure modulation based on body size.  This report was finalized on 3/27/2023 6:01 PM by Dr. Andrea Lackey M.D.        Ordered the above noted radiological studies. Reviewed by me in PACS.            PROCEDURES  Procedures              MEDICATIONS GIVEN IN ER  Medications   sodium chloride 0.9 % flush 10 mL (has no administration in time range)   sodium chloride 0.9 % flush 10 mL (has no administration in time range)   lidocaine (LIDODERM) 5 % 1 patch (1 patch Transdermal Medication Applied 3/27/23 1857)   lactated ringers bolus 500 mL (0 mL Intravenous Stopped 3/27/23 1650)   ondansetron (ZOFRAN) injection 4 mg (4 mg Intravenous Given 3/27/23 1614)   morphine injection 4 mg (4 mg Intravenous Given 3/27/23 1614)                   MEDICAL DECISION MAKING, PROGRESS, and CONSULTS    All labs have been independently reviewed by me.  All radiology studies have been reviewed by me and I  have also reviewed the radiology report.   EKG's independently viewed and interpreted by me.  Discussion below represents my analysis of pertinent findings related to patient's condition, differential diagnosis, treatment plan and final disposition.      Additional sources:  - Discussed/ obtained information from independent historians: Son at bedside    - External (non-ED) record review: Patient was admitted here last month for multiple left-sided rib fractures.  He followed up with Dr. Walton, his PCP, on 3/15/2023    - Chronic or social conditions impacting care: N/A          Orders placed during this visit:  Orders Placed This Encounter   Procedures   • XR Ribs Right With PA Chest   • CT Chest Without Contrast Diagnostic   • CT Abdomen Pelvis Without Contrast   • [place] Draw   • Comprehensive Metabolic Panel   • Lipase   • Single High Sensitivity Troponin T   • Urinalysis With Microscopic If Indicated (No Culture) - Urine, Clean Catch   • CBC Auto Differential   • NPO Diet NPO Type: Strict NPO   • Undress & Gown   • Cardiac Monitoring   • Continuous Pulse Oximetry   • Vital Signs   • Oxygen Therapy- Nasal Cannula; 2 LPM; Titrate for SPO2: 92%, Greater Than or Equal To   • ECG 12 Lead ED Triage Standing Order; Abdominal Pain (Upper)   • Insert Peripheral IV   • Insert Peripheral IV   • CBC & Differential   • Green Top (Gel)   • Lavender Top   • Gold Top - SST   • Light Blue Top         Additional orders considered but not ordered:  N/A        Differential diagnosis:    Gastritis, gastroenteritis, bowel obstruction, pancreatitis, acute coronary syndrome, rib fracture, chest contusion      Independent interpretation of labs, radiology studies, and discussions with consultants:  ED Course as of 03/27/23 1916   Mon Mar 27, 2023   1521 EKG personally interpreted by me.  My personal interpretation is:          EKG time: 1503  Rhythm/Rate: Sinus rhythm, rate 74  P waves and NV: Normal  QRS, axis: Normal  ST and T  waves: Normal    Interpreted Contemporaneously by me at 1505, independently viewed  EKG is not changed compared to prior EKG done on 2/14/2023   [WH]   1521 WBC(!): 22.21  White blood cell count was 18.9 six days ago [WH]   1521 Hemoglobin(!): 11.1  Hemoglobin was 9.9 six days ago [WH]   1523 Chest x-ray/right rib x-rays personally interpreted by me.  My personal interpretation is: Lungs are clear.  No pneumothorax.  Questionable fracture of the right sixth rib. [WH]   1549 HS Troponin T(!): 51 [WH]   1549 Creatinine(!): 1.33  1.1 one month ago [WH]   1549 Lipase: 43 [WH]   1549 ALT (SGPT): 24 [WH]   1549 AST (SGOT): 29 [WH]   1549 Alkaline Phosphatase: 97 [WH]   1549 Total Bilirubin(!): 1.5 [WH]   1553 Per the radiologist, there is a mildly displaced fracture of the right sixth rib and possibly the right fifth rib. [WH]   1625 HS Troponin T(!): 51  54 one month ago [WH]   1808 Per the radiologist, chest CT shows acute fractures of the right lateral fourth through seventh ribs.  There is no pneumothorax.  There is multifocal metastatic disease within the chest, abdomen, and pelvis which is largely unchanged compared to prior exam.  There is no bowel obstruction. [WH]   1815 Test results discussed with the patient and his son.  CT of the chest shows 4 right-sided rib fractures.  Patient fell 1 week ago.  CT abdomen did not show any evidence of a bowel obstruction.  Shared decision-making was discussed and the patient was offered admission.  He wants to go home.  He has hydrocodone at home that he takes regularly for cancer pain.  He also has Zofran at home.  He was encouraged to use his incentive spirometer (which he has from a prior admission).  He will be discharged with a prescription for lidocaine patches.  Return precautions were discussed.  He was advised follow-up with his PCP. [WH]   1830 Patient presented to the ED complaining of nausea and vomiting since yesterday.  He also complained of right lateral chest  wall pain since falling last week.  CT of the chest showed 4 right-sided rib fractures.  There was no pneumothorax.  CT abdomen/pelvis showed stable metastatic disease but no evidence of a bowel obstruction or perforation or fecal impaction.  Patient's white blood cell count was elevated.  Creatinine was stable.  EKG was unchanged.  Troponin was stable.  Patient's pain improved with IV morphine.  Patient was offered admission for pain control but he declined. [WH]      ED Course User Index  [WH] Sheldon Mccoy MD               DIAGNOSIS  Final diagnoses:   Closed fracture of multiple ribs of right side, initial encounter   Nausea and vomiting, unspecified vomiting type   Leukocytosis, unspecified type  Constipation   Chronic renal impairment, unspecified CKD stage   Metastatic malignant neoplasm, unspecified site (HCC)         DISPOSITION  DISCHARGE    Patient discharged in stable condition.    Reviewed implications of results, diagnosis, meds, responsibility to follow up, warning signs and symptoms of possible worsening, potential complications and reasons to return to ER, including worsening pain, persistent vomiting, fever, abdominal pain, or other concern.    Patient/Family voiced understanding of above instructions.    Discussed plan for discharge, as there is no emergent indication for admission. Patient referred to primary care provider for BP management due to today's BP. Pt/family is agreeable and understands need for follow up and repeat testing.  Pt is aware that discharge does not mean that nothing is wrong but it indicates no emergency is present that requires admission and they must continue care with follow-up as given below or physician of their choice.     FOLLOW-UP  Dillon Walton MD  [id number] [address] [address] [phone]    Schedule an appointment as soon as possible for a visit            Medication List      Changed    lidocaine 5 %  Commonly known as:  LIDODERM  Place 1 patch on the skin as directed by provider Daily. Remove & Discard patch within 12 hours  What changed: additional instructions           Where to Get Your Medications      These medications were sent to [facility] [id number] - [place], [place] - [address] AT [facility] [address] [facility] - [phone]  - [phone] FX  [address] , [facility] [address]    Phone: [phone]   · lidocaine 5 %                   Latest Documented Vital Signs:  As of 19:16 EDT  BP- 145/56 HR- 69 Temp- 97.4 °F (36.3 °C) (Tympanic) O2 sat- 97%              --    Please note that portions of this were completed with a voice recognition program.       Note Disclaimer: At [facility], we believe that sharing information builds trust and better relationships. You are receiving this note because you are receiving care at [facility] or recently visited. It is possible you will see health information before a provider has talked with you about it. This kind of information can be easy to misunderstand. To help you fully understand what it means for your health, we urge you to discuss this note with your provider.           Sheldon Mccoy MD  03/27/23 1916

## 2023-03-27 NOTE — ED TRIAGE NOTES
Pt fell last Tuesday and c/o right rib pain.  yest am he started vomiting - it resolved.  thia am he started vomiting again    Patient was placed in face mask during first look triage.  Patient was wearing a face mask throughout encounter.  I wore personal protective equipment throughout the encounter.  Hand hygiene was performed before and after patient encounter.

## 2023-03-27 NOTE — DISCHARGE INSTRUCTIONS
Continue taking your pain and nausea medications regularly.  Take MiraLAX daily.  Use lidocaine patches as prescribed.  Use your incentive spirometer at least once an hour while awake.  Return to the emergency department for fever, shortness of breath, abdominal pain, persistent vomiting, or other concern.  Follow-up with your primary care doctor soon as possible.

## 2023-05-03 DIAGNOSIS — R09.89 RUNNY NOSE: ICD-10-CM

## 2023-05-03 RX ORDER — IPRATROPIUM BROMIDE 42 UG/1
SPRAY, METERED NASAL
Qty: 15 ML | Refills: 11 | Status: SHIPPED | OUTPATIENT
Start: 2023-05-03

## 2024-02-01 NOTE — DISCHARGE PLACEMENT REQUEST
"Storm Koch Sr. (81 y.o. Male)     Date of Birth   1940    Social Security Number       Address   [address] [place] [address]    Home Phone   [phone]    MRN   [id number]       Shinto   [place]    Marital Status                               Admission Date   10/6/22    Admission Type   Emergency    Admitting Provider   Coco Moore MD    Attending Provider   Oh Dye MD    Department, Room/Bed   [address], [address]/1       Discharge Date       Discharge Disposition       Discharge Destination                               Attending Provider: Oh Dye MD    Allergies: Nsaids, Orange, Orange Oil, Latex    Isolation: None   Infection: None   Code Status: CPR    Ht: 182 cm (71.65\")   Wt: 101 kg (223 lb 4.8 oz)    Admission Cmt: None   Principal Problem: Dizziness [R42]                 Active Insurance as of 10/6/2022     Primary Coverage     Payor Plan Insurance Group Employer/Plan Group    HUMANA MEDICARE REPLACEMENT HUMANA MEDICARE REPLACEMENT [id number]     Payor Plan Address Payor Plan Phone Number Payor Plan Fax Number Effective Dates    [address] [address] [phone]  1/1/2013 - None Entered    [facility] [address]       Subscriber Name Subscriber Birth Date Member ID       STORM KOCH SR. 1940 [id number]                 Emergency Contacts      (Rel.) Home Phone Work Phone Mobile Phone    Lucina Koch (Spouse) [phone] -- --    Storm Koch (Son) [phone] -- --    Romina Koch (Daughter) [phone] -- --            " For fever/pain:  400 mg of ibuprofen every 6 hours   650 mg of acetaminophen every 4 hours    Take LOTS of fluids; if he's not eating, the liquids should have both sugar and electrolytes (Pedialyte would be a good option in that case)    Return with severe pain, inability to drink, or other new concerns.  Otherwise, follow up with your PCP in 2-3 days.      Feel better!  ~Dr Lenoard